# Patient Record
Sex: FEMALE | Race: WHITE | NOT HISPANIC OR LATINO | Employment: OTHER | URBAN - METROPOLITAN AREA
[De-identification: names, ages, dates, MRNs, and addresses within clinical notes are randomized per-mention and may not be internally consistent; named-entity substitution may affect disease eponyms.]

---

## 2017-01-03 ENCOUNTER — OFFICE VISIT (OUTPATIENT)
Dept: MEDICAL GROUP | Facility: PHYSICIAN GROUP | Age: 62
End: 2017-01-03
Payer: COMMERCIAL

## 2017-01-03 VITALS
RESPIRATION RATE: 16 BRPM | BODY MASS INDEX: 35.88 KG/M2 | WEIGHT: 178 LBS | TEMPERATURE: 97.9 F | DIASTOLIC BLOOD PRESSURE: 80 MMHG | HEART RATE: 86 BPM | SYSTOLIC BLOOD PRESSURE: 116 MMHG | OXYGEN SATURATION: 98 % | HEIGHT: 59 IN

## 2017-01-03 DIAGNOSIS — D70.9 NEUTROPENIA, UNSPECIFIED TYPE (HCC): ICD-10-CM

## 2017-01-03 DIAGNOSIS — B38.2 COCCIDIOIDOMYCOSIS, PULMONARY (HCC): ICD-10-CM

## 2017-01-03 DIAGNOSIS — Z23 NEED FOR PNEUMOCOCCAL VACCINATION: ICD-10-CM

## 2017-01-03 DIAGNOSIS — R74.8 ELEVATED LIVER ENZYMES: ICD-10-CM

## 2017-01-03 DIAGNOSIS — E78.5 DYSLIPIDEMIA: ICD-10-CM

## 2017-01-03 PROCEDURE — 99214 OFFICE O/P EST MOD 30 MIN: CPT | Mod: 25 | Performed by: FAMILY MEDICINE

## 2017-01-03 PROCEDURE — 90471 IMMUNIZATION ADMIN: CPT | Performed by: FAMILY MEDICINE

## 2017-01-03 PROCEDURE — 90670 PCV13 VACCINE IM: CPT | Performed by: FAMILY MEDICINE

## 2017-01-03 RX ORDER — NAPROXEN 500 MG/1
500 TABLET ORAL 2 TIMES DAILY WITH MEALS
COMMUNITY
End: 2017-05-22

## 2017-01-03 RX ORDER — ASCORBIC ACID 500 MG
1000 TABLET ORAL DAILY
Status: ON HOLD | COMMUNITY
End: 2018-10-09

## 2017-01-03 RX ORDER — HYDROCODONE BITARTRATE AND ACETAMINOPHEN 10; 325 MG/1; MG/1
1-2 TABLET ORAL EVERY 6 HOURS PRN
COMMUNITY
End: 2017-05-22

## 2017-01-03 RX ORDER — CLONAZEPAM 0.5 MG/1
0.25 TABLET ORAL 2 TIMES DAILY
Status: ON HOLD | COMMUNITY
End: 2017-06-07

## 2017-01-03 RX ORDER — SUMATRIPTAN 25 MG/1
25-100 TABLET, FILM COATED ORAL
Status: ON HOLD | COMMUNITY
End: 2017-06-07

## 2017-01-03 NOTE — MR AVS SNAPSHOT
"Dee Armstrong   1/3/2017 8:00 AM   Office Visit   MRN: [id number]    Department:  [facility]   Dept Phone:  [phone]    Description:  Female : 1955   Provider:  Sherly Calvert M.D.           Reason for Visit     Follow-Up 3 Month Follow Up       Allergies as of 1/3/2017     Allergen Noted Reactions    Nkda [No Known Drug Allergy] 2010         You were diagnosed with     Coccidioidomycosis, pulmonary (HCC)   [883397]       Dyslipidemia   [483605]       Elevated liver enzymes   [991839]       Neutropenia, unspecified type (HCC)   [5558373]       Need for pneumococcal vaccination   [337443]         Vital Signs     Blood Pressure Pulse Temperature Respirations Height Weight    116/80 mmHg 86 36.6 °C (97.9 °F) 16 1.499 m (4' 11.02\") 80.74 kg (178 lb)    Body Mass Index Oxygen Saturation Breastfeeding? Smoking Status          35.93 kg/m2 98% No Former Smoker        Basic Information     Date Of Birth Sex Race Ethnicity Preferred Language    1955 Female White Non- English      Your appointments     2017  1:00 PM   Established Patient with Sherly Calvert M.D.   [facility] (--)    [address]  [address]  [facility] [address] [phone]           You will be receiving a confirmation call a few days before your appointment from our automated call confirmation system.              Problem List              ICD-10-CM Priority Class Noted - Resolved    Obesity E66.9 Low  Unknown - Present    GERD (gastroesophageal reflux disease) K21.9 Low  Unknown - Present    Diverticulosis K57.90 Low  Unknown - Present    Low back pain M54.5 Medium  Unknown - Present    Vitamin D deficiency E55.9 Low  2013 - Present    Chest pain R07.9   2015 - Present    Cough R05   2015 - Present    Lumbar radicular pain M54.16 Medium  2015 - Present    DDD (degenerative disc disease), lumbar M51.36 High  2016 - Present    Anemia D64.9 Medium Acute 2016 - " Present    Hypokalemia E87.6 Medium Acute 2/18/2016 - Present    Coccidioidomycosis, pulmonary (HCC) B38.2   7/19/2016 - Present    Coccidioidomycosis B38.9   11/30/2016 - Present    Incomplete rotator cuff tear or rupture of right shoulder, not specified as traumatic M75.111   12/6/2016 - Present      Health Maintenance        Date Due Completion Dates    IMM INFLUENZA (1) 9/1/2016 9/6/2015    MAMMOGRAM 4/29/2017 4/29/2016, 4/28/2015, 4/14/2014, 4/10/2013, 4/5/2012, 1/13/2011, 12/16/2009    PAP SMEAR 7/6/2018 7/6/2015, 7/11/2011, 6/10/2009    COLONOSCOPY 9/29/2021 9/29/2011    IMM DTaP/Tdap/Td Vaccine (2 - Td) 12/9/2024 12/9/2014            Current Immunizations     13-VALENT PCV PREVNAR  Incomplete    Influenza Vaccine Adult HD 9/6/2015    Influenza Vaccine Quad Inj (Pf) 11/10/2016    SHINGLES VACCINE 12/9/2013    Tdap Vaccine 12/9/2014      Below and/or attached are the medications your provider expects you to take. Review all of your home medications and newly ordered medications with your provider and/or pharmacist. Follow medication instructions as directed by your provider and/or pharmacist. Please keep your medication list with you and share with your provider. Update the information when medications are discontinued, doses are changed, or new medications (including over-the-counter products) are added; and carry medication information at all times in the event of emergency situations     Allergies:  NKDA - (reactions not documented)               Medications  Valid as of: January 03, 2017 -  8:31 AM    Generic Name Brand Name Tablet Size Instructions for use    Acetaminophen (Tab) TYLENOL 500 MG Take 500-1,000 mg by mouth every 6 hours as needed.        Ascorbic Acid (Tab) ascorbic acid 500 MG Take 500 mg by mouth every day.        Aspirin-Acetaminophen-Caffeine (Tab) EXCEDRIN 250-250-65 MG Take 1 Tab by mouth every 6 hours as needed for Headache.        Calcium   Take 3 Tabs by mouth every evening.         Cholecalciferol (Cap) Vitamin D 2000 UNITS Take 1 Cap by mouth every day.        Cholecalciferol (Tab) cholecalciferol 1000 UNIT Take 1,000 Units by mouth every day.        ClonazePAM (Tab) KLONOPIN 0.5 MG Take 0.25 mg by mouth 2 times a day.        Clotrimazole-Betamethasone (Cream) LOTRISONE 1-0.05 % Apply 1 Application to affected area(s) 2 Times a Day.        Fiber   Take  by mouth every day.        Hydrocodone-Acetaminophen (Tab) NORCO  MG Take 1-2 Tabs by mouth every 6 hours as needed.        Lansoprazole (CAPSULE DELAYED RELEASE) PREVACID 30 MG TAKE 1 CAPSULE DAILY        Multiple Vitamins-Minerals (Tab) THERAGRAN-M  Take 1 Tab by mouth every day.        Naproxen (Tab) NAPROSYN 500 MG Take 500 mg by mouth 2 times a day, with meals.        Polyethylene Glycol 3350 (Pack) MIRALAX  Take 1 Packet by mouth every day.        SUMAtriptan Succinate (Tab) IMITREX 25 MG Take  mg by mouth Once PRN for Migraine.        .                 Medicines prescribed today were sent to:     [facility]ARMACY #[id number] - [place] - [address]    [address] [place] [address]    Phone: [phone] Fax: [phone]    Open 24 Hours?: No    [facility] [place], [place] - [address] AT PORTAL TO REGISTERED [facility] SITES    [address] [facility] [address]    Phone: [phone] Fax: [phone]    Open 24 Hours?: No      Medication refill instructions:       If your prescription bottle indicates you have medication refills left, it is not necessary to call your provider’s office. Please contact your pharmacy and they will refill your medication.    If your prescription bottle indicates you do not have any refills left, you may request refills at any time through one of the following ways: The online [facility] system (except Urgent Care), by calling your provider’s office, or by asking your pharmacy to contact your provider’s office with a refill request. Medication refills are processed  only during regular business hours and may not be available until the next business day. Your provider may request additional information or to have a follow-up visit with you prior to refilling your medication.   *Please Note: Medication refills are assigned a new Rx number when refilled electronically. Your pharmacy may indicate that no refills were authorized even though a new prescription for the same medication is available at the pharmacy. Please request the medicine by name with the pharmacy before contacting your provider for a refill.        Your To Do List     Future Labs/Procedures Complete By Expires    COMP METABOLIC PANEL  As directed 1/4/2018    HEP A AB IGM  As directed 1/4/2018    HEP A AB TOTAL  As directed 1/4/2018    HEP B SURFACE AB  As directed 1/4/2018    HEP B SURFACE ANTIGEN  As directed 1/4/2018    HEP C VIRUS ANTIBODY  As directed 1/4/2018    LIPID PROFILE  As directed 1/4/2018         [employer] Access Code: Activation code not generated  Current [employer] Status: Active

## 2017-01-04 NOTE — PATIENT INSTRUCTIONS
Patient instructions given regarding labs, x-rays, medications, referral and followup appointment.

## 2017-01-04 NOTE — PROGRESS NOTES
"Subjective:      Dee Armstrong is a 61 y.o. female who presents with Follow-Up            HPI     Follow-up of her medical problems.    For her pulmonary coccidioidomycosis, pulmonologist discontinued her Diflucan in 11/30 after she has been on it for a year. Most recent chest x-ray at the end of November 2016 came back mass in the left lung has not changed from before. The recommendation is to do annual chest x-ray. She has been asymptomatic even from the time of diagnosis.    She continues to manage her dyslipidemia with her own efforts. When we did her blood work in 9/16 her 10 year cardiovascular disease risk was less than 7.5%.     We have seen mild elevation of her alkaline phosphatase which started after she had her back surgery in 2/16. Patient already had her gallbladder removed a few years ago. She denies any abdominal pain, nausea, vomiting. She consumes wine about one glass per month.    We also did a follow-up CBC because she had slightly low WBC count 3 months ago. She has not had any recurrent infections.    She needs Prevnar 13 today. Her pulmonologist recommended that she gets both the Prevnar 13 and Pneumovax.    Past medical history, past surgical history, family history reviewed-no changes    Social history reviewed-no changes    Allergies reviewed-no changes    Medications reviewed-no changes    ROS     Review of systems as per history of present illness, the rest are negative.     Objective:     /80 mmHg  Pulse 86  Temp(Src) 36.6 °C (97.9 °F)  Resp 16  Ht 1.499 m (4' 11.02\")  Wt 80.74 kg (178 lb)  BMI 35.93 kg/m2  SpO2 98%  Breastfeeding? No     Physical Exam     Examined alert, awake, oriented, not in distress    Neck-supple, no lymphadenopathy, no thyromegaly  Lungs-clear to auscultation, no rales, no wheezes  Heart-regular rate and rhythm, no murmur  Extremities-no edema, clubbing, cyanosis     Hospital Outpatient Visit on 12/16/2016   Component Date Value   • WBC " 12/16/2016 7.5 previously 3.6    • RBC 12/16/2016 4.16*   • Hemoglobin 12/16/2016 13.3    • Hematocrit 12/16/2016 41.7    • MCV 12/16/2016 100.2*   • MCH 12/16/2016 32.0    • MCHC 12/16/2016 31.9*   • RDW 12/16/2016 49.5    • Platelet Count 12/16/2016 345    • MPV 12/16/2016 9.3    • Neutrophils-Polys 12/16/2016 69.30    • Lymphocytes 12/16/2016 21.10*   • Monocytes 12/16/2016 5.80    • Eosinophils 12/16/2016 2.40    • Basophils 12/16/2016 1.10    • Immature Granulocytes 12/16/2016 0.30    • Nucleated RBC 12/16/2016 0.00    • Neutrophils (Absolute) 12/16/2016 5.23    • Lymphs (Absolute) 12/16/2016 1.59    • Monos (Absolute) 12/16/2016 0.44    • Eos (Absolute) 12/16/2016 0.18    • Baso (Absolute) 12/16/2016 0.08    • Immature Granulocytes (a* 12/16/2016 0.02    • NRBC (Absolute) 12/16/2016 0.00    • Sodium 12/16/2016 138    • Potassium 12/16/2016 4.6    • Chloride 12/16/2016 103    • Co2 12/16/2016 27    • Anion Gap 12/16/2016 8.0    • Glucose 12/16/2016 86    • Bun 12/16/2016 26*   • Creatinine 12/16/2016 0.72    • Calcium 12/16/2016 9.9    • AST(SGOT) 12/16/2016 20    • ALT(SGPT) 12/16/2016 52*previously 18    • Alkaline Phosphatase 12/16/2016 130*previously 104    • Total Bilirubin 12/16/2016 0.4    • Albumin 12/16/2016 3.9    • Total Protein 12/16/2016 7.2    • Globulin 12/16/2016 3.3    • A-G Ratio 12/16/2016 1.2    • GFR If  12/16/2016 >60    • GFR If Non  Ameri* 12/16/2016 >60         Assessment/Plan:     1. Coccidioidomycosis, pulmonary (HCC)  She was treated with Diflucan for a total of one year. She finished treatment. Her chest x-ray has been stable without change in the lung mass from the pulmonary coccidioidomycosis. She will follow up with the pulmonologist on a yearly basis with her yearly chest x-ray. She is asymptomatic.  - LIPID PROFILE; Future  - COMP METABOLIC PANEL; Future  - HEP B SURFACE ANTIGEN; Future  - HEP C VIRUS ANTIBODY; Future  - HEP A AB IGM; Future  - HEP B  SURFACE AB; Future  - HEP A AB TOTAL; Future    2. Dyslipidemia  Her last blood work in September 2016 came back less than 7.5% 10 year cardiovascular disease risk. We will continue to have her manage this with her own efforts. We will do follow-up blood work next visit.  - LIPID PROFILE; Future  - COMP METABOLIC PANEL; Future  - HEP B SURFACE ANTIGEN; Future  - HEP C VIRUS ANTIBODY; Future  - HEP A AB IGM; Future  - HEP B SURFACE AB; Future  - HEP A AB TOTAL; Future    3. Elevated liver enzymes  Her alkaline phosphatase is higher than before. This is probably because she just had arthroscopic surgery of her shoulder. Her ALT is now elevated. I will check viral hepatitis panel to rule out hepatitis A, B, C. Consider further evaluation with abdominal ultrasound depending on results.  - LIPID PROFILE; Future  - COMP METABOLIC PANEL; Future  - HEP B SURFACE ANTIGEN; Future  - HEP C VIRUS ANTIBODY; Future  - HEP A AB IGM; Future  - HEP B SURFACE AB; Future  - HEP A AB TOTAL; Future    4. Neutropenia, unspecified type (HCC)  This has resolved. We don't need to do any further intervention.  - LIPID PROFILE; Future  - COMP METABOLIC PANEL; Future  - HEP B SURFACE ANTIGEN; Future  - HEP C VIRUS ANTIBODY; Future  - HEP A AB IGM; Future  - HEP B SURFACE AB; Future  - HEP A AB TOTAL; Future    5. Need for pneumococcal vaccination  Prevnar 13 was given.  - PNEUMOCOCCAL CONJUGATE VACCINE 13-VALENT      Please note that this dictation was created using voice recognition software. I have worked with consultants from the vendor as well as technical experts from [facility]  [employer] to optimize the interface. I have made every reasonable attempt to correct obvious errors, but I expect that there are errors of grammar and possibly content I did not discover before finalizing the note.

## 2017-02-21 RX ORDER — LANSOPRAZOLE 30 MG/1
CAPSULE, DELAYED RELEASE ORAL
Qty: 90 CAP | Refills: 1 | Status: ON HOLD | OUTPATIENT
Start: 2017-02-21 | End: 2017-08-11 | Stop reason: SDUPTHER

## 2017-03-29 ENCOUNTER — HOSPITAL ENCOUNTER (OUTPATIENT)
Dept: RADIOLOGY | Facility: MEDICAL CENTER | Age: 62
End: 2017-03-29
Attending: FAMILY MEDICINE
Payer: COMMERCIAL

## 2017-03-29 ENCOUNTER — HOSPITAL ENCOUNTER (OUTPATIENT)
Dept: LAB | Facility: MEDICAL CENTER | Age: 62
End: 2017-03-29
Attending: FAMILY MEDICINE
Payer: COMMERCIAL

## 2017-03-29 ENCOUNTER — OFFICE VISIT (OUTPATIENT)
Dept: URGENT CARE | Facility: PHYSICIAN GROUP | Age: 62
End: 2017-03-29
Payer: COMMERCIAL

## 2017-03-29 VITALS
OXYGEN SATURATION: 97 % | HEART RATE: 89 BPM | RESPIRATION RATE: 16 BRPM | WEIGHT: 177 LBS | DIASTOLIC BLOOD PRESSURE: 80 MMHG | TEMPERATURE: 99.9 F | BODY MASS INDEX: 35.68 KG/M2 | SYSTOLIC BLOOD PRESSURE: 140 MMHG | HEIGHT: 59 IN

## 2017-03-29 DIAGNOSIS — B38.2 COCCIDIOIDOMYCOSIS, PULMONARY (HCC): ICD-10-CM

## 2017-03-29 DIAGNOSIS — R06.2 WHEEZE: ICD-10-CM

## 2017-03-29 DIAGNOSIS — E78.5 DYSLIPIDEMIA: ICD-10-CM

## 2017-03-29 DIAGNOSIS — R74.8 ELEVATED LIVER ENZYMES: ICD-10-CM

## 2017-03-29 DIAGNOSIS — R19.7 DIARRHEA OF PRESUMED INFECTIOUS ORIGIN: ICD-10-CM

## 2017-03-29 DIAGNOSIS — Z86.19 HISTORY OF COCCIDIOIDOMYCOSIS: ICD-10-CM

## 2017-03-29 DIAGNOSIS — R05.9 COUGH: ICD-10-CM

## 2017-03-29 DIAGNOSIS — D70.9 NEUTROPENIA, UNSPECIFIED TYPE (HCC): ICD-10-CM

## 2017-03-29 LAB
ALBUMIN SERPL BCP-MCNC: 3.9 G/DL (ref 3.2–4.9)
ALBUMIN/GLOB SERPL: 1.2 G/DL
ALP SERPL-CCNC: 89 U/L (ref 30–99)
ALT SERPL-CCNC: 14 U/L (ref 2–50)
ANION GAP SERPL CALC-SCNC: 8 MMOL/L (ref 0–11.9)
AST SERPL-CCNC: 18 U/L (ref 12–45)
BILIRUB SERPL-MCNC: 0.3 MG/DL (ref 0.1–1.5)
BUN SERPL-MCNC: 11 MG/DL (ref 8–22)
CALCIUM SERPL-MCNC: 9.1 MG/DL (ref 8.5–10.5)
CHLORIDE SERPL-SCNC: 106 MMOL/L (ref 96–112)
CHOLEST SERPL-MCNC: 173 MG/DL (ref 100–199)
CO2 SERPL-SCNC: 26 MMOL/L (ref 20–33)
CREAT SERPL-MCNC: 0.54 MG/DL (ref 0.5–1.4)
GLOBULIN SER CALC-MCNC: 3.2 G/DL (ref 1.9–3.5)
GLUCOSE SERPL-MCNC: 89 MG/DL (ref 65–99)
HAV IGM SERPL QL IA: NEGATIVE
HBV SURFACE AB SER RIA-ACNC: 3.37 MIU/ML (ref 0–10)
HBV SURFACE AG SERPL QL IA: NEGATIVE
HCV AB S/CO SERPL IA: NEGATIVE
HDLC SERPL-MCNC: 59 MG/DL
LDLC SERPL CALC-MCNC: 91 MG/DL
POTASSIUM SERPL-SCNC: 3.7 MMOL/L (ref 3.6–5.5)
PROT SERPL-MCNC: 7.1 G/DL (ref 6–8.2)
SODIUM SERPL-SCNC: 140 MMOL/L (ref 135–145)
TRIGL SERPL-MCNC: 114 MG/DL (ref 0–149)

## 2017-03-29 PROCEDURE — 71020 DX-CHEST-2 VIEWS: CPT

## 2017-03-29 PROCEDURE — 86708 HEPATITIS A ANTIBODY: CPT

## 2017-03-29 PROCEDURE — 80061 LIPID PANEL: CPT

## 2017-03-29 PROCEDURE — 86706 HEP B SURFACE ANTIBODY: CPT

## 2017-03-29 PROCEDURE — 86709 HEPATITIS A IGM ANTIBODY: CPT

## 2017-03-29 PROCEDURE — 80053 COMPREHEN METABOLIC PANEL: CPT

## 2017-03-29 PROCEDURE — 99214 OFFICE O/P EST MOD 30 MIN: CPT | Performed by: FAMILY MEDICINE

## 2017-03-29 PROCEDURE — 86803 HEPATITIS C AB TEST: CPT

## 2017-03-29 PROCEDURE — 36415 COLL VENOUS BLD VENIPUNCTURE: CPT

## 2017-03-29 PROCEDURE — 87340 HEPATITIS B SURFACE AG IA: CPT

## 2017-03-29 RX ORDER — ALBUTEROL SULFATE 90 UG/1
2 AEROSOL, METERED RESPIRATORY (INHALATION) EVERY 4 HOURS PRN
Qty: 1 INHALER | Refills: 0 | Status: SHIPPED | OUTPATIENT
Start: 2017-03-29 | End: 2017-05-22

## 2017-03-29 RX ORDER — PROMETHAZINE HYDROCHLORIDE AND CODEINE PHOSPHATE 6.25; 1 MG/5ML; MG/5ML
5 SYRUP ORAL 4 TIMES DAILY PRN
Qty: 120 ML | Refills: 0 | Status: SHIPPED | OUTPATIENT
Start: 2017-03-29 | End: 2017-05-22

## 2017-03-29 ASSESSMENT — ENCOUNTER SYMPTOMS
WEIGHT LOSS: 0
HEADACHES: 0
COUGH: 1
ABDOMINAL PAIN: 0
NAUSEA: 0
MYALGIAS: 0
EYE DISCHARGE: 0
EYE REDNESS: 0
VOMITING: 0
DIARRHEA: 1
CHILLS: 1

## 2017-03-29 NOTE — MR AVS SNAPSHOT
"Dee Armstrong   3/29/2017 8:15 AM   Office Visit   MRN: [id number]    Department:  [facility]   Dept Phone:  [phone]    Description:  Female : 1955   Provider:  Jasvir Maciel M.D.           Reason for Visit     Cough with wheezing x 6 days       Allergies as of 3/29/2017     Allergen Noted Reactions    Nkda [No Known Drug Allergy] 2010         You were diagnosed with     Cough   [786.2.ICD-9-CM]       Wheeze   [695083]       History of coccidioidomycosis   [631439]       Diarrhea of presumed infectious origin   [009.3.ICD-9-CM]         Vital Signs     Blood Pressure Pulse Temperature Respirations Height Weight    140/80 mmHg 89 37.7 °C (99.9 °F) 16 1.499 m (4' 11.02\") 80.287 kg (177 lb)    Body Mass Index Oxygen Saturation Smoking Status             35.73 kg/m2 97% Former Smoker         Basic Information     Date Of Birth Sex Race Ethnicity Preferred Language    1955 Female White Non- English      Your appointments     2017  1:00 PM   Established Patient with Sherly Calvert M.D.   [facility] (--)    [address]  [address]  [facility] [address] [phone]           You will be receiving a confirmation call a few days before your appointment from our automated call confirmation system.              Problem List              ICD-10-CM Priority Class Noted - Resolved    Obesity E66.9 Low  Unknown - Present    GERD (gastroesophageal reflux disease) K21.9 Low  Unknown - Present    Diverticulosis K57.90 Low  Unknown - Present    Low back pain M54.5 Medium  Unknown - Present    Vitamin D deficiency E55.9 Low  2013 - Present    Chest pain R07.9   2015 - Present    Cough R05   2015 - Present    Lumbar radicular pain M54.16 Medium  2015 - Present    DDD (degenerative disc disease), lumbar M51.36 High  2016 - Present    Anemia D64.9 Medium Acute 2016 - Present    Hypokalemia E87.6 Medium Acute 2016 - Present   " Coccidioidomycosis, pulmonary ([facility]) B38.2   7/19/2016 - Present    Coccidioidomycosis B38.9   11/30/2016 - Present    Incomplete rotator cuff tear or rupture of right shoulder, not specified as traumatic M75.111   12/6/2016 - Present      Health Maintenance        Date Due Completion Dates    MAMMOGRAM 4/29/2017 4/29/2016, 4/28/2015, 4/14/2014, 4/10/2013, 4/5/2012, 1/13/2011, 12/16/2009    PAP SMEAR 7/6/2018 7/6/2015, 7/11/2011, 6/10/2009    COLONOSCOPY 9/29/2021 9/29/2011    IMM DTaP/Tdap/Td Vaccine (2 - Td) 12/9/2024 12/9/2014            Current Immunizations     13-VALENT PCV PREVNAR 1/3/2017  8:46 AM    Influenza TIV (IM) 11/10/2016    Influenza Vaccine Adult HD 9/6/2015    Influenza Vaccine Quad Inj (Pf) 11/10/2016    SHINGLES VACCINE 12/9/2013    Tdap Vaccine 12/9/2014      Below and/or attached are the medications your provider expects you to take. Review all of your home medications and newly ordered medications with your provider and/or pharmacist. Follow medication instructions as directed by your provider and/or pharmacist. Please keep your medication list with you and share with your provider. Update the information when medications are discontinued, doses are changed, or new medications (including over-the-counter products) are added; and carry medication information at all times in the event of emergency situations     Allergies:  NKDA - (reactions not documented)               Medications  Valid as of: March 29, 2017 -  9:28 AM    Generic Name Brand Name Tablet Size Instructions for use    Acetaminophen (Tab) TYLENOL 500 MG Take 500-1,000 mg by mouth every 6 hours as needed.        Albuterol Sulfate (Aero Soln) albuterol 108 (90 BASE) MCG/ACT Inhale 2 Puffs by mouth every four hours as needed for Shortness of Breath.        Ascorbic Acid (Tab) ascorbic acid 500 MG Take 500 mg by mouth every day.        Aspirin-Acetaminophen-Caffeine (Tab) EXCEDRIN 250-250-65 MG Take 1 Tab by mouth every 6 hours as  needed for Headache.        Calcium   Take 3 Tabs by mouth every evening.        Cholecalciferol (Cap) Vitamin D 2000 UNITS Take 1 Cap by mouth every day.        Cholecalciferol (Tab) cholecalciferol 1000 UNIT Take 1,000 Units by mouth every day.        ClonazePAM (Tab) KLONOPIN 0.5 MG Take 0.25 mg by mouth 2 times a day.        Clotrimazole-Betamethasone (Cream) LOTRISONE 1-0.05 % Apply 1 Application to affected area(s) 2 Times a Day.        Fiber   Take  by mouth every day.        Hydrocodone-Acetaminophen (Tab) NORCO  MG Take 1-2 Tabs by mouth every 6 hours as needed.        Lansoprazole (CAPSULE DELAYED RELEASE) PREVACID 30 MG TAKE 1 CAPSULE DAILY        Multiple Vitamins-Minerals (Tab) THERAGRAN-M  Take 1 Tab by mouth every day.        Naproxen (Tab) NAPROSYN 500 MG Take 500 mg by mouth 2 times a day, with meals.        Polyethylene Glycol 3350 (Pack) MIRALAX  Take 1 Packet by mouth every day.        Promethazine-Codeine (Syrup) PHENERGAN-CODEINE 6.25-10 MG/5ML Take 5 mL by mouth 4 times a day as needed for Cough.        SUMAtriptan Succinate (Tab) IMITREX 25 MG Take  mg by mouth Once PRN for Migraine.        .                 Medicines prescribed today were sent to:     [facility]/PHARMACY #[id number] - [place] - [address]    [address] [facility] [place] [address]    Phone: [phone] Fax: [phone]    Open 24 Hours?: No    [facility] - [address] [facility] [address]    Phone: [phone] Fax: [phone]    Open 24 Hours?: No      Medication refill instructions:       If your prescription bottle indicates you have medication refills left, it is not necessary to call your provider’s office. Please contact your pharmacy and they will refill your medication.    If your prescription bottle indicates you do not have any refills left, you may request refills at any time through one of the following ways: The online [employer] system  (except Urgent Care), by calling your provider’s office, or by asking your pharmacy to contact your provider’s office with a refill request. Medication refills are processed only during regular business hours and may not be available until the next business day. Your provider may request additional information or to have a follow-up visit with you prior to refilling your medication.   *Please Note: Medication refills are assigned a new Rx number when refilled electronically. Your pharmacy may indicate that no refills were authorized even though a new prescription for the same medication is available at the pharmacy. Please request the medicine by name with the pharmacy before contacting your provider for a refill.        Your To Do List     Future Labs/Procedures Complete By Expires    CDIFF BY PCR  As directed 3/30/2018    CRYPTO/GIARDIA RAPID ASSAY  As directed 3/29/2018    CULTURE STOOL  As directed 3/29/2018    DX-CHEST-2 VIEWS  As directed 3/29/2018         MyChart Access Code: Activation code not generated  Current [employer] Status: Active

## 2017-03-29 NOTE — PROGRESS NOTES
"Subjective:      Dee Armstrong is a 61 y.o. female who presents with Cough            Cough  This is a new problem. Episode onset: 6 days nonproductive wet cough. No fever. Intermittent wheeze. No SOB. No PMH asthma. +PMH coccidiomycosis left lung. +nasal congestion. No ST. OTC dayquil with minimal relief.  Associated symptoms include chills (3 days ago). Pertinent negatives include no eye redness, headaches, myalgias, rash or weight loss.       Review of Systems   Constitutional: Positive for chills (3 days ago) and malaise/fatigue. Negative for weight loss.   Eyes: Negative for discharge and redness.   Respiratory: Positive for cough.    Gastrointestinal: Positive for diarrhea (watery, 2x daily since 3/14. No blood in diarrhea. No foreign travel/abx/camping). Negative for nausea, vomiting and abdominal pain.   Genitourinary:        Normal urine output   Musculoskeletal: Negative for myalgias and joint pain.   Skin: Negative for itching and rash.   Neurological: Negative for headaches.   .  Medications, Allergies, and current problem list reviewed today in Epic         Objective:     /80 mmHg  Pulse 89  Temp(Src) 37.7 °C (99.9 °F)  Resp 16  Ht 1.499 m (4' 11.02\")  Wt 80.287 kg (177 lb)  BMI 35.73 kg/m2  SpO2 97%     Physical Exam   Constitutional: She appears well-developed and well-nourished. No distress.   HENT:   Head: Normocephalic and atraumatic.   Nasal congestion   Eyes: Conjunctivae are normal.   Neck: Neck supple.   Cardiovascular: Normal rate, regular rhythm and normal heart sounds.    Pulmonary/Chest: Effort normal. She has wheezes (end expiratory, rhonchi heard best right anterior).   Abdominal: Soft. Bowel sounds are normal. There is no tenderness.   Lymphadenopathy:     She has no cervical adenopathy.   Neurological:   Speech is clear. Patient is appropriate and cooperative.     Skin: Skin is warm and dry. No rash noted.               Assessment/Plan:     Pulse ox adequate  CXR: no " acute cardiopulmonary process by my read, previous xray 11/2016 reviewed with similar appearing PATTI oval opacity.  radiology pending.    1. Cough  DX-CHEST-2 VIEWS    promethazine-codeine (PHENERGAN-CODEINE) 6.25-10 MG/5ML Syrup   2. Wheeze  DX-CHEST-2 VIEWS    albuterol 108 (90 BASE) MCG/ACT Aero Soln inhalation aerosol   3. History of coccidioidomycosis  DX-CHEST-2 VIEWS   4. Diarrhea of presumed infectious origin  CULTURE STOOL    CRYPTO/GIARDIA RAPID ASSAY    CDIFF BY PCR     Differential diagnosis, natural history, supportive care, and indications for immediate follow-up discussed at length.   Will f/u stool studies.

## 2017-03-30 LAB — HAV AB SER QL IA: POSITIVE

## 2017-03-31 ENCOUNTER — TELEPHONE (OUTPATIENT)
Dept: MEDICAL GROUP | Facility: PHYSICIAN GROUP | Age: 62
End: 2017-03-31

## 2017-03-31 ENCOUNTER — HOSPITAL ENCOUNTER (OUTPATIENT)
Facility: MEDICAL CENTER | Age: 62
End: 2017-03-31
Attending: FAMILY MEDICINE
Payer: COMMERCIAL

## 2017-03-31 DIAGNOSIS — R19.7 DIARRHEA OF PRESUMED INFECTIOUS ORIGIN: ICD-10-CM

## 2017-03-31 LAB
C DIFF DNA SPEC QL NAA+PROBE: NEGATIVE
C DIFF TOX GENS STL QL NAA+PROBE: NEGATIVE
G LAMBLIA+C PARVUM AG STL QL RAPID: NORMAL
SIGNIFICANT IND 70042: NORMAL
SITE SITE: NORMAL
SOURCE SOURCE: NORMAL

## 2017-03-31 PROCEDURE — 87045 FECES CULTURE AEROBIC BACT: CPT

## 2017-03-31 PROCEDURE — 87329 GIARDIA AG IA: CPT

## 2017-03-31 PROCEDURE — 87493 C DIFF AMPLIFIED PROBE: CPT

## 2017-03-31 PROCEDURE — 87899 AGENT NOS ASSAY W/OPTIC: CPT

## 2017-03-31 PROCEDURE — 87328 CRYPTOSPORIDIUM AG IA: CPT

## 2017-03-31 PROCEDURE — 87046 STOOL CULTR AEROBIC BACT EA: CPT

## 2017-03-31 NOTE — TELEPHONE ENCOUNTER
1. Caller Name: Dee Armstrong                      Call Back Number: [phone] (home)     2. Message: Pt notified of results below. Pt stated she has an appointment with Dr. Calvert Monday and will speak with her if she has any questions.     3. Patient approves office to leave a detailed voicemail/MyChart message: N\A

## 2017-03-31 NOTE — TELEPHONE ENCOUNTER
Please let patient know that her results were positive for Hepatitis A. This is different from Hepatitis B or C. Hepatitis A is not a bloodborne illness. It is a GI illness and can cause vomiting, diarrhea and abdominal pain. Her liver tests are now in normal range which indicates she probably had the illness several months ago. Hepatitis A can be transmitted by contaminated food or stool, so it is important that she washes her hands.     Hepatitis A is a reportable disease. I will submit the paperwork to the health department. Dee may receive a call from them if they have any questions.    If patient has any concerns, please let me know and I can speak to her at lunchtime or the end of the day.    Dr. Greene covering for Dr. Calvert

## 2017-04-01 LAB
E COLI SXT1+2 STL IA: NORMAL
SIGNIFICANT IND 70042: NORMAL
SITE SITE: NORMAL
SOURCE SOURCE: NORMAL

## 2017-04-03 ENCOUNTER — OFFICE VISIT (OUTPATIENT)
Dept: MEDICAL GROUP | Facility: PHYSICIAN GROUP | Age: 62
End: 2017-04-03
Payer: COMMERCIAL

## 2017-04-03 VITALS
SYSTOLIC BLOOD PRESSURE: 130 MMHG | DIASTOLIC BLOOD PRESSURE: 80 MMHG | BODY MASS INDEX: 35.88 KG/M2 | WEIGHT: 178 LBS | OXYGEN SATURATION: 94 % | HEART RATE: 84 BPM | TEMPERATURE: 98.1 F | HEIGHT: 59 IN | RESPIRATION RATE: 18 BRPM

## 2017-04-03 DIAGNOSIS — B38.2 COCCIDIOIDOMYCOSIS, PULMONARY (HCC): ICD-10-CM

## 2017-04-03 DIAGNOSIS — E78.5 DYSLIPIDEMIA: ICD-10-CM

## 2017-04-03 DIAGNOSIS — R19.7 DIARRHEA, UNSPECIFIED TYPE: ICD-10-CM

## 2017-04-03 DIAGNOSIS — E66.9 OBESITY (BMI 35.0-39.9 WITHOUT COMORBIDITY): ICD-10-CM

## 2017-04-03 DIAGNOSIS — R74.8 ELEVATED LIVER ENZYMES: ICD-10-CM

## 2017-04-03 DIAGNOSIS — Z78.9 IMMUNE TO HEPATITIS A: ICD-10-CM

## 2017-04-03 LAB
BACTERIA STL CULT: NORMAL
E COLI SXT1+2 STL IA: NORMAL
SIGNIFICANT IND 70042: NORMAL
SITE SITE: NORMAL
SOURCE SOURCE: NORMAL

## 2017-04-03 PROCEDURE — 99214 OFFICE O/P EST MOD 30 MIN: CPT | Performed by: FAMILY MEDICINE

## 2017-04-03 RX ORDER — ALBUTEROL SULFATE 90 UG/1
2 AEROSOL, METERED RESPIRATORY (INHALATION) EVERY 6 HOURS PRN
COMMUNITY
End: 2017-05-22

## 2017-04-03 RX ORDER — ESTRADIOL 0.1 MG/G
1 CREAM VAGINAL DAILY
COMMUNITY
End: 2018-10-08

## 2017-04-03 NOTE — MR AVS SNAPSHOT
"Dee Armstrong   4/3/2017 1:00 PM   Office Visit   MRN: [id number]    Department:  [facility]   Dept Phone:  [phone]    Description:  Female : 1955   Provider:  Sherly Calvert M.D.           Reason for Visit     Follow-Up 3 month follow up       Allergies as of 4/3/2017     Allergen Noted Reactions    Nkda [No Known Drug Allergy] 2010         You were diagnosed with     Dyslipidemia   [999509]       Elevated liver enzymes   [733603]       Immune to hepatitis A   [208293]       Coccidioidomycosis, pulmonary (CMS-HCC)   [389118]       Diarrhea, unspecified type   [6737246]         Vital Signs     Blood Pressure Pulse Temperature Respirations Height Weight    122/89 mmHg 84 36.7 °C (98 °F) 18 1.499 m (4' 11\") 80.74 kg (178 lb)    Body Mass Index Oxygen Saturation Smoking Status             35.93 kg/m2 94% Former Smoker         Basic Information     Date Of Birth Sex Race Ethnicity Preferred Language    1955 Female White Non- English      Your appointments     Oct 09, 2017  1:00 PM   Established Patient with Sherly Calvert M.D.   [facility] (--)    [address]  [address]  [facility] [address] [phone]           You will be receiving a confirmation call a few days before your appointment from our automated call confirmation system.              Problem List              ICD-10-CM Priority Class Noted - Resolved    Obesity E66.9 Low  Unknown - Present    GERD (gastroesophageal reflux disease) K21.9 Low  Unknown - Present    Diverticulosis K57.90 Low  Unknown - Present    Low back pain M54.5 Medium  Unknown - Present    Vitamin D deficiency E55.9 Low  2013 - Present    Chest pain R07.9   2015 - Present    Cough R05   2015 - Present    Lumbar radicular pain M54.16 Medium  2015 - Present    DDD (degenerative disc disease), lumbar M51.36 High  2016 - Present    Anemia D64.9 Medium Acute 2016 - Present    Hypokalemia E87.6 Medium " Acute 2/18/2016 - Present    Coccidioidomycosis, pulmonary (CMS-[facility]) B38.2   7/19/2016 - Present    Coccidioidomycosis B38.9   11/30/2016 - Present    Incomplete rotator cuff tear or rupture of right shoulder, not specified as traumatic M75.111   12/6/2016 - Present      Health Maintenance        Date Due Completion Dates    MAMMOGRAM 4/29/2017 4/29/2016, 4/28/2015, 4/14/2014, 4/10/2013, 4/5/2012, 1/13/2011, 12/16/2009    PAP SMEAR 7/6/2018 7/6/2015, 7/11/2011, 6/10/2009    COLONOSCOPY 9/29/2021 9/29/2011    IMM DTaP/Tdap/Td Vaccine (2 - Td) 12/9/2024 12/9/2014            Current Immunizations     13-VALENT PCV PREVNAR 1/3/2017  8:46 AM    Influenza TIV (IM) 11/10/2016    Influenza Vaccine Adult HD 9/6/2015    Influenza Vaccine Quad Inj (Pf) 11/10/2016    SHINGLES VACCINE 12/9/2013    Tdap Vaccine 12/9/2014      Below and/or attached are the medications your provider expects you to take. Review all of your home medications and newly ordered medications with your provider and/or pharmacist. Follow medication instructions as directed by your provider and/or pharmacist. Please keep your medication list with you and share with your provider. Update the information when medications are discontinued, doses are changed, or new medications (including over-the-counter products) are added; and carry medication information at all times in the event of emergency situations     Allergies:  NKDA - (reactions not documented)               Medications  Valid as of: April 03, 2017 -  1:33 PM    Generic Name Brand Name Tablet Size Instructions for use    Acetaminophen (Tab) TYLENOL 500 MG Take 500-1,000 mg by mouth every 6 hours as needed.        Albuterol Sulfate (Aero Soln) albuterol 108 (90 BASE) MCG/ACT Inhale 2 Puffs by mouth every four hours as needed for Shortness of Breath.        Albuterol Sulfate (Aero Soln) albuterol 108 (90 BASE) MCG/ACT Inhale 2 Puffs by mouth every 6 hours as needed for Shortness of Breath.         Ascorbic Acid (Tab) ascorbic acid 500 MG Take 500 mg by mouth every day.        Aspirin-Acetaminophen-Caffeine (Tab) EXCEDRIN 250-250-65 MG Take 1 Tab by mouth every 6 hours as needed for Headache.        Calcium   Take 3 Tabs by mouth every evening.        Cholecalciferol (Cap) Vitamin D 2000 UNITS Take 1 Cap by mouth every day.        Cholecalciferol (Tab) cholecalciferol 1000 UNIT Take 1,000 Units by mouth every day.        ClonazePAM (Tab) KLONOPIN 0.5 MG Take 0.25 mg by mouth 2 times a day.        Clotrimazole-Betamethasone (Cream) LOTRISONE 1-0.05 % Apply 1 Application to affected area(s) 2 Times a Day.        Estradiol (Cream) ESTRACE 0.1 MG/GM Insert  in vagina every day.        Fiber   Take  by mouth every day.        Hydrocodone-Acetaminophen (Tab) NORCO  MG Take 1-2 Tabs by mouth every 6 hours as needed.        Lansoprazole (CAPSULE DELAYED RELEASE) PREVACID 30 MG TAKE 1 CAPSULE DAILY        Multiple Vitamins-Minerals (Tab) THERAGRAN-M  Take 1 Tab by mouth every day.        Naproxen (Tab) NAPROSYN 500 MG Take 500 mg by mouth 2 times a day, with meals.        Polyethylene Glycol 3350 (Pack) MIRALAX  Take 1 Packet by mouth every day.        Promethazine-Codeine (Syrup) PHENERGAN-CODEINE 6.25-10 MG/5ML Take 5 mL by mouth 4 times a day as needed for Cough.        SUMAtriptan Succinate (Tab) IMITREX 25 MG Take  mg by mouth Once PRN for Migraine.        .                 Medicines prescribed today were sent to:     [id number] - [place] [address] - [address]    [address] [place] [address]    Phone: [phone] Fax: [phone]    Open 24 Hours?: No    [facility] - [address] [facility] [address]    Phone: [phone] Fax: [phone]    Open 24 Hours?: No      Medication refill instructions:       If your prescription bottle indicates you have medication refills left, it is not necessary to call your provider’s  office. Please contact your pharmacy and they will refill your medication.    If your prescription bottle indicates you do not have any refills left, you may request refills at any time through one of the following ways: The online [employer] system (except Urgent Care), by calling your provider’s office, or by asking your pharmacy to contact your provider’s office with a refill request. Medication refills are processed only during regular business hours and may not be available until the next business day. Your provider may request additional information or to have a follow-up visit with you prior to refilling your medication.   *Please Note: Medication refills are assigned a new Rx number when refilled electronically. Your pharmacy may indicate that no refills were authorized even though a new prescription for the same medication is available at the pharmacy. Please request the medicine by name with the pharmacy before contacting your provider for a refill.        Your To Do List     Future Labs/Procedures Complete By Expires    COMP METABOLIC PANEL  As directed 4/4/2018    LIPID PROFILE  As directed 4/4/2018         [employer] Access Code: Activation code not generated  Current [employer] Status: Active

## 2017-04-04 NOTE — PROGRESS NOTES
"Subjective:      Dee Armstrong is a 61 y.o. female who presents with Follow-Up            HPI     Patient returns for follow-up of her medical problems.    She continues to manage her dyslipidemia with her own efforts only. She did a follow-up blood work before this visit.    We have been following her for elevated ALT and alkaline phosphatase. She already had cholecystectomy before. We had her 2 other hepatitis panel before this visit.    She continues to follow-up with her pulmonologist for history of pulmonary coccidioidomycosis. She has finished treatment with antifungal. They are recommending yearly chest x-ray which is due this coming August. She has residual scarring in the left upper lobe where she had the fungal infection.    She ended up in urgent care 2 days ago for cough and wheezing and diarrhea. She was treated supportively. Because of prior history of pulmonary coccidioidomycosis and recent trip to [place] where she acquired the infection, urgent care provider did a chest x-ray which showed scarring in the left upper lobe but no acute abnormalities. She was given albuterol HFA which is helped with the coughing. She was not given antibiotics. She was prescribed Phenergan/codeine cough syrup which has helped. She said she is now significantly improved. She was sent for stool studies which all came back negative for parasites, bacteria, C. difficile. She said the diarrhea has completely cleared after she's had it on and off for total of 3 weeks.    Past medical history, past surgical history, family history reviewed-no changes    Social history reviewed-no changes    Allergies reviewed-no changes    Medications reviewed-no changes        ROS     Review of systems as per history of present illness, the rest are negative.       Objective:     /89 mmHg  Pulse 84  Temp(Src) 36.7 °C (98 °F)  Resp 18  Ht 1.499 m (4' 11\")  Wt 80.74 kg (178 lb)  BMI 35.93 kg/m2  SpO2 94%     Physical Exam "     Examined alert, awake, oriented, not in distress    Neck-supple, no lymphadenopathy, no thyromegaly  Lungs-clear to auscultation, no rales, no wheezes  Heart-regular rate and rhythm, no murmur  Extremities-no edema, clubbing, cyanosis       I reviewed urgent care records including the chest x-ray result.    Hospital Outpatient Visit on 03/31/2017   Component Date Value   • EHEC 03/31/2017 Negative for Shiga Toxin 1 and 2.    • Significant Indicator 03/31/2017 NEG    • Source 03/31/2017 STL    • Site 03/31/2017 STOOL    • Culture Result Stool 03/31/2017                      Value:No enteric pathogens isolated.  NOTE:  Stool cultures are screened for Shiga Toxins 1 and 2,  Salmonella, Shigella, Campylobacter, Aeromonas,  Plesiomonas, and Vibrio.     • Significant Indicator 03/31/2017 NEG    • Source 03/31/2017 STL    • Site 03/31/2017 STOOL    • Ova And Parasites Antige* 03/31/2017                      Value:Negative for Giardia lamblia antigen.  Negative for Cryptosporidium parvum antigen.  NOTE:  The Cryptosporidium/Giardia assay is a rapid test for the  presence or absence of these specific antigens. In special  circumstances, a physician may need to request a complete  ova and parasite procedure when the patient meets certain  criteria. For example, recent travel abroad,immunosupression,  recent immigration, persistent undiagnosed diarrhea, or  persistent unexplained eosinophilia may be conditions to  warrant a complete ova and parasite examination. In these  special cases, or if the physician suspects another specific  gastrointestinal parasite,the Microbiology Department can  perform a complete ova and parasite microscopic examination.  The request for a complete ova and parasite examination must  come directly from the physician (or designee) within the  seven days of the original stool specimen being received in  the Microbiology Department. Stool specimens are discarded  after sev                           en days of storage.     • C Diff by PCR 03/31/2017 Negative    • 027-NAP1-BI Presumptive 03/31/2017 Negative    • Significant Indicator 03/31/2017 NEG    • Source 03/31/2017 STL    • Site 03/31/2017 STOOL    • EHEC 03/31/2017 Negative for Shiga Toxin 1 and 2.    Hospital Outpatient Visit on 03/29/2017   Component Date Value   • Cholesterol,Tot 03/29/2017 173 previously 230    • Triglycerides 03/29/2017 114 previously 226    • HDL 03/29/2017 59 previously 47    • LDL 03/29/2017 91 previously 138    • Sodium 03/29/2017 140    • Potassium 03/29/2017 3.7    • Chloride 03/29/2017 106    • Co2 03/29/2017 26    • Anion Gap 03/29/2017 8.0    • Glucose 03/29/2017 89    • Bun 03/29/2017 11    • Creatinine 03/29/2017 0.54    • Calcium 03/29/2017 9.1    • AST(SGOT) 03/29/2017 18    • ALT(SGPT) 03/29/2017 14 previously 52    • Alkaline Phosphatase 03/29/2017 89 previously 130    • Total Bilirubin 03/29/2017 0.3    • Albumin 03/29/2017 3.9    • Total Protein 03/29/2017 7.1    • Globulin 03/29/2017 3.2    • A-G Ratio 03/29/2017 1.2    • Hepatitis B Surface Anti* 03/29/2017 Negative    • Hepatitis C Antibody 03/29/2017 Negative    • Hepatitis A Virus Ab, IgM 03/29/2017 Negative    • Hep B Surface Antibody Q* 03/29/2017 3.37    • Hep A Virus Antibody Tot* 03/29/2017 Positive*   • GFR If  03/29/2017 >60    • GFR If Non  Ameri* 03/29/2017 >60         Assessment/Plan:     1. Dyslipidemia  Significantly improved. Previously her 10 year cardiovascular disease risk was 7.5% and now this is down to 3.6%. We don't need to put her on statin. She will continue to manage this with her own efforts. I congratulated her with her own efforts.  - LIPID PROFILE; Future  - COMP METABOLIC PANEL; Future    2. Elevated liver enzymes  These are back to normal. Viral hepatitis panel came back she is immune to hepatitis A. Discussed at length the significance of immunity to hepatitis A. She couldn't remember receiving hepatitis A  vaccine or having hepatitis A infection in the past. Most likely the elevation of the ALT and alkaline phosphatase was from taking the antifungal for the pulmonary coccidioidomycosis. She has been off the medication for a few months now.  - LIPID PROFILE; Future  - COMP METABOLIC PANEL; Future    3. Immune to hepatitis A  Same as #2.  - LIPID PROFILE; Future  - COMP METABOLIC PANEL; Future    4. Coccidioidomycosis, pulmonary (CMS-HCC)  She had cough and wheezing requiring urgent care visit 2 days ago. Chest x-ray showed left upper lobe scarring from previous pulmonary coccidioidomycosis in no acute abnormalities. I reassured patient. Her symptoms improved and resolved. She could have a viral URI causing bronchospasm. She has albuterol HFA that she can use an as needed basis.    5. Diarrhea, unspecified type  She had stool studies all came back negative for bacteria, parasite and C. difficile. She is now completely asymptomatic. No further intervention needed.    6. Obesity (BMI 35.0-39.9 without comorbidity) ([facility])  Discussed diet, exercise and weight loss.  - Patient identified as having weight management issue.  Appropriate orders and counseling given.      Please note that this dictation was created using voice recognition software. I have worked with consultants from the vendor as well as technical experts from [employer] to optimize the interface. I have made every reasonable attempt to correct obvious errors, but I expect that there are errors of grammar and possibly content I did not discover before finalizing the note.

## 2017-04-12 ENCOUNTER — PATIENT MESSAGE (OUTPATIENT)
Dept: PULMONOLOGY | Facility: HOSPICE | Age: 62
End: 2017-04-12

## 2017-04-13 NOTE — TELEPHONE ENCOUNTER
"Last OV: 2016, Lisa, GARRETT   Next OV: Not on File    Dee is requesting a letter for surgical clearance, please send to  . Lisa's last note stated clearance was given for \"4 right rotator cuff repair to be completed next week.\" I send a [employer] message to Dee to clarify surgical procedure being done 17. Does she need an OV/ testing prior to clearance?     Dr.Elizabeth Hoopre  [address] [facility] [place] [address]  Fax: [phone]    Last PFT: 2016   - FVC: 109%   - FEV1/FVC: 98%   - T%   - DLCO: 156%    Last CXR: 2016   - Comparison made to CXR 2015. The PATTI has shown interval decrease in size, now approximately 1.5 x 1cm. No infiltrates or effusions, Cardiac silhouette and bones are WNL.     - Impression: Interval diminish size of PATTI mass from 12/15    Thank You  "

## 2017-04-19 NOTE — TELEPHONE ENCOUNTER
Left patient a message advising to call back and inform me which Clinic to send surgical clearance letter.

## 2017-04-21 ENCOUNTER — TELEPHONE (OUTPATIENT)
Dept: PULMONOLOGY | Facility: HOSPICE | Age: 62
End: 2017-04-21

## 2017-04-21 NOTE — TELEPHONE ENCOUNTER
Call received from patient Dr. Jerica Hooper [phone]    Cover sheet, Surgical clearance letter, PFT 11/30/16, CXR 3/29/17 faxed to Dr. Hooper [phone]    [employer] message sent, and I called pt spoke to the  advised letter faxed

## 2017-05-17 ENCOUNTER — HOSPITAL ENCOUNTER (OUTPATIENT)
Dept: RADIOLOGY | Facility: MEDICAL CENTER | Age: 62
End: 2017-05-17
Attending: FAMILY MEDICINE
Payer: COMMERCIAL

## 2017-05-17 DIAGNOSIS — Z12.31 VISIT FOR SCREENING MAMMOGRAM: ICD-10-CM

## 2017-05-17 PROCEDURE — G0202 SCR MAMMO BI INCL CAD: HCPCS

## 2017-05-22 ENCOUNTER — HOSPITAL ENCOUNTER (OUTPATIENT)
Dept: RADIOLOGY | Facility: MEDICAL CENTER | Age: 62
End: 2017-05-22
Attending: OBSTETRICS & GYNECOLOGY | Admitting: OBSTETRICS & GYNECOLOGY
Payer: COMMERCIAL

## 2017-05-22 DIAGNOSIS — Z01.811 PRE-OPERATIVE RESPIRATORY EXAMINATION: ICD-10-CM

## 2017-05-22 DIAGNOSIS — Z01.812 PRE-OPERATIVE LABORATORY EXAMINATION: ICD-10-CM

## 2017-05-22 DIAGNOSIS — Z01.810 PRE-OPERATIVE CARDIOVASCULAR EXAMINATION: ICD-10-CM

## 2017-05-22 LAB
ANION GAP SERPL CALC-SCNC: 8 MMOL/L (ref 0–11.9)
BASOPHILS # BLD AUTO: 1.1 % (ref 0–1.8)
BASOPHILS # BLD: 0.06 K/UL (ref 0–0.12)
BUN SERPL-MCNC: 22 MG/DL (ref 8–22)
CALCIUM SERPL-MCNC: 10 MG/DL (ref 8.5–10.5)
CHLORIDE SERPL-SCNC: 104 MMOL/L (ref 96–112)
CO2 SERPL-SCNC: 25 MMOL/L (ref 20–33)
CREAT SERPL-MCNC: 0.66 MG/DL (ref 0.5–1.4)
EKG IMPRESSION: NORMAL
EOSINOPHIL # BLD AUTO: 0.09 K/UL (ref 0–0.51)
EOSINOPHIL NFR BLD: 1.7 % (ref 0–6.9)
ERYTHROCYTE [DISTWIDTH] IN BLOOD BY AUTOMATED COUNT: 45.4 FL (ref 35.9–50)
GFR SERPL CREATININE-BSD FRML MDRD: >60 ML/MIN/1.73 M 2
GLUCOSE SERPL-MCNC: 96 MG/DL (ref 65–99)
HCT VFR BLD AUTO: 46.1 % (ref 37–47)
HGB BLD-MCNC: 15.4 G/DL (ref 12–16)
IMM GRANULOCYTES # BLD AUTO: 0.01 K/UL (ref 0–0.11)
IMM GRANULOCYTES NFR BLD AUTO: 0.2 % (ref 0–0.9)
LYMPHOCYTES # BLD AUTO: 1.88 K/UL (ref 1–4.8)
LYMPHOCYTES NFR BLD: 35 % (ref 22–41)
MCH RBC QN AUTO: 31.4 PG (ref 27–33)
MCHC RBC AUTO-ENTMCNC: 33.4 G/DL (ref 33.6–35)
MCV RBC AUTO: 94.1 FL (ref 81.4–97.8)
MONOCYTES # BLD AUTO: 0.38 K/UL (ref 0–0.85)
MONOCYTES NFR BLD AUTO: 7.1 % (ref 0–13.4)
NEUTROPHILS # BLD AUTO: 2.95 K/UL (ref 2–7.15)
NEUTROPHILS NFR BLD: 54.9 % (ref 44–72)
NRBC # BLD AUTO: 0 K/UL
NRBC BLD AUTO-RTO: 0 /100 WBC
PLATELET # BLD AUTO: 314 K/UL (ref 164–446)
PMV BLD AUTO: 9.5 FL (ref 9–12.9)
POTASSIUM SERPL-SCNC: 4 MMOL/L (ref 3.6–5.5)
RBC # BLD AUTO: 4.9 M/UL (ref 4.2–5.4)
SODIUM SERPL-SCNC: 137 MMOL/L (ref 135–145)
WBC # BLD AUTO: 5.4 K/UL (ref 4.8–10.8)

## 2017-05-22 PROCEDURE — 36415 COLL VENOUS BLD VENIPUNCTURE: CPT

## 2017-05-22 PROCEDURE — 71020 DX-CHEST-2 VIEWS: CPT

## 2017-05-22 PROCEDURE — 93010 ELECTROCARDIOGRAM REPORT: CPT | Performed by: INTERNAL MEDICINE

## 2017-05-22 PROCEDURE — 85025 COMPLETE CBC W/AUTO DIFF WBC: CPT

## 2017-05-22 PROCEDURE — 93005 ELECTROCARDIOGRAM TRACING: CPT

## 2017-05-22 PROCEDURE — 80048 BASIC METABOLIC PNL TOTAL CA: CPT

## 2017-06-07 ENCOUNTER — HOSPITAL ENCOUNTER (OUTPATIENT)
Facility: MEDICAL CENTER | Age: 62
End: 2017-06-07
Attending: OBSTETRICS & GYNECOLOGY | Admitting: OBSTETRICS & GYNECOLOGY
Payer: COMMERCIAL

## 2017-06-07 VITALS
SYSTOLIC BLOOD PRESSURE: 121 MMHG | DIASTOLIC BLOOD PRESSURE: 64 MMHG | HEART RATE: 77 BPM | OXYGEN SATURATION: 93 % | WEIGHT: 182.98 LBS | HEIGHT: 60 IN | RESPIRATION RATE: 14 BRPM | BODY MASS INDEX: 35.92 KG/M2 | TEMPERATURE: 98.1 F

## 2017-06-07 PROBLEM — N95.0 POSTMENOPAUSAL BLEEDING: Status: ACTIVE | Noted: 2017-06-07

## 2017-06-07 PROBLEM — N81.2 UTEROVAGINAL PROLAPSE, INCOMPLETE: Status: RESOLVED | Noted: 2017-06-07 | Resolved: 2017-06-07

## 2017-06-07 PROBLEM — N95.0 POSTMENOPAUSAL BLEEDING: Status: RESOLVED | Noted: 2017-06-07 | Resolved: 2017-06-07

## 2017-06-07 PROBLEM — N81.2 UTEROVAGINAL PROLAPSE, INCOMPLETE: Status: ACTIVE | Noted: 2017-06-07

## 2017-06-07 PROCEDURE — 500002 HCHG ADHESIVE, DERMABOND: Performed by: OBSTETRICS & GYNECOLOGY

## 2017-06-07 PROCEDURE — 160025 RECOVERY II MINUTES (STATS): Performed by: OBSTETRICS & GYNECOLOGY

## 2017-06-07 PROCEDURE — 160002 HCHG RECOVERY MINUTES (STAT): Performed by: OBSTETRICS & GYNECOLOGY

## 2017-06-07 PROCEDURE — A9270 NON-COVERED ITEM OR SERVICE: HCPCS

## 2017-06-07 PROCEDURE — 501664 HCHG TUBING, FILTER STRYKER: Performed by: OBSTETRICS & GYNECOLOGY

## 2017-06-07 PROCEDURE — 700101 HCHG RX REV CODE 250

## 2017-06-07 PROCEDURE — 501583 HCHG TROCAR, THRD CAN&SEAL 5X100: Performed by: OBSTETRICS & GYNECOLOGY

## 2017-06-07 PROCEDURE — 501582 HCHG TROCAR, THRD BLADED: Performed by: OBSTETRICS & GYNECOLOGY

## 2017-06-07 PROCEDURE — 700111 HCHG RX REV CODE 636 W/ 250 OVERRIDE (IP)

## 2017-06-07 PROCEDURE — 502594 HCHG SCISSOR HANDLE, HARMONIC ACE: Performed by: OBSTETRICS & GYNECOLOGY

## 2017-06-07 PROCEDURE — 160048 HCHG OR STATISTICAL LEVEL 1-5: Performed by: OBSTETRICS & GYNECOLOGY

## 2017-06-07 PROCEDURE — 500868 HCHG NEEDLE, SURGI(VARES): Performed by: OBSTETRICS & GYNECOLOGY

## 2017-06-07 PROCEDURE — 501330 HCHG SET, CYSTO IRRIG TUBING: Performed by: OBSTETRICS & GYNECOLOGY

## 2017-06-07 PROCEDURE — 501838 HCHG SUTURE GENERAL: Performed by: OBSTETRICS & GYNECOLOGY

## 2017-06-07 PROCEDURE — 88307 TISSUE EXAM BY PATHOLOGIST: CPT

## 2017-06-07 PROCEDURE — 160031 HCHG SURGERY MINUTES - 1ST 30 MINS LEVEL 5: Performed by: OBSTETRICS & GYNECOLOGY

## 2017-06-07 PROCEDURE — 502679 HCHG MANIPULATOR, UTRN DAVINCI: Performed by: OBSTETRICS & GYNECOLOGY

## 2017-06-07 PROCEDURE — 160046 HCHG PACU - 1ST 60 MINS PHASE II: Performed by: OBSTETRICS & GYNECOLOGY

## 2017-06-07 PROCEDURE — 700102 HCHG RX REV CODE 250 W/ 637 OVERRIDE(OP)

## 2017-06-07 PROCEDURE — 501572 HCHG TROCAR, SHIELD OBTU 5X100: Performed by: OBSTETRICS & GYNECOLOGY

## 2017-06-07 PROCEDURE — 160036 HCHG PACU - EA ADDL 30 MINS PHASE I: Performed by: OBSTETRICS & GYNECOLOGY

## 2017-06-07 PROCEDURE — A9270 NON-COVERED ITEM OR SERVICE: HCPCS | Performed by: OBSTETRICS & GYNECOLOGY

## 2017-06-07 PROCEDURE — 160035 HCHG PACU - 1ST 60 MINS PHASE I: Performed by: OBSTETRICS & GYNECOLOGY

## 2017-06-07 PROCEDURE — 160047 HCHG PACU  - EA ADDL 30 MINS PHASE II: Performed by: OBSTETRICS & GYNECOLOGY

## 2017-06-07 PROCEDURE — 160042 HCHG SURGERY MINUTES - EA ADDL 1 MIN LEVEL 5: Performed by: OBSTETRICS & GYNECOLOGY

## 2017-06-07 PROCEDURE — 502714 HCHG ROBOTIC SURGERY SERVICES: Performed by: OBSTETRICS & GYNECOLOGY

## 2017-06-07 PROCEDURE — 500025 HCHG ARMREST, CRADLE FOAM: Performed by: OBSTETRICS & GYNECOLOGY

## 2017-06-07 PROCEDURE — 700102 HCHG RX REV CODE 250 W/ 637 OVERRIDE(OP): Performed by: OBSTETRICS & GYNECOLOGY

## 2017-06-07 PROCEDURE — 160009 HCHG ANES TIME/MIN: Performed by: OBSTETRICS & GYNECOLOGY

## 2017-06-07 PROCEDURE — 502240 HCHG MISC OR SUPPLY RC 0272: Performed by: OBSTETRICS & GYNECOLOGY

## 2017-06-07 RX ORDER — LIDOCAINE HYDROCHLORIDE 10 MG/ML
INJECTION, SOLUTION INFILTRATION; PERINEURAL
Status: COMPLETED
Start: 2017-06-07 | End: 2017-06-07

## 2017-06-07 RX ORDER — LIDOCAINE HYDROCHLORIDE 10 MG/ML
0.5 INJECTION, SOLUTION INFILTRATION; PERINEURAL
Status: COMPLETED | OUTPATIENT
Start: 2017-06-07 | End: 2017-06-07

## 2017-06-07 RX ORDER — SIMETHICONE 80 MG
80 TABLET,CHEWABLE ORAL EVERY 8 HOURS PRN
Status: DISCONTINUED | OUTPATIENT
Start: 2017-06-07 | End: 2017-06-07 | Stop reason: HOSPADM

## 2017-06-07 RX ORDER — ONDANSETRON 2 MG/ML
4 INJECTION INTRAMUSCULAR; INTRAVENOUS EVERY 6 HOURS PRN
Status: DISCONTINUED | OUTPATIENT
Start: 2017-06-07 | End: 2017-06-07 | Stop reason: HOSPADM

## 2017-06-07 RX ORDER — SODIUM CHLORIDE, SODIUM LACTATE, POTASSIUM CHLORIDE, CALCIUM CHLORIDE 600; 310; 30; 20 MG/100ML; MG/100ML; MG/100ML; MG/100ML
INJECTION, SOLUTION INTRAVENOUS CONTINUOUS
Status: DISCONTINUED | OUTPATIENT
Start: 2017-06-07 | End: 2017-06-07 | Stop reason: HOSPADM

## 2017-06-07 RX ORDER — METOCLOPRAMIDE HYDROCHLORIDE 5 MG/ML
10 INJECTION INTRAMUSCULAR; INTRAVENOUS EVERY 4 HOURS PRN
Status: DISCONTINUED | OUTPATIENT
Start: 2017-06-07 | End: 2017-06-07 | Stop reason: HOSPADM

## 2017-06-07 RX ORDER — PROMETHAZINE HYDROCHLORIDE 25 MG/1
25 SUPPOSITORY RECTAL EVERY 4 HOURS PRN
Status: DISCONTINUED | OUTPATIENT
Start: 2017-06-07 | End: 2017-06-07 | Stop reason: HOSPADM

## 2017-06-07 RX ORDER — IBUPROFEN 200 MG
600 TABLET ORAL EVERY 6 HOURS PRN
Status: DISCONTINUED | OUTPATIENT
Start: 2017-06-07 | End: 2017-06-07 | Stop reason: HOSPADM

## 2017-06-07 RX ORDER — BUPIVACAINE HYDROCHLORIDE AND EPINEPHRINE 2.5; 5 MG/ML; UG/ML
INJECTION, SOLUTION EPIDURAL; INFILTRATION; INTRACAUDAL; PERINEURAL
Status: DISCONTINUED | OUTPATIENT
Start: 2017-06-07 | End: 2017-06-07 | Stop reason: HOSPADM

## 2017-06-07 RX ORDER — KETOROLAC TROMETHAMINE 30 MG/ML
30 INJECTION, SOLUTION INTRAMUSCULAR; INTRAVENOUS
Status: DISCONTINUED | OUTPATIENT
Start: 2017-06-07 | End: 2017-06-07 | Stop reason: HOSPADM

## 2017-06-07 RX ORDER — HYDROCODONE BITARTRATE AND ACETAMINOPHEN 5; 325 MG/1; MG/1
2 TABLET ORAL EVERY 6 HOURS PRN
Status: DISCONTINUED | OUTPATIENT
Start: 2017-06-07 | End: 2017-06-07 | Stop reason: HOSPADM

## 2017-06-07 RX ORDER — LIDOCAINE AND PRILOCAINE 25; 25 MG/G; MG/G
1 CREAM TOPICAL
Status: COMPLETED | OUTPATIENT
Start: 2017-06-07 | End: 2017-06-07

## 2017-06-07 RX ORDER — HYDROCODONE BITARTRATE AND ACETAMINOPHEN 5; 325 MG/1; MG/1
1 TABLET ORAL EVERY 4 HOURS PRN
Status: DISCONTINUED | OUTPATIENT
Start: 2017-06-07 | End: 2017-06-07 | Stop reason: HOSPADM

## 2017-06-07 RX ORDER — KETOROLAC TROMETHAMINE 30 MG/ML
INJECTION, SOLUTION INTRAMUSCULAR; INTRAVENOUS
Status: COMPLETED
Start: 2017-06-07 | End: 2017-06-07

## 2017-06-07 RX ORDER — LANSOPRAZOLE 30 MG/1
30 CAPSULE, DELAYED RELEASE ORAL DAILY
COMMUNITY
End: 2018-02-09

## 2017-06-07 RX ORDER — ONDANSETRON 2 MG/ML
INJECTION INTRAMUSCULAR; INTRAVENOUS
Status: COMPLETED
Start: 2017-06-07 | End: 2017-06-07

## 2017-06-07 RX ADMIN — KETOROLAC TROMETHAMINE 30 MG: 30 INJECTION, SOLUTION INTRAMUSCULAR; INTRAVENOUS at 17:38

## 2017-06-07 RX ADMIN — FENTANYL CITRATE 25 MCG: 50 INJECTION, SOLUTION INTRAMUSCULAR; INTRAVENOUS at 15:30

## 2017-06-07 RX ADMIN — HYDROCODONE BITARTRATE AND ACETAMINOPHEN 15 ML: 2.5; 108 SOLUTION ORAL at 14:55

## 2017-06-07 RX ADMIN — FENTANYL CITRATE 25 MCG: 50 INJECTION, SOLUTION INTRAMUSCULAR; INTRAVENOUS at 15:40

## 2017-06-07 RX ADMIN — SODIUM CHLORIDE, SODIUM LACTATE, POTASSIUM CHLORIDE, CALCIUM CHLORIDE 1000 ML: 600; 310; 30; 20 INJECTION, SOLUTION INTRAVENOUS at 10:30

## 2017-06-07 RX ADMIN — KETOROLAC TROMETHAMINE 30 MG: 30 INJECTION, SOLUTION INTRAMUSCULAR at 17:38

## 2017-06-07 RX ADMIN — SIMETHICONE CHEW TAB 80 MG 80 MG: 80 TABLET ORAL at 20:01

## 2017-06-07 ASSESSMENT — PAIN SCALES - GENERAL
PAINLEVEL_OUTOF10: 4
PAINLEVEL_OUTOF10: 4
PAINLEVEL_OUTOF10: 3
PAINLEVEL_OUTOF10: 3
PAINLEVEL_OUTOF10: 0
PAINLEVEL_OUTOF10: 0
PAINLEVEL_OUTOF10: 3
PAINLEVEL_OUTOF10: 5
PAINLEVEL_OUTOF10: 6
PAINLEVEL_OUTOF10: 3
PAINLEVEL_OUTOF10: 0

## 2017-06-07 NOTE — OR SURGEON
Immediate Post-Operative Note      PreOp Diagnosis: Uterine prolapse, postmenopausal bleeding     PostOp Diagnosis: Same with adhesions    Procedure(s):  HYSTERECTOMY ROBOTIC SI, BILATERAL SALPINGO-OOPHORECTOMY, URETERAL SACRAL LIGAMENT SHORTENING, ADHESIOLYSIS  - Wound Class: Clean Contaminated  CYSTOSCOPY - Wound Class: Clean Contaminated    Surgeon(s):  Jerica Hooper M.D.    Anesthesiologist/Type of Anesthesia:  Anesthesiologist: Cezar Gilbert M.D./General    Surgical Staff:  Assistant: Noah Mendiola R.N.  Circulator: Tamra Hoyos R.N.  Scrub Person: Zina Cross  Count [place]: Tim Harper R.N.    Specimen: Uterus, bilateral tubes and ovaries    Estimated Blood Loss: 20 cc    Findings: Omentum adherent to the anterior abdominal wall over prior incisions, cervix protruding thru the introitus, small uterus, tubes adherent to the side walls, normal ovaries, cystoscopy is normal and good efflux of urine bilaterally, great support postop    Complications: None        6/7/2017 2:17 PM Jerica Hooper

## 2017-06-07 NOTE — OP REPORT
DATE OF SERVICE:  06/07/2017    PREOPERATIVE DIAGNOSES:  Uterine prolapse, postmenopausal bleeding.    POSTOPERATIVE DIAGNOSES:  Uterine prolapse, postmenopausal bleeding, with   adhesions.    PROCEDURES PERFORMED:  Da Rupal laparoscopic total hysterectomy with bilateral   salpingo-oophorectomy, uterosacral ligament shortening and plication,   adhesiolysis and cystoscopy.    SURGEON:  Jerica Hooper MD    ASSISTANT:  MOHAN Alvarez    ANESTHESIOLOGIST:  Cezar Gilbert MD    ANESTHESIA:  General endotracheal.    SPECIMEN:  Uterus, bilateral tubes and ovaries.    ESTIMATED BLOOD LOSS:  20 mL.    FINDINGS AT THE TIME OF SURGERY:  Exam under anesthesia reveals the cervix to   be protruding beyond the vaginal introitus and filling the vagina.  The   anterior and posterior fornix are actually fairly well supported.  The uterus   feels normal size.  There are no adnexal masses.  Laparoscopic findings   confirm the uterus to be normal size and no adnexal masses; however, the   fallopian tubes are adherent to the pelvic sidewall bilaterally.  There are   extensive adhesions of the omentum to the anterior abdominal wall from prior   surgeries.  I did take these down and approached them from that mid clavicular   left upper quadrant line.  Postoperatively, cystoscopic examination of   bladder appeared normal with good efflux of urine through the ureteral os   bilaterally and the vagina was well supported with no evidence of prolapse.    Bimanual was normal.    TECHNIQUE:  The patient was taken to the operating room where general   anesthesia was placed.  She was then placed in the Chetan stirrups with   excellent positioning, prepped and draped in the normal sterile fashion.  A   V-Care uterine manipulator was then placed without difficulty.  Attention was   then turned to the abdomen where a 0.25% Marcaine with epinephrine was then   injected in the left upper quadrant in the midclavicular line.  I then placed   the  Veress needle and pneumoperitoneum was created with CO2 gas.  I then   placed the 5 mm trocar and there were multiple omental adhesions to the   anterior abdominal wall.  These were taken down with monopolar sarah.  I   placed an additional port 20 cm from the umbilicus on the left side.  This   port was placed under direct visualization and the adhesions were taken down   without difficulty.  I then placed umbilical port.  The ports both 10 cm   lateral to the umbilicus on each side.  These trocars were placed under direct   visualization without difficulty.  Once instruments were in their appropriate   location, the da Rupal was brought to the patient's right side and side   docking took place without difficulty.  The hot sarah were placed in the   right arm, the [place] bipolar in the left arm and a straight scope was used   for this procedure.  Once instruments were in their appropriate location, I   then went to the [employer] Rupal console.  The patient had elected to have me remove   her tubes and ovaries, so I dissected the fallopian tubes off of the sidewalls   and removed the fallopian tubes, brought these out through the assistant port   and then the bipolar cautery was used to take down the infundibulopelvic   ligaments.  These were cut and the round ligaments were clamped, desiccated   and cut.  Intervening tissue clamped, desiccated, and cut.  Anterior leaf of   the broad ligament incised, posterior leaf incised.  Uterine vessels were then   clamped, desiccated and cut at about the level of the cardinal ligament   complex.  The endopelvic fascia was then scored anteriorly and posteriorly.    The bladder flap was created and the bladder pushed well below the V-Care cup.    The infundibulopelvic ligament on the left side was isolated after taking   the tube off of the sidewall on this side and the round ligament taken down in   a similar fashion, intervening tissue taken down.  Anterior leaf of the broad    ligament incised, posterior leaf incised.  Uterine vessels were then clamped,   desiccated and cut at about the level of the cardinal ligament complex.  The   anterior leaf of the broad ligament was completely incised creating the   bladder flap and the bladder was pushed well below that V-Care cup on this   side as well.  Endopelvic fascia was then scored anteriorly and posteriorly on   each side and then circumferentially.  The colpotomy incision was made with   the Harmonic scalpel.  The uterus, tubes, and ovaries were then delivered   through the vagina without any difficulty.  The Harmonic scalpel was switched   out for a silvana suture cut, 0 Vicryl was placed at each of the cuff angles   incorporating the uterosacral ligament on each side.  One of these sutures was   used to reapproximate the vaginal mucosa, the other the endopelvic fascia.    Excellent reapproximation was achieved.  After this, Prolene suture was used   to reinforce and shorten the uterosacral ligaments on each side.  Excellent   support was achieved.  PDS was then used to plicate the uterosacral ligaments   in the midline, incorporating the pubocervical fascia posteriorly for   excellent support.  Once this was accomplished, Monocryl was introduced.  This   was used to plicate the peritoneum from round ligament to round ligament   incorporating the uterosacral ligament in the mid portion of this suture line.    Excellent reapproximation was achieved.  Irrigation was performed.  There   was no bleeding.  Instruments were removed under direct visualization.  I then   scrubbed back into the case.  The umbilical fascia was reapproximated with 0   Vicryl, skin with Dermabond.  Excellent reapproximation was achieved.  After   IV administration of sodium fluoroscein, cystoscopic examination of the   bladder revealed a normal bladder with good efflux of sodium fluoroscein   through the ureteral os bilaterally.  There was no cystocele noted.  The    cystoscope was removed and SMARTECH MFG speculum was used to view the vagina and   this had excellent apical support and no evidence of any bulging anteriorly or   posteriorly.  The Graves speculum was removed and bimanual exam was   performed.  This was normal.  Rectal exam performed and this was normal.  The   patient tolerated the procedure very well and was brought to recovery room in   stable condition.       ____________________________________     MD LA LEMUS / GARETT    DD:  06/07/2017 14:27:39  DT:  06/07/2017 16:54:27    D#:  [id number]  Job#:  [id number]

## 2017-06-07 NOTE — DISCHARGE INSTRUCTIONS
ACTIVITY: Rest and take it easy for the first 24 hours.  A responsible adult is recommended to remain with you during that time.  It is normal to feel sleepy.  We encourage you to not do anything that requires balance, judgment or coordination.    MILD FLU-LIKE SYMPTOMS ARE NORMAL. YOU MAY EXPERIENCE GENERALIZED MUSCLE ACHES, THROAT IRRITATION, HEADACHE AND/OR SOME NAUSEA.    FOR 24 HOURS DO NOT:  Drive, operate machinery or run household appliances.  Drink beer or alcoholic beverages.   Make important decisions or sign legal documents.    SPECIAL INSTRUCTIONS:     The first few days after surgery are the most difficult. It is important to rest, take your pain medications regularly, stay well hydrated, get up and walk around at least four times a day, and call your doctor if you have any problems.     You may experience shoulder or neck pain for several days after your surgery if it involved laparoscopy. This is from the gas placed in your abdomen during surgery and usually within a few days this gas is reabsorbed and the pain will subside. You may use ice or heat, position changes, pain medications to help relieve this pain. Ice to left/right lateral incisions.    For the first 1-2 weeks, you should be on home rest. That means no driving or doing organized activities. Your goal should be to relax, read books, watch movies, nap and put your energies into healing. After two weeks, you can begin resuming your usual activities other than heavy lifting and sex. Listen to your body and don't overdo it. Pelvic rest for 6 weeks or until MD gives approval.    If you are given an incentive spirometer in the hospital, take it home with you and use it every hour while awake for seven days. This will help to keep your lungs expanded and decrease your chance of getting pneumonia post-op.     You may shower. After showering, pat incisions dry with a clean towel. Do not rub. If steri-strips are covering the incisions, do not  remove for at least 1 week. If glue is on the incisions, do not peel it off until after 1-2 weeks. Within the first few days to a week, you may note some leaking from the incision, bruising, or suture material - this is O.K.     You may eat and drink whatever you feel like. If you don't have much of an appetite, try to at least stay well hydrated. Several small meals a day may work better at first. Be sure to eat something before taking your pain medicines, as this will decrease the chance of nausea.     Pain medication and decreased activity may cause constipation. Please start taking a stool softener when you get home from surgery. When you stop taking your pain medications and resume more normal activities, you can stop taking the stool softeners. Keep bowels soft and regular. May use Milk of Magnesia PRN constipation. May use Colace OTC 2 Xs daily PRN constipation.    You will need to follow-up with your doctor for post-op visits as recommended, usually around two and six weeks post-op.     Some vaginal discharge and bleeding can be normal depending on the type of surgery you had. This should get less with time and is usually gone by 4-6 weeks. Call your doctor before going to the Emergency Department or if: temp greater than 100.4 F, severe pain, more than light spotting or drainage, redness of incision(s).    After surgery, the first couple days are the most difficult and then, every day should be better as long as you don't overdo it. If that is not the case, then please call your doctor's office.     Other reasons to call your doctor's office include these WARNING SIGNS:     Pain that is not relieved by pain medications   Symptoms getting worse over time instead of better   Fever over 101   Nausea and vomiting   Very heavy bleeding with clots   Not passing gas for 48 hours   No bowel movements in 4 days   Redness and swelling around incisions   Difficult or painful urination   Unexplained symptoms    DIET:  To avoid nausea, slowly advance diet as tolerated, avoiding spicy or greasy foods for the first day.  Add more substantial food to your diet according to your physician's instructions.  INCREASE FLUIDS AND FIBER TO AVOID CONSTIPATION.    SURGICAL DRESSING/BATHING: May shower as desired.    FOLLOW-UP APPOINTMENT:  A follow-up appointment should be arranged with your doctor; call to schedule.    You should CALL YOUR PHYSICIAN if you develop:  Fever greater than 101 degrees F.  Pain not relieved by medication, or persistent nausea or vomiting.  Excessive bleeding (blood soaking through dressing) or unexpected drainage from the wound.  Extreme redness or swelling around the incision site, drainage of pus or foul smelling drainage.  Inability to urinate or empty your bladder within 8 hours.  Problems with breathing or chest pain.    You should call 911 if you develop problems with breathing or chest pain.  If you are unable to contact your doctor or surgical center, you should go to the nearest emergency room or urgent care center.  Physician's telephone #: Dr. Hooper [phone]    If any questions arise, call your doctor.  If your doctor is not available, please feel free to call the Surgical Center at [phone].  The Center is open Monday through Friday from 7AM to 7PM.  You can also call the [employer] HOTLINE open 24 hours/day, 7 days/week and speak to a nurse at [phone], or toll free at [phone].    A registered nurse may call you a few days after your surgery to see how you are doing after your procedure.    MEDICATIONS: Resume taking daily medication.  Take prescribed pain medication with food.  If no medication is prescribed, you may take non-aspirin pain medication if needed.  PAIN MEDICATION CAN BE VERY CONSTIPATING.  Take a stool softener or laxative such as senokot, pericolace, or milk of magnesia if needed.    Prescriptions at home.  Last pain medication given at 2:55PM.    If your physician  has prescribed pain medication that includes Acetaminophen (Tylenol), do not take additional Acetaminophen (Tylenol) while taking the prescribed medication.    Depression / Suicide Risk    As you are discharged from this [facility] facility, it is important to learn how to keep safe from harming yourself.    Recognize the warning signs:  · Abrupt changes in personality, positive or negative- including increase in energy   · Giving away possessions  · Change in eating patterns- significant weight changes-  positive or negative  · Change in sleeping patterns- unable to sleep or sleeping all the time   · Unwillingness or inability to communicate  · Depression  · Unusual sadness, discouragement and loneliness  · Talk of wanting to die  · Neglect of personal appearance   · Rebelliousness- reckless behavior  · Withdrawal from people/activities they love  · Confusion- inability to concentrate     If you or a loved one observes any of these behaviors or has concerns about self-harm, here's what you can do:  · Talk about it- your feelings and reasons for harming yourself  · Remove any means that you might use to hurt yourself (examples: pills, rope, extension cords, firearm)  · Get professional help from the community (Mental Health, Substance Abuse, psychological counseling)  · Do not be alone:Call your Safe Contact- someone whom you trust who will be there for you.  · Call your local CRISIS HOTLINE 291-3098 or 143-181-0288  · Call your local Children's Mobile Crisis Response Team [place] (085) 425-1340 or www.[employer]  · Call the toll free National Suicide Prevention Hotlines   · National Suicide Prevention Lifeline 874-639-HWJT (4630)  · National Hope Line Network 800-SUICIDE (574-3230)

## 2017-06-07 NOTE — H&P
YOB: 1955.    DATE OF OPERATION:  2017.    CHIEF COMPLAINT:  Pelvic support problems including uterine prolapse with a   long cervix protruding beyond the introitus.    HISTORY OF PRESENT ILLNESS:  The patient is a 61-year-old  AB2   postmenopausal female on Estrace cream for vaginal atrophy.  The patient has   severe uterine/cervical prolapse protruding beyond the introitus every day.    This is very uncomfortable as the tissues are rubbing on her underwear.  In   fact her cervix is raw and dry appearing on exam secondary to rubbing.  She   has started to do Kegel's without halt.  She denies bladder or bowel problems.    She does not want a pessary and would like to proceed with definitive   surgical management.  She is scheduled for a da Rupal total laparoscopic   hysterectomy with bilateral salpingectomy, uterosacral ligament shortening.    Risks, benefits, alternatives and procedure were discussed with patient in   detail and she agrees to proceed.  A pelvic ultrasound was done end of last   year which showed her uterus measured 6x4x2 cm.  The myometrium was normal.    The endometrial echo complex was 0.36 cm and the ovaries appeared normal.    PAST MEDICAL HISTORY:  Valley fever, colitis, back trouble, gallbladder   troubles.    PAST SURGICAL HISTORY:  Cholecystectomy in , spine surgery in , foot   surgery in , gallbladder surgery in , tubes tied in .    MENSTRUAL HISTORY:  The patient underwent menarche at age 10, menopause at age   48.    OBSTETRICAL HISTORY:  She has had 4 pregnancies, 2 babies born alive and 2   miscarriages.  Last delivery was in ____.    MEDICATIONS:  Include Prevacid, clonazepam, hydrocodone, sumatriptan, estrogen   cream.    FAMILY HISTORY:  Noncontributory.    SOCIAL HISTORY:  The patient does not smoke, drinks very occasionally, does   not do recreational drugs.    ALLERGIES:  No known drug allergies.    PHYSICAL EXAMINATION:  VITAL  SIGNS:  The patient's blood pressure is 140/80, her weight is 182,   height is 5 feet.  HEENT:  Within normal limits.  NECK:  Supple , without lymphadenopathy or thyromegaly.  CARDIOVASCULAR:  Regular rate and rhythm.  LUNGS:  Clear to auscultation.  BACK:  No CVA tenderness.  ABDOMEN:  Soft and nontender, nondistended.  No masses are palpable.  PELVIC:  EGBUS appears normal; however, the cervix is protruding through the   introitus.  The cervix is long and the anterior and posterior fornix have   decent support so I think the patient will do well to have her cervix taken   out, so that this will functionally lengthen her vagina.  Bimanual exam   reveals a normal sized and positioned uterus which is smooth in contour and   there are no adnexal masses.  EXTREMITIES:  Benign.    ASSESSMENT:  1.  A 61-year-old  AB2, postmenopausal female, not on hormone replacement   therapy, but using Estrace cream vaginally for atrophy.  2.  Uterine/cervical prolapse protruding beyond the introitus with a long   cervix.  3.  Very uncomfortable secondary to this prolapse.  Denies any bladder or   bowel problems.  Normal ultrasound.    PLAN:  1.  The patient is scheduled for a da Rupal laparoscopic total hysterectomy   with bilateral salpingectomy, ovarian preservation if possible, uterosacral   ligament shortening.  Risks, benefits, alternatives and procedure were   discussed with the patient in detail and she agrees to proceed.  Preoperative   labs will be obtained.  Preoperative antibiotics will be administered.  If she   has any problems or questions, she can contact my office.       ____________________________________     MD LA LEMUS / GARETT    DD:  2017 21:59:40  DT:  2017 22:58:42    D#:  [id number]  Job#:  [id number]

## 2017-06-07 NOTE — IP AVS SNAPSHOT
6/7/2017    Dee Tripathie Patti  [address] [address]    Dear Dee:    [facility] wants to ensure your discharge home is safe and you or your loved ones have had all of your questions answered regarding your care after you leave the hospital.    Below is a list of resources and contact information should you have any questions regarding your hospital stay, follow-up instructions, or active medical symptoms.    Questions or Concerns Regarding… Contact   Medical Questions Related to Your Discharge  (7 days a week, 8am-5pm) Contact a Nurse Care Coordinator   [phone]   Medical Questions Not Related to Your Discharge  (24 hours a day / 7 days a week)  Contact the Nurse Health Line   [phone]    Medications or Discharge Instructions Refer to your discharge packet   or contact your [facility] Primary Care Provider   [phone]   Follow-up Appointment(s) Schedule your appointment via [employer]   or contact Scheduling [phone]   Billing Review your statement via [employer]  or contact Billing [phone]   Medical Records Review your records via [employer]   or contact Medical Records [phone]     You may receive a telephone call within two days of discharge. This call is to make certain you understand your discharge instructions and have the opportunity to have any questions answered. You can also easily access your medical information, test results and upcoming appointments via the [employer] free online health management tool. You can learn more and sign up at [employer]/Fara. For assistance setting up your [employer] account, please call [phone].    Once again, we want to ensure your discharge home is safe and that you have a clear understanding of any next steps in your care. If you have any questions or concerns, please do not hesitate to contact us, we are here for you. Thank you for choosing [facility] for your healthcare needs.    Sincerely,    Your [facility] Healthcare Team

## 2017-06-07 NOTE — IP AVS SNAPSHOT
Home Care Instructions                                                                                                                Name:Dee Armstrong  Medical Record Number:[id number]  CSN: [id number]    YOB: 1955   Age: 62 y.o.  Sex: female  HT:1.524 m (5') WT: 83 kg (182 lb 15.7 oz)          Admit Date: 6/7/2017     Discharge Date:   Today's Date: 6/7/2017  Attending Doctor:  Jerica Hooper M.D.                  Allergies:  Nkda                Discharge Instructions         ACTIVITY: Rest and take it easy for the first 24 hours.  A responsible adult is recommended to remain with you during that time.  It is normal to feel sleepy.  We encourage you to not do anything that requires balance, judgment or coordination.    MILD FLU-LIKE SYMPTOMS ARE NORMAL. YOU MAY EXPERIENCE GENERALIZED MUSCLE ACHES, THROAT IRRITATION, HEADACHE AND/OR SOME NAUSEA.    FOR 24 HOURS DO NOT:  Drive, operate machinery or run household appliances.  Drink beer or alcoholic beverages.   Make important decisions or sign legal documents.    SPECIAL INSTRUCTIONS:     The first few days after surgery are the most difficult. It is important to rest, take your pain medications regularly, stay well hydrated, get up and walk around at least four times a day, and call your doctor if you have any problems.     You may experience shoulder or neck pain for several days after your surgery if it involved laparoscopy. This is from the gas placed in your abdomen during surgery and usually within a few days this gas is reabsorbed and the pain will subside. You may use ice or heat, position changes, pain medications to help relieve this pain. Ice to left/right lateral incisions.    For the first 1-2 weeks, you should be on home rest. That means no driving or doing organized activities. Your goal should be to relax, read books, watch movies, nap and put your energies into healing. After two weeks, you can begin resuming your usual  activities other than heavy lifting and sex. Listen to your body and don't overdo it. Pelvic rest for 6 weeks or until MD gives approval.    If you are given an incentive spirometer in the hospital, take it home with you and use it every hour while awake for seven days. This will help to keep your lungs expanded and decrease your chance of getting pneumonia post-op.     You may shower. After showering, pat incisions dry with a clean towel. Do not rub. If steri-strips are covering the incisions, do not remove for at least 1 week. If glue is on the incisions, do not peel it off until after 1-2 weeks. Within the first few days to a week, you may note some leaking from the incision, bruising, or suture material - this is O.K.     You may eat and drink whatever you feel like. If you don't have much of an appetite, try to at least stay well hydrated. Several small meals a day may work better at first. Be sure to eat something before taking your pain medicines, as this will decrease the chance of nausea.     Pain medication and decreased activity may cause constipation. Please start taking a stool softener when you get home from surgery. When you stop taking your pain medications and resume more normal activities, you can stop taking the stool softeners. Keep bowels soft and regular. May use Milk of Magnesia PRN constipation. May use Colace OTC 2 Xs daily PRN constipation.    You will need to follow-up with your doctor for post-op visits as recommended, usually around two and six weeks post-op.     Some vaginal discharge and bleeding can be normal depending on the type of surgery you had. This should get less with time and is usually gone by 4-6 weeks. Call your doctor before going to the Emergency Department or if: temp greater than 100.4 F, severe pain, more than light spotting or drainage, redness of incision(s).    After surgery, the first couple days are the most difficult and then, every day should be better as long  as you don't overdo it. If that is not the case, then please call your doctor's office.     Other reasons to call your doctor's office include these WARNING SIGNS:     Pain that is not relieved by pain medications   Symptoms getting worse over time instead of better   Fever over 101   Nausea and vomiting   Very heavy bleeding with clots   Not passing gas for 48 hours   No bowel movements in 4 days   Redness and swelling around incisions   Difficult or painful urination   Unexplained symptoms    DIET: To avoid nausea, slowly advance diet as tolerated, avoiding spicy or greasy foods for the first day.  Add more substantial food to your diet according to your physician's instructions.  INCREASE FLUIDS AND FIBER TO AVOID CONSTIPATION.    SURGICAL DRESSING/BATHING: May shower as desired.    FOLLOW-UP APPOINTMENT:  A follow-up appointment should be arranged with your doctor; call to schedule.    You should CALL YOUR PHYSICIAN if you develop:  Fever greater than 101 degrees F.  Pain not relieved by medication, or persistent nausea or vomiting.  Excessive bleeding (blood soaking through dressing) or unexpected drainage from the wound.  Extreme redness or swelling around the incision site, drainage of pus or foul smelling drainage.  Inability to urinate or empty your bladder within 8 hours.  Problems with breathing or chest pain.    You should call 911 if you develop problems with breathing or chest pain.  If you are unable to contact your doctor or surgical center, you should go to the nearest emergency room or urgent care center.  Physician's telephone #: Dr. Hooper [phone]    If any questions arise, call your doctor.  If your doctor is not available, please feel free to call the Surgical Center at [phone].  The Center is open Monday through Friday from 7AM to 7PM.  You can also call the [place] HOTLINE open 24 hours/day, 7 days/week and speak to a nurse at [phone], or toll free at [phone].    A  registered nurse may call you a few days after your surgery to see how you are doing after your procedure.    MEDICATIONS: Resume taking daily medication.  Take prescribed pain medication with food.  If no medication is prescribed, you may take non-aspirin pain medication if needed.  PAIN MEDICATION CAN BE VERY CONSTIPATING.  Take a stool softener or laxative such as senokot, pericolace, or milk of magnesia if needed.    Prescriptions at home.  Last pain medication given at 2:55PM.    If your physician has prescribed pain medication that includes Acetaminophen (Tylenol), do not take additional Acetaminophen (Tylenol) while taking the prescribed medication.    Depression / Suicide Risk    As you are discharged from this [facility] facility, it is important to learn how to keep safe from harming yourself.    Recognize the warning signs:  · Abrupt changes in personality, positive or negative- including increase in energy   · Giving away possessions  · Change in eating patterns- significant weight changes-  positive or negative  · Change in sleeping patterns- unable to sleep or sleeping all the time   · Unwillingness or inability to communicate  · Depression  · Unusual sadness, discouragement and loneliness  · Talk of wanting to die  · Neglect of personal appearance   · Rebelliousness- reckless behavior  · Withdrawal from people/activities they love  · Confusion- inability to concentrate     If you or a loved one observes any of these behaviors or has concerns about self-harm, here's what you can do:  · Talk about it- your feelings and reasons for harming yourself  · Remove any means that you might use to hurt yourself (examples: pills, rope, extension cords, firearm)  · Get professional help from the community (Mental Health, Substance Abuse, psychological counseling)  · Do not be alone:Call your Safe Contact- someone whom you trust who will be there for you.  · Call your local CRISIS HOTLINE 066-8591 or  548.632.6145  · Call your local Children's Mobile Crisis Response Team [place] (391) 392-3700 or www.VersionOne  · Call the toll free National Suicide Prevention Hotlines   · National Suicide Prevention Lifeline 837-250-ZYKN (6758)  · National Hope Line Network 800-SUICIDE (302-9558)       Medication List      CONTINUE taking these medications        Instructions    Morning Afternoon Evening Bedtime    ascorbic acid 500 MG Tabs   Commonly known as:  ascorbic acid        Take 1,000 mg by mouth every day.   Dose:  1000 mg                        ESTRACE VAGINAL 0.1 MG/GM vaginal cream   Generic drug:  estradiol        Insert 1 g in vagina every day.   Dose:  1 g                        FIBER PO        Take 1 Cap by mouth every day.   Dose:  1 Cap                        lansoprazole 30 MG Cpdr   Commonly known as:  PREVACID        Take 30 mg by mouth every day.   Dose:  30 mg                                Medication Information     Above and/or attached are the medications your physician expects you to take upon discharge. Review all of your home medications and newly ordered medications with your doctor and/or pharmacist. Follow medication instructions as directed by your doctor and/or pharmacist. Please keep your medication list with you and share with your physician. Update the information when medications are discontinued, doses are changed, or new medications (including over-the-counter products) are added; and carry medication information at all times in the event of emergency situations.        Resources     Quit Smoking / Tobacco Use:    I understand the use of any tobacco products increases my chance of suffering from future heart disease or stroke and could cause other illnesses which may shorten my life. Quitting the use of tobacco products is the single most important thing I can do to improve my health. For further information on smoking / tobacco cessation call a Toll Free Quit Line at  1-705.579.3131 (*National Cancer [place]) or 1-497.222.3665 (American Lung Association) or you can access the web based program at www.lungusa.org.    [place] Tobacco Users Help Line:  (274) 447-2033       Toll Free: 7-791-345-8064  Quit Tobacco Program [facility] (237)885-9378    Crisis Hotline:    [place] Crisis Hotline:  6-231-QKQOYGY or 1-712.422.6800    [place] Crisis Hotline:    1-128.874.7446 or 630-176-9636    Discharge Survey:   Thank you for choosing [facility]. We hope we did everything we could to make your hospital stay a pleasant one. You may be receiving a survey and we would appreciate your time and participation in answering the questions. Your input is very valuable to us in our efforts to improve our service to our patients and their families.            Signatures     My signature on this form indicates that:    1. I acknowledge receipt and understanding of these Home Care Instruction.  2. My questions regarding this information have been answered to my satisfaction.  3. I have formulated a plan with my discharge nurse to obtain my prescribed medications for home.    __________________________________      __________________________________                   Patient Signature                                 Guardian/Responsible Adult Signature      __________________________________                 __________       ________                       Nurse Signature                                               Date                 Time

## 2017-06-07 NOTE — OR NURSING
Pt AA/Ox4. VSS. Intact dermabond to 5 lap stabs to abdomen, clean and dry. Pt reports 4/10 pain scale at this time. Pain medications given. Pt denies nausea or vomiting. No numbness or tingling. Intact indwelling catheter with good urine output. SCDs in place. Report given to NANDINI Calvin    Pt via aniya was transferred to PACU2 at 1551.

## 2017-06-08 NOTE — OR NURSING
Jason backfilled with 300cc of NS. Pt able to successfully void once jason removed. Bladder scan showed 12cc post void residual. Pt meets all D/C criteria.

## 2017-08-11 RX ORDER — LANSOPRAZOLE 30 MG/1
CAPSULE, DELAYED RELEASE ORAL
Qty: 90 CAP | Refills: 1 | Status: SHIPPED | OUTPATIENT
Start: 2017-08-11 | End: 2018-02-15 | Stop reason: SDUPTHER

## 2017-09-29 ENCOUNTER — HOSPITAL ENCOUNTER (OUTPATIENT)
Dept: LAB | Facility: MEDICAL CENTER | Age: 62
End: 2017-09-29
Attending: FAMILY MEDICINE
Payer: COMMERCIAL

## 2017-09-29 DIAGNOSIS — R74.8 ELEVATED LIVER ENZYMES: ICD-10-CM

## 2017-09-29 DIAGNOSIS — Z78.9 IMMUNE TO HEPATITIS A: ICD-10-CM

## 2017-09-29 DIAGNOSIS — E78.5 DYSLIPIDEMIA: ICD-10-CM

## 2017-09-29 LAB
ALBUMIN SERPL BCP-MCNC: 4 G/DL (ref 3.2–4.9)
ALBUMIN/GLOB SERPL: 1.4 G/DL
ALP SERPL-CCNC: 90 U/L (ref 30–99)
ALT SERPL-CCNC: 17 U/L (ref 2–50)
ANION GAP SERPL CALC-SCNC: 8 MMOL/L (ref 0–11.9)
AST SERPL-CCNC: 17 U/L (ref 12–45)
BILIRUB SERPL-MCNC: 0.7 MG/DL (ref 0.1–1.5)
BUN SERPL-MCNC: 21 MG/DL (ref 8–22)
CALCIUM SERPL-MCNC: 9.3 MG/DL (ref 8.5–10.5)
CHLORIDE SERPL-SCNC: 106 MMOL/L (ref 96–112)
CHOLEST SERPL-MCNC: 225 MG/DL (ref 100–199)
CO2 SERPL-SCNC: 27 MMOL/L (ref 20–33)
CREAT SERPL-MCNC: 0.66 MG/DL (ref 0.5–1.4)
GFR SERPL CREATININE-BSD FRML MDRD: >60 ML/MIN/1.73 M 2
GLOBULIN SER CALC-MCNC: 2.9 G/DL (ref 1.9–3.5)
GLUCOSE SERPL-MCNC: 89 MG/DL (ref 65–99)
HDLC SERPL-MCNC: 65 MG/DL
LDLC SERPL CALC-MCNC: 135 MG/DL
POTASSIUM SERPL-SCNC: 4.3 MMOL/L (ref 3.6–5.5)
PROT SERPL-MCNC: 6.9 G/DL (ref 6–8.2)
SODIUM SERPL-SCNC: 141 MMOL/L (ref 135–145)
TRIGL SERPL-MCNC: 125 MG/DL (ref 0–149)

## 2017-09-29 PROCEDURE — 80053 COMPREHEN METABOLIC PANEL: CPT

## 2017-09-29 PROCEDURE — 80061 LIPID PANEL: CPT

## 2017-09-29 PROCEDURE — 36415 COLL VENOUS BLD VENIPUNCTURE: CPT

## 2017-10-09 ENCOUNTER — OFFICE VISIT (OUTPATIENT)
Dept: MEDICAL GROUP | Facility: PHYSICIAN GROUP | Age: 62
End: 2017-10-09
Payer: COMMERCIAL

## 2017-10-09 VITALS
HEIGHT: 59 IN | TEMPERATURE: 98 F | HEART RATE: 93 BPM | OXYGEN SATURATION: 96 % | BODY MASS INDEX: 38.89 KG/M2 | DIASTOLIC BLOOD PRESSURE: 80 MMHG | SYSTOLIC BLOOD PRESSURE: 132 MMHG | WEIGHT: 192.9 LBS

## 2017-10-09 DIAGNOSIS — E78.5 DYSLIPIDEMIA: ICD-10-CM

## 2017-10-09 DIAGNOSIS — G25.81 RLS (RESTLESS LEGS SYNDROME): ICD-10-CM

## 2017-10-09 DIAGNOSIS — E66.9 OBESITY (BMI 30-39.9): ICD-10-CM

## 2017-10-09 DIAGNOSIS — Z23 NEED FOR IMMUNIZATION AGAINST INFLUENZA: ICD-10-CM

## 2017-10-09 DIAGNOSIS — B38.2 COCCIDIOIDOMYCOSIS, PULMONARY (HCC): ICD-10-CM

## 2017-10-09 PROCEDURE — 90471 IMMUNIZATION ADMIN: CPT | Performed by: FAMILY MEDICINE

## 2017-10-09 PROCEDURE — 90686 IIV4 VACC NO PRSV 0.5 ML IM: CPT | Performed by: FAMILY MEDICINE

## 2017-10-09 PROCEDURE — 99214 OFFICE O/P EST MOD 30 MIN: CPT | Mod: 25 | Performed by: FAMILY MEDICINE

## 2017-10-09 RX ORDER — SUMATRIPTAN 25 MG/1
25-100 TABLET, FILM COATED ORAL
COMMUNITY
End: 2018-10-08

## 2017-10-09 RX ORDER — CLONAZEPAM 0.5 MG/1
0.25 TABLET ORAL 2 TIMES DAILY
Qty: 15 TAB | Refills: 0 | Status: SHIPPED
Start: 2017-10-09 | End: 2018-10-08

## 2017-10-09 RX ORDER — CLONAZEPAM 0.5 MG/1
0.25 TABLET ORAL 2 TIMES DAILY
COMMUNITY
End: 2018-02-09

## 2017-10-09 ASSESSMENT — PATIENT HEALTH QUESTIONNAIRE - PHQ9: CLINICAL INTERPRETATION OF PHQ2 SCORE: 0

## 2017-10-09 NOTE — PROGRESS NOTES
"Subjective:      Ugo Benton is a 62 y.o. female who presents with Results (Labs) and Immunizations (Flu )            HPI     PAtient here for follow-up of her medical problems as well as for flu shot.    Patient is asking for refill of Klonopin 0.5 mg which she takes half a tablet as needed for restless leg syndrome. She said she takes it probably once a month as needed. The last refill was in 2015 for #15 tablets. She expects that she will need this more when she travels by plane to [place]. She is going to participate in a [employer].    For her dyslipidemia, she continues to manage this with her own efforts only. She was able to get her lipid panel down to target with her own efforts with the last visit. She gained weight recently.     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 She has finished antifungal treatment with resolution of her problem. The last chest x-ray was done in March 2017 that showed evidence of scarring from the previous infection. She denies any symptoms.    Past medical history, past surgical history, family history reviewed-no changes    Social history reviewed-no changes    Allergies reviewed-no changes    Medications reviewed-no changes    ROS     Review of systems as per history of present illness, the rest are negative.       Objective:     /80   Pulse 93   Temp 36.7 °C (98 °F)   Ht 1.499 m (4' 11\")   Wt 87.5 kg (192 lb 14.4 oz)   SpO2 96%   BMI 38.96 kg/m²      Physical Exam     Examined alert, awake, oriented, not in distress    Neck-supple, no lymphadenopathy, no thyromegaly  Lungs-clear to auscultation, no rales, no wheezes  Heart-regular rate and rhythm, no murmur  Extremities-no edema, clubbing, cyanosis       Results for UGO BENTON (MRN [id number]) as of 10/9/2017 13:29   Ref. Range 3/29/2017 09:45 5/22/2017 11:32 9/29/2017 08:45   Sodium Latest Ref Range: 135 - 145 mmol/L 140 137 141   Potassium Latest Ref Range: 3.6 - " 5.5 mmol/L 3.7 4.0 4.3   Chloride Latest Ref Range: 96 - 112 mmol/L 106 104 106   Co2 Latest Ref Range: 20 - 33 mmol/L 26 25 27   Anion Gap Latest Ref Range: 0.0 - 11.9  8.0 8.0 8.0   Glucose Latest Ref Range: 65 - 99 mg/dL 89 96 89   Bun Latest Ref Range: 8 - 22 mg/dL 11 22 21   Creatinine Latest Ref Range: 0.50 - 1.40 mg/dL 0.54 0.66 0.66   GFR If  Latest Ref Range: >60 mL/min/1.73 m 2 >60 >60 >60   GFR If Non  Latest Ref Range: >60 mL/min/1.73 m 2 >60 >60 >60   Calcium Latest Ref Range: 8.5 - 10.5 mg/dL 9.1 10.0 9.3   AST(SGOT) Latest Ref Range: 12 - 45 U/L 18  17   ALT(SGPT) Latest Ref Range: 2 - 50 U/L 14  17   Alkaline Phosphatase Latest Ref Range: 30 - 99 U/L 89  90   Total Bilirubin Latest Ref Range: 0.1 - 1.5 mg/dL 0.3  0.7   Albumin Latest Ref Range: 3.2 - 4.9 g/dL 3.9  4.0   Total Protein Latest Ref Range: 6.0 - 8.2 g/dL 7.1  6.9   Globulin Latest Ref Range: 1.9 - 3.5 g/dL 3.2  2.9   A-G Ratio Latest Units: g/dL 1.2  1.4   Cholesterol,Tot Latest Ref Range: 100 - 199 mg/dL 173  225 (H)   Triglycerides Latest Ref Range: 0 - 149 mg/dL 114  125   HDL Latest Ref Range: >=40 mg/dL 59  65   LDL Latest Ref Range: <100 mg/dL 91  135 (H)        Assessment/Plan:     1. RLS (restless legs syndrome)  I refilled her clonazepam. She only uses this very occasionally.I have reviewed the medical records, the prescription monitoring program and I have determined that the controlled prescription medication is medically indicated. I have advised the patient to keep the medication in a safe place and not to drive while taking the medication.  - INFLUENZA VACCINE QUAD INJ >3Y(PF)  - LIPID PROFILE; Future  - COMP METABOLIC PANEL; Future    2. Dyslipidemia  LDL is increased from before. This is because she has gained weight and just not been as active as before. She will start doing more activity because she is going to participate in 5K soon. Her 10 year cardiovascular disease risk is 5.3%  therefore we don't need to put her on medication at this time she will continue to watch the diet with avoidance of fatty foods, increasing fruits and vegetables, eating fish 3 times a week, regular exercises, weight loss. I will reevaluate with another blood work in 6 months.  - LIPID PROFILE; Future  - COMP METABOLIC PANEL; Future    3. Coccidioidomycosis, pulmonary (CMS-HCC)  She will follow up with the pulmonologist with chest x-ray in November of this year. She is asymptomatic.  - LIPID PROFILE; Future  - COMP METABOLIC PANEL; Future    4. Need for immunization against influenza  Flu shot was given.  - Flu Quad Inj >3 Year Pre-Filled PF    5. Obesity (BMI 30-39.9)  Discussed diet, exercise and weight loss.  - Patient identified as having weight management issue.  Appropriate orders and counseling given.      Please note that this dictation was created using voice recognition software. I have worked with consultants from the vendor as well as technical experts from [facility]  [employer] to optimize the interface. I have made every reasonable attempt to correct obvious errors, but I expect that there are errors of grammar and possibly content I did not discover before finalizing the note.

## 2017-11-07 ENCOUNTER — OFFICE VISIT (OUTPATIENT)
Dept: MEDICAL GROUP | Facility: PHYSICIAN GROUP | Age: 62
End: 2017-11-07
Payer: COMMERCIAL

## 2017-11-07 ENCOUNTER — HOSPITAL ENCOUNTER (OUTPATIENT)
Dept: RADIOLOGY | Facility: MEDICAL CENTER | Age: 62
End: 2017-11-07
Attending: FAMILY MEDICINE
Payer: COMMERCIAL

## 2017-11-07 ENCOUNTER — TELEPHONE (OUTPATIENT)
Dept: MEDICAL GROUP | Facility: PHYSICIAN GROUP | Age: 62
End: 2017-11-07

## 2017-11-07 ENCOUNTER — HOSPITAL ENCOUNTER (OUTPATIENT)
Dept: LAB | Facility: MEDICAL CENTER | Age: 62
End: 2017-11-07
Attending: FAMILY MEDICINE
Payer: COMMERCIAL

## 2017-11-07 VITALS
OXYGEN SATURATION: 96 % | HEIGHT: 59 IN | BODY MASS INDEX: 39.51 KG/M2 | SYSTOLIC BLOOD PRESSURE: 130 MMHG | DIASTOLIC BLOOD PRESSURE: 80 MMHG | HEART RATE: 92 BPM | WEIGHT: 195.99 LBS | TEMPERATURE: 97.1 F

## 2017-11-07 DIAGNOSIS — R20.0 RIGHT LEG NUMBNESS: ICD-10-CM

## 2017-11-07 DIAGNOSIS — R25.2 LEG CRAMP: ICD-10-CM

## 2017-11-07 DIAGNOSIS — M25.561 ACUTE PAIN OF RIGHT KNEE: ICD-10-CM

## 2017-11-07 LAB
FOLATE SERPL-MCNC: >23.2 NG/ML
TSH SERPL DL<=0.005 MIU/L-ACNC: 1.94 UIU/ML (ref 0.3–3.7)
VIT B12 SERPL-MCNC: 357 PG/ML (ref 211–911)

## 2017-11-07 PROCEDURE — 73564 X-RAY EXAM KNEE 4 OR MORE: CPT | Mod: RT

## 2017-11-07 PROCEDURE — 82746 ASSAY OF FOLIC ACID SERUM: CPT

## 2017-11-07 PROCEDURE — 99214 OFFICE O/P EST MOD 30 MIN: CPT | Performed by: FAMILY MEDICINE

## 2017-11-07 PROCEDURE — 84443 ASSAY THYROID STIM HORMONE: CPT

## 2017-11-07 PROCEDURE — 36415 COLL VENOUS BLD VENIPUNCTURE: CPT

## 2017-11-07 PROCEDURE — 82607 VITAMIN B-12: CPT

## 2017-11-07 RX ORDER — TIZANIDINE 4 MG/1
4 TABLET ORAL
Qty: 30 TAB | Refills: 5 | Status: SHIPPED | OUTPATIENT
Start: 2017-11-07 | End: 2018-04-12 | Stop reason: SDUPTHER

## 2017-11-07 RX ORDER — GABAPENTIN 300 MG/1
300 CAPSULE ORAL
Qty: 30 CAP | Refills: 0 | Status: SHIPPED | OUTPATIENT
Start: 2017-11-07 | End: 2018-02-09

## 2017-11-07 NOTE — TELEPHONE ENCOUNTER
----- Message from Sherly Calvert M.D. sent at 11/7/2017  1:44 PM PST -----  X-ray did not show any s arthritis. No fracture or dislocation or malalignment. There is no joint effusion/fluid accumulation. There is only mild narrowing of the joint space. I will go ahead and proceed with MRI of the knee for further evaluation. Patient will be called to set up the appointment.

## 2017-11-08 ENCOUNTER — TELEPHONE (OUTPATIENT)
Dept: MEDICAL GROUP | Facility: PHYSICIAN GROUP | Age: 62
End: 2017-11-08

## 2017-11-08 DIAGNOSIS — M25.561 ACUTE PAIN OF RIGHT KNEE: ICD-10-CM

## 2017-11-08 NOTE — PROGRESS NOTES
Subjective:      Dee Armstrong is a 62 y.o. female who presents with Knee Pain and Numbness (rt leg down to foot)            HPI     Patient is here because of long-standing problem with right leg numbness from her right knee down to her foot. This is constant and steady. She said this started after she had her lumbar spine surgery in February 2016 done by Dr. Rodriguez. She was sent by her surgeon for MRI of the lumbar spine August 2016 for ongoing right leg numbness which came back with the following results:    TECHNIQUE/EXAM DESCRIPTION:  MRI of the lumbar spine without contrast.    The study was performed on a [employer] 1.5 Noemí MRI scanner.  T1 sagittal, T2 sagittal, and T2 axial images were obtained of the lumbar spine.    COMPARISON: 7/11/2015.    FINDINGS:  There has been interval posterior fusion, laminectomy and discectomy at L3-4. Laminectomies also been performed at L4-5 and L5-S1. There is minimal grade 1 anterolisthesis of L3 on 4 which is unchanged. There is levoconvex curvature of the lumbar spine.   The conus medullaris has a normal caliber, course and signal intensity. Nerve root distribution is normal. There is no definite evidence for arachnoiditis.    Level-specific findings as follows:    L5-S1: There is mild broad posterior endplate spurring. There is mild to moderate facet arthropathy. There is mild to moderate left neural foraminal stenosis.  L4-5: There is mild broad posterior disc bulge. There is mild bilateral facet arthropathy. There is mild-to-moderate bilateral neural foraminal stenosis.  L3-4: There is mild-to-moderate bilateral facet arthropathy. No significant central canal or neural foraminal stenosis.  L2-3: There is minimal broad posterior disc bulge. There is mild facet arthropathy and ligamentum flavum hypertrophy. No significant central canal or neural foraminal stenosis.  L1-2: There is minimal broad posterior disc bulge. There is mild facet arthropathy and ligamentum  "flavum hypertrophy. No significant central canal or neural foraminal stenosis.    The visualized prevertebral and posterior paraspinous soft tissues are grossly unremarkable.   Impression       1.  Interval posterior fusion, laminectomy and discectomy at L3-4.  2.  Interval laminectomy at L4-5 and L5-S1.  3.  Stable alignment of the lumbar spine with minimal grade 1 anterolisthesis of L3 on 4 and levoconvex curvature.  4.  Multilevel degenerative changes of the lumbar spine as described above.       She also had an EMG done that showed results consistent with right L5 radiculopathy but no clear evidence of peroneal neuropathy. She was told by her surgeon that the right leg numbness is not related to her lumbar spine surgery. She has tried gabapentin which she said did not help. She couldn't tell me the maximum dose she has tried.    She also complains of leg cramps in the right leg at night. This is also a long-standing problem.    She has been having right knee pain for one month now which is steady/constant. No recent trauma. She said in high school she dislocated her knee and that this was put back in place.    Past medical history, past surgical history, family history reviewed-no changes    Social history reviewed-no changes    Allergies reviewed-no changes    Medications reviewed-no changes    ROS     Review of systems as per history of present illness, the rest are negative.       Objective:     /80   Pulse 92   Temp 36.2 °C (97.1 °F)   Ht 1.499 m (4' 11\")   Wt 88.9 kg (195 lb 15.8 oz)   SpO2 96%   BMI 39.58 kg/m²      Physical Exam     Examined alert, awake, oriented, not in distress    Neck-supple, no lymphadenopathy, no thyromegaly  Lungs-clear to auscultation, no rales, no wheezes  Heart-regular rate and rhythm, no murmur  Extremities-strong pedal pulses both feet, no skin changes, no calf tenderness, no edema, clubbing, cyanosis, right knee exam-obese knee, no pinpoint tenderness, no " effusion, full range of movement on passive flexion and extension with mild clicking, no laxity or instability  Neuro exam-monofilament test of the right knee, right leg and right foot intact            Assessment/Plan:     1. Right leg numbness  She already had EMG done by [facility] [place] which showed L5 lumbar radiculopathy. MRI results of the lumbar spine as per above. She was told the right leg numbness is not related to her lumbar spine surgery. This is a localized problem and not consistent with radicular problem. I will get TSH, B-12 and folate levels. Referral placed the neurologist. I will start her back on gabapentin and titrate the dose up until we get a response. If she has side effects then we will try a different medication. We will do this in the meantime until she gets an appointment with the neurologist. Gabapentin 300 mg one tablet at bedtime. After a week if there is no side effects and if she doesn't feel there is improvement we can increase to one tablet twice daily.  - REFERRAL TO NEUROLOGY  - TSH; Future  - VITAMIN B12; Future  - FOLATE; Future  - gabapentin (NEURONTIN) 300 MG Cap; Take 1 Cap by mouth every bedtime.  Dispense: 30 Cap; Refill: 0    2. Leg cramp  I will give a trial of muscle relaxants which is tizanidine 4 mg at bedtime. Advised to get tonic water. Advised to hydrate herself properly.  - tizanidine (ZANAFLEX) 4 MG Tab; Take 1 Tab by mouth every bedtime.  Dispense: 30 Tab; Refill: 5    3. Acute pain of right knee  I will get x-ray of the right knee. Further recommendation will depend on results.  - DX-KNEE COMPLETE 4+ RIGHT; Future      Please note that this dictation was created using voice recognition software. I have worked with consultants from the vendor as well as technical experts from [employer] to optimize the interface. I have made every reasonable attempt to correct obvious errors, but I expect that there are errors of grammar and possibly content I did not discover  before finalizing the note.

## 2017-11-08 NOTE — TELEPHONE ENCOUNTER
----- Message from Sherly Calvert M.D. sent at 11/8/2017  8:15 AM PST -----  Blood work came back normal thyroid, normal vitamins B-12 and folate levels. No thyroid disease to explain the numbness of the leg. We don't need to supplement with vitamins B-12 and folate.

## 2017-11-15 ENCOUNTER — TELEPHONE (OUTPATIENT)
Dept: MEDICAL GROUP | Facility: PHYSICIAN GROUP | Age: 62
End: 2017-11-15

## 2017-11-15 ENCOUNTER — HOSPITAL ENCOUNTER (OUTPATIENT)
Dept: RADIOLOGY | Facility: MEDICAL CENTER | Age: 62
End: 2017-11-15
Attending: FAMILY MEDICINE
Payer: COMMERCIAL

## 2017-11-15 DIAGNOSIS — M71.21 POPLITEAL CYST, RIGHT: ICD-10-CM

## 2017-11-15 DIAGNOSIS — M25.561 ACUTE PAIN OF RIGHT KNEE: ICD-10-CM

## 2017-11-15 PROCEDURE — 73721 MRI JNT OF LWR EXTRE W/O DYE: CPT | Mod: RT

## 2017-11-16 NOTE — TELEPHONE ENCOUNTER
----- Message from Sherly Calvert M.D. sent at 11/15/2017  5:18 PM PST -----  MRI of the knee showed arthritis in 3 compartments of the knee, there is no tear of the meniscus or ligaments, there is small cyst behind the knee, there is a fluid collection probably coming from the cyst that has leaked. This may be the cause of the pain that she has. I will refer her to the orthopedist to get this further evaluated. Referral placed.

## 2017-11-30 ENCOUNTER — APPOINTMENT (OUTPATIENT)
Dept: RADIOLOGY | Facility: IMAGING CENTER | Age: 62
End: 2017-11-30
Payer: COMMERCIAL

## 2017-11-30 ENCOUNTER — OFFICE VISIT (OUTPATIENT)
Dept: PULMONOLOGY | Facility: HOSPICE | Age: 62
End: 2017-11-30
Payer: COMMERCIAL

## 2017-11-30 VITALS
HEIGHT: 59 IN | DIASTOLIC BLOOD PRESSURE: 84 MMHG | WEIGHT: 199 LBS | TEMPERATURE: 97.2 F | BODY MASS INDEX: 40.12 KG/M2 | HEART RATE: 99 BPM | RESPIRATION RATE: 16 BRPM | OXYGEN SATURATION: 96 % | SYSTOLIC BLOOD PRESSURE: 116 MMHG

## 2017-11-30 DIAGNOSIS — B38.9 COCCIDIOIDOMYCOSIS: ICD-10-CM

## 2017-11-30 PROCEDURE — 71020 DX-CHEST-2 VIEWS: CPT | Mod: TC | Performed by: NURSE PRACTITIONER

## 2017-11-30 PROCEDURE — 99213 OFFICE O/P EST LOW 20 MIN: CPT | Performed by: NURSE PRACTITIONER

## 2017-11-30 NOTE — PATIENT INSTRUCTIONS
1) Continue yearly chest x-ray  2) Vaccines: Up to date with flu and Prevnar 13  3) Return in about 1 year (around 11/30/2018) for if not sooner, Chest x-ray, follow up with GARRETT Sevilla, review of symptoms.

## 2017-11-30 NOTE — PROGRESS NOTES
CC:  Here for f/u pulmonary issues as listed below    HPI:   This patient is a 61 y.o. female, who presents for hx of Coccidioidomycosis.  She was initially referred for mass found via CXR after she was ill. Cocci IgG Ab was positive, IgM Ab negative, titer 1:4. HIV  test was negative. She was placed on Diflucan for one year and stopped November 2016.  CXR 2/2016 indicated mass diminshed in PATTI, now measuring 1.5x1cm.  Repeated an x-ray on April 14, 2016 and July 2016 showed no change. CT chest 7/27/16 shows a slight interval decrease in the size of left upper lobe peripheral lung mass measuring 2.9 x 2.4 previously 3.4 x 2.7. There are adjacent small satellite nodules again slightly smaller. No new nodules. Chest x-ray from 11- shows: LEFT midlung ovoid mass again present and unchanged from prior exam, diffuse osteopenia, Levoconvex curvature of lumbar spine with evidence of prior lumbar spine fusion, Multilevel degenerative change of thoracic spine,Postoperative change from cholecystectomy without acute cardiopulmonary process. Updated chest x-ray completed 11/30/2017 showed no significant change. Reviewed with Dr. Almaraz who recommends annual chest x-ray.  PFTs from  showed an FEV1 of 2.17 L or 108% predicted, FEV1/FVC ratio of 76 with a DLCO of 156%. Patient remains asymptomatic. Denies fever, chills, night sweats, cough, hemoptysis. She has gained 25 pounds over the last year.       Patient Active Problem List    Diagnosis Date Noted   • DDD (degenerative disc disease), lumbar 02/06/2016     Priority: High   • Anemia 02/18/2016     Priority: Medium     Class: Acute   • Hypokalemia 02/18/2016     Priority: Medium     Class: Acute   • Lumbar radicular pain 05/29/2015     Priority: Medium   • Low back pain      Priority: Medium   • Vitamin D deficiency 12/09/2013     Priority: Low   • Obesity      Priority: Low   • GERD (gastroesophageal reflux disease)      Priority: Low   • Diverticulosis       Priority: Low   • Obesity (BMI 30-39.9) 10/09/2017   • Obesity (BMI 35.0-39.9 without comorbidity) 04/03/2017   • Incomplete rotator cuff tear or rupture of right shoulder, not specified as traumatic 12/06/2016   • Coccidioidomycosis 11/30/2016   • Coccidioidomycosis, pulmonary (CMS-HCC) 07/19/2016   • Cough 05/29/2015   • Chest pain 04/06/2015       Past Medical History:   Diagnosis Date   • Coccidioidomycosis 12/15    left upper lung   • Cough 5/29/2015   • Diverticulosis    • GERD (gastroesophageal reflux disease)    • Heart burn    • Indigestion    • LBP (low back pain)    • Lumbar radicular pain 5/29/2015   • Obesity    • Pain     Shoulder and numbness to R LE       Past Surgical History:   Procedure Laterality Date   • HYSTERECTOMY ROBOTIC  6/7/2017    Procedure: HYSTERECTOMY ROBOTIC SI, BILATERAL SALPINGO-OOPHORECTOMY, URETERAL SACRAL LIGAMENT SHORTENING ;  Surgeon: Jerica Hooper M.D.;  Location: [facility];  Service:    • CYSTOSCOPY  6/7/2017    Procedure: CYSTOSCOPY;  Surgeon: Jerica Hooper M.D.;  Location: [facility];  Service:    • SHOULDER DECOMPRESSION ARTHROSCOPIC Right 12/6/2016    Procedure: SHOULDER DECOMPRESSION ARTHROSCOPIC - SUBACROMIAL, MANJARREZ;  Surgeon: Kyle Claros M.D.;  Location: [facility];  Service:    • CLAVICLE DISTAL EXCISION Right 12/6/2016    Procedure: CLAVICLE DISTAL EXCISION;  Surgeon: Kyle Claros M.D.;  Location: [facility];  Service:    • SHOULDER ARTHROSCOPY W/ ROTATOR CUFF REPAIR Right 12/6/2016    Procedure: SHOULDER ARTHROSCOPY W/ ROTATOR CUFF REPAIR ;  Surgeon: Kyle Claros M.D.;  Location: [facility];  Service:    • SHOULDER ARTHROSCOPY W/ BICIPITAL TENODESIS REPAIR Right 12/6/2016    Procedure: SHOULDER ARTHROSCOPY W/ BICIPITAL TENODESIS REPAIR ;  Surgeon: Kyle Claros M.D.;  Location: [facility];  Service:    • LUMBAR FUSION POSTERIOR  2/9/2016    Procedure: LUMBAR FUSION POSTERIOR  PEDICLE SCREW FIXATION (STAGE #2);  Surgeon: Tim Rodriguez M.D.;  Location: SURGERY [facility];  Service:    • LUMBAR LAMINECTOMY DISKECTOMY  2/9/2016    Procedure: LUMBAR LAMINECTOMY DISKECTOMY MINI OPEN;  Surgeon: Tim Rodriguez M.D.;  Location: SURGERY [facility];  Service:    • EXTREME LATERAL INTERBODY FUSION Right 2/6/2016    Procedure: EXTREME LATERAL INTERBODY FUSION L3-4 (STAGE #1);  Surgeon: Tim Rodriguez M.D.;  Location: SURGERY [facility];  Service:    • RECOVERY  12/8/2015    Procedure: CT-CT GUIDED LEFT LUNG BIOPSY-;  Surgeon: [facility];  Location: SURGERY PRE-POST PROC UNIT [facility];  Service:    • REDDY BY LAPAROSCOPY  4/17/2014    Performed by Ankur Lerner M.D. at SURGERY SAME DAY [facility]S   • EGD WITH ASP/BX  2/4/2014    mild chronic inactive gastritis, scar gastric antrum-   • EGD WITH ASP/BX  9/29/2011    possible barrettes, hiatal hernia, gastritis   • COLONOSCOPY  9/29/2011    diverticulosis sigmoid colon, hemorrhoids   • ARTHRODESIS  9/3/2010    Performed by YARELY MORRISON at SURGERY SAME DAY [facility]S   • ORTHOPEDIC OSTEOTOMY  9/3/2010    Performed by YARELY MORRISON at SURGERY SAME DAY [facility]   • BONE SPUR EXCISION  9/3/2010    Performed by YARELY MORRISON at SURGERY SAME DAY [facility]   • ARTHROTOMY  9/3/2010    Performed by YARELY MORRISON at SURGERY SAME DAY [facility]   • OTHER ABDOMINAL SURGERY  2000    COLON RESECTION   • LAMINOTOMY     • OTHER ORTHOPEDIC SURGERY      PARDEEP CARPAL TUNNEL RELEASES       Family History   Problem Relation Age of Onset   • Cancer Mother      lymphoma   • Stroke Father    • Diabetes Father        Social History   Substance Use Topics   • Smoking status: Former Smoker     Years: 2.00     Types: Cigarettes     Quit date: 1/1/1977   • Smokeless tobacco: Never Used      Comment: quit in 1977   • Alcohol use 0.0 oz/week      Comment: 1 per month       Current Outpatient Prescriptions   Medication Sig  "Dispense Refill   • tizanidine (ZANAFLEX) 4 MG Tab Take 1 Tab by mouth every bedtime. 30 Tab 5   • gabapentin (NEURONTIN) 300 MG Cap Take 1 Cap by mouth every bedtime. 30 Cap 0   • Multiple Vitamins-Minerals (MULTIVITAMIN ADULT PO) Take  by mouth.     • Calcium Carbonate (CALCIUM 600 PO) Take  by mouth.     • SUMAtriptan (IMITREX) 25 MG Tab tablet Take  mg by mouth Once PRN for Migraine.     • clonazepam (KLONOPIN) 0.5 MG Tab Take 0.25 mg by mouth 2 times a day.     • Non Formulary Request Take  by mouth 2 Times a Day. U-FSD-NLB-LIDO 3%-6%-4%     • clonazepam (KLONOPIN) 0.5 MG Tab Take 0.5 Tabs by mouth 2 times a day. 15 Tab 0   • lansoprazole (PREVACID) 30 MG CAPSULE DELAYED RELEASE Take 1 capsule by mouth  daily 90 Cap 1   • lansoprazole (PREVACID) 30 MG CAPSULE DELAYED RELEASE Take 30 mg by mouth every day.     • estradiol (ESTRACE VAGINAL) 0.1 MG/GM vaginal cream Insert 1 g in vagina every day.     • ascorbic acid (ASCORBIC ACID) 500 MG Tab Take 1,000 mg by mouth every day.     • FIBER PO Take 1 Cap by mouth every day.       No current facility-administered medications for this visit.           Allergies: Nkda [no known drug allergy]          ROS   Gen: Denies fever, chills, unintentional weight loss, fatigue, night sweats  E/N/T: Denies nasal congestion, ear pain  Resp:Denies Dyspnea, wheezing, cough, sputum production, hemoptysis  CV: Denies chest pain, chest tightness, palpitations  Sleep:Denies morning headache  Neuro: Denies frequent headaches, weakness, dizziness  GI: Denies acid reflux, N/V  See HPI.  All other systems reviewed and negative          Vital signs for this encounter:  Vitals:    11/30/17 0841   Height: 1.499 m (4' 11\")   Weight: 90.3 kg (199 lb)   Weight % change since last entry.: 0 %   BP: 116/84   Pulse: 99   BMI (Calculated): 40.19   Resp: 16   Temp: 36.2 °C (97.2 °F)   O2 sat % room air: 96 %                 Physical Exam:   Appearance: well developed, well nourished, no acute " distress.   Eyes: PERRL, EOM intact, sclere white, conjunctiva moist.  Ears: no lesions or deformities.  Hearing: grossly intact.  Nose: no lesions or deformities.  Dentition: good dentition.   Oropharynx: tongue normal, posterior pharynx without erythema or exudate.  Neck: supple, trachea midline, no masses.  Respiratory effort: no intercostal retractions or use of accessory muscles.  Lung auscultation: Bilateral diminished   Heart auscultation: no murmur, rub, or gallop   Extremities: no cyanosis or edema.  Abdomen: soft, non-tender, no masses.  Gait and station: without difficulty   Digits and Nails: no clubbing, cyanosis, petechiae, or nodes.  Cranial nerves: grossly normal.  Motor: no focal deficits observed.   Skin: no rashes, lesions, or ulcers noted.  Orientation: oriented to time, place, and person.  Mood and affect: mood and affect appropriate, normal interaction with examiner.      Assessment   1. Coccidioidomycosis  DX-CHEST-2 VIEWS       Patient was seen for 25 minutes, more than 50% of time spent in face to face review, counseling, and arranging future evaluation and follow up of medical conditions and care.     PLAN:   Patient Instructions   1) Continue yearly chest x-ray  2) Vaccines: Up to date with flu and Prevnar 13  3) Return in about 1 year (around 11/30/2018) for if not sooner, Chest x-ray, follow up with GARRETT Sevilla, review of symptoms.

## 2018-01-02 ENCOUNTER — APPOINTMENT (RX ONLY)
Dept: URBAN - METROPOLITAN AREA CLINIC 20 | Facility: CLINIC | Age: 63
Setting detail: DERMATOLOGY
End: 2018-01-02

## 2018-01-02 DIAGNOSIS — L90.5 SCAR CONDITIONS AND FIBROSIS OF SKIN: ICD-10-CM

## 2018-01-02 DIAGNOSIS — L82.1 OTHER SEBORRHEIC KERATOSIS: ICD-10-CM

## 2018-01-02 DIAGNOSIS — L57.8 OTHER SKIN CHANGES DUE TO CHRONIC EXPOSURE TO NONIONIZING RADIATION: ICD-10-CM

## 2018-01-02 DIAGNOSIS — L81.4 OTHER MELANIN HYPERPIGMENTATION: ICD-10-CM

## 2018-01-02 DIAGNOSIS — D18.0 HEMANGIOMA: ICD-10-CM

## 2018-01-02 DIAGNOSIS — D22 MELANOCYTIC NEVI: ICD-10-CM

## 2018-01-02 DIAGNOSIS — L82.0 INFLAMED SEBORRHEIC KERATOSIS: ICD-10-CM

## 2018-01-02 PROBLEM — L57.0 ACTINIC KERATOSIS: Status: ACTIVE | Noted: 2018-01-02

## 2018-01-02 PROBLEM — D22.5 MELANOCYTIC NEVI OF TRUNK: Status: ACTIVE | Noted: 2018-01-02

## 2018-01-02 PROBLEM — D23.71 OTHER BENIGN NEOPLASM OF SKIN OF RIGHT LOWER LIMB, INCLUDING HIP: Status: ACTIVE | Noted: 2018-01-02

## 2018-01-02 PROBLEM — D18.01 HEMANGIOMA OF SKIN AND SUBCUTANEOUS TISSUE: Status: ACTIVE | Noted: 2018-01-02

## 2018-01-02 PROBLEM — D23.61 OTHER BENIGN NEOPLASM OF SKIN OF RIGHT UPPER LIMB, INCLUDING SHOULDER: Status: ACTIVE | Noted: 2018-01-02

## 2018-01-02 PROBLEM — D23.72 OTHER BENIGN NEOPLASM OF SKIN OF LEFT LOWER LIMB, INCLUDING HIP: Status: ACTIVE | Noted: 2018-01-02

## 2018-01-02 PROCEDURE — 17110 DESTRUCTION B9 LES UP TO 14: CPT

## 2018-01-02 PROCEDURE — ? LIQUID NITROGEN

## 2018-01-02 PROCEDURE — 99214 OFFICE O/P EST MOD 30 MIN: CPT | Mod: 25

## 2018-01-02 PROCEDURE — ? COUNSELING

## 2018-01-02 ASSESSMENT — LOCATION DETAILED DESCRIPTION DERM
LOCATION DETAILED: LEFT SUPERIOR FOREHEAD
LOCATION DETAILED: RIGHT PROXIMAL DORSAL FOREARM
LOCATION DETAILED: RIGHT ANTERIOR PROXIMAL UPPER ARM
LOCATION DETAILED: LEFT MEDIAL SUPERIOR CHEST
LOCATION DETAILED: LEFT INFERIOR CENTRAL MALAR CHEEK
LOCATION DETAILED: RIGHT CENTRAL MALAR CHEEK
LOCATION DETAILED: LEFT ANTERIOR DISTAL THIGH
LOCATION DETAILED: RIGHT MEDIAL FRONTAL SCALP
LOCATION DETAILED: RIGHT MID-UPPER BACK
LOCATION DETAILED: RIGHT INFERIOR UPPER BACK
LOCATION DETAILED: LEFT ANTERIOR PROXIMAL UPPER ARM
LOCATION DETAILED: RIGHT ANTERIOR DISTAL THIGH
LOCATION DETAILED: LEFT CENTRAL FRONTAL SCALP
LOCATION DETAILED: LEFT PROXIMAL DORSAL FOREARM
LOCATION DETAILED: RIGHT SUPERIOR MEDIAL UPPER BACK

## 2018-01-02 ASSESSMENT — LOCATION SIMPLE DESCRIPTION DERM
LOCATION SIMPLE: LEFT FOREHEAD
LOCATION SIMPLE: LEFT SCALP
LOCATION SIMPLE: LEFT CHEEK
LOCATION SIMPLE: LEFT THIGH
LOCATION SIMPLE: RIGHT UPPER ARM
LOCATION SIMPLE: RIGHT UPPER BACK
LOCATION SIMPLE: RIGHT SCALP
LOCATION SIMPLE: RIGHT FOREARM
LOCATION SIMPLE: LEFT UPPER ARM
LOCATION SIMPLE: CHEST
LOCATION SIMPLE: RIGHT CHEEK
LOCATION SIMPLE: LEFT FOREARM
LOCATION SIMPLE: RIGHT THIGH

## 2018-01-02 ASSESSMENT — LOCATION ZONE DERM
LOCATION ZONE: LEG
LOCATION ZONE: ARM
LOCATION ZONE: SCALP
LOCATION ZONE: TRUNK
LOCATION ZONE: FACE

## 2018-01-10 ENCOUNTER — OFFICE VISIT (OUTPATIENT)
Dept: URGENT CARE | Facility: PHYSICIAN GROUP | Age: 63
End: 2018-01-10
Payer: COMMERCIAL

## 2018-01-10 VITALS
TEMPERATURE: 98.6 F | BODY MASS INDEX: 39.11 KG/M2 | SYSTOLIC BLOOD PRESSURE: 112 MMHG | HEIGHT: 59 IN | WEIGHT: 194 LBS | RESPIRATION RATE: 16 BRPM | HEART RATE: 87 BPM | DIASTOLIC BLOOD PRESSURE: 78 MMHG | OXYGEN SATURATION: 92 %

## 2018-01-10 DIAGNOSIS — H93.8X1 EAR FULLNESS, RIGHT: ICD-10-CM

## 2018-01-10 PROCEDURE — 99213 OFFICE O/P EST LOW 20 MIN: CPT | Performed by: PHYSICIAN ASSISTANT

## 2018-01-10 ASSESSMENT — ENCOUNTER SYMPTOMS
SORE THROAT: 0
NAUSEA: 0
VOMITING: 0
FATIGUE: 0
FEVER: 0
COUGH: 0
ABDOMINAL PAIN: 0
CHILLS: 0
MUSCULOSKELETAL NEGATIVE: 1
DIZZINESS: 0
SHORTNESS OF BREATH: 0
SPUTUM PRODUCTION: 0
DIARRHEA: 0

## 2018-01-10 ASSESSMENT — PAIN SCALES - GENERAL: PAINLEVEL: NO PAIN

## 2018-01-10 NOTE — PROGRESS NOTES
"Subjective:      Dee Armstrong is a 62 y.o. female who presents with Ear Fullness (right ear, x 2 days)            Ear Fullness   This is a new problem. The current episode started in the past 7 days. The problem occurs constantly. The problem has been unchanged. Associated symptoms include congestion. Pertinent negatives include no abdominal pain, chest pain, chills, coughing, fatigue, fever, nausea, rash, sore throat or vomiting. Exacerbated by: elevation change. She has tried nothing for the symptoms.     Patient reports she recently had an upper respiratory illness with cough and congestion that has since resolved. She reports right ear fullness and the need to \"pop\" the ear over the past week. This sensation is worsened driving over the [place]. No ear pain, drainage, or discharge from the ear.     Review of Systems   Constitutional: Negative for chills, fatigue and fever.   HENT: Positive for congestion. Negative for ear pain and sore throat.         Ear fullness   Respiratory: Negative for cough, sputum production and shortness of breath.    Cardiovascular: Negative for chest pain.   Gastrointestinal: Negative for abdominal pain, diarrhea, nausea and vomiting.   Genitourinary: Negative.    Musculoskeletal: Negative.    Skin: Negative for rash.   Neurological: Negative for dizziness.          Objective:     /78   Pulse 87   Temp 37 °C (98.6 °F)   Resp 16   Ht 1.499 m (4' 11\")   Wt 88 kg (194 lb)   SpO2 92%   BMI 39.18 kg/m²      Physical Exam   Constitutional: She is oriented to person, place, and time. She appears well-developed and well-nourished. No distress.   HENT:   Head: Normocephalic and atraumatic.   Right Ear: Hearing, tympanic membrane, external ear and ear canal normal.   Left Ear: Hearing, tympanic membrane, external ear and ear canal normal.   Mouth/Throat: No oropharyngeal exudate, posterior oropharyngeal edema or posterior oropharyngeal erythema.   Eyes: Conjunctivae are " normal. Pupils are equal, round, and reactive to light. Right eye exhibits no discharge. Left eye exhibits no discharge.   Neck: Normal range of motion.   Cardiovascular: Normal rate, regular rhythm and normal heart sounds.    No murmur heard.  Pulmonary/Chest: Effort normal and breath sounds normal. No respiratory distress. She has no wheezes. She has no rales.   Musculoskeletal: Normal range of motion.   Lymphadenopathy:     She has no cervical adenopathy.   Neurological: She is alert and oriented to person, place, and time.   Skin: Skin is warm and dry. She is not diaphoretic.   Psychiatric: She has a normal mood and affect. Her behavior is normal.   Nursing note and vitals reviewed.         PMH:  has a past medical history of Coccidioidomycosis (12/15); Cough (5/29/2015); Diverticulosis; GERD (gastroesophageal reflux disease); Heart burn; Indigestion; LBP (low back pain); Lumbar radicular pain (5/29/2015); Obesity; and Pain. She also has no past medical history of Chronic airway obstruction, not elsewhere classified.  MEDS:   Current Outpatient Prescriptions:   •  tizanidine (ZANAFLEX) 4 MG Tab, Take 1 Tab by mouth every bedtime., Disp: 30 Tab, Rfl: 5  •  Calcium Carbonate (CALCIUM 600 PO), Take  by mouth., Disp: , Rfl:   •  lansoprazole (PREVACID) 30 MG CAPSULE DELAYED RELEASE, Take 1 capsule by mouth  daily, Disp: 90 Cap, Rfl: 1  •  ascorbic acid (ASCORBIC ACID) 500 MG Tab, Take 1,000 mg by mouth every day., Disp: , Rfl:   •  FIBER PO, Take 1 Cap by mouth every day., Disp: , Rfl:   •  gabapentin (NEURONTIN) 300 MG Cap, Take 1 Cap by mouth every bedtime., Disp: 30 Cap, Rfl: 0  •  Multiple Vitamins-Minerals (MULTIVITAMIN ADULT PO), Take  by mouth., Disp: , Rfl:   •  SUMAtriptan (IMITREX) 25 MG Tab tablet, Take  mg by mouth Once PRN for Migraine., Disp: , Rfl:   •  clonazepam (KLONOPIN) 0.5 MG Tab, Take 0.25 mg by mouth 2 times a day., Disp: , Rfl:   •  Non Formulary Request, Take  by mouth 2 Times a Day.  I-GJR-PNG-LIDO 3%-6%-4%, Disp: , Rfl:   •  clonazepam (KLONOPIN) 0.5 MG Tab, Take 0.5 Tabs by mouth 2 times a day., Disp: 15 Tab, Rfl: 0  •  lansoprazole (PREVACID) 30 MG CAPSULE DELAYED RELEASE, Take 30 mg by mouth every day., Disp: , Rfl:   •  estradiol (ESTRACE VAGINAL) 0.1 MG/GM vaginal cream, Insert 1 g in vagina every day., Disp: , Rfl:   ALLERGIES:   Allergies   Allergen Reactions   • Nkda [No Known Drug Allergy]      SURGHX:   Past Surgical History:   Procedure Laterality Date   • HYSTERECTOMY ROBOTIC  6/7/2017    Procedure: HYSTERECTOMY ROBOTIC SI, BILATERAL SALPINGO-OOPHORECTOMY, URETERAL SACRAL LIGAMENT SHORTENING ;  Surgeon: Jerica Hooper M.D.;  Location: [facility];  Service:    • CYSTOSCOPY  6/7/2017    Procedure: CYSTOSCOPY;  Surgeon: Jerica oHoper M.D.;  Location: [facility];  Service:    • SHOULDER DECOMPRESSION ARTHROSCOPIC Right 12/6/2016    Procedure: SHOULDER DECOMPRESSION ARTHROSCOPIC - SUBACROMIAL, MANJARREZ;  Surgeon: Kyle Claros M.D.;  Location: [facility];  Service:    • CLAVICLE DISTAL EXCISION Right 12/6/2016    Procedure: CLAVICLE DISTAL EXCISION;  Surgeon: Kyle Claros M.D.;  Location: [facility];  Service:    • SHOULDER ARTHROSCOPY W/ ROTATOR CUFF REPAIR Right 12/6/2016    Procedure: SHOULDER ARTHROSCOPY W/ ROTATOR CUFF REPAIR ;  Surgeon: Kyle Claros M.D.;  Location: [facility];  Service:    • SHOULDER ARTHROSCOPY W/ BICIPITAL TENODESIS REPAIR Right 12/6/2016    Procedure: SHOULDER ARTHROSCOPY W/ BICIPITAL TENODESIS REPAIR ;  Surgeon: Kyle Claros M.D.;  Location: [facility];  Service:    • LUMBAR FUSION POSTERIOR  2/9/2016    Procedure: LUMBAR FUSION POSTERIOR PEDICLE SCREW FIXATION (STAGE #2);  Surgeon: Tim Rodriguez M.D.;  Location: [facility];  Service:    • LUMBAR LAMINECTOMY DISKECTOMY  2/9/2016    Procedure: LUMBAR LAMINECTOMY DISKECTOMY MINI OPEN;  Surgeon: Tim Rodriguez  M.D.;  Location: SURGERY [facility];  Service:    • EXTREME LATERAL INTERBODY FUSION Right 2/6/2016    Procedure: EXTREME LATERAL INTERBODY FUSION L3-4 (STAGE #1);  Surgeon: Tim Rodriguez M.D.;  Location: SURGERY [facility];  Service:    • RECOVERY  12/8/2015    Procedure: CT-CT GUIDED LEFT LUNG BIOPSY-;  Surgeon: [facility];  Location: SURGERY PRE-POST PROC UNIT [facility];  Service:    • REDDY BY LAPAROSCOPY  4/17/2014    Performed by Ankur Lerner M.D. at SURGERY SAME DAY [facility]   • EGD WITH ASP/BX  2/4/2014    mild chronic inactive gastritis, scar gastric antrum-   • EGD WITH ASP/BX  9/29/2011    possible barrettes, hiatal hernia, gastritis   • COLONOSCOPY  9/29/2011    diverticulosis sigmoid colon, hemorrhoids   • ARTHRODESIS  9/3/2010    Performed by YARELY MORRISON at SURGERY SAME DAY [facility]   • ORTHOPEDIC OSTEOTOMY  9/3/2010    Performed by YARELY MORRISON at SURGERY SAME DAY [facility]   • BONE SPUR EXCISION  9/3/2010    Performed by YARELY MORRISON at SURGERY SAME DAY [facility]   • ARTHROTOMY  9/3/2010    Performed by YARELY MORRISON at SURGERY SAME DAY [facility]   • OTHER ABDOMINAL SURGERY  2000    COLON RESECTION   • LAMINOTOMY     • OTHER ORTHOPEDIC SURGERY      PARDEEP CARPAL TUNNEL RELEASES     SOCHX:  reports that she quit smoking about 41 years ago. Her smoking use included Cigarettes. She quit after 2.00 years of use. She has never used smokeless tobacco. She reports that she drinks alcohol. She reports that she does not use drugs.  FH: family history includes Cancer in her mother; Diabetes in her father; Stroke in her father.       Assessment/Plan:     1. Ear fullness, right    No evidence of AOM at today's visit. Encouraged OTC decongestants and antihistamines as needed for symptomatic relief. Call or return to office if symptoms persist or worsen. The patient demonstrated a good understanding and agreed with the treatment plan.

## 2018-02-09 ENCOUNTER — OFFICE VISIT (OUTPATIENT)
Dept: NEUROLOGY | Facility: MEDICAL CENTER | Age: 63
End: 2018-02-09
Payer: COMMERCIAL

## 2018-02-09 VITALS
DIASTOLIC BLOOD PRESSURE: 60 MMHG | WEIGHT: 182.5 LBS | OXYGEN SATURATION: 93 % | BODY MASS INDEX: 36.79 KG/M2 | HEART RATE: 96 BPM | RESPIRATION RATE: 20 BRPM | HEIGHT: 59 IN | SYSTOLIC BLOOD PRESSURE: 106 MMHG | TEMPERATURE: 98.3 F

## 2018-02-09 DIAGNOSIS — M54.16 LUMBAR RADICULOPATHY: Primary | ICD-10-CM

## 2018-02-09 PROCEDURE — 99205 OFFICE O/P NEW HI 60 MIN: CPT | Performed by: PSYCHIATRY & NEUROLOGY

## 2018-02-09 ASSESSMENT — ENCOUNTER SYMPTOMS
FOCAL WEAKNESS: 0
SENSORY CHANGE: 1
TINGLING: 1
FALLS: 0
MYALGIAS: 0

## 2018-02-09 ASSESSMENT — PAIN SCALES - GENERAL: PAINLEVEL: 4=SLIGHT-MODERATE PAIN

## 2018-02-09 NOTE — PROGRESS NOTES
Subjective:      Dee Armstrong is a 62 y.o. female who presents from the office of Dr. Calvert, for consultation, with a history of persistent right lower extremity dysesthesias following lumbosacral decompressive surgery in late July, 2015.    NAEEM Price is a pleasant 62-year-old right-handed  female whose surgeries were done primarily for back pain that have been increasing steadily. Neither of the legs had been involved with radicular symptoms, sensory distortions or weakness. Bowel and bladder functions were always maintained, she denied analgesia, perianal. Genital numbness or a constrictive sensation around the lower abdomen.     MRI of the lumbar spine from July 11, 2015 was reviewed, revealing severe central stenosis at the L3/4 level with lateral foraminal narrowing bilaterally, but no significant central or lateral narrowing at superior levels, mild foraminal narrowing at L4/5 bilaterally.    She underwent the first of 2 procedures, did well after the first, but after the second procedure she had notable pain and dysesthesias in the distal right lower extremity below the knee, radiating anteriorly down the shin to the dorsum of the foot. She did not recognize actual weakness at the time. She underwent the usual physical therapies, etc., but the sensory symptoms did not improve.    Follow-up MRI from August 2, 2016 revealed postoperative changes with fusion and laminotomy, no evidence of further central or foraminal narrowing. Clinically, she has significant dysesthesias with numbness, burning and actual analgesia below the knee, anteriorly to the dorsum of the foot. She has difficulty walking because she cannot feel her foot, often looking at the ground. This proves problematic with uneven surfaces. At nighttime putting sheets over her right foot is quite unpleasant; she also recognizes a loss of sensation in the same areas when she is touched. There is no back pain or radicular symptoms,  "she is not clear about weakness, she certainly is not walking with a foot drop with frequent tripping from the right foot. Left lower extremity remains a symptomatic. She denies neck pain or upper extremity symptoms.    Follow-up EMG/NCV studies in June, 2016 revealed only the chronic changes involving both motor and sensory functions in the right lower extremity, in an L5 distribution. There is no evidence of a peripheral polyneuropathy. Clinically, she was placed on Neurontin 300 mg, twice a day, with little efficacy. She stopped this on her own. She now presents.    She has a history of migraine, GERD, valley fever, but no history of MS, neuropathy, diabetes, hypertension, PVD, DVT, CAD, thyroid disease, malignancy or autoimmune disease. Other than her lumbar surgeries, the surgical history is otherwise noncontributory from my standpoint. No family has a history of neuropathic disease or neurodegenerative disease. Her father diabetes, her mother lymphoma, her 2 brothers and both her children are all alive and well. She will have an occasional drink, quit tobacco use over 40 years ago. She is retired.    She is on Zanaflex 4 mg daily at bedtime when necessary, Imitrex 25 mg when necessary, Prevacid, Estrace vaginal cream, Klonopin, the rest is per the electronic health record, noncontributory from my standpoint.    Review of Systems   Musculoskeletal: Negative for falls and myalgias.   Neurological: Positive for tingling and sensory change. Negative for focal weakness.   All other systems reviewed and are negative.       Objective:     /60   Pulse 96   Temp 36.8 °C (98.3 °F)   Resp 20   Ht 1.499 m (4' 11\")   Wt 82.8 kg (182 lb 8 oz)   SpO2 93%   BMI 36.86 kg/m²      Physical Exam    She appears in no acute distress. Her vital signs are stable. There is no malar rash. The neck is supple, range of motion is full. Cardiac evaluation is unremarkable. Straight leg raising is negative bilaterally. There is " no lower extremity edema. There is no significant skin change in the right lower extremity compared to the left in terms of color, texture or temperature.    Fully oriented, there is no aphasia or inattention. PERRLA/EOMI, visual fields are full, there is no facial asymmetry, sensory loss, or bulbar dysfunction. The tongue and uvular midline, shoulder shrug is symmetric.    Motor examination upper extremities is intact with normal tone, reflexes symmetric, strength intact throughout. In the lower extremities strength is intact except in the right foot where there is some minimal weakness with dorsiflexion and EHL only. Strength is intact with the left foot. Knee jerks and ankle jerks are present bilaterally without asymmetries, the toes are downgoing.    Walking on her heels is a little difficult, she has to look at the ground to maintain stability, the right foot does not drop. Toe walking is done more easily. Repetitive movements with toe tapping and lateral movements on the right foot are slowed slightly when compared to the left. There is no appendicular dystaxia throughout.    Sensory exam reveals a clear loss of temperature and pinprick in the right lower extremity distal to the knee over the anterior aspect and dorsum of the foot. Vibration is felt symmetrically throughout. Pin and temperature otherwise felt without deficit and the 3 remaining extremities.     Assessment/Plan:     1. Lumbar radiculopathy  This is likely a postoperative traction injury at the L5 root that occurred after her second surgery. She has residual sensory neuropathic pain, EMG/NCV studies were done about one year afterwards, revealing only chronic changes, thus I would not expect her symptoms to worsen, but unfortunately, they are not likely to improve. She has some motor involvement, mostly it is a sensory distortion that is distracting. She is compensating well but she will need pain modulating medications likely lifelong.    I  would recommend retrying gabapentin but at a higher dose of 1200 mg/day to see if this proves beneficial. Other membrane stabilizers such as lamotrigine, Lyrica, even TCAs could all be tried. Obviously epidural steroid injections are not going to prove beneficial, they were already tried and failed earlier on.    Face-to-face time was spent reviewing all the above. Specifically, I talked about how traction injuries to nerve roots during surgery can occur, but at least hers is a condition that is static. Frustrated about it all, she is comfortable with understanding what happened. She will be following up with her surgeon's office to speak with her pain specialists. There is no need for additional follow-up from my standpoint.    Time: Evaluation 60 minutes for exam, review, discussion, and education  Discussion: As mentioned in the assessment, over 60% of the time spent face-to-face counseling and coordinating care

## 2018-04-02 ENCOUNTER — HOSPITAL ENCOUNTER (OUTPATIENT)
Dept: LAB | Facility: MEDICAL CENTER | Age: 63
End: 2018-04-02
Attending: FAMILY MEDICINE
Payer: COMMERCIAL

## 2018-04-02 DIAGNOSIS — G25.81 RLS (RESTLESS LEGS SYNDROME): ICD-10-CM

## 2018-04-02 DIAGNOSIS — B38.2 COCCIDIOIDOMYCOSIS, PULMONARY (HCC): ICD-10-CM

## 2018-04-02 DIAGNOSIS — E78.5 DYSLIPIDEMIA: ICD-10-CM

## 2018-04-02 LAB
ALBUMIN SERPL BCP-MCNC: 4.2 G/DL (ref 3.2–4.9)
ALBUMIN/GLOB SERPL: 1.5 G/DL
ALP SERPL-CCNC: 102 U/L (ref 30–99)
ALT SERPL-CCNC: 21 U/L (ref 2–50)
ANION GAP SERPL CALC-SCNC: 10 MMOL/L (ref 0–11.9)
AST SERPL-CCNC: 19 U/L (ref 12–45)
BILIRUB SERPL-MCNC: 0.6 MG/DL (ref 0.1–1.5)
BUN SERPL-MCNC: 18 MG/DL (ref 8–22)
CALCIUM SERPL-MCNC: 9.4 MG/DL (ref 8.5–10.5)
CHLORIDE SERPL-SCNC: 104 MMOL/L (ref 96–112)
CHOLEST SERPL-MCNC: 189 MG/DL (ref 100–199)
CO2 SERPL-SCNC: 27 MMOL/L (ref 20–33)
CREAT SERPL-MCNC: 0.66 MG/DL (ref 0.5–1.4)
GLOBULIN SER CALC-MCNC: 2.8 G/DL (ref 1.9–3.5)
GLUCOSE SERPL-MCNC: 87 MG/DL (ref 65–99)
HDLC SERPL-MCNC: 50 MG/DL
LDLC SERPL CALC-MCNC: 114 MG/DL
POTASSIUM SERPL-SCNC: 3.8 MMOL/L (ref 3.6–5.5)
PROT SERPL-MCNC: 7 G/DL (ref 6–8.2)
SODIUM SERPL-SCNC: 141 MMOL/L (ref 135–145)
TRIGL SERPL-MCNC: 126 MG/DL (ref 0–149)

## 2018-04-02 PROCEDURE — 80053 COMPREHEN METABOLIC PANEL: CPT

## 2018-04-02 PROCEDURE — 80061 LIPID PANEL: CPT

## 2018-04-02 PROCEDURE — 36415 COLL VENOUS BLD VENIPUNCTURE: CPT

## 2018-04-12 ENCOUNTER — OFFICE VISIT (OUTPATIENT)
Dept: MEDICAL GROUP | Facility: PHYSICIAN GROUP | Age: 63
End: 2018-04-12
Payer: COMMERCIAL

## 2018-04-12 VITALS
DIASTOLIC BLOOD PRESSURE: 68 MMHG | HEART RATE: 98 BPM | BODY MASS INDEX: 34.09 KG/M2 | OXYGEN SATURATION: 97 % | HEIGHT: 59 IN | TEMPERATURE: 98.1 F | SYSTOLIC BLOOD PRESSURE: 118 MMHG | WEIGHT: 169.09 LBS

## 2018-04-12 DIAGNOSIS — R25.2 LEG CRAMP: ICD-10-CM

## 2018-04-12 DIAGNOSIS — Z23 NEED FOR SHINGLES VACCINE: ICD-10-CM

## 2018-04-12 DIAGNOSIS — M17.11 PRIMARY OSTEOARTHRITIS OF ONE KNEE, RIGHT: ICD-10-CM

## 2018-04-12 DIAGNOSIS — R20.0 RIGHT LEG NUMBNESS: ICD-10-CM

## 2018-04-12 DIAGNOSIS — E78.5 DYSLIPIDEMIA: ICD-10-CM

## 2018-04-12 PROCEDURE — 99214 OFFICE O/P EST MOD 30 MIN: CPT | Mod: 25 | Performed by: FAMILY MEDICINE

## 2018-04-12 PROCEDURE — 90471 IMMUNIZATION ADMIN: CPT | Performed by: FAMILY MEDICINE

## 2018-04-12 PROCEDURE — 90750 HZV VACC RECOMBINANT IM: CPT | Performed by: FAMILY MEDICINE

## 2018-04-12 RX ORDER — TIZANIDINE 4 MG/1
4 TABLET ORAL
Qty: 30 TAB | Refills: 5 | Status: SHIPPED | OUTPATIENT
Start: 2018-04-12 | End: 2018-06-18

## 2018-04-12 RX ORDER — AMITRIPTYLINE HYDROCHLORIDE 10 MG/1
10 TABLET, FILM COATED ORAL NIGHTLY
COMMUNITY
End: 2018-06-18

## 2018-04-13 DIAGNOSIS — R25.2 LEG CRAMP: ICD-10-CM

## 2018-04-13 RX ORDER — TIZANIDINE 4 MG/1
TABLET ORAL
Qty: 30 TAB | Refills: 5 | Status: SHIPPED | OUTPATIENT
Start: 2018-04-13 | End: 2018-06-18

## 2018-04-13 NOTE — PROGRESS NOTES
Subjective:      Dee Armstrong is a 62 y.o. female who presents with Follow-Up (dyslipidemia)            HPI     Patient returns for follow-up of her medical problems.    In terms of her chronic right leg numbness, I referred her to the neurologist and she was seen by Dr. Dillon. Impression was postoperative traction injury L5 root from previous lumbar spine surgery. She was told this may be something that is permanent. Recommendation is increasing the dose of gabapentin, Lyrica, TCA. She did not respond to the gabapentin that I gave her even with the higher dose. She said Lyrica did not work either. Priscilla Bass started her on amitriptyline initially with low dose and now she is taking 10 mg 4 tablets at night which seems to help. I have given her tizanidine to help with the leg cramps and she has been taking this one tablet at night which is also helping.    I worked her up for her right knee pain and x-ray did not show any acute findings or significant arthropathy. MRI showed tricompartment degenerative change with patellofemoral articulation to the greatest degree with fraying of the medial meniscus. She was seen and evaluated by the orthopedist and had steroid injection shot recently which helped only for a few weeks. She was sent for physical therapy for total of 3 weeks and she said she has home exercises which she continues to do with help. Surgery is recommended only if problem becomes debilitating. She has lost 13 pounds since the last visit with her own efforts. At this time she is able to ambulate and move around without significant pain and she is still able to do her ADLs without problems.    For her dyslipidemia, she continues to manage this with her own efforts. The last 10 year ASCVD risk based on her lab results in September 2017 came back 5.3%. Blood work was done before this visit.    Past medical history, past surgical history, family history reviewed-no changes    Social history  "reviewed-no changes    Allergies reviewed-no changes    Medications reviewed-no changes        ROS     As per history of present illness, the rest are negative.       Objective:     /68   Pulse 98   Temp 36.7 °C (98.1 °F)   Ht 1.499 m (4' 11\")   Wt 76.7 kg (169 lb 1.5 oz)   SpO2 97%   BMI 34.15 kg/m²      Physical Exam     Examined alert, awake, oriented, not in distress    Neck-supple, no lymphadenopathy, no thyromegaly  Lungs-clear to auscultation, no rales, no wheezes  Heart-regular rate and rhythm, no murmur  Extremities-no edema, clubbing, cyanosis       Results for UGO BENTON (MRN [id number]) as of 4/13/2018 09:52   Ref. Range 9/29/2017 08:45 4/2/2018 08:47   Sodium Latest Ref Range: 135 - 145 mmol/L 141 141   Potassium Latest Ref Range: 3.6 - 5.5 mmol/L 4.3 3.8   Chloride Latest Ref Range: 96 - 112 mmol/L 106 104   Co2 Latest Ref Range: 20 - 33 mmol/L 27 27   Anion Gap Latest Ref Range: 0.0 - 11.9  8.0 10.0   Glucose Latest Ref Range: 65 - 99 mg/dL 89 87   Bun Latest Ref Range: 8 - 22 mg/dL 21 18   Creatinine Latest Ref Range: 0.50 - 1.40 mg/dL 0.66 0.66   GFR If  Latest Ref Range: >60 mL/min/1.73 m 2 >60 >60   GFR If Non  Latest Ref Range: >60 mL/min/1.73 m 2 >60 >60   Calcium Latest Ref Range: 8.5 - 10.5 mg/dL 9.3 9.4   AST(SGOT) Latest Ref Range: 12 - 45 U/L 17 19   ALT(SGPT) Latest Ref Range: 2 - 50 U/L 17 21   Alkaline Phosphatase Latest Ref Range: 30 - 99 U/L 90 102 (H)   Total Bilirubin Latest Ref Range: 0.1 - 1.5 mg/dL 0.7 0.6   Albumin Latest Ref Range: 3.2 - 4.9 g/dL 4.0 4.2   Total Protein Latest Ref Range: 6.0 - 8.2 g/dL 6.9 7.0   Globulin Latest Ref Range: 1.9 - 3.5 g/dL 2.9 2.8   A-G Ratio Latest Units: g/dL 1.4 1.5   Cholesterol,Tot Latest Ref Range: 100 - 199 mg/dL 225 (H) 189   Triglycerides Latest Ref Range: 0 - 149 mg/dL 125 126   HDL Latest Ref Range: >=40 mg/dL 65 50   LDL Latest Ref Range: <100 mg/dL 135 (H) 114 (H)      "   Assessment/Plan:     1. Right leg numbness  She has been seen and evaluated by the neurologist. She has come to terms and has accepted that this may be something permanent. She is getting good results with amitriptyline. She can continue taking the tizanidine as needed for cramping.  - LIPID PROFILE; Future  - COMP METABOLIC PANEL; Future  - CBC WITH DIFFERENTIAL; Future    2. Leg cramp  May continue tizanidine at bedtime for the cramps.  - tizanidine (ZANAFLEX) 4 MG Tab; Take 1 Tab by mouth every bedtime.  Dispense: 30 Tab; Refill: 5    3. Primary osteoarthritis of one knee, right  She was seen and evaluated by the orthopedist. Steroid injection only gave short-term benefit. She had physical therapy which has helped. Surgery will be last resort when pain becomes debilitating and interferes with ADLs but at this time it is not that bad to require surgery per patient.  - LIPID PROFILE; Future  - COMP METABOLIC PANEL; Future  - CBC WITH DIFFERENTIAL; Future    4. Dyslipidemia  LDL improved and this is lower than before. Ten-year ASCVD risk dropped from 5.3% to 4.3%. She will continue to manage this with her own efforts. She has lost weight since her last visit and she will continue losing weight.  - LIPID PROFILE; Future  - COMP METABOLIC PANEL; Future  - CBC WITH DIFFERENTIAL; Future    5. BMI 34.0-34.9,adult  She already has lost 13 pounds since last visit. She will continue to work on losing weight with exercise and diet.  - Patient identified as having weight management issue.  Appropriate orders and counseling given.    6. Need for shingles vaccine  We discussed the new shingles vaccine. We gave her the first dose today. She will get the second dose in 2-6 months time.  - SHINGLES VACCINE (SHINGRIX)      Please note that this dictation was created using voice recognition software. I have worked with consultants from the vendor as well as technical experts from [employer] to optimize the interface. I have  made every reasonable attempt to correct obvious errors, but I expect that there are errors of grammar and possibly content I did not discover before finalizing the note.

## 2018-04-19 ENCOUNTER — HOSPITAL ENCOUNTER (OUTPATIENT)
Dept: RADIOLOGY | Facility: MEDICAL CENTER | Age: 63
End: 2018-04-19
Attending: PHYSICIAN ASSISTANT
Payer: COMMERCIAL

## 2018-04-19 DIAGNOSIS — M54.50 ACUTE MIDLINE LOW BACK PAIN WITHOUT SCIATICA: ICD-10-CM

## 2018-04-19 PROCEDURE — 72110 X-RAY EXAM L-2 SPINE 4/>VWS: CPT

## 2018-05-18 ENCOUNTER — HOSPITAL ENCOUNTER (OUTPATIENT)
Dept: RADIOLOGY | Facility: MEDICAL CENTER | Age: 63
End: 2018-05-18
Attending: FAMILY MEDICINE
Payer: MEDICARE

## 2018-05-18 DIAGNOSIS — Z12.39 SCREENING BREAST EXAMINATION: ICD-10-CM

## 2018-05-18 PROCEDURE — 77067 SCR MAMMO BI INCL CAD: CPT

## 2018-06-12 ENCOUNTER — TELEPHONE (OUTPATIENT)
Dept: HEALTH INFORMATION MANAGEMENT | Facility: OTHER | Age: 63
End: 2018-06-12

## 2018-06-12 DIAGNOSIS — Z78.0 POSTMENOPAUSAL: ICD-10-CM

## 2018-06-18 ENCOUNTER — OFFICE VISIT (OUTPATIENT)
Dept: MEDICAL GROUP | Facility: PHYSICIAN GROUP | Age: 63
End: 2018-06-18
Payer: MEDICARE

## 2018-06-18 VITALS
SYSTOLIC BLOOD PRESSURE: 120 MMHG | OXYGEN SATURATION: 99 % | HEART RATE: 71 BPM | BODY MASS INDEX: 32.89 KG/M2 | TEMPERATURE: 97.4 F | WEIGHT: 163.14 LBS | HEIGHT: 59 IN | DIASTOLIC BLOOD PRESSURE: 70 MMHG

## 2018-06-18 DIAGNOSIS — Z91.81 RISK FOR FALLS: ICD-10-CM

## 2018-06-18 DIAGNOSIS — R25.2 LEG CRAMPS: ICD-10-CM

## 2018-06-18 DIAGNOSIS — Z23 NEED FOR VACCINATION FOR PNEUMOCOCCUS: ICD-10-CM

## 2018-06-18 PROCEDURE — 90732 PPSV23 VACC 2 YRS+ SUBQ/IM: CPT | Performed by: FAMILY MEDICINE

## 2018-06-18 PROCEDURE — G0009 ADMIN PNEUMOCOCCAL VACCINE: HCPCS | Performed by: FAMILY MEDICINE

## 2018-06-18 PROCEDURE — 99214 OFFICE O/P EST MOD 30 MIN: CPT | Mod: 25 | Performed by: FAMILY MEDICINE

## 2018-06-18 RX ORDER — DILTIAZEM HYDROCHLORIDE 120 MG/1
120 CAPSULE, COATED, EXTENDED RELEASE ORAL
Qty: 90 CAP | Refills: 0 | Status: SHIPPED | OUTPATIENT
Start: 2018-06-18 | End: 2018-09-15 | Stop reason: SDUPTHER

## 2018-06-18 NOTE — PROGRESS NOTES
"Subjective:      Dee Armstrong is a 63 y.o. female who presents with Medication Refill and Injections (pneumonia)            HPI     Patient is here for ongoing problems with leg cramps, obesity, need for pneumonia shot. Patient also fell on [employer] day because she slipped on ice without any injury.    She has been taking tizanidine for nocturnal leg cramps for a few months now. Initially it worked but about couple months ago he stopped working. The leg cramps are only in the right leg almost every night that wakes her up about 2-3 times at night. Last CBC was in May 2017 that did not show any anemia. She denies restless leg.     She needs Pneumovax 23. She had Prevnar 13 in January 2017.    She has been doing Weight Watchers for weight loss. She has dropped close to 30 pounds since November 2017.    Past medical history, past surgical history, family history reviewed-no changes    Social history reviewed-no changes    Allergies reviewed-no changes    Medications reviewed-no changes    ROS     As per history of present illness, the rest are negative.         Objective:     /70   Pulse 71   Temp 36.3 °C (97.4 °F)   Ht 1.499 m (4' 11\")   Wt 74 kg (163 lb 2.3 oz)   SpO2 99%   BMI 32.95 kg/m²      Physical Exam     Examined alert, awake, oriented, not in distress    Neck-supple, no lymphadenopathy, no thyromegaly  Lungs-clear to auscultation, no rales, no wheezes  Heart-regular rate and rhythm, no murmur  Extremities-no edema, clubbing, cyanosis            Assessment/Plan:     1. Leg cramps  Tizanidine initially worked but has stopped helping in the last 2 months. Advised stretching exercises, adequately hydration with water. Advised over-the-counter B complex. We will try diltiazem 120 mg one tablet at bedtime. Monitor blood pressure to make sure it doesn't drop too low below 90 mmHg. She will communicate with me if blood pressure runs below 90 mmHg or if blood pressure runs 90 mmHg with " dizziness or lightheadedness. If the medication is tolerated well and the dose is not enough to control the problem we can increase to higher dose. If this doesn't work we can try gabapentin as the next.    2. Need for vaccination for pneumococcus  She was given Pneumovax 23.  - PNEUMOCOCCAL POLYSACCHARIDE VACCINE 23-VALENT =>3YO SQ/IM    3. Risk for falls  Counseling done to avoid falls.    4. BMI 32.0-32.9,adult  I congratulated her for all her efforts. For her BMI to drop to 28 she will have to lose another 30 pounds. She will continue with weight watchers.      Please note that this dictation was created using voice recognition software. I have worked with consultants from the vendor as well as technical experts from [employer]  [employer] to optimize the interface. I have made every reasonable attempt to correct obvious errors, but I expect that there are errors of grammar and possibly content I did not discover before finalizing the note.

## 2018-06-21 ENCOUNTER — HOSPITAL ENCOUNTER (OUTPATIENT)
Dept: RADIOLOGY | Facility: MEDICAL CENTER | Age: 63
End: 2018-06-21
Attending: FAMILY MEDICINE
Payer: MEDICARE

## 2018-06-21 DIAGNOSIS — Z78.0 POSTMENOPAUSAL: ICD-10-CM

## 2018-06-21 PROCEDURE — 77080 DXA BONE DENSITY AXIAL: CPT

## 2018-06-22 DIAGNOSIS — M81.0 OSTEOPOROSIS OF FOREARM: ICD-10-CM

## 2018-06-22 RX ORDER — ALENDRONATE SODIUM 70 MG/1
70 TABLET ORAL
Qty: 12 TAB | Refills: 3 | Status: SHIPPED | OUTPATIENT
Start: 2018-06-22 | End: 2019-05-13 | Stop reason: SDUPTHER

## 2018-07-03 ENCOUNTER — APPOINTMENT (RX ONLY)
Dept: URBAN - METROPOLITAN AREA CLINIC 20 | Facility: CLINIC | Age: 63
Setting detail: DERMATOLOGY
End: 2018-07-03

## 2018-07-03 DIAGNOSIS — L81.4 OTHER MELANIN HYPERPIGMENTATION: ICD-10-CM

## 2018-07-03 DIAGNOSIS — L57.8 OTHER SKIN CHANGES DUE TO CHRONIC EXPOSURE TO NONIONIZING RADIATION: ICD-10-CM

## 2018-07-03 DIAGNOSIS — D22 MELANOCYTIC NEVI: ICD-10-CM

## 2018-07-03 DIAGNOSIS — D18.0 HEMANGIOMA: ICD-10-CM

## 2018-07-03 DIAGNOSIS — L82.1 OTHER SEBORRHEIC KERATOSIS: ICD-10-CM

## 2018-07-03 PROBLEM — D22.5 MELANOCYTIC NEVI OF TRUNK: Status: ACTIVE | Noted: 2018-07-03

## 2018-07-03 PROBLEM — D18.01 HEMANGIOMA OF SKIN AND SUBCUTANEOUS TISSUE: Status: ACTIVE | Noted: 2018-07-03

## 2018-07-03 PROCEDURE — ? COUNSELING

## 2018-07-03 PROCEDURE — 99214 OFFICE O/P EST MOD 30 MIN: CPT

## 2018-07-03 ASSESSMENT — LOCATION SIMPLE DESCRIPTION DERM
LOCATION SIMPLE: LEFT CHEEK
LOCATION SIMPLE: RIGHT CHEEK
LOCATION SIMPLE: LEFT THIGH
LOCATION SIMPLE: LEFT FOREARM
LOCATION SIMPLE: RIGHT THIGH
LOCATION SIMPLE: RIGHT UPPER BACK
LOCATION SIMPLE: CHEST
LOCATION SIMPLE: RIGHT UPPER ARM
LOCATION SIMPLE: RIGHT FOREARM
LOCATION SIMPLE: LEFT UPPER ARM

## 2018-07-03 ASSESSMENT — LOCATION DETAILED DESCRIPTION DERM
LOCATION DETAILED: LEFT ANTERIOR DISTAL THIGH
LOCATION DETAILED: RIGHT MID-UPPER BACK
LOCATION DETAILED: RIGHT CENTRAL MALAR CHEEK
LOCATION DETAILED: RIGHT ANTERIOR DISTAL THIGH
LOCATION DETAILED: LEFT MEDIAL SUPERIOR CHEST
LOCATION DETAILED: RIGHT SUPERIOR MEDIAL UPPER BACK
LOCATION DETAILED: LEFT INFERIOR CENTRAL MALAR CHEEK
LOCATION DETAILED: RIGHT ANTERIOR PROXIMAL UPPER ARM
LOCATION DETAILED: LEFT ANTERIOR PROXIMAL UPPER ARM
LOCATION DETAILED: RIGHT INFERIOR UPPER BACK
LOCATION DETAILED: RIGHT PROXIMAL DORSAL FOREARM
LOCATION DETAILED: LEFT PROXIMAL DORSAL FOREARM

## 2018-07-03 ASSESSMENT — LOCATION ZONE DERM
LOCATION ZONE: LEG
LOCATION ZONE: TRUNK
LOCATION ZONE: FACE
LOCATION ZONE: ARM

## 2018-07-31 RX ORDER — LANSOPRAZOLE 30 MG/1
CAPSULE, DELAYED RELEASE ORAL
Qty: 90 CAP | Refills: 0 | Status: SHIPPED | OUTPATIENT
Start: 2018-07-31 | End: 2018-10-19 | Stop reason: SDUPTHER

## 2018-08-29 RX ORDER — GABAPENTIN 300 MG/1
300 CAPSULE ORAL EVERY EVENING
Qty: 90 CAP | Refills: 0 | Status: SHIPPED | OUTPATIENT
Start: 2018-08-29 | End: 2018-11-24 | Stop reason: SDUPTHER

## 2018-09-04 ENCOUNTER — APPOINTMENT (OUTPATIENT)
Dept: RADIOLOGY | Facility: IMAGING CENTER | Age: 63
End: 2018-09-04
Payer: MEDICARE

## 2018-09-04 ENCOUNTER — OFFICE VISIT (OUTPATIENT)
Dept: PULMONOLOGY | Facility: HOSPICE | Age: 63
End: 2018-09-04
Payer: MEDICARE

## 2018-09-04 VITALS
HEIGHT: 59 IN | HEART RATE: 92 BPM | BODY MASS INDEX: 34.27 KG/M2 | OXYGEN SATURATION: 95 % | SYSTOLIC BLOOD PRESSURE: 124 MMHG | RESPIRATION RATE: 16 BRPM | DIASTOLIC BLOOD PRESSURE: 68 MMHG | TEMPERATURE: 97.8 F | WEIGHT: 170 LBS

## 2018-09-04 DIAGNOSIS — B38.2 COCCIDIOIDOMYCOSIS, PULMONARY (HCC): Primary | ICD-10-CM

## 2018-09-04 DIAGNOSIS — B38.9 COCCIDIOIDOMYCOSIS: ICD-10-CM

## 2018-09-04 PROCEDURE — 71046 X-RAY EXAM CHEST 2 VIEWS: CPT | Mod: TC,FY | Performed by: NURSE PRACTITIONER

## 2018-09-04 PROCEDURE — 99213 OFFICE O/P EST LOW 20 MIN: CPT | Performed by: PHYSICIAN ASSISTANT

## 2018-09-04 RX ORDER — AMITRIPTYLINE HYDROCHLORIDE 10 MG/1
10 TABLET, FILM COATED ORAL
COMMUNITY
Start: 2018-08-28 | End: 2019-03-06 | Stop reason: SDUPTHER

## 2018-09-04 NOTE — PROGRESS NOTES
Chief Complaint   Patient presents with   • Follow-Up     Last seen 11/30/2017       HPI:  Dee Armstrong is a 63 y.o. 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 Treatment for initial illness completed in November 2016.  CXR today reviewed as compared to her one year follow up film on 11/30/17.  Result per radiology: similar left upper lobe ovoid mass, no pulmonary infiltrates,consolidations, pleural effusions or pneumothorax.  Stable cardiopericardial silhouette.     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    left upper lung   • Cough 5/29/2015   • Diverticulosis    • GERD (gastroesophageal reflux disease)    • Heart burn    • Indigestion    • LBP (low back pain)    • Lumbar radicular pain 5/29/2015   • Muscle disorder    • Obesity    • Pain     Shoulder and numbness to R LE       Past Surgical History:   Procedure Laterality Date   • HYSTERECTOMY ROBOTIC  6/7/2017    Procedure: HYSTERECTOMY ROBOTIC SI, BILATERAL SALPINGO-OOPHORECTOMY, URETERAL SACRAL LIGAMENT SHORTENING ;  Surgeon: Jerica Hooper M.D.;  Location: [facility];  Service:    • CYSTOSCOPY  6/7/2017    Procedure: CYSTOSCOPY;  Surgeon: Jerica Hooper M.D.;  Location: [facility];  Service:    • SHOULDER DECOMPRESSION ARTHROSCOPIC Right 12/6/2016    Procedure: SHOULDER DECOMPRESSION ARTHROSCOPIC - SUBACROMIAL, MANJARREZ;  Surgeon: Kyle Claros M.D.;  Location: [facility];  Service:    • CLAVICLE DISTAL EXCISION Right 12/6/2016    Procedure: CLAVICLE DISTAL EXCISION;  Surgeon: Kyle Claros M.D.;  Location: [facility]  [facility];  Service:    • SHOULDER ARTHROSCOPY W/ ROTATOR CUFF REPAIR Right 12/6/2016    Procedure: SHOULDER ARTHROSCOPY W/ ROTATOR CUFF REPAIR ;  Surgeon: Kyle Claros M.D.;  Location: SURGERY [facility];  Service:    • SHOULDER ARTHROSCOPY W/ BICIPITAL TENODESIS REPAIR Right 12/6/2016    Procedure: SHOULDER ARTHROSCOPY W/ BICIPITAL TENODESIS REPAIR ;  Surgeon: Kyle Claros M.D.;  Location: SURGERY [facility];  Service:    • LUMBAR FUSION POSTERIOR  2/9/2016    Procedure: LUMBAR FUSION POSTERIOR PEDICLE SCREW FIXATION (STAGE #2);  Surgeon: Tim Rodriguez M.D.;  Location: SURGERY [facility];  Service:    • LUMBAR LAMINECTOMY DISKECTOMY  2/9/2016    Procedure: LUMBAR LAMINECTOMY DISKECTOMY MINI OPEN;  Surgeon: Tim Rodriguez M.D.;  Location: SURGERY [facility];  Service:    • EXTREME LATERAL INTERBODY FUSION Right 2/6/2016    Procedure: EXTREME LATERAL INTERBODY FUSION L3-4 (STAGE #1);  Surgeon: Tim Rodriguez M.D.;  Location: SURGERY [facility];  Service:    • RECOVERY  12/8/2015    Procedure: CT-CT GUIDED LEFT LUNG BIOPSY-;  Surgeon: [facility];  Location: SURGERY PRE-POST PROC UNIT [facility];  Service:    • REDDY BY LAPAROSCOPY  4/17/2014    Performed by Ankur Lerner M.D. at SURGERY SAME DAY [facility]   • EGD WITH ASP/BX  2/4/2014    mild chronic inactive gastritis, scar gastric antrum-   • EGD WITH ASP/BX  9/29/2011    possible barrettes, hiatal hernia, gastritis   • COLONOSCOPY  9/29/2011    diverticulosis sigmoid colon, hemorrhoids   • ARTHRODESIS  9/3/2010    Performed by YARELY MORRISON at SURGERY SAME DAY [facility] ORS   • ORTHOPEDIC OSTEOTOMY  9/3/2010    Performed by YARELY MORRISNO at SURGERY SAME DAY [facility] ORS   • BONE SPUR EXCISION  9/3/2010    Performed by YARELY MORRISON at SURGERY SAME DAY [facility] ORS   • ARTHROTOMY  9/3/2010    Performed by YARELY MORRISON at SURGERY SAME DAY [facility] ORS   • OTHER ABDOMINAL SURGERY  2000    COLON RESECTION   •  "LAMINOTOMY     • OTHER ORTHOPEDIC SURGERY      PARDEEP CARPAL TUNNEL RELEASES       Family History   Problem Relation Age of Onset   • Cancer Mother         lymphoma   • Stroke Father    • Diabetes Father        Social History     Social History   • Marital status:      Spouse name: N/A   • Number of children: N/A   • Years of education: N/A     Occupational History   • Not on file.     Social History Main Topics   • Smoking status: Former Smoker     Years: 2.00     Types: Cigarettes     Quit date: 1/1/1977   • Smokeless tobacco: Never Used      Comment: quit in 1977   • Alcohol use 0.0 oz/week      Comment: 1 per month   • Drug use: No      Comment: cig hx= 1/2 pack per month    • Sexual activity: Yes     Partners: Male      Comment:      Other Topics Concern   • Not on file     Social History Narrative   • No narrative on file       Allergies as of 09/04/2018 - Reviewed 09/04/2018   Allergen Reaction Noted   • Nkda [no known drug allergy]  02/18/2010        Vital signs for this encounter:  Vitals:    09/04/18 0922   Height: 1.499 m (4' 11\")   Weight: 77.1 kg (170 lb)   Weight % change since last entry.: 0 %   BP: 124/68   Pulse: 92   BMI (Calculated): 34.34   Resp: 16   Temp: 36.6 °C (97.8 °F)       Current medications as of today   Current Outpatient Prescriptions   Medication Sig Dispense Refill   • amitriptyline (ELAVIL) 10 MG Tab      • gabapentin (NEURONTIN) 300 MG Cap Take 1 Cap by mouth every evening. 90 Cap 0   • lansoprazole (PREVACID) 30 MG CAPSULE DELAYED RELEASE TAKE 1 CAPSULE BY MOUTH  DAILY 90 Cap 0   • alendronate (FOSAMAX) 70 MG Tab Take 1 Tab by mouth every 7 days. 12 Tab 3   • Multiple Vitamins-Minerals (MULTIVITAMIN ADULT PO) Take  by mouth.     • Calcium Carbonate (CALCIUM 600 PO) Take  by mouth.     • SUMAtriptan (IMITREX) 25 MG Tab tablet Take  mg by mouth Once PRN for Migraine.     • clonazepam (KLONOPIN) 0.5 MG Tab Take 0.5 Tabs by mouth 2 times a day. 15 Tab 0   • FIBER PO " Take 1 Cap by mouth every day.     • DILTIAZem CD (CARDIZEM CD) 120 MG CAPSULE SR 24 HR Take 1 Cap by mouth every bedtime. 90 Cap 0   • estradiol (ESTRACE VAGINAL) 0.1 MG/GM vaginal cream Insert 1 g in vagina every day.     • ascorbic acid (ASCORBIC ACID) 500 MG Tab Take 1,000 mg by mouth every day.       No current facility-administered medications for this visit.          Physical Exam:   Gen:           Alert + oriented, No apparent distress. Mood + affect appropriate, normal interaction with provider.  Eyes:          sclere white, conjunctive moist.  Hearing:     Grossly intact.  Dentition:    Intact, regular dental care.  Oropharynx:   Tongue normal, posterior pharynx without erythema or exudate, no lesions noted.  Mallampati Classification: 2  Respiratory Effort: No intercostal retractions or use of accessory muscles.   Lung Auscultation:      Clear to auscultation bilaterally; no rales, rhonchi or wheezing.  CV:            Regular rate and rhythm. No murmurs, rubs or gallops.  Digits and Nails: No clubbing, cyanosis, petechiae, or nodes.   Skin:        No rashes, lesions or ulcers noted.               Ext:           No cyanosis or edema.      Assessment:  Coccidiodmycosis    Immunizations:    Flu:will need in about 6 weeks  Pneumovax 23:6/18/18  Prevnar 13:1/13/17    Plan:  Continue to work on weight loss goals.  BMI down from 40.1 last appointment to 34.34 today. Remote brief smoking history, continue tobacco abstinence.     1-Follow up one year for xray and exam   2-Update patient on official xray results via My Chart  3-seeing primary in October consider flu vaccine and zoster vaccine then  4-encourage light activity as tolerated consider aqua therapy        This dictation was created using voice recognition software. The accuracy of the dictation is limited to the abilities of the software. I expect there may be some errors of grammar and possibly content.

## 2018-09-04 NOTE — PATIENT INSTRUCTIONS
1-Follow up one year for xray and exam, sooner if needed   2-Update patient on official xray results via My Chart  3-seeing primary in October consider flu vaccine and zoster vaccine then  4-encourage light activity as tolerated consider aqua therapy

## 2018-10-04 ENCOUNTER — HOSPITAL ENCOUNTER (OUTPATIENT)
Dept: LAB | Facility: MEDICAL CENTER | Age: 63
End: 2018-10-04
Attending: FAMILY MEDICINE
Payer: MEDICARE

## 2018-10-04 DIAGNOSIS — E78.5 DYSLIPIDEMIA: ICD-10-CM

## 2018-10-04 DIAGNOSIS — M17.11 PRIMARY OSTEOARTHRITIS OF ONE KNEE, RIGHT: ICD-10-CM

## 2018-10-04 DIAGNOSIS — R20.0 RIGHT LEG NUMBNESS: ICD-10-CM

## 2018-10-04 LAB
ALBUMIN SERPL BCP-MCNC: 4.3 G/DL (ref 3.2–4.9)
ALBUMIN/GLOB SERPL: 1.4 G/DL
ALP SERPL-CCNC: 69 U/L (ref 30–99)
ALT SERPL-CCNC: 17 U/L (ref 2–50)
ANION GAP SERPL CALC-SCNC: 10 MMOL/L (ref 0–11.9)
AST SERPL-CCNC: 17 U/L (ref 12–45)
BASOPHILS # BLD AUTO: 1.1 % (ref 0–1.8)
BASOPHILS # BLD: 0.04 K/UL (ref 0–0.12)
BILIRUB SERPL-MCNC: 0.8 MG/DL (ref 0.1–1.5)
BUN SERPL-MCNC: 20 MG/DL (ref 8–22)
CALCIUM SERPL-MCNC: 9.6 MG/DL (ref 8.5–10.5)
CHLORIDE SERPL-SCNC: 102 MMOL/L (ref 96–112)
CHOLEST SERPL-MCNC: 230 MG/DL (ref 100–199)
CO2 SERPL-SCNC: 27 MMOL/L (ref 20–33)
CREAT SERPL-MCNC: 0.77 MG/DL (ref 0.5–1.4)
EOSINOPHIL # BLD AUTO: 0.08 K/UL (ref 0–0.51)
EOSINOPHIL NFR BLD: 2.2 % (ref 0–6.9)
ERYTHROCYTE [DISTWIDTH] IN BLOOD BY AUTOMATED COUNT: 46.2 FL (ref 35.9–50)
FASTING STATUS PATIENT QL REPORTED: NORMAL
GLOBULIN SER CALC-MCNC: 3.1 G/DL (ref 1.9–3.5)
GLUCOSE SERPL-MCNC: 82 MG/DL (ref 65–99)
HCT VFR BLD AUTO: 47.3 % (ref 37–47)
HDLC SERPL-MCNC: 77 MG/DL
HGB BLD-MCNC: 15.6 G/DL (ref 12–16)
IMM GRANULOCYTES # BLD AUTO: 0.01 K/UL (ref 0–0.11)
IMM GRANULOCYTES NFR BLD AUTO: 0.3 % (ref 0–0.9)
LDLC SERPL CALC-MCNC: 130 MG/DL
LYMPHOCYTES # BLD AUTO: 1.57 K/UL (ref 1–4.8)
LYMPHOCYTES NFR BLD: 42.3 % (ref 22–41)
MCH RBC QN AUTO: 32.6 PG (ref 27–33)
MCHC RBC AUTO-ENTMCNC: 33 G/DL (ref 33.6–35)
MCV RBC AUTO: 99 FL (ref 81.4–97.8)
MONOCYTES # BLD AUTO: 0.25 K/UL (ref 0–0.85)
MONOCYTES NFR BLD AUTO: 6.7 % (ref 0–13.4)
NEUTROPHILS # BLD AUTO: 1.76 K/UL (ref 2–7.15)
NEUTROPHILS NFR BLD: 47.4 % (ref 44–72)
NRBC # BLD AUTO: 0 K/UL
NRBC BLD-RTO: 0 /100 WBC
PLATELET # BLD AUTO: 304 K/UL (ref 164–446)
PMV BLD AUTO: 9.8 FL (ref 9–12.9)
POTASSIUM SERPL-SCNC: 3.8 MMOL/L (ref 3.6–5.5)
PROT SERPL-MCNC: 7.4 G/DL (ref 6–8.2)
RBC # BLD AUTO: 4.78 M/UL (ref 4.2–5.4)
SODIUM SERPL-SCNC: 139 MMOL/L (ref 135–145)
TRIGL SERPL-MCNC: 113 MG/DL (ref 0–149)
WBC # BLD AUTO: 3.7 K/UL (ref 4.8–10.8)

## 2018-10-04 PROCEDURE — 80053 COMPREHEN METABOLIC PANEL: CPT

## 2018-10-04 PROCEDURE — 36415 COLL VENOUS BLD VENIPUNCTURE: CPT

## 2018-10-04 PROCEDURE — 85025 COMPLETE CBC W/AUTO DIFF WBC: CPT

## 2018-10-04 PROCEDURE — 80061 LIPID PANEL: CPT

## 2018-10-05 NOTE — PROGRESS NOTES
Monse Price,  This is Dr. Akhtar, covering for Dr. Calvert. I just wanted to let you know that the labs look good. Please review them with Dr. Calvert at your next appointment.   Best,  Rl Akhtar M.D.

## 2018-10-08 ENCOUNTER — OFFICE VISIT (OUTPATIENT)
Dept: URGENT CARE | Facility: PHYSICIAN GROUP | Age: 63
End: 2018-10-08
Payer: MEDICARE

## 2018-10-08 ENCOUNTER — APPOINTMENT (OUTPATIENT)
Dept: RADIOLOGY | Facility: MEDICAL CENTER | Age: 63
End: 2018-10-08
Attending: EMERGENCY MEDICINE
Payer: MEDICARE

## 2018-10-08 ENCOUNTER — HOSPITAL ENCOUNTER (OUTPATIENT)
Facility: MEDICAL CENTER | Age: 63
End: 2018-10-09
Attending: EMERGENCY MEDICINE | Admitting: FAMILY MEDICINE
Payer: MEDICARE

## 2018-10-08 VITALS
OXYGEN SATURATION: 98 % | TEMPERATURE: 98.6 F | DIASTOLIC BLOOD PRESSURE: 84 MMHG | RESPIRATION RATE: 18 BRPM | HEART RATE: 92 BPM | SYSTOLIC BLOOD PRESSURE: 140 MMHG

## 2018-10-08 DIAGNOSIS — Z86.19 HISTORY OF COCCIDIOIDOMYCOSIS: ICD-10-CM

## 2018-10-08 DIAGNOSIS — R07.9 CHEST PAIN, UNSPECIFIED TYPE: ICD-10-CM

## 2018-10-08 DIAGNOSIS — R07.89 OTHER CHEST PAIN: ICD-10-CM

## 2018-10-08 LAB
ALBUMIN SERPL BCP-MCNC: 4 G/DL (ref 3.2–4.9)
ALBUMIN/GLOB SERPL: 1.4 G/DL
ALP SERPL-CCNC: 67 U/L (ref 30–99)
ALT SERPL-CCNC: 15 U/L (ref 2–50)
ANION GAP SERPL CALC-SCNC: 12 MMOL/L (ref 0–11.9)
APTT PPP: 27.1 SEC (ref 24.7–36)
AST SERPL-CCNC: 18 U/L (ref 12–45)
BASOPHILS # BLD AUTO: 0.8 % (ref 0–1.8)
BASOPHILS # BLD: 0.04 K/UL (ref 0–0.12)
BILIRUB SERPL-MCNC: 0.5 MG/DL (ref 0.1–1.5)
BLOOD CULTURE HOLD CXBCH: NORMAL
BNP SERPL-MCNC: 4 PG/ML (ref 0–100)
BUN SERPL-MCNC: 17 MG/DL (ref 8–22)
CALCIUM SERPL-MCNC: 9.8 MG/DL (ref 8.5–10.5)
CHLORIDE SERPL-SCNC: 106 MMOL/L (ref 96–112)
CO2 SERPL-SCNC: 19 MMOL/L (ref 20–33)
CREAT SERPL-MCNC: 0.6 MG/DL (ref 0.5–1.4)
EKG IMPRESSION: NORMAL
EKG IMPRESSION: NORMAL
EOSINOPHIL # BLD AUTO: 0.04 K/UL (ref 0–0.51)
EOSINOPHIL NFR BLD: 0.8 % (ref 0–6.9)
ERYTHROCYTE [DISTWIDTH] IN BLOOD BY AUTOMATED COUNT: 43.7 FL (ref 35.9–50)
GLOBULIN SER CALC-MCNC: 2.9 G/DL (ref 1.9–3.5)
GLUCOSE SERPL-MCNC: 92 MG/DL (ref 65–99)
HCT VFR BLD AUTO: 45.5 % (ref 37–47)
HGB BLD-MCNC: 15.4 G/DL (ref 12–16)
IMM GRANULOCYTES # BLD AUTO: 0.01 K/UL (ref 0–0.11)
IMM GRANULOCYTES NFR BLD AUTO: 0.2 % (ref 0–0.9)
INR PPP: 0.92 (ref 0.87–1.13)
LIPASE SERPL-CCNC: 31 U/L (ref 11–82)
LYMPHOCYTES # BLD AUTO: 1.54 K/UL (ref 1–4.8)
LYMPHOCYTES NFR BLD: 32.2 % (ref 22–41)
MCH RBC QN AUTO: 32.6 PG (ref 27–33)
MCHC RBC AUTO-ENTMCNC: 33.8 G/DL (ref 33.6–35)
MCV RBC AUTO: 96.2 FL (ref 81.4–97.8)
MONOCYTES # BLD AUTO: 0.27 K/UL (ref 0–0.85)
MONOCYTES NFR BLD AUTO: 5.6 % (ref 0–13.4)
NEUTROPHILS # BLD AUTO: 2.89 K/UL (ref 2–7.15)
NEUTROPHILS NFR BLD: 60.4 % (ref 44–72)
NRBC # BLD AUTO: 0 K/UL
NRBC BLD-RTO: 0 /100 WBC
PLATELET # BLD AUTO: 280 K/UL (ref 164–446)
PMV BLD AUTO: 9.5 FL (ref 9–12.9)
POTASSIUM SERPL-SCNC: 3.9 MMOL/L (ref 3.6–5.5)
PROT SERPL-MCNC: 6.9 G/DL (ref 6–8.2)
PROTHROMBIN TIME: 12.5 SEC (ref 12–14.6)
RBC # BLD AUTO: 4.73 M/UL (ref 4.2–5.4)
SODIUM SERPL-SCNC: 137 MMOL/L (ref 135–145)
TROPONIN I SERPL-MCNC: <0.01 NG/ML (ref 0–0.04)
TSH SERPL DL<=0.005 MIU/L-ACNC: 2.67 UIU/ML (ref 0.38–5.33)
WBC # BLD AUTO: 4.8 K/UL (ref 4.8–10.8)

## 2018-10-08 PROCEDURE — 84443 ASSAY THYROID STIM HORMONE: CPT

## 2018-10-08 PROCEDURE — 85025 COMPLETE CBC W/AUTO DIFF WBC: CPT

## 2018-10-08 PROCEDURE — 93005 ELECTROCARDIOGRAM TRACING: CPT

## 2018-10-08 PROCEDURE — G0378 HOSPITAL OBSERVATION PER HR: HCPCS

## 2018-10-08 PROCEDURE — 90471 IMMUNIZATION ADMIN: CPT

## 2018-10-08 PROCEDURE — 96374 THER/PROPH/DIAG INJ IV PUSH: CPT

## 2018-10-08 PROCEDURE — 93005 ELECTROCARDIOGRAM TRACING: CPT | Performed by: EMERGENCY MEDICINE

## 2018-10-08 PROCEDURE — 700102 HCHG RX REV CODE 250 W/ 637 OVERRIDE(OP): Performed by: EMERGENCY MEDICINE

## 2018-10-08 PROCEDURE — 700111 HCHG RX REV CODE 636 W/ 250 OVERRIDE (IP): Performed by: FAMILY MEDICINE

## 2018-10-08 PROCEDURE — 96375 TX/PRO/DX INJ NEW DRUG ADDON: CPT

## 2018-10-08 PROCEDURE — 99285 EMERGENCY DEPT VISIT HI MDM: CPT

## 2018-10-08 PROCEDURE — 84484 ASSAY OF TROPONIN QUANT: CPT

## 2018-10-08 PROCEDURE — 99220 PR INITIAL OBSERVATION CARE,LEVL III: CPT | Performed by: FAMILY MEDICINE

## 2018-10-08 PROCEDURE — 96372 THER/PROPH/DIAG INJ SC/IM: CPT | Mod: XU

## 2018-10-08 PROCEDURE — 700117 HCHG RX CONTRAST REV CODE 255: Performed by: EMERGENCY MEDICINE

## 2018-10-08 PROCEDURE — 83880 ASSAY OF NATRIURETIC PEPTIDE: CPT

## 2018-10-08 PROCEDURE — 80053 COMPREHEN METABOLIC PANEL: CPT

## 2018-10-08 PROCEDURE — 93010 ELECTROCARDIOGRAM REPORT: CPT | Performed by: INTERNAL MEDICINE

## 2018-10-08 PROCEDURE — A9270 NON-COVERED ITEM OR SERVICE: HCPCS | Performed by: FAMILY MEDICINE

## 2018-10-08 PROCEDURE — 700102 HCHG RX REV CODE 250 W/ 637 OVERRIDE(OP): Performed by: FAMILY MEDICINE

## 2018-10-08 PROCEDURE — 71045 X-RAY EXAM CHEST 1 VIEW: CPT

## 2018-10-08 PROCEDURE — 85730 THROMBOPLASTIN TIME PARTIAL: CPT

## 2018-10-08 PROCEDURE — 90662 IIV NO PRSV INCREASED AG IM: CPT | Performed by: FAMILY MEDICINE

## 2018-10-08 PROCEDURE — 99215 OFFICE O/P EST HI 40 MIN: CPT | Performed by: PHYSICIAN ASSISTANT

## 2018-10-08 PROCEDURE — 85610 PROTHROMBIN TIME: CPT

## 2018-10-08 PROCEDURE — 74175 CTA ABDOMEN W/CONTRAST: CPT

## 2018-10-08 PROCEDURE — 83690 ASSAY OF LIPASE: CPT

## 2018-10-08 PROCEDURE — A9270 NON-COVERED ITEM OR SERVICE: HCPCS | Performed by: EMERGENCY MEDICINE

## 2018-10-08 RX ORDER — ONDANSETRON 2 MG/ML
4 INJECTION INTRAMUSCULAR; INTRAVENOUS ONCE
Status: DISCONTINUED | OUTPATIENT
Start: 2018-10-08 | End: 2018-10-08

## 2018-10-08 RX ORDER — ASPIRIN 325 MG
325 TABLET ORAL DAILY
Status: DISCONTINUED | OUTPATIENT
Start: 2018-10-09 | End: 2018-10-09 | Stop reason: HOSPADM

## 2018-10-08 RX ORDER — OXYCODONE HYDROCHLORIDE 5 MG/1
5 TABLET ORAL
Status: DISCONTINUED | OUTPATIENT
Start: 2018-10-08 | End: 2018-10-09 | Stop reason: HOSPADM

## 2018-10-08 RX ORDER — GABAPENTIN 300 MG/1
300 CAPSULE ORAL EVERY EVENING
Status: DISCONTINUED | OUTPATIENT
Start: 2018-10-08 | End: 2018-10-09 | Stop reason: HOSPADM

## 2018-10-08 RX ORDER — AMITRIPTYLINE HYDROCHLORIDE 10 MG/1
10 TABLET, FILM COATED ORAL
Status: DISCONTINUED | OUTPATIENT
Start: 2018-10-08 | End: 2018-10-09 | Stop reason: HOSPADM

## 2018-10-08 RX ORDER — OXYCODONE HYDROCHLORIDE 10 MG/1
10 TABLET ORAL
Status: DISCONTINUED | OUTPATIENT
Start: 2018-10-08 | End: 2018-10-09 | Stop reason: HOSPADM

## 2018-10-08 RX ORDER — ACETAMINOPHEN 325 MG/1
650 TABLET ORAL EVERY 6 HOURS PRN
Status: DISCONTINUED | OUTPATIENT
Start: 2018-10-08 | End: 2018-10-09 | Stop reason: HOSPADM

## 2018-10-08 RX ORDER — ASPIRIN 325 MG
325 TABLET ORAL ONCE
Status: COMPLETED | OUTPATIENT
Start: 2018-10-08 | End: 2018-10-08

## 2018-10-08 RX ORDER — NITROGLYCERIN 0.4 MG/1
0.4 TABLET SUBLINGUAL
Status: DISCONTINUED | OUTPATIENT
Start: 2018-10-08 | End: 2018-10-09 | Stop reason: HOSPADM

## 2018-10-08 RX ORDER — OMEPRAZOLE 20 MG/1
20 CAPSULE, DELAYED RELEASE ORAL DAILY
Status: DISCONTINUED | OUTPATIENT
Start: 2018-10-09 | End: 2018-10-09 | Stop reason: HOSPADM

## 2018-10-08 RX ORDER — ONDANSETRON 4 MG/1
4 TABLET, ORALLY DISINTEGRATING ORAL EVERY 4 HOURS PRN
Status: DISCONTINUED | OUTPATIENT
Start: 2018-10-08 | End: 2018-10-09 | Stop reason: HOSPADM

## 2018-10-08 RX ORDER — BISACODYL 10 MG
10 SUPPOSITORY, RECTAL RECTAL
Status: DISCONTINUED | OUTPATIENT
Start: 2018-10-08 | End: 2018-10-09 | Stop reason: HOSPADM

## 2018-10-08 RX ORDER — MORPHINE SULFATE 4 MG/ML
4 INJECTION, SOLUTION INTRAMUSCULAR; INTRAVENOUS
Status: DISCONTINUED | OUTPATIENT
Start: 2018-10-08 | End: 2018-10-09 | Stop reason: HOSPADM

## 2018-10-08 RX ORDER — MORPHINE SULFATE 4 MG/ML
4 INJECTION, SOLUTION INTRAMUSCULAR; INTRAVENOUS ONCE
Status: DISCONTINUED | OUTPATIENT
Start: 2018-10-08 | End: 2018-10-09 | Stop reason: HOSPADM

## 2018-10-08 RX ORDER — ONDANSETRON 2 MG/ML
4 INJECTION INTRAMUSCULAR; INTRAVENOUS EVERY 4 HOURS PRN
Status: DISCONTINUED | OUTPATIENT
Start: 2018-10-08 | End: 2018-10-09 | Stop reason: HOSPADM

## 2018-10-08 RX ORDER — POLYETHYLENE GLYCOL 3350 17 G/17G
1 POWDER, FOR SOLUTION ORAL
Status: DISCONTINUED | OUTPATIENT
Start: 2018-10-08 | End: 2018-10-09 | Stop reason: HOSPADM

## 2018-10-08 RX ORDER — AMOXICILLIN 250 MG
2 CAPSULE ORAL 2 TIMES DAILY
Status: DISCONTINUED | OUTPATIENT
Start: 2018-10-08 | End: 2018-10-09 | Stop reason: HOSPADM

## 2018-10-08 RX ADMIN — INFLUENZA A VIRUS A/MICHIGAN/45/2015 X-275 (H1N1) ANTIGEN (FORMALDEHYDE INACTIVATED), INFLUENZA A VIRUS A/SINGAPORE/INFIMH-16-0019/2016 IVR-186 (H3N2) ANTIGEN (FORMALDEHYDE INACTIVATED), AND INFLUENZA B VIRUS B/MARYLAND/15/2016 BX-69A (A B/COLORADO/6/2017-LIKE VIRUS) ANTIGEN (FORMALDEHYDE INACTIVATED) 0.5 ML: 60; 60; 60 INJECTION, SUSPENSION INTRAMUSCULAR at 17:22

## 2018-10-08 RX ADMIN — ENOXAPARIN SODIUM 40 MG: 100 INJECTION SUBCUTANEOUS at 17:33

## 2018-10-08 RX ADMIN — MORPHINE SULFATE 4 MG: 4 INJECTION INTRAVENOUS at 21:48

## 2018-10-08 RX ADMIN — IOHEXOL 100 ML: 350 INJECTION, SOLUTION INTRAVENOUS at 15:12

## 2018-10-08 RX ADMIN — SENNOSIDES-DOCUSATE SODIUM TAB 8.6-50 MG 2 TABLET: 8.6-5 TAB at 21:49

## 2018-10-08 RX ADMIN — OXYCODONE HYDROCHLORIDE 5 MG: 5 TABLET ORAL at 17:33

## 2018-10-08 RX ADMIN — AMITRIPTYLINE HYDROCHLORIDE 10 MG: 10 TABLET, FILM COATED ORAL at 21:49

## 2018-10-08 RX ADMIN — GABAPENTIN 300 MG: 300 CAPSULE ORAL at 17:33

## 2018-10-08 RX ADMIN — ASPIRIN 325 MG: 325 TABLET, COATED ORAL at 16:06

## 2018-10-08 ASSESSMENT — ENCOUNTER SYMPTOMS
NUMBNESS: 1
SHORTNESS OF BREATH: 0
FEVER: 0
WHEEZING: 0
DIARRHEA: 0
IRREGULAR HEARTBEAT: 0
BACK PAIN: 1
BLURRED VISION: 0
HEARTBURN: 0
SENSORY CHANGE: 0
SHORTNESS OF BREATH: 0
ORTHOPNEA: 0
LOWER EXTREMITY EDEMA: 0
TINGLING: 1
ABDOMINAL PAIN: 0
DIZZINESS: 0
SYNCOPE: 0
SPUTUM PRODUCTION: 0
WHEEZING: 0
EYES NEGATIVE: 1
NAUSEA: 0
CHILLS: 0
ABDOMINAL PAIN: 0
SORE THROAT: 0
HEADACHES: 0
EXERTIONAL CHEST PRESSURE: 0
CONSTIPATION: 0
NERVOUS/ANXIOUS: 0
DIZZINESS: 0
DIARRHEA: 0
LEG PAIN: 0
HEADACHES: 0
BACK PAIN: 1
NAUSEA: 1
WEAKNESS: 0
COUGH: 0
PALPITATIONS: 0
NECK PAIN: 0
COUGH: 0
FEVER: 0
CHILLS: 0
VOMITING: 0
VOMITING: 0

## 2018-10-08 ASSESSMENT — PATIENT HEALTH QUESTIONNAIRE - PHQ9
2. FEELING DOWN, DEPRESSED, IRRITABLE, OR HOPELESS: NOT AT ALL
SUM OF ALL RESPONSES TO PHQ9 QUESTIONS 1 AND 2: 0
1. LITTLE INTEREST OR PLEASURE IN DOING THINGS: NOT AT ALL
1. LITTLE INTEREST OR PLEASURE IN DOING THINGS: NOT AT ALL
2. FEELING DOWN, DEPRESSED, IRRITABLE, OR HOPELESS: NOT AT ALL
SUM OF ALL RESPONSES TO PHQ9 QUESTIONS 1 AND 2: 0
SUM OF ALL RESPONSES TO PHQ9 QUESTIONS 1 AND 2: 0
2. FEELING DOWN, DEPRESSED, IRRITABLE, OR HOPELESS: NOT AT ALL
1. LITTLE INTEREST OR PLEASURE IN DOING THINGS: NOT AT ALL

## 2018-10-08 ASSESSMENT — PAIN SCALES - GENERAL
PAINLEVEL_OUTOF10: 5
PAINLEVEL_OUTOF10: 6
PAINLEVEL_OUTOF10: 5
PAINLEVEL_OUTOF10: 7
PAINLEVEL_OUTOF10: 3

## 2018-10-08 ASSESSMENT — COPD QUESTIONNAIRES
DO YOU EVER COUGH UP ANY MUCUS OR PHLEGM?: NO/ONLY WITH OCCASIONAL COLDS OR INFECTIONS
HAVE YOU SMOKED AT LEAST 100 CIGARETTES IN YOUR ENTIRE LIFE: NO/DON'T KNOW
DURING THE PAST 4 WEEKS HOW MUCH DID YOU FEEL SHORT OF BREATH: NONE/LITTLE OF THE TIME
COPD SCREENING SCORE: 1
IN THE PAST 12 MONTHS DO YOU DO LESS THAN YOU USED TO BECAUSE OF YOUR BREATHING PROBLEMS: DISAGREE/UNSURE

## 2018-10-08 ASSESSMENT — LIFESTYLE VARIABLES
EVER_SMOKED: YES
ALCOHOL_USE: NO

## 2018-10-08 NOTE — ED NOTES
Pt C/O 3-4/10 chest pain right sided radiating to back, EKG performed. ERP notified, order placed. Pt medicated per MAR.

## 2018-10-08 NOTE — ED TRIAGE NOTES
64 y/o female ambulatory to triage after being sent by urgent care for evaluation of chest pain that began this morning, pt states the pain radiates to her back and also c/o nausea. EKG done prior to triage.

## 2018-10-08 NOTE — ED NOTES
Assumed care of pt from Red [facility]. AAO x4. Spouse at bedside. Call light within reach. Seen by admitting MD

## 2018-10-08 NOTE — PROGRESS NOTES
Subjective:      Dee Armstrong is a 63 y.o. female who presents with Chest Pain (x 2 hours)            Patient was sitting on the couch today began having sharp stabbing right-sided chest pains that did radiate to her left side. Pain does go straight through to her back. She had associated nausea. She had tingling in her fingers. She denies any radiation to her jaw. Pain has been constant but it has been waxing and waning in severity. Patient has no personal or family history of cardiac conditions. 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      Chest Pain    This is a new problem. The current episode started today. The onset quality is sudden. The problem occurs constantly. The problem has been unchanged. The pain is present in the lateral region (Right sided). The pain is at a severity of 8/10. The pain is severe. The quality of the pain is described as sharp and stabbing. The pain radiates to the left arm and right arm. Associated symptoms include back pain, nausea and numbness. Pertinent negatives include no abdominal pain, cough, dizziness, exertional chest pressure, fever, headaches, irregular heartbeat, leg pain, lower extremity edema, orthopnea, palpitations, shortness of breath, sputum production, syncope or vomiting. She has tried nothing for the symptoms. The treatment provided no relief. Risk factors include obesity.       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 She also has no past medical history of Chronic airway obstruction, not elsewhere classified.  MEDS:   Current Outpatient Prescriptions:   •  diltiazem (TIAZAC) 120 MG SR capsule, TAKE 1 CAP BY MOUTH EVERY BEDTIME., Disp: 90 Cap, Rfl: 1  •  amitriptyline (ELAVIL) 10 MG Tab, , Disp: ,  Rfl:   •  gabapentin (NEURONTIN) 300 MG Cap, Take 1 Cap by mouth every evening., Disp: 90 Cap, Rfl: 0  •  lansoprazole (PREVACID) 30 MG CAPSULE DELAYED RELEASE, TAKE 1 CAPSULE BY MOUTH  DAILY, Disp: 90 Cap, Rfl: 0  •  alendronate (FOSAMAX) 70 MG Tab, Take 1 Tab by mouth every 7 days., Disp: 12 Tab, Rfl: 3  •  Multiple Vitamins-Minerals (MULTIVITAMIN ADULT PO), Take  by mouth., Disp: , Rfl:   •  Calcium Carbonate (CALCIUM 600 PO), Take  by mouth., Disp: , Rfl:   •  SUMAtriptan (IMITREX) 25 MG Tab tablet, Take  mg by mouth Once PRN for Migraine., Disp: , Rfl:   •  clonazepam (KLONOPIN) 0.5 MG Tab, Take 0.5 Tabs by mouth 2 times a day., Disp: 15 Tab, Rfl: 0  •  estradiol (ESTRACE VAGINAL) 0.1 MG/GM vaginal cream, Insert 1 g in vagina every day., Disp: , Rfl:   •  ascorbic acid (ASCORBIC ACID) 500 MG Tab, Take 1,000 mg by mouth every day., Disp: , Rfl:   •  FIBER PO, Take 1 Cap by mouth every day., Disp: , Rfl:   ALLERGIES:   Allergies   Allergen Reactions   • Nkda [No Known Drug Allergy]      SURGHX:   Past Surgical History:   Procedure Laterality Date   • HYSTERECTOMY ROBOTIC  6/7/2017    Procedure: HYSTERECTOMY ROBOTIC SI, BILATERAL SALPINGO-OOPHORECTOMY, URETERAL SACRAL LIGAMENT SHORTENING ;  Surgeon: Jerica Hooper M.D.;  Location: [facility];  Service:    • CYSTOSCOPY  6/7/2017    Procedure: CYSTOSCOPY;  Surgeon: Jerica Hooper M.D.;  Location: [facility];  Service:    • SHOULDER DECOMPRESSION ARTHROSCOPIC Right 12/6/2016    Procedure: SHOULDER DECOMPRESSION ARTHROSCOPIC - SUBACROMIAL, MANJARREZ;  Surgeon: Kyle Claros M.D.;  Location: [facility];  Service:    • CLAVICLE DISTAL EXCISION Right 12/6/2016    Procedure: CLAVICLE DISTAL EXCISION;  Surgeon: Kyle Claros M.D.;  Location: [facility];  Service:    • SHOULDER ARTHROSCOPY W/ ROTATOR CUFF REPAIR Right 12/6/2016    Procedure: SHOULDER ARTHROSCOPY W/ ROTATOR CUFF REPAIR ;  Surgeon: Kyle Claros,  M.D.;  Location: SURGERY [facility];  Service:    • SHOULDER ARTHROSCOPY W/ BICIPITAL TENODESIS REPAIR Right 12/6/2016    Procedure: SHOULDER ARTHROSCOPY W/ BICIPITAL TENODESIS REPAIR ;  Surgeon: Kyle Claros M.D.;  Location: SURGERY [facility];  Service:    • LUMBAR FUSION POSTERIOR  2/9/2016    Procedure: LUMBAR FUSION POSTERIOR PEDICLE SCREW FIXATION (STAGE #2);  Surgeon: Tim Rodriguez M.D.;  Location: SURGERY [facility];  Service:    • LUMBAR LAMINECTOMY DISKECTOMY  2/9/2016    Procedure: LUMBAR LAMINECTOMY DISKECTOMY MINI OPEN;  Surgeon: Tim Rodriguez M.D.;  Location: SURGERY [facility];  Service:    • EXTREME LATERAL INTERBODY FUSION Right 2/6/2016    Procedure: EXTREME LATERAL INTERBODY FUSION L3-4 (STAGE #1);  Surgeon: Tim Rodriguez M.D.;  Location: SURGERY [facility];  Service:    • RECOVERY  12/8/2015    Procedure: CT-CT GUIDED LEFT LUNG BIOPSY-;  Surgeon: [facility];  Location: SURG[place] PRE[place]IT [facility];  Service:    • REDDY BY LAPAROSCOPY  4/17/2014    Performed by Ankur Lerner M.D. at SURGERY SAME DAY [facility] ORS   • EGD WITH ASP/BX  2/4/2014    mild chronic inactive gastritis, scar gastric antrum-   • EGD WITH ASP/BX  9/29/2011    possible barrettes, hiatal hernia, gastritis   • COLONOSCOPY  9/29/2011    diverticulosis sigmoid colon, hemorrhoids   • ARTHRODESIS  9/3/2010    Performed by YARELY MORRISON at SURGERY SAME DAY [facility] ORS   • ORTHOPEDIC OSTEOTOMY  9/3/2010    Performed by YARELY MORRISON at SURGERY SAME DAY [facility] ORS   • BONE SPUR EXCISION  9/3/2010    Performed by YARELY MORRISON at SURGERY SAME DAY [facility] ORS   • ARTHROTOMY  9/3/2010    Performed by YARELY MORRISON at SURGERY SAME DAY [facility] ORS   • OTHER ABDOMINAL SURGERY  2000    COLON RESECTION   • LAMINOTOMY     • OTHER ORTHOPEDIC SURGERY      PARDEEP CARPAL TUNNEL RELEASES     SOCHX:  reports that she quit smoking about 41 years ago. Her smoking use included  Cigarettes. She quit after 2.00 years of use. She has never used smokeless tobacco. She reports that she drinks alcohol. She reports that she does not use drugs.  FH: family history includes Cancer in her mother; Diabetes in her father; Stroke in her father.      Review of Systems   Constitutional: Negative for chills and fever.   HENT: Negative.    Eyes: Negative.    Respiratory: Negative for cough, sputum production, shortness of breath and wheezing.    Cardiovascular: Positive for chest pain. Negative for palpitations, orthopnea, leg swelling and syncope.   Gastrointestinal: Positive for nausea. Negative for abdominal pain, constipation, diarrhea and vomiting.   Musculoskeletal: Positive for back pain.   Neurological: Positive for tingling and numbness. Negative for dizziness, sensory change and headaches.          Objective:     /84 (BP Location: Left arm)   Pulse 92   Temp 37 °C (98.6 °F)   Resp 18   SpO2 98%      Physical Exam   Constitutional: She is oriented to person, place, and time. She appears well-developed and well-nourished. No distress.   HENT:   Head: Normocephalic and atraumatic.   Eyes: Pupils are equal, round, and reactive to light. Conjunctivae and EOM are normal.   Neck: Normal range of motion. Neck supple.   Cardiovascular: Normal rate, regular rhythm and normal heart sounds.    Pulmonary/Chest: Effort normal and breath sounds normal. No respiratory distress. She has no wheezes.   Neurological: She is alert and oriented to person, place, and time.   Skin: Skin is warm and dry. She is not diaphoretic.   Psychiatric: She has a normal mood and affect. Her behavior is normal. Judgment and thought content normal.   Nursing note and vitals reviewed.         EKG: Normal sinus rhythm, rate 83. Possible left atrial enlargement. No obvious signs of ST elevation or ischemia. No previous EKG for comparison.     Assessment/Plan:     1. Chest pain, unspecified type  UC AMA/Refusal of Treatment      63-year-old female sudden onset of right-sided sharp stabbing chest pain. She has associated tingling in her fingers and nausea. Given age and risk factors it is recommended she be seen in the ER immediately for further workup and management. EMS was called but patient vehemently declines transport. Her  will drive her now by private vehicle. An AMA refusal of treatment form is scanned and signed into her chart.    Please note that this dictation was created using voice recognition software. I have made every reasonable attempt to correct obvious errors, but I expect that there are errors of grammar and possibly content that I did not discover before finalizing the note.

## 2018-10-08 NOTE — ED PROVIDER NOTES
ED Provider Note    CHIEF COMPLAINT  Chief Complaint   Patient presents with   • Chest Pain     began at 0930 this morning   • Back Pain   • Nausea       HPI  Dee Armstrong is a 63 y.o. female who presents to the emergency department complaint of chest pain.  The patient is chest pain that started this morning at 9:30 AM.  Started in the left side and felt like a squeezing band around the anterior portion of her chest.  Was associate with a sudden onset of sharp pain between her shoulder blades.  This pain was not tearing did not radiate but is quite severe.  She felt a little nauseated but did not vomit.  She had no shortness of breath.  There is no cough fevers chills or hemoptysis.  No history of PE or DVT.  The patient has a remote history of coccidiomycosis in her upper lung.  Denies any weight loss or night sweats.  No recent travel or immobilization or pain or swelling in her legs.  No family history of venous thromboembolic disease or dissections.    REVIEW OF SYSTEMS  See HPI for further details. All other systems are negative.    PAST MEDICAL HISTORY  Past Medical History:   Diagnosis Date   • Coccidioidomycosis 12/15    left upper lung   • Cough 5/29/2015   • Diverticulosis    • GERD (gastroesophageal reflux disease)    • Heart burn    • Indigestion    • LBP (low back pain)    • Lumbar radicular pain 5/29/2015   • Muscle disorder    • Obesity    • Pain     Shoulder and numbness to R LE       FAMILY HISTORY  Family History   Problem Relation Age of Onset   • Cancer Mother         lymphoma   • Stroke Father    • Diabetes Father        SOCIAL HISTORY  Social History     Social History   • Marital status:      Spouse name: N/A   • Number of children: N/A   • Years of education: N/A     Social History Main Topics   • Smoking status: Former Smoker     Years: 2.00     Types: Cigarettes     Quit date: 1/1/1977   • Smokeless tobacco: Never Used      Comment: quit in 1977   • Alcohol use 0.0 oz/week       Comment: 1 per month   • Drug use: No      Comment: cig hx= 1/2 pack per month    • Sexual activity: Yes     Partners: Male      Comment:      Other Topics Concern   • Not on file     Social History Narrative   • No narrative on file       SURGICAL HISTORY  Past Surgical History:   Procedure Laterality Date   • HYSTERECTOMY ROBOTIC  6/7/2017    Procedure: HYSTERECTOMY ROBOTIC SI, BILATERAL SALPINGO-OOPHORECTOMY, URETERAL SACRAL LIGAMENT SHORTENING ;  Surgeon: Jerica Hooper M.D.;  Location: [facility];  Service:    • CYSTOSCOPY  6/7/2017    Procedure: CYSTOSCOPY;  Surgeon: Jerica Hooper M.D.;  Location: [facility];  Service:    • SHOULDER DECOMPRESSION ARTHROSCOPIC Right 12/6/2016    Procedure: SHOULDER DECOMPRESSION ARTHROSCOPIC - SUBACROMIAL, MANJARREZ;  Surgeon: Kyle Claros M.D.;  Location: [facility];  Service:    • CLAVICLE DISTAL EXCISION Right 12/6/2016    Procedure: CLAVICLE DISTAL EXCISION;  Surgeon: Kyle Claros M.D.;  Location: [facility];  Service:    • SHOULDER ARTHROSCOPY W/ ROTATOR CUFF REPAIR Right 12/6/2016    Procedure: SHOULDER ARTHROSCOPY W/ ROTATOR CUFF REPAIR ;  Surgeon: Kyle Claros M.D.;  Location: [facility];  Service:    • SHOULDER ARTHROSCOPY W/ BICIPITAL TENODESIS REPAIR Right 12/6/2016    Procedure: SHOULDER ARTHROSCOPY W/ BICIPITAL TENODESIS REPAIR ;  Surgeon: Kyle Claros M.D.;  Location: [facility];  Service:    • LUMBAR FUSION POSTERIOR  2/9/2016    Procedure: LUMBAR FUSION POSTERIOR PEDICLE SCREW FIXATION (STAGE #2);  Surgeon: Tim Rodriguez M.D.;  Location: [facility];  Service:    • LUMBAR LAMINECTOMY DISKECTOMY  2/9/2016    Procedure: LUMBAR LAMINECTOMY DISKECTOMY MINI OPEN;  Surgeon: Tim Rodriguez M.D.;  Location: [facility];  Service:    • EXTREME LATERAL INTERBODY FUSION Right 2/6/2016    Procedure: EXTREME LATERAL INTERBODY FUSION L3-4 (STAGE #1);   Surgeon: Tim Rodriguez M.D.;  Location: SURGERY [facility];  Service:    • RECOVERY  12/8/2015    Procedure: CT-CT GUIDED LEFT LUNG BIOPSY-;  Surgeon: [facility];  Location: SURGERY PRE-POST PROC UNIT [facility];  Service:    • REDDY BY LAPAROSCOPY  4/17/2014    Performed by Ankur Lerner M.D. at SURGERY SAME DAY [facility] ORS   • EGD WITH ASP/BX  2/4/2014    mild chronic inactive gastritis, scar gastric antrum-   • EGD WITH ASP/BX  9/29/2011    possible barrettes, hiatal hernia, gastritis   • COLONOSCOPY  9/29/2011    diverticulosis sigmoid colon, hemorrhoids   • ARTHRODESIS  9/3/2010    Performed by YARELY MORRISON at SURGERY SAME DAY [facility] ORS   • ORTHOPEDIC OSTEOTOMY  9/3/2010    Performed by YARELY MORRISON at SURGERY SAME DAY [facility] ORS   • BONE SPUR EXCISION  9/3/2010    Performed by YARELY MORRISON at SURGERY SAME DAY [facility] ORS   • ARTHROTOMY  9/3/2010    Performed by YARELY MORRISON at SURGERY SAME DAY [facility] ORS   • OTHER ABDOMINAL SURGERY  2000    COLON RESECTION   • LAMINOTOMY     • OTHER ORTHOPEDIC SURGERY      PARDEEP CARPAL TUNNEL RELEASES       CURRENT MEDICATIONS  Home Medications     Reviewed by Wero Rao R.N. (Registered Nurse) on 10/08/18 at 1146  Med List Status: <None>   Medication Last Dose Status   alendronate (FOSAMAX) 70 MG Tab 9/4/2018 Active   amitriptyline (ELAVIL) 10 MG Tab 9/4/2018 Active   ascorbic acid (ASCORBIC ACID) 500 MG Tab 6/18/2018 Active   Calcium Carbonate (CALCIUM 600 PO) 9/4/2018 Active   clonazepam (KLONOPIN) 0.5 MG Tab 9/4/2018 Active   diltiazem (TIAZAC) 120 MG SR capsule  Active   estradiol (ESTRACE VAGINAL) 0.1 MG/GM vaginal cream 6/18/2018 Active   FIBER PO 9/4/2018 Active   gabapentin (NEURONTIN) 300 MG Cap 9/4/2018 Active   lansoprazole (PREVACID) 30 MG CAPSULE DELAYED RELEASE 9/4/2018 Active   Multiple Vitamins-Minerals (MULTIVITAMIN ADULT PO) 9/4/2018 Active   SUMAtriptan (IMITREX) 25 MG Tab tablet 9/4/2018 Active           "      ALLERGIES  Allergies   Allergen Reactions   • Nkda [No Known Drug Allergy]        PHYSICAL EXAM  VITAL SIGNS: BP (!) 177/87   Pulse 84   Temp 36.8 °C (98.3 °F) (Temporal)   Resp 18   Ht 1.499 m (4' 11\")   Wt 81 kg (178 lb 9.2 oz)   SpO2 97%   BMI 36.07 kg/m²    Constitutional: Well developed, Well nourished, No acute distress, Non-toxic appearance.   HENT: Normocephalic, Atraumatic, Bilateral external ears normal, Oropharynx moist, No oral exudates, Nose normal.   Eyes: PERRL, EOMI, Conjunctiva normal, No discharge.   Neck: Normal range of motion, No tenderness, Supple, No stridor.   Lymphatic: No lymphadenopathy noted.   Cardiovascular: Normal heart rate, Normal rhythm, No murmurs, No rubs, No gallops.   Thorax & Lungs: Normal breath sounds, No respiratory distress, No wheezing, No chest tenderness.   Abdomen: Bowel sounds normal, Soft, No tenderness, No masses, No pulsatile masses.   Skin: Warm, Dry, No erythema, No rash.   Back: No tenderness, No CVA tenderness. .   Musculoskeletal: Good range of motion in all major joints.  No asymmetric edema.  Good pulses in all extremities.  Neurologic: Alert, No focal deficits noted.   Psychiatric: Affect normal      Results for orders placed or performed during the hospital encounter of 10/08/18   Troponin   Result Value Ref Range    Troponin I <0.01 0.00 - 0.04 ng/mL   Btype Natriuretic Peptide   Result Value Ref Range    B Natriuretic Peptide 4 0 - 100 pg/mL   CBC with Differential   Result Value Ref Range    WBC 4.8 4.8 - 10.8 K/uL    RBC 4.73 4.20 - 5.40 M/uL    Hemoglobin 15.4 12.0 - 16.0 g/dL    Hematocrit 45.5 37.0 - 47.0 %    MCV 96.2 81.4 - 97.8 fL    MCH 32.6 27.0 - 33.0 pg    MCHC 33.8 33.6 - 35.0 g/dL    RDW 43.7 35.9 - 50.0 fL    Platelet Count 280 164 - 446 K/uL    MPV 9.5 9.0 - 12.9 fL    Neutrophils-Polys 60.40 44.00 - 72.00 %    Lymphocytes 32.20 22.00 - 41.00 %    Monocytes 5.60 0.00 - 13.40 %    Eosinophils 0.80 0.00 - 6.90 %    Basophils " 0.80 0.00 - 1.80 %    Immature Granulocytes 0.20 0.00 - 0.90 %    Nucleated RBC 0.00 /100 WBC    Neutrophils (Absolute) 2.89 2.00 - 7.15 K/uL    Lymphs (Absolute) 1.54 1.00 - 4.80 K/uL    Monos (Absolute) 0.27 0.00 - 0.85 K/uL    Eos (Absolute) 0.04 0.00 - 0.51 K/uL    Baso (Absolute) 0.04 0.00 - 0.12 K/uL    Immature Granulocytes (abs) 0.01 0.00 - 0.11 K/uL    NRBC (Absolute) 0.00 K/uL   Complete Metabolic Panel (CMP)   Result Value Ref Range    Sodium 137 135 - 145 mmol/L    Potassium 3.9 3.6 - 5.5 mmol/L    Chloride 106 96 - 112 mmol/L    Co2 19 (L) 20 - 33 mmol/L    Anion Gap 12.0 (H) 0.0 - 11.9    Glucose 92 65 - 99 mg/dL    Bun 17 8 - 22 mg/dL    Creatinine 0.60 0.50 - 1.40 mg/dL    Calcium 9.8 8.5 - 10.5 mg/dL    AST(SGOT) 18 12 - 45 U/L    ALT(SGPT) 15 2 - 50 U/L    Alkaline Phosphatase 67 30 - 99 U/L    Total Bilirubin 0.5 0.1 - 1.5 mg/dL    Albumin 4.0 3.2 - 4.9 g/dL    Total Protein 6.9 6.0 - 8.2 g/dL    Globulin 2.9 1.9 - 3.5 g/dL    A-G Ratio 1.4 g/dL   Prothrombin Time   Result Value Ref Range    PT 12.5 12.0 - 14.6 sec    INR 0.92 0.87 - 1.13   APTT   Result Value Ref Range    APTT 27.1 24.7 - 36.0 sec   Lipase   Result Value Ref Range    Lipase 31 11 - 82 U/L   ESTIMATED GFR   Result Value Ref Range    GFR If African American >60 >60 mL/min/1.73 m 2    GFR If Non African American >60 >60 mL/min/1.73 m 2   BLOOD CULTURE,HOLD   Result Value Ref Range    Blood Culture Hold Collected    TROPONIN   Result Value Ref Range    Troponin I <0.01 0.00 - 0.04 ng/mL   EKG (NOW)   Result Value Ref Range    Report       [facility] Emergency Dept.    Test Date:  2018-10-08  Pt Name:    UGO BENTON                Department: ER  MRN:        [id number]                      Room:  Gender:     Female                       Technician: [id number]  :        1955                   Requested By:ER TRIAGE PROTOCOL  Order #:    [id number]                    Reading MD:    Measurements  Intervals                                 Axis  Rate:       82                           P:          21  KY:         148                          QRS:        9  QRSD:       102                          T:          5  QT:         372  QTc:        435    Interpretive Statements  SINUS RHYTHM  PROBABLE LEFT ATRIAL ABNORMALITY  RSR' IN V1 OR V2, RIGHT VCD OR RVH  Compared to ECG 2017 11:25:25  Right ventricular hypertrophy now present     EKG (NOW)   Result Value Ref Range    Report       [facility] Emergency Dept.    Test Date:  2018-10-08  Pt Name:    UGO BENTON                Department: ER  MRN:        [id number]                      Room:        04  Gender:     Female                       Technician: [id number]  :        1955                   Requested By:AMANDA COMBS  Order #:    [id number]                    Reading MD:    Measurements  Intervals                                Axis  Rate:       76                           P:          49  KY:         164                          QRS:        3  QRSD:       108                          T:          26  QT:         400  QTc:        450    Interpretive Statements  SINUS RHYTHM  INCOMPLETE RIGHT BUNDLE BRANCH BLOCK  Compared to ECG 10/08/2018 11:42:29  Incomplete right bundle-branch block now present  Right ventricular hypertrophy no longer present        RADIOLOGY/PROCEDURES  CT-CTA COMPLETE THORACOABDOMINAL AORTA   Final Result      No evidence of aortic dissection. No aneurysmal dilatation. No mediastinal or retroperitoneal hematoma.   2.9 cm left upper lobe mass concerning for malignant neoplasm. Further evaluation recommended.   4.6 mm hypodensity left lobe of the liver statistically most likely hemangioma or cyst.   No hydronephrosis.   There are diverticula colon. No evidence diverticulitis. No free fluid.   Large and small bowel and bladder are incompletely imaged.         DX-CHEST-LIMITED (1 VIEW)   Final Result      2.4 cm nodular opacity left  lung unchanged. No new infiltrates.      NM-CARDIAC STRESS TEST    (Results Pending)         COURSE & MEDICAL DECISION MAKING  Pertinent Labs & Imaging studies reviewed. (See chart for details)    The patient presents emergency part for chest pain.  She has had an aspirin already and currently chest pain is minimal.  EKG does not show STEMI.    A broad official diagnosis was considered including but not limited to STEMI, N STEMI, ACS, PE, dissection, pneumonia, pneumothorax.    Because of this sudden onset severe pain and the pain in her back I was concerned about a dissection.  CTA was obtained this is negative.  She was given an aspirin.    Chest imaging does not show evidence of pneumonia, pneumothorax.  Her history is not strongly suggestive of PE.  We do not see one on the CT.    She had a mass on the CT but this is known to be coccidiomycosis.  She is aware of this.  I do not think is malignancy.  She will be admitted to the hospitalist for continued workup and treatment.*    Because of her age and correlated she will be admitted to the CDU.  Case was discussed with the CDU doctor.    FINAL IMPRESSION  1. Other chest pain    2. History of coccidioidomycosis        2.   3.         Electronically signed by: Jose L Smalls, 10/8/2018 2:56 PM

## 2018-10-08 NOTE — H&P
Hospital Medicine History & Physical Note    Date of Service  10/8/2018    Primary Care Physician  Sherly Calvert M.D.    Consultants  None    Code Status  Full    Chief Complaint  Chest pain    History of Presenting Illness  63 y.o. female who presented 10/8/2018 with chest pain which started this morning, denies any diaphoresis, palpitations or shortness of breath.  She describes the pain is across her chest and more so under her right breast.  Is also complaining of some pain radiation to the back.  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  She denies any fever chills, cough or congestion, abdominal pain nausea vomiting or diarrhea.  Initial EKG shows no acute findings and troponins are negative.    Review of Systems  Review of Systems   Constitutional: Negative for chills, fever and malaise/fatigue.   HENT: Negative for hearing loss and sore throat.    Eyes: Negative for blurred vision.   Respiratory: Negative for cough, shortness of breath and wheezing.    Cardiovascular: Positive for chest pain. Negative for leg swelling.   Gastrointestinal: Negative for abdominal pain, diarrhea, heartburn, nausea and vomiting.   Genitourinary: Negative for dysuria.   Musculoskeletal: Positive for back pain. Negative for neck pain.   Skin: Negative for rash.   Neurological: Negative for dizziness, weakness and headaches.   Psychiatric/Behavioral: The patient is not nervous/anxious.        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 She also has no past medical history of Chronic airway obstruction, not  elsewhere classified.    Surgical History   has a past surgical history that includes other orthopedic surgery; arthrodesis (9/3/2010); orthopedic osteotomy (9/3/2010); bone spur excision (9/3/2010); arthrotomy (9/3/2010); egd with asp/bx (9/29/2011); egd with asp/bx (2/4/2014); puja by laparoscopy (4/17/2014); other abdominal surgery (2000); colonoscopy (9/29/2011); recovery (12/8/2015); extreme lateral interbody fusion (Right, 2/6/2016); lumbar fusion posterior (2/9/2016); lumbar laminectomy diskectomy (2/9/2016); laminotomy; shoulder decompression arthroscopic (Right, 12/6/2016); clavicle distal excision (Right, 12/6/2016); shoulder arthroscopy w/ rotator cuff repair (Right, 12/6/2016); shoulder arthroscopy w/ bicipital tenodesis repair (Right, 12/6/2016); hysterectomy robotic (6/7/2017); and cystoscopy (6/7/2017).     Family History  family history includes Cancer in her mother; Diabetes in her father; Stroke in her father.     Social History   reports that she quit smoking about 41 years ago. Her smoking use included Cigarettes. She quit after 2.00 years of use. She has never used smokeless tobacco. She reports that she drinks alcohol. She reports that she does not use drugs.    Allergies  Allergies   Allergen Reactions   • Nkda [No Known Drug Allergy]        Medications  Prior to Admission Medications   Prescriptions Last Dose Informant Patient Reported? Taking?   Calcium Carbonate (CALCIUM 600 PO) 10/7/2018 at 1000 Patient Yes No   Sig: Take 2 Tabs by mouth every day.   FIBER PO 10/7/2018 at 1000 Patient Yes No   Sig: Take 1 Cap by mouth every day.   Multiple Vitamins-Minerals (MULTIVITAMIN ADULT PO) 10/7/2018 at 1000 Patient Yes No   Sig: Take 1 Tab by mouth every day.   alendronate (FOSAMAX) 70 MG Tab 10/7/2018 at 0730 Patient No No   Sig: Take 1 Tab by mouth every 7 days.   amitriptyline (ELAVIL) 10 MG Tab 10/7/2018 at 2030 Patient Yes No   Sig: Take 10 mg by mouth every bedtime.   asa/apap/caffeine  (EXCEDRIN) 250-250-65 MG Tab 10/7/2018 at 1630 Patient Yes Yes   Sig: Take 2 Tabs by mouth Once.   ascorbic acid (ASCORBIC ACID) 500 MG Tab 10/7/2018 at 1000 Patient Yes No   Sig: Take 1,000 mg by mouth every day.   gabapentin (NEURONTIN) 300 MG Cap 10/7/2018 at 2030 Patient No No   Sig: Take 1 Cap by mouth every evening.   lansoprazole (PREVACID) 30 MG CAPSULE DELAYED RELEASE 10/8/2018 at 0800 Patient No No   Sig: TAKE 1 CAPSULE BY MOUTH  DAILY      Facility-Administered Medications: None       Physical Exam  Temp:  [36.8 °C (98.3 °F)] 36.8 °C (98.3 °F)  Pulse:  [81-96] 84  Resp:  [16-18] 16  BP: (177)/(87) 177/87    Physical Exam   Constitutional: She is oriented to person, place, and time. She appears well-developed.   HENT:   Head: Normocephalic and atraumatic.   Eyes: Pupils are equal, round, and reactive to light. Conjunctivae are normal.   Neck: No tracheal deviation present. No thyromegaly present.   Cardiovascular: Normal rate and regular rhythm.    Pulmonary/Chest: Effort normal and breath sounds normal.   Abdominal: Soft. Bowel sounds are normal. She exhibits no distension. There is no tenderness.   Musculoskeletal: She exhibits no edema.   Lymphadenopathy:     She has no cervical adenopathy.   Neurological: She is alert and oriented to person, place, and time.   Skin: Skin is warm and dry.   Nursing note and vitals reviewed.      Laboratory:  Recent Labs      10/08/18   1325   WBC  4.8   RBC  4.73   HEMOGLOBIN  15.4   HEMATOCRIT  45.5   MCV  96.2   MCH  32.6   MCHC  33.8   RDW  43.7   PLATELETCT  280   MPV  9.5     Recent Labs      10/08/18   1325   SODIUM  137   POTASSIUM  3.9   CHLORIDE  106   CO2  19*   GLUCOSE  92   BUN  17   CREATININE  0.60   CALCIUM  9.8     Recent Labs      10/08/18   1325   ALTSGPT  15   ASTSGOT  18   ALKPHOSPHAT  67   TBILIRUBIN  0.5   LIPASE  31   GLUCOSE  92     Recent Labs      10/08/18   1324   APTT  27.1   INR  0.92     Recent Labs      10/08/18   1325   BNPBTYPENAT  4          Recent Labs      10/08/18   1325   TROPONINI  <0.01       Urinalysis:    No results found     Imaging:  CT-CTA COMPLETE THORACOABDOMINAL AORTA   Final Result      No evidence of aortic dissection. No aneurysmal dilatation. No mediastinal or retroperitoneal hematoma.   2.9 cm left upper lobe mass concerning for malignant neoplasm. Further evaluation recommended.   4.6 mm hypodensity left lobe of the liver statistically most likely hemangioma or cyst.   No hydronephrosis.   There are diverticula colon. No evidence diverticulitis. No free fluid.   Large and small bowel and bladder are incompletely imaged.         DX-CHEST-LIMITED (1 VIEW)   Final Result      2.4 cm nodular opacity left lung unchanged. No new infiltrates.      NM-CARDIAC STRESS TEST    (Results Pending)         Assessment/Plan:  I anticipate this patient is appropriate for observation status at this time.    * Chest pain- (present on admission)   Assessment & Plan    ASA,  Serial troponin, check lipid profile  Check stress test        History of coccidioidomycosis- (present on admission)   Assessment & Plan    CT stable        Low back pain- (present on admission)   Assessment & Plan    Chronic  Continue Elavil and Neurontin        GERD (gastroesophageal reflux disease)- (present on admission)   Assessment & Plan    Continue Prevacid            VTE prophylaxis: Lovenox

## 2018-10-09 ENCOUNTER — PATIENT OUTREACH (OUTPATIENT)
Dept: HEALTH INFORMATION MANAGEMENT | Facility: OTHER | Age: 63
End: 2018-10-09

## 2018-10-09 ENCOUNTER — APPOINTMENT (OUTPATIENT)
Dept: RADIOLOGY | Facility: MEDICAL CENTER | Age: 63
End: 2018-10-09
Attending: FAMILY MEDICINE
Payer: MEDICARE

## 2018-10-09 VITALS
HEIGHT: 59 IN | HEART RATE: 97 BPM | BODY MASS INDEX: 35.38 KG/M2 | WEIGHT: 175.49 LBS | SYSTOLIC BLOOD PRESSURE: 149 MMHG | OXYGEN SATURATION: 95 % | TEMPERATURE: 97.9 F | RESPIRATION RATE: 16 BRPM | DIASTOLIC BLOOD PRESSURE: 72 MMHG

## 2018-10-09 LAB
ALBUMIN SERPL BCP-MCNC: 3.3 G/DL (ref 3.2–4.9)
ALBUMIN/GLOB SERPL: 1.3 G/DL
ALP SERPL-CCNC: 63 U/L (ref 30–99)
ALT SERPL-CCNC: 25 U/L (ref 2–50)
ANION GAP SERPL CALC-SCNC: 9 MMOL/L (ref 0–11.9)
AST SERPL-CCNC: 33 U/L (ref 12–45)
BASOPHILS # BLD AUTO: 0.7 % (ref 0–1.8)
BASOPHILS # BLD: 0.04 K/UL (ref 0–0.12)
BILIRUB SERPL-MCNC: 0.6 MG/DL (ref 0.1–1.5)
BUN SERPL-MCNC: 18 MG/DL (ref 8–22)
CALCIUM SERPL-MCNC: 8.7 MG/DL (ref 8.5–10.5)
CHLORIDE SERPL-SCNC: 106 MMOL/L (ref 96–112)
CHOLEST SERPL-MCNC: 172 MG/DL (ref 100–199)
CO2 SERPL-SCNC: 24 MMOL/L (ref 20–33)
CREAT SERPL-MCNC: 0.55 MG/DL (ref 0.5–1.4)
EKG IMPRESSION: NORMAL
EOSINOPHIL # BLD AUTO: 0.1 K/UL (ref 0–0.51)
EOSINOPHIL NFR BLD: 1.6 % (ref 0–6.9)
ERYTHROCYTE [DISTWIDTH] IN BLOOD BY AUTOMATED COUNT: 43.2 FL (ref 35.9–50)
GLOBULIN SER CALC-MCNC: 2.6 G/DL (ref 1.9–3.5)
GLUCOSE SERPL-MCNC: 104 MG/DL (ref 65–99)
HCT VFR BLD AUTO: 40.4 % (ref 37–47)
HDLC SERPL-MCNC: 50 MG/DL
HGB BLD-MCNC: 13.4 G/DL (ref 12–16)
IMM GRANULOCYTES # BLD AUTO: 0.04 K/UL (ref 0–0.11)
IMM GRANULOCYTES NFR BLD AUTO: 0.7 % (ref 0–0.9)
LDLC SERPL CALC-MCNC: 78 MG/DL
LYMPHOCYTES # BLD AUTO: 2.11 K/UL (ref 1–4.8)
LYMPHOCYTES NFR BLD: 34.4 % (ref 22–41)
MCH RBC QN AUTO: 31.9 PG (ref 27–33)
MCHC RBC AUTO-ENTMCNC: 33.2 G/DL (ref 33.6–35)
MCV RBC AUTO: 96.2 FL (ref 81.4–97.8)
MONOCYTES # BLD AUTO: 0.44 K/UL (ref 0–0.85)
MONOCYTES NFR BLD AUTO: 7.2 % (ref 0–13.4)
NEUTROPHILS # BLD AUTO: 3.41 K/UL (ref 2–7.15)
NEUTROPHILS NFR BLD: 55.4 % (ref 44–72)
NRBC # BLD AUTO: 0 K/UL
NRBC BLD-RTO: 0 /100 WBC
PLATELET # BLD AUTO: 275 K/UL (ref 164–446)
PMV BLD AUTO: 9.3 FL (ref 9–12.9)
POTASSIUM SERPL-SCNC: 3.8 MMOL/L (ref 3.6–5.5)
PROT SERPL-MCNC: 5.9 G/DL (ref 6–8.2)
RBC # BLD AUTO: 4.2 M/UL (ref 4.2–5.4)
SODIUM SERPL-SCNC: 139 MMOL/L (ref 135–145)
TRIGL SERPL-MCNC: 222 MG/DL (ref 0–149)
TROPONIN I SERPL-MCNC: <0.01 NG/ML (ref 0–0.04)
WBC # BLD AUTO: 6.1 K/UL (ref 4.8–10.8)

## 2018-10-09 PROCEDURE — G0378 HOSPITAL OBSERVATION PER HR: HCPCS

## 2018-10-09 PROCEDURE — 96372 THER/PROPH/DIAG INJ SC/IM: CPT | Mod: XU

## 2018-10-09 PROCEDURE — A9502 TC99M TETROFOSMIN: HCPCS

## 2018-10-09 PROCEDURE — A9270 NON-COVERED ITEM OR SERVICE: HCPCS | Performed by: FAMILY MEDICINE

## 2018-10-09 PROCEDURE — 99217 PR OBSERVATION CARE DISCHARGE: CPT | Performed by: HOSPITALIST

## 2018-10-09 PROCEDURE — 700111 HCHG RX REV CODE 636 W/ 250 OVERRIDE (IP)

## 2018-10-09 PROCEDURE — 80061 LIPID PANEL: CPT

## 2018-10-09 PROCEDURE — 700102 HCHG RX REV CODE 250 W/ 637 OVERRIDE(OP): Performed by: NURSE PRACTITIONER

## 2018-10-09 PROCEDURE — 85025 COMPLETE CBC W/AUTO DIFF WBC: CPT

## 2018-10-09 PROCEDURE — A9270 NON-COVERED ITEM OR SERVICE: HCPCS | Performed by: NURSE PRACTITIONER

## 2018-10-09 PROCEDURE — 80053 COMPREHEN METABOLIC PANEL: CPT

## 2018-10-09 PROCEDURE — 96375 TX/PRO/DX INJ NEW DRUG ADDON: CPT | Mod: XU

## 2018-10-09 PROCEDURE — 700111 HCHG RX REV CODE 636 W/ 250 OVERRIDE (IP): Performed by: NURSE PRACTITIONER

## 2018-10-09 PROCEDURE — 700102 HCHG RX REV CODE 250 W/ 637 OVERRIDE(OP): Performed by: FAMILY MEDICINE

## 2018-10-09 PROCEDURE — 700111 HCHG RX REV CODE 636 W/ 250 OVERRIDE (IP): Performed by: FAMILY MEDICINE

## 2018-10-09 PROCEDURE — 84484 ASSAY OF TROPONIN QUANT: CPT

## 2018-10-09 RX ORDER — KETOROLAC TROMETHAMINE 30 MG/ML
15 INJECTION, SOLUTION INTRAMUSCULAR; INTRAVENOUS ONCE
Status: COMPLETED | OUTPATIENT
Start: 2018-10-09 | End: 2018-10-09

## 2018-10-09 RX ORDER — REGADENOSON 0.08 MG/ML
INJECTION, SOLUTION INTRAVENOUS
Status: COMPLETED
Start: 2018-10-09 | End: 2018-10-09

## 2018-10-09 RX ORDER — REGADENOSON 0.08 MG/ML
INJECTION, SOLUTION INTRAVENOUS
Status: DISCONTINUED
Start: 2018-10-09 | End: 2018-10-09 | Stop reason: HOSPADM

## 2018-10-09 RX ADMIN — KETOROLAC TROMETHAMINE 15 MG: 30 INJECTION, SOLUTION INTRAMUSCULAR at 13:33

## 2018-10-09 RX ADMIN — LIDOCAINE HYDROCHLORIDE 30 ML: 20 SOLUTION OROPHARYNGEAL at 12:58

## 2018-10-09 RX ADMIN — OMEPRAZOLE 20 MG: 20 CAPSULE, DELAYED RELEASE ORAL at 05:01

## 2018-10-09 RX ADMIN — REGADENOSON 0.4 MG: 0.08 INJECTION, SOLUTION INTRAVENOUS at 10:18

## 2018-10-09 RX ADMIN — ENOXAPARIN SODIUM 40 MG: 100 INJECTION SUBCUTANEOUS at 05:02

## 2018-10-09 RX ADMIN — SENNOSIDES-DOCUSATE SODIUM TAB 8.6-50 MG 2 TABLET: 8.6-5 TAB at 05:01

## 2018-10-09 RX ADMIN — ASPIRIN 325 MG: 325 TABLET, COATED ORAL at 05:01

## 2018-10-09 RX ADMIN — OXYCODONE HYDROCHLORIDE 5 MG: 5 TABLET ORAL at 11:35

## 2018-10-09 ASSESSMENT — PAIN SCALES - GENERAL
PAINLEVEL_OUTOF10: 3
PAINLEVEL_OUTOF10: 0
PAINLEVEL_OUTOF10: 3

## 2018-10-09 ASSESSMENT — PATIENT HEALTH QUESTIONNAIRE - PHQ9
1. LITTLE INTEREST OR PLEASURE IN DOING THINGS: NOT AT ALL
SUM OF ALL RESPONSES TO PHQ9 QUESTIONS 1 AND 2: 0
2. FEELING DOWN, DEPRESSED, IRRITABLE, OR HOPELESS: NOT AT ALL

## 2018-10-09 NOTE — DISCHARGE SUMMARY
Discharge Summary    CHIEF COMPLAINT ON ADMISSION  Chief Complaint   Patient presents with   • Chest Pain     began at 0930 this morning   • Back Pain   • Nausea       Reason for Admission  CP     Admission Date  10/8/2018 chest    CODE STATUS  Full Code    HPI & HOSPITAL COURSE  This is a 63 y.o. female here with chest pain. Please see the dictated history of present illness 10/8/2018 for complete details. In short, the patient describes substernal chest pain radiating under the right breast and to the spine. Initial EKG in the emergency room was interpreted as not acute. Initial troponin was unremarkable. CT scan of the chest was performed as well as CTA which was negative for aortic dissection or pulmonary embolism but which redemonstrated a chronic left upper lobe mass at 2.9 cm consistent with prior coccidioidomycosis. Again this was seen on multiple previous CTs and per the patient's report for many years. The patient was therefore admitted to the CDU under observation status for further risk stratification and treatment.    Nuclear medicine stress test was performed the following morning and was negative for evidence of reversible ischemia, wall motion abnormalities, or heart failure. Serial troponins were unremarkable. The patient was given a GI cocktail without improvement. She was therefore given a dose of IV Toradol with marketed reduction in her pain. I discussed the differential diagnosis and numerous potential etiologies, which at this point is likely emanating from a musculoskeletal source. The patient does have a history of gastroesophageal reflux and takes Prevacid on a chronic basis. Tear without improvement in her symptoms. She was found to be without evidence of GI bleed or hemodynamic instability. She does have outpatient follow-up with her PCP later this week.     Therefore, she is discharged in good and stable condition to home with close outpatient follow-up.    Discharge  Date  10/9/2018    FOLLOW UP ITEMS POST DISCHARGE  None    DISCHARGE DIAGNOSES  Principal Problem:    Chest pain POA: Yes  Active Problems:    Low back pain POA: Yes      Overview: Diagnois update 10/1/2016    History of coccidioidomycosis POA: Yes    GERD (gastroesophageal reflux disease) POA: Yes  Resolved Problems:    * No resolved hospital problems. *      FOLLOW UP  Future Appointments  Date Time Provider Department Center   10/11/2018 9:40 AM Sherly Calvert M.D. [facility] None   9/4/2019 11:00 AM CHASIDY Daniels None     No follow-up provider specified.    MEDICATIONS ON DISCHARGE     Medication List      CONTINUE taking these medications      Instructions   alendronate 70 MG Tabs  Commonly known as:  FOSAMAX   Take 1 Tab by mouth every 7 days.  Dose:  70 mg     amitriptyline 10 MG Tabs  Commonly known as:  ELAVIL   Take 10 mg by mouth every bedtime.  Dose:  10 mg     asa/apap/caffeine 250-250-65 MG Tabs  Commonly known as:  EXCEDRIN   Take 2 Tabs by mouth Once.  Dose:  2 Tab     CALCIUM 600 PO   Take 2 Tabs by mouth every day.  Dose:  2 Tab     FIBER PO   Take 1 Cap by mouth every day.  Dose:  1 Cap     gabapentin 300 MG Caps  Commonly known as:  NEURONTIN   Take 1 Cap by mouth every evening.  Dose:  300 mg     lansoprazole 30 MG Cpdr  Commonly known as:  PREVACID   TAKE 1 CAPSULE BY MOUTH  DAILY     MULTIVITAMIN ADULT PO   Take 1 Tab by mouth every day.  Dose:  1 Tab        STOP taking these medications    ascorbic acid 500 MG Tabs  Commonly known as:  ascorbic acid            Allergies  Allergies   Allergen Reactions   • Nkda [No Known Drug Allergy]        DIET  Orders Placed This Encounter   Procedures   • Diet Order Regular     Standing Status:   Standing     Number of Occurrences:   1     Order Specific Question:   Diet:     Answer:   Regular [1]       ACTIVITY  As tolerated.  Weight bearing as tolerated    CONSULTATIONS  None    PROCEDURES  None    LABORATORY  Lab Results   Component Value Date     SODIUM 139 10/09/2018    POTASSIUM 3.8 10/09/2018    CHLORIDE 106 10/09/2018    CO2 24 10/09/2018    GLUCOSE 104 (H) 10/09/2018    BUN 18 10/09/2018    CREATININE 0.55 10/09/2018        Lab Results   Component Value Date    WBC 6.1 10/09/2018    HEMOGLOBIN 13.4 10/09/2018    HEMATOCRIT 40.4 10/09/2018    PLATELETCT 275 10/09/2018        Total time of the discharge process exceeds 35 minutes.

## 2018-10-09 NOTE — PROGRESS NOTES
Patient resting in bed. Patient says her pain is 3/10 but bearable at this time. Patient NPO at this time.

## 2018-10-09 NOTE — DISCHARGE INSTRUCTIONS
Discharge Instructions    Discharged to home by car with relative. Discharged via walking, hospital escort: Refused.  Special equipment needed: Not Applicable    Be sure to schedule a follow-up appointment with your primary care doctor or any specialists as instructed.     Discharge Plan:   Diet Plan: Discussed  Activity Level: Discussed  Confirmed Follow up Appointment: Appointment Scheduled  Confirmed Symptoms Management: Discussed  Medication Reconciliation Updated: Yes  Influenza Vaccine Indication: Not indicated: Previously immunized this influenza season and > 8 years of age  Influenza Vaccine Given - only chart on this line when given: Influenza Vaccine Given (See MAR)    I understand that a diet low in cholesterol, fat, and sodium is recommended for good health. Unless I have been given specific instructions below for another diet, I accept this instruction as my diet prescription.   Other diet: heart healthy     Special Instructions: None    · Is patient discharged on Warfarin / Coumadin?   No     Depression / Suicide Risk    As you are discharged from this [facility] facility, it is important to learn how to keep safe from harming yourself.    Recognize the warning signs:  · Abrupt changes in personality, positive or negative- including increase in energy   · Giving away possessions  · Change in eating patterns- significant weight changes-  positive or negative  · Change in sleeping patterns- unable to sleep or sleeping all the time   · Unwillingness or inability to communicate  · Depression  · Unusual sadness, discouragement and loneliness  · Talk of wanting to die  · Neglect of personal appearance   · Rebelliousness- reckless behavior  · Withdrawal from people/activities they love  · Confusion- inability to concentrate     If you or a loved one observes any of these behaviors or has concerns about self-harm, here's what you can do:  · Talk about it- your feelings and reasons for harming  yourself  · Remove any means that you might use to hurt yourself (examples: pills, rope, extension cords, firearm)  · Get professional help from the community (Mental Health, Substance Abuse, psychological counseling)  · Do not be alone:Call your Safe Contact- someone whom you trust who will be there for you.  · Call your local CRISIS HOTLINE 957-9279 or 654-005-9751  · Call your local Children's Mobile Crisis Response Team [place] (557) 925-2478 or www.EchoFirst  · Call the toll free National Suicide Prevention Hotlines   · National Suicide Prevention Lifeline 321-411-GPFX (3412)  · National Hope Line Network 800-SUICIDE (779-8666)

## 2018-10-11 ENCOUNTER — OFFICE VISIT (OUTPATIENT)
Dept: MEDICAL GROUP | Facility: PHYSICIAN GROUP | Age: 63
End: 2018-10-11
Payer: MEDICARE

## 2018-10-11 VITALS
HEART RATE: 99 BPM | WEIGHT: 175.93 LBS | OXYGEN SATURATION: 98 % | BODY MASS INDEX: 35.47 KG/M2 | SYSTOLIC BLOOD PRESSURE: 130 MMHG | HEIGHT: 59 IN | TEMPERATURE: 98.6 F | DIASTOLIC BLOOD PRESSURE: 70 MMHG

## 2018-10-11 DIAGNOSIS — E55.9 VITAMIN D DEFICIENCY: ICD-10-CM

## 2018-10-11 DIAGNOSIS — M81.0 OSTEOPOROSIS OF FOREARM: ICD-10-CM

## 2018-10-11 DIAGNOSIS — R25.2 LEG CRAMPS: ICD-10-CM

## 2018-10-11 DIAGNOSIS — E78.5 DYSLIPIDEMIA: ICD-10-CM

## 2018-10-11 DIAGNOSIS — M25.552 LEFT HIP PAIN: ICD-10-CM

## 2018-10-11 DIAGNOSIS — R07.9 CHEST PAIN, UNSPECIFIED TYPE: ICD-10-CM

## 2018-10-11 DIAGNOSIS — Z23 NEED FOR SHINGLES VACCINE: ICD-10-CM

## 2018-10-11 PROCEDURE — 99214 OFFICE O/P EST MOD 30 MIN: CPT | Performed by: FAMILY MEDICINE

## 2018-10-11 RX ORDER — MELOXICAM 15 MG/1
15 TABLET ORAL DAILY
Qty: 30 TAB | Refills: 1 | Status: SHIPPED | OUTPATIENT
Start: 2018-10-11 | End: 2018-12-04 | Stop reason: SDUPTHER

## 2018-10-12 NOTE — PROGRESS NOTES
Subjective:      Dee Armstrong is a 63 y.o. female who presents with Leg Pain (cramps) and Pain (hip)            HPI     Patient returns for follow-up of her medical problems as well as for follow-up of recent hospitalization and for new concerns.    She was admitted at [facility] overnight for chest pain that radiated to the back on 10/8-9/2018.  Normal EKG, normal troponin, CTA of the chest did not show any dissection of the aorta, chest x-ray did not show any acute abnormalities and showed a 2.9 cm lesion from her previous coccidioidomycosis.  She had a nuclear stress test which came back normal heart function with no ischemia or infarction.  Most likely the chest pain was musculoskeletal in nature.  He said the pain has improved.    She planes of left hip pain after a fall in March which injured her left hip.  MRI was done at [facility] to evaluate the left hip pain show any tears of any tendons but it showed moderate degree of osteoarthritis.  She is scheduled to see Dr. Portillo at the [facility] for her back problem and she will talk to him about her hip problem at that time.  She is asking what she can take for the pain.  She said Tylenol does not work.  Has history of GERD for which she takes Prevacid with good results.    We have tried her on different medications to manage the leg cramps.  She did not have good results with diltiazem.  We switched her to gabapentin and she is now taking 300 mg 1 tablet daily.  She said this is helping with the leg cramps and instead of waking up 3-4 times at night only waking up 1-2 times at night.  She has been already on amitriptyline prescribed by [facility] [place] for her back pain.  This is a combination works for her leg cramps.    For her osteoporosis of the forearm, she continues to take alendronate which we started in June 2018, calcium and vitamin D supplementation.  Is also on calcium and vitamin D supplementation.    For her  "dyslipidemia, she is managing this with her own efforts only.    She takes vitamin D supplementation for vitamin D deficiency.    Past medical history, past surgical history, family history reviewed-no changes    Social history reviewed-no changes    Allergies reviewed-no changes    Medications reviewed-no changes        ROS     As per HPI, the rest are negative.       Objective:     /70 (BP Location: Left arm, Patient Position: Sitting, BP Cuff Size: Adult)   Pulse 99   Temp 37 °C (98.6 °F) (Temporal)   Ht 1.499 m (4' 11\")   Wt 79.8 kg (175 lb 14.8 oz)   SpO2 98%   BMI 35.53 kg/m²      Physical Exam     Examined alert, awake, oriented, not in distress    Neck-supple, no lymphadenopathy, no thyromegaly  Lungs-clear to auscultation, no rales, no wheezes  Heart-regular rate and rhythm, no murmur  Extremities-no edema, clubbing, cyanosis, left hip exam-there is some tenderness in the posterior hip joint, no tenderness in the trochanteric bursa, full range of movement on flexion, extension and internal rotations.    I reviewed hospital records.       Results for UGO BENTON (MRN [id number]) as of 10/12/2018 12:26   Ref. Range 10/4/2018 09:04 10/8/2018 13:25 10/8/2018 17:27 10/8/2018 22:00 10/9/2018 04:10   Sodium Latest Ref Range: 135 - 145 mmol/L 139 137   139   Potassium Latest Ref Range: 3.6 - 5.5 mmol/L 3.8 3.9   3.8   Chloride Latest Ref Range: 96 - 112 mmol/L 102 106   106   Co2 Latest Ref Range: 20 - 33 mmol/L 27 19 (L)   24   Anion Gap Latest Ref Range: 0.0 - 11.9  10.0 12.0 (H)   9.0   Glucose Latest Ref Range: 65 - 99 mg/dL 82 92   104 (H)   Bun Latest Ref Range: 8 - 22 mg/dL 20 17   18   Creatinine Latest Ref Range: 0.50 - 1.40 mg/dL 0.77 0.60   0.55   GFR If  Latest Ref Range: >60 mL/min/1.73 m 2 >60 >60   >60   GFR If Non  Latest Ref Range: >60 mL/min/1.73 m 2 >60 >60   >60   Calcium Latest Ref Range: 8.5 - 10.5 mg/dL 9.6 9.8   8.7   AST(SGOT) Latest Ref " Range: 12 - 45 U/L 17 18   33   ALT(SGPT) Latest Ref Range: 2 - 50 U/L 17 15   25   Alkaline Phosphatase Latest Ref Range: 30 - 99 U/L 69 67   63   Total Bilirubin Latest Ref Range: 0.1 - 1.5 mg/dL 0.8 0.5   0.6   Albumin Latest Ref Range: 3.2 - 4.9 g/dL 4.3 4.0   3.3   Total Protein Latest Ref Range: 6.0 - 8.2 g/dL 7.4 6.9   5.9 (L)   Globulin Latest Ref Range: 1.9 - 3.5 g/dL 3.1 2.9   2.6   A-G Ratio Latest Units: g/dL 1.4 1.4   1.3   Lipase Latest Ref Range: 11 - 82 U/L  31      Cholesterol,Tot Latest Ref Range: 100 - 199 mg/dL 230 (H)    172   Triglycerides Latest Ref Range: 0 - 149 mg/dL 113    222 (H)   HDL Latest Ref Range: >=40 mg/dL 77    50   LDL Latest Ref Range: <100 mg/dL 130 (H)    78        Assessment/Plan:     1. Chest pain, unspecified type  Extensive workup done in hospital.  Noncardiac.  No dissection per CTA.  Patient reassured.  Most likely musculoskeletal.    2. Left hip pain  We will try meloxicam 15 mg 1 tablet daily with food.  She can only take 1 tablet a day.  MRI showed osteoarthritis moderate degree.  She will see Dr. Portillo at [facility] and we will await further recommendation.  - meloxicam (MOBIC) 15 MG tablet; Take 1 Tab by mouth every day.  Dispense: 30 Tab; Refill: 1    3. Leg cramps  She has good results with gabapentin and amitriptyline.  - LIPID PROFILE; Future  - COMP METABOLIC PANEL; Future  - VITAMIN D,25 HYDROXY; Future    4. Osteoporosis of forearm  Continue Fosamax, calcium and vitamin D supplementation.  We will check vitamin D level next visit.  - LIPID PROFILE; Future  - COMP METABOLIC PANEL; Future  - VITAMIN D,25 HYDROXY; Future    5. Dyslipidemia  DL cholesterol improved.  Triglycerides however are high.  Advised to avoid sweets and decrease the carbs.  10-year ASCVD risk is at 5.5% therefore we do not need to start medication and she will continue to manage this with her own efforts with watching the fatty foods, eating more vegetables and fish, regular  exercise and keeping a healthy weight.  - LIPID PROFILE; Future  - COMP METABOLIC PANEL; Future  - VITAMIN D,25 HYDROXY; Future    6. Vitamin D deficiency  Continue vitamin D supplementation and we will check vitamin D level next visit.  - LIPID PROFILE; Future  - COMP METABOLIC PANEL; Future  - VITAMIN D,25 HYDROXY; Future    7. Need for shingles vaccine  Discussed the new shingles vaccine and she will get this from a local drugstore.  She will get 2 doses 2-6 months apart.  - Zoster Vac Recomb Adjuvanted (SHINGRIX) 50 MCG Recon Susp; 0.5 mL by Intramuscular route Once for 1 dose.  Dispense: 0.5 mL; Refill: 0      Please note that this dictation was created using voice recognition software. I have worked with consultants from the vendor as well as technical experts from [employer] to optimize the interface. I have made every reasonable attempt to correct obvious errors, but I expect that there are errors of grammar and possibly content I did not discover before finalizing the note.

## 2018-10-22 RX ORDER — LANSOPRAZOLE 30 MG/1
CAPSULE, DELAYED RELEASE ORAL
Qty: 90 CAP | Refills: 1 | Status: SHIPPED | OUTPATIENT
Start: 2018-10-22 | End: 2019-03-29 | Stop reason: SDUPTHER

## 2018-10-23 NOTE — ADDENDUM NOTE
Encounter addended by: Keila Rutherford on: 10/23/2018  9:39 AM<BR>    Actions taken: Charge Capture section accepted

## 2018-10-29 ENCOUNTER — OFFICE VISIT (OUTPATIENT)
Dept: MEDICAL GROUP | Facility: PHYSICIAN GROUP | Age: 63
End: 2018-10-29
Payer: MEDICARE

## 2018-10-29 VITALS
BODY MASS INDEX: 36.4 KG/M2 | OXYGEN SATURATION: 99 % | SYSTOLIC BLOOD PRESSURE: 120 MMHG | HEART RATE: 98 BPM | TEMPERATURE: 98.6 F | WEIGHT: 180.56 LBS | DIASTOLIC BLOOD PRESSURE: 80 MMHG | HEIGHT: 59 IN

## 2018-10-29 DIAGNOSIS — K46.9 ABDOMINAL HERNIA WITHOUT OBSTRUCTION AND WITHOUT GANGRENE, RECURRENCE NOT SPECIFIED, UNSPECIFIED HERNIA TYPE: ICD-10-CM

## 2018-10-29 DIAGNOSIS — R10.11 RUQ PAIN: ICD-10-CM

## 2018-10-29 PROCEDURE — 99213 OFFICE O/P EST LOW 20 MIN: CPT | Performed by: FAMILY MEDICINE

## 2018-10-29 NOTE — PROGRESS NOTES
"Subjective:      Dee Armstrong is a 63 y.o. female who presents with Gastrophageal Reflux            HPI     Patient returns earlier than scheduled appointment because of ongoing pain in the right upper quadrant that shoots to the back.  She was admitted at [facility] on 10/8/18 for chest pain which was across the chest and radiated to the back.  She was ruled out for acute MI and nuclear stress test came back no ischemia or infarction.  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  It did not show any common bile duct dilatation or common bile duct stone.    Since then chest pain has resolved but she continues to have episodes of the right upper quadrant pain happening late afternoon to early evening lasting for about 2 hours.  The pain can be very severe that makes her double over.  She denies any nausea, vomiting, disturbances in urination or bowel movement.  She already had her gallbladder removed years ago.  Labs done in the hospital did not show any problems with liver enzymes    Past medical history, past surgical history, family history reviewed-no changes    Social history reviewed-no changes    Allergies reviewed-no changes    Medications reviewed-no changes    ROS     As per HPI, the rest are negative.       Objective:     /80 (BP Location: Left arm, Patient Position: Sitting, BP Cuff Size: Adult)   Pulse 98   Temp 37 °C (98.6 °F) (Temporal)   Ht 1.499 m (4' 11\")   Wt 81.9 kg (180 lb 8.9 oz)   SpO2 99%   BMI 36.47 kg/m²      Physical Exam     Examined alert, awake, oriented, not in distress    Neck-supple, no lymphadenopathy, no thyromegaly  Lungs-clear to auscultation, no rales, no wheezes  Heart-regular rate and rhythm, no murmur  Abdomen -obese, good bowel sounds, soft, nontender, no hepatosplenomegaly, no masses, no palpable " hernia  Extremities-no edema, clubbing, cyanosis            Assessment/Plan:     1. RUQ pain  I will proceed with abdominal ultrasound complete survey.  Further recommendation will depend on results.  - US-ABDOMEN COMPLETE SURVEY; Future    2. Abdominal hernia without obstruction and without gangrene, recurrence not specified, unspecified hernia type  When she had CTA of the abdomen and chest it showed fat-containing right lateral abdominal hernia.  This could be the cause of the pain.  On exam I could not appreciate any abdominal hernia.  I will do ultrasound of the abdomen for further evaluation and further recommendation will depend on results.  We may have to send her to a surgeon or gastroenterologist depending on results.  - US-ABDOMEN COMPLETE SURVEY; Future      Please note that this dictation was created using voice recognition software. I have worked with consultants from the vendor as well as technical experts from [employer] to optimize the interface. I have made every reasonable attempt to correct obvious errors, but I expect that there are errors of grammar and possibly content I did not discover before finalizing the note.

## 2018-10-31 ENCOUNTER — HOSPITAL ENCOUNTER (OUTPATIENT)
Dept: RADIOLOGY | Facility: MEDICAL CENTER | Age: 63
End: 2018-10-31
Attending: FAMILY MEDICINE
Payer: MEDICARE

## 2018-10-31 DIAGNOSIS — R10.11 RUQ PAIN: ICD-10-CM

## 2018-10-31 DIAGNOSIS — K46.9 ABDOMINAL HERNIA WITHOUT OBSTRUCTION AND WITHOUT GANGRENE, RECURRENCE NOT SPECIFIED, UNSPECIFIED HERNIA TYPE: ICD-10-CM

## 2018-10-31 PROCEDURE — 76705 ECHO EXAM OF ABDOMEN: CPT

## 2018-11-01 DIAGNOSIS — K43.9 ABDOMINAL WALL HERNIA: ICD-10-CM

## 2018-11-01 DIAGNOSIS — R10.10 PAIN OF UPPER ABDOMEN: ICD-10-CM

## 2018-11-05 ENCOUNTER — TELEPHONE (OUTPATIENT)
Dept: MEDICAL GROUP | Facility: PHYSICIAN GROUP | Age: 63
End: 2018-11-05

## 2018-11-05 NOTE — TELEPHONE ENCOUNTER
Liat called about the referral to Haleigh Castillo's office.  It was sent to the Referral Dept on Nov 1, 2018

## 2018-11-15 ENCOUNTER — TELEPHONE (OUTPATIENT)
Dept: PULMONOLOGY | Facility: HOSPICE | Age: 63
End: 2018-11-15

## 2018-11-15 DIAGNOSIS — Z01.818 PRE-OPERATIVE CLEARANCE: ICD-10-CM

## 2018-11-15 NOTE — TELEPHONE ENCOUNTER
Pt is requesting a letter of surgical clearance for Hernia surgery w/ Dr. Ravi 12/7/18.  No recent PFT 2016, CXR done on 9/4/18.  Please advise!    Last OV: 9/4/18- [facility]    Next OV: 9/4/19    DX: Coccidiodmycosis

## 2018-11-16 NOTE — TELEPHONE ENCOUNTER
Pt notified on vm of Elda Fitch's last message and to [phone] so that the orders could be placed.

## 2018-11-19 ENCOUNTER — APPOINTMENT (OUTPATIENT)
Dept: ADMISSIONS | Facility: MEDICAL CENTER | Age: 63
End: 2018-11-19
Attending: SURGERY
Payer: MEDICARE

## 2018-11-19 RX ORDER — ASCORBIC ACID 500 MG
1000 TABLET ORAL DAILY
Status: ON HOLD | COMMUNITY
End: 2024-02-12

## 2018-11-21 ENCOUNTER — OFFICE VISIT (OUTPATIENT)
Dept: PULMONOLOGY | Facility: HOSPICE | Age: 63
End: 2018-11-21
Payer: MEDICARE

## 2018-11-21 ENCOUNTER — APPOINTMENT (OUTPATIENT)
Dept: RADIOLOGY | Facility: IMAGING CENTER | Age: 63
End: 2018-11-21
Payer: MEDICARE

## 2018-11-21 ENCOUNTER — NON-PROVIDER VISIT (OUTPATIENT)
Dept: PULMONOLOGY | Facility: HOSPICE | Age: 63
End: 2018-11-21
Attending: PHYSICIAN ASSISTANT
Payer: MEDICARE

## 2018-11-21 VITALS
BODY MASS INDEX: 36.89 KG/M2 | HEIGHT: 59 IN | OXYGEN SATURATION: 95 % | DIASTOLIC BLOOD PRESSURE: 80 MMHG | RESPIRATION RATE: 16 BRPM | TEMPERATURE: 96.8 F | WEIGHT: 183 LBS | SYSTOLIC BLOOD PRESSURE: 140 MMHG | HEART RATE: 93 BPM

## 2018-11-21 DIAGNOSIS — Z01.818 PRE-OPERATIVE CLEARANCE: ICD-10-CM

## 2018-11-21 DIAGNOSIS — B38.2 COCCIDIOIDOMYCOSIS, PULMONARY (HCC): ICD-10-CM

## 2018-11-21 PROCEDURE — 94726 PLETHYSMOGRAPHY LUNG VOLUMES: CPT | Performed by: INTERNAL MEDICINE

## 2018-11-21 PROCEDURE — 99214 OFFICE O/P EST MOD 30 MIN: CPT | Performed by: NURSE PRACTITIONER

## 2018-11-21 PROCEDURE — 94060 EVALUATION OF WHEEZING: CPT | Performed by: INTERNAL MEDICINE

## 2018-11-21 PROCEDURE — 94729 DIFFUSING CAPACITY: CPT | Performed by: INTERNAL MEDICINE

## 2018-11-21 ASSESSMENT — PULMONARY FUNCTION TESTS
FEV1: 2.08
FVC_PERCENT_PREDICTED: 100
FEV1/FVC_PERCENT_PREDICTED: 100
FVC_PREDICTED: 2.56
FEV1_PREDICTED: 1.95
FEV1/FVC_PERCENT_LLN: 64
FEV1_PERCENT_CHANGE: -2
FVC_PERCENT_PREDICTED: 103
FEV1/FVC_PERCENT_PREDICTED: 106
FEV1/FVC: 76
FEV1/FVC_PERCENT_CHANGE: -100
FEV1/FVC: 80.62
FEV1/FVC_PERCENT_PREDICTED: 99
FEV1/FVC: 81
FEV1/FVC_PERCENT_CHANGE: 5
FEV1/FVC_PREDICTED: 77
FVC_LLN: 2.14
FEV1_PERCENT_PREDICTED: 103
FEV1/FVC_PERCENT_PREDICTED: 104
FVC: 2.66
FEV1: 2.03
FEV1_PERCENT_PREDICTED: 106
FEV1/FVC: 76
FEV1_LLN: 1.63
FEV1_PERCENT_CHANGE: 2
FEV1/FVC_PERCENT_PREDICTED: 76
FVC: 2.58

## 2018-11-21 NOTE — PATIENT INSTRUCTIONS
Plan:    1) Reviewed PFT's and chest xray. Pt remains asymptomatic. Normal PFT's. Stable chest imaging. She is requesting clearance for upcoming hernia repair. She is cleared from a pulmonary standpoint for needed upcoming surgery. She is recommended early mobilization post op, coughing & deep breathing and routine use of incentive spirometer.   2) Request radiologist to compare recent CTA to prior CT chest July 2016 to ensure stability of her left upper lobe mass. Appears stable and consistent with her history of treated Coccidioidomycosis.   3) Patient is up to date on her Prevnar 13, Pneumovax 23 and Influenza vaccinations.  4) Continue annual follow up with chest xray, sooner OV if symptoms warrant.

## 2018-11-21 NOTE — LETTER
BHANU Terry  [facility] Pulmonary Medicine   [address],   [facility] [place] [address]  Phone: [phone] - Fax: [phone]           Encounter Date: 11/21/2018  Provider: BHANU Terry  Location of Care: [facility] PULMONARY MEDICINE      Patient:   Dee Armstrong   MR Number: [id number]   YOB: 1955     PROGRESS NOTE:  Chief Complaint   Patient presents with   • Results     PFT/ Xray      • Follow-Up     Surgical Clearance        HPI:  Dee Armstrong is a 63 y.o. 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 She was last seen in the office in September 2017 with Elda JOHNSON. Previously followed by Keke TUCKER. She was initially referred to our office after a mass was found on chest xray after an infection. Cocci  Titers were positive. She was placed on Diflucan for 1 year which was completed in November 2016. Imaging has been stable. She has been recommended yearly chest xray and PFT's. She is here today requesting clearance for upcoming surgery.     Chest xray 9/4/2018 indicates no change in a left upper lobe ovoid mass.     She ended up having a CTA in October 2018 for chest pain. Results indicated;  No evidence of aortic dissection. No aneurysmal dilatation. No mediastinal or retroperitoneal hematoma.  2.9 cm left upper lobe mass concerning for malignant neoplasm. Further evaluation recommended.  4.6 mm hypodensity left lobe of the liver statistically most likely hemangioma or cyst.  No hydronephrosis.  There are diverticula colon. No evidence diverticulitis. No free fluid.  Large and small bowel and bladder are incompletely imaged.    Prior CT chest July 2016 measured her PATTI mass at 2.9 x 2.4 CM. Will request radiologist to compare to confirm stable.    PFT's today in the office indicate an FEV1 of 2.03 L, 103% predicted with an FEV1/FVC ratio of 76, % predicted with  a DLCO of 161% predicted.    She denies dyspnea. She denies cough or mucous. She denies any wheezing. She denies any fevers or chills. She denies any recent respiratory infections.     Past Medical History:   Diagnosis Date   • Coccidioidomycosis 12/15    left upper lung   • Cough 5/29/2015   • Diverticulosis    • GERD (gastroesophageal reflux disease)    • Heart burn    • Indigestion    • LBP (low back pain)    • Lumbar radicular pain 5/29/2015   • Muscle disorder    • Obesity    • Osteoporosis of forearm     Alendronate started 6/18   • Pain     Shoulder and numbness to R LE       Past Surgical History:   Procedure Laterality Date   • HYSTERECTOMY ROBOTIC  6/7/2017    Procedure: HYSTERECTOMY ROBOTIC SI, BILATERAL SALPINGO-OOPHORECTOMY, URETERAL SACRAL LIGAMENT SHORTENING ;  Surgeon: Jerica Hooper M.D.;  Location: [facility];  Service:    • CYSTOSCOPY  6/7/2017    Procedure: CYSTOSCOPY;  Surgeon: Jerica Hooper M.D.;  Location: [facility];  Service:    • SHOULDER DECOMPRESSION ARTHROSCOPIC Right 12/6/2016    Procedure: SHOULDER DECOMPRESSION ARTHROSCOPIC - SUBACROMIAL, MANJARREZ;  Surgeon: Kyle Claros M.D.;  Location: [facility];  Service:    • CLAVICLE DISTAL EXCISION Right 12/6/2016    Procedure: CLAVICLE DISTAL EXCISION;  Surgeon: Kyle Claros M.D.;  Location: [facility];  Service:    • SHOULDER ARTHROSCOPY W/ ROTATOR CUFF REPAIR Right 12/6/2016    Procedure: SHOULDER ARTHROSCOPY W/ ROTATOR CUFF REPAIR ;  Surgeon: Kyle Claros M.D.;  Location: [facility];  Service:    • SHOULDER ARTHROSCOPY W/ BICIPITAL TENODESIS REPAIR Right 12/6/2016    Procedure: SHOULDER ARTHROSCOPY W/ BICIPITAL TENODESIS REPAIR ;  Surgeon: Kyle Claros M.D.;  Location: [facility];  Service:    • LUMBAR FUSION POSTERIOR  2/9/2016    Procedure: LUMBAR FUSION POSTERIOR PEDICLE SCREW FIXATION (STAGE #2);  Surgeon: Tim Rodriguez M.D.;  Location: SURGERY  [facility];  Service:    • LUMBAR LAMINECTOMY DISKECTOMY  2/9/2016    Procedure: LUMBAR LAMINECTOMY DISKECTOMY MINI OPEN;  Surgeon: Tim Rodriguez M.D.;  Location: SURGERY [facility];  Service:    • EXTREME LATERAL INTERBODY FUSION Right 2/6/2016    Procedure: EXTREME LATERAL INTERBODY FUSION L3-4 (STAGE #1);  Surgeon: Tim Rodriguez M.D.;  Location: SURGERY [facility];  Service:    • RECOVERY  12/8/2015    Procedure: CT-CT GUIDED LEFT LUNG BIOPSY-;  Surgeon: [facility];  Location: SURGERY PRE-POST PROC UNIT [facility];  Service:    • REDDY BY LAPAROSCOPY  4/17/2014    Performed by Ankur Lerner M.D. at SURGERY SAME DAY [facility] ORS   • EGD WITH ASP/BX  2/4/2014    mild chronic inactive gastritis, scar gastric antrum-   • EGD WITH ASP/BX  9/29/2011    possible barrettes, hiatal hernia, gastritis   • COLONOSCOPY  9/29/2011    diverticulosis sigmoid colon, hemorrhoids   • ARTHRODESIS  9/3/2010    Performed by YARELY MORRISON at SURGERY SAME DAY [facility] ORS   • ORTHOPEDIC OSTEOTOMY  9/3/2010    Performed by YARELY MORRISON at SURGERY SAME DAY [facility] ORS   • BONE SPUR EXCISION  9/3/2010    Performed by YARELY MORRISON at SURGERY SAME DAY [facility] ORS   • ARTHROTOMY  9/3/2010    Performed by YARELY MORRISON at SURGERY SAME DAY [facility] ORS   • OTHER ABDOMINAL SURGERY  2000    COLON RESECTION   • LAMINOTOMY     • OTHER ORTHOPEDIC SURGERY      PARDEEP CARPAL TUNNEL RELEASES       Family History   Problem Relation Age of Onset   • Cancer Mother         lymphoma   • Stroke Father    • Diabetes Father        Social History     Social History   • Marital status:      Spouse name: N/A   • Number of children: N/A   • Years of education: N/A     Occupational History   • Not on file.     Social History Main Topics   • Smoking status: Former Smoker     Years: 2.00     Types: Cigarettes     Quit date: 1/1/1977   • Smokeless tobacco: Never Used      Comment: quit in 1977   • Alcohol use 0.0  oz/week      Comment: 1 per month   • Drug use: No      Comment: cig hx= 1/2 pack per month    • Sexual activity: Yes     Partners: Male      Comment:      Other Topics Concern   • Not on file     Social History Narrative   • No narrative on file       ROS:  Constitutional: Denies fevers, chills, sweats, fatigue, weight loss  Eyes: Denies glasses, vision loss, pain, drainage, double vision  Ears/Nose/Mouth/Throat: Denies rhinitis, nasal congestion, ear ache, difficulty hearing, sore throat, persistent hoarseness, decayed teeth/toothache  Cardiovascular: Denies chest pain, tightness, palpitations, swelling in feet/legs, fainting, difficulty breathing when laying down  Respiratory: See HPI   GI: Denies heartburn, difficulty swallowing, nausea, vomiting, abdominal pain, diarrhea, constipation  : Denies frequent urination, painful urination  Integumentary: Denies rashes, lumps or color changes  MSK: Denies painful joints, sore muscles, and back pain.   Neurological: Denies frequent headaches, dizziness, weakness        Current Outpatient Prescriptions   Medication Sig Dispense Refill   • lansoprazole (PREVACID) 30 MG CAPSULE DELAYED RELEASE TAKE 1 CAPSULE BY MOUTH  DAILY 90 Cap 1   • meloxicam (MOBIC) 15 MG tablet Take 1 Tab by mouth every day. 30 Tab 1   • amitriptyline (ELAVIL) 10 MG Tab Take 10 mg by mouth every bedtime.     • gabapentin (NEURONTIN) 300 MG Cap Take 1 Cap by mouth every evening. 90 Cap 0   • Multiple Vitamins-Minerals (MULTIVITAMIN ADULT PO) Take 1 Tab by mouth every day.     • FIBER PO Take 1 Cap by mouth every day.     • ascorbic acid (ASCORBIC ACID) 500 MG Tab Take 1,000 mg by mouth every day.     • asa/apap/caffeine (EXCEDRIN) 250-250-65 MG Tab Take 2 Tabs by mouth Once.     • alendronate (FOSAMAX) 70 MG Tab Take 1 Tab by mouth every 7 days. 12 Tab 3     No current facility-administered medications for this visit.        Allergies   Allergen Reactions   • Nkda [No Known Drug Allergy]       "      Blood pressure 140/80, pulse 93, temperature 36 °C (96.8 °F), temperature source Temporal, resp. rate 16, height 1.499 m (4' 11\"), weight 83 kg (183 lb), SpO2 95 %, not currently breastfeeding.    PE:   Appearance: Well developed, well nourished, no acute distress  Eyes: PERRL, EOM intact, sclera white, conjunctiva moist  Ears: no lesions or deformities  Hearing: grossly intact  Nose: no lesions or deformities  Oropharynx: tongue normal, posterior pharynx without erythema or exudate  Neck: supple, trachea midline, no masses   Respiratory effort: no intercostal retractions or use of accessory muscles  Lung auscultation: no rales, rhonchi or wheezes  Heart auscultation: no murmur rub or gallop  Extremities: no cyanosis or edema  Abdomen: soft ,non tender, no masses  Gait and Station: normal  Digits and nails: no clubbing, cyanosis, petechiae or nodes.  Cranial nerves: grossly intact  Skin: no rashes, lesions or ulcers noted  Orientation: Oriented to time, person and place  Mood and affect: mood and affect appropriate, normal interaction with examiner  Judgement: Intact          Assessment:    1. Coccidioidomycosis, pulmonary (HCC)     2. BMI 36.0-36.9,adult           Plan:    1) Reviewed PFT's and chest xray. Pt remains asymptomatic. Normal PFT's. Stable chest imaging. She is requesting clearance for upcoming hernia repair. She is cleared from a pulmonary standpoint for needed upcoming surgery. She is recommended early mobilization post op, coughing & deep breathing and routine use of incentive spirometer.   2) Request radiologist to compare recent CTA to prior CT chest July 2016 to ensure stability of her left upper lobe mass. Appears stable and consistent with her history of treated Coccidioidomycosis.   3) Patient is up to date on her Prevnar 13, Pneumovax 23 and Influenza vaccinations.  4) Continue annual follow up with chest xray, sooner OV if symptoms warrant.       Electronically signed by Haleigh GALLEGO" BHANU Palencia  on 11/21/18    Misael Ramírez M.D.  [address] [place] [id number]  VIA Facsimile: [phone]

## 2018-11-21 NOTE — PROCEDURES
Good patient effort & cooperation.  The results of this test meet the ATS/ERS standards for acceptability and repeatability.  Predicted equations for Spirometry are N-Gabino II, ITS for lung volumes, and [facility] for DLCO.  The DLCO was uncorrected for Hgb.  A bronchodilator of Ventolin HFA- 2puffs via spacer were administered.  DLCO was performed during dilation period.    Spirometry: FVC was 2.58 L, 100% of predicted.  FEV1 was 2.08 L, 106% of predicted.  FEV1/FVC ratio was 81%.  There was no significant response to bronchodilators.  Flow volume loop was normal in shape and size.    Lung volumes showed normal residual volume at 2.21 L, 119% of predicted and normal total lung capacity at 4.84 L, 112% of predicted.    Diffusion capacity was moderately increased at 161% of predicted.    Impression:  The patient has moderate increase in diffusion capacity probably secondary to obesity with listed BMI of 37.  Otherwise the patient had normal pulmonary function test.  Clinical correlation is required.

## 2018-11-21 NOTE — LETTER
BHANU Terry  [facility] Pulmonary Medicine   [address],   [facility] [place] [address]  Phone: [phone] - Fax: [phone]           Encounter Date: 11/21/2018  Provider: BHANU Terry  Location of Care: [facility] PULMONARY MEDICINE      Patient:   Dee Armstrong   MR Number: [id number]   YOB: 1955     PROGRESS NOTE:  Chief Complaint   Patient presents with   • Results     PFT/ Xray      • Follow-Up     Surgical Clearance        HPI:  Dee Armstrong is a 63 y.o. 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 She was last seen in the office in September 2017 with Elda JOHNSON. Previously followed by Keke TUCKER. She was initially referred to our office after a mass was found on chest xray after an infection. Cocci  Titers were positive. She was placed on Diflucan for 1 year which was completed in November 2016. Imaging has been stable. She has been recommended yearly chest xray and PFT's. She is here today requesting clearance for upcoming surgery.     Chest xray 9/4/2018 indicates no change in a left upper lobe ovoid mass.     She ended up having a CTA in October 2018 for chest pain. Results indicated;  No evidence of aortic dissection. No aneurysmal dilatation. No mediastinal or retroperitoneal hematoma.  2.9 cm left upper lobe mass concerning for malignant neoplasm. Further evaluation recommended.  4.6 mm hypodensity left lobe of the liver statistically most likely hemangioma or cyst.  No hydronephrosis.  There are diverticula colon. No evidence diverticulitis. No free fluid.  Large and small bowel and bladder are incompletely imaged.    Prior CT chest July 2016 measured her PATTI mass at 2.9 x 2.4 CM. Will request radiologist to compare to confirm stable.    PFT's today in the office indicate an FEV1 of 2.03 L, 103% predicted with an FEV1/FVC ratio of 76, % predicted with  a DLCO of 161% predicted.    She denies dyspnea. She denies cough or mucous. She denies any wheezing. She denies any fevers or chills. She denies any recent respiratory infections.     Past Medical History:   Diagnosis Date   • Coccidioidomycosis 12/15    left upper lung   • Cough 5/29/2015   • Diverticulosis    • GERD (gastroesophageal reflux disease)    • Heart burn    • Indigestion    • LBP (low back pain)    • Lumbar radicular pain 5/29/2015   • Muscle disorder    • Obesity    • Osteoporosis of forearm     Alendronate started 6/18   • Pain     Shoulder and numbness to R LE       Past Surgical History:   Procedure Laterality Date   • HYSTERECTOMY ROBOTIC  6/7/2017    Procedure: HYSTERECTOMY ROBOTIC SI, BILATERAL SALPINGO-OOPHORECTOMY, URETERAL SACRAL LIGAMENT SHORTENING ;  Surgeon: Jerica Hooper M.D.;  Location: [facility];  Service:    • CYSTOSCOPY  6/7/2017    Procedure: CYSTOSCOPY;  Surgeon: Jerica Hooper M.D.;  Location: [facility];  Service:    • SHOULDER DECOMPRESSION ARTHROSCOPIC Right 12/6/2016    Procedure: SHOULDER DECOMPRESSION ARTHROSCOPIC - SUBACROMIAL, MANJARREZ;  Surgeon: Kyle Claros M.D.;  [facility];  Service:    • CLAVICLE DISTAL EXCISION Right 12/6/2016    Procedure: CLAVICLE DISTAL EXCISION;  Surgeon: Kyle Claros M.D.;  Location[facility];  Service:    • SHOULDER ARTHROSCOPY W/ ROTATOR CUFF REPAIR Right 12/6/2016    Procedure: SHOULDER ARTHROSCOPY W/ ROTATOR CUFF REPAIR ;  Surgeon: Kyle Claros M.D.;  Location: [facility];  Service:    • SHOULDER ARTHROSCOPY W/ BICIPITAL TENODESIS REPAIR Right 12/6/2016    Procedure: SHOULDER ARTHROSCOPY W/ BICIPITAL TENODESIS REPAIR ;  Surgeon: Kyle Claros M.D.;  Location: [facility];  Service:    • LUMBAR FUSION POSTERIOR  2/9/2016    Procedure: LUMBAR FUSION POSTERIOR PEDICLE SCREW FIXATION (STAGE #2);  Surgeon: Tim Rodriguez M.D.;  Location: SURGERY  [facility];  Service:    • LUMBAR LAMINECTOMY DISKECTOMY  2/9/2016    Procedure: LUMBAR LAMINECTOMY DISKECTOMY MINI OPEN;  Surgeon: Tim Rodriguze M.D.;  Location: SURGERY [facility];  Service:    • EXTREME LATERAL INTERBODY FUSION Right 2/6/2016    Procedure: EXTREME LATERAL INTERBODY FUSION L3-4 (STAGE #1);  Surgeon: Tim Rodriguez M.D.;  Location: SURGERY [facility];  Service:    • RECOVERY  12/8/2015    Procedure: CT-CT GUIDED LEFT LUNG BIOPSY-;  Surgeon: [facility];  Location: SURGERY PRE-POST PROC UNIT [facility];  Service:    • REDDY BY LAPAROSCOPY  4/17/2014    Performed by Ankur Lerner M.D. at SURGERY SAME DAY [facility] ORS   • EGD WITH ASP/BX  2/4/2014    mild chronic inactive gastritis, scar gastric antrum-   • EGD WITH ASP/BX  9/29/2011    possible barrettes, hiatal hernia, gastritis   • COLONOSCOPY  9/29/2011    diverticulosis sigmoid colon, hemorrhoids   • ARTHRODESIS  9/3/2010    Performed by YARELY MORRISON at SURGERY SAME DAY [facility] ORS   • ORTHOPEDIC OSTEOTOMY  9/3/2010    Performed by YARELY MORRISON at SURGERY SAME DAY [facility] ORS   • BONE SPUR EXCISION  9/3/2010    Performed by YARELY MORRISON at SURGERY SAME DAY [facility] ORS   • ARTHROTOMY  9/3/2010    Performed by YARELY MORRISON at SURGERY SAME DAY [facility] ORS   • OTHER ABDOMINAL SURGERY  2000    COLON RESECTION   • LAMINOTOMY     • OTHER ORTHOPEDIC SURGERY      PARDEEP CARPAL TUNNEL RELEASES       Family History   Problem Relation Age of Onset   • Cancer Mother         lymphoma   • Stroke Father    • Diabetes Father        Social History     Social History   • Marital status:      Spouse name: N/A   • Number of children: N/A   • Years of education: N/A     Occupational History   • Not on file.     Social History Main Topics   • Smoking status: Former Smoker     Years: 2.00     Types: Cigarettes     Quit date: 1/1/1977   • Smokeless tobacco: Never Used      Comment: quit in 1977   • Alcohol use 0.0  oz/week      Comment: 1 per month   • Drug use: No      Comment: cig hx= 1/2 pack per month    • Sexual activity: Yes     Partners: Male      Comment:      Other Topics Concern   • Not on file     Social History Narrative   • No narrative on file       Immunizations:  Last flu: ***  Last pneumonia: ***    ROS:  Constitutional: Denies fevers, chills, sweats, fatigue, weight loss  Eyes: Denies glasses, vision loss, pain, drainage, double vision  Ears/Nose/Mouth/Throat: Denies rhinitis, nasal congestion, ear ache, difficulty hearing, sore throat, persistent hoarseness, decayed teeth/toothache  Cardiovascular: Denies chest pain, tightness, palpitations, swelling in feet/legs, fainting, difficulty breathing when laying down  Respiratory: Denies shortness of breath, cough, sputum, wheezing, painful breathing, coughing up blood  GI: Denies heartburn, difficulty swallowing, nausea, vomiting, abdominal pain, diarrhea, constipation  : Denies frequent urination, painful urination  Integumentary: Denies rashes, lumps or color changes  MSK: Denies painful joints, sore muscles, and back pain.   Neurological: Denies frequent headaches, dizziness, weakness  Sleep: Denies daytime sleepiness, loud snoring, stops breathing during sleep, waking up gasping for air, insomnia, morning headaches      Current Outpatient Prescriptions   Medication Sig Dispense Refill   • lansoprazole (PREVACID) 30 MG CAPSULE DELAYED RELEASE TAKE 1 CAPSULE BY MOUTH  DAILY 90 Cap 1   • meloxicam (MOBIC) 15 MG tablet Take 1 Tab by mouth every day. 30 Tab 1   • amitriptyline (ELAVIL) 10 MG Tab Take 10 mg by mouth every bedtime.     • gabapentin (NEURONTIN) 300 MG Cap Take 1 Cap by mouth every evening. 90 Cap 0   • Multiple Vitamins-Minerals (MULTIVITAMIN ADULT PO) Take 1 Tab by mouth every day.     • FIBER PO Take 1 Cap by mouth every day.     • ascorbic acid (ASCORBIC ACID) 500 MG Tab Take 1,000 mg by mouth every day.     • asa/apap/caffeine (EXCEDRIN)  "250-250-65 MG Tab Take 2 Tabs by mouth Once.     • alendronate (FOSAMAX) 70 MG Tab Take 1 Tab by mouth every 7 days. 12 Tab 3     No current facility-administered medications for this visit.        Allergies   Allergen Reactions   • Nkda [No Known Drug Allergy]        Blood pressure 140/80, pulse 93, temperature 36 °C (96.8 °F), temperature source Temporal, resp. rate 16, height 1.499 m (4' 11\"), weight 83 kg (183 lb), SpO2 95 %, not currently breastfeeding.    PE:   Appearance: Well developed, well nourished, no acute distress  Eyes: PERRL, EOM intact, sclera white, conjunctiva moist  Ears: no lesions or deformities  Hearing: grossly intact  Nose: no lesions or deformities  Oropharynx: tongue normal, posterior pharynx without erythema or exudate  Neck: supple, trachea midline, no masses   Respiratory effort: no intercostal retractions or use of accessory muscles  Lung auscultation: no rales, rhonchi or wheezes  Heart auscultation: no murmur rub or gallop  Extremities: no cyanosis or edema  Abdomen: soft ,non tender, no masses  Gait and Station: normal  Digits and nails: no clubbing, cyanosis, petechiae or nodes.  Cranial nerves: grossly intact  Skin: no rashes, lesions or ulcers noted  Orientation: Oriented to time, person and place  Mood and affect: mood and affect appropriate, normal interaction with examiner  Judgement: Intact          Assessment:    1. Coccidioidomycosis, pulmonary (HCC)     2. BMI 36.0-36.9,adult           Plan:    1) Reviewed PFT's and chest xray. Pt remains asymptomatic. Normal PFT's. Stable chest imaging. She is requesting clearance for upcoming hernia repair. She is cleared from a pulmonary standpoint for needed upcoming surgery. She is recommended early mobilization post op, coughing & deep breathing and routine use of incentive spirometer.   2) Request radiologist to compare recent CTA to prior CT chest July 2016 to ensure stability of her left upper lobe mass. Appears stable and " consistent with her history of treated Coccidioidomycosis.   3) Patient is up to date on her Prevnar 13, Pneumovax 23 and Influenza vaccinations.  4) Continue annual follow up with chest xray, sooner OV if symptoms warrant.       Electronically signed by BHANU Terry  on 11/21/18    Misael Ramírez M.D.  [address] [place] [id number]  VIA Facsimile: [phone]

## 2018-11-21 NOTE — PROGRESS NOTES
Chief Complaint   Patient presents with   • Results     PFT/ Xray      • Follow-Up     Surgical Clearance        HPI:  Dee Armstrong is a 63 y.o. 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 She was last seen in the office in September 2017 with Elda JOHNSON. Previously followed by Keke TUCKER. She was initially referred to our office after a mass was found on chest xray after an infection. Cocci  Titers were positive. She was placed on Diflucan for 1 year which was completed in November 2016. Imaging has been stable. She has been recommended yearly chest xray and PFT's. She is here today requesting clearance for upcoming surgery.     Chest xray 9/4/2018 indicates no change in a left upper lobe ovoid mass.     She ended up having a CTA in October 2018 for chest pain. Results indicated;  No evidence of aortic dissection. No aneurysmal dilatation. No mediastinal or retroperitoneal hematoma.  2.9 cm left upper lobe mass concerning for malignant neoplasm. Further evaluation recommended.  4.6 mm hypodensity left lobe of the liver statistically most likely hemangioma or cyst.  No hydronephrosis.  There are diverticula colon. No evidence diverticulitis. No free fluid.  Large and small bowel and bladder are incompletely imaged.    Prior CT chest July 2016 measured her PATTI mass at 2.9 x 2.4 CM. Will request radiologist to compare to confirm stable.    PFT's today in the office indicate an FEV1 of 2.03 L, 103% predicted with an FEV1/FVC ratio of 76, % predicted with a DLCO of 161% predicted.    She denies dyspnea. She denies cough or mucous. She denies any wheezing. She denies any fevers or chills. She denies any recent respiratory infections.     Past Medical History:   Diagnosis Date   • Coccidioidomycosis 12/15    left upper lung   • Cough 5/29/2015   • Diverticulosis    • GERD (gastroesophageal reflux disease)    • Heart burn    • Indigestion    • LBP (low back  pain)    • Lumbar radicular pain 5/29/2015   • Muscle disorder    • Obesity    • Osteoporosis of forearm     Alendronate started 6/18   • Pain     Shoulder and numbness to R LE       Past Surgical History:   Procedure Laterality Date   • HYSTERECTOMY ROBOTIC  6/7/2017    Procedure: HYSTERECTOMY ROBOTIC SI, BILATERAL SALPINGO-OOPHORECTOMY, URETERAL SACRAL LIGAMENT SHORTENING ;  Surgeon: Jerica Hooper M.D.;  Location: [facility];  Service:    • CYSTOSCOPY  6/7/2017    Procedure: CYSTOSCOPY;  Surgeon: Jerica Hooper M.D.;  Location: [facility];  Service:    • SHOULDER DECOMPRESSION ARTHROSCOPIC Right 12/6/2016    Procedure: SHOULDER DECOMPRESSION ARTHROSCOPIC - SUBACROMIAL, MANJARREZ;  Surgeon: Kyle Claros M.D.;  Location: [facility];  Service:    • CLAVICLE DISTAL EXCISION Right 12/6/2016    Procedure: CLAVICLE DISTAL EXCISION;  Surgeon: Kyle Claros M.D.;  Location: [facility];  Service:    • SHOULDER ARTHROSCOPY W/ ROTATOR CUFF REPAIR Right 12/6/2016    Procedure: SHOULDER ARTHROSCOPY W/ ROTATOR CUFF REPAIR ;  Surgeon: Kyle Claros M.D.;  Location: [facility];  Service:    • SHOULDER ARTHROSCOPY W/ BICIPITAL TENODESIS REPAIR Right 12/6/2016    Procedure: SHOULDER ARTHROSCOPY W/ BICIPITAL TENODESIS REPAIR ;  Surgeon: Kyle Claros M.D.;  Location: [facility];  Service:    • LUMBAR FUSION POSTERIOR  2/9/2016    Procedure: LUMBAR FUSION POSTERIOR PEDICLE SCREW FIXATION (STAGE #2);  Surgeon: Tim Rodriguez M.D.;  Location: [facility];  Service:    • LUMBAR LAMINECTOMY DISKECTOMY  2/9/2016    Procedure: LUMBAR LAMINECTOMY DISKECTOMY MINI OPEN;  Surgeon: Tim Rodriguez M.D.;  Location: [facility];  Service:    • EXTREME LATERAL INTERBODY FUSION Right 2/6/2016    Procedure: EXTREME LATERAL INTERBODY FUSION L3-4 (STAGE #1);  Surgeon: Tim Rodriguez M.D.;  Location: [facility];  Service:    • RECOVERY   12/8/2015    Procedure: CT-CT GUIDED LEFT LUNG BIOPSY-;  Surgeon: [facility];  Location: SURGERY PRE-[facility] PROC UNIT [facility];  Service:    • REDDY BY LAPAROSCOPY  4/17/2014    Performed by Ankur Lerner M.D. at SURGERY SAME DAY [facility] ORS   • EGD WITH ASP/BX  2/4/2014    mild chronic inactive gastritis, scar gastric antrum-   • EGD WITH ASP/BX  9/29/2011    possible barrettes, hiatal hernia, gastritis   • COLONOSCOPY  9/29/2011    diverticulosis sigmoid colon, hemorrhoids   • ARTHRODESIS  9/3/2010    Performed by YARELY MORRISON at SURGERY SAME DAY [facility] ORS   • ORTHOPEDIC OSTEOTOMY  9/3/2010    Performed by YARELY MORRISON at SURGERY SAME DAY [facility] ORS   • BONE SPUR EXCISION  9/3/2010    Performed by YARELY MORRISON at SURGERY SAME DAY [facility] ORS   • ARTHROTOMY  9/3/2010    Performed by YARELY MORRISON at SURGERY SAME DAY [facility] ORS   • OTHER ABDOMINAL SURGERY  2000    COLON RESECTION   • LAMINOTOMY     • OTHER ORTHOPEDIC SURGERY      PARDEEP CARPAL TUNNEL RELEASES       Family History   Problem Relation Age of Onset   • Cancer Mother         lymphoma   • Stroke Father    • Diabetes Father        Social History     Social History   • Marital status:      Spouse name: N/A   • Number of children: N/A   • Years of education: N/A     Occupational History   • Not on file.     Social History Main Topics   • Smoking status: Former Smoker     Years: 2.00     Types: Cigarettes     Quit date: 1/1/1977   • Smokeless tobacco: Never Used      Comment: quit in 1977   • Alcohol use 0.0 oz/week      Comment: 1 per month   • Drug use: No      Comment: cig hx= 1/2 pack per month    • Sexual activity: Yes     Partners: Male      Comment:      Other Topics Concern   • Not on file     Social History Narrative   • No narrative on file       ROS:  Constitutional: Denies fevers, chills, sweats, fatigue, weight loss  Eyes: Denies glasses, vision loss, pain, drainage, double  "vision  Ears/Nose/Mouth/Throat: Denies rhinitis, nasal congestion, ear ache, difficulty hearing, sore throat, persistent hoarseness, decayed teeth/toothache  Cardiovascular: Denies chest pain, tightness, palpitations, swelling in feet/legs, fainting, difficulty breathing when laying down  Respiratory: See HPI   GI: Denies heartburn, difficulty swallowing, nausea, vomiting, abdominal pain, diarrhea, constipation  : Denies frequent urination, painful urination  Integumentary: Denies rashes, lumps or color changes  MSK: Denies painful joints, sore muscles, and back pain.   Neurological: Denies frequent headaches, dizziness, weakness        Current Outpatient Prescriptions   Medication Sig Dispense Refill   • lansoprazole (PREVACID) 30 MG CAPSULE DELAYED RELEASE TAKE 1 CAPSULE BY MOUTH  DAILY 90 Cap 1   • meloxicam (MOBIC) 15 MG tablet Take 1 Tab by mouth every day. 30 Tab 1   • amitriptyline (ELAVIL) 10 MG Tab Take 10 mg by mouth every bedtime.     • gabapentin (NEURONTIN) 300 MG Cap Take 1 Cap by mouth every evening. 90 Cap 0   • Multiple Vitamins-Minerals (MULTIVITAMIN ADULT PO) Take 1 Tab by mouth every day.     • FIBER PO Take 1 Cap by mouth every day.     • ascorbic acid (ASCORBIC ACID) 500 MG Tab Take 1,000 mg by mouth every day.     • asa/apap/caffeine (EXCEDRIN) 250-250-65 MG Tab Take 2 Tabs by mouth Once.     • alendronate (FOSAMAX) 70 MG Tab Take 1 Tab by mouth every 7 days. 12 Tab 3     No current facility-administered medications for this visit.        Allergies   Allergen Reactions   • Nkda [No Known Drug Allergy]        Blood pressure 140/80, pulse 93, temperature 36 °C (96.8 °F), temperature source Temporal, resp. rate 16, height 1.499 m (4' 11\"), weight 83 kg (183 lb), SpO2 95 %, not currently breastfeeding.    PE:   Appearance: Well developed, well nourished, no acute distress  Eyes: PERRL, EOM intact, sclera white, conjunctiva moist  Ears: no lesions or deformities  Hearing: grossly intact  Nose: no " lesions or deformities  Oropharynx: tongue normal, posterior pharynx without erythema or exudate  Neck: supple, trachea midline, no masses   Respiratory effort: no intercostal retractions or use of accessory muscles  Lung auscultation: no rales, rhonchi or wheezes  Heart auscultation: no murmur rub or gallop  Extremities: no cyanosis or edema  Abdomen: soft ,non tender, no masses  Gait and Station: normal  Digits and nails: no clubbing, cyanosis, petechiae or nodes.  Cranial nerves: grossly intact  Skin: no rashes, lesions or ulcers noted  Orientation: Oriented to time, person and place  Mood and affect: mood and affect appropriate, normal interaction with examiner  Judgement: Intact          Assessment:    1. Coccidioidomycosis, pulmonary (HCC)     2. BMI 36.0-36.9,adult           Plan:    1) Reviewed PFT's and chest xray. Pt remains asymptomatic. Normal PFT's. Stable chest imaging. She is requesting clearance for upcoming hernia repair. She is cleared from a pulmonary standpoint for needed upcoming surgery. She is recommended early mobilization post op, coughing & deep breathing and routine use of incentive spirometer.   2) Request radiologist to compare recent CTA to prior CT chest July 2016 to ensure stability of her left upper lobe mass. Appears stable and consistent with her history of treated Coccidioidomycosis.   3) Patient is up to date on her Prevnar 13, Pneumovax 23 and Influenza vaccinations.  4) Continue annual follow up with chest xray, sooner OV if symptoms warrant.

## 2018-11-26 ENCOUNTER — APPOINTMENT (OUTPATIENT)
Dept: RADIOLOGY | Facility: IMAGING CENTER | Age: 63
End: 2018-11-26
Payer: MEDICARE

## 2018-11-26 ENCOUNTER — HOSPITAL ENCOUNTER (OUTPATIENT)
Facility: MEDICAL CENTER | Age: 63
End: 2018-11-26
Attending: SURGERY | Admitting: SURGERY
Payer: MEDICARE

## 2018-11-26 ENCOUNTER — APPOINTMENT (OUTPATIENT)
Dept: PULMONOLOGY | Facility: HOSPICE | Age: 63
End: 2018-11-26
Payer: MEDICARE

## 2018-11-26 ENCOUNTER — APPOINTMENT (OUTPATIENT)
Dept: PULMONOLOGY | Facility: HOSPICE | Age: 63
End: 2018-11-26
Attending: PHYSICIAN ASSISTANT
Payer: MEDICARE

## 2018-11-26 ENCOUNTER — APPOINTMENT (OUTPATIENT)
Dept: RADIOLOGY | Facility: MEDICAL CENTER | Age: 63
End: 2018-11-26
Attending: SURGERY
Payer: MEDICARE

## 2018-11-26 VITALS
OXYGEN SATURATION: 93 % | SYSTOLIC BLOOD PRESSURE: 141 MMHG | BODY MASS INDEX: 37.38 KG/M2 | WEIGHT: 185.41 LBS | DIASTOLIC BLOOD PRESSURE: 73 MMHG | HEART RATE: 86 BPM | HEIGHT: 59 IN | TEMPERATURE: 97.6 F | RESPIRATION RATE: 18 BRPM

## 2018-11-26 DIAGNOSIS — K43.2 INCISIONAL HERNIA WITHOUT OBSTRUCTION OR GANGRENE: ICD-10-CM

## 2018-11-26 PROCEDURE — 501445 HCHG STAPLER, SKIN DISP: Performed by: SURGERY

## 2018-11-26 PROCEDURE — A6402 STERILE GAUZE <= 16 SQ IN: HCPCS | Performed by: SURGERY

## 2018-11-26 PROCEDURE — 501838 HCHG SUTURE GENERAL: Performed by: SURGERY

## 2018-11-26 PROCEDURE — 160002 HCHG RECOVERY MINUTES (STAT): Performed by: SURGERY

## 2018-11-26 PROCEDURE — 160046 HCHG PACU - 1ST 60 MINS PHASE II: Performed by: SURGERY

## 2018-11-26 PROCEDURE — 160025 RECOVERY II MINUTES (STATS): Performed by: SURGERY

## 2018-11-26 PROCEDURE — C1781 MESH (IMPLANTABLE): HCPCS | Performed by: SURGERY

## 2018-11-26 PROCEDURE — 160035 HCHG PACU - 1ST 60 MINS PHASE I: Performed by: SURGERY

## 2018-11-26 PROCEDURE — 160039 HCHG SURGERY MINUTES - EA ADDL 1 MIN LEVEL 3: Performed by: SURGERY

## 2018-11-26 PROCEDURE — 700111 HCHG RX REV CODE 636 W/ 250 OVERRIDE (IP): Performed by: ANESTHESIOLOGY

## 2018-11-26 PROCEDURE — A9270 NON-COVERED ITEM OR SERVICE: HCPCS | Performed by: ANESTHESIOLOGY

## 2018-11-26 PROCEDURE — 160028 HCHG SURGERY MINUTES - 1ST 30 MINS LEVEL 3: Performed by: SURGERY

## 2018-11-26 PROCEDURE — 160009 HCHG ANES TIME/MIN: Performed by: SURGERY

## 2018-11-26 PROCEDURE — 700102 HCHG RX REV CODE 250 W/ 637 OVERRIDE(OP): Performed by: ANESTHESIOLOGY

## 2018-11-26 PROCEDURE — 160048 HCHG OR STATISTICAL LEVEL 1-5: Performed by: SURGERY

## 2018-11-26 PROCEDURE — 160036 HCHG PACU - EA ADDL 30 MINS PHASE I: Performed by: SURGERY

## 2018-11-26 PROCEDURE — 76705 ECHO EXAM OF ABDOMEN: CPT

## 2018-11-26 PROCEDURE — 700101 HCHG RX REV CODE 250

## 2018-11-26 PROCEDURE — 700111 HCHG RX REV CODE 636 W/ 250 OVERRIDE (IP)

## 2018-11-26 DEVICE — IMPLANTABLE DEVICE: Type: IMPLANTABLE DEVICE | Status: FUNCTIONAL

## 2018-11-26 RX ORDER — OXYCODONE HYDROCHLORIDE 5 MG/1
5 TABLET ORAL
Status: DISCONTINUED | OUTPATIENT
Start: 2018-11-26 | End: 2018-11-26 | Stop reason: HOSPADM

## 2018-11-26 RX ORDER — SODIUM CHLORIDE, SODIUM LACTATE, POTASSIUM CHLORIDE, CALCIUM CHLORIDE 600; 310; 30; 20 MG/100ML; MG/100ML; MG/100ML; MG/100ML
INJECTION, SOLUTION INTRAVENOUS ONCE
Status: DISCONTINUED | OUTPATIENT
Start: 2018-11-26 | End: 2018-11-26 | Stop reason: HOSPADM

## 2018-11-26 RX ORDER — HYDROMORPHONE HYDROCHLORIDE 1 MG/ML
0.1 INJECTION, SOLUTION INTRAMUSCULAR; INTRAVENOUS; SUBCUTANEOUS
Status: DISCONTINUED | OUTPATIENT
Start: 2018-11-26 | End: 2018-11-26 | Stop reason: HOSPADM

## 2018-11-26 RX ORDER — HALOPERIDOL 5 MG/ML
1 INJECTION INTRAMUSCULAR
Status: DISCONTINUED | OUTPATIENT
Start: 2018-11-26 | End: 2018-11-26 | Stop reason: HOSPADM

## 2018-11-26 RX ORDER — SODIUM CHLORIDE, SODIUM LACTATE, POTASSIUM CHLORIDE, CALCIUM CHLORIDE 600; 310; 30; 20 MG/100ML; MG/100ML; MG/100ML; MG/100ML
INJECTION, SOLUTION INTRAVENOUS CONTINUOUS
Status: DISCONTINUED | OUTPATIENT
Start: 2018-11-26 | End: 2018-11-26 | Stop reason: HOSPADM

## 2018-11-26 RX ORDER — OXYCODONE HYDROCHLORIDE 5 MG/1
10 TABLET ORAL
Status: DISCONTINUED | OUTPATIENT
Start: 2018-11-26 | End: 2018-11-26 | Stop reason: HOSPADM

## 2018-11-26 RX ORDER — DIPHENHYDRAMINE HYDROCHLORIDE 50 MG/ML
12.5 INJECTION INTRAMUSCULAR; INTRAVENOUS
Status: DISCONTINUED | OUTPATIENT
Start: 2018-11-26 | End: 2018-11-26 | Stop reason: HOSPADM

## 2018-11-26 RX ORDER — HYDROMORPHONE HYDROCHLORIDE 1 MG/ML
0.4 INJECTION, SOLUTION INTRAMUSCULAR; INTRAVENOUS; SUBCUTANEOUS
Status: DISCONTINUED | OUTPATIENT
Start: 2018-11-26 | End: 2018-11-26 | Stop reason: HOSPADM

## 2018-11-26 RX ORDER — HYDROMORPHONE HYDROCHLORIDE 1 MG/ML
0.2 INJECTION, SOLUTION INTRAMUSCULAR; INTRAVENOUS; SUBCUTANEOUS
Status: DISCONTINUED | OUTPATIENT
Start: 2018-11-26 | End: 2018-11-26 | Stop reason: HOSPADM

## 2018-11-26 RX ORDER — ONDANSETRON 2 MG/ML
4 INJECTION INTRAMUSCULAR; INTRAVENOUS
Status: DISCONTINUED | OUTPATIENT
Start: 2018-11-26 | End: 2018-11-26 | Stop reason: HOSPADM

## 2018-11-26 RX ORDER — OXYCODONE HCL 5 MG/5 ML
5 SOLUTION, ORAL ORAL
Status: COMPLETED | OUTPATIENT
Start: 2018-11-26 | End: 2018-11-26

## 2018-11-26 RX ORDER — MEPERIDINE HYDROCHLORIDE 25 MG/ML
12.5 INJECTION INTRAMUSCULAR; INTRAVENOUS; SUBCUTANEOUS
Status: DISCONTINUED | OUTPATIENT
Start: 2018-11-26 | End: 2018-11-26 | Stop reason: HOSPADM

## 2018-11-26 RX ORDER — ACETAMINOPHEN 325 MG/1
1000 TABLET ORAL EVERY 6 HOURS PRN
Status: DISCONTINUED | OUTPATIENT
Start: 2018-11-26 | End: 2018-11-26 | Stop reason: HOSPADM

## 2018-11-26 RX ORDER — MIDAZOLAM HYDROCHLORIDE 1 MG/ML
1 INJECTION INTRAMUSCULAR; INTRAVENOUS
Status: DISCONTINUED | OUTPATIENT
Start: 2018-11-26 | End: 2018-11-26 | Stop reason: HOSPADM

## 2018-11-26 RX ORDER — BUPIVACAINE HYDROCHLORIDE AND EPINEPHRINE 5; 5 MG/ML; UG/ML
INJECTION, SOLUTION EPIDURAL; INTRACAUDAL; PERINEURAL
Status: DISCONTINUED | OUTPATIENT
Start: 2018-11-26 | End: 2018-11-26 | Stop reason: HOSPADM

## 2018-11-26 RX ORDER — OXYCODONE HCL 5 MG/5 ML
10 SOLUTION, ORAL ORAL
Status: COMPLETED | OUTPATIENT
Start: 2018-11-26 | End: 2018-11-26

## 2018-11-26 RX ADMIN — MIDAZOLAM 1 MG: 1 INJECTION INTRAMUSCULAR; INTRAVENOUS at 11:09

## 2018-11-26 RX ADMIN — HYDROMORPHONE HYDROCHLORIDE 0.4 MG: 1 INJECTION, SOLUTION INTRAMUSCULAR; INTRAVENOUS; SUBCUTANEOUS at 11:05

## 2018-11-26 RX ADMIN — FENTANYL CITRATE 50 MCG: 50 INJECTION, SOLUTION INTRAMUSCULAR; INTRAVENOUS at 10:48

## 2018-11-26 RX ADMIN — HYDROMORPHONE HYDROCHLORIDE 0.2 MG: 1 INJECTION, SOLUTION INTRAMUSCULAR; INTRAVENOUS; SUBCUTANEOUS at 11:57

## 2018-11-26 RX ADMIN — FENTANYL CITRATE 50 MCG: 50 INJECTION, SOLUTION INTRAMUSCULAR; INTRAVENOUS at 11:31

## 2018-11-26 RX ADMIN — MIDAZOLAM 1 MG: 1 INJECTION INTRAMUSCULAR; INTRAVENOUS at 11:34

## 2018-11-26 RX ADMIN — OXYCODONE HYDROCHLORIDE 10 MG: 5 SOLUTION ORAL at 11:08

## 2018-11-26 ASSESSMENT — PAIN SCALES - GENERAL
PAINLEVEL_OUTOF10: 8
PAINLEVEL_OUTOF10: 0
PAINLEVEL_OUTOF10: 0
PAINLEVEL_OUTOF10: 5
PAINLEVEL_OUTOF10: ASSUMED PAIN PRESENT
PAINLEVEL_OUTOF10: ASSUMED PAIN PRESENT

## 2018-11-26 NOTE — OP REPORT
DATE OF SERVICE:  11/26/2018    PREOPERATIVE DIAGNOSIS:  Right flank incisional hernia.    POSTOPERATIVE DIAGNOSIS:  Right flank incisional hernia.    PROCEDURE:  Right flank exploration with repair of incisional hernia with UA   chest oval mesh.    ANESTHESIA:  General endotracheal by Dr. Acharya.     SURGEON:  Misael Ramírez MD    FIRST ASSISTANT:  Clarence Escobar MD    INDICATIONS:  Patient is a 63-year-old female who previously had had   instrumentation; however, lower back through a posterior flank incision and   has now developed a hernia that has become symptomatic on CT scan with adipose from   the abdominal cavity is protruding through the muscle defect.    OPERATIVE FINDINGS:  A 3x1.5 cm defect through the posterior flank muscles.    OPERATIVE NOTE:  The patient was taken to the operating room, placed in supine   position, given general endotracheal anesthesia.  Once properly anesthetized,   was prepped and draped in usual sterile fashion, then rolled over laterally   in a lateral decubitus position and prepped and draped in usual sterile   fashion.  Ultrasound was used preoperatively to try to locate the hernia   because I was unable to palpate it.  There was a mild suggestion of where the   hernia was, but no definitive findings with ultrasound.  Going through her old   scar, the incision was made and carried down through the skin and   subcutaneous tissue.  The posterior flank musculature was palpated.  There was   a weak space that was opened through attenuated muscle and true defect was   encountered.  This hernia defect measured 3x1.5 cm.  The adipose tissue was   pushed away in the retroperitoneal space to allow UHS oval mesh system to be   placed, the posterior leaflet was sewn to the internal muscles with 3 pexing   horizontal mattress stitches of 0 Prolene.  The fascia was closed back.  The   defect was loosely reapproximated without undue tension with interrupted 0   Ethibonds.  The anterior  leaflet of the mesh was then deployed over the closed   muscle fascia and secured to the fascia with interrupted 0 horizontal   mattress Prolene sutures.  The subcutaneous tissues were closed in 2 layers   with a deep layer being closed with a running 2-0 Vicryl and the subcutaneous   tissue.  Superficial layer being closed with interrupted 3-0 Vicryls and the   skin was closed with a 4-0 Vicryl subcuticular closure, 30 mL of 0.5% Marcaine   with epinephrine was used to anesthetize the skin and muscle for   postoperative pain relief.  Sterile Tegaderms were applied as dressings.    Patient tolerated the procedure well.  Lap, sponge and instrument counts were   correct.       ____________________________________     MD JENNIFER Estrada / GARETT    DD:  11/26/2018 10:41:17  DT:  11/26/2018 10:56:36    D#:  [id number]  Job#:  [id number]

## 2018-11-26 NOTE — OR NURSING
Pt ready for sx, awaiting confirmation from Radiology that an ultrasound with radiologist read will be available in the OR pre-procedure. Pt will roll back to OR once availability of ultrasound is confirmed.

## 2018-11-26 NOTE — DISCHARGE INSTRUCTIONS
ACTIVITY: Rest and take it easy for the first 24 hours.  A responsible adult is recommended to remain with you during that time.  It is normal to feel sleepy.  We encourage you to not do anything that requires balance, judgment or coordination.    MILD FLU-LIKE SYMPTOMS ARE NORMAL. YOU MAY EXPERIENCE GENERALIZED MUSCLE ACHES, THROAT IRRITATION, HEADACHE AND/OR SOME NAUSEA.    FOR 24 HOURS DO NOT:  Drive, operate machinery or run household appliances.  Drink beer or alcoholic beverages.   Make important decisions or sign legal documents.    SPECIAL INSTRUCTIONS:   Follow up:7-10 days  Activity:no lifting weight greater than 20 lbs x 6 weeks  Diet:clear liquids tonight then advance as tolerated in AM  Showers only x 2 weeks  No driving for one week or while on pain medications  Wound Care:remove tegaderm dressings in 4 days      DIET: To avoid nausea, slowly advance diet as tolerated, avoiding spicy or greasy foods for the first day.  Add more substantial food to your diet according to your physician's instructions. INCREASE FLUIDS AND FIBER TO AVOID CONSTIPATION.    SURGICAL DRESSING/BATHING: Showers only x 2 weeks    FOLLOW-UP APPOINTMENT:  A follow-up appointment should be arranged with your doctor in 1-2; call to schedule.    You should CALL YOUR PHYSICIAN if you develop:  Fever greater than 101 degrees F.  Pain not relieved by medication, or persistent nausea or vomiting.  Excessive bleeding (blood soaking through dressing) or unexpected drainage from the wound.  Extreme redness or swelling around the incision site, drainage of pus or foul smelling drainage.  Inability to urinate or empty your bladder within 8 hours.  Problems with breathing or chest pain.    You should call 911 if you develop problems with breathing or chest pain.  If you are unable to contact your doctor or surgical center, you should go to the nearest emergency room or urgent care center.  Physician's telephone #: [phone]    If any  questions arise, call your doctor.  If your doctor is not available, please feel free to call the Surgical Center at [phone].  The Center is open Monday through Friday from 7AM to 7PM.  You can also call the HEALTH HOTLINE open 24 hours/day, 7 days/week and speak to a nurse at [phone], or toll free at [phone].    A registered nurse may call you a few days after your surgery to see how you are doing after your procedure.    MEDICATIONS: Resume taking daily medication.  Take prescribed pain medication with food.  If no medication is prescribed, you may take non-aspirin pain medication if needed.  PAIN MEDICATION CAN BE VERY CONSTIPATING.  Take a stool softener or laxative such as senokot, pericolace, or milk of magnesia if needed.    Prescriptions at home.  Last pain medication given at 11:08.    If your physician has prescribed pain medication that includes Acetaminophen (Tylenol), do not take additional Acetaminophen (Tylenol) while taking the prescribed medication.    Depression / Suicide Risk    As you are discharged from this [facility] facility, it is important to learn how to keep safe from harming yourself.    Recognize the warning signs:  · Abrupt changes in personality, positive or negative- including increase in energy   · Giving away possessions  · Change in eating patterns- significant weight changes-  positive or negative  · Change in sleeping patterns- unable to sleep or sleeping all the time   · Unwillingness or inability to communicate  · Depression  · Unusual sadness, discouragement and loneliness  · Talk of wanting to die  · Neglect of personal appearance   · Rebelliousness- reckless behavior  · Withdrawal from people/activities they love  · Confusion- inability to concentrate     If you or a loved one observes any of these behaviors or has concerns about self-harm, here's what you can do:  · Talk about it- your feelings and reasons for harming yourself  · Remove any means  that you might use to hurt yourself (examples: pills, rope, extension cords, firearm)  · Get professional help from the community (Mental Health, Substance Abuse, psychological counseling)  · Do not be alone:Call your Safe Contact- someone whom you trust who will be there for you.  · Call your local CRISIS HOTLINE 986-9968 or 748-324-1945  · Call your local Children's Mobile Crisis Response Team [place] (632) 585-5111 or www.MOBITRAC  · Call the toll free National Suicide Prevention Hotlines   · National Suicide Prevention Lifeline 226-927-PACS (5538)  · National Hope Line Network 800-SUICIDE (997-9381)

## 2018-11-26 NOTE — OR NURSING
CONTACTED SPOUSE LEFT MESSAGE  PT IN RECOVERY  DOING WELL   SLEEPY  WILL CALL TO UPDATE WHEN PT CLOSER TO DISCHARGE   ANTICIPATE 1.5 HOURS

## 2018-11-26 NOTE — OR NURSING
POC reviewed with pt and ride, call light within reach. Educated to call for assistance if needed. Dr. Acharya at bedside, I notified him I was unable to obtain IV access, he is aware and stated he will start IV in the OR.

## 2018-11-26 NOTE — OR NURSING
Pt's VSS; denies N/V; states pain is at tolerable level. Dressing CDI to R flank. D/c orders received. IV dc'd. Pt changed into clothing with assistance. Pt up and ambulated to BR, steady gait, voided adequately. Discharge instructions given; pt and family verbalized understanding and questions answered. Patient states ready to d/c home. Prescriptions already at home. Pt dc'd in w/c with CNA in stable condition.

## 2018-11-26 NOTE — OR SURGEON
Immediate Post OP Note    PreOp Diagnosis: Right Flank Incisional Hernia    PostOp Diagnosis: Same    Procedure(s):  INCISION HERNIA REPAIR- FLANK WITH [facility] OVAL MESH - Wound Class: Clean    Surgeon(s):  ZUNILDA Watt M.D.    Anesthesiologist/Type of Anesthesia:GET  Anesthesiologist: Jose L Acharya M.D./General    Surgical Staff:  Circulator: JEFFERSON Moya Circulator: JEFFERSON Miller Scrub: Angela Edmond  Scrub Person: Kelly Herrera    Specimens removed if any:  none    Estimated Blood Loss: none    Findings: 3 x 1.5 cm fascial defect containing adipose tissue     Complications: none        11/26/2018 10:27 AM Misael Ramírez M.D.

## 2018-11-26 NOTE — OR NURSING
PT RESTLESS/TENSE. REPORTS PAIN RIGHT FLANK. TURNED ONTO LEFT SIDE PROPPED W/ PILLOW.    PT HAS REC'D FENTANYL 50 MCGS IV, DILAUDID 0.4 MG IV, OXYCODONE 10 MG PO, MIDAZOLAM 1 MG IV.

## 2018-11-26 NOTE — OR NURSING
PT SITTING UP IN BED. REPORTS FATIGUE.  DIFFICULTY  RATING PAIN DESPITE REVIEW.  PRESENTLY REPORT PAIN 6-7/10  PT IS CALM. RESTING EYES CLOSED. EASILY AROUSED BY NORMAL VOICE. CONVERSANT.  HR 92 /64 RR 14 92% ROOM AIR.

## 2018-11-28 ENCOUNTER — TELEPHONE (OUTPATIENT)
Dept: PULMONOLOGY | Facility: HOSPICE | Age: 63
End: 2018-11-28

## 2018-11-28 NOTE — TELEPHONE ENCOUNTER
Please let pt know the radiologist compared her CT chest 10/8/2018 to prior imaging and her left upper lobe mass is unchanged from CT July 2016. Consistent with her history of cocci. No need for further work up at this time.

## 2019-01-16 ENCOUNTER — APPOINTMENT (RX ONLY)
Dept: URBAN - METROPOLITAN AREA CLINIC 20 | Facility: CLINIC | Age: 64
Setting detail: DERMATOLOGY
End: 2019-01-16

## 2019-01-16 DIAGNOSIS — L82.1 OTHER SEBORRHEIC KERATOSIS: ICD-10-CM

## 2019-01-16 DIAGNOSIS — L73.8 OTHER SPECIFIED FOLLICULAR DISORDERS: ICD-10-CM

## 2019-01-16 DIAGNOSIS — D49.2 NEOPLASM OF UNSPECIFIED BEHAVIOR OF BONE, SOFT TISSUE, AND SKIN: ICD-10-CM

## 2019-01-16 DIAGNOSIS — L84 CORNS AND CALLOSITIES: ICD-10-CM

## 2019-01-16 DIAGNOSIS — L57.8 OTHER SKIN CHANGES DUE TO CHRONIC EXPOSURE TO NONIONIZING RADIATION: ICD-10-CM

## 2019-01-16 DIAGNOSIS — L81.4 OTHER MELANIN HYPERPIGMENTATION: ICD-10-CM

## 2019-01-16 DIAGNOSIS — D22 MELANOCYTIC NEVI: ICD-10-CM

## 2019-01-16 DIAGNOSIS — D18.0 HEMANGIOMA: ICD-10-CM

## 2019-01-16 PROBLEM — D22.5 MELANOCYTIC NEVI OF TRUNK: Status: ACTIVE | Noted: 2019-01-16

## 2019-01-16 PROBLEM — D18.01 HEMANGIOMA OF SKIN AND SUBCUTANEOUS TISSUE: Status: ACTIVE | Noted: 2019-01-16

## 2019-01-16 PROCEDURE — ? OBSERVATION

## 2019-01-16 PROCEDURE — 99214 OFFICE O/P EST MOD 30 MIN: CPT

## 2019-01-16 PROCEDURE — ? COUNSELING

## 2019-01-16 PROCEDURE — ? ADDITIONAL NOTES

## 2019-01-16 ASSESSMENT — LOCATION ZONE DERM
LOCATION ZONE: ARM
LOCATION ZONE: FACE
LOCATION ZONE: LEG
LOCATION ZONE: SCALP
LOCATION ZONE: TRUNK
LOCATION ZONE: NECK
LOCATION ZONE: FINGER

## 2019-01-16 ASSESSMENT — LOCATION DETAILED DESCRIPTION DERM
LOCATION DETAILED: RIGHT INFERIOR UPPER BACK
LOCATION DETAILED: RIGHT MID-UPPER BACK
LOCATION DETAILED: RIGHT PROXIMAL DORSAL FOREARM
LOCATION DETAILED: LEFT POPLITEAL SKIN
LOCATION DETAILED: RIGHT MEDIAL FRONTAL SCALP
LOCATION DETAILED: RIGHT INFERIOR ANTERIOR NECK
LOCATION DETAILED: LEFT INFERIOR CENTRAL MALAR CHEEK
LOCATION DETAILED: RIGHT DISTAL ULNAR THUMB
LOCATION DETAILED: RIGHT ANTERIOR DISTAL THIGH
LOCATION DETAILED: RIGHT CENTRAL MALAR CHEEK
LOCATION DETAILED: LEFT ANTERIOR DISTAL THIGH
LOCATION DETAILED: LEFT PROXIMAL DORSAL FOREARM
LOCATION DETAILED: RIGHT ANTERIOR PROXIMAL UPPER ARM
LOCATION DETAILED: LEFT ANTERIOR PROXIMAL UPPER ARM
LOCATION DETAILED: RIGHT SUPERIOR MEDIAL UPPER BACK
LOCATION DETAILED: LEFT MEDIAL SUPERIOR CHEST

## 2019-01-16 ASSESSMENT — LOCATION SIMPLE DESCRIPTION DERM
LOCATION SIMPLE: LEFT POPLITEAL SKIN
LOCATION SIMPLE: LEFT UPPER ARM
LOCATION SIMPLE: LEFT FOREARM
LOCATION SIMPLE: RIGHT ANTERIOR NECK
LOCATION SIMPLE: LEFT THIGH
LOCATION SIMPLE: RIGHT SCALP
LOCATION SIMPLE: RIGHT THIGH
LOCATION SIMPLE: RIGHT CHEEK
LOCATION SIMPLE: LEFT CHEEK
LOCATION SIMPLE: RIGHT THUMB
LOCATION SIMPLE: RIGHT UPPER ARM
LOCATION SIMPLE: RIGHT UPPER BACK
LOCATION SIMPLE: RIGHT FOREARM
LOCATION SIMPLE: CHEST

## 2019-01-28 DIAGNOSIS — M25.552 LEFT HIP PAIN: ICD-10-CM

## 2019-01-28 RX ORDER — MELOXICAM 15 MG/1
TABLET ORAL
Qty: 30 TAB | Refills: 1 | Status: SHIPPED | OUTPATIENT
Start: 2019-01-28 | End: 2019-03-25 | Stop reason: SDUPTHER

## 2019-02-16 ENCOUNTER — OFFICE VISIT (OUTPATIENT)
Dept: URGENT CARE | Facility: PHYSICIAN GROUP | Age: 64
End: 2019-02-16
Payer: MEDICARE

## 2019-02-16 ENCOUNTER — HOSPITAL ENCOUNTER (OUTPATIENT)
Dept: RADIOLOGY | Facility: MEDICAL CENTER | Age: 64
End: 2019-02-16
Attending: PHYSICIAN ASSISTANT
Payer: MEDICARE

## 2019-02-16 VITALS
DIASTOLIC BLOOD PRESSURE: 82 MMHG | OXYGEN SATURATION: 93 % | WEIGHT: 180 LBS | TEMPERATURE: 97.8 F | HEART RATE: 102 BPM | BODY MASS INDEX: 36.36 KG/M2 | RESPIRATION RATE: 14 BRPM | SYSTOLIC BLOOD PRESSURE: 136 MMHG

## 2019-02-16 DIAGNOSIS — R05.9 COUGH: ICD-10-CM

## 2019-02-16 DIAGNOSIS — J98.01 ACUTE BRONCHOSPASM: ICD-10-CM

## 2019-02-16 PROCEDURE — 71046 X-RAY EXAM CHEST 2 VIEWS: CPT

## 2019-02-16 PROCEDURE — 99214 OFFICE O/P EST MOD 30 MIN: CPT | Performed by: PHYSICIAN ASSISTANT

## 2019-02-16 RX ORDER — ALBUTEROL SULFATE 90 UG/1
2 AEROSOL, METERED RESPIRATORY (INHALATION) EVERY 4 HOURS PRN
Qty: 1 INHALER | Refills: 0 | Status: SHIPPED | OUTPATIENT
Start: 2019-02-16 | End: 2019-09-12

## 2019-02-16 RX ORDER — DOXYCYCLINE HYCLATE 100 MG
100 TABLET ORAL 2 TIMES DAILY
Qty: 20 TAB | Refills: 0 | Status: SHIPPED | OUTPATIENT
Start: 2019-02-16 | End: 2019-02-26

## 2019-02-16 RX ORDER — SUMATRIPTAN 20 MG/1
1 SPRAY NASAL PRN
COMMUNITY
End: 2019-04-15

## 2019-02-16 RX ORDER — CODEINE PHOSPHATE/GUAIFENESIN 10-100MG/5
10 LIQUID (ML) ORAL 4 TIMES DAILY PRN
Qty: 200 ML | Refills: 0 | Status: SHIPPED | OUTPATIENT
Start: 2019-02-16 | End: 2019-02-21

## 2019-02-16 RX ORDER — IPRATROPIUM BROMIDE AND ALBUTEROL SULFATE 2.5; .5 MG/3ML; MG/3ML
3 SOLUTION RESPIRATORY (INHALATION) ONCE
Status: COMPLETED | OUTPATIENT
Start: 2019-02-16 | End: 2019-02-16

## 2019-02-16 RX ORDER — CLONAZEPAM 0.5 MG/1
0.25 TABLET ORAL 2 TIMES DAILY
COMMUNITY
End: 2019-04-15

## 2019-02-16 RX ORDER — PREDNISONE 20 MG/1
20 TABLET ORAL DAILY
Qty: 5 TAB | Refills: 0 | Status: SHIPPED | OUTPATIENT
Start: 2019-02-16 | End: 2019-02-21

## 2019-02-16 RX ADMIN — IPRATROPIUM BROMIDE AND ALBUTEROL SULFATE 3 ML: 2.5; .5 SOLUTION RESPIRATORY (INHALATION) at 11:30

## 2019-02-16 ASSESSMENT — ENCOUNTER SYMPTOMS
WHEEZING: 1
FEVER: 0
COUGH: 1
DIZZINESS: 0
SHORTNESS OF BREATH: 0

## 2019-02-16 NOTE — PROGRESS NOTES
Subjective:      Dee Armstrong is a 63 y.o. 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  MEDS:   Current Outpatient Prescriptions:   •  Calcium Carbonate (CALCIUM 600) 1500 (600 Ca) MG Tab, Take  by mouth., Disp: , Rfl:   •  Cholecalciferol (D3-1000) 1000 UNIT Tab, Take 1,000 Units by mouth every day., Disp: , Rfl:   •  clonazePAM (KLONOPIN) 0.5 MG Tab, Take 0.25 mg by mouth 2 times a day., Disp: , Rfl:   •  sumatriptan (IMITREX) 20 MG/ACT nasal spray, Spray 1 Spray in nose as needed for Migraine., Disp: , Rfl:   •  meloxicam (MOBIC) 15 MG tablet, TAKE 1 TABLET BY MOUTH EVERY DAY, Disp: 30 Tab, Rfl: 1  •  gabapentin (NEURONTIN) 300 MG Cap, TAKE 1 CAP BY MOUTH EVERY EVENING., Disp: 90 Cap, Rfl: 1  •  Diphenhydramine-APAP, sleep, (TYLENOL PM EXTRA STRENGTH)  MG/30ML Liquid, Take 2 Tabs by mouth at bedtime as needed., Disp: , Rfl:   •  ascorbic acid (ASCORBIC ACID) 500 MG Tab, Take 1,000 mg by mouth every day., Disp: , Rfl:   •  lansoprazole (PREVACID) 30 MG CAPSULE DELAYED RELEASE, TAKE 1 CAPSULE BY MOUTH  DAILY, Disp: 90 Cap, Rfl: 1  •  asa/apap/caffeine (EXCEDRIN) 250-250-65 MG Tab, Take 2 Tabs by mouth 2 times a day as needed., Disp: , Rfl:   •  amitriptyline (ELAVIL) 10 MG Tab, Take 10 mg by mouth every bedtime., Disp: , Rfl:   •  alendronate (FOSAMAX) 70 MG Tab, Take 1 Tab by mouth every 7 days., Disp: 12 Tab, Rfl: 3  •  Multiple Vitamins-Minerals (MULTIVITAMIN ADULT PO), Take 1 Tab by mouth every day., Disp: , Rfl:   •  FIBER PO, Take 1 Cap by mouth every day., Disp: , Rfl:     Current Facility-Administered Medications:   •  ipratropium-albuterol (DUONEB) nebulizer solution, 3 mL,  Nebulization, Once, Lizy TONE Davila, P.A.-C.  ALLERGIES:   Allergies   Allergen Reactions   • Nkda [No Known Drug Allergy]      SURGHX:   Past Surgical History:   Procedure Laterality Date   • INCISION HERNIA REPAIR Right 11/26/2018    Procedure: INCISION HERNIA REPAIR- FLANK WITH MESH;  Surgeon: Misael Ramírez M.D.;  Location: [facility];  Service: General   • HYSTERECTOMY ROBOTIC  6/7/2017    Procedure: HYSTERECTOMY ROBOTIC SI, BILATERAL SALPINGO-OOPHORECTOMY, URETERAL SACRAL LIGAMENT SHORTENING ;  Surgeon: Jerica Hooper M.D.;  Location: [facility];  Service:    • CYSTOSCOPY  6/7/2017    Procedure: CYSTOSCOPY;  Surgeon: Jerica Hooper M.D.;  Location: [facility];  Service:    • SHOULDER DECOMPRESSION ARTHROSCOPIC Right 12/6/2016    Procedure: SHOULDER DECOMPRESSION ARTHROSCOPIC - SUBACROMIAL, MANJARREZ;  Surgeon: Kyle Claros M.D.;  Location: [facility];  Service:    • CLAVICLE DISTAL EXCISION Right 12/6/2016    Procedure: CLAVICLE DISTAL EXCISION;  Surgeon: Kyle Claros M.D.;  Location: [facility];  Service:    • SHOULDER ARTHROSCOPY W/ ROTATOR CUFF REPAIR Right 12/6/2016    Procedure: SHOULDER ARTHROSCOPY W/ ROTATOR CUFF REPAIR ;  Surgeon: Kyle Claros M.D.;  Location: [facility];  Service:    • SHOULDER ARTHROSCOPY W/ BICIPITAL TENODESIS REPAIR Right 12/6/2016    Procedure: SHOULDER ARTHROSCOPY W/ BICIPITAL TENODESIS REPAIR ;  Surgeon: Kyle Claros M.D.;  Location: [facility];  Service:    • LUMBAR FUSION POSTERIOR  2/9/2016    Procedure: LUMBAR FUSION POSTERIOR PEDICLE SCREW FIXATION (STAGE #2);  Surgeon: Tim Rodriguez M.D.;  Location: [facility];  Service:    • LUMBAR LAMINECTOMY DISKECTOMY  2/9/2016    Procedure: LUMBAR LAMINECTOMY DISKECTOMY MINI OPEN;  Surgeon: Tim Rodriguez M.D.;  Location: [facility];  Service:    • EXTREME LATERAL INTERBODY FUSION Right 2/6/2016    Procedure:  EXTREME LATERAL INTERBODY FUSION L3-4 (STAGE #1);  Surgeon: Tim Rodriguez M.D.;  Location: SURGERY [facility];  Service:    • RECOVERY  12/8/2015    Procedure: CT-CT GUIDED LEFT LUNG BIOPSY-;  Surgeon: Recoveryonly Surgery;  Location: SURGERY PRE-POST PROC UNIT [facility];  Service:    • REDDY BY LAPAROSCOPY  4/17/2014    Performed by Ankur Lerner M.D. at SURGERY SAME DAY [facility]   • EGD WITH ASP/BX  2/4/2014    mild chronic inactive gastritis, scar gastric antrum-   • EGD WITH ASP/BX  9/29/2011    possible barrettes, hiatal hernia, gastritis   • COLONOSCOPY  9/29/2011    diverticulosis sigmoid colon, hemorrhoids   • ARTHRODESIS  9/3/2010    Performed by YARELY MORRISON at SURGERY SAME DAY [facility]   • ORTHOPEDIC OSTEOTOMY  9/3/2010    Performed by YARELY MORRISON at SURGERY SAME DAY [facility]   • BONE SPUR EXCISION  9/3/2010    Performed by YARELY MORRISON at SURGERY SAME DAY [facility]   • ARTHROTOMY  9/3/2010    Performed by YARELY MORRISON at SURGERY SAME DAY [facility]   • OTHER ABDOMINAL SURGERY  2000    COLON RESECTION   • LAMINOTOMY     • OTHER ORTHOPEDIC SURGERY      PARDEEP CARPAL TUNNEL RELEASES     SOCHX:  reports that she quit smoking about 42 years ago. Her smoking use included Cigarettes. She quit after 2.00 years of use. She has never used smokeless tobacco. She reports that she drinks alcohol. She reports that she does not use drugs.  FH: Reviewed with patient, not pertinent to this visit.           Patient presents with:  Cough: Burning sensation in chest when coughing, x2 days.            Cough   This is a new problem. The current episode started yesterday. The problem has been gradually worsening. The problem occurs every few minutes. The cough is non-productive. Associated symptoms include nasal congestion and wheezing. Pertinent negatives include no chest pain, fever, postnasal drip or shortness of breath. The symptoms are aggravated by cold air and lying down. She  has tried body position changes and OTC cough suppressant for the symptoms. The treatment provided no relief. Her past medical history is significant for pneumonia. coccidiomycosis       Review of Systems   Constitutional: Negative for fever.   HENT: Negative for postnasal drip.    Respiratory: Positive for cough and wheezing. Negative for shortness of breath.    Cardiovascular: Negative for chest pain and leg swelling.   Neurological: Negative for dizziness.   All other systems reviewed and are negative.         Objective:     /82 (BP Location: Left arm, Patient Position: Sitting, BP Cuff Size: Large adult)   Pulse (!) 102   Temp 36.6 °C (97.8 °F) (Temporal)   Resp 14   Wt 81.6 kg (180 lb)   SpO2 93%   BMI 36.36 kg/m²      Physical Exam   Constitutional: She appears well-developed and well-nourished. No distress.   HENT:   Head: Normocephalic.   Nose: Mucosal edema and rhinorrhea present.   Mouth/Throat: Oropharynx is clear and moist.   Eyes: Pupils are equal, round, and reactive to light. Conjunctivae and EOM are normal.   Neck: Normal range of motion. Neck supple.   Cardiovascular: Normal rate and regular rhythm.    Pulmonary/Chest: Effort normal. No respiratory distress. She has decreased breath sounds in the right lower field and the left lower field. She has wheezes. She has rhonchi. She has no rales.   Abdominal: Soft.   Musculoskeletal: Normal range of motion.   Lymphadenopathy:     She has no cervical adenopathy.   Neurological: She is alert.   Skin: Skin is warm and dry.   Psychiatric: She has a normal mood and affect.   Nursing note and vitals reviewed.         Xray images viewed and interpreted by me, confirmed by radiology:         FINDINGS:    The cardiac silhouette  and mediastinal contours are normal.    Elliptically shaped mass in the left upper lobe is grossly unchanged from the prior exam. No pleural fluid or pneumothorax identified.    No suspicious bony lesions.    Surgical clips  project over the upper abdomen.    Prior surgical fusion is partially visualized in the lumbar spine.   Impression       1.  No significant change in left upper lobe mass.    2.  No acute cardiopulmonary findings.   Reading Provider Reading Date   Aretha Brizuela M.D. Feb 16, 2019         Pt states symptoms have improved after neb treatment, on re-eval wheezing has improved and air movement better throughout.     Assessment/Plan:     1. Acute bronchospasm  ipratropium-albuterol (DUONEB) nebulizer solution    DX-CHEST-2 VIEWS    albuterol 108 (90 Base) MCG/ACT Aero Soln inhalation aerosol    doxycycline (VIBRAMYCIN) 100 MG Tab    predniSONE (DELTASONE) 20 MG Tab    guaifenesin-codeine (TUSSI-ORGANIDIN NR) 100-10 MG/5ML syrup   2. Cough  ipratropium-albuterol (DUONEB) nebulizer solution    DX-CHEST-2 VIEWS    albuterol 108 (90 Base) MCG/ACT Aero Soln inhalation aerosol    doxycycline (VIBRAMYCIN) 100 MG Tab    predniSONE (DELTASONE) 20 MG Tab    guaifenesin-codeine (TUSSI-ORGANIDIN NR) 100-10 MG/5ML syrup       Discussed that I felt this was viral in nature. Did not see any evidence of a bacterial process. However, due to patient history of coccidiomycosis, contingent antibiotic prescription given to patient to fill upon meeting criteria of guidelines discussed.     PT instructed not to drive or operate heavy machinery or drink alcohol while taking this medication because it contains either a narcotic or benzodiazepines which causes drowsiness. PT verbalized understanding of these instructions.     [facility] Aware web site evaluation: I have obtained and reviewed patient utilization report from [facility] pharmacy database prior to writing prescription for controlled substance.  No history of abuse.    PT should follow up with PCP in 1-2 days for re-evaluation if symptoms have not improved.  Discussed red flags and reasons to return to UC or ED.  Pt and/or family verbalized understanding of diagnosis and follow  up instructions and was offered informational handout on diagnosis.  PT discharged.

## 2019-03-06 DIAGNOSIS — M51.36 DDD (DEGENERATIVE DISC DISEASE), LUMBAR: ICD-10-CM

## 2019-03-06 RX ORDER — AMITRIPTYLINE HYDROCHLORIDE 10 MG/1
10 TABLET, FILM COATED ORAL
Qty: 90 TAB | Refills: 1 | Status: SHIPPED | OUTPATIENT
Start: 2019-03-06 | End: 2019-04-15

## 2019-03-25 DIAGNOSIS — M25.552 LEFT HIP PAIN: ICD-10-CM

## 2019-03-25 RX ORDER — MELOXICAM 15 MG/1
15 TABLET ORAL
Qty: 30 TAB | Refills: 0 | Status: SHIPPED | OUTPATIENT
Start: 2019-03-25 | End: 2019-04-27 | Stop reason: SDUPTHER

## 2019-03-25 RX ORDER — MELOXICAM 15 MG/1
15 TABLET ORAL
Qty: 30 TAB | Refills: 0 | OUTPATIENT
Start: 2019-03-25

## 2019-03-25 NOTE — TELEPHONE ENCOUNTER
Was the patient seen in the last year in this department?  Yes     Does patient have an active prescription for medications requested?  NO    Received Request Via: Pharmacy

## 2019-04-09 ENCOUNTER — HOSPITAL ENCOUNTER (OUTPATIENT)
Dept: LAB | Facility: MEDICAL CENTER | Age: 64
End: 2019-04-09
Attending: FAMILY MEDICINE
Payer: MEDICARE

## 2019-04-09 DIAGNOSIS — E78.5 DYSLIPIDEMIA: ICD-10-CM

## 2019-04-09 DIAGNOSIS — E55.9 VITAMIN D DEFICIENCY: ICD-10-CM

## 2019-04-09 DIAGNOSIS — R25.2 LEG CRAMPS: ICD-10-CM

## 2019-04-09 DIAGNOSIS — M81.0 OSTEOPOROSIS OF FOREARM: ICD-10-CM

## 2019-04-09 LAB
25(OH)D3 SERPL-MCNC: 40 NG/ML (ref 30–100)
ALBUMIN SERPL BCP-MCNC: 4 G/DL (ref 3.2–4.9)
ALBUMIN/GLOB SERPL: 1.4 G/DL
ALP SERPL-CCNC: 70 U/L (ref 30–99)
ALT SERPL-CCNC: 16 U/L (ref 2–50)
ANION GAP SERPL CALC-SCNC: 8 MMOL/L (ref 0–11.9)
AST SERPL-CCNC: 13 U/L (ref 12–45)
BILIRUB SERPL-MCNC: 0.8 MG/DL (ref 0.1–1.5)
BUN SERPL-MCNC: 27 MG/DL (ref 8–22)
CALCIUM SERPL-MCNC: 9.3 MG/DL (ref 8.5–10.5)
CHLORIDE SERPL-SCNC: 103 MMOL/L (ref 96–112)
CHOLEST SERPL-MCNC: 201 MG/DL (ref 100–199)
CO2 SERPL-SCNC: 26 MMOL/L (ref 20–33)
CREAT SERPL-MCNC: 0.73 MG/DL (ref 0.5–1.4)
FASTING STATUS PATIENT QL REPORTED: NORMAL
GLOBULIN SER CALC-MCNC: 2.9 G/DL (ref 1.9–3.5)
GLUCOSE SERPL-MCNC: 98 MG/DL (ref 65–99)
HDLC SERPL-MCNC: 71 MG/DL
LDLC SERPL CALC-MCNC: 108 MG/DL
POTASSIUM SERPL-SCNC: 4.4 MMOL/L (ref 3.6–5.5)
PROT SERPL-MCNC: 6.9 G/DL (ref 6–8.2)
SODIUM SERPL-SCNC: 137 MMOL/L (ref 135–145)
TRIGL SERPL-MCNC: 109 MG/DL (ref 0–149)

## 2019-04-09 PROCEDURE — 80053 COMPREHEN METABOLIC PANEL: CPT

## 2019-04-09 PROCEDURE — 80061 LIPID PANEL: CPT

## 2019-04-09 PROCEDURE — 36415 COLL VENOUS BLD VENIPUNCTURE: CPT

## 2019-04-09 PROCEDURE — 82306 VITAMIN D 25 HYDROXY: CPT

## 2019-04-15 ENCOUNTER — OFFICE VISIT (OUTPATIENT)
Dept: MEDICAL GROUP | Facility: PHYSICIAN GROUP | Age: 64
End: 2019-04-15
Payer: MEDICARE

## 2019-04-15 VITALS
HEART RATE: 100 BPM | SYSTOLIC BLOOD PRESSURE: 110 MMHG | BODY MASS INDEX: 38.58 KG/M2 | OXYGEN SATURATION: 92 % | HEIGHT: 59 IN | DIASTOLIC BLOOD PRESSURE: 70 MMHG | TEMPERATURE: 98.7 F | WEIGHT: 191.36 LBS

## 2019-04-15 DIAGNOSIS — M81.0 OSTEOPOROSIS OF FOREARM: ICD-10-CM

## 2019-04-15 DIAGNOSIS — E78.5 DYSLIPIDEMIA: ICD-10-CM

## 2019-04-15 DIAGNOSIS — E55.9 VITAMIN D DEFICIENCY: ICD-10-CM

## 2019-04-15 DIAGNOSIS — M25.552 LEFT HIP PAIN: ICD-10-CM

## 2019-04-15 PROCEDURE — 99214 OFFICE O/P EST MOD 30 MIN: CPT | Performed by: FAMILY MEDICINE

## 2019-04-15 RX ORDER — AMITRIPTYLINE HYDROCHLORIDE 10 MG/1
30 TABLET, FILM COATED ORAL NIGHTLY PRN
Qty: 270 TAB | Refills: 1 | Status: SHIPPED | OUTPATIENT
Start: 2019-04-15 | End: 2019-10-03 | Stop reason: SDUPTHER

## 2019-04-15 RX ORDER — ESTRADIOL 0.1 MG/G
CREAM VAGINAL DAILY
Status: ON HOLD | COMMUNITY
End: 2019-09-19

## 2019-04-15 ASSESSMENT — PATIENT HEALTH QUESTIONNAIRE - PHQ9: CLINICAL INTERPRETATION OF PHQ2 SCORE: 0

## 2019-04-15 NOTE — PROGRESS NOTES
Subjective:      Dee Armstrong is a 63 y.o. female who presents with Results (labs)      HPI:    Patient presents today for follow-up of her chronic medical problems.    Hernia Repair  Patient had a hernia repair surgery via mesh on 11/26/2018 performed by Dr. Ramírez. She notes it has healed well. Her  RUQ pain has since much improved.    Left hip pain  She notes she had a fall in March 2018 which has resulted in left lower back pain mostly in her left buttock. She is followed by [facility] for this issue, who has referred her to pain management. Exacerbation of her pain from putting weight on her left leg; alleviation of her pain from laying down. She notes they have not identified a specific etiology of her pain, but they have plans for pain injections. She has gone through physical therapy as well for this issue with only some improvement. Previous MRI through [facility] does shows moderate osteoarthritis. She has also been taking Amitriptyline 10 mg up to 3 tablets at bedtime, and she would like a refill. She also take Gabapentin 300 mg at night as well for this issue.  The combination is working to manage her pain.    Osteoporosis  She has continued to take Alendronate which she began in 2018. She also supplements with Calcium and Vitamin D. She is not due for another bone density screening until next year as her last was in June 2018.    Dyslipidemia  She continues to manage her dyslipidemia through her own efforts.     Vitamin D deficiency  She takes OTC Vitamin D supplements for this issue as well as her osteoporosis. Blood work was completed prior to this visit.    Health Maintenance  She is due for a repeat mammogram in May 2019.       Past medical history, past surgical history, family history reviewed-no changes    Social history reviewed-no changes    Allergies reviewed-no changes    Medications reviewed-no changes        ROS:  As per the HPI as shown above, the rest are negative.        "Objective:     /70 (BP Location: Left arm, Patient Position: Sitting, BP Cuff Size: Adult) Comment: forearm  Pulse 100   Temp 37.1 °C (98.7 °F) (Temporal)   Ht 1.499 m (4' 11\")   Wt 86.8 kg (191 lb 5.8 oz)   SpO2 92%   BMI 38.65 kg/m²     Physical Exam    Examined alert, awake, oriented, not in distress    Neck-supple, no lymphadenopathy, no thyromegaly  Lungs-clear to auscultation, no rales, no wheezes  Heart-regular rate and rhythm, no murmur  Extremities-no edema, clubbing, cyanosis      Labs:  Hospital Outpatient Visit on 04/09/2019   Component Date Value Ref Range Status   • Cholesterol,Tot 04/09/2019 201* 100 - 199 mg/dL Final   • Triglycerides 04/09/2019 109  0 - 149 mg/dL Final   • HDL 04/09/2019 71  >=40 mg/dL Final   • LDL 04/09/2019 108* <100 mg/dL Final   • Sodium 04/09/2019 137  135 - 145 mmol/L Final   • Potassium 04/09/2019 4.4  3.6 - 5.5 mmol/L Final   • Chloride 04/09/2019 103  96 - 112 mmol/L Final   • Co2 04/09/2019 26  20 - 33 mmol/L Final   • Anion Gap 04/09/2019 8.0  0.0 - 11.9 Final   • Glucose 04/09/2019 98  65 - 99 mg/dL Final   • Bun 04/09/2019 27* 8 - 22 mg/dL Final   • Creatinine 04/09/2019 0.73  0.50 - 1.40 mg/dL Final   • Calcium 04/09/2019 9.3  8.5 - 10.5 mg/dL Final   • AST(SGOT) 04/09/2019 13  12 - 45 U/L Final   • ALT(SGPT) 04/09/2019 16  2 - 50 U/L Final   • Alkaline Phosphatase 04/09/2019 70  30 - 99 U/L Final   • Total Bilirubin 04/09/2019 0.8  0.1 - 1.5 mg/dL Final   • Albumin 04/09/2019 4.0  3.2 - 4.9 g/dL Final   • Total Protein 04/09/2019 6.9  6.0 - 8.2 g/dL Final   • Globulin 04/09/2019 2.9  1.9 - 3.5 g/dL Final   • A-G Ratio 04/09/2019 1.4  g/dL Final   • 25-Hydroxy   Vitamin D 25 04/09/2019 40  30 - 100 ng/mL Final    Comment: Adult Ranges:   <20 ng/mL - Deficiency  20-29 ng/mL - Insufficiency   ng/mL - Sufficiency  The [employer] Centaur Vitamin D Assay is standardized to the  [facility] reference measurement procedures, a  reference method for the " Vitamin D Standardization Program  (VDSP).  The VDSP aligns patient results among 25 (OH)  Vitamin D methods.     • Fasting Status 04/09/2019 Fasting   Final   • GFR If  04/09/2019 >60  >60 mL/min/1.73 m 2 Final   • GFR If Non  04/09/2019 >60  >60 mL/min/1.73 m 2 Final        Assessment/Plan:     1. Dyslipidemia  Her total cholesterol has increased from 172 to 201, and her LDL has increased from 78 to 108. Her triglycerides have also improved significantly from 222 to 109, and her HDL improved from 50 to 71.  10-year ASCVD risk 3.8% therefore we do not need to put her on statin.  We will continue the current plan of care. I have advised the patient to increase her exercise regimen as her pain allows and avoid fatty foods. Labs have been ordered for the next follow up visit.   - Lipid Profile; Future  - Comp Metabolic Panel; Future  - CBC WITH DIFFERENTIAL; Future    2. Osteoporosis of forearm  She will continue Alendronate which she will complete in 2023 as well as calcium and Vitamin D supplementation. Her last bone density screening was in June 2018, so we will repeat this next year.   - Lipid Profile; Future  - Comp Metabolic Panel; Future  - CBC WITH DIFFERENTIAL; Future    3. Vitamin D deficiency  Her vitamin D levels remain with target range. Plan of care is to continue current supplementation.  - Lipid Profile; Future  - Comp Metabolic Panel; Future  - CBC WITH DIFFERENTIAL; Future    4. Left hip pain  She is followed by [facility] for this issue. Refills of her Amitriptyline have been provided as she has been taking 3 tablets at night for management of her pain.  Continue gabapentin.  A combination is working to manage her pain.  We will continue to follow her closely along with [facility].   - amitriptyline (ELAVIL) 10 MG Tab; Take 3 Tabs by mouth at bedtime as needed.  Dispense: 270 Tab; Refill: 1        IRolly (Scribe), am scribing for, and in the presence  of, Sherly Calvert MD     Electronically signed by: Rolly Rodriguez (Scribe), 4/15/2019    I, Sherly Calvert MD personally performed the services described in this documentation, as scribed by Rolly Rodriguez in my presence, and it is both accurate and complete.

## 2019-04-27 DIAGNOSIS — M25.552 LEFT HIP PAIN: ICD-10-CM

## 2019-04-27 DIAGNOSIS — R25.2 LEG CRAMP: ICD-10-CM

## 2019-04-29 RX ORDER — TIZANIDINE 4 MG/1
TABLET ORAL
Qty: 30 TAB | Refills: 0 | Status: SHIPPED | OUTPATIENT
Start: 2019-04-29 | End: 2019-05-28 | Stop reason: SDUPTHER

## 2019-04-29 RX ORDER — CLOBETASOL PROPIONATE 0.5 MG/G
1 CREAM TOPICAL PRN
Refills: 1 | Status: ON HOLD | COMMUNITY
Start: 2019-03-08 | End: 2024-02-12

## 2019-04-29 RX ORDER — MELOXICAM 15 MG/1
TABLET ORAL
Qty: 30 TAB | Refills: 0 | Status: SHIPPED | OUTPATIENT
Start: 2019-04-29 | End: 2019-05-27 | Stop reason: SDUPTHER

## 2019-04-29 RX ORDER — TIZANIDINE 4 MG/1
4 TABLET ORAL
Qty: 30 TAB | Refills: 0 | OUTPATIENT
Start: 2019-04-29

## 2019-04-29 RX ORDER — ALBUTEROL SULFATE 90 UG/1
AEROSOL, METERED RESPIRATORY (INHALATION)
COMMUNITY
Start: 2019-02-16 | End: 2019-09-12

## 2019-04-29 RX ORDER — OXYCODONE HYDROCHLORIDE AND ACETAMINOPHEN 5; 325 MG/1; MG/1
1 TABLET ORAL
Refills: 0 | COMMUNITY
Start: 2019-03-18 | End: 2019-09-12

## 2019-04-29 RX ORDER — TIZANIDINE 4 MG/1
4 TABLET ORAL
Refills: 5 | COMMUNITY
Start: 2019-03-31 | End: 2019-09-12

## 2019-04-29 RX ORDER — ALBUTEROL SULFATE 90 UG/1
AEROSOL, METERED RESPIRATORY (INHALATION)
Refills: 0 | COMMUNITY
Start: 2019-02-16 | End: 2019-09-12

## 2019-04-29 RX ORDER — METHYLPREDNISOLONE 4 MG/1
TABLET ORAL
Refills: 0 | COMMUNITY
Start: 2019-03-18 | End: 2019-09-12

## 2019-05-13 RX ORDER — GABAPENTIN 300 MG/1
300 CAPSULE ORAL EVERY EVENING
Qty: 90 CAP | Refills: 3 | Status: SHIPPED | OUTPATIENT
Start: 2019-05-13 | End: 2020-05-04

## 2019-05-13 RX ORDER — ALENDRONATE SODIUM 70 MG/1
70 TABLET ORAL
Qty: 12 TAB | Refills: 3 | Status: ON HOLD | OUTPATIENT
Start: 2019-05-13 | End: 2019-10-07

## 2019-06-07 ENCOUNTER — HOSPITAL ENCOUNTER (OUTPATIENT)
Dept: RADIOLOGY | Facility: MEDICAL CENTER | Age: 64
End: 2019-06-07
Attending: FAMILY MEDICINE
Payer: MEDICARE

## 2019-06-07 DIAGNOSIS — Z12.31 BREAST CANCER SCREENING BY MAMMOGRAM: ICD-10-CM

## 2019-06-07 PROCEDURE — 77063 BREAST TOMOSYNTHESIS BI: CPT

## 2019-07-31 ENCOUNTER — OFFICE VISIT (OUTPATIENT)
Dept: URGENT CARE | Facility: PHYSICIAN GROUP | Age: 64
End: 2019-07-31
Payer: MEDICARE

## 2019-07-31 ENCOUNTER — HOSPITAL ENCOUNTER (OUTPATIENT)
Dept: RADIOLOGY | Facility: MEDICAL CENTER | Age: 64
End: 2019-07-31
Attending: NURSE PRACTITIONER
Payer: MEDICARE

## 2019-07-31 VITALS
HEART RATE: 95 BPM | HEIGHT: 59 IN | RESPIRATION RATE: 14 BRPM | SYSTOLIC BLOOD PRESSURE: 122 MMHG | DIASTOLIC BLOOD PRESSURE: 80 MMHG | OXYGEN SATURATION: 96 % | TEMPERATURE: 98.3 F | BODY MASS INDEX: 39.72 KG/M2 | WEIGHT: 197 LBS

## 2019-07-31 DIAGNOSIS — M16.12 OSTEOARTHRITIS OF LEFT HIP, UNSPECIFIED OSTEOARTHRITIS TYPE: ICD-10-CM

## 2019-07-31 DIAGNOSIS — M79.652 LEFT THIGH PAIN: ICD-10-CM

## 2019-07-31 DIAGNOSIS — S76.012A HIP STRAIN, LEFT, INITIAL ENCOUNTER: ICD-10-CM

## 2019-07-31 PROCEDURE — 99214 OFFICE O/P EST MOD 30 MIN: CPT | Performed by: NURSE PRACTITIONER

## 2019-07-31 PROCEDURE — 73502 X-RAY EXAM HIP UNI 2-3 VIEWS: CPT | Mod: LT

## 2019-07-31 RX ORDER — METHYLPREDNISOLONE 4 MG/1
TABLET ORAL
Qty: 1 KIT | Refills: 0 | Status: SHIPPED | OUTPATIENT
Start: 2019-07-31 | End: 2019-09-12

## 2019-07-31 RX ORDER — CYCLOBENZAPRINE HCL 5 MG
5-10 TABLET ORAL 3 TIMES DAILY PRN
Qty: 30 TAB | Refills: 0 | Status: SHIPPED | OUTPATIENT
Start: 2019-07-31 | End: 2019-09-12

## 2019-07-31 RX ORDER — HYDROCODONE BITARTRATE AND ACETAMINOPHEN 5; 325 MG/1; MG/1
1 TABLET ORAL EVERY 8 HOURS PRN
Qty: 15 TAB | Refills: 0 | Status: SHIPPED | OUTPATIENT
Start: 2019-07-31 | End: 2019-08-05

## 2019-07-31 ASSESSMENT — ENCOUNTER SYMPTOMS
CONSTITUTIONAL NEGATIVE: 1
INABILITY TO BEAR WEIGHT: 0
TINGLING: 0
LOSS OF SENSATION: 0
SENSORY CHANGE: 0
NUMBNESS: 0
SHORTNESS OF BREATH: 0
NEUROLOGICAL NEGATIVE: 1
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
## 2019-08-05 ENCOUNTER — APPOINTMENT (RX ONLY)
Dept: URBAN - METROPOLITAN AREA CLINIC 20 | Facility: CLINIC | Age: 64
Setting detail: DERMATOLOGY
End: 2019-08-05

## 2019-08-05 DIAGNOSIS — L82.1 OTHER SEBORRHEIC KERATOSIS: ICD-10-CM

## 2019-08-05 DIAGNOSIS — D18.0 HEMANGIOMA: ICD-10-CM

## 2019-08-05 DIAGNOSIS — L82.0 INFLAMED SEBORRHEIC KERATOSIS: ICD-10-CM

## 2019-08-05 DIAGNOSIS — L57.8 OTHER SKIN CHANGES DUE TO CHRONIC EXPOSURE TO NONIONIZING RADIATION: ICD-10-CM

## 2019-08-05 DIAGNOSIS — D22 MELANOCYTIC NEVI: ICD-10-CM

## 2019-08-05 DIAGNOSIS — L81.4 OTHER MELANIN HYPERPIGMENTATION: ICD-10-CM

## 2019-08-05 PROBLEM — D22.5 MELANOCYTIC NEVI OF TRUNK: Status: ACTIVE | Noted: 2019-08-05

## 2019-08-05 PROBLEM — D18.01 HEMANGIOMA OF SKIN AND SUBCUTANEOUS TISSUE: Status: ACTIVE | Noted: 2019-08-05

## 2019-08-05 PROCEDURE — ? COUNSELING

## 2019-08-05 PROCEDURE — ? LIQUID NITROGEN

## 2019-08-05 PROCEDURE — 99214 OFFICE O/P EST MOD 30 MIN: CPT | Mod: 25

## 2019-08-05 PROCEDURE — 17110 DESTRUCTION B9 LES UP TO 14: CPT

## 2019-08-05 ASSESSMENT — LOCATION DETAILED DESCRIPTION DERM
LOCATION DETAILED: RIGHT PROXIMAL DORSAL FOREARM
LOCATION DETAILED: LEFT MEDIAL SUPERIOR CHEST
LOCATION DETAILED: RIGHT ANTERIOR PROXIMAL UPPER ARM
LOCATION DETAILED: RIGHT ANTERIOR DISTAL THIGH
LOCATION DETAILED: RIGHT CENTRAL MALAR CHEEK
LOCATION DETAILED: RIGHT MID-UPPER BACK
LOCATION DETAILED: LEFT FOREHEAD
LOCATION DETAILED: LEFT PROXIMAL DORSAL FOREARM
LOCATION DETAILED: LEFT INFERIOR CENTRAL MALAR CHEEK
LOCATION DETAILED: RIGHT INFERIOR UPPER BACK
LOCATION DETAILED: LEFT ANTERIOR PROXIMAL UPPER ARM
LOCATION DETAILED: LEFT ANTERIOR DISTAL THIGH
LOCATION DETAILED: RIGHT SUPERIOR MEDIAL UPPER BACK

## 2019-08-05 ASSESSMENT — LOCATION ZONE DERM
LOCATION ZONE: FACE
LOCATION ZONE: ARM
LOCATION ZONE: TRUNK
LOCATION ZONE: LEG

## 2019-08-05 ASSESSMENT — LOCATION SIMPLE DESCRIPTION DERM
LOCATION SIMPLE: RIGHT UPPER BACK
LOCATION SIMPLE: RIGHT FOREARM
LOCATION SIMPLE: RIGHT THIGH
LOCATION SIMPLE: RIGHT UPPER ARM
LOCATION SIMPLE: LEFT FOREARM
LOCATION SIMPLE: RIGHT CHEEK
LOCATION SIMPLE: LEFT UPPER ARM
LOCATION SIMPLE: LEFT CHEEK
LOCATION SIMPLE: LEFT THIGH
LOCATION SIMPLE: LEFT FOREHEAD
LOCATION SIMPLE: CHEST

## 2019-08-11 DIAGNOSIS — M25.552 LEFT HIP PAIN: ICD-10-CM

## 2019-08-12 RX ORDER — MELOXICAM 15 MG/1
TABLET ORAL
Qty: 30 TAB | Refills: 2 | Status: ON HOLD | OUTPATIENT
Start: 2019-08-12 | End: 2019-10-07

## 2019-08-20 ENCOUNTER — HOSPITAL ENCOUNTER (OUTPATIENT)
Dept: RADIOLOGY | Facility: MEDICAL CENTER | Age: 64
End: 2019-08-20
Attending: PHYSICIAN ASSISTANT
Payer: MEDICARE

## 2019-08-20 DIAGNOSIS — M25.552 LEFT HIP PAIN: ICD-10-CM

## 2019-08-20 PROCEDURE — 73721 MRI JNT OF LWR EXTRE W/O DYE: CPT | Mod: LT

## 2019-09-06 ENCOUNTER — OFFICE VISIT (OUTPATIENT)
Dept: PULMONOLOGY | Facility: HOSPICE | Age: 64
End: 2019-09-06
Payer: MEDICARE

## 2019-09-06 ENCOUNTER — APPOINTMENT (OUTPATIENT)
Dept: RADIOLOGY | Facility: IMAGING CENTER | Age: 64
End: 2019-09-06
Payer: MEDICARE

## 2019-09-06 VITALS
SYSTOLIC BLOOD PRESSURE: 134 MMHG | HEIGHT: 59 IN | BODY MASS INDEX: 38.3 KG/M2 | DIASTOLIC BLOOD PRESSURE: 84 MMHG | HEART RATE: 88 BPM | OXYGEN SATURATION: 96 % | RESPIRATION RATE: 16 BRPM | WEIGHT: 190 LBS

## 2019-09-06 DIAGNOSIS — B38.9 COCCIDIOIDOMYCOSIS: ICD-10-CM

## 2019-09-06 DIAGNOSIS — Z01.818 ENCOUNTER FOR PRE-OPERATIVE EXAMINATION: ICD-10-CM

## 2019-09-06 PROCEDURE — 99213 OFFICE O/P EST LOW 20 MIN: CPT | Performed by: PHYSICIAN ASSISTANT

## 2019-09-06 PROCEDURE — 71046 X-RAY EXAM CHEST 2 VIEWS: CPT | Mod: TC | Performed by: PHYSICIAN ASSISTANT

## 2019-09-06 ASSESSMENT — ENCOUNTER SYMPTOMS
SINUS PAIN: 0
SPUTUM PRODUCTION: 0
EYES NEGATIVE: 1
PALPITATIONS: 0
SORE THROAT: 0
HEARTBURN: 1
CHILLS: 0
CLAUDICATION: 0
ORTHOPNEA: 0
WEIGHT LOSS: 0
SHORTNESS OF BREATH: 0
TREMORS: 0
COUGH: 0
DIAPHORESIS: 0
DIZZINESS: 0
WHEEZING: 0
HEADACHES: 0
FEVER: 0

## 2019-09-06 NOTE — PATIENT INSTRUCTIONS
1-will review cxr when available  2-otherwise no absolute contraindication to medically necessary surger  3-patient provided clearance information to send letter  4-will follow up annual basis   5-any symptoms at that time will repeat PFT

## 2019-09-06 NOTE — LETTER
To whom it may concern:      Patient seen in pulmonary clinic 9/6/2019.  She does have a history of coccidiomycosis treated for 1 year with Diflucan on with treatment completion in November 2016.  She does have a left upper lobe mass which has been stable since 2016.  She has no current symptoms.    Chest x-ray obtained 9/6/2019 demonstrated unchanged 2.4 cm elliptical mass in the left upper lobe with no other cardiopulmonary processes identified.  Patient does have an elevated BMI with central adiposity which can impact pulmonary function. Postop care should include incentive spirometry, has albuterol rescue inhaler to use if wheezing.    Last pulmonary function test demonstrated moderate increase in gas transfer capacity probably secondary to weight.  Otherwise normal PFT.  Oxygen saturation was 96% in the clinic.    Regarding upcoming total hip surgery, patient has no absolute contraindications to medically necessary procedures.  Any questions or concerns please contact our office.    Respectfully,    Elda Fitch PA-C

## 2019-09-06 NOTE — PROGRESS NOTES
CC: Hip and back pain    HPI:  Dee Armstrong is a 64 y.o. year old female here today for surgical clearance, history of coccidiomycosis.  Patient reports total hip replacement as needed and she has fairly severe lumbar radiculopathy. Last seen in clinic 11/21/2018 by GARRETT Hanson.  Remote very brief smoking history reported as  2 pack years quit in 1977.  Continued abstinence advised.    Reviewed in clinic vitals including blood pressure 140/100, heart rate of 88, O2 sat of 96%.  Patient admits she is very anxious regarding getting surgical clearance due to severity of her current pain.  Blood pressure was rechecked at completion of visit and was 134/84 at that time.  Patient's body mass index is 38.38 kg/m². Exercise and nutrition counseling were performed at this visit.  Discussed impact of central adiposity on pulmonary function.  Patient is hoping to increase her activity level significantly post surgery.    Reviewed home medication regimen including Prevacid albuterol rescue inhaler which she rarely requires.  She has occasionally required a Medrol Dosepak last in July 2019 and in March 2019 prior.    Reviewed most recent imaging including chest x-ray obtained 9/6/2019 demonstrating unchanged 2.4 cm elliptical mass in the left upper lobe with no other cardiopulmonary processes identified.  Left upper lobe mass is unchanged in size compared to 7/27/2016.  Patient has a past history of coccidiomycosis with positive titers treated with Diflucan for 1 year completed November 2016.    Last pulmonary function test was obtained 12/12/2018 demonstrating an FEV1 of 2.08 L or 106% predicted, FVC of 2.58 L or 100% of predicted, FEV1/FVC ratio 81.  No significant response to bronchodilators, normal residual volume at 119% predicted, normal total lung capacity at 112% predicted, diffusion capacity moderately increased at 161% predicted.  Per pulmonologist interpretation moderate increase in diffusion  capacity probably secondary to obesity.  Otherwise normal pulmonary function test.      Review of Systems   Constitutional: Negative for chills, diaphoresis, fever, malaise/fatigue and weight loss.   HENT: Negative for congestion, hearing loss, nosebleeds, sinus pain, sore throat and tinnitus.    Eyes: Negative.    Respiratory: Negative for cough, sputum production, shortness of breath and wheezing.    Cardiovascular: Negative for chest pain, palpitations, orthopnea, claudication and leg swelling.   Gastrointestinal: Positive for heartburn (no break thru symptoms).   Neurological: Negative for dizziness, tremors and headaches.       Past Medical History:   Diagnosis Date   • Coccidioidomycosis 12/15    left upper lung   • Cough 5/29/2015   • Diverticulosis    • GERD (gastroesophageal reflux disease)    • Heart burn    • Indigestion    • LBP (low back pain)    • Lumbar radicular pain 5/29/2015   • Muscle disorder    • Obesity    • Osteoporosis of forearm     Alendronate started 6/18   • Pain     Shoulder and numbness to R LE       Past Surgical History:   Procedure Laterality Date   • INCISION HERNIA REPAIR Right 11/26/2018    Procedure: INCISION HERNIA REPAIR- FLANK WITH MESH;  Surgeon: Misael Ramírez M.D.;  Location: [facility];  Service: General   • HYSTERECTOMY ROBOTIC  6/7/2017    Procedure: HYSTERECTOMY ROBOTIC SI, BILATERAL SALPINGO-OOPHORECTOMY, URETERAL SACRAL LIGAMENT SHORTENING ;  Surgeon: Jerica Hooper M.D.;  Location: [facility];  Service:    • CYSTOSCOPY  6/7/2017    Procedure: CYSTOSCOPY;  Surgeon: Jerica Hooper M.D.;  Location: [facility];  Service:    • SHOULDER DECOMPRESSION ARTHROSCOPIC Right 12/6/2016    Procedure: SHOULDER DECOMPRESSION ARTHROSCOPIC - SUBACROMIAL, MANJARREZ;  Surgeon: Kyle Claros M.D.;  Location: [facility];  Service:    • CLAVICLE DISTAL EXCISION Right 12/6/2016    Procedure: CLAVICLE DISTAL EXCISION;  Surgeon: Kyle  ZUNILDA Claros;  Location: SURGERY [facility];  Service:    • SHOULDER ARTHROSCOPY W/ ROTATOR CUFF REPAIR Right 12/6/2016    Procedure: SHOULDER ARTHROSCOPY W/ ROTATOR CUFF REPAIR ;  Surgeon: Kyle Claros M.D.;  Location: SURGERY [facility];  Service:    • SHOULDER ARTHROSCOPY W/ BICIPITAL TENODESIS REPAIR Right 12/6/2016    Procedure: SHOULDER ARTHROSCOPY W/ BICIPITAL TENODESIS REPAIR ;  Surgeon: Kyle Claros M.D.;  Location: SURGERY [facility];  Service:    • LUMBAR FUSION POSTERIOR  2/9/2016    Procedure: LUMBAR FUSION POSTERIOR PEDICLE SCREW FIXATION (STAGE #2);  Surgeon: Tim Rodriguez M.D.;  Location: SURGERY [facility];  Service:    • LUMBAR LAMINECTOMY DISKECTOMY  2/9/2016    Procedure: LUMBAR LAMINECTOMY DISKECTOMY MINI OPEN;  Surgeon: Tim Rodriguez M.D.;  Location: SURGERY [facility];  Service:    • EXTREME LATERAL INTERBODY FUSION Right 2/6/2016    Procedure: EXTREME LATERAL INTERBODY FUSION L3-4 (STAGE #1);  Surgeon: Tim Rodriguez M.D.;  Location: SURGERY [facility];  Service:    • RECOVERY  12/8/2015    Procedure: CT-CT GUIDED LEFT LUNG BIOPSY-;  Surgeon: Recoveryonly Surgery;  Location: SURGERY PRE[place] PROC UNIT [facility];  Service:    • REDDY BY LAPAROSCOPY  4/17/2014    Performed by Ankur Lerner M.D. at SURGERY SAME DAY [facility]S   • EGD WITH ASP/BX  2/4/2014    mild chronic inactive gastritis, scar gastric antrum-   • EGD WITH ASP/BX  9/29/2011    possible barrettes, hiatal hernia, gastritis   • COLONOSCOPY  9/29/2011    diverticulosis sigmoid colon, hemorrhoids   • ARTHRODESIS  9/3/2010    Performed by YARELY MORRISON at SURGERY SAME DAY [facility] ORS   • ORTHOPEDIC OSTEOTOMY  9/3/2010    Performed by YARELY MORRISON at SURGERY SAME DAY [facility]   • BONE SPUR EXCISION  9/3/2010    Performed by YARELY MORRISON at SURGERY SAME DAY [facility]   • ARTHROTOMY  9/3/2010    Performed by YARELY MORRISON at SURGERY SAME DAY [facility]   • OTHER  ABDOMINAL SURGERY      COLON RESECTION   • LAMINOTOMY     • OTHER ORTHOPEDIC SURGERY      PARDEEP CARPAL TUNNEL RELEASES       Family History   Problem Relation Age of Onset   • Cancer Mother         lymphoma   • Stroke Father    • Diabetes Father        Social History     Socioeconomic History   • Marital status:      Spouse name: Not on file   • Number of children: Not on file   • Years of education: Not on file   • Highest education level: Not on file   Occupational History   • Not on file   Social Needs   • Financial resource strain: Not on file   • Food insecurity:     Worry: Not on file     Inability: Not on file   • Transportation needs:     Medical: Not on file     Non-medical: Not on file   Tobacco Use   • Smoking status: Former Smoker     Years: 2.00     Types: Cigarettes     Last attempt to quit: 1977     Years since quittin.7   • Smokeless tobacco: Never Used   • Tobacco comment: quit in    Substance and Sexual Activity   • Alcohol use: Yes     Alcohol/week: 0.0 oz     Comment: 1 per month   • Drug use: No     Comment: cig hx= 1/2 pack per month    • Sexual activity: Yes     Partners: Male     Comment:    Lifestyle   • Physical activity:     Days per week: Not on file     Minutes per session: Not on file   • Stress: Not on file   Relationships   • Social connections:     Talks on phone: Not on file     Gets together: Not on file     Attends Rastafari service: Not on file     Active member of club or organization: Not on file     Attends meetings of clubs or organizations: Not on file     Relationship status: Not on file   • Intimate partner violence:     Fear of current or ex partner: Not on file     Emotionally abused: Not on file     Physically abused: Not on file     Forced sexual activity: Not on file   Other Topics Concern   • Not on file   Social History Narrative   • Not on file       Allergies as of 2019 - Reviewed 2019   Allergen Reaction Noted   • Nkda [no  "known drug allergy]  02/18/2010        @Vital signs for this encounter:  Vitals:    09/06/19 1033   Height: 1.499 m (4' 11\")   Weight: 86.2 kg (190 lb)   Weight % change since last entry.: 0 %   BP: 140/100   Pulse: 88   BMI (Calculated): 38.38   Resp: 16       Current medications as of today   Current Outpatient Medications   Medication Sig Dispense Refill   • lansoprazole (PREVACID) 30 MG CAPSULE DELAYED RELEASE TAKE 1 CAPSULE BY MOUTH  DAILY 90 Cap 3   • meloxicam (MOBIC) 15 MG tablet TAKE 1 TABLET BY MOUTH EVERY DAY 30 Tab 2   • cyclobenzaprine (FLEXERIL) 5 MG tablet Take 1-2 Tabs by mouth 3 times a day as needed. 30 Tab 0   • methylPREDNISolone (MEDROL DOSEPAK) 4 MG Tablet Therapy Pack Use as directed on package 1 Kit 0   • tizanidine (ZANAFLEX) 4 MG Tab TAKE 1 TABLET BY MOUTH EVERYDAY AT BEDTIME 30 Tab 2   • alendronate (FOSAMAX) 70 MG Tab Take 1 Tab by mouth every 7 days. 12 Tab 3   • gabapentin (NEURONTIN) 300 MG Cap Take 1 Cap by mouth every evening. 90 Cap 3   • tizanidine (ZANAFLEX) 4 MG Tab Take 4 mg by mouth every bedtime.  5   • oxyCODONE-acetaminophen (PERCOCET) 5-325 MG Tab Take 1 Tab by mouth.  0   • MethylPREDNISolone (MEDROL DOSEPAK) 4 MG Tablet Therapy Pack TAKE 6 TABLETS ON DAY 1 AS DIRECTED ON PACKAGE AND DECREASE BY 1 TAB EACH DAY FOR A TOTAL OF 6 DAYS  0   • clobetasol (TEMOVATE) 0.05 % Cream APPLY TO AFFECTED AREA TWICE A DAY UP TO 4 DAYS AS NEEDED FOR ITCH  1   • albuterol 108 (90 Base) MCG/ACT Aero Soln inhalation aerosol      • albuterol 108 (90 Base) MCG/ACT Aero Soln inhalation aerosol INHALE 2 PUFFS BY MOUTH EVERY FOUR HOURS AS NEEDED.  0   • estradiol (ESTRACE) 0.1 MG/GM vaginal cream Insert  in vagina every day.     • amitriptyline (ELAVIL) 10 MG Tab Take 3 Tabs by mouth at bedtime as needed. 270 Tab 1   • Calcium Carbonate (CALCIUM 600) 1500 (600 Ca) MG Tab Take  by mouth.     • Cholecalciferol (D3-1000) 1000 UNIT Tab Take 1,000 Units by mouth every day.     • albuterol 108 (90 " Base) MCG/ACT Aero Soln inhalation aerosol Inhale 2 Puffs by mouth every four hours as needed. 1 Inhaler 0   • ascorbic acid (ASCORBIC ACID) 500 MG Tab Take 1,000 mg by mouth every day.     • asa/apap/caffeine (EXCEDRIN) 250-250-65 MG Tab Take 2 Tabs by mouth 2 times a day as needed.     • FIBER PO Take 1 Cap by mouth every day.       No current facility-administered medications for this visit.          Physical Exam:   Gen:           Alert and oriented, No apparent distress. Mood and affect     appropriate, normal interaction with provider.  Eyes:          sclere white, conjunctive moist.  Hearing:     Grossly intact.  Dentition:    Good dentition.  Oropharynx:   Tongue normal, posterior pharynx without erythema or exudate.  Neck:        Supple, trachea midline, no masses.  Respiratory Effort: No intercostal retractions or use of accessory muscles.   Lung Auscultation:      Slightly decreased bases otherwise clear to auscultation bilaterally; no rales, rhonchi or wheezing.  CV:            Regular rate and rhythm. No edema. No murmurs, rubs or gallops.  Digits, Nails, Ext: No clubbing, cyanosis, petechiae, or nodes.   Skin:        No rashes, lesions or ulcers noted on back or exposed skin    surfaces.                     Assessment:  1. Pre-op exam     2. Encounter for pre-operative examination  DX-CHEST-2 VIEWS       Immunizations:    Flu: 10/8/2018, recommend updating September October 2019  Pneumovax 23: 6/18/2018  Prevnar 13: 1/3/2017    Plan:  1-chest x-ray reviewed  2-no absolute contraindication to medically necessary surgery  3-patient provided clearance information to send letter  4-will follow up annual basis   5-any symptoms at that time will repeat PFT  6-baseline oxygen saturation 96% on room air, ambulating multi ox not completed due to patient's poor tolerance of any ambulation due to pain    This dictation was created using voice recognition software. The accuracy of the dictation is limited to the  abilities of the software. I expect there may be some errors of grammar and possibly content.

## 2019-09-12 DIAGNOSIS — Z01.812 PRE-OPERATIVE LABORATORY EXAMINATION: ICD-10-CM

## 2019-09-12 DIAGNOSIS — Z01.810 PRE-OPERATIVE CARDIOVASCULAR EXAMINATION: ICD-10-CM

## 2019-09-12 LAB
ANION GAP SERPL CALC-SCNC: 12 MMOL/L (ref 0–11.9)
APPEARANCE UR: CLEAR
BASOPHILS # BLD AUTO: 1.4 % (ref 0–1.8)
BASOPHILS # BLD: 0.07 K/UL (ref 0–0.12)
BILIRUB UR QL STRIP.AUTO: NEGATIVE
BUN SERPL-MCNC: 13 MG/DL (ref 8–22)
CALCIUM SERPL-MCNC: 9.3 MG/DL (ref 8.5–10.5)
CHLORIDE SERPL-SCNC: 106 MMOL/L (ref 96–112)
CO2 SERPL-SCNC: 24 MMOL/L (ref 20–33)
COLOR UR: YELLOW
CREAT SERPL-MCNC: 0.66 MG/DL (ref 0.5–1.4)
EKG IMPRESSION: NORMAL
EOSINOPHIL # BLD AUTO: 0.06 K/UL (ref 0–0.51)
EOSINOPHIL NFR BLD: 1.2 % (ref 0–6.9)
ERYTHROCYTE [DISTWIDTH] IN BLOOD BY AUTOMATED COUNT: 44.5 FL (ref 35.9–50)
GLUCOSE SERPL-MCNC: 98 MG/DL (ref 65–99)
GLUCOSE UR STRIP.AUTO-MCNC: NEGATIVE MG/DL
HCT VFR BLD AUTO: 47.6 % (ref 37–47)
HGB BLD-MCNC: 15.3 G/DL (ref 12–16)
HIV 1+2 AB+HIV1 P24 AG SERPL QL IA: NON REACTIVE
IMM GRANULOCYTES # BLD AUTO: 0.03 K/UL (ref 0–0.11)
IMM GRANULOCYTES NFR BLD AUTO: 0.6 % (ref 0–0.9)
KETONES UR STRIP.AUTO-MCNC: NEGATIVE MG/DL
LEUKOCYTE ESTERASE UR QL STRIP.AUTO: NEGATIVE
LYMPHOCYTES # BLD AUTO: 1.67 K/UL (ref 1–4.8)
LYMPHOCYTES NFR BLD: 34.4 % (ref 22–41)
MCH RBC QN AUTO: 31.4 PG (ref 27–33)
MCHC RBC AUTO-ENTMCNC: 32.1 G/DL (ref 33.6–35)
MCV RBC AUTO: 97.5 FL (ref 81.4–97.8)
MICRO URNS: NORMAL
MONOCYTES # BLD AUTO: 0.42 K/UL (ref 0–0.85)
MONOCYTES NFR BLD AUTO: 8.7 % (ref 0–13.4)
NEUTROPHILS # BLD AUTO: 2.6 K/UL (ref 2–7.15)
NEUTROPHILS NFR BLD: 53.7 % (ref 44–72)
NITRITE UR QL STRIP.AUTO: NEGATIVE
NRBC # BLD AUTO: 0 K/UL
NRBC BLD-RTO: 0 /100 WBC
PH UR STRIP.AUTO: 7 [PH] (ref 5–8)
PLATELET # BLD AUTO: 288 K/UL (ref 164–446)
PMV BLD AUTO: 10 FL (ref 9–12.9)
POTASSIUM SERPL-SCNC: 4 MMOL/L (ref 3.6–5.5)
PROT UR QL STRIP: NEGATIVE MG/DL
RBC # BLD AUTO: 4.88 M/UL (ref 4.2–5.4)
RBC UR QL AUTO: NEGATIVE
SCCMEC + MECA PNL NOSE NAA+PROBE: NEGATIVE
SCCMEC + MECA PNL NOSE NAA+PROBE: POSITIVE
SODIUM SERPL-SCNC: 142 MMOL/L (ref 135–145)
SP GR UR STRIP.AUTO: 1.01
UROBILINOGEN UR STRIP.AUTO-MCNC: 0.2 MG/DL
WBC # BLD AUTO: 4.9 K/UL (ref 4.8–10.8)

## 2019-09-12 PROCEDURE — 87641 MR-STAPH DNA AMP PROBE: CPT

## 2019-09-12 PROCEDURE — 81003 URINALYSIS AUTO W/O SCOPE: CPT

## 2019-09-12 PROCEDURE — 87640 STAPH A DNA AMP PROBE: CPT | Mod: XU

## 2019-09-12 PROCEDURE — 93005 ELECTROCARDIOGRAM TRACING: CPT | Performed by: ORTHOPAEDIC SURGERY

## 2019-09-12 PROCEDURE — 93010 ELECTROCARDIOGRAM REPORT: CPT | Performed by: INTERNAL MEDICINE

## 2019-09-12 PROCEDURE — 36415 COLL VENOUS BLD VENIPUNCTURE: CPT

## 2019-09-12 PROCEDURE — 80048 BASIC METABOLIC PNL TOTAL CA: CPT

## 2019-09-12 PROCEDURE — G0475 HIV COMBINATION ASSAY: HCPCS

## 2019-09-12 PROCEDURE — 85025 COMPLETE CBC W/AUTO DIFF WBC: CPT

## 2019-09-12 SDOH — HEALTH STABILITY: MENTAL HEALTH: HOW OFTEN DO YOU HAVE A DRINK CONTAINING ALCOHOL?: MONTHLY OR LESS

## 2019-09-12 NOTE — DISCHARGE PLANNING
DISCHARGE PLANNING NOTE - TOTAL JOINT     Procedure: Procedure(s):  ARTHROPLASTY, HIP, TOTAL, ANTERIOR APPROACH  Procedure Date: 9/19/2019  Insurance:  Payor: MEDICARE / Plan: MEDICARE PART A & B  Equipment currently available at home? cane, four-wheel walker, front-wheel walker, raised toilet seat and shower chair  Steps into the home? 0  Steps within the home? 0  Toilet height? Standard  Type of shower? walk-in shower   Who will be with you during your recovery? spouse  Is Outpatient Physical Therapy set up after surgery? No   Did you take the Total Joint Class and where? No, provided LYNDA booklet     Plan: Provided Home Safety Checklist.There are no identified discharge needs. Anticipate discharge home.

## 2019-09-19 ENCOUNTER — HOSPITAL ENCOUNTER (INPATIENT)
Facility: MEDICAL CENTER | Age: 64
LOS: 1 days | DRG: 470 | End: 2019-09-20
Attending: ORTHOPAEDIC SURGERY | Admitting: ORTHOPAEDIC SURGERY
Payer: MEDICARE

## 2019-09-19 ENCOUNTER — ANESTHESIA (OUTPATIENT)
Dept: SURGERY | Facility: MEDICAL CENTER | Age: 64
DRG: 470 | End: 2019-09-19
Payer: MEDICARE

## 2019-09-19 ENCOUNTER — APPOINTMENT (OUTPATIENT)
Dept: RADIOLOGY | Facility: MEDICAL CENTER | Age: 64
DRG: 470 | End: 2019-09-19
Attending: ORTHOPAEDIC SURGERY
Payer: MEDICARE

## 2019-09-19 ENCOUNTER — ANESTHESIA EVENT (OUTPATIENT)
Dept: SURGERY | Facility: MEDICAL CENTER | Age: 64
DRG: 470 | End: 2019-09-19
Payer: MEDICARE

## 2019-09-19 DIAGNOSIS — Z96.642 STATUS POST TOTAL HIP REPLACEMENT, LEFT: ICD-10-CM

## 2019-09-19 PROCEDURE — 501838 HCHG SUTURE GENERAL: Performed by: ORTHOPAEDIC SURGERY

## 2019-09-19 PROCEDURE — 700101 HCHG RX REV CODE 250: Performed by: ORTHOPAEDIC SURGERY

## 2019-09-19 PROCEDURE — 160031 HCHG SURGERY MINUTES - 1ST 30 MINS LEVEL 5: Performed by: ORTHOPAEDIC SURGERY

## 2019-09-19 PROCEDURE — 0SRB06A REPLACEMENT OF LEFT HIP JOINT WITH OXIDIZED ZIRCONIUM ON POLYETHYLENE SYNTHETIC SUBSTITUTE, UNCEMENTED, OPEN APPROACH: ICD-10-PCS | Performed by: ORTHOPAEDIC SURGERY

## 2019-09-19 PROCEDURE — 700111 HCHG RX REV CODE 636 W/ 250 OVERRIDE (IP): Performed by: ANESTHESIOLOGY

## 2019-09-19 PROCEDURE — 770001 HCHG ROOM/CARE - MED/SURG/GYN PRIV*

## 2019-09-19 PROCEDURE — 700105 HCHG RX REV CODE 258

## 2019-09-19 PROCEDURE — A9270 NON-COVERED ITEM OR SERVICE: HCPCS | Performed by: ORTHOPAEDIC SURGERY

## 2019-09-19 PROCEDURE — A6402 STERILE GAUZE <= 16 SQ IN: HCPCS | Performed by: ORTHOPAEDIC SURGERY

## 2019-09-19 PROCEDURE — 160042 HCHG SURGERY MINUTES - EA ADDL 1 MIN LEVEL 5: Performed by: ORTHOPAEDIC SURGERY

## 2019-09-19 PROCEDURE — 700111 HCHG RX REV CODE 636 W/ 250 OVERRIDE (IP): Performed by: ORTHOPAEDIC SURGERY

## 2019-09-19 PROCEDURE — 700102 HCHG RX REV CODE 250 W/ 637 OVERRIDE(OP): Performed by: ORTHOPAEDIC SURGERY

## 2019-09-19 PROCEDURE — 700112 HCHG RX REV CODE 229: Performed by: ORTHOPAEDIC SURGERY

## 2019-09-19 PROCEDURE — 160035 HCHG PACU - 1ST 60 MINS PHASE I: Performed by: ORTHOPAEDIC SURGERY

## 2019-09-19 PROCEDURE — 160048 HCHG OR STATISTICAL LEVEL 1-5: Performed by: ORTHOPAEDIC SURGERY

## 2019-09-19 PROCEDURE — 160009 HCHG ANES TIME/MIN: Performed by: ORTHOPAEDIC SURGERY

## 2019-09-19 PROCEDURE — 502578 HCHG PACK, TOTAL HIP: Performed by: ORTHOPAEDIC SURGERY

## 2019-09-19 PROCEDURE — 700101 HCHG RX REV CODE 250: Performed by: ANESTHESIOLOGY

## 2019-09-19 PROCEDURE — 160036 HCHG PACU - EA ADDL 30 MINS PHASE I: Performed by: ORTHOPAEDIC SURGERY

## 2019-09-19 PROCEDURE — A9270 NON-COVERED ITEM OR SERVICE: HCPCS | Performed by: ANESTHESIOLOGY

## 2019-09-19 PROCEDURE — 700105 HCHG RX REV CODE 258: Performed by: ORTHOPAEDIC SURGERY

## 2019-09-19 PROCEDURE — 700102 HCHG RX REV CODE 250 W/ 637 OVERRIDE(OP): Performed by: ANESTHESIOLOGY

## 2019-09-19 PROCEDURE — 502000 HCHG MISC OR IMPLANTS RC 0278: Performed by: ORTHOPAEDIC SURGERY

## 2019-09-19 PROCEDURE — 160002 HCHG RECOVERY MINUTES (STAT): Performed by: ORTHOPAEDIC SURGERY

## 2019-09-19 DEVICE — IMPLANT OXINIUM FEM HD 12/14 36 MM +0 (1EA): Type: IMPLANTABLE DEVICE | Site: HIP | Status: FUNCTIONAL

## 2019-09-19 DEVICE — IMPLANTABLE DEVICE: Type: IMPLANTABLE DEVICE | Site: HIP | Status: FUNCTIONAL

## 2019-09-19 DEVICE — IMPLANT REF SPHER HEAD SCREW 25MM (1EA): Type: IMPLANTABLE DEVICE | Site: HIP | Status: FUNCTIONAL

## 2019-09-19 DEVICE — IMPLANT R3 3 HOLE ACET SHELL 52MM (1EA): Type: IMPLANTABLE DEVICE | Site: HIP | Status: FUNCTIONAL

## 2019-09-19 DEVICE — IMPLANT REF THREADED HOLE COVER (1EA): Type: IMPLANTABLE DEVICE | Site: HIP | Status: FUNCTIONAL

## 2019-09-19 DEVICE — IMPLANT R3 0 DEG XLPE ACET LNR 36MM X 52MM (1EA): Type: IMPLANTABLE DEVICE | Site: HIP | Status: FUNCTIONAL

## 2019-09-19 RX ORDER — BISACODYL 10 MG
10 SUPPOSITORY, RECTAL RECTAL
Status: DISCONTINUED | OUTPATIENT
Start: 2019-09-19 | End: 2019-09-20 | Stop reason: HOSPADM

## 2019-09-19 RX ORDER — SODIUM CHLORIDE, SODIUM LACTATE, POTASSIUM CHLORIDE, CALCIUM CHLORIDE 600; 310; 30; 20 MG/100ML; MG/100ML; MG/100ML; MG/100ML
INJECTION, SOLUTION INTRAVENOUS CONTINUOUS
Status: DISCONTINUED | OUTPATIENT
Start: 2019-09-19 | End: 2019-09-19 | Stop reason: HOSPADM

## 2019-09-19 RX ORDER — GABAPENTIN 300 MG/1
600 CAPSULE ORAL ONCE
Status: COMPLETED | OUTPATIENT
Start: 2019-09-19 | End: 2019-09-19

## 2019-09-19 RX ORDER — HALOPERIDOL 5 MG/ML
1 INJECTION INTRAMUSCULAR
Status: DISCONTINUED | OUTPATIENT
Start: 2019-09-19 | End: 2019-09-19 | Stop reason: HOSPADM

## 2019-09-19 RX ORDER — HYDROMORPHONE HYDROCHLORIDE 2 MG/ML
INJECTION, SOLUTION INTRAMUSCULAR; INTRAVENOUS; SUBCUTANEOUS PRN
Status: DISCONTINUED | OUTPATIENT
Start: 2019-09-19 | End: 2019-09-19 | Stop reason: SURG

## 2019-09-19 RX ORDER — DIPHENHYDRAMINE HCL 25 MG
25 TABLET ORAL NIGHTLY PRN
Status: DISCONTINUED | OUTPATIENT
Start: 2019-09-20 | End: 2019-09-20 | Stop reason: HOSPADM

## 2019-09-19 RX ORDER — SODIUM CHLORIDE, SODIUM LACTATE, POTASSIUM CHLORIDE, CALCIUM CHLORIDE 600; 310; 30; 20 MG/100ML; MG/100ML; MG/100ML; MG/100ML
INJECTION, SOLUTION INTRAVENOUS CONTINUOUS
Status: ACTIVE | OUTPATIENT
Start: 2019-09-19 | End: 2019-09-19

## 2019-09-19 RX ORDER — DEXAMETHASONE SODIUM PHOSPHATE 4 MG/ML
INJECTION, SOLUTION INTRA-ARTICULAR; INTRALESIONAL; INTRAMUSCULAR; INTRAVENOUS; SOFT TISSUE PRN
Status: DISCONTINUED | OUTPATIENT
Start: 2019-09-19 | End: 2019-09-19 | Stop reason: SURG

## 2019-09-19 RX ORDER — ACETAMINOPHEN 500 MG
500 TABLET ORAL ONCE
Status: COMPLETED | OUTPATIENT
Start: 2019-09-19 | End: 2019-09-19

## 2019-09-19 RX ORDER — CHLORPROMAZINE HYDROCHLORIDE 10 MG/1
25 TABLET, FILM COATED ORAL EVERY 6 HOURS PRN
Status: DISCONTINUED | OUTPATIENT
Start: 2019-09-19 | End: 2019-09-20 | Stop reason: HOSPADM

## 2019-09-19 RX ORDER — CEFAZOLIN SODIUM 1 G/3ML
INJECTION, POWDER, FOR SOLUTION INTRAMUSCULAR; INTRAVENOUS PRN
Status: DISCONTINUED | OUTPATIENT
Start: 2019-09-19 | End: 2019-09-19 | Stop reason: SURG

## 2019-09-19 RX ORDER — DEXAMETHASONE SODIUM PHOSPHATE 4 MG/ML
10 INJECTION, SOLUTION INTRA-ARTICULAR; INTRALESIONAL; INTRAMUSCULAR; INTRAVENOUS; SOFT TISSUE ONCE
Status: COMPLETED | OUTPATIENT
Start: 2019-09-20 | End: 2019-09-20

## 2019-09-19 RX ORDER — OXYCODONE HYDROCHLORIDE 5 MG/1
5 TABLET ORAL
Status: DISCONTINUED | OUTPATIENT
Start: 2019-09-19 | End: 2019-09-20 | Stop reason: HOSPADM

## 2019-09-19 RX ORDER — MAGNESIUM HYDROXIDE 1200 MG/15ML
LIQUID ORAL
Status: COMPLETED | OUTPATIENT
Start: 2019-09-19 | End: 2019-09-19

## 2019-09-19 RX ORDER — ESMOLOL HYDROCHLORIDE 10 MG/ML
INJECTION INTRAVENOUS PRN
Status: DISCONTINUED | OUTPATIENT
Start: 2019-09-19 | End: 2019-09-19 | Stop reason: SURG

## 2019-09-19 RX ORDER — SCOLOPAMINE TRANSDERMAL SYSTEM 1 MG/1
1 PATCH, EXTENDED RELEASE TRANSDERMAL
Status: DISCONTINUED | OUTPATIENT
Start: 2019-09-19 | End: 2019-09-20 | Stop reason: HOSPADM

## 2019-09-19 RX ORDER — OMEPRAZOLE 20 MG/1
20 CAPSULE, DELAYED RELEASE ORAL DAILY
Status: DISCONTINUED | OUTPATIENT
Start: 2019-09-20 | End: 2019-09-20 | Stop reason: HOSPADM

## 2019-09-19 RX ORDER — CHLORPROMAZINE HYDROCHLORIDE 25 MG/ML
25 INJECTION INTRAMUSCULAR EVERY 6 HOURS PRN
Status: DISCONTINUED | OUTPATIENT
Start: 2019-09-19 | End: 2019-09-20 | Stop reason: HOSPADM

## 2019-09-19 RX ORDER — LIDOCAINE HYDROCHLORIDE 20 MG/ML
INJECTION, SOLUTION EPIDURAL; INFILTRATION; INTRACAUDAL; PERINEURAL PRN
Status: DISCONTINUED | OUTPATIENT
Start: 2019-09-19 | End: 2019-09-19 | Stop reason: SURG

## 2019-09-19 RX ORDER — ENEMA 19; 7 G/133ML; G/133ML
1 ENEMA RECTAL
Status: DISCONTINUED | OUTPATIENT
Start: 2019-09-19 | End: 2019-09-20 | Stop reason: HOSPADM

## 2019-09-19 RX ORDER — DEXAMETHASONE SODIUM PHOSPHATE 4 MG/ML
4 INJECTION, SOLUTION INTRA-ARTICULAR; INTRALESIONAL; INTRAMUSCULAR; INTRAVENOUS; SOFT TISSUE
Status: DISCONTINUED | OUTPATIENT
Start: 2019-09-19 | End: 2019-09-20 | Stop reason: HOSPADM

## 2019-09-19 RX ORDER — ACETAMINOPHEN 500 MG
1000 TABLET ORAL EVERY 6 HOURS
Status: DISCONTINUED | OUTPATIENT
Start: 2019-09-19 | End: 2019-09-20 | Stop reason: HOSPADM

## 2019-09-19 RX ORDER — OXYCODONE HCL 5 MG/5 ML
5 SOLUTION, ORAL ORAL
Status: COMPLETED | OUTPATIENT
Start: 2019-09-19 | End: 2019-09-19

## 2019-09-19 RX ORDER — DIPHENHYDRAMINE HCL 25 MG
25 TABLET ORAL EVERY 6 HOURS PRN
Status: DISCONTINUED | OUTPATIENT
Start: 2019-09-19 | End: 2019-09-20 | Stop reason: HOSPADM

## 2019-09-19 RX ORDER — AMOXICILLIN 250 MG
1 CAPSULE ORAL NIGHTLY
Status: DISCONTINUED | OUTPATIENT
Start: 2019-09-19 | End: 2019-09-20 | Stop reason: HOSPADM

## 2019-09-19 RX ORDER — HYDROMORPHONE HYDROCHLORIDE 1 MG/ML
0.4 INJECTION, SOLUTION INTRAMUSCULAR; INTRAVENOUS; SUBCUTANEOUS
Status: DISCONTINUED | OUTPATIENT
Start: 2019-09-19 | End: 2019-09-19 | Stop reason: HOSPADM

## 2019-09-19 RX ORDER — DIPHENHYDRAMINE HYDROCHLORIDE 50 MG/ML
25 INJECTION INTRAMUSCULAR; INTRAVENOUS EVERY 6 HOURS PRN
Status: DISCONTINUED | OUTPATIENT
Start: 2019-09-19 | End: 2019-09-20 | Stop reason: HOSPADM

## 2019-09-19 RX ORDER — CEFAZOLIN SODIUM 2 G/100ML
2 INJECTION, SOLUTION INTRAVENOUS ONCE
Status: DISCONTINUED | OUTPATIENT
Start: 2019-09-19 | End: 2019-09-19 | Stop reason: HOSPADM

## 2019-09-19 RX ORDER — HYDRALAZINE HYDROCHLORIDE 20 MG/ML
5 INJECTION INTRAMUSCULAR; INTRAVENOUS
Status: DISCONTINUED | OUTPATIENT
Start: 2019-09-19 | End: 2019-09-19 | Stop reason: HOSPADM

## 2019-09-19 RX ORDER — LANSOPRAZOLE 30 MG/1
30 CAPSULE, DELAYED RELEASE ORAL DAILY
Status: DISCONTINUED | OUTPATIENT
Start: 2019-09-19 | End: 2019-09-19

## 2019-09-19 RX ORDER — AMITRIPTYLINE HYDROCHLORIDE 10 MG/1
30 TABLET, FILM COATED ORAL NIGHTLY PRN
Status: DISCONTINUED | OUTPATIENT
Start: 2019-09-19 | End: 2019-09-20 | Stop reason: HOSPADM

## 2019-09-19 RX ORDER — CEFAZOLIN SODIUM 2 G/100ML
2 INJECTION, SOLUTION INTRAVENOUS EVERY 8 HOURS
Status: DISCONTINUED | OUTPATIENT
Start: 2019-09-19 | End: 2019-09-20

## 2019-09-19 RX ORDER — TRAMADOL HYDROCHLORIDE 50 MG/1
50 TABLET ORAL EVERY 4 HOURS PRN
Status: DISCONTINUED | OUTPATIENT
Start: 2019-09-19 | End: 2019-09-20 | Stop reason: HOSPADM

## 2019-09-19 RX ORDER — DIPHENHYDRAMINE HYDROCHLORIDE 50 MG/ML
12.5 INJECTION INTRAMUSCULAR; INTRAVENOUS
Status: DISCONTINUED | OUTPATIENT
Start: 2019-09-19 | End: 2019-09-19 | Stop reason: HOSPADM

## 2019-09-19 RX ORDER — SODIUM CHLORIDE 9 MG/ML
INJECTION, SOLUTION INTRAVENOUS
Status: COMPLETED
Start: 2019-09-19 | End: 2019-09-19

## 2019-09-19 RX ORDER — KETAMINE HYDROCHLORIDE 50 MG/ML
INJECTION, SOLUTION INTRAMUSCULAR; INTRAVENOUS PRN
Status: DISCONTINUED | OUTPATIENT
Start: 2019-09-19 | End: 2019-09-19 | Stop reason: SURG

## 2019-09-19 RX ORDER — CELECOXIB 200 MG/1
200 CAPSULE ORAL ONCE
Status: COMPLETED | OUTPATIENT
Start: 2019-09-19 | End: 2019-09-19

## 2019-09-19 RX ORDER — BUPIVACAINE HYDROCHLORIDE AND EPINEPHRINE 2.5; 5 MG/ML; UG/ML
INJECTION, SOLUTION EPIDURAL; INFILTRATION; INTRACAUDAL; PERINEURAL
Status: DISCONTINUED | OUTPATIENT
Start: 2019-09-19 | End: 2019-09-19 | Stop reason: HOSPADM

## 2019-09-19 RX ORDER — OXYCODONE HCL 5 MG/5 ML
10 SOLUTION, ORAL ORAL
Status: COMPLETED | OUTPATIENT
Start: 2019-09-19 | End: 2019-09-19

## 2019-09-19 RX ORDER — ONDANSETRON 2 MG/ML
INJECTION INTRAMUSCULAR; INTRAVENOUS PRN
Status: DISCONTINUED | OUTPATIENT
Start: 2019-09-19 | End: 2019-09-19 | Stop reason: SURG

## 2019-09-19 RX ORDER — CELECOXIB 200 MG/1
200 CAPSULE ORAL 2 TIMES DAILY
Status: DISCONTINUED | OUTPATIENT
Start: 2019-09-19 | End: 2019-09-20 | Stop reason: HOSPADM

## 2019-09-19 RX ORDER — ONDANSETRON 2 MG/ML
4 INJECTION INTRAMUSCULAR; INTRAVENOUS EVERY 4 HOURS PRN
Status: DISCONTINUED | OUTPATIENT
Start: 2019-09-19 | End: 2019-09-20 | Stop reason: HOSPADM

## 2019-09-19 RX ORDER — MAGNESIUM SULFATE HEPTAHYDRATE 40 MG/ML
INJECTION, SOLUTION INTRAVENOUS PRN
Status: DISCONTINUED | OUTPATIENT
Start: 2019-09-19 | End: 2019-09-19 | Stop reason: SURG

## 2019-09-19 RX ORDER — HYDROMORPHONE HYDROCHLORIDE 1 MG/ML
0.5 INJECTION, SOLUTION INTRAMUSCULAR; INTRAVENOUS; SUBCUTANEOUS
Status: DISCONTINUED | OUTPATIENT
Start: 2019-09-19 | End: 2019-09-20 | Stop reason: HOSPADM

## 2019-09-19 RX ORDER — HALOPERIDOL 5 MG/ML
1 INJECTION INTRAMUSCULAR EVERY 6 HOURS PRN
Status: DISCONTINUED | OUTPATIENT
Start: 2019-09-19 | End: 2019-09-20 | Stop reason: HOSPADM

## 2019-09-19 RX ORDER — HYDROMORPHONE HYDROCHLORIDE 1 MG/ML
0.1 INJECTION, SOLUTION INTRAMUSCULAR; INTRAVENOUS; SUBCUTANEOUS
Status: DISCONTINUED | OUTPATIENT
Start: 2019-09-19 | End: 2019-09-19 | Stop reason: HOSPADM

## 2019-09-19 RX ORDER — LABETALOL HYDROCHLORIDE 5 MG/ML
5 INJECTION, SOLUTION INTRAVENOUS
Status: DISCONTINUED | OUTPATIENT
Start: 2019-09-19 | End: 2019-09-19 | Stop reason: HOSPADM

## 2019-09-19 RX ORDER — DOCUSATE SODIUM 100 MG/1
100 CAPSULE, LIQUID FILLED ORAL 2 TIMES DAILY
Status: DISCONTINUED | OUTPATIENT
Start: 2019-09-19 | End: 2019-09-20 | Stop reason: HOSPADM

## 2019-09-19 RX ORDER — ONDANSETRON 2 MG/ML
4 INJECTION INTRAMUSCULAR; INTRAVENOUS
Status: COMPLETED | OUTPATIENT
Start: 2019-09-19 | End: 2019-09-19

## 2019-09-19 RX ORDER — OXYCODONE HYDROCHLORIDE 10 MG/1
10 TABLET ORAL
Status: DISCONTINUED | OUTPATIENT
Start: 2019-09-19 | End: 2019-09-20 | Stop reason: HOSPADM

## 2019-09-19 RX ORDER — GABAPENTIN 300 MG/1
300 CAPSULE ORAL 3 TIMES DAILY
Status: DISCONTINUED | OUTPATIENT
Start: 2019-09-19 | End: 2019-09-20 | Stop reason: HOSPADM

## 2019-09-19 RX ORDER — AMOXICILLIN 250 MG
1 CAPSULE ORAL
Status: DISCONTINUED | OUTPATIENT
Start: 2019-09-19 | End: 2019-09-20 | Stop reason: HOSPADM

## 2019-09-19 RX ORDER — HYDROMORPHONE HYDROCHLORIDE 1 MG/ML
0.2 INJECTION, SOLUTION INTRAMUSCULAR; INTRAVENOUS; SUBCUTANEOUS
Status: DISCONTINUED | OUTPATIENT
Start: 2019-09-19 | End: 2019-09-19 | Stop reason: HOSPADM

## 2019-09-19 RX ORDER — ACETAMINOPHEN 650 MG
TABLET, EXTENDED RELEASE ORAL
Status: DISCONTINUED | OUTPATIENT
Start: 2019-09-19 | End: 2019-09-19 | Stop reason: HOSPADM

## 2019-09-19 RX ORDER — POLYETHYLENE GLYCOL 3350 17 G/17G
1 POWDER, FOR SOLUTION ORAL 2 TIMES DAILY PRN
Status: DISCONTINUED | OUTPATIENT
Start: 2019-09-19 | End: 2019-09-20 | Stop reason: HOSPADM

## 2019-09-19 RX ORDER — ROCURONIUM BROMIDE 10 MG/ML
INJECTION, SOLUTION INTRAVENOUS PRN
Status: DISCONTINUED | OUTPATIENT
Start: 2019-09-19 | End: 2019-09-19 | Stop reason: SURG

## 2019-09-19 RX ORDER — KETOROLAC TROMETHAMINE 30 MG/ML
INJECTION, SOLUTION INTRAMUSCULAR; INTRAVENOUS
Status: DISCONTINUED | OUTPATIENT
Start: 2019-09-19 | End: 2019-09-19 | Stop reason: HOSPADM

## 2019-09-19 RX ADMIN — CEFAZOLIN 2 G: 330 INJECTION, POWDER, FOR SOLUTION INTRAMUSCULAR; INTRAVENOUS at 11:47

## 2019-09-19 RX ADMIN — OXYCODONE HYDROCHLORIDE 10 MG: 10 TABLET ORAL at 18:22

## 2019-09-19 RX ADMIN — ACETAMINOPHEN 1000 MG: 500 TABLET ORAL at 18:22

## 2019-09-19 RX ADMIN — CELECOXIB 200 MG: 200 CAPSULE ORAL at 09:36

## 2019-09-19 RX ADMIN — ROCURONIUM BROMIDE 50 MG: 10 INJECTION, SOLUTION INTRAVENOUS at 11:43

## 2019-09-19 RX ADMIN — ESMOLOL HYDROCHLORIDE 20 MG: 100 INJECTION, SOLUTION INTRAVENOUS at 12:10

## 2019-09-19 RX ADMIN — ASPIRIN 81 MG: 81 TABLET, COATED ORAL at 20:37

## 2019-09-19 RX ADMIN — FENTANYL CITRATE 50 MCG: 50 INJECTION INTRAMUSCULAR; INTRAVENOUS at 15:06

## 2019-09-19 RX ADMIN — FENTANYL CITRATE 50 MCG: 50 INJECTION, SOLUTION INTRAMUSCULAR; INTRAVENOUS at 11:55

## 2019-09-19 RX ADMIN — ONDANSETRON 4 MG: 2 INJECTION INTRAMUSCULAR; INTRAVENOUS at 13:14

## 2019-09-19 RX ADMIN — OXYCODONE HYDROCHLORIDE 10 MG: 5 SOLUTION ORAL at 14:50

## 2019-09-19 RX ADMIN — DEXAMETHASONE SODIUM PHOSPHATE 4 MG: 4 INJECTION, SOLUTION INTRA-ARTICULAR; INTRALESIONAL; INTRAMUSCULAR; INTRAVENOUS; SOFT TISSUE at 12:00

## 2019-09-19 RX ADMIN — GABAPENTIN 600 MG: 300 CAPSULE ORAL at 09:36

## 2019-09-19 RX ADMIN — CEFAZOLIN SODIUM 2 G: 2 INJECTION, SOLUTION INTRAVENOUS at 20:37

## 2019-09-19 RX ADMIN — SUGAMMADEX 200 MG: 100 INJECTION, SOLUTION INTRAVENOUS at 13:19

## 2019-09-19 RX ADMIN — FENTANYL CITRATE 50 MCG: 50 INJECTION, SOLUTION INTRAMUSCULAR; INTRAVENOUS at 11:43

## 2019-09-19 RX ADMIN — GABAPENTIN 300 MG: 300 CAPSULE ORAL at 18:22

## 2019-09-19 RX ADMIN — KETAMINE HYDROCHLORIDE 20 MG: 50 INJECTION, SOLUTION INTRAMUSCULAR; INTRAVENOUS at 13:06

## 2019-09-19 RX ADMIN — LIDOCAINE HYDROCHLORIDE 80 MG: 20 INJECTION, SOLUTION EPIDURAL; INFILTRATION; INTRACAUDAL at 11:43

## 2019-09-19 RX ADMIN — FENTANYL CITRATE 50 MCG: 50 INJECTION INTRAMUSCULAR; INTRAVENOUS at 14:51

## 2019-09-19 RX ADMIN — TRANEXAMIC ACID 1000 MG: 100 INJECTION, SOLUTION INTRAVENOUS at 13:51

## 2019-09-19 RX ADMIN — HYDROMORPHONE HYDROCHLORIDE 0.2 MG: 2 INJECTION, SOLUTION INTRAMUSCULAR; INTRAVENOUS; SUBCUTANEOUS at 13:21

## 2019-09-19 RX ADMIN — DOCUSATE SODIUM 100 MG: 100 CAPSULE, LIQUID FILLED ORAL at 18:22

## 2019-09-19 RX ADMIN — PROPOFOL 30 MG: 10 INJECTION, EMULSION INTRAVENOUS at 13:15

## 2019-09-19 RX ADMIN — HYDROMORPHONE HYDROCHLORIDE 0.4 MG: 2 INJECTION, SOLUTION INTRAMUSCULAR; INTRAVENOUS; SUBCUTANEOUS at 13:15

## 2019-09-19 RX ADMIN — KETAMINE HYDROCHLORIDE 40 MG: 50 INJECTION, SOLUTION INTRAMUSCULAR; INTRAVENOUS at 11:55

## 2019-09-19 RX ADMIN — ACETAMINOPHEN 500 MG: 500 TABLET ORAL at 09:36

## 2019-09-19 RX ADMIN — MAGNESIUM SULFATE IN WATER 4 G: 40 INJECTION, SOLUTION INTRAVENOUS at 12:36

## 2019-09-19 RX ADMIN — HYDROMORPHONE HYDROCHLORIDE 0.4 MG: 2 INJECTION, SOLUTION INTRAMUSCULAR; INTRAVENOUS; SUBCUTANEOUS at 11:55

## 2019-09-19 RX ADMIN — CELECOXIB 200 MG: 200 CAPSULE ORAL at 18:21

## 2019-09-19 RX ADMIN — PROPOFOL 150 MG: 10 INJECTION, EMULSION INTRAVENOUS at 11:43

## 2019-09-19 RX ADMIN — SODIUM CHLORIDE, POTASSIUM CHLORIDE, SODIUM LACTATE AND CALCIUM CHLORIDE: 600; 310; 30; 20 INJECTION, SOLUTION INTRAVENOUS at 11:36

## 2019-09-19 RX ADMIN — SENNOSIDES, DOCUSATE SODIUM 1 TABLET: 50; 8.6 TABLET, FILM COATED ORAL at 20:37

## 2019-09-19 RX ADMIN — SODIUM CHLORIDE: 9 INJECTION, SOLUTION INTRAVENOUS at 20:45

## 2019-09-19 ASSESSMENT — LIFESTYLE VARIABLES
HAVE PEOPLE ANNOYED YOU BY CRITICIZING YOUR DRINKING: NO
ON A TYPICAL DAY WHEN YOU DRINK ALCOHOL HOW MANY DRINKS DO YOU HAVE: 1
TOTAL SCORE: 0
EVER FELT BAD OR GUILTY ABOUT YOUR DRINKING: NO
HAVE YOU EVER FELT YOU SHOULD CUT DOWN ON YOUR DRINKING: NO
ALCOHOL_USE: YES
HOW MANY TIMES IN THE PAST YEAR HAVE YOU HAD 5 OR MORE DRINKS IN A DAY: 0
TOTAL SCORE: 0
DOES PATIENT WANT TO STOP DRINKING: NO
TOTAL SCORE: 0
AVERAGE NUMBER OF DAYS PER WEEK YOU HAVE A DRINK CONTAINING ALCOHOL: 0
CONSUMPTION TOTAL: NEGATIVE
EVER HAD A DRINK FIRST THING IN THE MORNING TO STEADY YOUR NERVES TO GET RID OF A HANGOVER: NO

## 2019-09-19 ASSESSMENT — PATIENT HEALTH QUESTIONNAIRE - PHQ9
9. THOUGHTS THAT YOU WOULD BE BETTER OFF DEAD, OR OF HURTING YOURSELF: NOT AT ALL
SUM OF ALL RESPONSES TO PHQ9 QUESTIONS 1 AND 2: 1
1. LITTLE INTEREST OR PLEASURE IN DOING THINGS: SEVERAL DAYS
6. FEELING BAD ABOUT YOURSELF - OR THAT YOU ARE A FAILURE OR HAVE LET YOURSELF OR YOUR FAMILY DOWN: NOT AL ALL
SUM OF ALL RESPONSES TO PHQ QUESTIONS 1-9: 1
3. TROUBLE FALLING OR STAYING ASLEEP OR SLEEPING TOO MUCH: NOT AT ALL
8. MOVING OR SPEAKING SO SLOWLY THAT OTHER PEOPLE COULD HAVE NOTICED. OR THE OPPOSITE, BEING SO FIGETY OR RESTLESS THAT YOU HAVE BEEN MOVING AROUND A LOT MORE THAN USUAL: NOT AT ALL
2. FEELING DOWN, DEPRESSED, IRRITABLE, OR HOPELESS: NOT AT ALL
5. POOR APPETITE OR OVEREATING: NOT AT ALL
7. TROUBLE CONCENTRATING ON THINGS, SUCH AS READING THE NEWSPAPER OR WATCHING TELEVISION: NOT AT ALL
4. FEELING TIRED OR HAVING LITTLE ENERGY: NOT AT ALL

## 2019-09-19 ASSESSMENT — COGNITIVE AND FUNCTIONAL STATUS - GENERAL
DRESSING REGULAR LOWER BODY CLOTHING: A LITTLE
SUGGESTED CMS G CODE MODIFIER DAILY ACTIVITY: CJ
MOBILITY SCORE: 19
WALKING IN HOSPITAL ROOM: A LITTLE
MOVING TO AND FROM BED TO CHAIR: A LITTLE
SUGGESTED CMS G CODE MODIFIER MOBILITY: CK
DAILY ACTIVITIY SCORE: 21
CLIMB 3 TO 5 STEPS WITH RAILING: A LITTLE
STANDING UP FROM CHAIR USING ARMS: A LITTLE
TOILETING: A LITTLE
HELP NEEDED FOR BATHING: A LITTLE
MOVING FROM LYING ON BACK TO SITTING ON SIDE OF FLAT BED: A LITTLE

## 2019-09-19 NOTE — OP REPORT
DIAGNOSIS: Osteoarthritis, left hip.    PROCEDURE: left Total hip arthroplasty.    ANESTHESIA: General.    COMPLICATIONS: None.    SURGEON: Clarence Vallejo MD.    ASSISTANT: Bella Lara    INDICATIONS: This is a patient with severe osteoarthritis causing pain,   having failed conservative treatments.    DESCRIPTION OF PROCEDURE: Patient was identified in the preop area, site was   marked, taken back to the operating room and underwent general anesthesia.   left lower extremity was prepped and draped in sterile manner. Preoperative   timeout was held and antibiotics were given. Incision was made coming off the  ASIS. Soft tissue dissected down to fascia. Fascia was split in line with   the tensor. Tensor was retracted laterally. Deep fascia was incised and   vessels were ligated. A capsulotomy was performed and then an osteotomy of   the femoral neck. The acetabulum was then reamed up to a 52 and a 52 R3 cluster   hole cup by Smith and Nephew was placed. A liner was placed for a 36 head.   Osteophytes were debrided and then the femur was externally rotated and   extended. This was then broached up to a size 1, and a 1 lateral offset polar stem  by Smith and Nephew was placed. Final trialing showed equal leg lengths with  a 0 head, the 36 0  Oxinium head by Smith and Nephew was placed. Wound was   soaked with dilute Betadine solution and was injected with Marcaine. Vicryl   was used for the fascia, Monocryl soft tissue skin and Dermabond for the final  skin layer. Patient was woken up, taken back to PACU, will be weightbear as   tolerated.

## 2019-09-19 NOTE — ANESTHESIA TIME REPORT
Anesthesia Start and Stop Event Times     Date Time Event    9/19/2019 1133 Ready for Procedure     1136 Anesthesia Start     1332 Anesthesia Stop        Responsible Staff  09/19/19    Name Role Begin End    Bry Villareal M.D. Anesth 1136 1332        Preop Diagnosis (Free Text):  Pre-op Diagnosis     HIP PAIN LEFT        Preop Diagnosis (Codes):    Post op Diagnosis  Osteoarthritis of left hip      Premium Reason  Non-Premium    Comments:

## 2019-09-19 NOTE — ANESTHESIA PROCEDURE NOTES
Airway  Date/Time: 9/19/2019 11:44 AM  Performed by: Bry Villareal M.D.  Authorized by: Bry Villareal M.D.     Location:  OR  Urgency:  Elective  Indications for Airway Management:  Anesthesia  Spontaneous Ventilation: absent    Sedation Level:  Deep  Preoxygenated: Yes    Patient Position:  Sniffing  Mask Difficulty Assessment:  0 - not attempted  Final Airway Type:  Endotracheal airway  Final Endotracheal Airway:  ETT  Cuffed: Yes    Technique Used for Successful ETT Placement:  Direct laryngoscopy  Insertion Site:  Oral  Blade Type:  Tanner  Laryngoscope Blade/Videolaryngoscope Blade Size:  3  ETT Size (mm):  7.0  Measured from:  Lips  ETT to Lips (cm):  20  Placement Verified by: auscultation and capnometry    Cormack-Lehane Classification:  Grade I - full view of glottis  Number of Attempts at Approach:  1  Number of Other Approaches Attempted:  0

## 2019-09-19 NOTE — ANESTHESIA PREPROCEDURE EVALUATION
Left hip osteoarthritis. No problems with anesthesia in the past.    Relevant Problems   PULMONARY   (+) History of coccidioidomycosis      NEURO   (+) History of coccidioidomycosis      GI   (+) GERD (gastroesophageal reflux disease)       Physical Exam    Airway   Mallampati: II  TM distance: >3 FB  Neck ROM: full       Cardiovascular - normal exam  Rhythm: regular  Rate: normal  (-) murmur     Dental - normal exam         Pulmonary - normal exam  Breath sounds clear to auscultation     Abdominal    Neurological - normal exam                 Anesthesia Plan    ASA 2       Plan - general       Airway plan will be ETT        Induction: intravenous    Postoperative Plan: Postoperative administration of opioids is intended.    Pertinent diagnostic labs and testing reviewed    Informed Consent:    Anesthetic plan and risks discussed with patient.    Use of blood products discussed with: patient whom consented to blood products.

## 2019-09-20 VITALS
HEIGHT: 59 IN | BODY MASS INDEX: 38.13 KG/M2 | OXYGEN SATURATION: 95 % | DIASTOLIC BLOOD PRESSURE: 69 MMHG | TEMPERATURE: 97 F | SYSTOLIC BLOOD PRESSURE: 122 MMHG | HEART RATE: 114 BPM | WEIGHT: 189.15 LBS | RESPIRATION RATE: 17 BRPM

## 2019-09-20 LAB
HCT VFR BLD AUTO: 40.8 % (ref 37–47)
HGB BLD-MCNC: 13.6 G/DL (ref 12–16)

## 2019-09-20 PROCEDURE — 97535 SELF CARE MNGMENT TRAINING: CPT

## 2019-09-20 PROCEDURE — A9270 NON-COVERED ITEM OR SERVICE: HCPCS | Performed by: ORTHOPAEDIC SURGERY

## 2019-09-20 PROCEDURE — 700112 HCHG RX REV CODE 229: Performed by: ORTHOPAEDIC SURGERY

## 2019-09-20 PROCEDURE — 36415 COLL VENOUS BLD VENIPUNCTURE: CPT

## 2019-09-20 PROCEDURE — 700102 HCHG RX REV CODE 250 W/ 637 OVERRIDE(OP): Performed by: ORTHOPAEDIC SURGERY

## 2019-09-20 PROCEDURE — 97165 OT EVAL LOW COMPLEX 30 MIN: CPT

## 2019-09-20 PROCEDURE — 85018 HEMOGLOBIN: CPT

## 2019-09-20 PROCEDURE — 85014 HEMATOCRIT: CPT

## 2019-09-20 PROCEDURE — 97162 PT EVAL MOD COMPLEX 30 MIN: CPT

## 2019-09-20 PROCEDURE — 700111 HCHG RX REV CODE 636 W/ 250 OVERRIDE (IP): Performed by: ORTHOPAEDIC SURGERY

## 2019-09-20 RX ORDER — CEFAZOLIN SODIUM 2 G/100ML
2 INJECTION, SOLUTION INTRAVENOUS EVERY 8 HOURS
Status: COMPLETED | OUTPATIENT
Start: 2019-09-20 | End: 2019-09-20

## 2019-09-20 RX ORDER — OXYCODONE HYDROCHLORIDE 5 MG/1
40 TABLET ORAL
Qty: 30 TAB | Refills: 0 | Status: SHIPPED | OUTPATIENT
Start: 2019-09-20 | End: 2019-09-20 | Stop reason: SDUPTHER

## 2019-09-20 RX ORDER — OXYCODONE HYDROCHLORIDE 5 MG/1
5 TABLET ORAL
Qty: 30 TAB | Refills: 0 | Status: SHIPPED | OUTPATIENT
Start: 2019-09-20 | End: 2019-10-04

## 2019-09-20 RX ADMIN — DEXAMETHASONE SODIUM PHOSPHATE 10 MG: 4 INJECTION, SOLUTION INTRA-ARTICULAR; INTRALESIONAL; INTRAMUSCULAR; INTRAVENOUS; SOFT TISSUE at 04:24

## 2019-09-20 RX ADMIN — CELECOXIB 200 MG: 200 CAPSULE ORAL at 04:28

## 2019-09-20 RX ADMIN — GABAPENTIN 300 MG: 300 CAPSULE ORAL at 04:28

## 2019-09-20 RX ADMIN — OMEPRAZOLE 20 MG: 20 CAPSULE, DELAYED RELEASE ORAL at 04:28

## 2019-09-20 RX ADMIN — CEFAZOLIN SODIUM 2 G: 2 INJECTION, SOLUTION INTRAVENOUS at 10:48

## 2019-09-20 RX ADMIN — CEFAZOLIN SODIUM 2 G: 2 INJECTION, SOLUTION INTRAVENOUS at 04:19

## 2019-09-20 RX ADMIN — DOCUSATE SODIUM 100 MG: 100 CAPSULE, LIQUID FILLED ORAL at 04:28

## 2019-09-20 RX ADMIN — ACETAMINOPHEN 1000 MG: 500 TABLET ORAL at 04:28

## 2019-09-20 RX ADMIN — ASPIRIN 81 MG: 81 TABLET, COATED ORAL at 08:39

## 2019-09-20 RX ADMIN — ACETAMINOPHEN 1000 MG: 500 TABLET ORAL at 01:25

## 2019-09-20 RX ADMIN — GABAPENTIN 300 MG: 300 CAPSULE ORAL at 12:29

## 2019-09-20 RX ADMIN — HYDROMORPHONE HYDROCHLORIDE 0.5 MG: 1 INJECTION, SOLUTION INTRAMUSCULAR; INTRAVENOUS; SUBCUTANEOUS at 04:22

## 2019-09-20 RX ADMIN — OXYCODONE HYDROCHLORIDE 10 MG: 10 TABLET ORAL at 05:55

## 2019-09-20 RX ADMIN — OXYCODONE HYDROCHLORIDE 10 MG: 10 TABLET ORAL at 02:12

## 2019-09-20 RX ADMIN — ACETAMINOPHEN 1000 MG: 500 TABLET ORAL at 12:29

## 2019-09-20 ASSESSMENT — COGNITIVE AND FUNCTIONAL STATUS - GENERAL
DRESSING REGULAR LOWER BODY CLOTHING: A LITTLE
HELP NEEDED FOR BATHING: A LITTLE
CLIMB 3 TO 5 STEPS WITH RAILING: A LITTLE
SUGGESTED CMS G CODE MODIFIER MOBILITY: CK
DAILY ACTIVITIY SCORE: 21
MOVING FROM LYING ON BACK TO SITTING ON SIDE OF FLAT BED: A LITTLE
MOBILITY SCORE: 18
STANDING UP FROM CHAIR USING ARMS: A LITTLE
MOVING TO AND FROM BED TO CHAIR: A LITTLE
TURNING FROM BACK TO SIDE WHILE IN FLAT BAD: A LITTLE
WALKING IN HOSPITAL ROOM: A LITTLE
SUGGESTED CMS G CODE MODIFIER DAILY ACTIVITY: CJ
TOILETING: A LITTLE

## 2019-09-20 ASSESSMENT — GAIT ASSESSMENTS
DEVIATION: ANTALGIC
ASSISTIVE DEVICE: FRONT WHEEL WALKER
GAIT LEVEL OF ASSIST: SUPERVISED
DISTANCE (FEET): 150

## 2019-09-20 ASSESSMENT — ACTIVITIES OF DAILY LIVING (ADL): TOILETING: INDEPENDENT

## 2019-09-20 ASSESSMENT — PAIN SCALES - GENERAL: PAIN_LEVEL: 6

## 2019-09-20 NOTE — DISCHARGE SUMMARY
Patient was admitted for a Total Hip Arthroplasty.  Had no complications during the surgery. Did well post-operatively.         Recent Labs     09/20/19  0527   HEMOGLOBIN 13.6   HEMATOCRIT 40.8       There are no active hospital problems to display for this patient.      Uneventful hospital course.     Medication List      START taking these medications      Instructions   oxyCODONE immediate-release 5 MG Tabs  Commonly known as:  ROXICODONE   Take 8 Tabs by mouth every 3 hours as needed for up to 14 days.  Dose:  40 mg        CONTINUE taking these medications      Instructions   alendronate 70 MG Tabs  Commonly known as:  FOSAMAX   Take 1 Tab by mouth every 7 days.  Dose:  70 mg     amitriptyline 10 MG Tabs  Commonly known as:  ELAVIL   Take 3 Tabs by mouth at bedtime as needed.  Dose:  30 mg     ascorbic acid 500 MG Tabs  Commonly known as:  ascorbic acid   Take 1,000 mg by mouth every day.  Dose:  1,000 mg     CALCIUM 600 1500 (600 Ca) MG Tabs  Generic drug:  Calcium Carbonate   Take 1 Tab by mouth every day.  Dose:  1 Tab     clobetasol 0.05 % Crea  Commonly known as:  TEMOVATE   APPLY TO AFFECTED AREA TWICE A DAY UP TO 4 DAYS AS NEEDED FOR ITCH     D3-1000 1000 UNIT Tabs  Generic drug:  Cholecalciferol   Take 1,000 Units by mouth every day.  Dose:  1,000 Units     FIBER PO   Take 1 Cap by mouth every day.  Dose:  1 Cap     gabapentin 300 MG Caps  Commonly known as:  NEURONTIN   Take 1 Cap by mouth every evening.  Dose:  300 mg     lansoprazole 30 MG Cpdr  Commonly known as:  PREVACID   TAKE 1 CAPSULE BY MOUTH  DAILY     meloxicam 15 MG tablet  Commonly known as:  MOBIC   TAKE 1 TABLET BY MOUTH EVERY DAY         Oxy order corrected to 1 instead of 8     Patient will be discharged home and follow up with Dr. Vallejo clinic in 2 weeks, for which the patient already has scheduled.

## 2019-09-20 NOTE — DISCHARGE INSTRUCTIONS
Discharge Instructions    Discharged to home by car with relative. Discharged via wheelchair, hospital escort: Yes.  Special equipment needed: Not Applicable    Be sure to schedule a follow-up appointment with your primary care doctor or any specialists as instructed.     *Follow up with Dr. Vallejo at scheduled appointment  *Weight bearing as tolerated                     *Activity as tolerated  *Use assistive device for all activity  *Continue exercises provided by physical therapy  *Elevate leg as needed  *Ice as needed (20 minutes every 1-2 hours)  *Keep dressing in place until 9/24/19 postoperative day #5   *Starting 9/24/19 remove dressing and shower. Do not soak or scrub incision, after shower pat dry and leave open to air.  *No soaking of the incision; no baths, hot tubs, or swimming until cleared by doctor  *Aspirin 81mg twice a day for blood clot prevention for six weeks        *Take medications as prescribed by doctor  *Call doctor’s office with any questions or concerns     Discharge Plan:   Diet Plan: Discussed  Activity Level: Discussed  Confirmed Follow up Appointment: Patient to Call and Schedule Appointment  Confirmed Symptoms Management: Discussed  Medication Reconciliation Updated: Yes  Influenza Vaccine Indication: Indicated: Not available from distributor/    I understand that a diet low in cholesterol, fat, and sodium is recommended for good health. Unless I have been given specific instructions below for another diet, I accept this instruction as my diet prescription.   Other diet: Regular    Special Instructions: Discharge instructions for the Orthopedic Patient    Follow up with Primary Care Physician within 2 weeks of discharge to home, regarding:  Review of medications and diagnostic testing.  Surveillance for medical complications.  Workup and treatment of osteoporosis, if appropriate.     -Is this a Joint Replacement patient? Yes   Total Joint Hip Replacement Discharge  Instructions    Pain  - The goal is to slowly wean off the prescription pain medicine.  - Ice can be used for pain control.  20 minutes at a time is recommended, and never directly against your skin or incision.  - Most patients are off the pain pills by 3 weeks; others may require a low level of pain medications for many months. If your pain continues to be severe, follow up with your physician.  Infection  Deep hip joint infections that require removal of the prostheses occur in less than 0.1% of patients. Lesser infections in the skin (cellulites) are more common and much more easily treated.  - Keep the incision as clean and dry as possible.  - Always wash your hands before touching your incision.  - Skin infections tend to develop around 7-10 days after surgery, most can be treated with oral antibiotics.  - Dental Care should be delayed for 3 months after surgery, your surgeon recommends taking a dose of antibiotics 1 hour prior to any dental procedure.  After 2 years, most surgeons recommend antibiotics only before an extensive procedure.  Ask your surgeon what he recommends.  - Signs and symptoms of infection can include:  low grade fever, redness, pain, swelling and drainage from your incision.  Notify your surgeon immediately if you develop any of these symptoms.  Post op Disturbances  - Bowel habits - constipation is extremely common and is caused by a combination of anesthesia, lack of mobility and pain medicine.  Use stool softeners or laxatives if necessary. It is important not to ignore this problem, as bowel obstructions can be a serious complication after joint replacement surgery.  - Mood/Energy Level - Many patients experience a lack of energy and endurance for up to 2-3 months after surgery.  Some may also feel down and can even become depressed.  This is likely due to the postoperative anemia, change in activity level, lack of sleep, pain medicine and just the emotional reaction to the surgery  itself that is a big disruption in a person’s life.  This usually passes.  If symptoms persist, follow up with your primary physician.  - Returning to work - Your surgeon will give you more specific instructions.  Generally, if you work a sedentary job requiring little standing or walking, most patients may return within 2-6 weeks.  Manual labor jobs involving walking, lifting and standing may take 3-4 months.  Your surgeon’s office can provide a release to part-time or light duty work early on in your recovery and progress you to full duty as able.  - Driving - You can begin driving an automatic shift car in 4 to 8 weeks, provided you are no longer taking narcotic pain medication. If you have a stick-shift car and your right hip was replaced, do not begin driving until your doctor says you can.   - Avoiding falls -  throw rugs and tack down loose carpeting.  Be aware of floor hazards such as pets, small objects or uneven surfaces.   -  Airport Metal Detectors - The sensitivity of metal detectors varies and it is likely that your prosthesis will cause an alarm. Inform the  that you have an artificial joint.  Diet  - Resume your normal diet as tolerated.  - It is important to achieve a healthy nutritional status by eating a well balanced diet on a regular basis.  - Your physician may recommend that you take iron and vitamin supplements.   - Continue to drink plenty of fluids.  Shower/Bathing  - You may shower as soon as you get home from the hospital unless otherwise instructed.  - Keep your incision out of water.  To keep the incision dry when showering, cover it with a plastic bag or plastic wrap.  - Pat incision dry if it gets wet.  Don’t rub.  - Do not submerge in a bath until staples are out and the incision is completely healed. (Approximately 6-8 weeks after surgery).  Dressing Change:  Procedure (if recommended by your physician)  - Wash hands.  - Open all dressing change  materials.  - Remove old dressing and discard.  - Inspect incision for redness, increase in clear drainage, yellow/green drainage, odor and surrounding skin hot to touch.  -  ABD (large gauze) pad by one corner and lay over the incision.  Be careful not to touch the inside of the dressing that will lay over the incision.  - Secure in place as instructed (Ace wrap or tape).    Swelling/Bruising  - Swelling is normal after hip replacement and can involve the thigh, knee, calf and foot.  - Swelling can last from 3-6 months.  - Elevate your leg higher than your heart while reclining.  The first week you are home you should elevate your leg an equal amount of time, as you are active.    - Anti-inflammatory pills can be taken once you have stopped the blood thinners.  - The swelling is usually worse after you go home since you are upright for longer periods of time.  - Bruising is common and can involve the entire leg including the thigh, calf and even foot.  Bruising often does not appear until after you arrive home and it can be quite dramatic- purple, black, green.  The bruising you can see is not usually concerning and will subside without any treatment.      Blood Clot Prevention  Blood clots in the legs and the less common, but frightening, clots that travel to the lungs are a real focus of our preventative. Most patients are at standard risk for them, but those patients who are at higher risk include people who have had previous clots, a family history of clotting, smoking, diabetes, obesity, advanced age, use of estrogen and a sedentary lifestyle.    - Signs of blood clots in legs - Swelling in thigh, calf or ankle that does not go down with elevation.  Pain, heat and tenderness in calf, back of calf or groin area.  NOTE: blood clots can occur in either leg.  - You have been receiving anticoagulant therapy (blood thinners) in the hospital and you may be instructed to continue at home depending on your risk  factors.  - Your risk for developing a clot continues for up to 2-3 months after surgery.  You should avoid prolonged sitting and dehydration during that time (long air trips and car trips).  If you do take a trip during this time, please get up and move around every 1- 1.5 hours.  - If you are prescribed blood thinning medication for home, follow instructions as directed. (Handouts provided if applicable).      Activity    Once you get home, you should stay active. The key is not to overdo it! While you can expect some good days and some bad days, you should notice a gradual improvement over time you should notice a gradual improvement and a gradual increase in your endurance over the next 6 to 12 months.    - Weight Bearing - If you have undergone cemented or hybrid hip replacement, you can put some weight on the leg immediately using a cane or walker, and you should continue to use some support for 4 to 6 weeks to help the muscles recover.   - Sleeping Positions - Sleep on your back with your legs slightly apart or on your side with a regular pillow between your knees. Be sure to use the pillow for at least 6 weeks, or until your doctor says you can do without it. Sleeping on your stomach should be all right  - Sitting - For at least the first 3 months, sit only in chairs that have arms. Do not sit on low chairs, low stools, or reclining chairs. Do not cross your legs at the knees. The physical therapist will show you how to sit and stand from a chair, keeping your affected leg out in front of you. Get up and move around on a regular basis--at least once every hour.  - Walking - Walk as much as you like once your doctor gives you the go-ahead, but remember that walking is no substitute for your prescribed exercises. Walking with a pair of trekking poles is helpful and adds as much as 40% to the exercise you get when you walk  - Therapy may be needed in some cases, to strengthen your muscles and improve your gait  (walking pattern).  This decision will be made at your post-operative appointment.  Follow your therapist recommended post-operative exercises (handout provided by Therapist).  - Swimming is also recommended; you can begin as soon as the sutures have been removed and the wound is healed, approximately 6 to 8 weeks after surgery. Using a pair of training fins may make swimming a more enjoyable and effective exercise.  - Other activities - Lower impact activities are preferred.  If you have specific questions, consult your Surgeon.    - Sexual activity - Your surgeon can tell you when it should be safe to resume sexual activity.      When to Call the Doctor   Call the physician if:   - Fever over 100.5? F  - Increased pain, drainage, redness, odor or heat around the incision area  - Shaking chills  - Increased knee pain with activity and rest  - Increased pain in calf, tenderness or redness above or below the knee  - Increased swelling of calf, ankle, foot  - Sudden increased shortness of breath, sudden onset of chest pain, localized chest pain with coughing  - Incision opening  Or, if there are any questions or concerns about medications or care.       -Is this patient being discharged with medication to prevent blood clots?  Yes, Aspirin Aspirin, ASA oral tablets  What is this medicine?  ASPIRIN (AS pir in) is a pain reliever. It is used to treat mild pain and fever. This medicine is also used as directed by a doctor to prevent and to treat heart attacks, to prevent strokes, and to treat arthritis or inflammation.  This medicine may be used for other purposes; ask your health care provider or pharmacist if you have questions.  COMMON BRAND NAME(S): Aspir-Low, Aspir-Erika, Aspirtab, Jorge Advanced Aspirin, Jorge Aspirin, Jorge Aspirin Extra Strength, Jorge Aspirin Plus, Jorge Extra Strength, Jorge Extra Strength Plus, Jorge Genuine Aspirin, Jorge Womens Aspirin, Bufferin, Bufferin Extra Strength, Bufferin Low  Dose  What should I tell my health care provider before I take this medicine?  They need to know if you have any of these conditions:  -anemia  -asthma  -bleeding problems  -child with chickenpox, the flu, or other viral infection  -diabetes  -gout  -if you frequently drink alcohol containing drinks  -kidney disease  -liver disease  -low level of vitamin K  -lupus  -smoke tobacco  -stomach ulcers or other problems  -an unusual or allergic reaction to aspirin, tartrazine dye, other medicines, dyes, or preservatives  -pregnant or trying to get pregnant  -breast-feeding  How should I use this medicine?  Take this medicine by mouth with a glass of water. Follow the directions on the package or prescription label. You can take this medicine with or without food. If it upsets your stomach, take it with food. Do not take your medicine more often than directed.  Talk to your pediatrician regarding the use of this medicine in children. While this drug may be prescribed for children as young as 12 years of age for selected conditions, precautions do apply. Children and teenagers should not use this medicine to treat chicken pox or flu symptoms unless directed by a doctor.  Patients over 65 years old may have a stronger reaction and need a smaller dose.  Overdosage: If you think you have taken too much of this medicine contact a poison control center or emergency room at once.  NOTE: This medicine is only for you. Do not share this medicine with others.  What if I miss a dose?  If you are taking this medicine on a regular schedule and miss a dose, take it as soon as you can. If it is almost time for your next dose, take only that dose. Do not take double or extra doses.  What may interact with this medicine?  Do not take this medicine with any of the following medications:  -cidofovir  -ketorolac  -probenecid  This medicine may also interact with the following medications:  -alcohol  -alendronate  -bismuth  subsalicylate  -flavocoxid  -herbal supplements like feverfew, garlic, nicolle, ginkgo biloba, horse chestnut  -medicines for diabetes or glaucoma like acetazolamide, methazolamide  -medicines for gout  -medicines that treat or prevent blood clots like enoxaparin, heparin, ticlopidine, warfarin  -other aspirin and aspirin-like medicines  -NSAIDs, medicines for pain and inflammation, like ibuprofen or naproxen  -pemetrexed  -sulfinpyrazone  -varicella live vaccine  This list may not describe all possible interactions. Give your health care provider a list of all the medicines, herbs, non-prescription drugs, or dietary supplements you use. Also tell them if you smoke, drink alcohol, or use illegal drugs. Some items may interact with your medicine.  What should I watch for while using this medicine?  If you are treating yourself for pain, tell your doctor or health care professional if the pain lasts more than 10 days, if it gets worse, or if there is a new or different kind of pain. Tell your doctor if you see redness or swelling. Also, check with your doctor if you have a fever that lasts for more than 3 days. Only take this medicine to prevent heart attacks or blood clotting if prescribed by your doctor or health care professional.  Do not take aspirin or aspirin-like medicines with this medicine. Too much aspirin can be dangerous. Always read the labels carefully.  This medicine can irritate your stomach or cause bleeding problems. Do not smoke cigarettes or drink alcohol while taking this medicine. Do not lie down for 30 minutes after taking this medicine to prevent irritation to your throat.  If you are scheduled for any medical or dental procedure, tell your healthcare provider that you are taking this medicine. You may need to stop taking this medicine before the procedure.  This medicine may be used to treat migraines. If you take migraine medicines for 10 or more days a month, your migraines may get worse.  Keep a diary of headache days and medicine use. Contact your healthcare professional if your migraine attacks occur more frequently.  What side effects may I notice from receiving this medicine?  Side effects that you should report to your doctor or health care professional as soon as possible:  -allergic reactions like skin rash, itching or hives, swelling of the face, lips, or tongue  -breathing problems  -changes in hearing, ringing in the ears  -confusion  -general ill feeling or flu-like symptoms  -pain on swallowing  -redness, blistering, peeling or loosening of the skin, including inside the mouth or nose  -signs and symptoms of bleeding such as bloody or black, tarry stools; red or dark-brown urine; spitting up blood or brown material that looks like coffee grounds; red spots on the skin; unusual bruising or bleeding from the eye, gums, or nose  -trouble passing urine or change in the amount of urine  -unusually weak or tired  -yellowing of the eyes or skin  Side effects that usually do not require medical attention (report to your doctor or health care professional if they continue or are bothersome):  -diarrhea or constipation  -headache  -nausea, vomiting  -stomach gas, heartburn  This list may not describe all possible side effects. Call your doctor for medical advice about side effects. You may report side effects to FDA at 1-849-FDA-8530.  Where should I keep my medicine?  Keep out of the reach of children.  Store at room temperature between 15 and 30 degrees C (59 and 86 degrees F). Protect from heat and moisture. Do not use this medicine if it has a strong vinegar smell. Throw away any unused medicine after the expiration date.  NOTE: This sheet is a summary. It may not cover all possible information. If you have questions about this medicine, talk to your doctor, pharmacist, or health care provider.  © 2018 [employer]/Gold Standard (2014-08-19 11:30:31)      · Is patient discharged on Warfarin /  Coumadin?   No       Total Hip Replacement, Care After  These instructions give you information on caring for yourself after your procedure. Your doctor also may give you specific instructions. Call your doctor if you have any problems or questions after your procedure.  Follow these instructions at home:  Your doctor will give you instructions on what types of movements are okay and not okay.  · Take medicines as told by your doctor.  · Do not take baths, swim, or use a hot tub until your doctor says that it is okay.  · Avoid lifting until your doctor says it is okay.  · Use a raised toilet as told by your doctor.  · Avoid sitting in low chairs as told by your doctor.  · Use crutches or a walker as told by your doctor.  · Rest often, but move around as much as you can. Movement helps you to heal and helps to prevent problems.  · Wear special socks (compression stockings) as told by your doctor. These socks help to prevent blood clots and lessen swelling of your legs.  · Follow instructions from your doctor about how to take care of your cut from surgery (incision). Make sure you:  ¨ Wash your hands with soap and water before you change your bandage (dressing). If you cannot use soap and water, use hand .  ¨ Change your bandage as told by your doctor.  ¨ Leave stitches (sutures), skin glue, or skin tape (adhesive) strips in place. These may need to stay in place for 2 weeks or longer. If the tape strips get loose and curl up, you may trim the loose edges. Do not remove the strips completely unless your doctor says it is okay.  Contact a doctor if:  · You have trouble breathing.  · You have fluid coming from your surgery site.  · You have redness or swelling at your surgery site.  · You have a bad smell coming from your surgery site.  · You have bleeding that will not stop.  · Your surgical cut opens up after sutures (stitches) or staples are removed.  · You have a fever.  Get help right away if:  · You have  a rash.  · You have pain or puffiness on your thigh or on the back of your lower leg.  · You have shortness of breath or chest pain.  This information is not intended to replace advice given to you by your health care provider. Make sure you discuss any questions you have with your health care provider.  Document Released: 03/11/2013 Document Revised: 08/21/2017 Document Reviewed: 02/18/2015  [employer] Interactive Patient Education © 2017 [employer]    Depression / Suicide Risk    As you are discharged from this [facility] facility, it is important to learn how to keep safe from harming yourself.    Recognize the warning signs:  · Abrupt changes in personality, positive or negative- including increase in energy   · Giving away possessions  · Change in eating patterns- significant weight changes-  positive or negative  · Change in sleeping patterns- unable to sleep or sleeping all the time   · Unwillingness or inability to communicate  · Depression  · Unusual sadness, discouragement and loneliness  · Talk of wanting to die  · Neglect of personal appearance   · Rebelliousness- reckless behavior  · Withdrawal from people/activities they love  · Confusion- inability to concentrate     If you or a loved one observes any of these behaviors or has concerns about self-harm, here's what you can do:  · Talk about it- your feelings and reasons for harming yourself  · Remove any means that you might use to hurt yourself (examples: pills, rope, extension cords, firearm)  · Get professional help from the community (Mental Health, Substance Abuse, psychological counseling)  · Do not be alone:Call your Safe Contact- someone whom you trust who will be there for you.  · Call your local CRISIS HOTLINE 079-2139 or 865-478-7618  · Call your local Children's Mobile Crisis Response Team [place] (275) 795-0115 or www.[employer]For[employer].com  · Call the toll free National Suicide Prevention Hotlines   · National Suicide Prevention  Lifeline 069-439-PBPN (5099)  · National [place] Line Network 800-SUICIDE (000-5037)

## 2019-09-20 NOTE — THERAPY
"Physical Therapy Evaluation completed.   Bed Mobility:  Supine to Sit: Supervised  Transfers: Sit to Stand: Supervised  Gait: Level Of Assist: Supervised with Front-Wheel Walker       Plan of Care: Patient with no further skilled PT needs in the acute care setting at this time  Discharge Recommendations: Equipment: No Equipment Needed. Post-acute therapy Recommend home health transitional care for continued physical therapy services.     See \"Rehab Therapy-Acute\" Patient Summary Report for complete documentation.     "

## 2019-09-20 NOTE — ANESTHESIA POSTPROCEDURE EVALUATION
Patient: Dee Armstrong    Procedure Summary     Date:  09/19/19 Room / Location:  [place] [address] / SURGERY [facility]    Anesthesia Start:  1136 Anesthesia Stop:  1332    Procedure:  ARTHROPLASTY, HIP, TOTAL, ANTERIOR APPROACH (Left Hip) Diagnosis:  (HIP PAIN LEFT)    Surgeon:  Clarence Vallejo M.D. Responsible Provider:  Bry Villareal M.D.    Anesthesia Type:  general ASA Status:  2          Final Anesthesia Type: general  Last vitals  BP   Blood Pressure : 122/69    Temp   36.1 °C (97 °F)    Pulse   Pulse: (!) 114   Resp   17    SpO2   95 %      Anesthesia Post Evaluation    Patient location during evaluation: PACU  Patient participation: complete - patient participated  Level of consciousness: awake and alert  Pain score: 6    Airway patency: patent  Anesthetic complications: no  Cardiovascular status: hemodynamically stable  Respiratory status: acceptable  Hydration status: euvolemic    PONV: none           Nurse Pain Score: 10 (NPRS)

## 2019-09-20 NOTE — CARE PLAN
Non-skid socks on and belongings within reach. Call light within reach. Hourly rounding in place.    SCDs to BLE. Aspirin for DVT prophylaxis per MAR. Mobilization at least three times a day with staff and PT/OT.     Pain adequately controlled per MAR PRN.     Patient receiving Ancef as an ATB for infection prevention.

## 2019-09-20 NOTE — PROGRESS NOTES
ABX retimed d/t lack of access. CIC charge notified this RN he would be down shortly for US guided IV. This RN attempted PIV three times with patient permission prior.

## 2019-09-20 NOTE — THERAPY
"Occupational Therapy Evaluation completed.   Functional Status:  SPV ADLs with AE and ADL transfers w/FWW  Plan of Care: Patient with no further skilled OT needs in the acute care setting at this time  Discharge Recommendations:  Equipment: No Equipment Needed. Post-acute therapy Anticipate that the patient will have no further occupational therapy needs after discharge from the hospital.     See \"Rehab Therapy-Acute\" Patient Summary Report for complete documentation.    Pt is 63yo female s/p L LYNDA, anterior approach, WBAT. Pt presents to OT eval at SPV level for basic ADLs following AE training for LB dressing (sock aid/reacher). Pt reports DME available in home for increased safety includes FWW, 4WW, SPC, shower chair, and hand held shower head. Pt did demonstrate increased work of breathing throughout functional activities, cued for pursed lip breathing. Pt reports supportive  to assist as needed.  No additional acute OT needs at this time.   "

## 2019-09-20 NOTE — PROGRESS NOTES
MDs have signed off on patient to discharge today. Patient will be leaving today with home to go . Discharge instructions given and patient has no questions or concerns at this time. Prescriptions given to patient at bedside. DME not needed.

## 2019-09-20 NOTE — PROGRESS NOTES
Skin check completed no new skin break down noted. Chronic numbness to LLE shin area from previous surgery. L hip dressing with scant old drainage, pain tolerable at this time. Ambulating with FWW, 100 ft ambulated SBA, patient brushed teeth and now back to bed. Needs met at this time. Patient tolerating room air.

## 2019-09-20 NOTE — PROGRESS NOTES
Patient was connected to PIV line for ATB dose prior to dc. IV was soon seen to be infiltrated. MD called to verify is pt needed a new PIV and this ATB dose or if she was ok to dc without.

## 2019-09-20 NOTE — PROGRESS NOTES
2 RN skin check complete with Adonay DELATORRE. Adonay is primary Rn and responsible for interventions.   Devices in place include oxymask and SCDs.  Skin assessed under devices.  No confirmed pressure ulcers found.  No new potential pressure ulcers noted.  The following interventions in place patient encouraged to turn from side to side and pillows in use for support and positioning.  Surgical dressing to left hip.

## 2019-09-20 NOTE — PROGRESS NOTES
Patient up in bed, no complaints or needs at this time. Rates pain 2/10 and seems comfortable at this time. Instructed to use call light if needed at her bedside.

## 2019-09-20 NOTE — CARE PLAN
Problem: Pain Management  Goal: Pain level will decrease to patient's comfort goal  Outcome: PROGRESSING AS EXPECTED  Note:   Patient came to floor complains of some pain when moving/ambulating. Administer Oxycodone 10 mg at 1815.

## 2019-09-20 NOTE — PROGRESS NOTES
"Patient seen and examined  No complaints    /69   Pulse (!) 114   Temp 36.1 °C (97 °F) (Temporal)   Resp 17   Ht 1.499 m (4' 11\")   Wt 85.8 kg (189 lb 2.5 oz)   SpO2 95%     Recent Labs     09/20/19  0527   HEMOGLOBIN 13.6   HEMATOCRIT 40.8       No acute distress  Dressing clean dry and intact  Neurovascularly intact      Plan:  Doing well  Discharge home.          "

## 2019-09-20 NOTE — PROGRESS NOTES
Refusal of ambulation this morning due to pain. Appropriate pain meds given. SCD / polar ice on patient. resting in bed.

## 2019-09-30 ENCOUNTER — HOSPITAL ENCOUNTER (OUTPATIENT)
Dept: LAB | Facility: MEDICAL CENTER | Age: 64
End: 2019-09-30
Attending: FAMILY MEDICINE
Payer: MEDICARE

## 2019-09-30 DIAGNOSIS — E78.5 DYSLIPIDEMIA: ICD-10-CM

## 2019-09-30 DIAGNOSIS — E55.9 VITAMIN D DEFICIENCY: ICD-10-CM

## 2019-09-30 DIAGNOSIS — M81.0 OSTEOPOROSIS OF FOREARM: ICD-10-CM

## 2019-09-30 LAB
ALBUMIN SERPL BCP-MCNC: 3.9 G/DL (ref 3.2–4.9)
ALBUMIN/GLOB SERPL: 1.4 G/DL
ALP SERPL-CCNC: 79 U/L (ref 30–99)
ALT SERPL-CCNC: 15 U/L (ref 2–50)
ANION GAP SERPL CALC-SCNC: 10 MMOL/L (ref 0–11.9)
AST SERPL-CCNC: 16 U/L (ref 12–45)
BASOPHILS # BLD AUTO: 0.9 % (ref 0–1.8)
BASOPHILS # BLD: 0.04 K/UL (ref 0–0.12)
BILIRUB SERPL-MCNC: 0.5 MG/DL (ref 0.1–1.5)
BUN SERPL-MCNC: 18 MG/DL (ref 8–22)
CALCIUM SERPL-MCNC: 9.4 MG/DL (ref 8.5–10.5)
CHLORIDE SERPL-SCNC: 107 MMOL/L (ref 96–112)
CHOLEST SERPL-MCNC: 184 MG/DL (ref 100–199)
CO2 SERPL-SCNC: 26 MMOL/L (ref 20–33)
CREAT SERPL-MCNC: 0.77 MG/DL (ref 0.5–1.4)
EOSINOPHIL # BLD AUTO: 0.1 K/UL (ref 0–0.51)
EOSINOPHIL NFR BLD: 2.1 % (ref 0–6.9)
ERYTHROCYTE [DISTWIDTH] IN BLOOD BY AUTOMATED COUNT: 45.3 FL (ref 35.9–50)
FASTING STATUS PATIENT QL REPORTED: NORMAL
GLOBULIN SER CALC-MCNC: 2.8 G/DL (ref 1.9–3.5)
GLUCOSE SERPL-MCNC: 99 MG/DL (ref 65–99)
HCT VFR BLD AUTO: 39.9 % (ref 37–47)
HDLC SERPL-MCNC: 46 MG/DL
HGB BLD-MCNC: 12.9 G/DL (ref 12–16)
IMM GRANULOCYTES # BLD AUTO: 0.01 K/UL (ref 0–0.11)
IMM GRANULOCYTES NFR BLD AUTO: 0.2 % (ref 0–0.9)
LDLC SERPL CALC-MCNC: 108 MG/DL
LYMPHOCYTES # BLD AUTO: 1.17 K/UL (ref 1–4.8)
LYMPHOCYTES NFR BLD: 24.9 % (ref 22–41)
MCH RBC QN AUTO: 32 PG (ref 27–33)
MCHC RBC AUTO-ENTMCNC: 32.3 G/DL (ref 33.6–35)
MCV RBC AUTO: 99 FL (ref 81.4–97.8)
MONOCYTES # BLD AUTO: 0.33 K/UL (ref 0–0.85)
MONOCYTES NFR BLD AUTO: 7 % (ref 0–13.4)
NEUTROPHILS # BLD AUTO: 3.05 K/UL (ref 2–7.15)
NEUTROPHILS NFR BLD: 64.9 % (ref 44–72)
NRBC # BLD AUTO: 0.02 K/UL
NRBC BLD-RTO: 0.4 /100 WBC
PLATELET # BLD AUTO: 412 K/UL (ref 164–446)
PMV BLD AUTO: 9.1 FL (ref 9–12.9)
POTASSIUM SERPL-SCNC: 4.6 MMOL/L (ref 3.6–5.5)
PROT SERPL-MCNC: 6.7 G/DL (ref 6–8.2)
RBC # BLD AUTO: 4.03 M/UL (ref 4.2–5.4)
SODIUM SERPL-SCNC: 143 MMOL/L (ref 135–145)
TRIGL SERPL-MCNC: 151 MG/DL (ref 0–149)
WBC # BLD AUTO: 4.7 K/UL (ref 4.8–10.8)

## 2019-09-30 PROCEDURE — 80053 COMPREHEN METABOLIC PANEL: CPT

## 2019-09-30 PROCEDURE — 36415 COLL VENOUS BLD VENIPUNCTURE: CPT

## 2019-09-30 PROCEDURE — 85025 COMPLETE CBC W/AUTO DIFF WBC: CPT

## 2019-09-30 PROCEDURE — 80061 LIPID PANEL: CPT

## 2019-10-01 ENCOUNTER — OFFICE VISIT (OUTPATIENT)
Dept: URGENT CARE | Facility: PHYSICIAN GROUP | Age: 64
End: 2019-10-01
Payer: MEDICARE

## 2019-10-01 ENCOUNTER — HOSPITAL ENCOUNTER (OUTPATIENT)
Facility: MEDICAL CENTER | Age: 64
End: 2019-10-01
Attending: PHYSICIAN ASSISTANT
Payer: MEDICARE

## 2019-10-01 VITALS
HEIGHT: 59 IN | RESPIRATION RATE: 16 BRPM | BODY MASS INDEX: 38.3 KG/M2 | HEART RATE: 114 BPM | TEMPERATURE: 98.3 F | SYSTOLIC BLOOD PRESSURE: 116 MMHG | DIASTOLIC BLOOD PRESSURE: 72 MMHG | WEIGHT: 190 LBS | OXYGEN SATURATION: 97 %

## 2019-10-01 DIAGNOSIS — R39.89 SUSPECTED UTI: ICD-10-CM

## 2019-10-01 DIAGNOSIS — R31.0 GROSS HEMATURIA: ICD-10-CM

## 2019-10-01 LAB
APPEARANCE UR: CLEAR
BILIRUB UR STRIP-MCNC: NEGATIVE MG/DL
COLOR UR AUTO: ABNORMAL
GLUCOSE UR STRIP.AUTO-MCNC: NEGATIVE MG/DL
KETONES UR STRIP.AUTO-MCNC: NEGATIVE MG/DL
LEUKOCYTE ESTERASE UR QL STRIP.AUTO: NEGATIVE
NITRITE UR QL STRIP.AUTO: NEGATIVE
PH UR STRIP.AUTO: 6.5 [PH] (ref 5–8)
PROT UR QL STRIP: POSITIVE MG/DL
RBC UR QL AUTO: ABNORMAL
SP GR UR STRIP.AUTO: 1.01
UROBILINOGEN UR STRIP-MCNC: NEGATIVE MG/DL

## 2019-10-01 PROCEDURE — 81002 URINALYSIS NONAUTO W/O SCOPE: CPT | Performed by: PHYSICIAN ASSISTANT

## 2019-10-01 PROCEDURE — 99214 OFFICE O/P EST MOD 30 MIN: CPT | Performed by: PHYSICIAN ASSISTANT

## 2019-10-01 PROCEDURE — 87086 URINE CULTURE/COLONY COUNT: CPT

## 2019-10-01 RX ORDER — CEFDINIR 300 MG/1
300 CAPSULE ORAL EVERY 12 HOURS
Qty: 10 CAP | Refills: 0 | Status: SHIPPED | OUTPATIENT
Start: 2019-10-01 | End: 2019-10-06

## 2019-10-01 ASSESSMENT — ENCOUNTER SYMPTOMS
CHILLS: 0
ABDOMINAL PAIN: 0
FEVER: 0
VOMITING: 0
NAUSEA: 0
BACK PAIN: 1
FLANK PAIN: 0
DIARRHEA: 0

## 2019-10-01 NOTE — PROGRESS NOTES
"Subjective:   Dee Armstrong is a 64 y.o. female who presents for Blood in Urine (x2days, pain in mid to lower back last night, had hip replacement 2weeks ago)    This is a new problem.  Patient presents urgent care with 3-day history of blood in the urine.  Patient denies dysuria, urgency or frequency with urination.  Denies flank pain or abdominal pain.  Denies fever or chills.  Patient denies history of hematuria.  She is a non-smoker.  Patient did undergo hip replacement 2 weeks ago.  She is on a daily aspirin.    Denies hematochezia or melena.  Denies unusual bleeding when brushing her teeth.  Denies unusual bruising.      HPI  Review of Systems   Constitutional: Negative for chills, fever and malaise/fatigue.   Gastrointestinal: Negative for abdominal pain, diarrhea, nausea and vomiting.   Genitourinary: Positive for hematuria. Negative for dysuria, flank pain and frequency.   Musculoskeletal: Positive for back pain and joint pain.   All other systems reviewed and are negative.    Allergies   Allergen Reactions   • Nkda [No Known Drug Allergy]         Objective:   /72 (BP Location: Left arm, Patient Position: Sitting, BP Cuff Size: Adult)   Pulse (!) 114   Temp 36.8 °C (98.3 °F) (Temporal)   Resp 16   Ht 1.499 m (4' 11\")   Wt 86.2 kg (190 lb)   SpO2 97%   BMI 38.38 kg/m²   Physical Exam   Constitutional: She is oriented to person, place, and time. She appears well-developed and well-nourished. She does not appear ill. No distress.   HENT:   Head: Normocephalic and atraumatic.   Right Ear: External ear normal.   Left Ear: External ear normal.   Mouth/Throat: Uvula is midline, oropharynx is clear and moist and mucous membranes are normal. No oropharyngeal exudate.   Eyes: Pupils are equal, round, and reactive to light. Conjunctivae and EOM are normal.   Neck: Normal range of motion. Neck supple.   Cardiovascular: Normal rate, regular rhythm and normal heart sounds. Exam reveals no gallop and no " friction rub.   No murmur heard.  Pulmonary/Chest: Effort normal and breath sounds normal. No respiratory distress. She has no wheezes. She has no rales.   Abdominal: Soft. Bowel sounds are normal. She exhibits no distension and no mass. There is no hepatosplenomegaly. There is no tenderness. There is no rebound, no guarding and no CVA tenderness.   Musculoskeletal: Normal range of motion. She exhibits no edema, tenderness or deformity.   Lymphadenopathy:        Head (right side): No submental, no submandibular and no tonsillar adenopathy present.        Head (left side): No submental, no submandibular and no tonsillar adenopathy present.     She has no cervical adenopathy.        Right: No supraclavicular adenopathy present.        Left: No supraclavicular adenopathy present.   Neurological: She is alert and oriented to person, place, and time. She has normal strength. No cranial nerve deficit or sensory deficit. Coordination normal.   Skin: Skin is warm and dry. No rash noted.   Psychiatric: She has a normal mood and affect. Judgment normal.   Vitals reviewed.          Assessment/Plan:   Assessment    1. Gross hematuria  - POCT Urinalysis  - Urine Culture; Future  - cefdinir (OMNICEF) 300 MG Cap; Take 1 Cap by mouth every 12 hours for 5 days.  Dispense: 10 Cap; Refill: 0    2. Suspected UTI  - Urine Culture; Future  - cefdinir (OMNICEF) 300 MG Cap; Take 1 Cap by mouth every 12 hours for 5 days.  Dispense: 10 Cap; Refill: 0    Results for orders placed or performed in visit on 10/01/19   POCT Urinalysis   Result Value Ref Range    POC Color PINK Negative    POC Appearance CLEAR Negative    POC Leukocyte Esterase NEGATIVE Negative    POC Nitrites NEGATIVE Negative    POC Urobiligen NEGATIVE Negative (0.2) mg/dL    POC Protein POSITIVE Negative mg/dL    POC Urine PH 6.5 5.0 - 8.0    POC Blood LARGE Negative    POC Specific Gravity 1.010 <1.005 - >1.030    POC Ketones NEGATIVE Negative mg/dL    POC Bilirubin NEGATIVE  "Negative mg/dL    POC Glucose NEGATIVE Negative mg/dL     Despite no leukocyte esterase or nitrates I am still suspicious for the possibility of UTI as patient has no other relatable symptoms.  Patient will be started on Omnicef pending culture and sensitivity.  If culture negative, patient will need referral to urology.  She is not experiencing dysuria and therefore is not given Pyridium.      OK to leave detailed voicemail or discuss results with  \"bryan\"    Differential diagnosis, natural history, supportive care, and indications for immediate follow-up discussed.    If not improving in 3-5 days, F/U with PCP or return to  or sooner if worsens  Red flag warning symptoms and strict ER/follow-up precautions given.  The patient demonstrated a good understanding and agreed with the treatment plan.  Please note that this note was created using voice recognition speech to text software. Every effort has been made to correct obvious errors.  However, I expect there are errors of grammar and possibly context that were not discovered prior to finalizing the note  CHRIS Griffin PA-C    "

## 2019-10-02 ENCOUNTER — HOSPITAL ENCOUNTER (EMERGENCY)
Facility: MEDICAL CENTER | Age: 64
End: 2019-10-02
Attending: EMERGENCY MEDICINE
Payer: MEDICARE

## 2019-10-02 ENCOUNTER — APPOINTMENT (OUTPATIENT)
Dept: RADIOLOGY | Facility: MEDICAL CENTER | Age: 64
End: 2019-10-02
Attending: EMERGENCY MEDICINE
Payer: MEDICARE

## 2019-10-02 VITALS
WEIGHT: 187.39 LBS | HEIGHT: 59 IN | TEMPERATURE: 97.3 F | DIASTOLIC BLOOD PRESSURE: 71 MMHG | RESPIRATION RATE: 18 BRPM | BODY MASS INDEX: 37.78 KG/M2 | HEART RATE: 88 BPM | OXYGEN SATURATION: 97 % | SYSTOLIC BLOOD PRESSURE: 107 MMHG

## 2019-10-02 DIAGNOSIS — R10.9 FLANK PAIN: ICD-10-CM

## 2019-10-02 DIAGNOSIS — N20.1 URETERAL STONE: ICD-10-CM

## 2019-10-02 LAB
ALBUMIN SERPL BCP-MCNC: 4.2 G/DL (ref 3.2–4.9)
ALBUMIN/GLOB SERPL: 1.4 G/DL
ALP SERPL-CCNC: 83 U/L (ref 30–99)
ALT SERPL-CCNC: 12 U/L (ref 2–50)
ANION GAP SERPL CALC-SCNC: 11 MMOL/L (ref 0–11.9)
APPEARANCE UR: ABNORMAL
AST SERPL-CCNC: 13 U/L (ref 12–45)
BACTERIA #/AREA URNS HPF: NEGATIVE /HPF
BASOPHILS # BLD AUTO: 1 % (ref 0–1.8)
BASOPHILS # BLD: 0.07 K/UL (ref 0–0.12)
BILIRUB SERPL-MCNC: 0.7 MG/DL (ref 0.1–1.5)
BILIRUB UR QL STRIP.AUTO: NEGATIVE
BUN SERPL-MCNC: 16 MG/DL (ref 8–22)
CALCIUM SERPL-MCNC: 9.2 MG/DL (ref 8.5–10.5)
CHLORIDE SERPL-SCNC: 104 MMOL/L (ref 96–112)
CO2 SERPL-SCNC: 24 MMOL/L (ref 20–33)
COLOR UR: YELLOW
CREAT SERPL-MCNC: 0.8 MG/DL (ref 0.5–1.4)
EOSINOPHIL # BLD AUTO: 0.09 K/UL (ref 0–0.51)
EOSINOPHIL NFR BLD: 1.3 % (ref 0–6.9)
EPI CELLS #/AREA URNS HPF: ABNORMAL /HPF
ERYTHROCYTE [DISTWIDTH] IN BLOOD BY AUTOMATED COUNT: 44.8 FL (ref 35.9–50)
GLOBULIN SER CALC-MCNC: 2.9 G/DL (ref 1.9–3.5)
GLUCOSE SERPL-MCNC: 125 MG/DL (ref 65–99)
GLUCOSE UR STRIP.AUTO-MCNC: NEGATIVE MG/DL
HCT VFR BLD AUTO: 42.5 % (ref 37–47)
HGB BLD-MCNC: 13.6 G/DL (ref 12–16)
IMM GRANULOCYTES # BLD AUTO: 0.02 K/UL (ref 0–0.11)
IMM GRANULOCYTES NFR BLD AUTO: 0.3 % (ref 0–0.9)
KETONES UR STRIP.AUTO-MCNC: 40 MG/DL
LEUKOCYTE ESTERASE UR QL STRIP.AUTO: NEGATIVE
LIPASE SERPL-CCNC: 24 U/L (ref 11–82)
LYMPHOCYTES # BLD AUTO: 1.9 K/UL (ref 1–4.8)
LYMPHOCYTES NFR BLD: 28.1 % (ref 22–41)
MCH RBC QN AUTO: 31.4 PG (ref 27–33)
MCHC RBC AUTO-ENTMCNC: 32 G/DL (ref 33.6–35)
MCV RBC AUTO: 98.2 FL (ref 81.4–97.8)
MICRO URNS: ABNORMAL
MONOCYTES # BLD AUTO: 0.46 K/UL (ref 0–0.85)
MONOCYTES NFR BLD AUTO: 6.8 % (ref 0–13.4)
NEUTROPHILS # BLD AUTO: 4.23 K/UL (ref 2–7.15)
NEUTROPHILS NFR BLD: 62.5 % (ref 44–72)
NITRITE UR QL STRIP.AUTO: NEGATIVE
NRBC # BLD AUTO: 0 K/UL
NRBC BLD-RTO: 0 /100 WBC
PH UR STRIP.AUTO: 6.5 [PH] (ref 5–8)
PLATELET # BLD AUTO: 452 K/UL (ref 164–446)
PMV BLD AUTO: 8.6 FL (ref 9–12.9)
POTASSIUM SERPL-SCNC: 3.4 MMOL/L (ref 3.6–5.5)
PROT SERPL-MCNC: 7.1 G/DL (ref 6–8.2)
PROT UR QL STRIP: NEGATIVE MG/DL
RBC # BLD AUTO: 4.33 M/UL (ref 4.2–5.4)
RBC # URNS HPF: >150 /HPF
RBC UR QL AUTO: ABNORMAL
SODIUM SERPL-SCNC: 139 MMOL/L (ref 135–145)
SP GR UR STRIP.AUTO: 1.01
UROBILINOGEN UR STRIP.AUTO-MCNC: 0.2 MG/DL
WBC # BLD AUTO: 6.8 K/UL (ref 4.8–10.8)
WBC #/AREA URNS HPF: ABNORMAL /HPF

## 2019-10-02 PROCEDURE — 81001 URINALYSIS AUTO W/SCOPE: CPT

## 2019-10-02 PROCEDURE — 74176 CT ABD & PELVIS W/O CONTRAST: CPT

## 2019-10-02 PROCEDURE — A9270 NON-COVERED ITEM OR SERVICE: HCPCS | Performed by: EMERGENCY MEDICINE

## 2019-10-02 PROCEDURE — 83690 ASSAY OF LIPASE: CPT

## 2019-10-02 PROCEDURE — 96374 THER/PROPH/DIAG INJ IV PUSH: CPT

## 2019-10-02 PROCEDURE — 700111 HCHG RX REV CODE 636 W/ 250 OVERRIDE (IP)

## 2019-10-02 PROCEDURE — 700102 HCHG RX REV CODE 250 W/ 637 OVERRIDE(OP): Performed by: EMERGENCY MEDICINE

## 2019-10-02 PROCEDURE — 80053 COMPREHEN METABOLIC PANEL: CPT

## 2019-10-02 PROCEDURE — 700111 HCHG RX REV CODE 636 W/ 250 OVERRIDE (IP): Performed by: EMERGENCY MEDICINE

## 2019-10-02 PROCEDURE — 85025 COMPLETE CBC W/AUTO DIFF WBC: CPT

## 2019-10-02 PROCEDURE — 700105 HCHG RX REV CODE 258: Performed by: EMERGENCY MEDICINE

## 2019-10-02 PROCEDURE — 99284 EMERGENCY DEPT VISIT MOD MDM: CPT

## 2019-10-02 RX ORDER — TAMSULOSIN HYDROCHLORIDE 0.4 MG/1
0.4 CAPSULE ORAL DAILY
Qty: 5 CAP | Refills: 0 | Status: ON HOLD | OUTPATIENT
Start: 2019-10-02 | End: 2019-10-07

## 2019-10-02 RX ORDER — OXYCODONE AND ACETAMINOPHEN 10; 325 MG/1; MG/1
1 TABLET ORAL ONCE
Status: COMPLETED | OUTPATIENT
Start: 2019-10-02 | End: 2019-10-02

## 2019-10-02 RX ORDER — SODIUM CHLORIDE 9 MG/ML
1000 INJECTION, SOLUTION INTRAVENOUS ONCE
Status: COMPLETED | OUTPATIENT
Start: 2019-10-02 | End: 2019-10-02

## 2019-10-02 RX ORDER — OXYCODONE HYDROCHLORIDE AND ACETAMINOPHEN 5; 325 MG/1; MG/1
1 TABLET ORAL EVERY 6 HOURS PRN
Qty: 15 TAB | Refills: 0 | Status: SHIPPED | OUTPATIENT
Start: 2019-10-02 | End: 2019-10-06

## 2019-10-02 RX ORDER — KETOROLAC TROMETHAMINE 30 MG/ML
INJECTION, SOLUTION INTRAMUSCULAR; INTRAVENOUS
Status: COMPLETED
Start: 2019-10-02 | End: 2019-10-02

## 2019-10-02 RX ORDER — KETOROLAC TROMETHAMINE 30 MG/ML
15 INJECTION, SOLUTION INTRAMUSCULAR; INTRAVENOUS ONCE
Status: DISCONTINUED | OUTPATIENT
Start: 2019-10-02 | End: 2019-10-02 | Stop reason: HOSPADM

## 2019-10-02 RX ORDER — MORPHINE SULFATE 4 MG/ML
4 INJECTION, SOLUTION INTRAMUSCULAR; INTRAVENOUS ONCE
Status: COMPLETED | OUTPATIENT
Start: 2019-10-02 | End: 2019-10-02

## 2019-10-02 RX ADMIN — KETOROLAC TROMETHAMINE 30 MG: 30 INJECTION, SOLUTION INTRAMUSCULAR at 10:08

## 2019-10-02 RX ADMIN — OXYCODONE HYDROCHLORIDE AND ACETAMINOPHEN 1 TABLET: 10; 325 TABLET ORAL at 12:18

## 2019-10-02 RX ADMIN — MORPHINE SULFATE 4 MG: 4 INJECTION INTRAVENOUS at 10:54

## 2019-10-02 RX ADMIN — SODIUM CHLORIDE 1000 ML: 9 INJECTION, SOLUTION INTRAVENOUS at 10:08

## 2019-10-02 ASSESSMENT — ENCOUNTER SYMPTOMS
SHORTNESS OF BREATH: 0
FLANK PAIN: 1
FEVER: 0
CHILLS: 0
NAUSEA: 1
VOMITING: 1
DIARRHEA: 0
HEADACHES: 0
BACK PAIN: 1

## 2019-10-02 NOTE — ED TRIAGE NOTES
Dee Tripathie Patti  64 y.o.  Chief Complaint   Patient presents with   • Flank Pain     left side, onset this am while in the shower, caused her to vomit   • Blood in Urine     since sunday, went to  yesterday and was dx with UTI, urine test showed gross hematuria     Patient wheeled in wc with  to triage room with above complaint.  Patient appears in severe pain, patient is hyperventilating and groaning in triage. Patient has some difficulty standing due to pain and she just had a hip replacement a couple weeks ago.  She was started on an abx yesterday after going to .  She's taken one dose.  Denies fevers.  Patient also has significant hx of back problems/surgeries.    Patient escorted to the lobby and instructed to notify staff of any changes in condition.

## 2019-10-02 NOTE — ED NOTES
Pt moaning and rolling around in bed in pain.    Attempted to call Dr. Oliveira x 2 with no answer.

## 2019-10-02 NOTE — ED PROVIDER NOTES
ED Provider Note    ED Provider Note    Primary care provider: Sherly Calvert M.D.  Means of arrival: POV  History obtained from: patient  History limited by: NOne    CHIEF COMPLAINT  Chief Complaint   Patient presents with   • Flank Pain     left side, onset this am while in the shower, caused her to vomit   • Blood in Urine     since sunday, went to  yesterday and was dx with UTI, urine test showed gross hematuria       HPI  Dee Armstrong is a 64 y.o. female who presents to the Emergency Department with a chief complaint of left-sided flank pain.  She had had some minor left-sided back pain over the last several days.  She was seen in the urgent care yesterday and placed on antibiotics for concern for urinary tract infection although the patient states it was not clear that she definitively had a urinary tract infection.  She has not had a fever.  This morning, pain worsened significantly.  She is unable to find a position of comfort.  She is had nausea and vomiting.  This prompted her ED visit.  She has not had a fever.  No chest pain or shortness of breath.  Patient has no history of kidney stones.    REVIEW OF SYSTEMS  Review of Systems   Constitutional: Negative for chills and fever.   HENT: Negative for congestion.    Respiratory: Negative for shortness of breath.    Cardiovascular: Negative for chest pain.   Gastrointestinal: Positive for nausea and vomiting. Negative for diarrhea.   Genitourinary: Positive for flank pain and hematuria.   Musculoskeletal: Positive for back pain.   Neurological: Negative for headaches.   All other systems reviewed and are negative.      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    SURGICAL HISTORY   has a  "past surgical history that includes arthrotomy (9/3/2010); egd with asp/bx (2011); egd with asp/bx (2014); puja by laparoscopy (2014); other abdominal surgery (); colonoscopy (2011); recovery (2015); extreme lateral interbody fusion (Right, 2016); lumbar fusion posterior (2016); lumbar laminectomy diskectomy (2016); laminotomy; hysterectomy robotic (2017); cystoscopy (2017); incision hernia repair (Right, 2018); other orthopedic surgery; arthrodesis (9/3/2010); orthopedic osteotomy (9/3/2010); bone spur excision (9/3/2010); shoulder decompression arthroscopic (Right, 2016); clavicle distal excision (Right, 2016); shoulder arthroscopy w/ rotator cuff repair (Right, 2016); shoulder arthroscopy w/ bicipital tenodesis repair (Right, 2016); and total hip arthroplasty (Left, 2019).    SOCIAL HISTORY  Social History     Tobacco Use   • Smoking status: Former Smoker     Years: 2.00     Types: Cigarettes     Last attempt to quit: 1977     Years since quittin.7   • Smokeless tobacco: Never Used   Substance Use Topics   • Alcohol use: Not Currently     Alcohol/week: 0.0 oz     Frequency: Monthly or less   • Drug use: No      Social History     Substance and Sexual Activity   Drug Use No       FAMILY HISTORY  Family History   Problem Relation Age of Onset   • Cancer Mother         lymphoma   • Stroke Father    • Diabetes Father        CURRENT MEDICATIONS  Home Medications    **Home medications have not yet been reviewed for this encounter**         ALLERGIES  No Known Allergies    PHYSICAL EXAM  VITAL SIGNS: /71   Pulse 88   Temp 36.3 °C (97.3 °F) (Temporal)   Resp 18   Ht 1.499 m (4' 11\")   Wt 85 kg (187 lb 6.3 oz)   SpO2 97%   BMI 37.85 kg/m²   Vitals reviewed.  Constitutional: Patient is oriented to person, place, and time. Appears well-developed and well-nourished.  Moderate distress.    Head: Normocephalic and atraumatic. "   Ears: Normal external ears bilaterally.   Mouth/Throat: Oropharynx is clear and moist  Eyes: Conjunctivae are normal.   Neck: Normal range of motion.   Cardiovascular: Normal rate, regular rhythm and normal heart sounds.   Pulmonary/Chest: Effort normal and breath sounds normal. No respiratory distress, no wheezes, rhonchi, or rales.   Abdominal: Soft. Bowel sounds are normal. There is no tenderness. No rebound or guarding, or peritoneal signs.  Left CVA tenderness.  Musculoskeletal: No edema.  There is some mild tenderness around the left hip, there are subcutaneous sutures with dressing in place over the lateral hip.   Neurological: No focal deficits.   Skin: Skin is warm and dry. No erythema. No pallor.   Psychiatric: Patient has a normal mood and affect, given circumstances.     LABS  Results for orders placed or performed during the hospital encounter of 10/02/19   CBC WITH DIFFERENTIAL   Result Value Ref Range    WBC 6.8 4.8 - 10.8 K/uL    RBC 4.33 4.20 - 5.40 M/uL    Hemoglobin 13.6 12.0 - 16.0 g/dL    Hematocrit 42.5 37.0 - 47.0 %    MCV 98.2 (H) 81.4 - 97.8 fL    MCH 31.4 27.0 - 33.0 pg    MCHC 32.0 (L) 33.6 - 35.0 g/dL    RDW 44.8 35.9 - 50.0 fL    Platelet Count 452 (H) 164 - 446 K/uL    MPV 8.6 (L) 9.0 - 12.9 fL    Neutrophils-Polys 62.50 44.00 - 72.00 %    Lymphocytes 28.10 22.00 - 41.00 %    Monocytes 6.80 0.00 - 13.40 %    Eosinophils 1.30 0.00 - 6.90 %    Basophils 1.00 0.00 - 1.80 %    Immature Granulocytes 0.30 0.00 - 0.90 %    Nucleated RBC 0.00 /100 WBC    Neutrophils (Absolute) 4.23 2.00 - 7.15 K/uL    Lymphs (Absolute) 1.90 1.00 - 4.80 K/uL    Monos (Absolute) 0.46 0.00 - 0.85 K/uL    Eos (Absolute) 0.09 0.00 - 0.51 K/uL    Baso (Absolute) 0.07 0.00 - 0.12 K/uL    Immature Granulocytes (abs) 0.02 0.00 - 0.11 K/uL    NRBC (Absolute) 0.00 K/uL   COMP METABOLIC PANEL   Result Value Ref Range    Sodium 139 135 - 145 mmol/L    Potassium 3.4 (L) 3.6 - 5.5 mmol/L    Chloride 104 96 - 112 mmol/L     Co2 24 20 - 33 mmol/L    Anion Gap 11.0 0.0 - 11.9    Glucose 125 (H) 65 - 99 mg/dL    Bun 16 8 - 22 mg/dL    Creatinine 0.80 0.50 - 1.40 mg/dL    Calcium 9.2 8.5 - 10.5 mg/dL    AST(SGOT) 13 12 - 45 U/L    ALT(SGPT) 12 2 - 50 U/L    Alkaline Phosphatase 83 30 - 99 U/L    Total Bilirubin 0.7 0.1 - 1.5 mg/dL    Albumin 4.2 3.2 - 4.9 g/dL    Total Protein 7.1 6.0 - 8.2 g/dL    Globulin 2.9 1.9 - 3.5 g/dL    A-G Ratio 1.4 g/dL   LIPASE   Result Value Ref Range    Lipase 24 11 - 82 U/L   URINALYSIS CULTURE, IF INDICATED   Result Value Ref Range    Color Yellow     Character Cloudy (A)     Specific Gravity 1.015 <1.035    Ph 6.5 5.0 - 8.0    Glucose Negative Negative mg/dL    Ketones 40 (A) Negative mg/dL    Protein Negative Negative mg/dL    Bilirubin Negative Negative    Urobilinogen, Urine 0.2 Negative    Nitrite Negative Negative    Leukocyte Esterase Negative Negative    Occult Blood Large (A) Negative    Micro Urine Req Microscopic    ESTIMATED GFR   Result Value Ref Range    GFR If African American >60 >60 mL/min/1.73 m 2    GFR If Non African American >60 >60 mL/min/1.73 m 2   URINE MICROSCOPIC (W/UA)   Result Value Ref Range    WBC 0-2 /hpf    RBC >150 (A) /hpf    Bacteria Negative None /hpf    Epithelial Cells Many (A) /hpf       All labs reviewed by me.      RADIOLOGY  CT-RENAL COLIC EVALUATION(A/P W/O)   Final Result      1.  5 mm stone in the proximal LEFT ureter with mild associated obstructive changes.   2.  Stable RIGHT lower lobe lung nodule.   3.  Irregular fluid collection within the deep subcutaneous soft tissues of the posterior lateral RIGHT lower chest wall at site of prior fat-containing hernia.  This may be postoperative in indicate seroma or hematoma.  Abscess is felt unlikely.        The radiologist's interpretation of all radiological studies have been reviewed by me.    COURSE & MEDICAL DECISION MAKING  Pertinent Labs & Imaging studies reviewed. (See chart for details)    Obtained and  reviewed past medical records.  Patient's last encounter was September 20, she underwent total hip arthroplasty with Dr. Clarence Vallejo    9:36 AM - Patient seen and examined at bedside.  Patient's been moderate to severe distress, she is moaning, rolling around on the gurney in pain.  To be treated with Toradol.  She presented with pain starting this morning, highly concerning for renal stone.  No fever.  She has normal vital signs.  IVs been established, labs drawn per nursing protocols, CT scan for further evaluation of her symptoms.     The differential diagnoses include but are not limited to: Kidney stone, pyelonephritis, UTI    Patient is reevaluated at the bedside.  Awaiting urine analysis.  She is made aware, that she has a left-sided ureteral stone.  Disposition, pending urine analysis results.    11:55 AM, patient's reevaluated the bedside.  This is after urine analysis is resulted.  There is no evidence of urinary tract infection.  She is feeling markedly better after medicated and given IV fluids here in the ED.  I discussed plan of care which would be for her home with Flomax and pain medication.  She is to follow-up with urology.  She is given strict return precautions regarding uncontrolled pain or fever.  Her  experienced a similar problem, he had a kidney stone but did not pass and he did have to return to the emergency room so she is aware of this.  She is able at this time and comfortable with this plan of care.      FINAL IMPRESSION  1. Ureteral stone    2. Flank pain

## 2019-10-03 LAB — BLOOD CULTURE HOLD CXBCH: NORMAL

## 2019-10-04 LAB
BACTERIA UR CULT: NORMAL
SIGNIFICANT IND 70042: NORMAL
SITE SITE: NORMAL
SOURCE SOURCE: NORMAL

## 2019-10-07 ENCOUNTER — HOSPITAL ENCOUNTER (OUTPATIENT)
Facility: MEDICAL CENTER | Age: 64
End: 2019-10-07
Attending: UROLOGY | Admitting: UROLOGY
Payer: MEDICARE

## 2019-10-07 ENCOUNTER — APPOINTMENT (OUTPATIENT)
Dept: RADIOLOGY | Facility: MEDICAL CENTER | Age: 64
End: 2019-10-07
Attending: UROLOGY
Payer: MEDICARE

## 2019-10-07 VITALS
DIASTOLIC BLOOD PRESSURE: 84 MMHG | SYSTOLIC BLOOD PRESSURE: 148 MMHG | BODY MASS INDEX: 38 KG/M2 | WEIGHT: 188.49 LBS | HEART RATE: 90 BPM | RESPIRATION RATE: 20 BRPM | HEIGHT: 59 IN | TEMPERATURE: 97.7 F | OXYGEN SATURATION: 97 %

## 2019-10-07 PROCEDURE — 700111 HCHG RX REV CODE 636 W/ 250 OVERRIDE (IP)

## 2019-10-07 RX ORDER — NAPROXEN SODIUM 220 MG
220 TABLET ORAL PRN
COMMUNITY
End: 2020-03-30

## 2019-10-07 RX ORDER — ALENDRONATE SODIUM 70 MG/1
70 TABLET ORAL
COMMUNITY
End: 2020-04-06

## 2019-10-07 RX ORDER — MELOXICAM 15 MG/1
15 TABLET ORAL DAILY
COMMUNITY
End: 2020-07-29

## 2019-10-07 RX ORDER — TAMSULOSIN HYDROCHLORIDE 0.4 MG/1
0.4 CAPSULE ORAL
COMMUNITY
Start: 2019-10-03 | End: 2019-10-15

## 2019-10-07 RX ORDER — OXYCODONE HYDROCHLORIDE AND ACETAMINOPHEN 5; 325 MG/1; MG/1
1 TABLET ORAL EVERY 4 HOURS PRN
COMMUNITY
End: 2021-01-12

## 2019-10-07 RX ORDER — LIDOCAINE HYDROCHLORIDE 10 MG/ML
INJECTION, SOLUTION EPIDURAL; INFILTRATION; INTRACAUDAL; PERINEURAL
Status: COMPLETED
Start: 2019-10-07 | End: 2019-10-07

## 2019-10-07 RX ORDER — SODIUM CHLORIDE, SODIUM LACTATE, POTASSIUM CHLORIDE, CALCIUM CHLORIDE 600; 310; 30; 20 MG/100ML; MG/100ML; MG/100ML; MG/100ML
INJECTION, SOLUTION INTRAVENOUS CONTINUOUS
Status: DISCONTINUED | OUTPATIENT
Start: 2019-10-07 | End: 2019-10-07 | Stop reason: HOSPADM

## 2019-10-07 RX ORDER — LANSOPRAZOLE 30 MG/1
30 CAPSULE, DELAYED RELEASE ORAL DAILY
COMMUNITY
End: 2020-07-09

## 2019-10-07 RX ORDER — AMITRIPTYLINE HYDROCHLORIDE 10 MG/1
30 TABLET, FILM COATED ORAL
COMMUNITY
End: 2020-03-30

## 2019-10-07 RX ADMIN — Medication 0.5 ML: at 14:45

## 2019-10-07 RX ADMIN — LIDOCAINE HYDROCHLORIDE 0.5 ML: 10 INJECTION, SOLUTION EPIDURAL; INFILTRATION; INTRACAUDAL at 14:45

## 2019-10-07 NOTE — OR NURSING
Surgery cancelled by Dr Blunt, he spoke with pt, pt dressed, discharged ambulatory to home with her personal belongings.  
home

## 2019-10-07 NOTE — PROGRESS NOTES
Med rec updated and complete  Allergies reviewed  Interviewed pt with  at bedside with permission from pt.  Pt reports no antibiotics in the last 2 weeks

## 2019-10-15 ENCOUNTER — OFFICE VISIT (OUTPATIENT)
Dept: MEDICAL GROUP | Facility: PHYSICIAN GROUP | Age: 64
End: 2019-10-15
Payer: MEDICARE

## 2019-10-15 VITALS
HEIGHT: 59 IN | TEMPERATURE: 99.1 F | SYSTOLIC BLOOD PRESSURE: 116 MMHG | WEIGHT: 189.62 LBS | OXYGEN SATURATION: 98 % | BODY MASS INDEX: 38.23 KG/M2 | DIASTOLIC BLOOD PRESSURE: 80 MMHG | HEART RATE: 98 BPM

## 2019-10-15 DIAGNOSIS — M81.0 OSTEOPOROSIS OF FOREARM: ICD-10-CM

## 2019-10-15 DIAGNOSIS — N20.1 LEFT URETERAL STONE: ICD-10-CM

## 2019-10-15 DIAGNOSIS — E55.9 VITAMIN D DEFICIENCY: ICD-10-CM

## 2019-10-15 DIAGNOSIS — E78.5 DYSLIPIDEMIA: ICD-10-CM

## 2019-10-15 DIAGNOSIS — Z23 NEED FOR IMMUNIZATION AGAINST INFLUENZA: ICD-10-CM

## 2019-10-15 DIAGNOSIS — Z96.642 S/P HIP REPLACEMENT, LEFT: ICD-10-CM

## 2019-10-15 DIAGNOSIS — B38.2 COCCIDIOIDOMYCOSIS, PULMONARY (HCC): ICD-10-CM

## 2019-10-15 PROCEDURE — G0008 ADMIN INFLUENZA VIRUS VAC: HCPCS | Performed by: FAMILY MEDICINE

## 2019-10-15 PROCEDURE — 90662 IIV NO PRSV INCREASED AG IM: CPT | Performed by: FAMILY MEDICINE

## 2019-10-15 PROCEDURE — 99214 OFFICE O/P EST MOD 30 MIN: CPT | Mod: 25 | Performed by: FAMILY MEDICINE

## 2019-10-15 RX ORDER — ACETAMINOPHEN 160 MG
1 TABLET,DISINTEGRATING ORAL DAILY
Status: ON HOLD | COMMUNITY
End: 2024-02-12

## 2019-10-15 NOTE — PROGRESS NOTES
Subjective:      Dee Armstrong is a 64 y.o. female who presents with Hyperlipidemia    HPI:    The patient is here for follow up on her chronic medical problems.  She said the last few months were rough because of her left hip replacement and left ureteral stone requiring surgical intervention.    Left hip replacement  Patient had previously had chronic left sided hip pain.  She was doing pain management through [facility].  They eventually figured out that the pain is due to severe osteoarthritis of the hip as documented by the left hip x-ray and MRI.  On 9/19 she underwent surgery for a complete hip replacement performed at [facility] by Dr. Lima, Orthopaedics with significant improvement of her pain.    Left ureteral stone  She was seen at urgent care on 10/01 for hematuria and left flank pain onset 2 days prior, and was started on antibiotics for a possible UTI and given red flag precautions to go to the ED. She went to the ED the next day for continued and worsening symptonms and was found to have 5 mm kidney stone in her left ureter with mild obstructive changes.  She eventually had ureteral stent and lithotripsy done with resolution of her symptoms.    Dyslipidemia  She continues to manage her dyslipidemia through her own efforts. Her triglycerides and LDL are both elevated however all other results are within normal limits. The 10-year ASCVD risk score is: 5.3%.    Osteoporosis  She has continued to take Alendronate 70 mg which she began in 2018. She also supplements with Calcium and Vitamin D. She is not due for another bone density screening until June 2020.    Vitamin D deficiency  She takes OTC Vitamin D supplements for this issue as well as her osteoporosis.    Coccidioidomycosis, pulmonary (HCC)  Patient was last seen by pulmonology on 9/6, where her imagng performed the same day was reviewed and demonstrated and unchanged 2.4 cm elliptical mass in the left upper lobe with no other  "cardiopulmonary processes. Left upper lobe mass was unchanged in size compared to 7/27/2016.  Patient has a past history of coccidiomycosis with positive titers treated with Diflucan for 1 year completed in November 2016. No new changes or concerns reported.    Need for immunization against influenza  Patient agrees to receive her influenza vaccination today in office.      Past medical history, past surgical history, family history reviewed-no changes    Social history reviewed-no changes    Allergies reviewed-no changes    Medications reviewed-no changes    ROS:  As per the HPI as shown above, the rest are negative.       Objective:     /80 (BP Location: Left arm, Patient Position: Sitting, BP Cuff Size: Adult)   Pulse 98   Temp 37.3 °C (99.1 °F) (Temporal)   Ht 1.499 m (4' 11\")   Wt 86 kg (189 lb 9.9 oz)   SpO2 98%   BMI 38.30 kg/m²     Physical Exam  Examined alert, awake, oriented, not in distress    Neck-supple, no lymphadenopathy, no thyromegaly  Lungs-clear to auscultation, no rales, no wheezes  Heart-regular rate and rhythm, no murmur  Extremities-no edema, clubbing, cyanosis  Skin: patient has a well healed surgical scar to her left hip.     Labs:  Admission on 10/02/2019, Discharged on 10/02/2019   Component Date Value Ref Range Status   • WBC 10/02/2019 6.8  4.8 - 10.8 K/uL Final   • RBC 10/02/2019 4.33  4.20 - 5.40 M/uL Final   • Hemoglobin 10/02/2019 13.6  12.0 - 16.0 g/dL Final   • Hematocrit 10/02/2019 42.5  37.0 - 47.0 % Final   • MCV 10/02/2019 98.2* 81.4 - 97.8 fL Final   • MCH 10/02/2019 31.4  27.0 - 33.0 pg Final   • MCHC 10/02/2019 32.0* 33.6 - 35.0 g/dL Final   • RDW 10/02/2019 44.8  35.9 - 50.0 fL Final   • Platelet Count 10/02/2019 452* 164 - 446 K/uL Final   • MPV 10/02/2019 8.6* 9.0 - 12.9 fL Final   • Neutrophils-Polys 10/02/2019 62.50  44.00 - 72.00 % Final   • Lymphocytes 10/02/2019 28.10  22.00 - 41.00 % Final   • Monocytes 10/02/2019 6.80  0.00 - 13.40 % Final   • " Eosinophils 10/02/2019 1.30  0.00 - 6.90 % Final   • Basophils 10/02/2019 1.00  0.00 - 1.80 % Final   • Immature Granulocytes 10/02/2019 0.30  0.00 - 0.90 % Final   • Nucleated RBC 10/02/2019 0.00  /100 WBC Final   • Neutrophils (Absolute) 10/02/2019 4.23  2.00 - 7.15 K/uL Final    Includes immature neutrophils, if present.   • Lymphs (Absolute) 10/02/2019 1.90  1.00 - 4.80 K/uL Final   • Monos (Absolute) 10/02/2019 0.46  0.00 - 0.85 K/uL Final   • Eos (Absolute) 10/02/2019 0.09  0.00 - 0.51 K/uL Final   • Baso (Absolute) 10/02/2019 0.07  0.00 - 0.12 K/uL Final   • Immature Granulocytes (abs) 10/02/2019 0.02  0.00 - 0.11 K/uL Final   • NRBC (Absolute) 10/02/2019 0.00  K/uL Final   • Sodium 10/02/2019 139  135 - 145 mmol/L Final   • Potassium 10/02/2019 3.4* 3.6 - 5.5 mmol/L Final   • Chloride 10/02/2019 104  96 - 112 mmol/L Final   • Co2 10/02/2019 24  20 - 33 mmol/L Final   • Anion Gap 10/02/2019 11.0  0.0 - 11.9 Final   • Glucose 10/02/2019 125* 65 - 99 mg/dL Final   • Bun 10/02/2019 16  8 - 22 mg/dL Final   • Creatinine 10/02/2019 0.80  0.50 - 1.40 mg/dL Final   • Calcium 10/02/2019 9.2  8.5 - 10.5 mg/dL Final   • AST(SGOT) 10/02/2019 13  12 - 45 U/L Final   • ALT(SGPT) 10/02/2019 12  2 - 50 U/L Final   • Alkaline Phosphatase 10/02/2019 83  30 - 99 U/L Final   • Total Bilirubin 10/02/2019 0.7  0.1 - 1.5 mg/dL Final   • Albumin 10/02/2019 4.2  3.2 - 4.9 g/dL Final   • Total Protein 10/02/2019 7.1  6.0 - 8.2 g/dL Final   • Globulin 10/02/2019 2.9  1.9 - 3.5 g/dL Final   • A-G Ratio 10/02/2019 1.4  g/dL Final   • Lipase 10/02/2019 24  11 - 82 U/L Final   • Color 10/02/2019 Yellow   Final   • Character 10/02/2019 Cloudy*  Final   • Specific Gravity 10/02/2019 1.015  <1.035 Final   • Ph 10/02/2019 6.5  5.0 - 8.0 Final   • Glucose 10/02/2019 Negative  Negative mg/dL Final   • Ketones 10/02/2019 40* Negative mg/dL Final   • Protein 10/02/2019 Negative  Negative mg/dL Final   • Bilirubin 10/02/2019 Negative  Negative  Final   • Urobilinogen, Urine 10/02/2019 0.2  Negative Final   • Nitrite 10/02/2019 Negative  Negative Final   • Leukocyte Esterase 10/02/2019 Negative  Negative Final   • Occult Blood 10/02/2019 Large* Negative Final   • Micro Urine Req 10/02/2019 Microscopic   Final   • GFR If  10/02/2019 >60  >60 mL/min/1.73 m 2 Final   • GFR If Non  10/02/2019 >60  >60 mL/min/1.73 m 2 Final   • WBC 10/02/2019 0-2  /hpf Final    Comment: Female  <12 Yr 0-2  >12 Yr 0-5  Male   None     • RBC 10/02/2019 >150* /hpf Final    Comment: Female  >12 Yr 0-2  Male   None     • Bacteria 10/02/2019 Negative  None /hpf Final   • Epithelial Cells 10/02/2019 Many* /hpf Final   • Blood Culture Hold 10/01/2019 Collected   Final   Hospital Outpatient Visit on 10/01/2019   Component Date Value Ref Range Status   • Significant Indicator 10/01/2019 NEG   Final   • Source 10/01/2019 UR   Final   • Site 10/01/2019 -   Final   • Culture Result 10/01/2019 Mixed skin emilie ,000 cfu/mL   Final   Office Visit on 10/01/2019   Component Date Value Ref Range Status   • POC Color 10/01/2019 PINK  Negative Final   • POC Appearance 10/01/2019 CLEAR  Negative Final   • POC Leukocyte Esterase 10/01/2019 NEGATIVE  Negative Final   • POC Nitrites 10/01/2019 NEGATIVE  Negative Final   • POC Urobiligen 10/01/2019 NEGATIVE  Negative (0.2) mg/dL Final   • POC Protein 10/01/2019 POSITIVE  Negative mg/dL Final   • POC Urine PH 10/01/2019 6.5  5.0 - 8.0 Final   • POC Blood 10/01/2019 LARGE  Negative Final   • POC Specific Gravity 10/01/2019 1.010  <1.005 - >1.030 Final   • POC Ketones 10/01/2019 NEGATIVE  Negative mg/dL Final   • POC Bilirubin 10/01/2019 NEGATIVE  Negative mg/dL Final   • POC Glucose 10/01/2019 NEGATIVE  Negative mg/dL Final   Hospital Outpatient Visit on 09/30/2019   Component Date Value Ref Range Status   • WBC 09/30/2019 4.7* 4.8 - 10.8 K/uL Final   • RBC 09/30/2019 4.03* 4.20 - 5.40 M/uL Final   • Hemoglobin  09/30/2019 12.9  12.0 - 16.0 g/dL Final   • Hematocrit 09/30/2019 39.9  37.0 - 47.0 % Final   • MCV 09/30/2019 99.0* 81.4 - 97.8 fL Final   • MCH 09/30/2019 32.0  27.0 - 33.0 pg Final   • MCHC 09/30/2019 32.3* 33.6 - 35.0 g/dL Final   • RDW 09/30/2019 45.3  35.9 - 50.0 fL Final   • Platelet Count 09/30/2019 412  164 - 446 K/uL Final   • MPV 09/30/2019 9.1  9.0 - 12.9 fL Final   • Neutrophils-Polys 09/30/2019 64.90  44.00 - 72.00 % Final   • Lymphocytes 09/30/2019 24.90  22.00 - 41.00 % Final   • Monocytes 09/30/2019 7.00  0.00 - 13.40 % Final   • Eosinophils 09/30/2019 2.10  0.00 - 6.90 % Final   • Basophils 09/30/2019 0.90  0.00 - 1.80 % Final   • Immature Granulocytes 09/30/2019 0.20  0.00 - 0.90 % Final   • Nucleated RBC 09/30/2019 0.40  /100 WBC Final   • Neutrophils (Absolute) 09/30/2019 3.05  2.00 - 7.15 K/uL Final    Includes immature neutrophils, if present.   • Lymphs (Absolute) 09/30/2019 1.17  1.00 - 4.80 K/uL Final   • Monos (Absolute) 09/30/2019 0.33  0.00 - 0.85 K/uL Final   • Eos (Absolute) 09/30/2019 0.10  0.00 - 0.51 K/uL Final   • Baso (Absolute) 09/30/2019 0.04  0.00 - 0.12 K/uL Final   • Immature Granulocytes (abs) 09/30/2019 0.01  0.00 - 0.11 K/uL Final   • NRBC (Absolute) 09/30/2019 0.02  K/uL Final   • Sodium 09/30/2019 143  135 - 145 mmol/L Final   • Potassium 09/30/2019 4.6  3.6 - 5.5 mmol/L Final   • Chloride 09/30/2019 107  96 - 112 mmol/L Final   • Co2 09/30/2019 26  20 - 33 mmol/L Final   • Anion Gap 09/30/2019 10.0  0.0 - 11.9 Final   • Glucose 09/30/2019 99  65 - 99 mg/dL Final   • Bun 09/30/2019 18  8 - 22 mg/dL Final   • Creatinine 09/30/2019 0.77  0.50 - 1.40 mg/dL Final   • Calcium 09/30/2019 9.4  8.5 - 10.5 mg/dL Final   • AST(SGOT) 09/30/2019 16  12 - 45 U/L Final   • ALT(SGPT) 09/30/2019 15  2 - 50 U/L Final   • Alkaline Phosphatase 09/30/2019 79  30 - 99 U/L Final   • Total Bilirubin 09/30/2019 0.5  0.1 - 1.5 mg/dL Final   • Albumin 09/30/2019 3.9  3.2 - 4.9 g/dL Final   •  Total Protein 09/30/2019 6.7  6.0 - 8.2 g/dL Final   • Globulin 09/30/2019 2.8  1.9 - 3.5 g/dL Final   • A-G Ratio 09/30/2019 1.4  g/dL Final   • Cholesterol,Tot 09/30/2019 184  100 - 199 mg/dL Final   • Triglycerides 09/30/2019 151* 0 - 149 mg/dL Final   • HDL 09/30/2019 46  >=40 mg/dL Final   • LDL 09/30/2019 108* <100 mg/dL Final   • Fasting Status 09/30/2019 Fasting   Final   • GFR If  09/30/2019 >60  >60 mL/min/1.73 m 2 Final   • GFR If Non  09/30/2019 >60  >60 mL/min/1.73 m 2 Final   Admission on 09/19/2019, Discharged on 09/20/2019   Component Date Value Ref Range Status   • Hemoglobin 09/20/2019 13.6  12.0 - 16.0 g/dL Final   • Hematocrit 09/20/2019 40.8  37.0 - 47.0 % Final            Assessment/Plan:     1. Dyslipidemia  -Most recent lipid panel came back mild elevation of triglycerides at 151 and LDL the same as before at 108.  10-year ASCVD risk however is still low at 5.3% therefore we do not need to treat with statin.  She will continue to manage this with her own efforts including diet, exercise and weight loss.  We will do blood work next visit..   - Lipid Profile; Future  - Comp Metabolic Panel; Future  - VITAMIN D,25 HYDROXY; Future    2. Osteoporosis of forearm  -She will continue to take alendronate for a total of 5 years.  She will continue to supplement herself with calcium and vitamin D.   - Lipid Profile; Future  - Comp Metabolic Panel; Future  - VITAMIN D,25 HYDROXY; Future    3. Vitamin D deficiency  - Advised to continue current OTC supplementation of 2000 IU's daily.  Last vitamin D level was normal at 48 in April 2019.  We will do follow-up vitamin D level  - Lipid Profile; Future  - Comp Metabolic Panel; Future  - VITAMIN D,25 HYDROXY; Future    4. Coccidioidomycosis, pulmonary (HCC)  -Status post treatment.  Currently asymptomatic.  Continue to follow pulmonology for yearly chest x-ray.     5. Left ureteral stone  -Status post lithotripsy and currently  asymptomatic. No new changes or concerns reported since.    6. S/P hip replacement, left  - Patient is still recovering from surgery. Her surgical scar is well healed and does not show signs of infection. She will continue to follow up with orthopaedics as needed.    7. Need for immunization against influenza  - Vaccine was given today after reviewing risks and benefits as well as side effects of vaccine.  - INFLUENZA VACCINE, HIGH DOSE (65+ ONLY)        IKyle (Rocaelibe), am scribing for, and in the presence of, Sherly Calvert MD     Electronically signed by: Kyle Lofton (Rodericke), 10/15/2019    ISherly MD personally performed the services described in this documentation, as scribed by Kyle Lofton in my presence, and it is both accurate and complete.

## 2019-10-30 DIAGNOSIS — R25.2 LEG CRAMP: ICD-10-CM

## 2019-10-30 RX ORDER — TIZANIDINE 4 MG/1
TABLET ORAL
Qty: 30 TAB | Refills: 2 | Status: SHIPPED | OUTPATIENT
Start: 2019-10-30 | End: 2020-03-30

## 2019-11-21 DIAGNOSIS — M25.552 LEFT HIP PAIN: ICD-10-CM

## 2019-11-21 RX ORDER — MELOXICAM 15 MG/1
TABLET ORAL
Qty: 30 TAB | Refills: 2 | Status: SHIPPED | OUTPATIENT
Start: 2019-11-21 | End: 2020-02-13

## 2019-12-17 ENCOUNTER — HOSPITAL ENCOUNTER (OUTPATIENT)
Dept: LAB | Facility: MEDICAL CENTER | Age: 64
End: 2019-12-17
Attending: UROLOGY
Payer: MEDICARE

## 2019-12-17 PROCEDURE — 82340 ASSAY OF CALCIUM IN URINE: CPT

## 2019-12-17 PROCEDURE — 83986 ASSAY PH BODY FLUID NOS: CPT

## 2019-12-17 PROCEDURE — 82570 ASSAY OF URINE CREATININE: CPT

## 2019-12-17 PROCEDURE — 82131 AMINO ACIDS SINGLE QUANT: CPT

## 2019-12-17 PROCEDURE — 82507 ASSAY OF CITRATE: CPT

## 2019-12-17 PROCEDURE — 80053 COMPREHEN METABOLIC PANEL: CPT

## 2019-12-17 PROCEDURE — 81050 URINALYSIS VOLUME MEASURE: CPT

## 2019-12-17 PROCEDURE — 84550 ASSAY OF BLOOD/URIC ACID: CPT

## 2019-12-17 PROCEDURE — 84560 ASSAY OF URINE/URIC ACID: CPT

## 2019-12-17 PROCEDURE — 83945 ASSAY OF OXALATE: CPT

## 2019-12-17 PROCEDURE — 83970 ASSAY OF PARATHORMONE: CPT

## 2019-12-17 PROCEDURE — 84300 ASSAY OF URINE SODIUM: CPT

## 2019-12-18 LAB
ALBUMIN SERPL BCP-MCNC: 4.2 G/DL (ref 3.2–4.9)
ALBUMIN/GLOB SERPL: 1.6 G/DL
ALP SERPL-CCNC: 91 U/L (ref 30–99)
ALT SERPL-CCNC: 16 U/L (ref 2–50)
ANION GAP SERPL CALC-SCNC: 11 MMOL/L (ref 0–11.9)
AST SERPL-CCNC: 20 U/L (ref 12–45)
BILIRUB SERPL-MCNC: 0.4 MG/DL (ref 0.1–1.5)
BODY FLD TYPE: NORMAL
BUN SERPL-MCNC: 10 MG/DL (ref 8–22)
CALCIUM SERPL-MCNC: 9.7 MG/DL (ref 8.5–10.5)
CHLORIDE SERPL-SCNC: 105 MMOL/L (ref 96–112)
CO2 SERPL-SCNC: 26 MMOL/L (ref 20–33)
COLLECT DURATION TIME SPEC: 24 HRS
CREAT 24H UR-MSRATE: 1066 MG/24 HR (ref 800–1800)
CREAT SERPL-MCNC: 0.66 MG/DL (ref 0.5–1.4)
CREAT UR-MCNC: 82 MG/DL
GLOBULIN SER CALC-MCNC: 2.6 G/DL (ref 1.9–3.5)
GLUCOSE SERPL-MCNC: 101 MG/DL (ref 65–99)
PH FLD: 6.8 [PH]
POTASSIUM SERPL-SCNC: 3.8 MMOL/L (ref 3.6–5.5)
PROT SERPL-MCNC: 6.8 G/DL (ref 6–8.2)
PTH-INTACT SERPL-MCNC: 49.9 PG/ML (ref 14–72)
SODIUM SERPL-SCNC: 142 MMOL/L (ref 135–145)
SPECIMEN VOL ?TM UR: 1300 ML
SPECIMEN VOL UR: 1300 ML
URATE SERPL-MCNC: 4 MG/DL (ref 1.9–8.2)

## 2019-12-21 LAB
CALCIUM 24H UR-MCNC: 22.5 MG/DL
CALCIUM 24H UR-MRATE: 292 MG/D
CITRATE 24H UR-MCNC: 793 MG/L
CITRATE 24H UR-MRATE: 1031 MG/D (ref 320–1240)
COLLECT DURATION TIME SPEC: 24 HRS
CREAT 24H UR-MCNC: 83 MG/DL
CREAT 24H UR-MCNC: 83 MG/DL
CREAT 24H UR-MRATE: 1079 MG/D (ref 500–1400)
CREAT 24H UR-MRATE: 1079 MG/D (ref 500–1400)
CREAT UR-MCNC: 77 MG/DL
CYSTINE 24H UR-MCNC: 1.5 MG/DL
CYSTINE 24H UR-MRATE: 19 MG/D
CYSTINE/CREAT 24H UR-RTO: 81 UMOL/G CRT
OXALATE 24H UR-MCNC: 18 MG/L
OXALATE 24H UR-MRATE: 23 MG/D (ref 13–40)
SODIUM 24H UR-SCNC: 224 MMOL/L
SODIUM 24H UR-SRATE: 291 MMOL/D (ref 51–286)
SPECIMEN VOL ?TM UR: 1300 ML
TOTAL VOLUME 1105: 1300 ML
TOTAL VOLUME 1105: 1300 ML
URATE 24H UR-MCNC: 36.6 MG/DL
URATE 24H UR-MRATE: 476 MG/D (ref 250–750)

## 2020-03-23 ENCOUNTER — HOSPITAL ENCOUNTER (OUTPATIENT)
Dept: LAB | Facility: MEDICAL CENTER | Age: 65
End: 2020-03-23
Attending: FAMILY MEDICINE
Payer: MEDICARE

## 2020-03-23 DIAGNOSIS — E78.5 DYSLIPIDEMIA: ICD-10-CM

## 2020-03-23 DIAGNOSIS — E55.9 VITAMIN D DEFICIENCY: ICD-10-CM

## 2020-03-23 DIAGNOSIS — M81.0 OSTEOPOROSIS OF FOREARM: ICD-10-CM

## 2020-03-23 LAB
25(OH)D3 SERPL-MCNC: 40 NG/ML (ref 30–100)
ALBUMIN SERPL BCP-MCNC: 4 G/DL (ref 3.2–4.9)
ALBUMIN/GLOB SERPL: 1.3 G/DL
ALP SERPL-CCNC: 78 U/L (ref 30–99)
ALT SERPL-CCNC: 17 U/L (ref 2–50)
ANION GAP SERPL CALC-SCNC: 13 MMOL/L (ref 7–16)
AST SERPL-CCNC: 25 U/L (ref 12–45)
BILIRUB SERPL-MCNC: 0.8 MG/DL (ref 0.1–1.5)
BUN SERPL-MCNC: 22 MG/DL (ref 8–22)
CALCIUM SERPL-MCNC: 9.6 MG/DL (ref 8.5–10.5)
CHLORIDE SERPL-SCNC: 105 MMOL/L (ref 96–112)
CHOLEST SERPL-MCNC: 232 MG/DL (ref 100–199)
CO2 SERPL-SCNC: 20 MMOL/L (ref 20–33)
CREAT SERPL-MCNC: 0.59 MG/DL (ref 0.5–1.4)
FASTING STATUS PATIENT QL REPORTED: NORMAL
GLOBULIN SER CALC-MCNC: 3.1 G/DL (ref 1.9–3.5)
GLUCOSE SERPL-MCNC: 81 MG/DL (ref 65–99)
HDLC SERPL-MCNC: 67 MG/DL
LDLC SERPL CALC-MCNC: 137 MG/DL
POTASSIUM SERPL-SCNC: 4.9 MMOL/L (ref 3.6–5.5)
PROT SERPL-MCNC: 7.1 G/DL (ref 6–8.2)
SODIUM SERPL-SCNC: 138 MMOL/L (ref 135–145)
TRIGL SERPL-MCNC: 138 MG/DL (ref 0–149)

## 2020-03-23 PROCEDURE — 82306 VITAMIN D 25 HYDROXY: CPT

## 2020-03-23 PROCEDURE — 36415 COLL VENOUS BLD VENIPUNCTURE: CPT

## 2020-03-23 PROCEDURE — 80053 COMPREHEN METABOLIC PANEL: CPT

## 2020-03-23 PROCEDURE — 80061 LIPID PANEL: CPT

## 2020-03-30 ENCOUNTER — OFFICE VISIT (OUTPATIENT)
Dept: MEDICAL GROUP | Facility: PHYSICIAN GROUP | Age: 65
End: 2020-03-30
Payer: MEDICARE

## 2020-03-30 VITALS
BODY MASS INDEX: 38.36 KG/M2 | SYSTOLIC BLOOD PRESSURE: 140 MMHG | HEART RATE: 118 BPM | HEIGHT: 59 IN | DIASTOLIC BLOOD PRESSURE: 86 MMHG | WEIGHT: 190.26 LBS | OXYGEN SATURATION: 96 % | TEMPERATURE: 98.2 F

## 2020-03-30 DIAGNOSIS — R25.2 LEG CRAMPS: ICD-10-CM

## 2020-03-30 DIAGNOSIS — B38.2 COCCIDIOIDOMYCOSIS, PULMONARY (HCC): ICD-10-CM

## 2020-03-30 DIAGNOSIS — E55.9 VITAMIN D DEFICIENCY: ICD-10-CM

## 2020-03-30 DIAGNOSIS — M81.0 OSTEOPOROSIS OF FOREARM: ICD-10-CM

## 2020-03-30 DIAGNOSIS — E78.5 DYSLIPIDEMIA: ICD-10-CM

## 2020-03-30 DIAGNOSIS — R03.0 ELEVATED BLOOD PRESSURE READING: ICD-10-CM

## 2020-03-30 DIAGNOSIS — K21.9 GASTROESOPHAGEAL REFLUX DISEASE, ESOPHAGITIS PRESENCE NOT SPECIFIED: ICD-10-CM

## 2020-03-30 PROCEDURE — 99214 OFFICE O/P EST MOD 30 MIN: CPT | Performed by: FAMILY MEDICINE

## 2020-03-30 RX ORDER — CLONAZEPAM 0.5 MG/1
0.25 TABLET ORAL 2 TIMES DAILY
COMMUNITY
End: 2020-07-29

## 2020-03-30 RX ORDER — AMITRIPTYLINE HYDROCHLORIDE 10 MG/1
TABLET, FILM COATED ORAL
Qty: 270 TAB | Refills: 1 | Status: SHIPPED | OUTPATIENT
Start: 2020-03-30 | End: 2020-09-21

## 2020-03-30 RX ORDER — SUMATRIPTAN 25 MG/1
25-100 TABLET, FILM COATED ORAL
COMMUNITY
End: 2021-01-12

## 2020-03-30 ASSESSMENT — FIBROSIS 4 INDEX: FIB4 SCORE: 0.89

## 2020-03-30 ASSESSMENT — PATIENT HEALTH QUESTIONNAIRE - PHQ9: CLINICAL INTERPRETATION OF PHQ2 SCORE: 0

## 2020-03-30 NOTE — PROGRESS NOTES
Subjective:      Dee Armstrong is a 64 y.o. female who presents with Results (labs)      HPI:    The patient is here for follow up on her chronic medical problems.      Dyslipidemia  She continues to manage her dyslipidemia through her own efforts. Her last lipid panel showed mild elevation of triglycerides and LDL. Her 10-year ASCVD risk score has increased from 5.3% to 8.3 %. She normally eats pork, fish, and chicken at home.    Osteoporosis of forearm  She has continued to take Alendronate 70 mg which she began in 2018. She will finish the treatment in 2023. She also supplements with Calcium and Vitamin D. Her last bone density scan was in 2018 and her next will be due in 2021.    Vitamin D deficiency  She takes 2000 IU's of OTC Vitamin D supplementation. Blood work was completed prior to this visit.     Coccidioidomycosis, pulmonary  She has a history of coccidioidomycosis and is followed closely by pulmonology. She follows up yearly and has x-rays once per year. Her most recent imaging was reviewed during her last pulmonology appointment in 9/2019 showing the left upper lobe mass was unchanged in size compared to 7/27/2016.     Leg cramps  She has noticed the increased leg cramping for the past 3 weeks, however this has worsened the past 3 nights. She will have to get out of bed and stand to alleviate her cramps. She has continued numbness of her right lower extremity but this is chronic received will problem from previous lumbar spine surgery. She has been taking gabapentin 300 mg 1 tablet and amitriptyline 10 mg, 3 tablets at night. These medications were [facility].     Gastroesophageal reflux disease, esophagitis presence not specified  She has new complaints of increased heartburn at night. She notices this while sitting down.  The lansoprazole 30 mg that she has been taking for a while now has not been helping anymore.  She said she consumes caffeine only very minimally on a daily  "basis.  She continues to take meloxicam daily for her hip pain post left hip replacement.  She has been seeing a chiropractor for the hip pain.    Elevated blood pressure reading  Patient's blood pressure in the office today is elevated at 150/80. Upon recheck it is 140/86. Her pulse is elevated at 118. The patient does not have a history of hypertension. She reports she was running late for her appointment and was speeding in and likely a cause for her elevated reading.     Past medical history, past surgical history, family history reviewed-no changes    Social history reviewed-no changes    Allergies reviewed-no changes    Medications reviewed-no changes     ROS:  As per the HPI as shown above, the rest are negative.       Objective:     /86   Pulse (!) 118   Temp 36.8 °C (98.2 °F) (Temporal)   Ht 1.499 m (4' 11\")   Wt 86.3 kg (190 lb 4.1 oz)   SpO2 96%   BMI 38.43 kg/m²     Physical Exam  Examined alert, awake, oriented, not in distress    Neck-supple, no lymphadenopathy, no thyromegaly  Lungs-clear to auscultation, no rales, no wheezes  Heart-regular rate and rhythm, no murmur  Extremities-no edema, clubbing, cyanosis       Labs:  No visits with results within 1 Week(s) from this visit.   Latest known visit with results is:   Hospital Outpatient Visit on 03/23/2020   Component Date Value Ref Range Status   • 25-Hydroxy   Vitamin D 25 03/23/2020 40  30 - 100 ng/mL Final    Comment: Adult Ranges:   <20 ng/mL - Deficiency  20-29 ng/mL - Insufficiency   ng/mL - Sufficiency  Effective 3/18/2020, this electrochemiluminescence binding assay is  performed using Roche daysi e immunoassay analyzer.  The [employer] Vitamin D  total II assay is intended for the quantitative determination of total 25  hydroxyvitamin D in human serum and plasma. This assay is to be used as an  aid in the assessment of vitamin D sufficiency in adults.     • Cholesterol,Tot 03/23/2020 232* 100 - 199 mg/dL Final   • Triglycerides " 03/23/2020 138  0 - 149 mg/dL Final   • HDL 03/23/2020 67  >=40 mg/dL Final   • LDL 03/23/2020 137* <100 mg/dL Final   • Sodium 03/23/2020 138  135 - 145 mmol/L Final   • Potassium 03/23/2020 4.9  3.6 - 5.5 mmol/L Final    Comment: Specimen slightly hemolyzed. Hemolysis checked by tech by: [id number] on:  2020-03-23 15:13:15     • Chloride 03/23/2020 105  96 - 112 mmol/L Final   • Co2 03/23/2020 20  20 - 33 mmol/L Final   • Anion Gap 03/23/2020 13.0  7.0 - 16.0 Final   • Glucose 03/23/2020 81  65 - 99 mg/dL Final   • Bun 03/23/2020 22  8 - 22 mg/dL Final   • Creatinine 03/23/2020 0.59  0.50 - 1.40 mg/dL Final   • Calcium 03/23/2020 9.6  8.5 - 10.5 mg/dL Final   • AST(SGOT) 03/23/2020 25  12 - 45 U/L Final    Comment: Specimen slightly hemolyzed. Hemolysis checked by tech by: [id number] on:  2020-03-23 15:13:15     • ALT(SGPT) 03/23/2020 17  2 - 50 U/L Final    Comment: Specimen slightly hemolyzed. Hemolysis checked by tech by: [id number] on:  2020-03-23 15:13:15     • Alkaline Phosphatase 03/23/2020 78  30 - 99 U/L Final   • Total Bilirubin 03/23/2020 0.8  0.1 - 1.5 mg/dL Final   • Albumin 03/23/2020 4.0  3.2 - 4.9 g/dL Final   • Total Protein 03/23/2020 7.1  6.0 - 8.2 g/dL Final   • Globulin 03/23/2020 3.1  1.9 - 3.5 g/dL Final   • A-G Ratio 03/23/2020 1.3  g/dL Final   • Fasting Status 03/23/2020 Fasting   Final   • GFR If  03/23/2020 >60  >60 mL/min/1.73 m 2 Final   • GFR If Non  03/23/2020 >60  >60 mL/min/1.73 m 2 Final        Assessment/Plan:     1. Dyslipidemia  Her lipid panel shows increased total cholesterol from 184 to 232, LDL increased from 108-137, decreased triglycerides from 184 to 138, increased HDL from 46 to 67. Her 10-year ASCVD risk score has increased from 5.3% to 8.3 %. Patient has agreed to decrease the amount of pork and other fatty foods in her diet. Advised fish 3 times per week in additional to increased fruits and vegetables. Patient was given a diet guide. She will  continue to manage her dyslipidemia through her own efforts without medication at this time. Labs have been ordered for the next follow up visit.    - Lipid Profile; Future  - Comp Metabolic Panel; Future    2. Osteoporosis of forearm  She will continue to take alendronate for a total of 5 years. Her treatment ends in 2023. She will continue to supplement with vitamin D. Advised patient to discontinue OTC calcium supplementation to prevent future ureteral stones which she has a history for.    3. Vitamin D deficiency  Patient's Vitamin D level is within the target range at 40. Advised to continue current OTC supplementation of 2000 IU's daily.      4. Coccidioidomycosis, pulmonary (HCC)  Status post treatment.  Currently asymptomatic.  Continue to follow pulmonology for yearly chest x-ray.     5. Leg cramps  Patient has a history of bilateral leg cramping at night which has been increased the past 3 weeks. We will increase her gabapentin dose from 300 mg to 600 mg.  She will continue amitriptyline 10 mg 3 tablets at night.  Patient was made aware the increased dose may cause increased sleepiness/drowsiness. She will message me on MyChart if her symptoms do not improve after the increased dose.     6. Gastroesophageal reflux disease, esophagitis presence not specified  Advised against consumption of high caffeine foods/drinks and OTC anti-inflammatories. Recommended stopping the meloxicam as this is known to be a stomach irritant. Patient has been taking this medication daily. She will message me on MyChart if her symptoms do not improve after the above modifications.     7. Elevated blood pressure reading  Patient's blood pressure in the office today is elevated at 150/80 likely due to rushing into the office for her appointment. Upon recheck it is 140/86. She does not have a history of elevated blood pressure. We will continue to monitor.  We will reevaluate next visit.      Agustin SILVERMAN (Juan Antonio), sumaya patricia  for, and in the presence of, Sherly Calvert MD     Electronically signed by: Agustin Causey (Scribe), 3/30/2020    I, Sherly Calvert MD personally performed the services described in this documentation, as scribed by Agustin Causey in my presence, and it is both accurate and complete.

## 2020-04-06 RX ORDER — ALENDRONATE SODIUM 70 MG/1
TABLET ORAL
Qty: 12 TAB | Refills: 1 | Status: SHIPPED | OUTPATIENT
Start: 2020-04-06 | End: 2020-09-14

## 2020-04-20 DIAGNOSIS — R25.2 LEG CRAMPS: ICD-10-CM

## 2020-04-20 RX ORDER — GABAPENTIN 600 MG/1
600 TABLET ORAL EVERY EVENING
Qty: 90 TAB | Refills: 1 | Status: SHIPPED | OUTPATIENT
Start: 2020-04-20 | End: 2020-10-14

## 2020-06-09 ENCOUNTER — HOSPITAL ENCOUNTER (OUTPATIENT)
Dept: RADIOLOGY | Facility: MEDICAL CENTER | Age: 65
End: 2020-06-09
Attending: FAMILY MEDICINE
Payer: MEDICARE

## 2020-06-09 DIAGNOSIS — Z12.31 VISIT FOR SCREENING MAMMOGRAM: ICD-10-CM

## 2020-06-09 PROCEDURE — 77067 SCR MAMMO BI INCL CAD: CPT

## 2020-07-24 ENCOUNTER — HOSPITAL ENCOUNTER (OUTPATIENT)
Dept: LAB | Facility: MEDICAL CENTER | Age: 65
End: 2020-07-24
Attending: FAMILY MEDICINE
Payer: MEDICARE

## 2020-07-24 DIAGNOSIS — E78.5 DYSLIPIDEMIA: ICD-10-CM

## 2020-07-24 LAB
ALBUMIN SERPL BCP-MCNC: 4.1 G/DL (ref 3.2–4.9)
ALBUMIN/GLOB SERPL: 1.6 G/DL
ALP SERPL-CCNC: 77 U/L (ref 30–99)
ALT SERPL-CCNC: 23 U/L (ref 2–50)
ANION GAP SERPL CALC-SCNC: 13 MMOL/L (ref 7–16)
AST SERPL-CCNC: 23 U/L (ref 12–45)
BILIRUB SERPL-MCNC: 0.5 MG/DL (ref 0.1–1.5)
BUN SERPL-MCNC: 15 MG/DL (ref 8–22)
CALCIUM SERPL-MCNC: 9.2 MG/DL (ref 8.5–10.5)
CHLORIDE SERPL-SCNC: 101 MMOL/L (ref 96–112)
CHOLEST SERPL-MCNC: 218 MG/DL (ref 100–199)
CO2 SERPL-SCNC: 24 MMOL/L (ref 20–33)
CREAT SERPL-MCNC: 0.68 MG/DL (ref 0.5–1.4)
FASTING STATUS PATIENT QL REPORTED: NORMAL
GLOBULIN SER CALC-MCNC: 2.5 G/DL (ref 1.9–3.5)
GLUCOSE SERPL-MCNC: 90 MG/DL (ref 65–99)
HDLC SERPL-MCNC: 58 MG/DL
LDLC SERPL CALC-MCNC: 127 MG/DL
POTASSIUM SERPL-SCNC: 4.1 MMOL/L (ref 3.6–5.5)
PROT SERPL-MCNC: 6.6 G/DL (ref 6–8.2)
SODIUM SERPL-SCNC: 138 MMOL/L (ref 135–145)
TRIGL SERPL-MCNC: 163 MG/DL (ref 0–149)

## 2020-07-24 PROCEDURE — 80053 COMPREHEN METABOLIC PANEL: CPT

## 2020-07-24 PROCEDURE — 36415 COLL VENOUS BLD VENIPUNCTURE: CPT

## 2020-07-24 PROCEDURE — 80061 LIPID PANEL: CPT

## 2020-07-29 ENCOUNTER — OFFICE VISIT (OUTPATIENT)
Dept: MEDICAL GROUP | Facility: PHYSICIAN GROUP | Age: 65
End: 2020-07-29
Payer: MEDICARE

## 2020-07-29 VITALS
BODY MASS INDEX: 39.38 KG/M2 | DIASTOLIC BLOOD PRESSURE: 80 MMHG | TEMPERATURE: 98.4 F | SYSTOLIC BLOOD PRESSURE: 118 MMHG | OXYGEN SATURATION: 97 % | HEART RATE: 103 BPM | HEIGHT: 59 IN | WEIGHT: 195.33 LBS

## 2020-07-29 DIAGNOSIS — M25.511 ACUTE PAIN OF RIGHT SHOULDER: ICD-10-CM

## 2020-07-29 DIAGNOSIS — M81.0 OSTEOPOROSIS OF FOREARM: ICD-10-CM

## 2020-07-29 DIAGNOSIS — E55.9 VITAMIN D DEFICIENCY: ICD-10-CM

## 2020-07-29 DIAGNOSIS — E78.5 DYSLIPIDEMIA: ICD-10-CM

## 2020-07-29 PROCEDURE — 99214 OFFICE O/P EST MOD 30 MIN: CPT | Performed by: FAMILY MEDICINE

## 2020-07-29 RX ORDER — MELOXICAM 15 MG/1
15 TABLET ORAL
Qty: 30 TAB | Refills: 2 | Status: SHIPPED | OUTPATIENT
Start: 2020-07-29 | End: 2022-08-30

## 2020-07-29 ASSESSMENT — FIBROSIS 4 INDEX: FIB4 SCORE: 0.71

## 2020-07-29 NOTE — PATIENT INSTRUCTIONS
Shoulder Range of Motion Exercises  Shoulder range of motion (ROM) exercises are done to keep the shoulder moving freely or to increase movement. They are often recommended for people who have shoulder pain or stiffness or who are recovering from a shoulder surgery.  Phase 1 exercises  When you are able, do this exercise 1-2 times per day for 30-60 seconds in each direction, or as directed by your health care provider.  Pendulum exercise  To do this exercise while sittin. Sit in a chair or at the edge of your bed with your feet flat on the floor.  2. Let your affected arm hang down in front of you over the edge of the bed or chair.  3. Relax your shoulder, arm, and hand.  4. Rock your body so your arm gently swings in small circles. You can also use your unaffected arm to start the motion.  5. Repeat changing the direction of the circles, swinging your arm left and right, and swinging your arm forward and back.  To do this exercise while standin. Stand next to a sturdy chair or table, and hold on to it with your hand on your unaffected side.  2. Bend forward at the waist.  3. Bend your knees slightly.  4. Relax your shoulder, arm, and hand.  5. While keeping your shoulder relaxed, use body motion to swing your arm in small circles.  6. Repeat changing the direction of the circles, swinging your arm left and right, and swinging your arm forward and back.  7. Between exercises, stand up tall and take a short break to relax your lower back.    Phase 2 exercises  Do these exercises 1-2 times per day or as told by your health care provider. Hold each stretch for 30 seconds, and repeat 3 times. Do the exercises with one or both arms as instructed by your health care provider.  For these exercises, sit at a table with your hand and arm supported by the table. A chair that slides easily or has wheels can be helpful.  External rotation  1. Turn your chair so that your affected side is nearest to the  table.  2. Place your forearm on the table to your side. Bend your elbow about 90° at the elbow (right angle) and place your hand palm facing down on the table. Your elbow should be about 6 inches away from your side.  3. Keeping your arm on the table, lean your body forward.  Abduction  1. Turn your chair so that your affected side is nearest to the table.  2. Place your forearm and hand on the table so that your thumb points toward the ceiling and your arm is straight out to your side.  3. Slide your hand out to the side and away from you, using your unaffected arm to do the work.  4. To increase the stretch, you can slide your chair away from the table.  Flexion: forward stretch  1. Sit facing the table. Place your hand and elbow on the table in front of you.  2. Slide your hand forward and away from you, using your unaffected arm to do the work.  3. To increase the stretch, you can slide your chair backward.  Phase 3 exercises  Do these exercises 1-2 times per day or as told by your health care provider. Hold each stretch for 30 seconds, and repeat 3 times. Do the exercises with one or both arms as instructed by your health care provider.  Cross-body stretch: posterior capsule stretch  1. Lift your arm straight out in front of you.  2. Bend your arm 90° at the elbow (right angle) so your forearm moves across your body.  3. Use your other arm to gently pull the elbow across your body, toward your other shoulder.  Wall climbs  1. Stand with your affected arm extended out to the side with your hand resting on a door frame.  2. Slide your hand slowly up the door frame.  3. To increase the stretch, step through the door frame. Keep your body upright and do not lean.  Wand exercises  You will need a cane, a piece of PVC pipe, or a sturdy wooden dowel for wand exercises.  Flexion  To do this exercise while standin. Hold the wand with both of your hands, palms down.  2. Using the other arm to help, lift your arms  up and over your head, if able.  3. Push upward with your other arm to gently increase the stretch.  To do this exercise while lying down:  1. Lie on your back with your elbows resting on the floor and the wand in both your hands. Your hands will be palm down, or pointing toward your feet.  2. Lift your hands toward the ceiling, using your unaffected arm to help if needed.  3. Bring your arms overhead as able, using your unaffected arm to help if needed.  Internal rotation  1. Stand while holding the wand behind you with both hands. Your unaffected arm should be extended above your head with the arm of the affected side extended behind you at the level of your waist. The wand should be pointing straight up and down as you hold it.  2. Slowly pull the wand up behind your back by straightening the elbow of your unaffected arm and bending the elbow of your affected arm.  External rotation  1. Lie on your back with your affected upper arm supported on a small pillow or rolled towel. When you first do this exercise, keep your upper arm close to your body. Over time, bring your arm up to a 90° angle out to the side.  2. Hold the wand across your stomach and with both hands palm up. Your elbow on your affected side should be bent at a 90° angle.  3. Use your unaffected side to help push your forearm away from you and toward the floor. Keep your elbow on your affected side bent at a 90° angle.  Contact a health care provider if you have:  · New or increasing pain.  · New numbness, tingling, weakness, or discoloration in your arm or hand.  This information is not intended to replace advice given to you by your health care provider. Make sure you discuss any questions you have with your health care provider.  Document Released: 09/15/2004 Document Revised: 01/30/2019 Document Reviewed: 01/30/2019  Elsevier Patient Education © 2020 [employer]

## 2020-07-29 NOTE — PROGRESS NOTES
"Subjective:      Dee Armstrong is a 65 y.o. female who presents with Hyperlipidemia            HPI     Patient returns for follow-up of her medical problems.    In terms of her dyslipidemia, she is managing this with her own efforts.  Her blood work 4 months ago came back 10-year ASCVD risk increased from 5.3 to 8.3%.  She opted to continue managing this with her own efforts at that time.  She has gained 5 pounds since her last visit due to the coronavirus pandemic.  Blood work was done before this visit.    For her osteoporosis, she continues to take alendronate and she will finish treatment in 2023.  She is on vitamin D supplementation for vitamin D deficiency and osteoporosis.  We told her not to take any calcium supplements secondary to history of ureteral stone.    She complains of pain in the right shoulder after she fell 5 days ago.  She said she was at a local [employer] restaurant when this happened.  She said she probably caught her foot on uneven surface on the floor and she went down face forward but she was able to break the fall by landing on all fours.  Since then she has been having pain in the right shoulder.  This shoulder already had arthroscopic surgery back in 2016.  She has been taking ibuprofen and using Biofreeze without any help.    Past medical history, past surgical history, family history reviewed-no changes    Social history reviewed-no changes    Allergies reviewed-no changes    Medications reviewed-no changes    ROS     As per HPI, the rest are negative.       Objective:     /80 (BP Location: Left arm, Patient Position: Sitting, BP Cuff Size: Adult)   Pulse (!) 103   Temp 36.9 °C (98.4 °F) (Temporal)   Ht 1.499 m (4' 11\")   Wt 88.6 kg (195 lb 5.2 oz)   SpO2 97%   BMI 39.45 kg/m²      Physical Exam     Examined alert, awake, oriented, not in distress    Neck-supple, no lymphadenopathy, no thyromegaly  Lungs-clear to auscultation, no rales, no wheezes  Heart-regular rate and " rhythm, no murmur  Extremities-no edema, clubbing, cyanosis, right shoulder exam-no deformities, no pinpoint tenderness, full range of movement on forward elevation at 180 degrees and decreased abduction only at 90 degrees.      Hospital Outpatient Visit on 07/24/2020   Component Date Value Ref Range Status   • Sodium 07/24/2020 138  135 - 145 mmol/L Final   • Potassium 07/24/2020 4.1  3.6 - 5.5 mmol/L Final   • Chloride 07/24/2020 101  96 - 112 mmol/L Final   • Co2 07/24/2020 24  20 - 33 mmol/L Final   • Anion Gap 07/24/2020 13.0  7.0 - 16.0 Final   • Glucose 07/24/2020 90  65 - 99 mg/dL Final   • Bun 07/24/2020 15  8 - 22 mg/dL Final   • Creatinine 07/24/2020 0.68  0.50 - 1.40 mg/dL Final   • Calcium 07/24/2020 9.2  8.5 - 10.5 mg/dL Final   • AST(SGOT) 07/24/2020 23  12 - 45 U/L Final   • ALT(SGPT) 07/24/2020 23  2 - 50 U/L Final   • Alkaline Phosphatase 07/24/2020 77  30 - 99 U/L Final   • Total Bilirubin 07/24/2020 0.5  0.1 - 1.5 mg/dL Final   • Albumin 07/24/2020 4.1  3.2 - 4.9 g/dL Final   • Total Protein 07/24/2020 6.6  6.0 - 8.2 g/dL Final   • Globulin 07/24/2020 2.5  1.9 - 3.5 g/dL Final   • A-G Ratio 07/24/2020 1.6  g/dL Final   • Cholesterol,Tot 07/24/2020 218* 100 - 199 mg/dL Final   • Triglycerides 07/24/2020 163* 0 - 149 mg/dL Final   • HDL 07/24/2020 58  >=40 mg/dL Final   • LDL 07/24/2020 127* <100 mg/dL Final   • Fasting Status 07/24/2020 Fasting   Final   • GFR If  07/24/2020 >60  >60 mL/min/1.73 m 2 Final   • GFR If Non  07/24/2020 >60  >60 mL/min/1.73 m 2 Final               The 10-year ASCVD risk score (Patricia DC Jr., et al., 2013) is: 6%     Assessment/Plan:       There are no diagnoses linked to this encounter.    1. Dyslipidemia  LDL cholesterol improved.  10-year ASCVD risk went down from 8.3 to 6%.  At this time we do not need to put her on statin and she can manage this with her own efforts with avoidance of fatty foods, eating more fruits and vegetables,  regular exercise and weight loss.  Recheck blood work in 4 months.    2. Osteoporosis of forearm  Continue alendronate and she will finish treatment in 2023.  We will do follow-up bone density scan next year.  Continue vitamin D supplementation.    3. Vitamin D deficiency  The last vitamin D level was in March 2020 that came back at goal.  Continue same dose of vitamin D supplementation.    4. Acute pain of right shoulder  She could have shoulder strain due to the recent fall.  She was given shoulder exercises for range of movement.  We will put her on meloxicam 15 mg 1 tablet daily with food.  Advised warm compresses.  If problem persists and does not get better or if this gets worse she will let me know because she may need further evaluation.      Please note that this dictation was created using voice recognition software. I have worked with consultants from the vendor as well as technical experts from [facility]  [employer] to optimize the interface. I have made every reasonable attempt to correct obvious errors, but I expect that there are errors of grammar and possibly content I did not discover before finalizing the note.

## 2020-08-22 ENCOUNTER — OFFICE VISIT (OUTPATIENT)
Dept: URGENT CARE | Facility: PHYSICIAN GROUP | Age: 65
End: 2020-08-22
Payer: MEDICARE

## 2020-08-22 ENCOUNTER — HOSPITAL ENCOUNTER (OUTPATIENT)
Facility: MEDICAL CENTER | Age: 65
End: 2020-08-22
Attending: NURSE PRACTITIONER
Payer: MEDICARE

## 2020-08-22 VITALS
HEIGHT: 59 IN | WEIGHT: 195.4 LBS | DIASTOLIC BLOOD PRESSURE: 86 MMHG | RESPIRATION RATE: 20 BRPM | BODY MASS INDEX: 39.39 KG/M2 | SYSTOLIC BLOOD PRESSURE: 140 MMHG | TEMPERATURE: 97 F | OXYGEN SATURATION: 96 % | HEART RATE: 108 BPM

## 2020-08-22 DIAGNOSIS — M54.50 ACUTE LEFT-SIDED LOW BACK PAIN WITHOUT SCIATICA: ICD-10-CM

## 2020-08-22 DIAGNOSIS — R82.90 ABNORMAL FINDING ON URINALYSIS: ICD-10-CM

## 2020-08-22 LAB
APPEARANCE UR: CLEAR
BILIRUB UR STRIP-MCNC: NEGATIVE MG/DL
COLOR UR AUTO: YELLOW
GLUCOSE UR STRIP.AUTO-MCNC: NEGATIVE MG/DL
KETONES UR STRIP.AUTO-MCNC: NEGATIVE MG/DL
LEUKOCYTE ESTERASE UR QL STRIP.AUTO: NORMAL
NITRITE UR QL STRIP.AUTO: NEGATIVE
PH UR STRIP.AUTO: 5.5 [PH] (ref 5–8)
PROT UR QL STRIP: NEGATIVE MG/DL
RBC UR QL AUTO: NORMAL
SP GR UR STRIP.AUTO: 1.02
UROBILINOGEN UR STRIP-MCNC: NEGATIVE MG/DL

## 2020-08-22 PROCEDURE — 81002 URINALYSIS NONAUTO W/O SCOPE: CPT | Performed by: NURSE PRACTITIONER

## 2020-08-22 PROCEDURE — 99214 OFFICE O/P EST MOD 30 MIN: CPT | Performed by: NURSE PRACTITIONER

## 2020-08-22 PROCEDURE — 87086 URINE CULTURE/COLONY COUNT: CPT

## 2020-08-22 RX ORDER — PREDNISONE 20 MG/1
TABLET ORAL
Qty: 10 TAB | Refills: 0 | Status: SHIPPED | OUTPATIENT
Start: 2020-08-22 | End: 2021-01-12

## 2020-08-22 RX ORDER — TIZANIDINE 4 MG/1
4 TABLET ORAL EVERY 6 HOURS PRN
Qty: 30 TAB | Refills: 3 | Status: SHIPPED | OUTPATIENT
Start: 2020-08-22 | End: 2021-04-22

## 2020-08-22 ASSESSMENT — ENCOUNTER SYMPTOMS
BACK PAIN: 1
CHILLS: 0
WEAKNESS: 0
TINGLING: 1
LEG PAIN: 0
NECK PAIN: 0
SENSORY CHANGE: 0
HEADACHES: 0
BOWEL INCONTINENCE: 0
DIZZINESS: 0
NUMBNESS: 1
FEVER: 0
PERIANAL NUMBNESS: 0
PARESTHESIAS: 1
FOCAL WEAKNESS: 0
WEIGHT LOSS: 0

## 2020-08-22 ASSESSMENT — PAIN SCALES - GENERAL: PAINLEVEL: 7=MODERATE-SEVERE PAIN

## 2020-08-22 ASSESSMENT — FIBROSIS 4 INDEX: FIB4 SCORE: 0.71

## 2020-08-22 NOTE — PROGRESS NOTES
Subjective:   Dee Armstrong  is a 65 y.o. female who presents for Back Pain (low back pain, since this morning pt states she is having a hard time walking )        65-year-old female patient reports urgent care for recurrent problem.  Patient states she has a history of low back pain.  Was diagnosed with lumbar radiculopathy 2 years ago is currently taking Mobic as well as gabapentin daily. She has paresthesias to her right lower extremity and states that she woke up having recurrent worsening back pain and states she can barely ambulate and is currently using a cane for support. She states that the pain is located in her left lower back with some radiating to the left buttocks. She hasn't taken anything OTC for symptoms. Denies fever, chills, nausea, vomiting or changes to her bowel or bladder function. Patient denies saddle anesthesia. Patient has had two lumbar surgeries dating back to 2015 and 2016    Back Pain  This is a new problem. The current episode started today. The problem occurs constantly. The problem is unchanged. The pain is present in the lumbar spine and gluteal. The quality of the pain is described as stabbing, cramping and shooting. The pain is at a severity of 7/10. The pain is severe. The pain is the same all the time. The symptoms are aggravated by sitting, standing and twisting. Associated symptoms include numbness, paresthesias and tingling. Pertinent negatives include no bladder incontinence, bowel incontinence, chest pain, dysuria, fever, headaches, leg pain, pelvic pain, perianal numbness, weakness or weight loss. Risk factors include lack of exercise and sedentary lifestyle. She has tried nothing for the symptoms.     Review of Systems   Constitutional: Negative for chills, fever, malaise/fatigue and weight loss.   Cardiovascular: Negative for chest pain.   Gastrointestinal: Negative for bowel incontinence.   Genitourinary: Negative for bladder incontinence, dysuria and pelvic  pain.   Musculoskeletal: Positive for back pain. Negative for joint pain and neck pain.   Neurological: Positive for tingling, numbness and paresthesias. Negative for dizziness, sensory change, focal weakness, weakness and headaches.     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), medicated for a year, yearly xrays   • Cough 5/29/2015   • Diverticulosis    • GERD (gastroesophageal reflux disease)    • Heart burn    • Indigestion    • LBP (low back pain)    • Lumbar radicular pain 5/29/2015   • Muscle disorder    • Obesity    • Osteoporosis of forearm     Alendronate started 6/18   • Pain     Shoulder and numbness to R LE, hip      Past Surgical History:   Procedure Laterality Date   • PB TOTAL HIP ARTHROPLASTY Left 9/19/2019    Procedure: ARTHROPLASTY, HIP, TOTAL, ANTERIOR APPROACH;  Surgeon: Clarence Vallejo M.D.;  Location: [facility];  Service: Orthopedics   • INCISION HERNIA REPAIR Right 11/26/2018    Procedure: INCISION HERNIA REPAIR- FLANK WITH MESH;  Surgeon: Misael Ramírez M.D.;  Location: [facility];  Service: General   • HYSTERECTOMY ROBOTIC  6/7/2017    Procedure: HYSTERECTOMY ROBOTIC SI, BILATERAL SALPINGO-OOPHORECTOMY, URETERAL SACRAL LIGAMENT SHORTENING ;  Surgeon: Jerica Hooper M.D.;  Location: [facility];  Service:    • CYSTOSCOPY  6/7/2017    Procedure: CYSTOSCOPY;  Surgeon: Jerica Hooper M.D.;  Location: [facility];  Service:    • SHOULDER DECOMPRESSION ARTHROSCOPIC Right 12/6/2016    Procedure: SHOULDER DECOMPRESSION ARTHROSCOPIC - SUBACROMIAL, MANJARREZ;  Surgeon: Kyle Claros M.D.;  Location: [facility];  Service:    • CLAVICLE DISTAL EXCISION Right 12/6/2016    Procedure: CLAVICLE DISTAL EXCISION;  Surgeon: Kyle Claros M.D.;  Location: [facility];  Service:    • SHOULDER ARTHROSCOPY W/ ROTATOR CUFF REPAIR  Right 12/6/2016    Procedure: SHOULDER ARTHROSCOPY W/ ROTATOR CUFF REPAIR ;  Surgeon: Kyle Claros M.D.;  Location: SURGERY [facility];  Service:    • SHOULDER ARTHROSCOPY W/ BICIPITAL TENODESIS REPAIR Right 12/6/2016    Procedure: SHOULDER ARTHROSCOPY W/ BICIPITAL TENODESIS REPAIR ;  Surgeon: Kyle Claros M.D.;  Location: SURGERY [facility];  Service:    • LUMBAR FUSION POSTERIOR  2/9/2016    Procedure: LUMBAR FUSION POSTERIOR PEDICLE SCREW FIXATION (STAGE #2);  Surgeon: Tim Rodriguez M.D.;  Location: SURGERY [facility];  Service:    • LUMBAR LAMINECTOMY DISKECTOMY  2/9/2016    Procedure: LUMBAR LAMINECTOMY DISKECTOMY MINI OPEN;  Surgeon: Tim Rodriguez M.D.;  Location: SURGERY [facility];  Service:    • EXTREME LATERAL INTERBODY FUSION Right 2/6/2016    Procedure: EXTREME LATERAL INTERBODY FUSION L3-4 (STAGE #1);  Surgeon: Tim Rodriguez M.D.;  Location: SURGERY [facility];  Service:    • RECOVERY  12/8/2015    Procedure: CT-CT GUIDED LEFT LUNG BIOPSY-;  Surgeon: Recoveryonly Surgery;  Location: SURGERY PRE-POST PROC UNIT [facility];  Service:    • REDDY BY LAPAROSCOPY  4/17/2014    Performed by Ankur Lerner M.D. at SURGERY SAME DAY [facility] ORS   • EGD WITH ASP/BX  2/4/2014    mild chronic inactive gastritis, scar gastric antrum-   • EGD WITH ASP/BX  9/29/2011    possible barrettes, hiatal hernia, gastritis   • COLONOSCOPY  9/29/2011    diverticulosis sigmoid colon, hemorrhoids   • ARTHROTOMY  9/3/2010    Performed by YARELY MORRISON at SURGERY SAME DAY [facility] ORS   • ARTHRODESIS  9/3/2010    Performed by YARELY MORRISON at SURGERY SAME DAY [facility] ORS   • ORTHOPEDIC OSTEOTOMY  9/3/2010    Performed by YARELY MORRISON at SURGERY SAME DAY [facility] ORS   • BONE SPUR EXCISION  9/3/2010    Performed by YARELY MORRISON at SURGERY SAME DAY [facility] ORS   • OTHER ABDOMINAL SURGERY  2000    COLON RESECTION   • LAMINOTOMY     • OTHER ORTHOPEDIC SURGERY      PARDEEP CARPAL TUNNEL  "RELEASES      Social History     Socioeconomic History   • Marital status:      Spouse name: Not on file   • Number of children: Not on file   • Years of education: Not on file   • Highest education level: Not on file   Occupational History   • Not on file   Social Needs   • Financial resource strain: Not on file   • Food insecurity     Worry: Not on file     Inability: Not on file   • Transportation needs     Medical: Not on file     Non-medical: Not on file   Tobacco Use   • Smoking status: Former Smoker     Years: 2.00     Types: Cigarettes     Quit date: 1977     Years since quittin.6   • Smokeless tobacco: Never Used   Substance and Sexual Activity   • Alcohol use: Not Currently     Alcohol/week: 0.0 oz     Frequency: Monthly or less   • Drug use: No   • Sexual activity: Yes     Partners: Male     Comment:    Lifestyle   • Physical activity     Days per week: Not on file     Minutes per session: Not on file   • Stress: Not on file   Relationships   • Social connections     Talks on phone: Not on file     Gets together: Not on file     Attends Synagogue service: Not on file     Active member of club or organization: Not on file     Attends meetings of clubs or organizations: Not on file     Relationship status: Not on file   • Intimate partner violence     Fear of current or ex partner: Not on file     Emotionally abused: Not on file     Physically abused: Not on file     Forced sexual activity: Not on file   Other Topics Concern   • Not on file   Social History Narrative   • Not on file    Patient has no known allergies.       Objective:   /86 (BP Location: Left arm, Patient Position: Sitting, BP Cuff Size: Adult)   Pulse (!) 108   Temp 36.1 °C (97 °F) (Temporal)   Resp 20   Ht 1.499 m (4' 11\")   Wt 88.6 kg (195 lb 6.4 oz)   SpO2 96%   BMI 39.47 kg/m²   Physical Exam  Vitals signs reviewed.   Constitutional:       Appearance: Normal appearance.   Cardiovascular:      Rate and " Rhythm: Normal rate and regular rhythm.      Heart sounds: Normal heart sounds.   Pulmonary:      Effort: Pulmonary effort is normal.      Breath sounds: Normal breath sounds.   Musculoskeletal:      Lumbar back: She exhibits decreased range of motion, tenderness, pain and spasm. She exhibits no swelling, no edema, no deformity and no laceration.   Skin:     Capillary Refill: Capillary refill takes less than 2 seconds.   Neurological:      Mental Status: She is alert and oriented to person, place, and time.   Psychiatric:         Mood and Affect: Mood normal.         Behavior: Behavior normal.         Thought Content: Thought content normal.         Judgment: Judgment normal.       Lab Results   Component Value Date/Time    POCCOLOR PINK 10/01/2019 02:10 PM    POCAPPEAR CLEAR 10/01/2019 02:10 PM    POCLEUKEST NEGATIVE 10/01/2019 02:10 PM    POCNITRITE NEGATIVE 10/01/2019 02:10 PM    POCUROBILIGE NEGATIVE 10/01/2019 02:10 PM    POCPROTEIN POSITIVE 10/01/2019 02:10 PM    POCURPH 6.5 10/01/2019 02:10 PM    POCBLOOD LARGE 10/01/2019 02:10 PM    POCSPGRV 1.010 10/01/2019 02:10 PM    POCKETONES NEGATIVE 10/01/2019 02:10 PM    POCBILIRUBIN NEGATIVE 10/01/2019 02:10 PM    POCGLUCUA NEGATIVE 10/01/2019 02:10 PM            Assessment/Plan:        1. Acute left-sided low back pain without sciatica  POCT Urinalysis    URINE CULTURE(NEW)    predniSONE (DELTASONE) 20 MG Tab    tizanidine (ZANAFLEX) 4 MG Tab       Discussed physical examination findings are consistent with possible left-sided low back pain with paraspinous muscle spasm.  Will provide patient with a prescription for steroid burst as well as muscle relaxant.  Discussed sedating effects of muscle relaxant.  Due to patient's abnormal results on urinalysis but no urinary symptoms will send urine for culture and will treat with antibiotics if indicated.  Advised patient she should not be taking additional Mobic while she is taking steroid due to possibility of  developing gastric ulcers.  Advised patient to continue to use cane for ambulation and may use rest, ice, heat and gentle exercises.  Advised patient to follow-up with her primary care physician or [facility] where she is a patient if patient's symptoms are persisting.    Supportive care, differential diagnoses, and indications for immediate follow-up discussed with patient.    Pathogenesis of diagnosis discussed including typical length and natural progression. Patient expresses understanding and agrees to plan.    Instructed patient to return to clinic for worsening symptoms or symptoms that persist for 7 to 10 days     Please note that this dictation was created using voice recognition software. I have made every reasonable attempt to correct obvious errors, but I expect that there are errors of grammar and possibly content that I did not discover before finalizing the note.    Note electronically signed by GARRETT Peck

## 2020-08-23 ENCOUNTER — TELEPHONE (OUTPATIENT)
Dept: URGENT CARE | Facility: PHYSICIAN GROUP | Age: 65
End: 2020-08-23

## 2020-08-23 DIAGNOSIS — M54.50 ACUTE LEFT-SIDED LOW BACK PAIN WITHOUT SCIATICA: ICD-10-CM

## 2020-08-23 DIAGNOSIS — R82.90 ABNORMAL FINDING ON URINALYSIS: ICD-10-CM

## 2020-08-24 LAB
AMBIGUOUS DTTM AMBI4: NORMAL
SIGNIFICANT IND 70042: NORMAL
SITE SITE: NORMAL
SOURCE SOURCE: NORMAL

## 2020-08-25 LAB
BACTERIA UR CULT: NORMAL
SIGNIFICANT IND 70042: NORMAL
SITE SITE: NORMAL
SOURCE SOURCE: NORMAL

## 2020-09-09 ENCOUNTER — HOSPITAL ENCOUNTER (OUTPATIENT)
Dept: RADIOLOGY | Facility: MEDICAL CENTER | Age: 65
End: 2020-09-09
Attending: PHYSICIAN ASSISTANT
Payer: MEDICARE

## 2020-09-09 DIAGNOSIS — B38.2: ICD-10-CM

## 2020-09-09 PROCEDURE — 71046 X-RAY EXAM CHEST 2 VIEWS: CPT

## 2020-09-11 ENCOUNTER — OFFICE VISIT (OUTPATIENT)
Dept: PULMONOLOGY | Facility: HOSPICE | Age: 65
End: 2020-09-11
Payer: MEDICARE

## 2020-09-11 VITALS
OXYGEN SATURATION: 97 % | HEIGHT: 59 IN | RESPIRATION RATE: 16 BRPM | WEIGHT: 198.4 LBS | SYSTOLIC BLOOD PRESSURE: 142 MMHG | HEART RATE: 100 BPM | DIASTOLIC BLOOD PRESSURE: 84 MMHG | BODY MASS INDEX: 40 KG/M2

## 2020-09-11 DIAGNOSIS — K21.9 GASTROESOPHAGEAL REFLUX DISEASE, ESOPHAGITIS PRESENCE NOT SPECIFIED: ICD-10-CM

## 2020-09-11 DIAGNOSIS — B38.2 COCCIDIOIDOMYCOSIS, PULMONARY (HCC): ICD-10-CM

## 2020-09-11 PROCEDURE — 99213 OFFICE O/P EST LOW 20 MIN: CPT | Performed by: PHYSICIAN ASSISTANT

## 2020-09-11 RX ORDER — ESTRADIOL 0.1 MG/G
CREAM VAGINAL
Status: ON HOLD | COMMUNITY
End: 2021-05-13

## 2020-09-11 RX ORDER — CEFDINIR 300 MG/1
CAPSULE ORAL
COMMUNITY
End: 2021-01-12

## 2020-09-11 RX ORDER — TIZANIDINE 4 MG
KIT MISCELLANEOUS
COMMUNITY
End: 2021-01-12

## 2020-09-11 RX ORDER — ALBUTEROL SULFATE 90 UG/1
AEROSOL, METERED RESPIRATORY (INHALATION)
COMMUNITY
End: 2021-01-12

## 2020-09-11 ASSESSMENT — ENCOUNTER SYMPTOMS
INSOMNIA: 0
ORTHOPNEA: 0
CHILLS: 0
FEVER: 0
PALPITATIONS: 0
SPUTUM PRODUCTION: 0
DIZZINESS: 0
SHORTNESS OF BREATH: 0
EYES NEGATIVE: 1
SINUS PAIN: 0
HEARTBURN: 1
COUGH: 0
HEADACHES: 1
WEIGHT LOSS: 0
TREMORS: 0
SORE THROAT: 0

## 2020-09-11 ASSESSMENT — FIBROSIS 4 INDEX: FIB4 SCORE: 0.71

## 2020-09-11 NOTE — PATIENT INSTRUCTIONS
1-stable imaging, no significant change  2-pulmonary symptoms none  3-reviewed covid 19 recommendations:  Wearing mask in public, social distancing, frequent hand washing, early flu vaccine-patient has gotten  4-follow up in one year

## 2020-09-11 NOTE — PROGRESS NOTES
CC: None    HPI:  Dee Armstrong is a 65 y.o. 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 Last seen in clinic 9/6/2019.  Remote brief smoking history reported with quit date in 1977 and 2 pack years.  Continued abstinence advised.    Pertinent past medical history includes valley fever left upper lobe, medicated x1 year, chronic back pain, left total hip replacement, right shoulder arthroscopy with rotator cuff repair GERD.    Reviewed in clinic vitals including blood pressure of 142/84, heart rate of 100, O2 sat of 97%. Patient's body mass index is 40.07 kg/m². Exercise and nutrition counseling were performed at this visit.    Reviewed home medication regimen no inhalers, no cpap.  Had an albuterol rescue inhaler but did not require use, on lansoprazole.    Reviewed most recent imaging including chest x-ray obtained 9/9/2020 demonstrating stable lobulated 2.4 cm elliptical left upper lobe mass consistent with previous granulomatous inflammatory process, no acute cardiopulmonary changes.    Last pulmonary function test was obtained 12/12/2018 demonstrating an FEV1 of 2.08 L or 106% predicted, FVC of 2.58 L or 100% of predicted, FEV1/FVC ratio 81.  No significant response to bronchodilators, normal residual volume at 119% predicted, normal total lung capacity at 112% predicted, diffusion capacity moderately increased at 161% predicted.  Per pulmonologist interpretation moderate increase in diffusion capacity probably secondary to obesity.  Otherwise normal pulmonary function test.    Review of Systems   Constitutional: Negative for chills, fever, malaise/fatigue and weight loss.   HENT: Negative for congestion, hearing loss, nosebleeds, sinus pain, sore throat and tinnitus.    Eyes: Negative.    Respiratory: Negative for cough, sputum production and shortness of breath.    Cardiovascular: Negative for chest pain, palpitations and orthopnea.   Gastrointestinal: Positive for heartburn (controlled  on medication).        No dentures, no difficulty   Skin: Negative.    Neurological: Positive for headaches (occasional ). Negative for dizziness and tremors.   Psychiatric/Behavioral: The patient does not have insomnia.        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), medicated for a year, yearly xrays   • Cough 5/29/2015   • Diverticulosis    • GERD (gastroesophageal reflux disease)    • Heart burn    • Indigestion    • LBP (low back pain)    • Lumbar radicular pain 5/29/2015   • Muscle disorder    • Obesity    • Osteoporosis of forearm     Alendronate started 6/18   • Pain     Shoulder and numbness to R LE, hip       Past Surgical History:   Procedure Laterality Date   • PB TOTAL HIP ARTHROPLASTY Left 9/19/2019    Procedure: ARTHROPLASTY, HIP, TOTAL, ANTERIOR APPROACH;  Surgeon: Clarence Vallejo M.D.;  Location: [facility];  Service: Orthopedics   • INCISION HERNIA REPAIR Right 11/26/2018    Procedure: INCISION HERNIA REPAIR- FLANK WITH MESH;  Surgeon: Misael Ramírez M.D.;  Location: [facility];  Service: General   • HYSTERECTOMY ROBOTIC  6/7/2017    Procedure: HYSTERECTOMY ROBOTIC SI, BILATERAL SALPINGO-OOPHORECTOMY, URETERAL SACRAL LIGAMENT SHORTENING ;  Surgeon: Jerica Hooper M.D.;  Location: [facility];  Service:    • CYSTOSCOPY  6/7/2017    Procedure: CYSTOSCOPY;  Surgeon: Jerica Hooper M.D.;  Location: [facility];  Service:    • SHOULDER DECOMPRESSION ARTHROSCOPIC Right 12/6/2016    Procedure: SHOULDER DECOMPRESSION ARTHROSCOPIC - SUBACROMIAL, MANJARREZ;  Surgeon: Kyle Claros M.D.;  Location: [facility];  Service:    • CLAVICLE DISTAL EXCISION Right 12/6/2016    Procedure: CLAVICLE DISTAL EXCISION;  Surgeon: Kyle Claros M.D.;  Location: [facility];  Service:    • SHOULDER ARTHROSCOPY W/ ROTATOR CUFF REPAIR Right  12/6/2016    Procedure: SHOULDER ARTHROSCOPY W/ ROTATOR CUFF REPAIR ;  Surgeon: Kyle Claros M.D.;  Location: SURGERY [facility];  Service:    • SHOULDER ARTHROSCOPY W/ BICIPITAL TENODESIS REPAIR Right 12/6/2016    Procedure: SHOULDER ARTHROSCOPY W/ BICIPITAL TENODESIS REPAIR ;  Surgeon: Kyle Claros M.D.;  Location: SURGERY [facility];  Service:    • LUMBAR FUSION POSTERIOR  2/9/2016    Procedure: LUMBAR FUSION POSTERIOR PEDICLE SCREW FIXATION (STAGE #2);  Surgeon: Tim Rodriguez M.D.;  Location: SURGERY [facility];  Service:    • LUMBAR LAMINECTOMY DISKECTOMY  2/9/2016    Procedure: LUMBAR LAMINECTOMY DISKECTOMY MINI OPEN;  Surgeon: Tim Rodriguez M.D.;  Location: SURGERY [facility];  Service:    • EXTREME LATERAL INTERBODY FUSION Right 2/6/2016    Procedure: EXTREME LATERAL INTERBODY FUSION L3-4 (STAGE #1);  Surgeon: Tim Rodriguez M.D.;  Location: SURGERY [facility];  Service:    • RECOVERY  12/8/2015    Procedure: CT-CT GUIDED LEFT LUNG BIOPSY-;  Surgeon: Recoveryonly Surgery;  [place] [facility]  Service:    • REDDY BY LAPAROSCOPY  4/17/2014    Performed by Ankur Lerner M.D. at SURGERY SAME DAY [facility] ORS   • EGD WITH ASP/BX  2/4/2014    mild chronic inactive gastritis, scar gastric antrum-   • EGD WITH ASP/BX  9/29/2011    possible barrettes, hiatal hernia, gastritis   • COLONOSCOPY  9/29/2011    diverticulosis sigmoid colon, hemorrhoids   • ARTHROTOMY  9/3/2010    Performed by YARELY MORRISON at SURGERY SAME DAY [facility] ORS   • ARTHRODESIS  9/3/2010    Performed by YARELY MORRISON at SURGERY SAME DAY [facility] ORS   • ORTHOPEDIC OSTEOTOMY  9/3/2010    Performed by YARELY MORRISON at SURGERY SAME DAY [facility] ORS   • BONE SPUR EXCISION  9/3/2010    Performed by YARELY MORRISON at SURGERY SAME DAY [facility] ORS   • OTHER ABDOMINAL SURGERY  2000    COLON RESECTION   • LAMINOTOMY     • OTHER ORTHOPEDIC SURGERY      PARDEEP CARPAL TUNNEL RELEASES  "      Family History   Problem Relation Age of Onset   • Cancer Mother         lymphoma   • Stroke Father    • Diabetes Father        Social History     Socioeconomic History   • Marital status:      Spouse name: Not on file   • Number of children: Not on file   • Years of education: Not on file   • Highest education level: Not on file   Occupational History   • Not on file   Social Needs   • Financial resource strain: Not on file   • Food insecurity     Worry: Not on file     Inability: Not on file   • Transportation needs     Medical: Not on file     Non-medical: Not on file   Tobacco Use   • Smoking status: Former Smoker     Years: 2.00     Types: Cigarettes     Quit date: 1977     Years since quittin.7   • Smokeless tobacco: Never Used   Substance and Sexual Activity   • Alcohol use: Not Currently     Alcohol/week: 0.0 oz     Frequency: Monthly or less   • Drug use: No   • Sexual activity: Yes     Partners: Male     Comment:    Lifestyle   • Physical activity     Days per week: Not on file     Minutes per session: Not on file   • Stress: Not on file   Relationships   • Social connections     Talks on phone: Not on file     Gets together: Not on file     Attends Oriental orthodox service: Not on file     Active member of club or organization: Not on file     Attends meetings of clubs or organizations: Not on file     Relationship status: Not on file   • Intimate partner violence     Fear of current or ex partner: Not on file     Emotionally abused: Not on file     Physically abused: Not on file     Forced sexual activity: Not on file   Other Topics Concern   • Not on file   Social History Narrative   • Not on file       Allergies as of 2020   • (No Known Allergies)        @Vital signs for this encounter:  Vitals:    20 0852 20 0855   Height: 1.499 m (4' 11\")    Weight: 90 kg (198 lb 6.4 oz)    Weight % change since last entry.: 0 %    BP: 142/84    Pulse: 100    BMI (Calculated): " 40.07    Resp: 16    O2 sat % room air:  97 %       Current medications as of today   Current Outpatient Medications   Medication Sig Dispense Refill   • Calcium Carbonate 1500 (600 Ca) MG Tab 1 Tab.     • Cholecalciferol 20984 UNIT Cap 1,000 Units.     • tiZANidine-Liniment (TIZANIDINE COMFORT PAC) 4 MG Misc tizanidine hydrochloride 4 mg tabs     • tizanidine (ZANAFLEX) 4 MG Tab Take 1 Tab by mouth every 6 hours as needed. 30 Tab 3   • Multiple Vitamin (MULTI VITAMIN DAILY PO) Take  by mouth.     • meloxicam (MOBIC) 15 MG tablet Take 1 Tab by mouth every day. 30 Tab 2   • lansoprazole (PREVACID) 30 MG CAPSULE DELAYED RELEASE TAKE 1 CAPSULE BY MOUTH  DAILY 90 Cap 3   • gabapentin (NEURONTIN) 600 MG tablet Take 1 Tab by mouth every evening. 90 Tab 1   • alendronate (FOSAMAX) 70 MG Tab TAKE 1 TAB BY MOUTH EVERY 7 DAYS. 12 Tab 1   • amitriptyline (ELAVIL) 10 MG Tab TAKE 3 TABS BY MOUTH AT BEDTIME AS NEEDED. 270 Tab 1   • Cholecalciferol (VITAMIN D3) 2000 UNIT Cap Take  by mouth.     • ascorbic acid (ASCORBIC ACID) 500 MG Tab Take 1,000 mg by mouth every day.     • albuterol 108 (90 Base) MCG/ACT Aero Soln inhalation aerosol albuterol sulfate hfa 108 (90 base) mcg/act aers     • cefdinir (OMNICEF) 300 MG Cap cefdinir 300 mg caps     • Diclofenac Sodium 1 % Gel diclofenac sodium 1 % gel     • estradiol (ESTRACE) 0.1 MG/GM vaginal cream estradiol 0.1 mg/gm crea     • predniSONE (DELTASONE) 20 MG Tab Take one in the morning and evening for five days (Patient not taking: Reported on 9/11/2020) 10 Tab 0   • gabapentin (NEURONTIN) 300 MG Cap TAKE 1 CAP BY MOUTH EVERY EVENING. 90 Cap 1   • SUMAtriptan (IMITREX) 25 MG Tab tablet Take  mg by mouth Once PRN for Migraine.     • oxyCODONE-acetaminophen (PERCOCET) 5-325 MG Tab Take 1 Tab by mouth every four hours as needed for Severe Pain.     • clobetasol (TEMOVATE) 0.05 % Cream Apply 1 Application to affected area(s) as needed (Apply's vaginal). Apply's vaginal  Indications:  Inflammation  1     No current facility-administered medications for this visit.          Physical Exam:   Gen:           Alert and oriented, No apparent distress. Mood and affect     appropriate, normal interaction with provider.  Eyes:          sclere white, conjunctive moist.  Hearing:     Grossly intact.  Dentition:    Fair dentition.  Oropharynx:   Tongue normal, posterior pharynx without erythema or exudate.  Neck:        Supple, trachea midline, no masses.  Respiratory Effort: No intercostal retractions or use of accessory muscles.   Lung Auscultation:      Clear to auscultation bilaterally; no rales, rhonchi or wheezing.  CV:            Regular rate and rhythm. No edema. No murmurs, rubs or gallops.  Digits, Nails, Ext: No clubbing, cyanosis, petechiae, or nodes.   Skin:        No rashes, lesions or ulcers noted on back or exposed skin    surfaces.                     Assessment:  1. Coccidioidomycosis, pulmonary ([facility])   follow-up in 1 year with chest x-ray   2. BMI 40.0-44.9, adult ([facility])   obesity counseling   3. Gastroesophageal reflux disease, esophagitis presence not specified   managed on lansoprazole       Immunizations:    Flu: 9/3/2020  Pneumovax 23: 6/18/2018  Prevnar 13:    1/3/2017                                                                                                                                                                                                                                   Plan:  65 y.o. 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 Last seen in clinic 9/6/2019.  Remote brief smoking history reported with quit date in 1977 and 2 pack years.  Continued abstinence advised.    Pertinent past medical history includes valley fever left upper lobe, medicated x1 year, chronic back pain, left total hip replacement, right shoulder arthroscopy with rotator cuff repair GERD.    Reviewed in clinic vitals including blood pressure of 142/84, heart rate of 100, O2  sat of 97%. Patient's body mass index is 40.07 kg/m². Exercise and nutrition counseling were performed at this visit.  Discussion included impact of central adiposity on pulmonary function.    Reviewed home medication regimen no inhalers, no cpap.  Had an albuterol rescue inhaler but did not require use, on lansoprazole.  GERD symptoms reported as controlled.    Reviewed most recent imaging including chest x-ray obtained 9/9/2020 demonstrating stable elliptical 2.4 cm lobulated left upper lobe mass consistent with previous granulomatous inflammatory process, no acute cardiopulmonary changes.    Last pulmonary function test was obtained 12/12/2018 demonstrating an FEV1 of 2.08 L or 106% predicted, FVC of 2.58 L or 100% of predicted, FEV1/FVC ratio 81.  No significant response to bronchodilators, normal residual volume at 119% predicted, normal total lung capacity at 112% predicted, diffusion capacity moderately increased at 161% predicted.  Per pulmonologist interpretation moderate increase in diffusion capacity probably secondary to obesity.  Otherwise normal pulmonary function test.    Patient remains asymptomatic of pulmonary symptoms.  Chest x-ray stable and consistent with treated coccidiomycosis.  Continue annual follow-up, sooner if symptoms warrant.    Health discussion included current COVID-19 recommendations including wearing mask in public, social distancing, frequent handwashing and/or hand  use and early flu vaccine.  Patient has received.    This dictation was created using voice recognition software. The accuracy of the dictation is limited to the abilities of the software. I expect there may be some errors of grammar and possibly content.

## 2020-09-22 ENCOUNTER — APPOINTMENT (RX ONLY)
Dept: URBAN - METROPOLITAN AREA CLINIC 22 | Facility: CLINIC | Age: 65
Setting detail: DERMATOLOGY
End: 2020-09-22

## 2020-09-22 DIAGNOSIS — Z71.89 OTHER SPECIFIED COUNSELING: ICD-10-CM

## 2020-09-22 DIAGNOSIS — D22 MELANOCYTIC NEVI: ICD-10-CM

## 2020-09-22 DIAGNOSIS — L82.1 OTHER SEBORRHEIC KERATOSIS: ICD-10-CM

## 2020-09-22 DIAGNOSIS — L81.4 OTHER MELANIN HYPERPIGMENTATION: ICD-10-CM

## 2020-09-22 PROBLEM — D22.5 MELANOCYTIC NEVI OF TRUNK: Status: ACTIVE | Noted: 2020-09-22

## 2020-09-22 PROCEDURE — ? COUNSELING

## 2020-09-22 PROCEDURE — 99214 OFFICE O/P EST MOD 30 MIN: CPT

## 2020-09-22 PROCEDURE — ? PHOTO-DOCUMENTATION

## 2020-09-22 PROCEDURE — ? OBSERVATION

## 2020-09-22 ASSESSMENT — LOCATION ZONE DERM
LOCATION ZONE: LEG
LOCATION ZONE: ARM
LOCATION ZONE: TRUNK
LOCATION ZONE: FACE

## 2020-09-22 ASSESSMENT — LOCATION DETAILED DESCRIPTION DERM
LOCATION DETAILED: LOWER STERNUM
LOCATION DETAILED: INFERIOR MID FOREHEAD
LOCATION DETAILED: RIGHT ANTERIOR PROXIMAL THIGH
LOCATION DETAILED: SUPERIOR THORACIC SPINE
LOCATION DETAILED: LEFT VENTRAL PROXIMAL FOREARM
LOCATION DETAILED: INFERIOR THORACIC SPINE
LOCATION DETAILED: LEFT MEDIAL FOREHEAD
LOCATION DETAILED: RIGHT VENTRAL PROXIMAL FOREARM

## 2020-09-22 ASSESSMENT — LOCATION SIMPLE DESCRIPTION DERM
LOCATION SIMPLE: RIGHT FOREARM
LOCATION SIMPLE: RIGHT THIGH
LOCATION SIMPLE: UPPER BACK
LOCATION SIMPLE: CHEST
LOCATION SIMPLE: INFERIOR FOREHEAD
LOCATION SIMPLE: LEFT FOREHEAD
LOCATION SIMPLE: LEFT FOREARM

## 2020-09-29 ENCOUNTER — HOSPITAL ENCOUNTER (OUTPATIENT)
Dept: RADIOLOGY | Facility: MEDICAL CENTER | Age: 65
End: 2020-09-29
Attending: ORTHOPAEDIC SURGERY
Payer: MEDICARE

## 2020-09-29 DIAGNOSIS — M25.552 LEFT HIP PAIN: ICD-10-CM

## 2020-09-29 DIAGNOSIS — Z96.642 PRESENCE OF LEFT ARTIFICIAL HIP JOINT: ICD-10-CM

## 2020-09-29 PROCEDURE — 73700 CT LOWER EXTREMITY W/O DYE: CPT | Mod: LT

## 2020-11-14 NOTE — TELEPHONE ENCOUNTER
Received request via: Pharmacy    Was the patient seen in the last year in this department? Yes    Does the patient have an active prescription (recently filled or refills available) for medication(s) requested? No  
Statement Selected

## 2020-11-16 ENCOUNTER — HOSPITAL ENCOUNTER (OUTPATIENT)
Dept: LAB | Facility: MEDICAL CENTER | Age: 65
End: 2020-11-16
Attending: ORTHOPAEDIC SURGERY
Payer: MEDICARE

## 2020-11-16 LAB
BASOPHILS # BLD AUTO: 1.5 % (ref 0–1.8)
BASOPHILS # BLD: 0.07 K/UL (ref 0–0.12)
CRP SERPL HS-MCNC: 0.69 MG/DL (ref 0–0.75)
EOSINOPHIL # BLD AUTO: 0.06 K/UL (ref 0–0.51)
EOSINOPHIL NFR BLD: 1.3 % (ref 0–6.9)
ERYTHROCYTE [DISTWIDTH] IN BLOOD BY AUTOMATED COUNT: 48.2 FL (ref 35.9–50)
HCT VFR BLD AUTO: 44.9 % (ref 37–47)
HGB BLD-MCNC: 14.6 G/DL (ref 12–16)
IMM GRANULOCYTES # BLD AUTO: 0.01 K/UL (ref 0–0.11)
IMM GRANULOCYTES NFR BLD AUTO: 0.2 % (ref 0–0.9)
LYMPHOCYTES # BLD AUTO: 1.78 K/UL (ref 1–4.8)
LYMPHOCYTES NFR BLD: 38.7 % (ref 22–41)
MCH RBC QN AUTO: 33 PG (ref 27–33)
MCHC RBC AUTO-ENTMCNC: 32.5 G/DL (ref 33.6–35)
MCV RBC AUTO: 101.4 FL (ref 81.4–97.8)
MONOCYTES # BLD AUTO: 0.34 K/UL (ref 0–0.85)
MONOCYTES NFR BLD AUTO: 7.4 % (ref 0–13.4)
NEUTROPHILS # BLD AUTO: 2.34 K/UL (ref 2–7.15)
NEUTROPHILS NFR BLD: 50.9 % (ref 44–72)
NRBC # BLD AUTO: 0 K/UL
NRBC BLD-RTO: 0 /100 WBC
PLATELET # BLD AUTO: 286 K/UL (ref 164–446)
PMV BLD AUTO: 10 FL (ref 9–12.9)
RBC # BLD AUTO: 4.43 M/UL (ref 4.2–5.4)
WBC # BLD AUTO: 4.6 K/UL (ref 4.8–10.8)

## 2020-11-16 PROCEDURE — 86140 C-REACTIVE PROTEIN: CPT

## 2020-11-16 PROCEDURE — 85025 COMPLETE CBC W/AUTO DIFF WBC: CPT

## 2020-11-16 PROCEDURE — 85652 RBC SED RATE AUTOMATED: CPT

## 2020-11-16 PROCEDURE — 36415 COLL VENOUS BLD VENIPUNCTURE: CPT

## 2020-11-17 LAB — ERYTHROCYTE [SEDIMENTATION RATE] IN BLOOD BY WESTERGREN METHOD: <1 MM/HOUR (ref 0–30)

## 2021-01-05 ENCOUNTER — HOSPITAL ENCOUNTER (OUTPATIENT)
Dept: LAB | Facility: MEDICAL CENTER | Age: 66
End: 2021-01-05
Attending: FAMILY MEDICINE
Payer: MEDICARE

## 2021-01-05 DIAGNOSIS — M81.0 OSTEOPOROSIS OF FOREARM: ICD-10-CM

## 2021-01-05 DIAGNOSIS — E78.5 DYSLIPIDEMIA: ICD-10-CM

## 2021-01-05 DIAGNOSIS — E55.9 VITAMIN D DEFICIENCY: ICD-10-CM

## 2021-01-05 DIAGNOSIS — M25.511 ACUTE PAIN OF RIGHT SHOULDER: ICD-10-CM

## 2021-01-05 LAB
ALBUMIN SERPL BCP-MCNC: 4.1 G/DL (ref 3.2–4.9)
ALBUMIN/GLOB SERPL: 1.5 G/DL
ALP SERPL-CCNC: 81 U/L (ref 30–99)
ALT SERPL-CCNC: 15 U/L (ref 2–50)
ANION GAP SERPL CALC-SCNC: 11 MMOL/L (ref 7–16)
AST SERPL-CCNC: 21 U/L (ref 12–45)
BASOPHILS # BLD AUTO: 0.9 % (ref 0–1.8)
BASOPHILS # BLD: 0.04 K/UL (ref 0–0.12)
BILIRUB SERPL-MCNC: 0.5 MG/DL (ref 0.1–1.5)
BUN SERPL-MCNC: 17 MG/DL (ref 8–22)
CALCIUM SERPL-MCNC: 8.9 MG/DL (ref 8.5–10.5)
CHLORIDE SERPL-SCNC: 103 MMOL/L (ref 96–112)
CHOLEST SERPL-MCNC: 225 MG/DL (ref 100–199)
CO2 SERPL-SCNC: 26 MMOL/L (ref 20–33)
CREAT SERPL-MCNC: 0.71 MG/DL (ref 0.5–1.4)
EOSINOPHIL # BLD AUTO: 0.1 K/UL (ref 0–0.51)
EOSINOPHIL NFR BLD: 2.2 % (ref 0–6.9)
ERYTHROCYTE [DISTWIDTH] IN BLOOD BY AUTOMATED COUNT: 46.2 FL (ref 35.9–50)
FASTING STATUS PATIENT QL REPORTED: NORMAL
GLOBULIN SER CALC-MCNC: 2.7 G/DL (ref 1.9–3.5)
GLUCOSE SERPL-MCNC: 93 MG/DL (ref 65–99)
HCT VFR BLD AUTO: 46 % (ref 37–47)
HDLC SERPL-MCNC: 67 MG/DL
HGB BLD-MCNC: 15.1 G/DL (ref 12–16)
IMM GRANULOCYTES # BLD AUTO: 0.01 K/UL (ref 0–0.11)
IMM GRANULOCYTES NFR BLD AUTO: 0.2 % (ref 0–0.9)
LDLC SERPL CALC-MCNC: 133 MG/DL
LYMPHOCYTES # BLD AUTO: 1.91 K/UL (ref 1–4.8)
LYMPHOCYTES NFR BLD: 41.8 % (ref 22–41)
MCH RBC QN AUTO: 32.8 PG (ref 27–33)
MCHC RBC AUTO-ENTMCNC: 32.8 G/DL (ref 33.6–35)
MCV RBC AUTO: 99.8 FL (ref 81.4–97.8)
MONOCYTES # BLD AUTO: 0.36 K/UL (ref 0–0.85)
MONOCYTES NFR BLD AUTO: 7.9 % (ref 0–13.4)
NEUTROPHILS # BLD AUTO: 2.15 K/UL (ref 2–7.15)
NEUTROPHILS NFR BLD: 47 % (ref 44–72)
NRBC # BLD AUTO: 0 K/UL
NRBC BLD-RTO: 0 /100 WBC
PLATELET # BLD AUTO: 305 K/UL (ref 164–446)
PMV BLD AUTO: 10 FL (ref 9–12.9)
POTASSIUM SERPL-SCNC: 3.9 MMOL/L (ref 3.6–5.5)
PROT SERPL-MCNC: 6.8 G/DL (ref 6–8.2)
RBC # BLD AUTO: 4.61 M/UL (ref 4.2–5.4)
SODIUM SERPL-SCNC: 140 MMOL/L (ref 135–145)
TRIGL SERPL-MCNC: 125 MG/DL (ref 0–149)
WBC # BLD AUTO: 4.6 K/UL (ref 4.8–10.8)

## 2021-01-05 PROCEDURE — 85025 COMPLETE CBC W/AUTO DIFF WBC: CPT

## 2021-01-05 PROCEDURE — 80061 LIPID PANEL: CPT

## 2021-01-05 PROCEDURE — 36415 COLL VENOUS BLD VENIPUNCTURE: CPT

## 2021-01-05 PROCEDURE — 80053 COMPREHEN METABOLIC PANEL: CPT

## 2021-01-11 ENCOUNTER — OFFICE VISIT (OUTPATIENT)
Dept: MEDICAL GROUP | Facility: PHYSICIAN GROUP | Age: 66
End: 2021-01-11
Payer: MEDICARE

## 2021-01-11 VITALS
OXYGEN SATURATION: 95 % | HEART RATE: 117 BPM | BODY MASS INDEX: 39.96 KG/M2 | HEIGHT: 59 IN | WEIGHT: 198.19 LBS | TEMPERATURE: 96.6 F | SYSTOLIC BLOOD PRESSURE: 150 MMHG | DIASTOLIC BLOOD PRESSURE: 80 MMHG

## 2021-01-11 DIAGNOSIS — E78.5 DYSLIPIDEMIA: ICD-10-CM

## 2021-01-11 DIAGNOSIS — R03.0 ELEVATED BLOOD PRESSURE READING: ICD-10-CM

## 2021-01-11 DIAGNOSIS — E55.9 VITAMIN D DEFICIENCY: ICD-10-CM

## 2021-01-11 DIAGNOSIS — M81.0 OSTEOPOROSIS OF FOREARM: ICD-10-CM

## 2021-01-11 DIAGNOSIS — S32.402A CLOSED NONDISPLACED FRACTURE OF LEFT ACETABULUM, UNSPECIFIED PORTION OF ACETABULUM, INITIAL ENCOUNTER (HCC): ICD-10-CM

## 2021-01-11 PROCEDURE — 99214 OFFICE O/P EST MOD 30 MIN: CPT | Performed by: FAMILY MEDICINE

## 2021-01-11 RX ORDER — TRAMADOL HYDROCHLORIDE 50 MG/1
100 TABLET ORAL
Qty: 60 TAB | Refills: 0 | Status: SHIPPED | OUTPATIENT
Start: 2021-01-11 | End: 2021-02-10

## 2021-01-11 ASSESSMENT — PATIENT HEALTH QUESTIONNAIRE - PHQ9: CLINICAL INTERPRETATION OF PHQ2 SCORE: 0

## 2021-01-11 ASSESSMENT — FIBROSIS 4 INDEX: FIB4 SCORE: 1.19

## 2021-01-11 NOTE — LETTER
[facility]  Sherly Calvert M.D.  [address]  [place] [address]  Fax: [phone]   Authorization for Release/Disclosure of   Protected Health Information   Name: UGO ARMSTRONG : 1955 SSN: [id number]   Address: [address] [place] [id number] Phone:    [phone] (home)    I authorize the entity listed below to release/disclose the PHI below to:   [facility]/Sherly Calvert M.D. and Sherly Calvert M.D.   Provider or Entity Name:    [facility]   Address   City, State, [facility]   Phone:      Fax:     Reason for request: continuity of care   Information to be released:    [  ] LAST COLONOSCOPY,  including any PATH REPORT and follow-up  [  ] LAST FIT/COLOGUARD RESULT [  ] LAST DEXA  [  ] LAST MAMMOGRAM  [  ] LAST PAP  [  ] LAST LABS [  ] RETINA EXAM REPORT  [  ] IMMUNIZATION RECORDS  [x  ] Release all info      [  ] Check here and initial the line next to each item to release ALL health information INCLUDING  _____ Care and treatment for drug and / or alcohol abuse  _____ HIV testing, infection status, or AIDS  _____ Genetic Testing    DATES OF SERVICE OR TIME PERIOD TO BE DISCLOSED: _____________  I understand and acknowledge that:  * This Authorization may be revoked at any time by you in writing, except if your health information has already been used or disclosed.  * Your health information that will be used or disclosed as a result of you signing this authorization could be re-disclosed by the recipient. If this occurs, your re-disclosed health information may no longer be protected by State or Federal laws.  * You may refuse to sign this Authorization. Your refusal will not affect your ability to obtain treatment.  * This Authorization becomes effective upon signing and will  on (date) __________.      If no date is indicated, this Authorization will  one (1) year from the signature date.    Name: Ugo Armstrong    Signature:   Date:     2021            PLEASE FAX REQUESTED RECORDS BACK TO: [phone]

## 2021-01-11 NOTE — PROGRESS NOTES
Subjective:      Dee Armstrong is a 65 y.o. female who presents with Other (labs)            HPI     Patient is here for follow-up of her medical problems as well as for left hip pain.  She had a ground-level fall in April 2020.  She thought she may have injured her hip at that time although she did not start having pain until September 2020.  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  She was seen and evaluated by her orthopedic surgeon Dr. Vallejo and she was sent for a CAT scan of the hip that showed left acetabular/periprostatic fracture which is nondisplaced.  Dr. Vallejo referred her to Dr. Adams.  She is currently doing physical therapy.  She said she has a follow-up appointment on February 24.  She has been taking about 2 to 3 tablets of Advil twice a day and 3 tablets of Advil PM at night without help of her pain.  She would like to take something stronger at night to help her sleep.  She also takes gabapentin 600 mg at bedtime to manage her leg cramps which is helping for the leg cramps but not for her pain.  She started doing physical therapy last week and she is doing it twice a week for 6 weeks.    Acute pain   This is a new problem.   Patient is complaining of pain x 4 month(s) located in her left hip.  Pain is constant, described as throbbing and a 7-8/10 on the pain scale.   Treatments tried include:NSAIDs, gabapentin     Is the pain medication improving the patient’s symptoms: No  Any adverse effects: No  Alcohol or illicit drug use:   She  reports previous alcohol use.  She  reports no history of drug use.     History of controlled substance used in a way other than prescribed? No     Any early refills of a controlled substance: No  History of lost or stolen controlled substance prescription: No  Any aberrant behavior or intoxication while on a controlled substance: No  Has the patient self-modified their dose or frequency of the medication :No  Compliant with treatment  "recommendations and plan: No  Any major health change to the patient: No  Concerns for misuse, abuse or addiction: No  /NarxCheck report reviewed: Yes  History of abnormal drug screening: No  I have assessed the patient’s risk for abuse, dependency, and addiction using the validated Opioid Risk Tool.     Opioid Risk Score: 0       Interpretation of Opioid Risk Score   Score 0-3 = Low risk of abuse. Do UDS at least once per year.  Score 4-7 = Moderate risk of abuse. Do UDS 1-4 times per year.  Score 8+ = High risk of abuse. Refer to specialist.     I have conducted a physical exam and documented findings.     I certify that I have obtained and reviewed her medical history. I have also made a good tracy effort to obtain applicable records from other providers who have treated the patient.  I have reviewed the patient's prescription history as maintained by the [place] Prescription Monitoring Program.      Given the above, I believe the benefits of controlled substance therapy outweigh the risks. The reasons for prescribing controlled substances include non-narcotic, oral analgesic alternatives have been inadequate for pain control. Accordingly, I have discussed the risk and benefits, treatment plan, and alternative therapies with the patient. Patient was advised that this medicine is intended for short term (no more than 14 days)/intermittent use only and not intended to be an ongoing prescription.    In terms of her dyslipidemia, she continues to manage this with her own efforts only.  Blood work was done before this visit.    She continues to take vitamin D supplementation for vitamin D deficiency.    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  She will finish her treatment in 2023.    Her blood pressure is elevated this visit.  No prior history of hypertension.    ROS       Objective:     /80   Pulse (!) 117   Temp 35.9 °C (96.6 °F) (Temporal)   Ht 1.499 m (4' 11\")   Wt 89.9 kg " (198 lb 3.1 oz)   SpO2 95%   BMI 40.03 kg/m²      Physical Exam     Examined alert, awake, oriented, not in distress    Neck-supple, no lymphadenopathy, no thyromegaly  Lungs-clear to auscultation, no rales, no wheezes  Heart-regular rate and rhythm, no murmur  Extremities-no edema, clubbing, cyanosis, left hip exam-no pinpoint tenderness, no limitation of range of movement    Hospital Outpatient Visit on 01/05/2021   Component Date Value Ref Range Status   • WBC 01/05/2021 4.6* 4.8 - 10.8 K/uL Final   • RBC 01/05/2021 4.61  4.20 - 5.40 M/uL Final   • Hemoglobin 01/05/2021 15.1  12.0 - 16.0 g/dL Final   • Hematocrit 01/05/2021 46.0  37.0 - 47.0 % Final   • MCV 01/05/2021 99.8* 81.4 - 97.8 fL Final   • MCH 01/05/2021 32.8  27.0 - 33.0 pg Final   • MCHC 01/05/2021 32.8* 33.6 - 35.0 g/dL Final   • RDW 01/05/2021 46.2  35.9 - 50.0 fL Final   • Platelet Count 01/05/2021 305  164 - 446 K/uL Final   • MPV 01/05/2021 10.0  9.0 - 12.9 fL Final   • Neutrophils-Polys 01/05/2021 47.00  44.00 - 72.00 % Final   • Lymphocytes 01/05/2021 41.80* 22.00 - 41.00 % Final   • Monocytes 01/05/2021 7.90  0.00 - 13.40 % Final   • Eosinophils 01/05/2021 2.20  0.00 - 6.90 % Final   • Basophils 01/05/2021 0.90  0.00 - 1.80 % Final   • Immature Granulocytes 01/05/2021 0.20  0.00 - 0.90 % Final   • Nucleated RBC 01/05/2021 0.00  /100 WBC Final   • Neutrophils (Absolute) 01/05/2021 2.15  2.00 - 7.15 K/uL Final    Includes immature neutrophils, if present.   • Lymphs (Absolute) 01/05/2021 1.91  1.00 - 4.80 K/uL Final   • Monos (Absolute) 01/05/2021 0.36  0.00 - 0.85 K/uL Final   • Eos (Absolute) 01/05/2021 0.10  0.00 - 0.51 K/uL Final   • Baso (Absolute) 01/05/2021 0.04  0.00 - 0.12 K/uL Final   • Immature Granulocytes (abs) 01/05/2021 0.01  0.00 - 0.11 K/uL Final   • NRBC (Absolute) 01/05/2021 0.00  K/uL Final   • Sodium 01/05/2021 140  135 - 145 mmol/L Final   • Potassium 01/05/2021 3.9  3.6 - 5.5 mmol/L Final   • Chloride 01/05/2021 103  96 -  112 mmol/L Final   • Co2 01/05/2021 26  20 - 33 mmol/L Final   • Anion Gap 01/05/2021 11.0  7.0 - 16.0 Final   • Glucose 01/05/2021 93  65 - 99 mg/dL Final   • Bun 01/05/2021 17  8 - 22 mg/dL Final   • Creatinine 01/05/2021 0.71  0.50 - 1.40 mg/dL Final   • Calcium 01/05/2021 8.9  8.5 - 10.5 mg/dL Final   • AST(SGOT) 01/05/2021 21  12 - 45 U/L Final   • ALT(SGPT) 01/05/2021 15  2 - 50 U/L Final   • Alkaline Phosphatase 01/05/2021 81  30 - 99 U/L Final   • Total Bilirubin 01/05/2021 0.5  0.1 - 1.5 mg/dL Final   • Albumin 01/05/2021 4.1  3.2 - 4.9 g/dL Final   • Total Protein 01/05/2021 6.8  6.0 - 8.2 g/dL Final   • Globulin 01/05/2021 2.7  1.9 - 3.5 g/dL Final   • A-G Ratio 01/05/2021 1.5  g/dL Final   • Cholesterol,Tot 01/05/2021 225* 100 - 199 mg/dL Final   • Triglycerides 01/05/2021 125  0 - 149 mg/dL Final   • HDL 01/05/2021 67  >=40 mg/dL Final   • LDL 01/05/2021 133* <100 mg/dL Final   • Fasting Status 01/05/2021 Fasting   Final   • GFR If  01/05/2021 >60  >60 mL/min/1.73 m 2 Final   • GFR If Non  01/05/2021 >60  >60 mL/min/1.73 m 2 Final              The 10-year ASCVD risk score (Patricia AGNES Jr., et al., 2013) is: 9.2%     Assessment/Plan:        1. Closed nondisplaced fracture of left acetabulum, unspecified portion of acetabulum, initial encounter ([facility])  Patient is asking for stronger pain medication to take at night to help manage her pain.  She is not getting good results with over-the-counter Advil PM.  I will put her on tramadol 50 mg and she can start 1 tablet at night and if 1 tablet does not work she can take 2 tablets altogether.  She will continue her physical therapy.  She will keep her follow-up appointment with her orthopedist.  She signed the informed consent and controlled substance treatment agreement.  Prescription was sent electronically to her pharmacy.I have reviewed the medical records, the prescription monitoring program and I have determined that the  controlled prescription medication is medically indicated. I have advised the patient to keep the medication in a safe place and not to drive while taking the medication.    2. Dyslipidemia  Total cholesterol and LDL cholesterol are more elevated than before.  10-year ASCVD risk is at 9.2%.  We discussed starting her on medication because of elevated 10-year ASCVD risk.  She wants to hold off on medication.  She will try to work harder on watching her diet and losing weight.  She has difficulty doing exercises because of her hip problem.    3. Vitamin D deficiency  Vitamin D was normal in March 2020.  Continue current dose of vitamin D supplementation.    4. Osteoporosis of forearm  Continue alendronate until 2023.  Continue calcium and vitamin D supplementation.    5. Elevated blood pressure reading  No prior history of hypertension.  This could be related to her pain in the hip.  She will monitor her blood pressure at home and keep a record.  Advised watching the salt intake.  I will reevaluate in 1 month.  She will bring her blood pressure log at that time.      Please note that this dictation was created using voice recognition software. I have worked with consultants from the vendor as well as technical experts from [employer] to optimize the interface. I have made every reasonable attempt to correct obvious errors, but I expect that there are errors of grammar and possibly content I did not discover before finalizing the note.

## 2021-01-24 ENCOUNTER — TELEPHONE (OUTPATIENT)
Dept: URGENT CARE | Facility: PHYSICIAN GROUP | Age: 66
End: 2021-01-24

## 2021-01-24 ENCOUNTER — OFFICE VISIT (OUTPATIENT)
Dept: URGENT CARE | Facility: PHYSICIAN GROUP | Age: 66
End: 2021-01-24
Payer: MEDICARE

## 2021-01-24 ENCOUNTER — HOSPITAL ENCOUNTER (OUTPATIENT)
Dept: RADIOLOGY | Facility: MEDICAL CENTER | Age: 66
End: 2021-01-24
Attending: NURSE PRACTITIONER
Payer: MEDICARE

## 2021-01-24 VITALS
SYSTOLIC BLOOD PRESSURE: 150 MMHG | TEMPERATURE: 98 F | OXYGEN SATURATION: 99 % | RESPIRATION RATE: 12 BRPM | BODY MASS INDEX: 38.91 KG/M2 | DIASTOLIC BLOOD PRESSURE: 98 MMHG | WEIGHT: 193 LBS | HEIGHT: 59 IN | HEART RATE: 100 BPM

## 2021-01-24 DIAGNOSIS — G44.319 ACUTE POST-TRAUMATIC HEADACHE, NOT INTRACTABLE: ICD-10-CM

## 2021-01-24 DIAGNOSIS — G44.52 NEW DAILY PERSISTENT HEADACHE: ICD-10-CM

## 2021-01-24 PROCEDURE — 70450 CT HEAD/BRAIN W/O DYE: CPT

## 2021-01-24 PROCEDURE — 99214 OFFICE O/P EST MOD 30 MIN: CPT | Performed by: NURSE PRACTITIONER

## 2021-01-24 RX ORDER — SUMATRIPTAN 50 MG/1
50 TABLET, FILM COATED ORAL
Qty: 10 TAB | Refills: 0 | Status: SHIPPED | OUTPATIENT
Start: 2021-01-24 | End: 2021-10-08

## 2021-01-24 ASSESSMENT — ENCOUNTER SYMPTOMS
HEADACHES: 1
LOSS OF CONSCIOUSNESS: 0
SPEECH CHANGE: 0
DIARRHEA: 0
SORE THROAT: 0
FEVER: 0
EYE DISCHARGE: 0
EYE REDNESS: 0
ABDOMINAL PAIN: 0
COUGH: 0
PALPITATIONS: 0
EYE PAIN: 0
NAUSEA: 0
VOMITING: 0
WEAKNESS: 0
SCALP TENDERNESS: 1
DOUBLE VISION: 0
VISUAL CHANGE: 0
BLURRED VISION: 0
PHOTOPHOBIA: 1
NECK PAIN: 0
SENSORY CHANGE: 0
DIZZINESS: 0
TREMORS: 0
LOSS OF BALANCE: 0
ABNORMAL BEHAVIOR: 0
TINGLING: 0
SEIZURES: 0
FOCAL WEAKNESS: 0
SHORTNESS OF BREATH: 0
CHILLS: 0

## 2021-01-24 ASSESSMENT — FIBROSIS 4 INDEX: FIB4 SCORE: 1.19

## 2021-01-24 NOTE — PATIENT INSTRUCTIONS
General Headache Without Cause  A headache is pain or discomfort felt around the head or neck area. The specific cause of a headache may not be found. There are many causes and types of headaches. A few common ones are:  · Tension headaches.  · Migraine headaches.  · Cluster headaches.  · Chronic daily headaches.  Follow these instructions at home:  Watch your condition for any changes. Let your health care provider know about them. Take these steps to help with your condition:  Managing pain         · Take over-the-counter and prescription medicines only as told by your health care provider.  · Lie down in a dark, quiet room when you have a headache.  · If directed, put ice on your head and neck area:  ? Put ice in a plastic bag.  ? Place a towel between your skin and the bag.  ? Leave the ice on for 20 minutes, 2-3 times per day.  · If directed, apply heat to the affected area. Use the heat source that your health care provider recommends, such as a moist heat pack or a heating pad.  ? Place a towel between your skin and the heat source.  ? Leave the heat on for 20-30 minutes.  ? Remove the heat if your skin turns bright red. This is especially important if you are unable to feel pain, heat, or cold. You may have a greater risk of getting burned.  · Keep lights dim if bright lights bother you or make your headaches worse.  Eating and drinking  · Eat meals on a regular schedule.  · If you drink alcohol:  ? Limit how much you use to:  § 0-1 drink a day for women.  § 0-2 drinks a day for men.  ? Be aware of how much alcohol is in your drink. In the [place] one drink equals one 12 oz bottle of beer (355 mL), one 5 oz glass of wine (148 mL), or one 1½ oz glass of hard liquor (44 mL).  · Stop drinking caffeine, or decrease the amount of caffeine you drink.  General instructions    · Keep a headache journal to help find out what may trigger your headaches. For example, write down:  ? What you eat and drink.  ? How much  sleep you get.  ? Any change to your diet or medicines.  · Try massage or other relaxation techniques.  · Limit stress.  · Sit up straight, and do not tense your muscles.  · Do not use any products that contain nicotine or tobacco, such as cigarettes, e-cigarettes, and chewing tobacco. If you need help quitting, ask your health care provider.  · Exercise regularly as told by your health care provider.  · Sleep on a regular schedule. Get 7-9 hours of sleep each night, or the amount recommended by your health care provider.  · Keep all follow-up visits as told by your health care provider. This is important.  Contact a health care provider if:  · Your symptoms are not helped by medicine.  · You have a headache that is different from the usual headache.  · You have nausea or you vomit.  · You have a fever.  Get help right away if:  · Your headache becomes severe quickly.  · Your headache gets worse after moderate to intense physical activity.  · You have repeated vomiting.  · You have a stiff neck.  · You have a loss of vision.  · You have problems with speech.  · You have pain in the eye or ear.  · You have muscular weakness or loss of muscle control.  · You lose your balance or have trouble walking.  · You feel faint or pass out.  · You have confusion.  · You have a seizure.  Summary  · A headache is pain or discomfort felt around the head or neck area.  · There are many causes and types of headaches. In some cases, the cause may not be found.  · Keep a headache journal to help find out what may trigger your headaches. Watch your condition for any changes. Let your health care provider know about them.  · Contact a health care provider if you have a headache that is different from the usual headache, or if your symptoms are not helped by medicine.  · Get help right away if your headache becomes severe, you vomit, you have a loss of vision, you lose your balance, or you have a seizure.  This information is not  intended to replace advice given to you by your health care provider. Make sure you discuss any questions you have with your health care provider.  Document Released: 12/18/2006 Document Revised: 07/08/2019 Document Reviewed: 07/08/2019  [employer]vier Patient Education © 2020 [employer]
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
## 2021-01-25 DIAGNOSIS — I10 ESSENTIAL HYPERTENSION: ICD-10-CM

## 2021-01-25 RX ORDER — LISINOPRIL 10 MG/1
10 TABLET ORAL DAILY
Qty: 30 TAB | Refills: 1 | Status: SHIPPED | OUTPATIENT
Start: 2021-01-25 | End: 2021-02-09 | Stop reason: SDUPTHER

## 2021-02-03 ENCOUNTER — TELEPHONE (OUTPATIENT)
Dept: MEDICAL GROUP | Facility: PHYSICIAN GROUP | Age: 66
End: 2021-02-03

## 2021-02-03 NOTE — TELEPHONE ENCOUNTER
ESTABLISHED PATIENT PRE-VISIT PLANNING     Patient was NOT contacted to complete PVP.     Note: Patient will not be contacted if there is no indication to call.     1.  Reviewed notes from the last few office visits within the medical group: Yes    2.  If any orders were placed at last visit or intended to be done for this visit (i.e. 6 mos follow-up), do we have Results/Consult Notes?         •  Labs - Labs were not ordered at last office visit.  Note: If patient appointment is for lab review and patient did not complete labs, check with provider if OK to reschedule patient until labs completed.       •  Imaging - Imaging was not ordered at last office visit.       •  Referrals - No referrals were ordered at last office visit.    3. Is this appointment scheduled as a Hospital Follow-Up? No    4.  Immunizations were updated in Epic using Reconcile Outside Information activity? Yes    5.  Patient is due for the following Health Maintenance Topics:   Health Maintenance Due   Topic Date Due   • Annual Wellness Visit  1955       - Patient plans to schedule appointment for Annual Wellness Visit (AWV).    6.  AHA (Pulse8) form printed for Provider? N/A

## 2021-02-09 ENCOUNTER — OFFICE VISIT (OUTPATIENT)
Dept: MEDICAL GROUP | Facility: PHYSICIAN GROUP | Age: 66
End: 2021-02-09
Payer: MEDICARE

## 2021-02-09 VITALS
OXYGEN SATURATION: 96 % | BODY MASS INDEX: 39.87 KG/M2 | DIASTOLIC BLOOD PRESSURE: 70 MMHG | SYSTOLIC BLOOD PRESSURE: 120 MMHG | TEMPERATURE: 96.9 F | HEART RATE: 112 BPM | WEIGHT: 197.75 LBS | HEIGHT: 59 IN

## 2021-02-09 DIAGNOSIS — I10 ESSENTIAL HYPERTENSION: ICD-10-CM

## 2021-02-09 DIAGNOSIS — R51.9 NEW ONSET HEADACHE: ICD-10-CM

## 2021-02-09 DIAGNOSIS — S32.402A CLOSED NONDISPLACED FRACTURE OF LEFT ACETABULUM, UNSPECIFIED PORTION OF ACETABULUM, INITIAL ENCOUNTER (HCC): ICD-10-CM

## 2021-02-09 PROCEDURE — 99214 OFFICE O/P EST MOD 30 MIN: CPT | Performed by: FAMILY MEDICINE

## 2021-02-09 RX ORDER — LISINOPRIL 10 MG/1
10 TABLET ORAL DAILY
Qty: 90 TAB | Refills: 1 | Status: SHIPPED | OUTPATIENT
Start: 2021-02-09 | End: 2021-02-11 | Stop reason: SDUPTHER

## 2021-02-09 ASSESSMENT — FIBROSIS 4 INDEX: FIB4 SCORE: 1.19
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
## 2021-02-11 DIAGNOSIS — I10 ESSENTIAL HYPERTENSION: ICD-10-CM

## 2021-02-11 DIAGNOSIS — R25.2 LEG CRAMPS: ICD-10-CM

## 2021-02-11 RX ORDER — LISINOPRIL 10 MG/1
10 TABLET ORAL DAILY
Qty: 90 TABLET | Refills: 1 | Status: SHIPPED | OUTPATIENT
Start: 2021-02-11 | End: 2021-06-21

## 2021-02-11 RX ORDER — GABAPENTIN 600 MG/1
600 TABLET ORAL
Qty: 90 TABLET | Refills: 1 | Status: SHIPPED | OUTPATIENT
Start: 2021-02-11 | End: 2021-04-07

## 2021-02-12 RX ORDER — AMITRIPTYLINE HYDROCHLORIDE 10 MG/1
TABLET, FILM COATED ORAL
Qty: 270 TABLET | Refills: 3 | Status: SHIPPED | OUTPATIENT
Start: 2021-02-12 | End: 2021-03-15

## 2021-02-12 RX ORDER — ALENDRONATE SODIUM 70 MG/1
70 TABLET ORAL
Qty: 12 TABLET | Refills: 3 | Status: SHIPPED | OUTPATIENT
Start: 2021-02-12 | End: 2021-02-24

## 2021-03-03 DIAGNOSIS — Z23 NEED FOR VACCINATION: ICD-10-CM

## 2021-04-01 ENCOUNTER — HOSPITAL ENCOUNTER (OUTPATIENT)
Dept: RADIOLOGY | Facility: MEDICAL CENTER | Age: 66
End: 2021-04-01
Attending: ORTHOPAEDIC SURGERY
Payer: MEDICARE

## 2021-04-01 DIAGNOSIS — Z96.642 PRESENCE OF ARTIFICIAL HIP JOINT, LEFT: ICD-10-CM

## 2021-04-01 PROCEDURE — 73721 MRI JNT OF LWR EXTRE W/O DYE: CPT | Mod: LT,MH

## 2021-04-14 ENCOUNTER — TELEPHONE (OUTPATIENT)
Dept: SLEEP MEDICINE | Facility: MEDICAL CENTER | Age: 66
End: 2021-04-14

## 2021-04-14 DIAGNOSIS — B38.9: ICD-10-CM

## 2021-04-14 DIAGNOSIS — J99: ICD-10-CM

## 2021-04-14 DIAGNOSIS — R06.02 SOB (SHORTNESS OF BREATH): ICD-10-CM

## 2021-04-14 DIAGNOSIS — B38.2 COCCIDIOIDOMYCOSIS, PULMONARY (HCC): ICD-10-CM

## 2021-04-19 ENCOUNTER — HOSPITAL ENCOUNTER (OUTPATIENT)
Dept: RADIOLOGY | Facility: MEDICAL CENTER | Age: 66
End: 2021-04-19
Attending: PHYSICIAN ASSISTANT
Payer: MEDICARE

## 2021-04-19 DIAGNOSIS — B38.9: ICD-10-CM

## 2021-04-19 PROCEDURE — 71046 X-RAY EXAM CHEST 2 VIEWS: CPT

## 2021-04-22 ENCOUNTER — PRE-ADMISSION TESTING (OUTPATIENT)
Dept: ADMISSIONS | Facility: MEDICAL CENTER | Age: 66
End: 2021-04-22
Attending: ORTHOPAEDIC SURGERY
Payer: MEDICARE

## 2021-04-22 DIAGNOSIS — Z01.812 PRE-OPERATIVE LABORATORY EXAMINATION: ICD-10-CM

## 2021-04-22 DIAGNOSIS — Z01.810 PRE-OPERATIVE CARDIOVASCULAR EXAMINATION: ICD-10-CM

## 2021-04-22 LAB
ANION GAP SERPL CALC-SCNC: 11 MMOL/L (ref 7–16)
BASOPHILS # BLD AUTO: 0.9 % (ref 0–1.8)
BASOPHILS # BLD: 0.04 K/UL (ref 0–0.12)
BUN SERPL-MCNC: 20 MG/DL (ref 8–22)
CALCIUM SERPL-MCNC: 9.6 MG/DL (ref 8.5–10.5)
CHLORIDE SERPL-SCNC: 105 MMOL/L (ref 96–112)
CO2 SERPL-SCNC: 25 MMOL/L (ref 20–33)
CREAT SERPL-MCNC: 0.73 MG/DL (ref 0.5–1.4)
EKG IMPRESSION: NORMAL
EOSINOPHIL # BLD AUTO: 0.12 K/UL (ref 0–0.51)
EOSINOPHIL NFR BLD: 2.8 % (ref 0–6.9)
ERYTHROCYTE [DISTWIDTH] IN BLOOD BY AUTOMATED COUNT: 46.5 FL (ref 35.9–50)
GLUCOSE SERPL-MCNC: 96 MG/DL (ref 65–99)
HCT VFR BLD AUTO: 45.8 % (ref 37–47)
HGB BLD-MCNC: 15.4 G/DL (ref 12–16)
IMM GRANULOCYTES # BLD AUTO: 0.01 K/UL (ref 0–0.11)
IMM GRANULOCYTES NFR BLD AUTO: 0.2 % (ref 0–0.9)
LYMPHOCYTES # BLD AUTO: 1.6 K/UL (ref 1–4.8)
LYMPHOCYTES NFR BLD: 37 % (ref 22–41)
MCH RBC QN AUTO: 32.7 PG (ref 27–33)
MCHC RBC AUTO-ENTMCNC: 33.6 G/DL (ref 33.6–35)
MCV RBC AUTO: 97.2 FL (ref 81.4–97.8)
MONOCYTES # BLD AUTO: 0.36 K/UL (ref 0–0.85)
MONOCYTES NFR BLD AUTO: 8.3 % (ref 0–13.4)
NEUTROPHILS # BLD AUTO: 2.2 K/UL (ref 2–7.15)
NEUTROPHILS NFR BLD: 50.8 % (ref 44–72)
NRBC # BLD AUTO: 0 K/UL
NRBC BLD-RTO: 0 /100 WBC
PLATELET # BLD AUTO: 326 K/UL (ref 164–446)
PMV BLD AUTO: 9.6 FL (ref 9–12.9)
POTASSIUM SERPL-SCNC: 4.6 MMOL/L (ref 3.6–5.5)
RBC # BLD AUTO: 4.71 M/UL (ref 4.2–5.4)
SODIUM SERPL-SCNC: 141 MMOL/L (ref 135–145)
WBC # BLD AUTO: 4.3 K/UL (ref 4.8–10.8)

## 2021-04-22 PROCEDURE — 36415 COLL VENOUS BLD VENIPUNCTURE: CPT

## 2021-04-22 PROCEDURE — 87640 STAPH A DNA AMP PROBE: CPT

## 2021-04-22 PROCEDURE — 93005 ELECTROCARDIOGRAM TRACING: CPT

## 2021-04-22 PROCEDURE — 85025 COMPLETE CBC W/AUTO DIFF WBC: CPT

## 2021-04-22 PROCEDURE — 80048 BASIC METABOLIC PNL TOTAL CA: CPT

## 2021-04-22 PROCEDURE — 93010 ELECTROCARDIOGRAM REPORT: CPT | Performed by: INTERNAL MEDICINE

## 2021-04-22 PROCEDURE — 87641 MR-STAPH DNA AMP PROBE: CPT

## 2021-04-22 ASSESSMENT — FIBROSIS 4 INDEX: FIB4 SCORE: 1.19

## 2021-04-22 NOTE — DISCHARGE PLANNING
Per Jazmyne, preadmission's RN, pt declined to speak with case management. She saw linda Price on 912/2019(see note in epic) and had no concerns at this time.

## 2021-04-23 LAB
SCCMEC + MECA PNL NOSE NAA+PROBE: NEGATIVE
SCCMEC + MECA PNL NOSE NAA+PROBE: POSITIVE

## 2021-04-28 ENCOUNTER — NON-PROVIDER VISIT (OUTPATIENT)
Dept: SLEEP MEDICINE | Facility: MEDICAL CENTER | Age: 66
End: 2021-04-28
Attending: PHYSICIAN ASSISTANT
Payer: MEDICARE

## 2021-04-28 ENCOUNTER — OFFICE VISIT (OUTPATIENT)
Dept: SLEEP MEDICINE | Facility: MEDICAL CENTER | Age: 66
End: 2021-04-28
Payer: MEDICARE

## 2021-04-28 VITALS — BODY MASS INDEX: 38.38 KG/M2 | WEIGHT: 190 LBS

## 2021-04-28 VITALS
HEIGHT: 59 IN | WEIGHT: 190 LBS | RESPIRATION RATE: 16 BRPM | BODY MASS INDEX: 38.3 KG/M2 | HEART RATE: 102 BPM | OXYGEN SATURATION: 95 % | SYSTOLIC BLOOD PRESSURE: 112 MMHG | DIASTOLIC BLOOD PRESSURE: 78 MMHG

## 2021-04-28 DIAGNOSIS — K21.9 GASTROESOPHAGEAL REFLUX DISEASE, UNSPECIFIED WHETHER ESOPHAGITIS PRESENT: ICD-10-CM

## 2021-04-28 DIAGNOSIS — J99: ICD-10-CM

## 2021-04-28 DIAGNOSIS — B38.9: ICD-10-CM

## 2021-04-28 DIAGNOSIS — B38.9 COCCIDIOMYCOSIS, PROGRESSIVE: ICD-10-CM

## 2021-04-28 DIAGNOSIS — E66.9 OBESITY (BMI 35.0-39.9 WITHOUT COMORBIDITY): ICD-10-CM

## 2021-04-28 PROCEDURE — 94726 PLETHYSMOGRAPHY LUNG VOLUMES: CPT | Performed by: INTERNAL MEDICINE

## 2021-04-28 PROCEDURE — 94729 DIFFUSING CAPACITY: CPT | Performed by: INTERNAL MEDICINE

## 2021-04-28 PROCEDURE — 99213 OFFICE O/P EST LOW 20 MIN: CPT | Performed by: PHYSICIAN ASSISTANT

## 2021-04-28 PROCEDURE — 94060 EVALUATION OF WHEEZING: CPT | Performed by: INTERNAL MEDICINE

## 2021-04-28 ASSESSMENT — PULMONARY FUNCTION TESTS
FVC_PERCENT_PREDICTED: 112
FEV1/FVC_PERCENT_PREDICTED: 99
FEV1_PERCENT_PREDICTED: 112
FEV1_PERCENT_CHANGE: 0
FEV1/FVC_PERCENT_CHANGE: 3
FVC: 2.74
FEV1_PREDICTED: 1.93
FVC_PERCENT_PREDICTED: 111
FEV1/FVC_PREDICTED: 79
FVC: 2.73
FEV1/FVC_PERCENT_PREDICTED: 100
FEV1/FVC_PERCENT_PREDICTED: 96
FEV1: 2.08
FEV1_LLN: 1.61
FEV1/FVC_PERCENT_LLN: 66
FVC_LLN: 2.05
FEV1/FVC_PERCENT_PREDICTED: 96
FEV1/FVC: 76
FEV1/FVC: 79
FVC_PREDICTED: 2.45
FEV1/FVC: 76
FEV1/FVC: 79.2
FEV1_PERCENT_PREDICTED: 107
FEV1_PERCENT_CHANGE: 4
FEV1/FVC_PERCENT_PREDICTED: 79
FEV1: 2.17

## 2021-04-28 ASSESSMENT — ENCOUNTER SYMPTOMS
PALPITATIONS: 0
HEARTBURN: 1
WHEEZING: 0
INSOMNIA: 0
ROS GI COMMENTS: NO DENTURES, NO DIFFICULTY SWALLOWING
FEVER: 0
WEIGHT LOSS: 0
COUGH: 0
CHILLS: 0
DIZZINESS: 0
SORE THROAT: 0
HEADACHES: 0
SHORTNESS OF BREATH: 0
SINUS PAIN: 0
ORTHOPNEA: 0
SPUTUM PRODUCTION: 0
TREMORS: 0

## 2021-04-28 ASSESSMENT — FIBROSIS 4 INDEX
FIB4 SCORE: 1.11
FIB4 SCORE: 1.11

## 2021-04-28 NOTE — LETTER
RE:  Dee Armstrong    : 1955    To whom it may concern:    Patient seen in pulmonary clinic 21.  She does have a history of coccidiomycosis treated for 1 year with Diflucan with treatment completion in 2016.  She does have a left upper lobe elliptical nodule which has been stable since 2016.  She has no current symptoms.    Chest x-ray obtained 21 demonstrated unchanged nodule left upper lobe possibly within fissure, no active cardiopulmonary disease.  Patient does have an elevated BMI with central adiposity which can impact pulmonary function.     Last pulmonary function test obtained 21 with an FEV1 2.08 L or 107% predicted, FVC of 2.73 L 111% predicted, FEV1/FVC ratio 76, no significant postbronchodilator change.  Residual volume 87% predicted, total lung capacity 106% predicted, DLCO 135% predicted.  Per pulmonologist interpretation normal pulmonary function test, elevated DLCO is nonspecific, does not rule out reactive airway disease. Oxygen saturation was 95% in the clinic on room air.    Regarding upcoming total hip surgery, patient has no absolute contraindications to medically necessary procedure.  Any questions or concerns please contact our office.    Respectfully,         Elda Fitch PA-C

## 2021-04-28 NOTE — PROGRESS NOTES
CC: None    HPI:  Dee Armstrong is a 65 y.o. year old female here today for surgical clearance knee surgery, history of coccidiomycosis.  She is concerned about her breathing in relation to surgery.  She has a remote brief smoking history with quit date 1977 and 2 pack years.    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    Reviewed in clinic vitals including /78, , O2 sat 95% on room air. Patient's body mass index is 38.38 kg/m². Exercise and nutrition counseling were performed at this visit.    Reviewed home medication regimen include lansoprazole, lisinopril-denies dry persistent cough.  She is not using her rescue inhaler.    Reviewed most recent imaging including chest x-ray obtained 4/19/2021 demonstrating normal heart size, elliptical shaped nodule left upper lobe possibly within pulmonary fissure no change, no active disease.    Reviewed most recent pulmonary function test obtained today 4/28/2021 with an FEV1 of 2.08 L or 107% predicted, FVC of 2.73 L 111% predicted, FEV1/FVC ratio 76, no significant postbronchodilator change.  Residual volume 87% predicted, total lung capacity 106% predicted, DLCO 135% predicted.  Per pulmonologist interpretation normal pulmonary function test, elevated DLCO is nonspecific, does not rule out reactive airway disease.    Review of Systems   Constitutional: Negative for chills, fever, malaise/fatigue and weight loss.   HENT: Negative for congestion, hearing loss, nosebleeds, sinus pain, sore throat and tinnitus.    Respiratory: Negative for cough, sputum production, shortness of breath and wheezing.    Cardiovascular: Negative for chest pain, palpitations, orthopnea and leg swelling.   Gastrointestinal: Positive for heartburn (controlled on meds).        No dentures, no difficulty swallowing    Neurological: Negative for  dizziness, tremors and headaches.   Psychiatric/Behavioral: The patient does not have insomnia.        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), medicated for a year, yearly xrays   • Cough 5/29/2015   • Diverticulosis    • GERD (gastroesophageal reflux disease)    • Heart burn    • Hypertension    • Indigestion    • LBP (low back pain)    • Lumbar radicular pain 5/29/2015   • Muscle disorder    • Obesity    • Osteoporosis of forearm     Alendronate started 6/18   • Pain     Shoulder and numbness to R LE, hip       Past Surgical History:   Procedure Laterality Date   • PB TOTAL HIP ARTHROPLASTY Left 9/19/2019    Procedure: ARTHROPLASTY, HIP, TOTAL, ANTERIOR APPROACH;  Surgeon: Clarence Vallejo M.D.;  Location: [facility];  Service: Orthopedics   • INCISION HERNIA REPAIR Right 11/26/2018    Procedure: INCISION HERNIA REPAIR- FLANK WITH MESH;  Surgeon: Misael Ramírez M.D.;  Location: [facility];  Service: General   • HYSTERECTOMY ROBOTIC  6/7/2017    Procedure: HYSTERECTOMY ROBOTIC SI, BILATERAL SALPINGO-OOPHORECTOMY, URETERAL SACRAL LIGAMENT SHORTENING ;  Surgeon: Jerica Hooper M.D.;  Location: [facility];  Service:    • CYSTOSCOPY  6/7/2017    Procedure: CYSTOSCOPY;  Surgeon: Jerica Hooper M.D.;  Location: [facility];  Service:    • SHOULDER DECOMPRESSION ARTHROSCOPIC Right 12/6/2016    Procedure: SHOULDER DECOMPRESSION ARTHROSCOPIC - SUBACROMIAL, MANJARREZ;  Surgeon: Kyle Claros M.D.;  Location: [facility];  Service:    • CLAVICLE DISTAL EXCISION Right 12/6/2016    Procedure: CLAVICLE DISTAL EXCISION;  Surgeon: Kyle Claros M.D.;  Location: [facility];  Service:    • SHOULDER ARTHROSCOPY W/ ROTATOR CUFF REPAIR Right 12/6/2016    Procedure: SHOULDER ARTHROSCOPY W/ ROTATOR CUFF REPAIR ;  Surgeon: Kyle Claros M.D.;  Location:  SURGERY [facility];  Service:    • SHOULDER ARTHROSCOPY W/ BICIPITAL TENODESIS REPAIR Right 12/6/2016    Procedure: SHOULDER ARTHROSCOPY W/ BICIPITAL TENODESIS REPAIR ;  Surgeon: Kyle Claros M.D.;  Location: SURGERY [facility];  Service:    • LUMBAR FUSION POSTERIOR  2/9/2016    Procedure: LUMBAR FUSION POSTERIOR PEDICLE SCREW FIXATION (STAGE #2);  Surgeon: Tim Rodriguez M.D.;  Location: SURGERY [facility];  Service:    • LUMBAR LAMINECTOMY DISKECTOMY  2/9/2016    Procedure: LUMBAR LAMINECTOMY DISKECTOMY MINI OPEN;  Surgeon: Tim Rodriguez M.D.;  Location: SURGERY [facility];  Service:    • EXTREME LATERAL INTERBODY FUSION Right 2/6/2016    Procedure: EXTREME LATERAL INTERBODY FUSION L3-4 (STAGE #1);  Surgeon: Tim Rodriguez M.D.;  Location: SURGERY [facility];  Service:    • RECOVERY  12/8/2015    Procedure: CT-CT GUIDED LEFT LUNG BIOPSY-;  Surgeon: [facility];  Location: SURGERY [facility] PROC UNIT [facility];  Service:    • REDDY BY LAPAROSCOPY  4/17/2014    Performed by Ankur Lerner M.D. at SURGERY SAME DAY [facility]   • EGD WITH ASP/BX  2/4/2014    mild chronic inactive gastritis, scar gastric antrum-   • EGD WITH ASP/BX  9/29/2011    possible barrettes, hiatal hernia, gastritis   • COLONOSCOPY  9/29/2011    diverticulosis sigmoid colon, hemorrhoids   • ARTHROTOMY  9/3/2010    Performed by YARELY MORRISON at SURGERY SAME DAY [facility] ORS   • ARTHRODESIS  9/3/2010    Performed by YARELY MORRISON at SURGERY SAME DAY [facility] ORS   • ORTHOPEDIC OSTEOTOMY  9/3/2010    Performed by YARELY MORRISON at SURGERY SAME DAY [facility] ORS   • BONE SPUR EXCISION  9/3/2010    Performed by YARELY MORRISON at SURGERY SAME DAY [facility] ORS   • OTHER ABDOMINAL SURGERY  2000    COLON RESECTION   • LAMINOTOMY     • OTHER ORTHOPEDIC SURGERY      PARDEEP CARPAL TUNNEL RELEASES       Family History   Problem Relation Age of Onset   • Cancer Mother         lymphoma   • Stroke Father    •  "Diabetes Father        Social History     Socioeconomic History   • Marital status:      Spouse name: Not on file   • Number of children: Not on file   • Years of education: Not on file   • Highest education level: Not on file   Occupational History   • Not on file   Tobacco Use   • Smoking status: Former Smoker     Years: 2.00     Types: Cigarettes     Quit date: 1977     Years since quittin.3   • Smokeless tobacco: Never Used   Substance and Sexual Activity   • Alcohol use: Not Currently     Alcohol/week: 0.0 oz   • Drug use: No   • Sexual activity: Yes     Partners: Male     Comment:    Other Topics Concern   • Not on file   Social History Narrative   • Not on file     Social Determinants of Health     Financial Resource Strain:    • Difficulty of Paying Living Expenses:    Food Insecurity:    • Worried About Running Out of Food in the Last Year:    • Ran Out of Food in the Last Year:    Transportation Needs:    • Lack of Transportation (Medical):    • Lack of Transportation (Non-Medical):    Physical Activity:    • Days of Exercise per Week:    • Minutes of Exercise per Session:    Stress:    • Feeling of Stress :    Social Connections:    • Frequency of Communication with Friends and Family:    • Frequency of Social Gatherings with Friends and Family:    • Attends Muslim Services:    • Active Member of Clubs or Organizations:    • Attends Club or Organization Meetings:    • Marital Status:    Intimate Partner Violence:    • Fear of Current or Ex-Partner:    • Emotionally Abused:    • Physically Abused:    • Sexually Abused:        Allergies as of 2021   • (No Known Allergies)        @Vital signs for this encounter:  Vitals:    21 1241   Height: 1.499 m (4' 11\")   Weight: 86.2 kg (190 lb)   Weight % change since last entry.: 0 %   BP: 112/78   Pulse: (!) 102   BMI (Calculated): 38.38   Resp: 16       Current medications as of today   Current Outpatient Medications "   Medication Sig Dispense Refill   • gabapentin (NEURONTIN) 600 MG tablet TAKE 1 TABLET BY MOUTH EVERY DAY IN THE EVENING 90 tablet 1   • amitriptyline (ELAVIL) 10 MG Tab TAKE 3 TABS BY MOUTH AT BEDTIME AS NEEDED. 270 tablet 1   • alendronate (FOSAMAX) 70 MG Tab TAKE 1 TABLET BY MOUTH EVERY 7 DAYS 12 tablet 1   • lisinopril (PRINIVIL) 10 MG Tab Take 1 tablet by mouth every day. 90 tablet 1   • asa/apap/caffeine (EXCEDRIN) 250-250-65 MG Tab Take 1 Tab by mouth every 6 hours as needed for Headache.     • Calcium Carbonate 1500 (600 Ca) MG Tab 1 Tab.     • estradiol (ESTRACE) 0.1 MG/GM vaginal cream estradiol 0.1 mg/gm crea     • Multiple Vitamin (MULTI VITAMIN DAILY PO) Take  by mouth.     • meloxicam (MOBIC) 15 MG tablet Take 1 Tab by mouth every day. 30 Tab 2   • lansoprazole (PREVACID) 30 MG CAPSULE DELAYED RELEASE TAKE 1 CAPSULE BY MOUTH  DAILY 90 Cap 3   • Cholecalciferol (VITAMIN D3) 2000 UNIT Cap Take  by mouth.     • clobetasol (TEMOVATE) 0.05 % Cream Apply 1 Application to affected area(s) as needed (Apply's vaginal). Apply's vaginal  Indications: Inflammation  1   • ascorbic acid (ASCORBIC ACID) 500 MG Tab Take 1,000 mg by mouth every day.     • SUMAtriptan (IMITREX) 50 MG Tab Take 1 Tab by mouth one time as needed for Migraine for up to 1 dose. May repeat dose X 1 if no improvement after 2 hours. 10 Tab 0     No current facility-administered medications for this visit.         Physical Exam:   Gen:           Alert and oriented, No apparent distress. Mood and affect appropriate, normal interaction with provider.  Eyes:          sclere white, conjunctive moist.  Hearing:     Grossly intact.  Dentition:    Fair dentition.  Oropharynx:   Tongue normal, posterior pharynx without erythema or exudate.  Neck:        Supple, trachea midline, no masses.  Respiratory Effort: No intercostal retractions or use of accessory muscles.   Lung Auscultation:      Clear to auscultation bilaterally; no rales, rhonchi or  wheezing.  CV:            Regular rate and rhythm. No edema. No murmurs, rubs or gallops.  Digits, Nails, Ext: No clubbing, cyanosis, petechiae, or nodes.   Skin:        No rashes, lesions or ulcers noted on exposed skin surfaces.                     Assessment:  1. Coccidiomycosis, progressive     2. Obesity (BMI 35.0-39.9 without comorbidity)     3. Gastroesophageal reflux disease, unspecified whether esophagitis present         Immunizations:    Flu: 9/3/2020  Pneumovax 23: 6/18/2018  Prevnar 13: 1/3/2017    Plan:  65 y.o. year old female here today for surgical clearance hip arthroscopy, history of coccidiomycosis.  She is concerned about her breathing in relation to surgery.  She has a remote brief smoking history with quit date 1977 and 2 pack years.    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    Coccidiomycosis: Status post treatment, stable pulmonary function testing.    Reviewed in clinic vitals including /78, , O2 sat 95% on room air. Patient's body mass index is 38.38 kg/m². Exercise and nutrition counseling were performed at this visit.    Elevated BMI:  Exercise and nutrition counseling were performed at this visit.    Reviewed home medication regimen include lansoprazole, lisinopril-denies dry persistent cough.  She is not using her rescue inhaler.    GERD: Symptoms managed on lansoprazole.    Patient did not require O2 in clinic with ambulation.     She had her Covid vaccine x2, continue COVID-19 precautions including wearing mask in public, social distancing, frequent handwashing and annual flu shot.    Follow-up as needed.    This dictation was created using voice recognition software. The accuracy of the dictation is limited to the abilities of the software. I expect there may be some errors of grammar and possibly content.

## 2021-04-28 NOTE — PROCEDURES
Technician: RASHEEDA Ayala    Technician Comment:  Good patient effort & cooperation.  The results of this test meet the ATS/ERS standards for acceptability & reproducibility.  Test was performed on the [employer] Body Plethysmograph-Elite DX system.  Predicted values were GLI-2012 for spirometry, GLI-2017 for DLCO, ITS for Lung Volumes.  The DLCO was uncorrected for Hgb.  A bronchodilator of Ventolin HFA -2puffs via spacer administered.  DLCO performed during dilation period.    Interpretation:  Baseline spirometry shows normal airflows.  No significant bronchodilator response.  Lung volumes are within normal limits.  Diffusion capacity is elevated at 1 her 35% predicted.  Normal pulmonary function testing with normal flow volume loop.  Elevated DLCO is nonspecific but not necessarily pathologic.  This does not rule out reactive airways disease.  Suggest clinical correlation.

## 2021-05-05 ENCOUNTER — PATIENT MESSAGE (OUTPATIENT)
Dept: SLEEP MEDICINE | Facility: MEDICAL CENTER | Age: 66
End: 2021-05-05

## 2021-05-06 NOTE — TELEPHONE ENCOUNTER
From: Dee Armstrong  To: Physician Assistant Elda Fitch  Sent: 5/5/2021 5:48 PM PDT  Subject: Test Result Question    As of this afternoon ESTELITA (Dr. Hogue) has not rec'd the my Surgical Clearance Letter. Could it be faxed asa to [phone]. Could I also have a copy email to me. My email is: [email or web address] I did call and left a message with the Pulmonary.    Thank you,  Dee Armstrong

## 2021-05-10 ENCOUNTER — PRE-ADMISSION TESTING (OUTPATIENT)
Dept: ADMISSIONS | Facility: MEDICAL CENTER | Age: 66
End: 2021-05-10
Attending: ORTHOPAEDIC SURGERY
Payer: MEDICARE

## 2021-05-10 DIAGNOSIS — Z01.812 PRE-OPERATIVE LABORATORY EXAMINATION: ICD-10-CM

## 2021-05-10 LAB
COVID ORDER STATUS COVID19: NORMAL
SARS-COV-2 RNA RESP QL NAA+PROBE: NOTDETECTED
SPECIMEN SOURCE: NORMAL

## 2021-05-10 PROCEDURE — U0005 INFEC AGEN DETEC AMPLI PROBE: HCPCS

## 2021-05-10 PROCEDURE — C9803 HOPD COVID-19 SPEC COLLECT: HCPCS

## 2021-05-10 PROCEDURE — U0003 INFECTIOUS AGENT DETECTION BY NUCLEIC ACID (DNA OR RNA); SEVERE ACUTE RESPIRATORY SYNDROME CORONAVIRUS 2 (SARS-COV-2) (CORONAVIRUS DISEASE [COVID-19]), AMPLIFIED PROBE TECHNIQUE, MAKING USE OF HIGH THROUGHPUT TECHNOLOGIES AS DESCRIBED BY CMS-2020-01-R: HCPCS

## 2021-05-13 ENCOUNTER — ANESTHESIA EVENT (OUTPATIENT)
Dept: SURGERY | Facility: MEDICAL CENTER | Age: 66
End: 2021-05-13
Payer: MEDICARE

## 2021-05-13 ENCOUNTER — ANESTHESIA (OUTPATIENT)
Dept: SURGERY | Facility: MEDICAL CENTER | Age: 66
End: 2021-05-13
Payer: MEDICARE

## 2021-05-13 ENCOUNTER — APPOINTMENT (OUTPATIENT)
Dept: RADIOLOGY | Facility: MEDICAL CENTER | Age: 66
End: 2021-05-13
Attending: ORTHOPAEDIC SURGERY
Payer: MEDICARE

## 2021-05-13 ENCOUNTER — HOSPITAL ENCOUNTER (OUTPATIENT)
Facility: MEDICAL CENTER | Age: 66
End: 2021-05-14
Attending: ORTHOPAEDIC SURGERY | Admitting: ORTHOPAEDIC SURGERY
Payer: MEDICARE

## 2021-05-13 DIAGNOSIS — G89.18 POSTOPERATIVE PAIN: ICD-10-CM

## 2021-05-13 PROCEDURE — C1713 ANCHOR/SCREW BN/BN,TIS/BN: HCPCS | Performed by: ORTHOPAEDIC SURGERY

## 2021-05-13 PROCEDURE — 700101 HCHG RX REV CODE 250: Performed by: ANESTHESIOLOGY

## 2021-05-13 PROCEDURE — 700111 HCHG RX REV CODE 636 W/ 250 OVERRIDE (IP): Performed by: ANESTHESIOLOGY

## 2021-05-13 PROCEDURE — 160009 HCHG ANES TIME/MIN: Performed by: ORTHOPAEDIC SURGERY

## 2021-05-13 PROCEDURE — 96365 THER/PROPH/DIAG IV INF INIT: CPT

## 2021-05-13 PROCEDURE — 700101 HCHG RX REV CODE 250: Performed by: ORTHOPAEDIC SURGERY

## 2021-05-13 PROCEDURE — A9270 NON-COVERED ITEM OR SERVICE: HCPCS | Performed by: ORTHOPAEDIC SURGERY

## 2021-05-13 PROCEDURE — G0378 HOSPITAL OBSERVATION PER HR: HCPCS

## 2021-05-13 PROCEDURE — A9270 NON-COVERED ITEM OR SERVICE: HCPCS | Performed by: ANESTHESIOLOGY

## 2021-05-13 PROCEDURE — C1776 JOINT DEVICE (IMPLANTABLE): HCPCS | Performed by: ORTHOPAEDIC SURGERY

## 2021-05-13 PROCEDURE — 160036 HCHG PACU - EA ADDL 30 MINS PHASE I: Performed by: ORTHOPAEDIC SURGERY

## 2021-05-13 PROCEDURE — 700105 HCHG RX REV CODE 258: Performed by: ORTHOPAEDIC SURGERY

## 2021-05-13 PROCEDURE — 160035 HCHG PACU - 1ST 60 MINS PHASE I: Performed by: ORTHOPAEDIC SURGERY

## 2021-05-13 PROCEDURE — 160042 HCHG SURGERY MINUTES - EA ADDL 1 MIN LEVEL 5: Performed by: ORTHOPAEDIC SURGERY

## 2021-05-13 PROCEDURE — 700102 HCHG RX REV CODE 250 W/ 637 OVERRIDE(OP): Performed by: ORTHOPAEDIC SURGERY

## 2021-05-13 PROCEDURE — 700111 HCHG RX REV CODE 636 W/ 250 OVERRIDE (IP): Performed by: ORTHOPAEDIC SURGERY

## 2021-05-13 PROCEDURE — 72170 X-RAY EXAM OF PELVIS: CPT

## 2021-05-13 PROCEDURE — 502240 HCHG MISC OR SUPPLY RC 0272: Performed by: ORTHOPAEDIC SURGERY

## 2021-05-13 PROCEDURE — 160002 HCHG RECOVERY MINUTES (STAT): Performed by: ORTHOPAEDIC SURGERY

## 2021-05-13 PROCEDURE — 160048 HCHG OR STATISTICAL LEVEL 1-5: Performed by: ORTHOPAEDIC SURGERY

## 2021-05-13 PROCEDURE — 501838 HCHG SUTURE GENERAL: Performed by: ORTHOPAEDIC SURGERY

## 2021-05-13 PROCEDURE — 160031 HCHG SURGERY MINUTES - 1ST 30 MINS LEVEL 5: Performed by: ORTHOPAEDIC SURGERY

## 2021-05-13 PROCEDURE — 110371 HCHG SHELL REV 272: Performed by: ORTHOPAEDIC SURGERY

## 2021-05-13 PROCEDURE — 700102 HCHG RX REV CODE 250 W/ 637 OVERRIDE(OP): Performed by: ANESTHESIOLOGY

## 2021-05-13 PROCEDURE — 502000 HCHG MISC OR IMPLANTS RC 0278: Performed by: ORTHOPAEDIC SURGERY

## 2021-05-13 PROCEDURE — 502578 HCHG PACK, TOTAL HIP: Performed by: ORTHOPAEDIC SURGERY

## 2021-05-13 DEVICE — IMPLANTABLE DEVICE: Type: IMPLANTABLE DEVICE | Site: HIP | Status: FUNCTIONAL

## 2021-05-13 DEVICE — IMPLANT OXINIUM FEM HD 12/14 28MM +4 (1EA): Type: IMPLANTABLE DEVICE | Site: HIP | Status: FUNCTIONAL

## 2021-05-13 DEVICE — BEADS STIMULAN CALCIUM SULFATE: Type: IMPLANTABLE DEVICE | Site: HIP | Status: FUNCTIONAL

## 2021-05-13 RX ORDER — HYDROMORPHONE HYDROCHLORIDE 1 MG/ML
0.1 INJECTION, SOLUTION INTRAMUSCULAR; INTRAVENOUS; SUBCUTANEOUS
Status: DISCONTINUED | OUTPATIENT
Start: 2021-05-13 | End: 2021-05-13 | Stop reason: HOSPADM

## 2021-05-13 RX ORDER — AMOXICILLIN 250 MG
1 CAPSULE ORAL NIGHTLY
Status: DISCONTINUED | OUTPATIENT
Start: 2021-05-13 | End: 2021-05-14 | Stop reason: HOSPADM

## 2021-05-13 RX ORDER — HYDROMORPHONE HYDROCHLORIDE 1 MG/ML
0.4 INJECTION, SOLUTION INTRAMUSCULAR; INTRAVENOUS; SUBCUTANEOUS
Status: DISCONTINUED | OUTPATIENT
Start: 2021-05-13 | End: 2021-05-13 | Stop reason: HOSPADM

## 2021-05-13 RX ORDER — METOPROLOL TARTRATE 1 MG/ML
1 INJECTION, SOLUTION INTRAVENOUS
Status: DISCONTINUED | OUTPATIENT
Start: 2021-05-13 | End: 2021-05-13 | Stop reason: HOSPADM

## 2021-05-13 RX ORDER — SCOLOPAMINE TRANSDERMAL SYSTEM 1 MG/1
1 PATCH, EXTENDED RELEASE TRANSDERMAL ONCE
Status: DISCONTINUED | OUTPATIENT
Start: 2021-05-13 | End: 2021-05-14 | Stop reason: HOSPADM

## 2021-05-13 RX ORDER — ACETAMINOPHEN 325 MG/1
650 TABLET ORAL EVERY 6 HOURS
Status: DISCONTINUED | OUTPATIENT
Start: 2021-05-13 | End: 2021-05-14 | Stop reason: HOSPADM

## 2021-05-13 RX ORDER — MIDAZOLAM HYDROCHLORIDE 1 MG/ML
1 INJECTION INTRAMUSCULAR; INTRAVENOUS
Status: DISCONTINUED | OUTPATIENT
Start: 2021-05-13 | End: 2021-05-13 | Stop reason: HOSPADM

## 2021-05-13 RX ORDER — HYDROMORPHONE HYDROCHLORIDE 2 MG/ML
INJECTION, SOLUTION INTRAMUSCULAR; INTRAVENOUS; SUBCUTANEOUS PRN
Status: DISCONTINUED | OUTPATIENT
Start: 2021-05-13 | End: 2021-05-13 | Stop reason: SURG

## 2021-05-13 RX ORDER — OXYCODONE HCL 5 MG/5 ML
10 SOLUTION, ORAL ORAL
Status: COMPLETED | OUTPATIENT
Start: 2021-05-13 | End: 2021-05-13

## 2021-05-13 RX ORDER — OXYCODONE HYDROCHLORIDE 5 MG/1
5 TABLET ORAL
Status: DISCONTINUED | OUTPATIENT
Start: 2021-05-13 | End: 2021-05-14 | Stop reason: HOSPADM

## 2021-05-13 RX ORDER — LIDOCAINE HYDROCHLORIDE 20 MG/ML
INJECTION, SOLUTION EPIDURAL; INFILTRATION; INTRACAUDAL; PERINEURAL PRN
Status: DISCONTINUED | OUTPATIENT
Start: 2021-05-13 | End: 2021-05-13 | Stop reason: SURG

## 2021-05-13 RX ORDER — AMITRIPTYLINE HYDROCHLORIDE 10 MG/1
30 TABLET, FILM COATED ORAL NIGHTLY PRN
Status: DISCONTINUED | OUTPATIENT
Start: 2021-05-13 | End: 2021-05-14 | Stop reason: HOSPADM

## 2021-05-13 RX ORDER — TOBRAMYCIN 1.2 G/30ML
INJECTION, POWDER, LYOPHILIZED, FOR SOLUTION INTRAVENOUS
Status: DISCONTINUED | OUTPATIENT
Start: 2021-05-13 | End: 2021-05-13 | Stop reason: HOSPADM

## 2021-05-13 RX ORDER — SODIUM CHLORIDE, SODIUM LACTATE, POTASSIUM CHLORIDE, CALCIUM CHLORIDE 600; 310; 30; 20 MG/100ML; MG/100ML; MG/100ML; MG/100ML
INJECTION, SOLUTION INTRAVENOUS CONTINUOUS
Status: ACTIVE | OUTPATIENT
Start: 2021-05-13 | End: 2021-05-13

## 2021-05-13 RX ORDER — DEXAMETHASONE SODIUM PHOSPHATE 4 MG/ML
INJECTION, SOLUTION INTRA-ARTICULAR; INTRALESIONAL; INTRAMUSCULAR; INTRAVENOUS; SOFT TISSUE PRN
Status: DISCONTINUED | OUTPATIENT
Start: 2021-05-13 | End: 2021-05-13 | Stop reason: SURG

## 2021-05-13 RX ORDER — HALOPERIDOL 5 MG/ML
1 INJECTION INTRAMUSCULAR
Status: DISCONTINUED | OUTPATIENT
Start: 2021-05-13 | End: 2021-05-13 | Stop reason: HOSPADM

## 2021-05-13 RX ORDER — SUMATRIPTAN 50 MG/1
50 TABLET, FILM COATED ORAL
Status: DISCONTINUED | OUTPATIENT
Start: 2021-05-13 | End: 2021-05-13

## 2021-05-13 RX ORDER — CEFAZOLIN SODIUM 1 G/3ML
INJECTION, POWDER, FOR SOLUTION INTRAMUSCULAR; INTRAVENOUS PRN
Status: DISCONTINUED | OUTPATIENT
Start: 2021-05-13 | End: 2021-05-13 | Stop reason: SURG

## 2021-05-13 RX ORDER — BISACODYL 10 MG
10 SUPPOSITORY, RECTAL RECTAL
Status: DISCONTINUED | OUTPATIENT
Start: 2021-05-13 | End: 2021-05-14 | Stop reason: HOSPADM

## 2021-05-13 RX ORDER — SODIUM CHLORIDE 9 MG/ML
INJECTION, SOLUTION INTRAMUSCULAR; INTRAVENOUS; SUBCUTANEOUS
Status: DISCONTINUED | OUTPATIENT
Start: 2021-05-13 | End: 2021-05-13 | Stop reason: HOSPADM

## 2021-05-13 RX ORDER — HYDROMORPHONE HYDROCHLORIDE 1 MG/ML
0.2 INJECTION, SOLUTION INTRAMUSCULAR; INTRAVENOUS; SUBCUTANEOUS
Status: DISCONTINUED | OUTPATIENT
Start: 2021-05-13 | End: 2021-05-13 | Stop reason: HOSPADM

## 2021-05-13 RX ORDER — OXYCODONE HCL 5 MG/5 ML
5 SOLUTION, ORAL ORAL
Status: COMPLETED | OUTPATIENT
Start: 2021-05-13 | End: 2021-05-13

## 2021-05-13 RX ORDER — HYDROMORPHONE HYDROCHLORIDE 1 MG/ML
0.25 INJECTION, SOLUTION INTRAMUSCULAR; INTRAVENOUS; SUBCUTANEOUS
Status: DISCONTINUED | OUTPATIENT
Start: 2021-05-13 | End: 2021-05-14 | Stop reason: HOSPADM

## 2021-05-13 RX ORDER — CEFAZOLIN SODIUM 2 G/100ML
2 INJECTION, SOLUTION INTRAVENOUS ONCE
Status: DISCONTINUED | OUTPATIENT
Start: 2021-05-13 | End: 2021-05-13 | Stop reason: HOSPADM

## 2021-05-13 RX ORDER — DOCUSATE SODIUM 100 MG/1
100 CAPSULE, LIQUID FILLED ORAL 2 TIMES DAILY
Status: DISCONTINUED | OUTPATIENT
Start: 2021-05-13 | End: 2021-05-14 | Stop reason: HOSPADM

## 2021-05-13 RX ORDER — TRANEXAMIC ACID 100 MG/ML
INJECTION, SOLUTION INTRAVENOUS PRN
Status: DISCONTINUED | OUTPATIENT
Start: 2021-05-13 | End: 2021-05-13 | Stop reason: SURG

## 2021-05-13 RX ORDER — ENEMA 19; 7 G/133ML; G/133ML
1 ENEMA RECTAL
Status: DISCONTINUED | OUTPATIENT
Start: 2021-05-13 | End: 2021-05-14 | Stop reason: HOSPADM

## 2021-05-13 RX ORDER — SODIUM CHLORIDE, SODIUM LACTATE, POTASSIUM CHLORIDE, CALCIUM CHLORIDE 600; 310; 30; 20 MG/100ML; MG/100ML; MG/100ML; MG/100ML
INJECTION, SOLUTION INTRAVENOUS CONTINUOUS
Status: DISCONTINUED | OUTPATIENT
Start: 2021-05-13 | End: 2021-05-13 | Stop reason: HOSPADM

## 2021-05-13 RX ORDER — AMOXICILLIN 250 MG
1 CAPSULE ORAL
Status: DISCONTINUED | OUTPATIENT
Start: 2021-05-13 | End: 2021-05-14 | Stop reason: HOSPADM

## 2021-05-13 RX ORDER — KETOROLAC TROMETHAMINE 30 MG/ML
INJECTION, SOLUTION INTRAMUSCULAR; INTRAVENOUS PRN
Status: DISCONTINUED | OUTPATIENT
Start: 2021-05-13 | End: 2021-05-13 | Stop reason: SURG

## 2021-05-13 RX ORDER — ROCURONIUM BROMIDE 10 MG/ML
INJECTION, SOLUTION INTRAVENOUS PRN
Status: DISCONTINUED | OUTPATIENT
Start: 2021-05-13 | End: 2021-05-13 | Stop reason: SURG

## 2021-05-13 RX ORDER — POLYETHYLENE GLYCOL 3350 17 G/17G
1 POWDER, FOR SOLUTION ORAL 2 TIMES DAILY PRN
Status: DISCONTINUED | OUTPATIENT
Start: 2021-05-13 | End: 2021-05-14 | Stop reason: HOSPADM

## 2021-05-13 RX ORDER — DIPHENHYDRAMINE HYDROCHLORIDE 50 MG/ML
25 INJECTION INTRAMUSCULAR; INTRAVENOUS EVERY 6 HOURS PRN
Status: DISCONTINUED | OUTPATIENT
Start: 2021-05-13 | End: 2021-05-14 | Stop reason: HOSPADM

## 2021-05-13 RX ORDER — SCOLOPAMINE TRANSDERMAL SYSTEM 1 MG/1
1 PATCH, EXTENDED RELEASE TRANSDERMAL
Status: DISCONTINUED | OUTPATIENT
Start: 2021-05-13 | End: 2021-05-14 | Stop reason: HOSPADM

## 2021-05-13 RX ORDER — DIPHENHYDRAMINE HYDROCHLORIDE 50 MG/ML
12.5 INJECTION INTRAMUSCULAR; INTRAVENOUS
Status: DISCONTINUED | OUTPATIENT
Start: 2021-05-13 | End: 2021-05-13 | Stop reason: HOSPADM

## 2021-05-13 RX ORDER — KETOROLAC TROMETHAMINE 30 MG/ML
INJECTION, SOLUTION INTRAMUSCULAR; INTRAVENOUS
Status: DISCONTINUED | OUTPATIENT
Start: 2021-05-13 | End: 2021-05-13 | Stop reason: HOSPADM

## 2021-05-13 RX ORDER — ONDANSETRON 2 MG/ML
4 INJECTION INTRAMUSCULAR; INTRAVENOUS
Status: DISCONTINUED | OUTPATIENT
Start: 2021-05-13 | End: 2021-05-13 | Stop reason: HOSPADM

## 2021-05-13 RX ORDER — OXYCODONE HCL 10 MG/1
10 TABLET, FILM COATED, EXTENDED RELEASE ORAL ONCE
Status: COMPLETED | OUTPATIENT
Start: 2021-05-13 | End: 2021-05-13

## 2021-05-13 RX ORDER — LISINOPRIL 10 MG/1
10 TABLET ORAL DAILY
Status: DISCONTINUED | OUTPATIENT
Start: 2021-05-14 | End: 2021-05-14 | Stop reason: HOSPADM

## 2021-05-13 RX ORDER — CELECOXIB 200 MG/1
200 CAPSULE ORAL ONCE
Status: COMPLETED | OUTPATIENT
Start: 2021-05-13 | End: 2021-05-13

## 2021-05-13 RX ORDER — HALOPERIDOL 5 MG/ML
1 INJECTION INTRAMUSCULAR EVERY 6 HOURS PRN
Status: DISCONTINUED | OUTPATIENT
Start: 2021-05-13 | End: 2021-05-14 | Stop reason: HOSPADM

## 2021-05-13 RX ORDER — LABETALOL HYDROCHLORIDE 5 MG/ML
5 INJECTION, SOLUTION INTRAVENOUS
Status: DISCONTINUED | OUTPATIENT
Start: 2021-05-13 | End: 2021-05-13 | Stop reason: HOSPADM

## 2021-05-13 RX ORDER — OXYCODONE HYDROCHLORIDE 5 MG/1
2.5 TABLET ORAL
Status: DISCONTINUED | OUTPATIENT
Start: 2021-05-13 | End: 2021-05-14 | Stop reason: HOSPADM

## 2021-05-13 RX ORDER — GABAPENTIN 300 MG/1
300 CAPSULE ORAL ONCE
Status: COMPLETED | OUTPATIENT
Start: 2021-05-13 | End: 2021-05-13

## 2021-05-13 RX ORDER — ACETAMINOPHEN 325 MG/1
650 TABLET ORAL 2 TIMES DAILY PRN
Status: DISCONTINUED | OUTPATIENT
Start: 2021-05-13 | End: 2021-05-14 | Stop reason: HOSPADM

## 2021-05-13 RX ORDER — ACETAMINOPHEN 325 MG/1
650 TABLET ORAL EVERY 6 HOURS PRN
Status: DISCONTINUED | OUTPATIENT
Start: 2021-05-18 | End: 2021-05-14 | Stop reason: HOSPADM

## 2021-05-13 RX ORDER — CEFAZOLIN SODIUM 2 G/100ML
2 INJECTION, SOLUTION INTRAVENOUS ONCE
Status: COMPLETED | OUTPATIENT
Start: 2021-05-13 | End: 2021-05-13

## 2021-05-13 RX ORDER — MIDAZOLAM HYDROCHLORIDE 1 MG/ML
INJECTION INTRAMUSCULAR; INTRAVENOUS PRN
Status: DISCONTINUED | OUTPATIENT
Start: 2021-05-13 | End: 2021-05-13 | Stop reason: SURG

## 2021-05-13 RX ORDER — ONDANSETRON 2 MG/ML
4 INJECTION INTRAMUSCULAR; INTRAVENOUS EVERY 4 HOURS PRN
Status: DISCONTINUED | OUTPATIENT
Start: 2021-05-13 | End: 2021-05-14 | Stop reason: HOSPADM

## 2021-05-13 RX ORDER — BUPIVACAINE HYDROCHLORIDE 2.5 MG/ML
INJECTION, SOLUTION EPIDURAL; INFILTRATION; INTRACAUDAL
Status: DISCONTINUED | OUTPATIENT
Start: 2021-05-13 | End: 2021-05-13 | Stop reason: HOSPADM

## 2021-05-13 RX ORDER — ACETAMINOPHEN 325 MG/1
650 TABLET ORAL 2 TIMES DAILY PRN
COMMUNITY
End: 2022-08-30

## 2021-05-13 RX ORDER — ONDANSETRON 2 MG/ML
INJECTION INTRAMUSCULAR; INTRAVENOUS PRN
Status: DISCONTINUED | OUTPATIENT
Start: 2021-05-13 | End: 2021-05-13 | Stop reason: SURG

## 2021-05-13 RX ORDER — OMEPRAZOLE 20 MG/1
20 CAPSULE, DELAYED RELEASE ORAL DAILY
Status: DISCONTINUED | OUTPATIENT
Start: 2021-05-14 | End: 2021-05-14 | Stop reason: HOSPADM

## 2021-05-13 RX ORDER — MAGNESIUM HYDROXIDE 1200 MG/15ML
LIQUID ORAL
Status: COMPLETED | OUTPATIENT
Start: 2021-05-13 | End: 2021-05-13

## 2021-05-13 RX ORDER — MEPERIDINE HYDROCHLORIDE 25 MG/ML
12.5 INJECTION INTRAMUSCULAR; INTRAVENOUS; SUBCUTANEOUS
Status: DISCONTINUED | OUTPATIENT
Start: 2021-05-13 | End: 2021-05-13 | Stop reason: HOSPADM

## 2021-05-13 RX ORDER — HYDRALAZINE HYDROCHLORIDE 20 MG/ML
5 INJECTION INTRAMUSCULAR; INTRAVENOUS
Status: DISCONTINUED | OUTPATIENT
Start: 2021-05-13 | End: 2021-05-13 | Stop reason: HOSPADM

## 2021-05-13 RX ORDER — MORPHINE SULFATE 0.5 MG/ML
INJECTION, SOLUTION EPIDURAL; INTRATHECAL; INTRAVENOUS
Status: DISCONTINUED | OUTPATIENT
Start: 2021-05-13 | End: 2021-05-13 | Stop reason: HOSPADM

## 2021-05-13 RX ORDER — VANCOMYCIN HYDROCHLORIDE 1 G/20ML
INJECTION, POWDER, LYOPHILIZED, FOR SOLUTION INTRAVENOUS
Status: COMPLETED | OUTPATIENT
Start: 2021-05-13 | End: 2021-05-13

## 2021-05-13 RX ORDER — ACETAMINOPHEN 500 MG
1000 TABLET ORAL ONCE
Status: COMPLETED | OUTPATIENT
Start: 2021-05-13 | End: 2021-05-13

## 2021-05-13 RX ADMIN — CEFAZOLIN 2 G: 330 INJECTION, POWDER, FOR SOLUTION INTRAMUSCULAR; INTRAVENOUS at 10:24

## 2021-05-13 RX ADMIN — FENTANYL CITRATE 50 MCG: 50 INJECTION, SOLUTION INTRAMUSCULAR; INTRAVENOUS at 13:05

## 2021-05-13 RX ADMIN — KETOROLAC TROMETHAMINE 30 MG: 30 INJECTION, SOLUTION INTRAMUSCULAR at 11:49

## 2021-05-13 RX ADMIN — DOCUSATE SODIUM 50 MG AND SENNOSIDES 8.6 MG 1 TABLET: 8.6; 5 TABLET, FILM COATED ORAL at 23:20

## 2021-05-13 RX ADMIN — OXYCODONE HYDROCHLORIDE 10 MG: 10 TABLET, FILM COATED, EXTENDED RELEASE ORAL at 09:09

## 2021-05-13 RX ADMIN — ACETAMINOPHEN 1000 MG: 500 TABLET ORAL at 09:10

## 2021-05-13 RX ADMIN — MIDAZOLAM HYDROCHLORIDE 2 MG: 1 INJECTION, SOLUTION INTRAMUSCULAR; INTRAVENOUS at 10:19

## 2021-05-13 RX ADMIN — FENTANYL CITRATE 100 MCG: 50 INJECTION, SOLUTION INTRAMUSCULAR; INTRAVENOUS at 10:24

## 2021-05-13 RX ADMIN — HYDROMORPHONE HYDROCHLORIDE 0.5 MG: 2 INJECTION, SOLUTION INTRAMUSCULAR; INTRAVENOUS; SUBCUTANEOUS at 11:25

## 2021-05-13 RX ADMIN — DEXAMETHASONE SODIUM PHOSPHATE 4 MG: 4 INJECTION, SOLUTION INTRA-ARTICULAR; INTRALESIONAL; INTRAMUSCULAR; INTRAVENOUS; SOFT TISSUE at 10:24

## 2021-05-13 RX ADMIN — POVIDONE IODINE 15 ML: 100 SOLUTION TOPICAL at 09:14

## 2021-05-13 RX ADMIN — PROPOFOL 160 MG: 10 INJECTION, EMULSION INTRAVENOUS at 10:24

## 2021-05-13 RX ADMIN — CELECOXIB 200 MG: 200 CAPSULE ORAL at 09:09

## 2021-05-13 RX ADMIN — OXYCODONE HYDROCHLORIDE 10 MG: 5 SOLUTION ORAL at 13:15

## 2021-05-13 RX ADMIN — TRANEXAMIC ACID 1000 MG: 100 INJECTION, SOLUTION INTRAVENOUS at 10:24

## 2021-05-13 RX ADMIN — DOCUSATE SODIUM 100 MG: 100 CAPSULE ORAL at 18:43

## 2021-05-13 RX ADMIN — ACETAMINOPHEN 650 MG: 325 TABLET, FILM COATED ORAL at 23:19

## 2021-05-13 RX ADMIN — ROCURONIUM BROMIDE 50 MG: 10 INJECTION, SOLUTION INTRAVENOUS at 10:24

## 2021-05-13 RX ADMIN — HYDROMORPHONE HYDROCHLORIDE 0.5 MG: 2 INJECTION, SOLUTION INTRAMUSCULAR; INTRAVENOUS; SUBCUTANEOUS at 12:22

## 2021-05-13 RX ADMIN — CEFAZOLIN SODIUM 2 G: 2 INJECTION, SOLUTION INTRAVENOUS at 18:43

## 2021-05-13 RX ADMIN — ROCURONIUM BROMIDE 20 MG: 10 INJECTION, SOLUTION INTRAVENOUS at 11:25

## 2021-05-13 RX ADMIN — GABAPENTIN 300 MG: 300 CAPSULE ORAL at 09:09

## 2021-05-13 RX ADMIN — OXYCODONE 5 MG: 5 TABLET ORAL at 23:18

## 2021-05-13 RX ADMIN — LIDOCAINE HYDROCHLORIDE 100 MG: 20 INJECTION, SOLUTION EPIDURAL; INFILTRATION; INTRACAUDAL at 10:24

## 2021-05-13 RX ADMIN — FENTANYL CITRATE 25 MCG: 50 INJECTION, SOLUTION INTRAMUSCULAR; INTRAVENOUS at 12:53

## 2021-05-13 RX ADMIN — SCOPALAMINE 1 PATCH: 1 PATCH, EXTENDED RELEASE TRANSDERMAL at 09:11

## 2021-05-13 RX ADMIN — ACETAMINOPHEN 650 MG: 325 TABLET, FILM COATED ORAL at 18:43

## 2021-05-13 RX ADMIN — SODIUM CHLORIDE, POTASSIUM CHLORIDE, SODIUM LACTATE AND CALCIUM CHLORIDE: 600; 310; 30; 20 INJECTION, SOLUTION INTRAVENOUS at 09:10

## 2021-05-13 RX ADMIN — HYDROMORPHONE HYDROCHLORIDE 0.5 MG: 2 INJECTION, SOLUTION INTRAMUSCULAR; INTRAVENOUS; SUBCUTANEOUS at 10:41

## 2021-05-13 RX ADMIN — FENTANYL CITRATE 25 MCG: 50 INJECTION, SOLUTION INTRAMUSCULAR; INTRAVENOUS at 12:57

## 2021-05-13 RX ADMIN — SUGAMMADEX 200 MG: 100 INJECTION, SOLUTION INTRAVENOUS at 12:13

## 2021-05-13 RX ADMIN — ONDANSETRON 4 MG: 2 INJECTION INTRAMUSCULAR; INTRAVENOUS at 11:49

## 2021-05-13 RX ADMIN — SODIUM CHLORIDE, POTASSIUM CHLORIDE, SODIUM LACTATE AND CALCIUM CHLORIDE: 600; 310; 30; 20 INJECTION, SOLUTION INTRAVENOUS at 11:55

## 2021-05-13 ASSESSMENT — PAIN DESCRIPTION - PAIN TYPE
TYPE: SURGICAL PAIN;ACUTE PAIN
TYPE: SURGICAL PAIN

## 2021-05-13 ASSESSMENT — FIBROSIS 4 INDEX: FIB4 SCORE: 1.11

## 2021-05-13 ASSESSMENT — PAIN SCALES - GENERAL: PAIN_LEVEL: 4

## 2021-05-13 NOTE — ANESTHESIA POSTPROCEDURE EVALUATION
Patient: Dee Armstrong    Procedure Summary     Date: 05/13/21 Room / Location: [place] [id number] / SURGERY [facility]    Anesthesia Start: 1017 Anesthesia Stop: 1227    Procedure: REVISION, TOTAL ARTHROPLASTY, HIP. (Left Hip) Diagnosis: (Failed left total hip arthroplasty)    Surgeons: Amol Hogue M.D. Responsible Provider: Ward Vo M.D.    Anesthesia Type: general ASA Status: 2          Final Anesthesia Type: general  Last vitals  BP   Blood Pressure : 124/69    Temp   36.4 °C (97.5 °F)    Pulse   71   Resp   (!) 21    SpO2   92 %      Anesthesia Post Evaluation    Patient location during evaluation: PACU  Patient participation: complete - patient participated  Level of consciousness: awake and alert  Pain score: 4    Airway patency: patent  Anesthetic complications: no  Cardiovascular status: hemodynamically stable  Respiratory status: acceptable  Hydration status: euvolemic    PONV: none          No complications documented.     Nurse Pain Score: 4 (NPRS)

## 2021-05-13 NOTE — OR NURSING
1225 Patient arrived to PACU from OR, VSS. Dressing to left hip clean dry and intact. Obdulio hugger applied to patient.     1246 spouse updated on plan of care, PT complaining of moderate to severe pain, medicated per MAR. Placed on 10L oxymask per ERAS.     1315 Pt given apple juice per ERAS. PT reports 8/10 hip pain, medication per MAR.     1350 Xray at bedside    1400 left spouse message regarding continued wait for bed assignment.     1710 Report called to T3 NANDINI Thomson. Spouse updated on bed assignment. Patient transported to room with transporter in bed. 1 bag of belongings with patient. Oxygen tank full, tubing connect to tank and patient.

## 2021-05-13 NOTE — ANESTHESIA PROCEDURE NOTES
Airway    Date/Time: 5/13/2021 10:26 AM  Performed by: Ward Vo M.D.  Authorized by: Ward Vo M.D.     Location:  OR  Urgency:  Elective  Indications for Airway Management:  Anesthesia      Spontaneous Ventilation: absent    Sedation Level:  Deep  Preoxygenated: Yes    Patient Position:  Sniffing  Mask Difficulty Assessment:  1 - vent by mask  Final Airway Type:  Endotracheal airway  Final Endotracheal Airway:  ETT  Cuffed: Yes    Technique Used for Successful ETT Placement:  Direct laryngoscopy    Insertion Site:  Oral  Blade Type:  Tanner  Laryngoscope Blade/Videolaryngoscope Blade Size:  3  ETT Size (mm):  7.0  Measured from:  Teeth  ETT to Teeth (cm):  21  Placement Verified by: auscultation and capnometry    Cormack-Lehane Classification:  Grade I - full view of glottis  Number of Attempts at Approach:  1

## 2021-05-13 NOTE — ANESTHESIA TIME REPORT
Anesthesia Start and Stop Event Times     Date Time Event    5/13/2021 0953 Ready for Procedure     1017 Anesthesia Start     1227 Anesthesia Stop        Responsible Staff  05/13/21    Name Role Begin End    Ward Vo M.D. Anesth 1017 1227        Preop Diagnosis (Free Text):  Pre-op Diagnosis     Failed left total hip arthroplasty        Preop Diagnosis (Codes):    Post op Diagnosis  Failed total hip arthroplasty (HCC)      Premium Reason  Non-Premium    Comments:

## 2021-05-13 NOTE — ANESTHESIA PREPROCEDURE EVALUATION
Relevant Problems   PULMONARY   (positive) History of coccidioidomycosis      NEURO   (positive) History of coccidioidomycosis      GI   (positive) GERD (gastroesophageal reflux disease)       Physical Exam    Airway   Mallampati: II  TM distance: >3 FB  Neck ROM: full       Cardiovascular - normal exam  Rhythm: regular  Rate: normal  (-) murmur     Dental - normal exam           Pulmonary - normal exam  Breath sounds clear to auscultation     Abdominal   (+) obese     Neurological - normal exam                 Anesthesia Plan    ASA 2       Plan - general       Airway plan will be ETT          Induction: intravenous    Postoperative Plan: Postoperative administration of opioids is intended.    Pertinent diagnostic labs and testing reviewed    Informed Consent:    Anesthetic plan and risks discussed with patient.    Use of blood products discussed with: patient whom consented to blood products.          No

## 2021-05-14 VITALS
OXYGEN SATURATION: 93 % | RESPIRATION RATE: 16 BRPM | WEIGHT: 189.6 LBS | BODY MASS INDEX: 38.22 KG/M2 | HEIGHT: 59 IN | SYSTOLIC BLOOD PRESSURE: 136 MMHG | HEART RATE: 94 BPM | TEMPERATURE: 98.5 F | DIASTOLIC BLOOD PRESSURE: 83 MMHG

## 2021-05-14 PROCEDURE — 700102 HCHG RX REV CODE 250 W/ 637 OVERRIDE(OP): Performed by: ORTHOPAEDIC SURGERY

## 2021-05-14 PROCEDURE — 97162 PT EVAL MOD COMPLEX 30 MIN: CPT

## 2021-05-14 PROCEDURE — G0378 HOSPITAL OBSERVATION PER HR: HCPCS

## 2021-05-14 PROCEDURE — A9270 NON-COVERED ITEM OR SERVICE: HCPCS | Performed by: ORTHOPAEDIC SURGERY

## 2021-05-14 PROCEDURE — 97165 OT EVAL LOW COMPLEX 30 MIN: CPT

## 2021-05-14 RX ORDER — OXYCODONE HYDROCHLORIDE 5 MG/1
2.5-5 TABLET ORAL EVERY 4 HOURS PRN
Qty: 35 TABLET | Refills: 0 | Status: SHIPPED | OUTPATIENT
Start: 2021-05-14 | End: 2021-05-21

## 2021-05-14 RX ORDER — PSEUDOEPHEDRINE HCL 30 MG
100 TABLET ORAL 2 TIMES DAILY
Qty: 20 CAPSULE | Refills: 2 | Status: SHIPPED | OUTPATIENT
Start: 2021-05-14 | End: 2022-08-30

## 2021-05-14 RX ORDER — ASPIRIN 81 MG/1
81 TABLET ORAL 2 TIMES DAILY
Qty: 30 TABLET | COMMUNITY
Start: 2021-05-14 | End: 2021-06-11

## 2021-05-14 RX ADMIN — ACETAMINOPHEN 650 MG: 325 TABLET, FILM COATED ORAL at 05:44

## 2021-05-14 RX ADMIN — OMEPRAZOLE 20 MG: 20 CAPSULE, DELAYED RELEASE ORAL at 05:44

## 2021-05-14 RX ADMIN — ASPIRIN 81 MG: 81 TABLET, COATED ORAL at 05:44

## 2021-05-14 RX ADMIN — OXYCODONE 5 MG: 5 TABLET ORAL at 13:23

## 2021-05-14 RX ADMIN — DOCUSATE SODIUM 100 MG: 100 CAPSULE ORAL at 05:44

## 2021-05-14 RX ADMIN — ACETAMINOPHEN 650 MG: 325 TABLET, FILM COATED ORAL at 13:23

## 2021-05-14 RX ADMIN — OXYCODONE 5 MG: 5 TABLET ORAL at 09:22

## 2021-05-14 ASSESSMENT — COGNITIVE AND FUNCTIONAL STATUS - GENERAL
TURNING FROM BACK TO SIDE WHILE IN FLAT BAD: A LOT
STANDING UP FROM CHAIR USING ARMS: A LITTLE
MOVING FROM LYING ON BACK TO SITTING ON SIDE OF FLAT BED: A LOT
TURNING FROM BACK TO SIDE WHILE IN FLAT BAD: A LITTLE
WALKING IN HOSPITAL ROOM: A LITTLE
DAILY ACTIVITIY SCORE: 23
MOBILITY SCORE: 14
SUGGESTED CMS G CODE MODIFIER MOBILITY: CL
DRESSING REGULAR LOWER BODY CLOTHING: A LITTLE
MOVING TO AND FROM BED TO CHAIR: A LOT
SUGGESTED CMS G CODE MODIFIER MOBILITY: CK
WALKING IN HOSPITAL ROOM: A LITTLE
CLIMB 3 TO 5 STEPS WITH RAILING: A LITTLE
DAILY ACTIVITIY SCORE: 23
STANDING UP FROM CHAIR USING ARMS: A LITTLE
SUGGESTED CMS G CODE MODIFIER DAILY ACTIVITY: CI
MOVING TO AND FROM BED TO CHAIR: A LITTLE
MOBILITY SCORE: 18
SUGGESTED CMS G CODE MODIFIER DAILY ACTIVITY: CI
MOVING FROM LYING ON BACK TO SITTING ON SIDE OF FLAT BED: A LITTLE
CLIMB 3 TO 5 STEPS WITH RAILING: A LOT
DRESSING REGULAR LOWER BODY CLOTHING: A LITTLE

## 2021-05-14 ASSESSMENT — LIFESTYLE VARIABLES
TOTAL SCORE: 0
CONSUMPTION TOTAL: NEGATIVE
HOW MANY TIMES IN THE PAST YEAR HAVE YOU HAD 5 OR MORE DRINKS IN A DAY: 0
TOTAL SCORE: 0
HAVE YOU EVER FELT YOU SHOULD CUT DOWN ON YOUR DRINKING: NO
TOTAL SCORE: 0
EVER HAD A DRINK FIRST THING IN THE MORNING TO STEADY YOUR NERVES TO GET RID OF A HANGOVER: NO
ON A TYPICAL DAY WHEN YOU DRINK ALCOHOL HOW MANY DRINKS DO YOU HAVE: 0
HAVE PEOPLE ANNOYED YOU BY CRITICIZING YOUR DRINKING: NO
ALCOHOL_USE: NO
DOES PATIENT WANT TO STOP DRINKING: NO
AVERAGE NUMBER OF DAYS PER WEEK YOU HAVE A DRINK CONTAINING ALCOHOL: 0
EVER FELT BAD OR GUILTY ABOUT YOUR DRINKING: NO

## 2021-05-14 ASSESSMENT — PATIENT HEALTH QUESTIONNAIRE - PHQ9
SUM OF ALL RESPONSES TO PHQ9 QUESTIONS 1 AND 2: 0
2. FEELING DOWN, DEPRESSED, IRRITABLE, OR HOPELESS: NOT AT ALL
1. LITTLE INTEREST OR PLEASURE IN DOING THINGS: NOT AT ALL

## 2021-05-14 ASSESSMENT — GAIT ASSESSMENTS
DISTANCE (FEET): 40
ASSISTIVE DEVICE: FRONT WHEEL WALKER
DEVIATION: ANTALGIC;BRADYKINETIC;DECREASED HEEL STRIKE;DECREASED TOE OFF

## 2021-05-14 ASSESSMENT — ACTIVITIES OF DAILY LIVING (ADL): TOILETING: INDEPENDENT

## 2021-05-14 ASSESSMENT — PAIN DESCRIPTION - PAIN TYPE: TYPE: SURGICAL PAIN

## 2021-05-14 NOTE — PROGRESS NOTES
2 RN Skin Assessment Completed by Berto RN and Yary RN.    Head: WDL  Ears: WDL  Nose: WDL  Mouth: WDL  Neck: WDL  Breasts/Chest: WDL  Shoulder Blades: WDL  Spine: incision  (R) Arm/Elbow/hand: WDL  (L) Arm/Elbow/hand: WDL  Abdomen:WDL  Groin: excoriation  Sacrum/Coccyx/Buttocks: WDL  (R) Leg: WDL  (L) Leg: WDL  (R) Heel/Foot/Toe: WDL  (L) Heel/Foot/Toe: WDL          Devices in place:     Interventions in place: Gray ear foams    Possible skin injury found: Yes    Pictures uploaded into Epic: N/A  Wound Consult Placed: N/A

## 2021-05-14 NOTE — DISCHARGE INSTRUCTIONS
Leave dressing in place until battery in pump dies. Then removed and leave without dressing    Total Hip Replacement, Anterior, Care After  This sheet gives you information about how to care for yourself after your procedure. Your doctor may also give you more specific instructions. If you have problems or questions, contact your doctor.  What can I expect after the procedure?  After the procedure, it is common to have:  · Pain.  · Stiffness.  · Discomfort.  Follow these instructions at home:  Medicines  · Take over-the-counter and prescription medicines only as told by your doctor.  · If you were prescribed a medicine to thin your blood (anticoagulant), take it as told by your doctor.  Surgery cut care    · Follow instructions from your doctor about how to take care of your cut (incision) from surgery. Make sure you:  ? Wash your hands with soap and water before you change your bandage (dressing). If you cannot use soap and water, use alcohol-based hand .  ? Change your bandage as told by your doctor.  ? Leave stitches (sutures), skin glue, or skin tape (adhesive) strips in place. They may need to stay in place for 2 weeks or longer. If tape strips get loose and curl up, you may trim the loose edges. Do not remove tape strips completely unless your doctor tells you to do that.  · Check your surgical cut every day for signs of infection. Check for:  ? Redness, swelling, or pain.  ? Fluid or blood.  ? Warmth.  ? Pus or a bad smell.  Bathing  · Do not take baths, swim, or use a hot tub until your doctor says it is okay.  · Keep the bandage dry until your doctor says it can be removed.  Managing pain, stiffness, and swelling    · If directed, put ice on the hip area.  ? Put ice in a plastic bag.  ? Place a towel between your skin and the bag.  ? Leave the ice on for 20 minutes, 2-3 times a day.  · Move your toes often to avoid stiffness and to lessen swelling.  · Raise (elevate) your leg above the level of  your heart while you are sitting or lying down.  Activity  · Rest as told by your doctor.  · Do not sit for a long time without moving. Get up to take short walks every 1-2 hours. This is important. Ask for help if you feel weak or unsteady.  · Do exercises as told by your doctor or physical therapist.  · Use a walker, crutches, or a cane as told by your doctor.  ? You may use your legs to support (bear) your body weight as told by your doctor. Follow instructions about how much weight you may safely support on your affected leg (weight-bearing restrictions).  ? A physical therapist may show you how to get out of a bed and chair and how to go up and down stairs. You will first do this with a walker, crutches, or a cane. Then you will do it without any of these devices.  ? Once you are able to walk without a limp, you may stop using a walker, crutches, or cane.  · Return to your normal activities as told by your doctor. Ask your doctor what activities are safe for you.  Safety  · To help prevent falls:  ? Keep floors clear of objects you may trip over.  ? Place items that you may need within easy reach.  · Wear an apron or tool belt with pockets for carrying objects. This leaves your hands free to help with your balance.  Driving  · Do not drive or use heavy machinery while taking prescription pain medicine.  · Ask your doctor when it is safe to drive.  General instructions  · Wear compression stockings as told by your doctor. These help to prevent blood clots and reduce swelling in your legs.  · Keep doing breathing exercises as told by your doctor. This helps prevent lung infection.  · If you are taking prescription pain medicine, take actions to prevent or treat constipation. Your doctor may suggest that you:  ? Drink enough fluid to keep your pee (urine) pale yellow.  ? Eat foods that are high in fiber. These include fresh fruits and vegetables, whole grains, and beans.  ? Limit foods that are high in fat and  sugar. These include fried or sweet foods.  ? Take an over-the-counter or prescription medicine for constipation.  · Do not use any products that have nicotine or tobacco in them, such as cigarettes and e-cigarettes. These can delay bone healing. If you need help quitting, ask your doctor.  · Keep all follow-up visits as told by your doctor. This is important.  Contact a doctor if:  · You have a fever or chills.  · You have a cough.  · You feel short of breath.  · Your medicine is not helping your pain.  · You have any of these at or near your cut from surgery:  ? Redness, swelling, or pain.  ? Fluid or blood.  ? An area that feels warm when you touch it.  ? Pus or a bad smell.  Get help right away if:  · You have very bad pain.  · You have trouble breathing.  · You have chest pain.  · You have redness, swelling, pain, and warmth in your calf or leg.  Summary  · Follow instructions from your doctor about how to take care of your surgery cut (incision).  · Do not take baths, swim, or use a hot tub until your doctor says it is okay.  · Use crutches, a walker, or a cane as told by your doctor.  · If you were prescribed a medicine to thin your blood (anticoagulant), take it as told by your doctor.  This information is not intended to replace advice given to you by your health care provider. Make sure you discuss any questions you have with your health care provider.  Document Released: 04/03/2019 Document Revised: 01/10/2020 Document Reviewed: 04/03/2019  Elsevier Interactive Patient Education © 2020 [employer]    Discharge Instructions    Discharged to home by car with relative. Discharged via wheelchair, hospital escort: Yes.  Special equipment needed: Walker    Be sure to schedule a follow-up appointment with your primary care doctor or any specialists as instructed.     Discharge Plan:   Diet Plan: Discussed  Activity Level: Discussed  Confirmed Follow up Appointment: Appointment Scheduled  Confirmed Symptoms  Management: Discussed  Medication Reconciliation Updated: Yes    I understand that a diet low in cholesterol, fat, and sodium is recommended for good health. Unless I have been given specific instructions below for another diet, I accept this instruction as my diet prescription.   Other diet: Regular    Special Instructions: None    · Is patient discharged on Warfarin / Coumadin?   No     Depression / Suicide Risk    As you are discharged from this [facility] facility, it is important to learn how to keep safe from harming yourself.    Recognize the warning signs:  · Abrupt changes in personality, positive or negative- including increase in energy   · Giving away possessions  · Change in eating patterns- significant weight changes-  positive or negative  · Change in sleeping patterns- unable to sleep or sleeping all the time   · Unwillingness or inability to communicate  · Depression  · Unusual sadness, discouragement and loneliness  · Talk of wanting to die  · Neglect of personal appearance   · Rebelliousness- reckless behavior  · Withdrawal from people/activities they love  · Confusion- inability to concentrate     If you or a loved one observes any of these behaviors or has concerns about self-harm, here's what you can do:  · Talk about it- your feelings and reasons for harming yourself  · Remove any means that you might use to hurt yourself (examples: pills, rope, extension cords, firearm)  · Get professional help from the community (Mental Health, Substance Abuse, psychological counseling)  · Do not be alone:Call your Safe Contact- someone whom you trust who will be there for you.  · Call your local CRISIS HOTLINE 084-2537 or 833-696-7786  · Call your local Children's Mobile Crisis Response Team [place] (361) 064-4470 or www.sofatutor  · Call the toll free National Suicide Prevention Hotlines   · National Suicide Prevention Lifeline 194-460-VWEO (0081)  · National Hope Line Network 800-DJDRRGX  (342-6432)

## 2021-05-14 NOTE — PROGRESS NOTES
Some pain when standing/walking with walker, minimal at rest  AVSS  + DF/PF  Dressing in place  SCDs on  XR - increased cup abduction    Plan:    WBAT LLE, posterior hip precautions  DVT proph with SCDs (while in hospital), ASA BID  D/c home when cleared by PT

## 2021-05-14 NOTE — THERAPY
"Physical Therapy   Initial Evaluation     Patient Name: Dee Armstrong  Age:  65 y.o., Sex:  female  Medical Record #: [id number]  Today's Date: 5/14/2021     Precautions: Weight Bearing As Tolerated Left Lower Extremity, Posterior Hip Precautions, Fall Risk    Assessment  Patient is 65 y.o. female POD #1 L LYNDA revision. Pt educated on posterior hip precautions and was able to verbalize. Primarily limited by L hip pain with WB for mobility. Reviewed post-op hip exercises with pt in seated position. She reports she will have adequate support at home by family upon DC.    Plan    Recommend Physical Therapy 4 times per week until therapy goals are met for the following treatments:  Gait Training, Neuro Re-Education / Balance, Stair Training, Therapeutic Activities and Therapeutic Exercises    DC Equipment Recommendations: None  Discharge Recommendations: Recommend outpatient physical therapy services to address higher level deficits     Objective     05/14/21 1040   Prior Living Situation   Prior Services None   Housing / Facility 1 Story House   Steps Into Home (small threshold ~1\")   Steps In Home 0   Equipment Owned Front-Wheel Walker;4-Wheel Walker   Lives with - Patient's Self Care Capacity Spouse   Comments Has adult children coming to visit that will be able to assist   Prior Level of Functional Mobility   Bed Mobility Independent   Transfer Status Independent   Ambulation Independent   Distance Ambulation (Feet) (Limited community distances 2/2 hip pain)   Assistive Devices Used None   Stairs Independent   Strength Lower Body   Comments RLE WFL, LLE limited by pain/weakness primarily with hip ABD and knee extension   Sensation Lower Body   Lower Extremity Sensation   WDL   Balance Assessment   Sitting Balance (Static) Fair +   Sitting Balance (Dynamic) Fair +   Standing Balance (Static) Fair   Standing Balance (Dynamic) Fair -   Gait Analysis   Gait Level Of Assist (SBA for safety)   Assistive Device Front " Wheel Walker   Distance (Feet) 40   Deviation Antalgic;Bradykinetic;Decreased Heel Strike;Decreased Toe Off   # of Stairs Climbed 0   Weight Bearing Status No restrictions   Bed Mobility    Supine to Sit Supervised   Sit to Supine (up in chair post session)   Short Term Goals    Short Term Goal # 1 Pt will amb >150ft with FWW and SPV within 6 visits to negotiate household distances at AR.   Short Term Goal # 2 Pt will ascend/descend a platform step with FWW and SPV within 6 visits to enter/exit home at AR.   Short Term Goal # 3 Pt will demo ind with post-op hip HEP within 6 visits to regain strength.   Session Information   Priority (LYNDA revision)

## 2021-05-14 NOTE — CARE PLAN
The patient is resting comfortable in bed, A&Ox4, NAD, no dyspnea, non-diaphoretic, clear speech.     Shift Goals  Clinical Goals: PT to remain safe and comfortable throughout shift    Progress made toward(s) clinical / shift goals:  ambulates safely with a FWW with a steady gait.      Patient is not progressing towards the following goals:

## 2021-05-14 NOTE — PROGRESS NOTES
Pt discharged home in stable condition. IV removed from right wrist. Dressing to left hip intact with prevena VAC in place. Pt informed to remove when battery dies, understands instructions. All discharge instructions including new medications and f/u appointment reviewed with pt. PT recommend outpt PT referral, doctor will order at follow up appointment. All belongings left with patient. Transported to car via wheelchair.

## 2021-05-14 NOTE — THERAPY
"Occupational Therapy   Initial Evaluation     Patient Name: Dee Armstrong  Age:  65 y.o., Sex:  female  Medical Record #: [id number]  Today's Date: 5/14/2021     Precautions  Precautions: (P) Posterior Hip Precautions, Weight Bearing As Tolerated Left Lower Extremity  Comments: (P) s/p LYNDA    Assessment  Patient is 65 y.o. female admitted for L LYNDA revision, LLE WBAT with posterior hip precautions. Pt normally independent with all ADLs and functional mobility had total hip on same hip ~1.5 years ago. Pt able to complete ADLs and functional mobility with supervision, educated on posterior hip precautions, adaptive equipment, and adaptive strategies for functional mobility/ADLs. Pt provided with handout for posterior hip precautions and adaptive equipment resources, pt safe to discharge home with family support when medically cleared.     Plan    Recommend Occupational Therapy for Evaluation only.    DC Equipment Recommendations: (P) Other (Comments) (hip kit)  Discharge Recommendations: (P) Anticipate that the patient will have no further occupational therapy needs after discharge from the hospital     Subjective    \"My  and my kids will help me\"     Objective       05/14/21 1043   Prior Living Situation   Prior Services Home-Independent   Housing / Facility [address]   Steps In Home 0   Bathroom Set up Walk In Shower;Shower Chair   Equipment Owned Front-Wheel Walker;4-Wheel Walker;Single Point Cane;Tub / Shower Seat;Raised Toilet Seat With Arms   Lives with - Patient's Self Care Capacity Spouse   Comments Adult children coming to assist and spouse capable to assist   Prior Level of ADL Function   Self Feeding Independent   Grooming / Hygiene Independent   Bathing Independent   Dressing Independent   Toileting Independent   Prior Level of IADL Function   Medication Management Independent   Laundry Independent   Kitchen Mobility Independent   Finances Independent   Home Management Independent   Shopping " Independent   Prior Level Of Mobility Independent Without Device in Community   Driving / Transportation Driving Independent   Occupation (Pre-Hospital Vocational) Retired Due To Age   History of Falls   History of Falls Yes   Precautions   Precautions Posterior Hip Precautions;Weight Bearing As Tolerated Left Lower Extremity   Comments s/p LYNDA   Pain 0 - 10 Group   Therapist Pain Assessment During Activity  (while walking)   Non Verbal Descriptors   Non Verbal Scale  Calm   Cognition    Cognition / Consciousness WDL   Level of Consciousness Alert   Comments Pleasant, cooperative, receptive to all education   Active ROM Upper Body   Active ROM Upper Body  WDL   Dominant Hand Right   Strength Upper Body   Upper Body Strength  WDL   Sensation Upper Body   Upper Extremity Sensation  WDL   Upper Body Muscle Tone   Upper Body Muscle Tone  WDL   Neurological Concerns   Neurological Concerns No   Coordination Upper Body   Coordination WDL   Balance Assessment   Sitting Balance (Static) Fair +   Sitting Balance (Dynamic) Fair +   Standing Balance (Static) Fair   Standing Balance (Dynamic) Fair -   Weight Shift Sitting Fair   Weight Shift Standing Poor   Comments w/ FWW   Bed Mobility    Supine to Sit Supervised   Scooting Supervised   ADL Assessment   Grooming Supervision;Seated   Upper Body Dressing Supervision   Lower Body Dressing Supervision  (w/ adaptive equipment)   Toileting   (NT-refused need)   How much help from another person does the patient currently need...   Putting on and taking off regular lower body clothing? 3   Bathing (including washing, rinsing, and drying)? 4   Toileting, which includes using a toilet, bedpan, or urinal? 4   Putting on and taking off regular upper body clothing? 4   Taking care of personal grooming such as brushing teeth? 4   Eating meals? 4   6 Clicks Daily Activity Score 23   Functional Mobility   Sit to Stand Supervised   Bed, Chair, Wheelchair Transfer Supervised   Transfer Method  Stand Step   Mobility bed mobility, hallway mobility, up to chair   Comments w/ FWW   Visual Perception   Visual Perception  Not Tested   Edema / Skin Assessment   Edema / Skin  Not Assessed   Activity Tolerance   Sitting in Chair left seated in chair   Sitting Edge of Bed 5   Standing 10   Education Group   Education Provided Role of Occupational Therapist;Hip Precautions;Adaptive Equipment   Role of Occupational Therapist Patient Response Patient;Acceptance;Explanation   Hip Precautions Patient Response Patient;Acceptance;Explanation;Handout;Action Demonstration   Adaptive Equipment Patient Response Patient;Acceptance;Explanation;Handout;Action Demonstration   Problem List   Problem List None   Interdisciplinary Plan of Care Collaboration   IDT Collaboration with  Nursing;Family / Caregiver;Physical Therapist   Patient Position at End of Therapy Seated;Call Light within Reach;Tray Table within Reach;Phone within Reach   Collaboration Comments RN updated

## 2021-05-14 NOTE — OP REPORT
DATE OF SERVICE:  05/13/2021     PREOPERATIVE DIAGNOSES:  1.  Painful left total hip arthroplasty.  2.  Obesity.     POSTOPERATIVE DIAGNOSES:  1.  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  2.  Obesity.     SURGICAL PROCEDURE:  Revision of left total hip arthroplasty (acetabular   component).     SURGEON:  Amol Hogue MD     ASSISTANT:  YASMEEN Benjamin     ANESTHESIOLOGIST:  Ward Vo MD     ANESTHETIC:  General.     ESTIMATED BLOOD LOSS:  150 mL.    IMPLANTS:    1.  Biomet OsseoTi 54 mm acetabular shell.  2.  Acetabular screws x 2 (6.5 x 40 mm, 6.5 x 30 mm)  3.  Dual mobility liner, 44 mm.  4.  Brizuela/Nephew Oxinium 12/14 taper femoral head, 28 mm, +4 mm  5.  Biomet dual mobility bearing, 44mm/28 mm     INDICATIONS:  The patient is 65 years old.  She had a left total hip   arthroplasty about a year and a half ago, doing anterior approach with Dr. Vallejo.  About 6 months afterwards she fell, she had pain and that has   persisted.  Initially she was found to have a nondisplaced fracture through   the quadrilateral plate.  There is a lucency around the medial aspect of the   acetabulum and this lucency is now more visible in the more cephalad aspect.    She has tried therapy.  She said she did get relief from a diagnostic   injection.  She has normal sed rate, CRP and white blood cell count.  A bone   scan has an increased uptake around the hip suggesting possible loosening of   the acetabulum, which is also our suspicion based on radiographs and   persistent buttock pain.  She does have some chronic lumbar spine problems,   but this was not felt to be the cause of her buttock pain after consultation   with her spine doctors.  I discussed the options with the patient and these   include continued therapy and nonsurgical treatment versus revision   arthroplasty of the acetabulum and possibly femur.  Risks include bleeding,   infection, neurovascular injury, pain,  stiffness, fracture, implant failure,   dislocation, leg length discrepancy, thromboembolic phenomena, anesthetic and   medical complications, etc.     DESCRIPTION OF PROCEDURE:  The patient was identified in the preoperative   holding area.  Her left leg was marked.  She was taken to the operating room   where general anesthetic was administered.  Intravenous antibiotics were   given.  She was placed in lateral decubitus position with the left side up.    The left hindquarter was prepped and draped in sterile fashion.  Timeout was   held to confirm the patient identity and correct surgical site.     Posterior approach to the hip was performed with longitudinal incision   centered over the greater trochanter.  This was carried down through about 4   inches of subcutaneous fat to the IT band, which was split longitudinally and   the split was carried into gluteus betsy in line with its fibers.  Then, the   short external rotators were elevated off the proximal femur.  A capsulotomy   was performed.  The capsule was extremely thick posteriorly.  Eventually, we   exposed the hip and dislocated the joint.  There is no fluid in the hip.  The   femoral head was removed.  We then exposed the acetabulum with anterior   retractor and Steinmann pins in the supra-acetabular region.  Inferior capsule   was divided.  Elevated the capsule off the posterior wall and placed the   posterior Steinmann pin.  I visualized the cup.  I could not get complete   exposure, so I released the gluteus betsy tendon from the femur.  This gave   us improved anterior translation of the femur.  Then I removed the   polyethylene liner.  The 2 screws were then removed.  I tapped on the   acetabulum and noted some movement although I could not dislodge it.  I then   placed the polyethylene liner back into place and used the cup extractor.    There was just fibrous tissue that I had to disrupt, then the cup came out   easily.  There was no bone  bonded to the cup once it had been removed.     Then reamed 51 mm up to 53 mm so we got some bleeding bone.  There were still   some areas of sclerosis.  I then inserted the OsseoTi size 54 cup, which was   not totally secure when it was finally impacted.  We used the alignment    and felt that the cup abduction was about 40 degrees and there was at   least 30 degrees of anteversion.  We then placed 2 screws into the ilium,   which had good purchase and the cup was then noted to be stable.  The dual   mobility liner was tapped into place in standard fashion.  We then trialed   with a +0 head.  This was too loose and the leg length was a little bit short   and the hip was a little bit unstable, so we went to a +4.  This gave better   leg lengths, soft tissue tension and better stability.  The trial was removed.    The Bearden taper was washed and dried and then the dual mobility component   was assembled on the back table.  This was then tapped into place in standard   fashion.  The hip was reduced.  One batch of Stimulan beads with vancomycin   and tobramycin were placed around the inferior aspect of the joint.  The   capsule was repaired with #1 Vicryl.  Local anesthetic mixture was injected   into the pericapsular tissues.  Short external rotators were then repaired to   the proximal femur with #1 Vicryl.  IT band and gluteus betsy fascia were   closed with #1 Vicryl.  Skin was closed with 2-0 Vicryl and 3-0 Monocryl.    Prevena VAC was applied.  The patient was transferred to a bed in the supine   position.  We noted approximately equal leg lengths.  She was then extubated   and taken to recovery room in stable condition.     POSTOPERATIVE PLAN:  1.  Intravenous antibiotics for 24 hours.  2.  DVT prophylaxis with early mobilization, SCDs and aspirin 81 mg p.o.   b.i.d. for 4 weeks.  3.  Weightbearing as tolerated with posterior hip precautions.  4.  AP pelvis radiograph in recovery  room.        ______________________________  MD EFREN MCCALL/DENTON    DD:  05/13/2021 20:36  DT:  05/13/2021 21:16    Job#:  [id number]

## 2021-05-14 NOTE — PROGRESS NOTES
Pt arrival assumed care. Assessment as charted. Pt GCS 15 answering quest approp. Pt ambulated to bathroom  To void with 1 person assist tolerated well.

## 2021-05-15 NOTE — DISCHARGE SUMMARY
DATE OF ADMISSION:  05/13/2021   DATE OF DISCHARGE:  05/14/2021     ADMISSION DIAGNOSIS:  Failed left total hip arthroplasty.     DISCHARGE DIAGNOSIS:  Failed left total hip arthroplasty.     SURGICAL PROCEDURE:  Revision left total hip arthroplasty.     HISTORY:  The patient has persistent pain for the last year in her left hip   after a fall shortly after having her left hip replaced.  X-rays show lucency   around the cut acetabulum and a nondisplaced fracture through the   quadrilateral plate.  She has persistent pain despite therapy and time.  She   did get some relief with diagnostic injection.  This is brief however.  On the   day of admission, she underwent revision arthroplasty at which time the cup   was noted to have no ingrowth.  Tolerated this procedure well and was   transferred to the floor postoperatively in stable condition.  Intravenous   antibiotics were given for 24 hours.  She had DVT prophylaxis with early   mobilization, SCDs, and aspirin.  Subsequently discharged home on   postoperative day #1 in stable condition.     POSTOPERATIVE PLAN:  Weightbear as tolerated with a walker and continue with   posterior hip precautions.  She should leave a Prevena VAC in place.  We will   see her back in clinic in approximately 10-14 days for wound check and   followup radiographs.     DISCHARGE MEDICATIONS:  1.  Aspirin 81 mg p.o. b.i.d. for four weeks.  2.  Oxycodone 5 mg p.o. every 4 to 6 hours p.r.n.  3.  Resume preoperative medical regimen.        ______________________________  MD EFREN MCCALL/BRIEN/ARCELIA    DD:  05/14/2021 15:56  DT:  05/14/2021 18:00    Job#:  [id number]

## 2021-06-14 ENCOUNTER — HOSPITAL ENCOUNTER (OUTPATIENT)
Dept: RADIOLOGY | Facility: MEDICAL CENTER | Age: 66
End: 2021-06-14
Attending: FAMILY MEDICINE
Payer: MEDICARE

## 2021-06-14 DIAGNOSIS — Z12.31 ENCOUNTER FOR SCREENING MAMMOGRAM FOR BREAST CANCER: ICD-10-CM

## 2021-06-14 PROCEDURE — 77063 BREAST TOMOSYNTHESIS BI: CPT

## 2021-06-18 DIAGNOSIS — I10 ESSENTIAL HYPERTENSION: ICD-10-CM

## 2021-06-21 RX ORDER — LISINOPRIL 10 MG/1
TABLET ORAL
Qty: 90 TABLET | Refills: 3 | Status: SHIPPED | OUTPATIENT
Start: 2021-06-21 | End: 2022-05-25

## 2021-06-23 PROBLEM — M16.12 UNILATERAL PRIMARY OSTEOARTHRITIS, LEFT HIP: Status: ACTIVE | Noted: 2021-06-23

## 2021-06-23 PROBLEM — Z96.649 MECHANICAL LOOSENING OF PROSTHETIC HIP (HCC): Status: ACTIVE | Noted: 2021-06-23

## 2021-06-23 PROBLEM — T84.038A MECHANICAL LOOSENING OF PROSTHETIC HIP (HCC): Status: ACTIVE | Noted: 2021-06-23

## 2021-08-31 ENCOUNTER — APPOINTMENT (RX ONLY)
Dept: URBAN - METROPOLITAN AREA CLINIC 22 | Facility: CLINIC | Age: 66
Setting detail: DERMATOLOGY
End: 2021-08-31

## 2021-08-31 DIAGNOSIS — L81.4 OTHER MELANIN HYPERPIGMENTATION: ICD-10-CM

## 2021-08-31 DIAGNOSIS — L82.1 OTHER SEBORRHEIC KERATOSIS: ICD-10-CM

## 2021-08-31 DIAGNOSIS — Z71.89 OTHER SPECIFIED COUNSELING: ICD-10-CM

## 2021-08-31 DIAGNOSIS — L85.1 ACQUIRED KERATOSIS [KERATODERMA] PALMARIS ET PLANTARIS: ICD-10-CM

## 2021-08-31 DIAGNOSIS — D22 MELANOCYTIC NEVI: ICD-10-CM

## 2021-08-31 DIAGNOSIS — L57.0 ACTINIC KERATOSIS: ICD-10-CM

## 2021-08-31 PROBLEM — D22.5 MELANOCYTIC NEVI OF TRUNK: Status: ACTIVE | Noted: 2021-08-31

## 2021-08-31 PROCEDURE — ? DIAGNOSIS COMMENT

## 2021-08-31 PROCEDURE — ? SUNSCREEN RECOMMENDATIONS

## 2021-08-31 PROCEDURE — 17000 DESTRUCT PREMALG LESION: CPT

## 2021-08-31 PROCEDURE — ? PHOTO-DOCUMENTATION

## 2021-08-31 PROCEDURE — ? LIQUID NITROGEN

## 2021-08-31 PROCEDURE — ? COUNSELING

## 2021-08-31 PROCEDURE — ? OBSERVATION

## 2021-08-31 PROCEDURE — 99213 OFFICE O/P EST LOW 20 MIN: CPT | Mod: 25

## 2021-08-31 PROCEDURE — 17003 DESTRUCT PREMALG LES 2-14: CPT

## 2021-08-31 ASSESSMENT — LOCATION DETAILED DESCRIPTION DERM
LOCATION DETAILED: LEFT LATERAL PLANTAR HEEL
LOCATION DETAILED: RIGHT VENTRAL PROXIMAL FOREARM
LOCATION DETAILED: SUPERIOR THORACIC SPINE
LOCATION DETAILED: INFERIOR MID FOREHEAD
LOCATION DETAILED: LEFT VENTRAL PROXIMAL FOREARM
LOCATION DETAILED: RIGHT RADIAL DORSAL HAND
LOCATION DETAILED: LEFT CENTRAL PARIETAL SCALP
LOCATION DETAILED: INFERIOR THORACIC SPINE
LOCATION DETAILED: RIGHT UPPER CUTANEOUS LIP
LOCATION DETAILED: RIGHT INFERIOR CENTRAL MALAR CHEEK
LOCATION DETAILED: LEFT MEDIAL FOREHEAD
LOCATION DETAILED: RIGHT MEDIAL PLANTAR HEEL

## 2021-08-31 ASSESSMENT — LOCATION SIMPLE DESCRIPTION DERM
LOCATION SIMPLE: LEFT PLANTAR SURFACE
LOCATION SIMPLE: RIGHT FOREARM
LOCATION SIMPLE: RIGHT CHEEK
LOCATION SIMPLE: SCALP
LOCATION SIMPLE: LEFT FOREHEAD
LOCATION SIMPLE: RIGHT LIP
LOCATION SIMPLE: INFERIOR FOREHEAD
LOCATION SIMPLE: LEFT FOREARM
LOCATION SIMPLE: RIGHT HAND
LOCATION SIMPLE: RIGHT PLANTAR SURFACE
LOCATION SIMPLE: UPPER BACK

## 2021-08-31 ASSESSMENT — LOCATION ZONE DERM
LOCATION ZONE: LIP
LOCATION ZONE: TRUNK
LOCATION ZONE: FACE
LOCATION ZONE: HAND
LOCATION ZONE: ARM
LOCATION ZONE: FEET
LOCATION ZONE: SCALP

## 2021-08-31 NOTE — PROCEDURE: COUNSELING
Detail Level: Generalized
Detail Level: Zone
Detail Level: Detailed
Detail Level: Simple
Topical Keratolytics Recommendations: Amlactin samples given

## 2021-08-31 NOTE — PROCEDURE: LIQUID NITROGEN
Consent: The patient's consent was obtained including but not limited to risks of crusting, scabbing, blistering, scarring, darker or lighter pigmentary change, recurrence, incomplete removal and infection.
Render Post-Care Instructions In Note?: no
Detail Level: Detailed
Number Of Freeze-Thaw Cycles: 2 freeze-thaw cycles
Duration Of Freeze Thaw-Cycle (Seconds): 0
Post-Care Instructions: I reviewed with the patient in detail post-care instructions. Patient is to wear sunprotection, and avoid picking at any of the treated lesions. Pt may apply Vaseline to crusted or scabbing areas.
Application Tool (Optional): Liquid Nitrogen Sprayer
Show Aperture Variable?: Yes

## 2021-10-08 ENCOUNTER — OFFICE VISIT (OUTPATIENT)
Dept: MEDICAL GROUP | Facility: PHYSICIAN GROUP | Age: 66
End: 2021-10-08
Payer: MEDICARE

## 2021-10-08 VITALS
TEMPERATURE: 97.2 F | DIASTOLIC BLOOD PRESSURE: 82 MMHG | BODY MASS INDEX: 37.78 KG/M2 | RESPIRATION RATE: 20 BRPM | WEIGHT: 187.39 LBS | SYSTOLIC BLOOD PRESSURE: 126 MMHG | OXYGEN SATURATION: 98 % | HEIGHT: 59 IN | HEART RATE: 112 BPM

## 2021-10-08 DIAGNOSIS — Z00.00 MEDICARE ANNUAL WELLNESS VISIT, INITIAL: ICD-10-CM

## 2021-10-08 DIAGNOSIS — I10 ESSENTIAL HYPERTENSION: ICD-10-CM

## 2021-10-08 DIAGNOSIS — Z23 NEED FOR IMMUNIZATION AGAINST INFLUENZA: ICD-10-CM

## 2021-10-08 DIAGNOSIS — B38.2 COCCIDIOIDOMYCOSIS, PULMONARY (HCC): ICD-10-CM

## 2021-10-08 DIAGNOSIS — Z91.81 RISK FOR FALLS: ICD-10-CM

## 2021-10-08 DIAGNOSIS — E78.5 DYSLIPIDEMIA: ICD-10-CM

## 2021-10-08 DIAGNOSIS — M81.0 OSTEOPOROSIS OF FOREARM: ICD-10-CM

## 2021-10-08 DIAGNOSIS — E55.9 VITAMIN D DEFICIENCY: ICD-10-CM

## 2021-10-08 DIAGNOSIS — E66.01 CLASS 2 SEVERE OBESITY DUE TO EXCESS CALORIES WITH SERIOUS COMORBIDITY AND BODY MASS INDEX (BMI) OF 37.0 TO 37.9 IN ADULT (HCC): ICD-10-CM

## 2021-10-08 PROCEDURE — 90662 IIV NO PRSV INCREASED AG IM: CPT | Performed by: FAMILY MEDICINE

## 2021-10-08 PROCEDURE — G0438 PPPS, INITIAL VISIT: HCPCS | Performed by: FAMILY MEDICINE

## 2021-10-08 PROCEDURE — G0008 ADMIN INFLUENZA VIRUS VAC: HCPCS | Performed by: FAMILY MEDICINE

## 2021-10-08 RX ORDER — ESTRADIOL 0.1 MG/G
CREAM VAGINAL
Status: ON HOLD | COMMUNITY
Start: 2021-10-06 | End: 2024-02-12

## 2021-10-08 ASSESSMENT — ENCOUNTER SYMPTOMS: GENERAL WELL-BEING: FAIR

## 2021-10-08 ASSESSMENT — PATIENT HEALTH QUESTIONNAIRE - PHQ9: CLINICAL INTERPRETATION OF PHQ2 SCORE: 0

## 2021-10-08 ASSESSMENT — FIBROSIS 4 INDEX: FIB4 SCORE: 1.13

## 2021-10-08 ASSESSMENT — ACTIVITIES OF DAILY LIVING (ADL): BATHING_REQUIRES_ASSISTANCE: 0

## 2021-10-08 NOTE — PROGRESS NOTES
Chief Complaint   Patient presents with   • Annual Exam         HPI:  Dee is a 66 y.o. here for Medicare Annual Wellness Visit    In the interim, patient had surgery done by orthopedist on 5/13/2021 for mechanical loosening of the prosthetic hip most likely from the fall.  She is currently doing physical therapy.  She is improving in terms of ambulation and her pain.    Patient Active Problem List    Diagnosis Date Noted   • Unilateral primary osteoarthritis, left hip 06/23/2021   • Mechanical loosening of prosthetic hip (HCC) 06/23/2021   • 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   • Lumbar radiculopathy 02/09/2018   • Obesity (BMI 30-39.9) 10/09/2017   • Obesity (BMI 35.0-39.9 without comorbidity) 04/03/2017   • Incomplete rotator cuff tear or rupture of right shoulder, not specified as traumatic 12/06/2016   • Coccidiomycosis, progressive 11/30/2016   • Coccidioidomycosis, pulmonary (HCC) 07/19/2016   • Anemia 02/18/2016   • Hypokalemia 02/18/2016   • DDD (degenerative disc disease), lumbar 02/06/2016   • Cough 05/29/2015   • Chest pain 04/06/2015   • Vitamin D deficiency 12/09/2013   • Obesity    • GERD (gastroesophageal reflux disease)    • Diverticulosis    • Low back pain        Current Outpatient Medications   Medication Sig Dispense Refill   • lansoprazole (PREVACID) 30 MG CAPSULE DELAYED RELEASE Take 1 Capsule by mouth every day. 90 Capsule 3   • gabapentin (NEURONTIN) 600 MG tablet TAKE 1 TABLET BY MOUTH  DAILY IN THE EVENING 90 Tablet 0   • lisinopril (PRINIVIL) 10 MG Tab TAKE 1 TABLET BY MOUTH  DAILY 90 tablet 3   • docusate sodium 100 MG Cap Take 100 mg by mouth 2 times a day. 20 capsule 2   • acetaminophen (TYLENOL) 325 MG Tab Take 650 mg by mouth 2 times a day as needed (HA). Indications: Pain     • amitriptyline (ELAVIL) 10 MG Tab TAKE 3 TABS BY MOUTH AT BEDTIME AS NEEDED. (Patient taking differently: Take 30 mg by mouth at bedtime as needed. TAKE 3 TABS BY MOUTH AT BEDTIME AS NEEDED.) 270 tablet  1   • alendronate (FOSAMAX) 70 MG Tab TAKE 1 TABLET BY MOUTH EVERY 7 DAYS 12 tablet 1   • asa/apap/caffeine (EXCEDRIN) 250-250-65 MG Tab Take 1 Tab by mouth every 6 hours as needed for Headache.     • Calcium Carbonate 1500 (600 Ca) MG Tab Take 1 tablet by mouth every day.     • Multiple Vitamin (MULTI VITAMIN DAILY PO) Take 1 tablet by mouth every day.     • meloxicam (MOBIC) 15 MG tablet Take 1 Tab by mouth every day. 30 Tab 2   • Cholecalciferol (VITAMIN D3) 2000 UNIT Cap Take 1 capsule by mouth every day.     • clobetasol (TEMOVATE) 0.05 % Cream Apply 1 Application topically as needed (Applies outside vaginal). Indications: Inflammation  1   • ascorbic acid (ASCORBIC ACID) 500 MG Tab Take 1,000 mg by mouth every day.     • SUMAtriptan (IMITREX) 50 MG Tab Take 1 Tab by mouth one time as needed for Migraine for up to 1 dose. May repeat dose X 1 if no improvement after 2 hours. 10 Tab 0     No current facility-administered medications for this visit.        Patient is taking medications as noted in medication list.  Current supplements as per medication list.     Allergies: Patient has no known allergies.    Current social contact/activities: watch baseball game, camping with trailer, retreat, movies    Is patient current with immunizations? No, due for FLU. Patient is interested in receiving FLU today.    She  reports that she quit smoking about 44 years ago. Her smoking use included cigarettes. She quit after 2.00 years of use. She has never used smokeless tobacco. She reports previous alcohol use. She reports that she does not use drugs.  Counseling given: Not Answered        DPA/Advanced directive: Patient has Advanced Directive on file.     ROS:    Gait: Uses no assistive device   Ostomy: No   Other tubes: No   Amputations: No   Chronic oxygen use No   Last eye exam 2020   Wears hearing aids: No   : Denies any urinary leakage during the last 6 months      Screening:        Depression Screening    Little  interest or pleasure in doing things?  0 - not at all  Feeling down, depressed, or hopeless? 0 - not at all  Patient Health Questionnaire Score: 0    If depressive symptoms identified deferred to follow up visit unless specifically addressed in assessment and plan.    Interpretation of PHQ-9 Total Score   Score Severity   1-4 No Depression   5-9 Mild Depression   10-14 Moderate Depression   15-19 Moderately Severe Depression   20-27 Severe Depression    Screening for Cognitive Impairment    Three Minute Recall (captain, garden, picture)  3/3    Jose Raul clock face with all 12 numbers and set the hands to show 5 past 8.  Yes    If cognitive concerns identified, deferred for follow up unless specifically addressed in assessment and plan.    Fall Risk Assessment    Has the patient had two or more falls in the last year or any fall with injury in the last year?  Yes  If fall risk identified, deferred for follow up unless specifically addressed in assessment and plan.    Safety Assessment    Throw rugs on floor.  Yes  Handrails on all stairs.  Yes  Good lighting in all hallways.  Yes  Difficulty hearing.  No  Patient counseled about all safety risks that were identified.    Functional Assessment ADLs    Are there any barriers preventing you from cooking for yourself or meeting nutritional needs?  No.    Are there any barriers preventing you from driving safely or obtaining transportation?  No.    Are there any barriers preventing you from using a telephone or calling for help?  No.    Are there any barriers preventing you from shopping?  No.    Are there any barriers preventing you from taking care of your own finances?  No.    Are there any barriers preventing you from managing your medications?  No.    Are there any barriers preventing you from showering, bathing or dressing yourself?  No.    Are you currently engaging in any exercise or physical activity?  No.     What is your perception of your health?  Fair.    Health  Maintenance Summary                Annual Wellness Visit Overdue 1955     IMM INFLUENZA Overdue 2021      Done 9/3/2020 Imm Admin: Influenza Vaccine Adult HD     Patient has more history with this topic...    COLORECTAL CANCER SCREENING Overdue 2021     PAP SMEAR Next Due 2022      Done 2019 sees gyn     Patient has more history with this topic...    MAMMOGRAM Next Due 2022      Done 2021 MA-SCREENING MAMMO BILAT W/TOMOSYNTHESIS W/CAD     Patient has more history with this topic...    BONE DENSITY Next Due 2023      Done 2018 DS-BONE DENSITY STUDY (DEXA)    IMM DTaP/Tdap/Td Vaccine Next Due 2024      Done 2014 Imm Admin: Tdap Vaccine          Patient Care Team:  Sherly Calvert M.D. as PCP - General (Family Medicine)    Social History     Tobacco Use   • Smoking status: Former Smoker     Years: 2.00     Types: Cigarettes     Quit date: 1977     Years since quittin.7   • Smokeless tobacco: Never Used   Vaping Use   • Vaping Use: Never used   Substance Use Topics   • Alcohol use: Not Currently     Alcohol/week: 0.0 oz   • Drug use: No     Family History   Problem Relation Age of Onset   • Cancer Mother         lymphoma   • Stroke Father    • Diabetes Father      She  has a past medical history of Arthritis, Coccidioidomycosis (12/15), Cough (2015), Diverticulosis, GERD (gastroesophageal reflux disease), Heart burn, Hypertension, Indigestion, LBP (low back pain), Lumbar radicular pain (2015), Muscle disorder, Obesity, Osteoporosis of forearm, and Pain.   Past Surgical History:   Procedure Laterality Date   • HIP REVISION TOTAL Left 2021    Procedure: REVISION, TOTAL ARTHROPLASTY, HIP.;  Surgeon: Amol Hogue M.D.;  Location: SURGERY [facility];  Service: Orthopedics   • PB TOTAL HIP ARTHROPLASTY Left 2019    Procedure: ARTHROPLASTY, HIP, TOTAL, ANTERIOR APPROACH;  Surgeon: Clarence Vallejo M.D.;  Location: [facility];   Service: Orthopedics   • INCISION HERNIA REPAIR Right 11/26/2018    Procedure: INCISION HERNIA REPAIR- FLANK WITH MESH;  Surgeon: Misael Ramírez M.D.;  Location: [facility];  Service: General   • HYSTERECTOMY ROBOTIC  6/7/2017    Procedure: HYSTERECTOMY ROBOTIC SI, BILATERAL SALPINGO-OOPHORECTOMY, URETERAL SACRAL LIGAMENT SHORTENING ;  Surgeon: Jerica Hooper M.D.;  Location: [facility];  Service:    • CYSTOSCOPY  6/7/2017    Procedure: CYSTOSCOPY;  Surgeon: Jerica Hooper M.D.;  Location: [facility];  Service:    • SHOULDER DECOMPRESSION ARTHROSCOPIC Right 12/6/2016    Procedure: SHOULDER DECOMPRESSION ARTHROSCOPIC - SUBACROMIAL, MANJARREZ;  Surgeon: Kyle Claros M.D.;  Location: [facility];  Service:    • CLAVICLE DISTAL EXCISION Right 12/6/2016    Procedure: CLAVICLE DISTAL EXCISION;  Surgeon: Kyle Claros M.D.;  Location: [facility];  Service:    • SHOULDER ARTHROSCOPY W/ ROTATOR CUFF REPAIR Right 12/6/2016    Procedure: SHOULDER ARTHROSCOPY W/ ROTATOR CUFF REPAIR ;  Surgeon: Kyle Claros M.D.;  Location: [facility];  Service:    • SHOULDER ARTHROSCOPY W/ BICIPITAL TENODESIS REPAIR Right 12/6/2016    Procedure: SHOULDER ARTHROSCOPY W/ BICIPITAL TENODESIS REPAIR ;  Surgeon: Kyle Claros M.D.;  Location: [facility];  Service:    • LUMBAR FUSION POSTERIOR  2/9/2016    Procedure: LUMBAR FUSION POSTERIOR PEDICLE SCREW FIXATION (STAGE #2);  Surgeon: Tim Rodriguez M.D.;  Location: [facility];  Service:    • LUMBAR LAMINECTOMY DISKECTOMY  2/9/2016    Procedure: LUMBAR LAMINECTOMY DISKECTOMY MINI OPEN;  Surgeon: Tim Rodriguez M.D.;  Location: [facility];  Service:    • EXTREME LATERAL INTERBODY FUSION Right 2/6/2016    Procedure: EXTREME LATERAL INTERBODY FUSION L3-4 (STAGE #1);  Surgeon: Tim Rodriguez M.D.;  Location: [facility];  Service:    • RECOVERY  12/8/2015    Procedure:  "CT-CT GUIDED LEFT LUNG BIOPSY-;  Surgeon: Recoveryonly Surgery;  Location: SURGERY PRE-POST PROC UNIT [facility];  Service:    • REDDY BY LAPAROSCOPY  4/17/2014    Performed by Ankur Lerner M.D. at SURGERY SAME DAY [facility]   • EGD WITH ASP/BX  2/4/2014    mild chronic inactive gastritis, scar gastric antrum-   • EGD WITH ASP/BX  9/29/2011    possible barrettes, hiatal hernia, gastritis   • COLONOSCOPY  9/29/2011    diverticulosis sigmoid colon, hemorrhoids   • ARTHROTOMY  9/3/2010    Performed by YARELY MORRISON at SURGERY SAME DAY [facility]   • ARTHRODESIS  9/3/2010    Performed by YARELY MORRISON at SURGERY SAME DAY [facility]   • ORTHOPEDIC OSTEOTOMY  9/3/2010    Performed by YARELY MORRISON at SURGERY SAME DAY [facility]   • BONE SPUR EXCISION  9/3/2010    Performed by YARELY MORRISON at SURGERY SAME DAY [facility]   • OTHER ABDOMINAL SURGERY  2000    COLON RESECTION   • LAMINOTOMY     • OTHER ORTHOPEDIC SURGERY      PARDEEP CARPAL TUNNEL RELEASES           Exam:     /82 (BP Location: Left arm, Patient Position: Sitting, BP Cuff Size: Adult)   Pulse (!) 112   Temp 36.2 °C (97.2 °F) (Temporal)   Resp 20   Ht 1.499 m (4' 11\")   Wt 85 kg (187 lb 6.3 oz)   SpO2 98%  Body mass index is 37.85 kg/m².    Hearing good.    Dentition good  Alert, oriented in no acute distress.  Eye contact is good, speech goal directed, affect calm  Constitutional:  Alert, awake, oriented, not in distress    Skin:                 Warm, no rashes in visible areas    Head:               Atraumatic, normocephalic    Eyes:               Pupils equal, round and reactive to light, extraocular muscles movement  intact, clear conjunctivae    Ears:               Left tympanic membrane intact without evidence of infection, right tympanic membrane not visible because of excessive cerumen in the canal    Nose:              No nasal discharge, no obstruction    Mouth:             No oral lesions    Throat:            " Tonsils not enlarged, no exudates    Neck:              Trachea midline, supple, no lymphadenopathy, no thyromegaly    Lungs:             Clear to auscultation, no rales, no wheezes, no rhonchi    Heart:              Regular rate and rhythm, no murmur    Abdomen:       Good bowel sounds, soft, nontender, no hepatosplenomegaly, no masses    Extremities:    No edema, no clubbing, no cyanosis    Psych:            Alert and oriented x3, normal affect    Neuro:            Cranial nerves intact, Motor strength 5/5 upper and lower extremities, DTRs 2+, sensation intact to light touch, negative Romberg    Assessment and Plan. The following treatment and monitoring plan is recommended:        1. Medicare annual wellness visit, initial  Negative screening for depression, dementia.  Up-to-date with all immunizations.  Aged out of for GYN exam/Pap smear.  The last mammogram was in June 2021 which came back no evidence of malignancy.  Her last colonoscopy was in September 2011 which came back diverticulosis otherwise no other abnormal findings.  She is due for another colonoscopy.  She will call her GI specialist office to schedule the colonoscopy.    2. Essential hypertension  Controlled on her blood pressure medication which is lisinopril 10 mg daily.    3. Dyslipidemia  She is managing this with her own efforts only.  The last blood work in January 2021 came back  and HDL 67.  We will do updated blood work.    4. Vitamin D deficiency  Last vitamin D level adequately replaced in March 2020.  Continue vitamin D supplementation and we will do updated level.    5. Osteoporosis of forearm  She continues to take Fosamax, calcium and vitamin D supplementation for osteoporosis of the left forearm and osteopenia of the left femur.  She will finish Fosamax in 2023.  We will do updated bone density scan.    6. Coccidioidomycosis, pulmonary (HCC)  This was treated with 1 year of Diflucan completed in November 2016.  She has left  upper lobe 2.4 cm mass which is unchanged.  This is followed by her pulmonary specialist with a yearly chest x-ray.  The last chest x-ray was in April 2021 which showed no change in the nodule in the left upper lung and no active disease.  Patient is currently asymptomatic.    7. Risk for falls  She has fallen discussed measures to avoid falls.    8. Class 2 severe obesity due to excess calories with serious comorbidity and body mass index (BMI) of 37.0 to 37.9 in adult (HCC)  She has lost 3 pounds since last visit.  Advised to continue to work on diet, exercise and weight loss.    9. Need for immunization against influenza  Flu shot was given today.    Services suggested: No services needed at this time  Health Care Screening recommendations as per orders if indicated.  Referrals offered: PT/OT/Nutrition counseling/Behavioral Health/Smoking cessation as per orders if indicated.    Discussion today about general wellness and lifestyle habits:    · Prevent falls and reduce trip hazards; Cautioned about securing or removing rugs.  · Have a working fire alarm and carbon monoxide detector;   · Engage in regular physical activity and social activities       Follow-up: Follow-up in 6 months or sooner if needed.      Please note that this dictation was created using voice recognition software. I have worked with consultants from the vendor as well as technical experts from [employer] to optimize the interface. I have made every reasonable attempt to correct obvious errors, but I expect that there are errors of grammar and possibly content I did not discover before finalizing the note.

## 2021-10-12 ENCOUNTER — HOSPITAL ENCOUNTER (EMERGENCY)
Facility: MEDICAL CENTER | Age: 66
End: 2021-10-12
Attending: EMERGENCY MEDICINE
Payer: MEDICARE

## 2021-10-12 ENCOUNTER — APPOINTMENT (OUTPATIENT)
Dept: RADIOLOGY | Facility: MEDICAL CENTER | Age: 66
End: 2021-10-12
Attending: EMERGENCY MEDICINE
Payer: MEDICARE

## 2021-10-12 VITALS
DIASTOLIC BLOOD PRESSURE: 51 MMHG | TEMPERATURE: 97.2 F | HEART RATE: 81 BPM | RESPIRATION RATE: 17 BRPM | OXYGEN SATURATION: 94 % | HEIGHT: 59 IN | BODY MASS INDEX: 37.85 KG/M2 | SYSTOLIC BLOOD PRESSURE: 127 MMHG

## 2021-10-12 DIAGNOSIS — M25.552 LEFT HIP PAIN: ICD-10-CM

## 2021-10-12 LAB
EKG IMPRESSION: NORMAL
TROPONIN T SERPL-MCNC: <6 NG/L (ref 6–19)

## 2021-10-12 PROCEDURE — 99285 EMERGENCY DEPT VISIT HI MDM: CPT

## 2021-10-12 PROCEDURE — A9270 NON-COVERED ITEM OR SERVICE: HCPCS | Performed by: EMERGENCY MEDICINE

## 2021-10-12 PROCEDURE — 700102 HCHG RX REV CODE 250 W/ 637 OVERRIDE(OP): Performed by: EMERGENCY MEDICINE

## 2021-10-12 PROCEDURE — 84484 ASSAY OF TROPONIN QUANT: CPT

## 2021-10-12 PROCEDURE — 700111 HCHG RX REV CODE 636 W/ 250 OVERRIDE (IP): Performed by: EMERGENCY MEDICINE

## 2021-10-12 PROCEDURE — 72170 X-RAY EXAM OF PELVIS: CPT

## 2021-10-12 PROCEDURE — 96374 THER/PROPH/DIAG INJ IV PUSH: CPT

## 2021-10-12 PROCEDURE — 93005 ELECTROCARDIOGRAM TRACING: CPT | Performed by: EMERGENCY MEDICINE

## 2021-10-12 PROCEDURE — 73700 CT LOWER EXTREMITY W/O DYE: CPT | Mod: LT,ME

## 2021-10-12 PROCEDURE — 73552 X-RAY EXAM OF FEMUR 2/>: CPT | Mod: LT

## 2021-10-12 PROCEDURE — 71045 X-RAY EXAM CHEST 1 VIEW: CPT

## 2021-10-12 RX ORDER — HYDROCODONE BITARTRATE AND ACETAMINOPHEN 5; 325 MG/1; MG/1
1 TABLET ORAL EVERY 4 HOURS PRN
Qty: 15 TABLET | Refills: 0 | Status: SHIPPED | OUTPATIENT
Start: 2021-10-12 | End: 2021-10-15

## 2021-10-12 RX ORDER — OXYCODONE HYDROCHLORIDE AND ACETAMINOPHEN 5; 325 MG/1; MG/1
1 TABLET ORAL EVERY 4 HOURS PRN
Status: DISCONTINUED | OUTPATIENT
Start: 2021-10-12 | End: 2021-10-12 | Stop reason: HOSPADM

## 2021-10-12 RX ADMIN — OXYCODONE AND ACETAMINOPHEN 1 TABLET: 5; 325 TABLET ORAL at 21:15

## 2021-10-12 RX ADMIN — FENTANYL CITRATE 50 MCG: 50 INJECTION, SOLUTION INTRAMUSCULAR; INTRAVENOUS at 17:59

## 2021-10-12 RX ADMIN — OXYCODONE AND ACETAMINOPHEN 1 TABLET: 5; 325 TABLET ORAL at 16:41

## 2021-10-12 NOTE — ED TRIAGE NOTES
Chief Complaint   Patient presents with   • Hip Pain   • Shoulder Pain   • Fall      pt had a fall today when trying to get up a small step. Pt state she fell backwards hurting her back, right shoulder, and left hip. Pt states she has had a total hip surgery on same hip. Unable to bear weight. Did not have LOC, not on blood thinners.     Has this patient been vaccinated for COVID yes

## 2021-10-13 ENCOUNTER — TELEPHONE (OUTPATIENT)
Dept: MEDICAL GROUP | Facility: PHYSICIAN GROUP | Age: 66
End: 2021-10-13

## 2021-10-13 NOTE — TELEPHONE ENCOUNTER
Phone conversation with patient, offered to schedule her visit with her primary care provider following her ED visit on 10/12/21, patient declined at this time, she plans to see Dr. Hogue at the [facility] first.

## 2021-10-13 NOTE — DISCHARGE INSTRUCTIONS
Please follow-up with your primary care physician as well as Dr. Shankar for further evaluation.  Did have chest discomfort here in the emergency department but was musculoskeletal and I do not believe it is cardiac in nature.  Please return to the emerge    Severe pain, nausea, vomiting, loss of sensation or strength arms or legs.  Although CT scans do not reveal evidence of an acute fracture is possible that you have a fracture that we cannot see.  For this reason I do recommend you follow-up with your surgeon for further evaluation and management.

## 2021-10-13 NOTE — ED NOTES
Pt rounding, vs as charted, placed on heart moniter due to previous report of chest pain. Attempted to stand-able to do so, though c/o left hip pain. erp aware, order for ct obtained.pt aware of plan.

## 2021-10-13 NOTE — ED NOTES
While in imaging pt c/o sudden o set of L side chest pain, describes pain as constant, denies any radiation, nausea, SOB, or diaphoresis; pt brought back to ED room 16, EKG done, ERP aware

## 2021-10-13 NOTE — ED NOTES
Dc completed with prescription to [facility] in [place] per pt request. To f/u with pcp and ortho, return for worsening s/s

## 2021-10-13 NOTE — ED NOTES
"Vs as charted upon return from ct. Per lab at [facility], d dimer has not been recvd, per que at [facility], spec still here \"freezer and will be picked up at any time\". erp aware of same   "

## 2021-11-04 DIAGNOSIS — R25.2 LEG CRAMPS: ICD-10-CM

## 2021-11-05 RX ORDER — GABAPENTIN 600 MG/1
TABLET ORAL
Qty: 90 TABLET | Refills: 1 | Status: SHIPPED | OUTPATIENT
Start: 2021-11-05 | End: 2022-04-21

## 2021-11-08 ENCOUNTER — HOSPITAL ENCOUNTER (OUTPATIENT)
Dept: RADIOLOGY | Facility: MEDICAL CENTER | Age: 66
End: 2021-11-08
Attending: FAMILY MEDICINE
Payer: MEDICARE

## 2021-11-08 DIAGNOSIS — M81.0 OSTEOPOROSIS OF FOREARM: ICD-10-CM

## 2021-11-08 PROCEDURE — 77080 DXA BONE DENSITY AXIAL: CPT

## 2021-11-10 NOTE — TELEPHONE ENCOUNTER
Was the patient seen in the last year in this department? Yes    Does patient have an active prescription for medications requested? No     Received Request Via: Pharmacy  
6

## 2021-11-29 ENCOUNTER — HOSPITAL ENCOUNTER (OUTPATIENT)
Dept: LAB | Facility: MEDICAL CENTER | Age: 66
End: 2021-11-29
Attending: FAMILY MEDICINE
Payer: MEDICARE

## 2021-11-29 DIAGNOSIS — I10 ESSENTIAL HYPERTENSION: ICD-10-CM

## 2021-11-29 DIAGNOSIS — B38.2 COCCIDIOIDOMYCOSIS, PULMONARY (HCC): ICD-10-CM

## 2021-11-29 DIAGNOSIS — E78.5 DYSLIPIDEMIA: ICD-10-CM

## 2021-11-29 DIAGNOSIS — E55.9 VITAMIN D DEFICIENCY: ICD-10-CM

## 2021-11-29 DIAGNOSIS — M81.0 OSTEOPOROSIS OF FOREARM: ICD-10-CM

## 2021-11-29 LAB
ALBUMIN SERPL BCP-MCNC: 4.2 G/DL (ref 3.2–4.9)
ALBUMIN/GLOB SERPL: 1.9 G/DL
ALP SERPL-CCNC: 72 U/L (ref 30–99)
ALT SERPL-CCNC: 16 U/L (ref 2–50)
ANION GAP SERPL CALC-SCNC: 9 MMOL/L (ref 7–16)
AST SERPL-CCNC: 13 U/L (ref 12–45)
BASOPHILS # BLD AUTO: 1.2 % (ref 0–1.8)
BASOPHILS # BLD: 0.05 K/UL (ref 0–0.12)
BILIRUB SERPL-MCNC: 0.4 MG/DL (ref 0.1–1.5)
BUN SERPL-MCNC: 19 MG/DL (ref 8–22)
CALCIUM SERPL-MCNC: 9.6 MG/DL (ref 8.5–10.5)
CHLORIDE SERPL-SCNC: 103 MMOL/L (ref 96–112)
CHOLEST SERPL-MCNC: 222 MG/DL (ref 100–199)
CO2 SERPL-SCNC: 27 MMOL/L (ref 20–33)
CREAT SERPL-MCNC: 0.65 MG/DL (ref 0.5–1.4)
EOSINOPHIL # BLD AUTO: 0.13 K/UL (ref 0–0.51)
EOSINOPHIL NFR BLD: 3.1 % (ref 0–6.9)
ERYTHROCYTE [DISTWIDTH] IN BLOOD BY AUTOMATED COUNT: 47 FL (ref 35.9–50)
FASTING STATUS PATIENT QL REPORTED: NORMAL
GLOBULIN SER CALC-MCNC: 2.2 G/DL (ref 1.9–3.5)
GLUCOSE SERPL-MCNC: 97 MG/DL (ref 65–99)
HCT VFR BLD AUTO: 43.5 % (ref 37–47)
HDLC SERPL-MCNC: 57 MG/DL
HGB BLD-MCNC: 14.5 G/DL (ref 12–16)
IMM GRANULOCYTES # BLD AUTO: 0.01 K/UL (ref 0–0.11)
IMM GRANULOCYTES NFR BLD AUTO: 0.2 % (ref 0–0.9)
LDLC SERPL CALC-MCNC: 127 MG/DL
LYMPHOCYTES # BLD AUTO: 1.63 K/UL (ref 1–4.8)
LYMPHOCYTES NFR BLD: 38.4 % (ref 22–41)
MCH RBC QN AUTO: 32.5 PG (ref 27–33)
MCHC RBC AUTO-ENTMCNC: 33.3 G/DL (ref 33.6–35)
MCV RBC AUTO: 97.5 FL (ref 81.4–97.8)
MONOCYTES # BLD AUTO: 0.33 K/UL (ref 0–0.85)
MONOCYTES NFR BLD AUTO: 7.8 % (ref 0–13.4)
NEUTROPHILS # BLD AUTO: 2.09 K/UL (ref 2–7.15)
NEUTROPHILS NFR BLD: 49.3 % (ref 44–72)
NRBC # BLD AUTO: 0 K/UL
NRBC BLD-RTO: 0 /100 WBC
PLATELET # BLD AUTO: 297 K/UL (ref 164–446)
PMV BLD AUTO: 9.8 FL (ref 9–12.9)
POTASSIUM SERPL-SCNC: 4.4 MMOL/L (ref 3.6–5.5)
PROT SERPL-MCNC: 6.4 G/DL (ref 6–8.2)
RBC # BLD AUTO: 4.46 M/UL (ref 4.2–5.4)
SODIUM SERPL-SCNC: 139 MMOL/L (ref 135–145)
TRIGL SERPL-MCNC: 188 MG/DL (ref 0–149)
WBC # BLD AUTO: 4.2 K/UL (ref 4.8–10.8)

## 2021-11-29 PROCEDURE — 85025 COMPLETE CBC W/AUTO DIFF WBC: CPT

## 2021-11-29 PROCEDURE — 80053 COMPREHEN METABOLIC PANEL: CPT

## 2021-11-29 PROCEDURE — 36415 COLL VENOUS BLD VENIPUNCTURE: CPT

## 2021-11-29 PROCEDURE — 82306 VITAMIN D 25 HYDROXY: CPT

## 2021-11-29 PROCEDURE — 80061 LIPID PANEL: CPT

## 2021-12-01 DIAGNOSIS — E78.5 DYSLIPIDEMIA: ICD-10-CM

## 2021-12-01 LAB — 25(OH)D3 SERPL-MCNC: 36 NG/ML (ref 30–80)

## 2021-12-01 RX ORDER — ATORVASTATIN CALCIUM 20 MG/1
20 TABLET, FILM COATED ORAL EVERY EVENING
Qty: 90 TABLET | Refills: 1 | Status: SHIPPED | OUTPATIENT
Start: 2021-12-01 | End: 2022-05-17

## 2021-12-14 NOTE — OR NURSING
Caller: AKOSUA YOUSSEF    Relationship to patient: SELF    Best call back number:     Patient is needing: PATIENT CALLED - REQ TO SPEAK TO April..Per PATIENT, SHE HAS BEEN TRYING SINCE LAST Thursday TO SCHEDULE SHOULDER INJ (WC RELATED).  PER PATIENT, SHE SAID WAS TOLD COULDN'T HAVE ANOTHER SHOULDER INJ TIL JAN 2022.  PER PATIENT, THERE HAS BEEN A MIX UP AND HASN'T HAD INJ IN SHOULDER APPROX FOR 8 MONTHS....REQ CALL BACK ASAP, TO HAVE THIS LOOKED INTO AND CALLED BACK.  HER CHART DOES SHOW SHE IS SCHEDULED FOR F/U IN JAN 2022.   Lab results called and faxed to Dr Vallejo

## 2022-03-04 ENCOUNTER — HOSPITAL ENCOUNTER (OUTPATIENT)
Dept: LAB | Facility: MEDICAL CENTER | Age: 67
End: 2022-03-04
Attending: FAMILY MEDICINE
Payer: MEDICARE

## 2022-03-04 DIAGNOSIS — E78.5 DYSLIPIDEMIA: ICD-10-CM

## 2022-03-04 LAB
ALBUMIN SERPL BCP-MCNC: 4.4 G/DL (ref 3.2–4.9)
ALBUMIN/GLOB SERPL: 1.6 G/DL
ALP SERPL-CCNC: 98 U/L (ref 30–99)
ALT SERPL-CCNC: 19 U/L (ref 2–50)
ANION GAP SERPL CALC-SCNC: 13 MMOL/L (ref 7–16)
AST SERPL-CCNC: 19 U/L (ref 12–45)
BILIRUB SERPL-MCNC: 0.6 MG/DL (ref 0.1–1.5)
BUN SERPL-MCNC: 14 MG/DL (ref 8–22)
CALCIUM SERPL-MCNC: 9.8 MG/DL (ref 8.5–10.5)
CHLORIDE SERPL-SCNC: 104 MMOL/L (ref 96–112)
CHOLEST SERPL-MCNC: 183 MG/DL (ref 100–199)
CO2 SERPL-SCNC: 24 MMOL/L (ref 20–33)
CREAT SERPL-MCNC: 0.67 MG/DL (ref 0.5–1.4)
FASTING STATUS PATIENT QL REPORTED: NORMAL
GLOBULIN SER CALC-MCNC: 2.8 G/DL (ref 1.9–3.5)
GLUCOSE SERPL-MCNC: 94 MG/DL (ref 65–99)
HDLC SERPL-MCNC: 59 MG/DL
LDLC SERPL CALC-MCNC: 86 MG/DL
POTASSIUM SERPL-SCNC: 4.2 MMOL/L (ref 3.6–5.5)
PROT SERPL-MCNC: 7.2 G/DL (ref 6–8.2)
SODIUM SERPL-SCNC: 141 MMOL/L (ref 135–145)
TRIGL SERPL-MCNC: 190 MG/DL (ref 0–149)

## 2022-03-04 PROCEDURE — 80061 LIPID PANEL: CPT

## 2022-03-04 PROCEDURE — 80053 COMPREHEN METABOLIC PANEL: CPT

## 2022-03-04 PROCEDURE — 36415 COLL VENOUS BLD VENIPUNCTURE: CPT

## 2022-03-09 ENCOUNTER — OFFICE VISIT (OUTPATIENT)
Dept: MEDICAL GROUP | Facility: PHYSICIAN GROUP | Age: 67
End: 2022-03-09
Payer: MEDICARE

## 2022-03-09 VITALS
BODY MASS INDEX: 37.82 KG/M2 | TEMPERATURE: 98.1 F | DIASTOLIC BLOOD PRESSURE: 70 MMHG | OXYGEN SATURATION: 95 % | WEIGHT: 187.61 LBS | SYSTOLIC BLOOD PRESSURE: 110 MMHG | HEART RATE: 104 BPM | HEIGHT: 59 IN

## 2022-03-09 DIAGNOSIS — M81.0 OSTEOPOROSIS OF FOREARM: ICD-10-CM

## 2022-03-09 DIAGNOSIS — G25.81 RLS (RESTLESS LEGS SYNDROME): ICD-10-CM

## 2022-03-09 DIAGNOSIS — E78.5 DYSLIPIDEMIA: ICD-10-CM

## 2022-03-09 DIAGNOSIS — H00.15 CHALAZION LEFT LOWER EYELID: ICD-10-CM

## 2022-03-09 DIAGNOSIS — I10 ESSENTIAL HYPERTENSION: ICD-10-CM

## 2022-03-09 DIAGNOSIS — E55.9 VITAMIN D DEFICIENCY: ICD-10-CM

## 2022-03-09 PROCEDURE — 99214 OFFICE O/P EST MOD 30 MIN: CPT | Performed by: FAMILY MEDICINE

## 2022-03-09 RX ORDER — SUMATRIPTAN 25 MG/1
25-100 TABLET, FILM COATED ORAL
COMMUNITY
End: 2023-05-03

## 2022-03-09 RX ORDER — GABAPENTIN 100 MG/1
100 CAPSULE ORAL
Qty: 30 CAPSULE | Refills: 0 | Status: SHIPPED | OUTPATIENT
Start: 2022-03-09 | End: 2022-11-03

## 2022-03-09 RX ORDER — CLONAZEPAM 0.5 MG/1
TABLET ORAL 2 TIMES DAILY
COMMUNITY
End: 2022-08-30

## 2022-03-09 ASSESSMENT — FIBROSIS 4 INDEX: FIB4 SCORE: 1

## 2022-03-09 ASSESSMENT — PATIENT HEALTH QUESTIONNAIRE - PHQ9: CLINICAL INTERPRETATION OF PHQ2 SCORE: 0

## 2022-03-09 NOTE — PROGRESS NOTES
"Subjective     Dee Armstrong is a 66 y.o. female who presents with Hypertension            HPI      Patient returns for follow-up of her medical problems.    In terms of her hypertension, this is under control on lisinopril without side effects.    She continues to take atorvastatin for dyslipidemia without myalgias.    She continues take vitamin D supplementation for vitamin D deficiency.    She has been on Fosamax for osteoporosis involving the forearm.  She will finish the treatment in 2023.  She is on calcium and vitamin D supplementation.  The last bone density scan in November 2021 came back worsening of the score of the forearm with normal hip score.    She has been taking gabapentin 600 mg at night for leg cramps with good results.  She said she gets restless leg about 1-2 times a month.  She is asking for clonazepam which she was taking before for the restless leg.    She said she had a stye on the left lower lid which started 3 months ago and which she managed with conservative measures such as warm compresses.  This is significantly improved but not completely gone.  She still has a small bump in the left lower lid although there is no more pain.    Past medical history, past surgical history, family history reviewed-no changes    Social history reviewed-no changes    Allergies reviewed-no changes    Medications reviewed-no changes      ROS     As per HPI, the rest are negative.           Objective     /70 (BP Location: Left arm, Patient Position: Sitting, BP Cuff Size: Adult)   Pulse (!) 104   Temp 36.7 °C (98.1 °F) (Temporal)   Ht 1.499 m (4' 11\")   Wt 85.1 kg (187 lb 9.8 oz)   SpO2 95%   BMI 37.89 kg/m²      Physical Exam     Examined alert, awake, oriented, not in distress    Eyes-small nodule/mass left lower lid which is hardly noticeable on inspection but on palpation it is about 3 mm in size, no overlying erythema of the skin or pointing, no tenderness  Neck-supple, no " lymphadenopathy, no thyromegaly  Lungs-clear to auscultation, no rales, no wheezes  Heart-regular rate and rhythm, no murmur  Extremities-no edema, clubbing, cyanosis             Hospital Outpatient Visit on 03/04/2022   Component Date Value Ref Range Status   • Sodium 03/04/2022 141  135 - 145 mmol/L Final   • Potassium 03/04/2022 4.2  3.6 - 5.5 mmol/L Final   • Chloride 03/04/2022 104  96 - 112 mmol/L Final   • Co2 03/04/2022 24  20 - 33 mmol/L Final   • Anion Gap 03/04/2022 13.0  7.0 - 16.0 Final   • Glucose 03/04/2022 94  65 - 99 mg/dL Final   • Bun 03/04/2022 14  8 - 22 mg/dL Final   • Creatinine 03/04/2022 0.67  0.50 - 1.40 mg/dL Final   • Calcium 03/04/2022 9.8  8.5 - 10.5 mg/dL Final   • AST(SGOT) 03/04/2022 19  12 - 45 U/L Final   • ALT(SGPT) 03/04/2022 19  2 - 50 U/L Final   • Alkaline Phosphatase 03/04/2022 98  30 - 99 U/L Final   • Total Bilirubin 03/04/2022 0.6  0.1 - 1.5 mg/dL Final   • Albumin 03/04/2022 4.4  3.2 - 4.9 g/dL Final   • Total Protein 03/04/2022 7.2  6.0 - 8.2 g/dL Final   • Globulin 03/04/2022 2.8  1.9 - 3.5 g/dL Final   • A-G Ratio 03/04/2022 1.6  g/dL Final   • Cholesterol,Tot 03/04/2022 183  100 - 199 mg/dL Final   • Triglycerides 03/04/2022 190 (A) 0 - 149 mg/dL Final   • HDL 03/04/2022 59  >=40 mg/dL Final   • LDL 03/04/2022 86  <100 mg/dL Final   • Fasting Status 03/04/2022 Fasting   Final   • GFR If  03/04/2022 >60  >60 mL/min/1.73 m 2 Final   • GFR If Non  03/04/2022 >60  >60 mL/min/1.73 m 2 Final                  Assessment & Plan        1. Essential hypertension  Controlled on her medication.  Continue lisinopril.  - Lipid Profile; Future  - Comp Metabolic Panel; Future  - CBC WITH DIFFERENTIAL; Future  - VITAMIN D,25 HYDROXY; Future    2. Dyslipidemia  Triglycerides are higher than before but advised avoidance of sweets and decreasing carbs.  LDL improved and this is now at goal.  Continue atorvastatin.  Advised regular exercises and keeping a  healthy weight.  - Lipid Profile; Future  - Comp Metabolic Panel; Future  - CBC WITH DIFFERENTIAL; Future  - VITAMIN D,25 HYDROXY; Future    3. Vitamin D deficiency  Adequately replaced per last vitamin D level in November 2021.  Continue vitamin D supplementation and we will do updated vitamin D level next visit..  - Lipid Profile; Future  - Comp Metabolic Panel; Future  - CBC WITH DIFFERENTIAL; Future  - VITAMIN D,25 HYDROXY; Future    4. Osteoporosis of forearm  I will have her stay on Fosamax, calcium and vitamin D supplementation through the 2023.  We will do updated bone density scan next year.  Advised weightbearing exercises.  We will consider extending alendronate for about 2 years if we do not see any improvement of the scores at that time.    5. RLS (restless legs syndrome)  I told the patient that clonazepam is a controlled substance and there is potential for abuse, dependence, tolerance.  I told her we will try giving her extra gabapentin 100 mg during episodes of restless leg syndrome which happens about 1 to 2 times a month only.  She agrees with the plan of care.  She will still continue taking her nighttime dose of gabapentin 600 mg regularly.  - gabapentin (NEURONTIN) 100 MG Cap; Take 1 Capsule by mouth at bedtime as needed.  Dispense: 30 Capsule; Refill: 0    6. Chalazion left lower eyelid  She has received well small mass/nodule left lower lid from previous stye.  This is hardly noticeable.  I told the patient to continue doing warm compresses.  I expect that this should clear completely.  Patient is now asymptomatic.    Follow-up in 6 months.      Please note that this dictation was created using voice recognition software. I have worked with consultants from the vendor as well as technical experts from [employer] to optimize the interface. I have made every reasonable attempt to correct obvious errors, but I expect that there are errors of grammar and possibly content I did not discover  before finalizing the note.

## 2022-03-23 RX ORDER — ALENDRONATE SODIUM 70 MG/1
TABLET ORAL
Qty: 12 TABLET | Refills: 0 | Status: SHIPPED | OUTPATIENT
Start: 2022-03-23 | End: 2022-06-10

## 2022-04-03 RX ORDER — AMITRIPTYLINE HYDROCHLORIDE 10 MG/1
TABLET, FILM COATED ORAL
Qty: 270 TABLET | Refills: 3 | Status: SHIPPED | OUTPATIENT
Start: 2022-04-03 | End: 2023-02-15 | Stop reason: SDUPTHER

## 2022-05-17 DIAGNOSIS — E78.5 DYSLIPIDEMIA: ICD-10-CM

## 2022-05-17 RX ORDER — ATORVASTATIN CALCIUM 20 MG/1
TABLET, FILM COATED ORAL
Qty: 90 TABLET | Refills: 1 | Status: SHIPPED | OUTPATIENT
Start: 2022-05-17 | End: 2022-08-30 | Stop reason: SDUPTHER

## 2022-06-10 RX ORDER — ALENDRONATE SODIUM 70 MG/1
TABLET ORAL
Qty: 12 TABLET | Refills: 3 | Status: SHIPPED | OUTPATIENT
Start: 2022-06-10 | End: 2023-04-26 | Stop reason: SDUPTHER

## 2022-06-16 ENCOUNTER — HOSPITAL ENCOUNTER (OUTPATIENT)
Dept: RADIOLOGY | Facility: MEDICAL CENTER | Age: 67
End: 2022-06-16
Attending: FAMILY MEDICINE
Payer: MEDICARE

## 2022-06-16 DIAGNOSIS — Z12.31 VISIT FOR SCREENING MAMMOGRAM: ICD-10-CM

## 2022-06-16 PROCEDURE — 77063 BREAST TOMOSYNTHESIS BI: CPT

## 2022-08-08 ENCOUNTER — TELEPHONE (OUTPATIENT)
Dept: SCHEDULING | Facility: IMAGING CENTER | Age: 67
End: 2022-08-08
Payer: MEDICARE

## 2022-08-08 NOTE — TELEPHONE ENCOUNTER
Hello,    Pt requests a call back at [phone] to discuss putting an order for an X-Ray in.    Thank you,  Susan CULLEN

## 2022-08-09 NOTE — TELEPHONE ENCOUNTER
I left a message with the patient spouse in regards to needing an appointment for her chest xray order.

## 2022-08-10 ENCOUNTER — TELEPHONE (OUTPATIENT)
Dept: SLEEP MEDICINE | Facility: MEDICAL CENTER | Age: 67
End: 2022-08-10
Payer: MEDICARE

## 2022-08-10 DIAGNOSIS — B38.9 COCCIDIOMYCOSIS, PROGRESSIVE: ICD-10-CM

## 2022-08-10 NOTE — TELEPHONE ENCOUNTER
The patient called and stated that she has an appt with you in September and she needs to get a chest xray before the visit.  She had Valley Fever and she gets a yearly xray before her yearly visit.  Please advise, thank you

## 2022-08-25 ENCOUNTER — HOSPITAL ENCOUNTER (OUTPATIENT)
Dept: LAB | Facility: MEDICAL CENTER | Age: 67
End: 2022-08-25
Attending: FAMILY MEDICINE
Payer: MEDICARE

## 2022-08-25 DIAGNOSIS — E78.5 DYSLIPIDEMIA: ICD-10-CM

## 2022-08-25 DIAGNOSIS — E55.9 VITAMIN D DEFICIENCY: ICD-10-CM

## 2022-08-25 DIAGNOSIS — I10 ESSENTIAL HYPERTENSION: ICD-10-CM

## 2022-08-25 LAB
25(OH)D3 SERPL-MCNC: 40 NG/ML (ref 30–100)
ALBUMIN SERPL BCP-MCNC: 3.9 G/DL (ref 3.2–4.9)
ALBUMIN/GLOB SERPL: 1.3 G/DL
ALP SERPL-CCNC: 75 U/L (ref 30–99)
ALT SERPL-CCNC: 17 U/L (ref 2–50)
ANION GAP SERPL CALC-SCNC: 12 MMOL/L (ref 7–16)
AST SERPL-CCNC: 18 U/L (ref 12–45)
BASOPHILS # BLD AUTO: 1.7 % (ref 0–1.8)
BASOPHILS # BLD: 0.06 K/UL (ref 0–0.12)
BILIRUB SERPL-MCNC: 0.8 MG/DL (ref 0.1–1.5)
BUN SERPL-MCNC: 20 MG/DL (ref 8–22)
CALCIUM SERPL-MCNC: 9.2 MG/DL (ref 8.5–10.5)
CHLORIDE SERPL-SCNC: 101 MMOL/L (ref 96–112)
CHOLEST SERPL-MCNC: 141 MG/DL (ref 100–199)
CO2 SERPL-SCNC: 25 MMOL/L (ref 20–33)
CREAT SERPL-MCNC: 0.81 MG/DL (ref 0.5–1.4)
EOSINOPHIL # BLD AUTO: 0.17 K/UL (ref 0–0.51)
EOSINOPHIL NFR BLD: 4.9 % (ref 0–6.9)
ERYTHROCYTE [DISTWIDTH] IN BLOOD BY AUTOMATED COUNT: 46.8 FL (ref 35.9–50)
FASTING STATUS PATIENT QL REPORTED: NORMAL
GFR SERPLBLD CREATININE-BSD FMLA CKD-EPI: 78 ML/MIN/1.73 M 2
GLOBULIN SER CALC-MCNC: 3 G/DL (ref 1.9–3.5)
GLUCOSE SERPL-MCNC: 90 MG/DL (ref 65–99)
HCT VFR BLD AUTO: 45.3 % (ref 37–47)
HDLC SERPL-MCNC: 54 MG/DL
HGB BLD-MCNC: 14.9 G/DL (ref 12–16)
IMM GRANULOCYTES # BLD AUTO: 0.01 K/UL (ref 0–0.11)
IMM GRANULOCYTES NFR BLD AUTO: 0.3 % (ref 0–0.9)
LDLC SERPL CALC-MCNC: 64 MG/DL
LYMPHOCYTES # BLD AUTO: 1.65 K/UL (ref 1–4.8)
LYMPHOCYTES NFR BLD: 47.6 % (ref 22–41)
MCH RBC QN AUTO: 32.4 PG (ref 27–33)
MCHC RBC AUTO-ENTMCNC: 32.9 G/DL (ref 33.6–35)
MCV RBC AUTO: 98.5 FL (ref 81.4–97.8)
MONOCYTES # BLD AUTO: 0.33 K/UL (ref 0–0.85)
MONOCYTES NFR BLD AUTO: 9.5 % (ref 0–13.4)
NEUTROPHILS # BLD AUTO: 1.25 K/UL (ref 2–7.15)
NEUTROPHILS NFR BLD: 36 % (ref 44–72)
NRBC # BLD AUTO: 0 K/UL
NRBC BLD-RTO: 0 /100 WBC
PLATELET # BLD AUTO: 267 K/UL (ref 164–446)
PMV BLD AUTO: 10 FL (ref 9–12.9)
POTASSIUM SERPL-SCNC: 4.2 MMOL/L (ref 3.6–5.5)
PROT SERPL-MCNC: 6.9 G/DL (ref 6–8.2)
RBC # BLD AUTO: 4.6 M/UL (ref 4.2–5.4)
SODIUM SERPL-SCNC: 138 MMOL/L (ref 135–145)
TRIGL SERPL-MCNC: 116 MG/DL (ref 0–149)
WBC # BLD AUTO: 3.5 K/UL (ref 4.8–10.8)

## 2022-08-25 PROCEDURE — 82306 VITAMIN D 25 HYDROXY: CPT

## 2022-08-25 PROCEDURE — 85025 COMPLETE CBC W/AUTO DIFF WBC: CPT

## 2022-08-25 PROCEDURE — 36415 COLL VENOUS BLD VENIPUNCTURE: CPT

## 2022-08-25 PROCEDURE — 80053 COMPREHEN METABOLIC PANEL: CPT

## 2022-08-25 PROCEDURE — 80061 LIPID PANEL: CPT

## 2022-08-29 ENCOUNTER — HOSPITAL ENCOUNTER (OUTPATIENT)
Dept: RADIOLOGY | Facility: MEDICAL CENTER | Age: 67
End: 2022-08-29
Attending: NURSE PRACTITIONER
Payer: MEDICARE

## 2022-08-29 DIAGNOSIS — B38.9 COCCIDIOMYCOSIS, PROGRESSIVE: ICD-10-CM

## 2022-08-29 PROCEDURE — 71046 X-RAY EXAM CHEST 2 VIEWS: CPT

## 2022-08-30 ENCOUNTER — OFFICE VISIT (OUTPATIENT)
Dept: MEDICAL GROUP | Facility: PHYSICIAN GROUP | Age: 67
End: 2022-08-30
Payer: MEDICARE

## 2022-08-30 VITALS
SYSTOLIC BLOOD PRESSURE: 100 MMHG | DIASTOLIC BLOOD PRESSURE: 60 MMHG | HEIGHT: 59 IN | BODY MASS INDEX: 37.73 KG/M2 | WEIGHT: 187.17 LBS | HEART RATE: 99 BPM | OXYGEN SATURATION: 97 % | TEMPERATURE: 97.2 F

## 2022-08-30 DIAGNOSIS — E66.9 OBESITY (BMI 30-39.9): ICD-10-CM

## 2022-08-30 DIAGNOSIS — I10 ESSENTIAL HYPERTENSION: ICD-10-CM

## 2022-08-30 DIAGNOSIS — E78.5 DYSLIPIDEMIA: ICD-10-CM

## 2022-08-30 DIAGNOSIS — E55.9 VITAMIN D DEFICIENCY: ICD-10-CM

## 2022-08-30 DIAGNOSIS — M81.0 OSTEOPOROSIS OF FOREARM: ICD-10-CM

## 2022-08-30 DIAGNOSIS — B38.2 COCCIDIOIDOMYCOSIS, PULMONARY (HCC): ICD-10-CM

## 2022-08-30 PROBLEM — N20.0 KIDNEY STONE: Status: ACTIVE | Noted: 2022-04-19

## 2022-08-30 PROCEDURE — 99214 OFFICE O/P EST MOD 30 MIN: CPT | Performed by: FAMILY MEDICINE

## 2022-08-30 RX ORDER — AMOXICILLIN 500 MG/1
CAPSULE ORAL
COMMUNITY
Start: 2022-08-30 | End: 2022-11-03

## 2022-08-30 RX ORDER — ATORVASTATIN CALCIUM 20 MG/1
20 TABLET, FILM COATED ORAL EVERY EVENING
Qty: 90 TABLET | Refills: 1 | Status: SHIPPED | OUTPATIENT
Start: 2022-08-30 | End: 2023-05-01 | Stop reason: SDUPTHER

## 2022-08-30 ASSESSMENT — FIBROSIS 4 INDEX: FIB4 SCORE: 1.13

## 2022-08-31 NOTE — PROGRESS NOTES
"Subjective     Dee Armstrong is a 67 y.o. female who presents with Hypertension            HPI    Patient is here for follow-up of her medical problems.    She continues to take lisinopril for hypertension with good control of the blood pressure.    She continues to take atorvastatin for dyslipidemia without myalgias.    For vitamin D deficiency, she continues to take vitamin D supplementation regularly.    In terms of osteoporosis, she continues to take alendronate and she will finish this in 2023.  She is also on calcium and vitamin D supplementation.    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  She is followed closely by pulmonary specialist with yearly chest x-ray.  She had most recent chest x-ray done yesterday which came back no evidence of acute cardiopulmonary disease and grossly stable appearance of left upper lobe pulmonary nodule biopsy-proven residua of coccidioidomycosis.    She has been dealing with lower back pain for years.  She already had lumbar spine surgery in 2016 done by [facility].  She said she will to get this reevaluated because this has been more prominent recently.    Past medical history, past surgical history, family history reviewed-no changes    Social history reviewed-no changes    Allergies reviewed-no changes    Medications reviewed-no changes    ROS    As per HPI, the rest are negative           Objective     /60 (BP Location: Left arm, Patient Position: Sitting, BP Cuff Size: Adult)   Pulse 99   Temp 36.2 °C (97.2 °F) (Temporal)   Ht 1.499 m (4' 11\")   Wt 84.9 kg (187 lb 2.7 oz)   SpO2 97%   BMI 37.80 kg/m²      Physical Exam    Examined alert, awake, oriented, not in distress    Neck-supple, no lymphadenopathy, no thyromegaly  Lungs-clear to auscultation, no rales, no wheezes  Heart-regular rate and rhythm, no murmur  Extremities-no edema, clubbing, cyanosis     "         Hospital Outpatient Visit on 08/25/2022   Component Date Value Ref Range Status    25-Hydroxy   Vitamin D 25 08/25/2022 40  30 - 100 ng/mL Final    Comment: Adult Ranges:   <20 ng/mL - Deficiency  20-29 ng/mL - Insufficiency   ng/mL - Sufficiency  Effective 3/18/2020, this electrochemiluminescence binding assay is  performed using Roche daysi e immunoassay analyzer.  The [employer] Vitamin D  total II assay is intended for the quantitative determination of total 25  hydroxyvitamin D in human serum and plasma. This assay is to be used as an  aid in the assessment of vitamin D sufficiency in adults.      WBC 08/25/2022 3.5 (A) 4.8 - 10.8 K/uL Final    RBC 08/25/2022 4.60  4.20 - 5.40 M/uL Final    Hemoglobin 08/25/2022 14.9  12.0 - 16.0 g/dL Final    Hematocrit 08/25/2022 45.3  37.0 - 47.0 % Final    MCV 08/25/2022 98.5 (A) 81.4 - 97.8 fL Final    MCH 08/25/2022 32.4  27.0 - 33.0 pg Final    MCHC 08/25/2022 32.9 (A) 33.6 - 35.0 g/dL Final    RDW 08/25/2022 46.8  35.9 - 50.0 fL Final    Platelet Count 08/25/2022 267  164 - 446 K/uL Final    MPV 08/25/2022 10.0  9.0 - 12.9 fL Final    Neutrophils-Polys 08/25/2022 36.00 (A) 44.00 - 72.00 % Final    Lymphocytes 08/25/2022 47.60 (A) 22.00 - 41.00 % Final    Monocytes 08/25/2022 9.50  0.00 - 13.40 % Final    Eosinophils 08/25/2022 4.90  0.00 - 6.90 % Final    Basophils 08/25/2022 1.70  0.00 - 1.80 % Final    Immature Granulocytes 08/25/2022 0.30  0.00 - 0.90 % Final    Nucleated RBC 08/25/2022 0.00  /100 WBC Final    Neutrophils (Absolute) 08/25/2022 1.25 (A) 2.00 - 7.15 K/uL Final    Includes immature neutrophils, if present.    Lymphs (Absolute) 08/25/2022 1.65  1.00 - 4.80 K/uL Final    Monos (Absolute) 08/25/2022 0.33  0.00 - 0.85 K/uL Final    Eos (Absolute) 08/25/2022 0.17  0.00 - 0.51 K/uL Final    Baso (Absolute) 08/25/2022 0.06  0.00 - 0.12 K/uL Final    Immature Granulocytes (abs) 08/25/2022 0.01  0.00 - 0.11 K/uL Final    NRBC (Absolute) 08/25/2022  0.00  K/uL Final    Sodium 08/25/2022 138  135 - 145 mmol/L Final    Potassium 08/25/2022 4.2  3.6 - 5.5 mmol/L Final    Chloride 08/25/2022 101  96 - 112 mmol/L Final    Co2 08/25/2022 25  20 - 33 mmol/L Final    Anion Gap 08/25/2022 12.0  7.0 - 16.0 Final    Glucose 08/25/2022 90  65 - 99 mg/dL Final    Bun 08/25/2022 20  8 - 22 mg/dL Final    Creatinine 08/25/2022 0.81  0.50 - 1.40 mg/dL Final    Calcium 08/25/2022 9.2  8.5 - 10.5 mg/dL Final    AST(SGOT) 08/25/2022 18  12 - 45 U/L Final    ALT(SGPT) 08/25/2022 17  2 - 50 U/L Final    Alkaline Phosphatase 08/25/2022 75  30 - 99 U/L Final    Total Bilirubin 08/25/2022 0.8  0.1 - 1.5 mg/dL Final    Albumin 08/25/2022 3.9  3.2 - 4.9 g/dL Final    Total Protein 08/25/2022 6.9  6.0 - 8.2 g/dL Final    Globulin 08/25/2022 3.0  1.9 - 3.5 g/dL Final    A-G Ratio 08/25/2022 1.3  g/dL Final    Cholesterol,Tot 08/25/2022 141  100 - 199 mg/dL Final    Triglycerides 08/25/2022 116  0 - 149 mg/dL Final    HDL 08/25/2022 54  >=40 mg/dL Final    LDL 08/25/2022 64  <100 mg/dL Final    Fasting Status 08/25/2022 Fasting   Final    GFR (CKD-EPI) 08/25/2022 78  >60 mL/min/1.73 m 2 Final    Comment: Effective 3/15/2022, estimated Glomerular Filtration Rate  is calculated using race neutral CKD-EPI 2021 equation  per NKF-ASN recommendations.                    Assessment & Plan        1. Essential hypertension  Controlled on lisinopril.    2. Dyslipidemia  All at goal on atorvastatin.  Triglycerides improved and down to goal.  - atorvastatin (LIPITOR) 20 MG Tab; Take 1 Tablet by mouth every evening.  Dispense: 90 Tablet; Refill: 1    3. Vitamin D deficiency  Adequately replaced.  Continue vitamin D supplementation.    4. Osteoporosis of forearm  Continue alendronate until 2023.  Calcium and vitamin D supplementation    5. Coccidioidomycosis, pulmonary (HCC)  Currently asymptomatic and no evidence of active disease with stable residual left upper lobe nodule.  Continue follow-up with  pulmonary specialist.    6. Obesity (BMI 30-39.9)  Discussed diet, exercise to lose weight.    She will establish care with another PCP as I am relocating to [place].      Please note that this dictation was created using voice recognition software. I have worked with consultants from the vendor as well as technical experts from [facility] to optimize the interface. I have made every reasonable attempt to correct obvious errors, but I expect that there are errors of grammar and possibly content I did not discover before finalizing the note.

## 2022-09-06 ENCOUNTER — APPOINTMENT (RX ONLY)
Dept: URBAN - METROPOLITAN AREA CLINIC 22 | Facility: CLINIC | Age: 67
Setting detail: DERMATOLOGY
End: 2022-09-06

## 2022-09-06 DIAGNOSIS — D22 MELANOCYTIC NEVI: ICD-10-CM

## 2022-09-06 DIAGNOSIS — Z71.89 OTHER SPECIFIED COUNSELING: ICD-10-CM

## 2022-09-06 DIAGNOSIS — L81.4 OTHER MELANIN HYPERPIGMENTATION: ICD-10-CM

## 2022-09-06 DIAGNOSIS — L82.1 OTHER SEBORRHEIC KERATOSIS: ICD-10-CM

## 2022-09-06 PROBLEM — D48.5 NEOPLASM OF UNCERTAIN BEHAVIOR OF SKIN: Status: ACTIVE | Noted: 2022-09-06

## 2022-09-06 PROBLEM — D23.61 OTHER BENIGN NEOPLASM OF SKIN OF RIGHT UPPER LIMB, INCLUDING SHOULDER: Status: ACTIVE | Noted: 2022-09-06

## 2022-09-06 PROBLEM — D22.5 MELANOCYTIC NEVI OF TRUNK: Status: ACTIVE | Noted: 2022-09-06

## 2022-09-06 PROCEDURE — ? SUNSCREEN TREATMENT REGIMEN

## 2022-09-06 PROCEDURE — ? PHOTO-DOCUMENTATION

## 2022-09-06 PROCEDURE — ? COUNSELING

## 2022-09-06 PROCEDURE — 99213 OFFICE O/P EST LOW 20 MIN: CPT

## 2022-09-06 ASSESSMENT — LOCATION SIMPLE DESCRIPTION DERM
LOCATION SIMPLE: LEFT SCALP
LOCATION SIMPLE: RIGHT FOREARM
LOCATION SIMPLE: INFERIOR FOREHEAD
LOCATION SIMPLE: LEFT FOREARM
LOCATION SIMPLE: UPPER BACK

## 2022-09-06 ASSESSMENT — LOCATION DETAILED DESCRIPTION DERM
LOCATION DETAILED: INFERIOR MID FOREHEAD
LOCATION DETAILED: LEFT VENTRAL PROXIMAL FOREARM
LOCATION DETAILED: SUPERIOR THORACIC SPINE
LOCATION DETAILED: RIGHT VENTRAL PROXIMAL FOREARM
LOCATION DETAILED: INFERIOR THORACIC SPINE
LOCATION DETAILED: LEFT CENTRAL FRONTAL SCALP

## 2022-09-06 ASSESSMENT — LOCATION ZONE DERM
LOCATION ZONE: FACE
LOCATION ZONE: SCALP
LOCATION ZONE: TRUNK
LOCATION ZONE: ARM

## 2022-09-20 ENCOUNTER — OFFICE VISIT (OUTPATIENT)
Dept: SLEEP MEDICINE | Facility: MEDICAL CENTER | Age: 67
End: 2022-09-20
Payer: MEDICARE

## 2022-09-20 VITALS
DIASTOLIC BLOOD PRESSURE: 84 MMHG | HEART RATE: 92 BPM | RESPIRATION RATE: 16 BRPM | SYSTOLIC BLOOD PRESSURE: 130 MMHG | HEIGHT: 59 IN | WEIGHT: 189 LBS | BODY MASS INDEX: 38.1 KG/M2 | OXYGEN SATURATION: 95 %

## 2022-09-20 DIAGNOSIS — Z23 NEED FOR IMMUNIZATION AGAINST INFLUENZA: ICD-10-CM

## 2022-09-20 DIAGNOSIS — B38.9 COCCIDIOMYCOSIS, PROGRESSIVE: ICD-10-CM

## 2022-09-20 PROCEDURE — 99214 OFFICE O/P EST MOD 30 MIN: CPT | Mod: 25 | Performed by: NURSE PRACTITIONER

## 2022-09-20 PROCEDURE — 90662 IIV NO PRSV INCREASED AG IM: CPT | Performed by: NURSE PRACTITIONER

## 2022-09-20 PROCEDURE — G0008 ADMIN INFLUENZA VIRUS VAC: HCPCS | Performed by: NURSE PRACTITIONER

## 2022-09-20 ASSESSMENT — FIBROSIS 4 INDEX: FIB4 SCORE: 1.13

## 2022-09-20 NOTE — PROGRESS NOTES
Chief Complaint   Patient presents with    Follow-Up     Last seen 4/28/21 Elda Fitch p.a-c // Coccidiomycosis    Results     Chest xray 8/30/22       HPI:  Dee Armstrong is a 67 y.o. 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  Last seen 4/28/2021 by Elda Fitch PA-C. She was initially referred to our office after a mass was found on chest xray after an infection. Cocci  Titers were positive. She was placed on Diflucan for 1 year which was completed in November 2016. Imaging has been stable. She has been recommended yearly chest xray and PFT's.    Patient today is asymptomatic.  She denies cough, chest congestion, chest tightness, wheezing, dyspnea with exertion, shortness of breath, or fevers, chills, or night sweats.    Chest x-ray (8/30/2022):   No evidence of acute cardiopulmonary disease. Grossly stable appearance of LEFT upper lobe pulmonary nodule, biopsy-proven residua of coccidioidomycosis. Atherosclerosis    PFT (4/28/2021):  Baseline spirometry shows normal airflows.  No significant bronchodilator response.  Lung volumes are within normal limits.  Diffusion capacity is elevated at 1 her 35% predicted.  Normal pulmonary function testing with normal flow volume loop.  Elevated DLCO is nonspecific but not necessarily pathologic.  This does not rule out reactive airways disease.  Suggest clinical correlation.    ROS: As per HPI and otherwise negative if not stated.    Past Medical History:   Diagnosis Date    Arthritis     osteo, hips, spine    Coccidioidomycosis 12/15    left upper lung ([place] fever), medicated for a year, yearly xrays    Cough 5/29/2015    Diverticulosis     GERD (gastroesophageal reflux disease)     Heart burn     Hypertension     Indigestion     LBP (low back pain)     Lumbar radicular pain 5/29/2015    Muscle disorder     Obesity     Osteoporosis of forearm     Alendronate started 6/18    Pain     Shoulder and numbness to R LE, hip       Past  Surgical History:   Procedure Laterality Date    HIP REVISION TOTAL Left 5/13/2021    Procedure: REVISION, TOTAL ARTHROPLASTY, HIP.;  Surgeon: Amol Hogue M.D.;  Location: [facility];  Service: Orthopedics    GA TOTAL HIP ARTHROPLASTY Left 9/19/2019    Procedure: ARTHROPLASTY, HIP, TOTAL, ANTERIOR APPROACH;  Surgeon: Clarence Vallejo M.D.;  Location: [facility];  Service: Orthopedics    INCISION HERNIA REPAIR Right 11/26/2018    Procedure: INCISION HERNIA REPAIR- FLANK WITH MESH;  Surgeon: Misael Ramírez M.D.;  Location: [facility];  Service: General    HYSTERECTOMY ROBOTIC  6/7/2017    Procedure: HYSTERECTOMY ROBOTIC SI, BILATERAL SALPINGO-OOPHORECTOMY, URETERAL SACRAL LIGAMENT SHORTENING ;  Surgeon: Jerica Hooper M.D.;  Location: [facility];  Service:     CYSTOSCOPY  6/7/2017    Procedure: CYSTOSCOPY;  Surgeon: Jerica Hooper M.D.;  Location: [facility];  Service:     SHOULDER DECOMPRESSION ARTHROSCOPIC Right 12/6/2016    Procedure: SHOULDER DECOMPRESSION ARTHROSCOPIC - SUBACROMIAL, MANJARREZ;  Surgeon: Kyle Claros M.D.;  Location: [facility];  Service:     CLAVICLE DISTAL EXCISION Right 12/6/2016    Procedure: CLAVICLE DISTAL EXCISION;  Surgeon: Kyle Claros M.D.;  Location: [facility];  Service:     SHOULDER ARTHROSCOPY W/ ROTATOR CUFF REPAIR Right 12/6/2016    Procedure: SHOULDER ARTHROSCOPY W/ ROTATOR CUFF REPAIR ;  Surgeon: Kyle Claros M.D.;  Location: [facility];  Service:     SHOULDER ARTHROSCOPY W/ BICIPITAL TENODESIS REPAIR Right 12/6/2016    Procedure: SHOULDER ARTHROSCOPY W/ BICIPITAL TENODESIS REPAIR ;  Surgeon: Kyle Claros M.D.;  Location: [facility];  Service:     FUSION, SPINE, LUMBAR, PLIF  2/9/2016    Procedure: LUMBAR FUSION POSTERIOR PEDICLE SCREW FIXATION (STAGE #2);  Surgeon: Tim Rodriguez M.D.;  Location: [facility];  Service:     LUMBAR LAMINECTOMY  "DISKECTOMY  2/9/2016    Procedure: LUMBAR LAMINECTOMY DISKECTOMY MINI OPEN;  Surgeon: Tim Rodriguez M.D.;  Location: SURGERY [facility];  Service:     FUSION, SPINE, XLIF Right 2/6/2016    Procedure: EXTREME LATERAL INTERBODY FUSION L3-4 (STAGE #1);  Surgeon: Tim Rodriguez M.D.;  Location: SURGERY [facility];  Service:     RECOVERY  12/8/2015    Procedure: CT-CT GUIDED LEFT LUNG BIOPSY-;  Surgeon: Recoveryon Surgery;  Location: SURGERY PRE-POST PROC UNIT [facility];  Service:     REDDY BY LAPAROSCOPY  4/17/2014    Performed by Ankur Lerner M.D. at SURGERY SAME DAY [facility]    EGD WITH ASP/BX  2/4/2014    mild chronic inactive gastritis, scar gastric antrum-    EGD WITH ASP/BX  9/29/2011    possible barrettes, hiatal hernia, gastritis    COLONOSCOPY  9/29/2011    diverticulosis sigmoid colon, hemorrhoids    ARTHROTOMY  9/3/2010    Performed by YARELY MORRISON at SURGERY SAME DAY [facility]    ARTHRODESIS  9/3/2010    Performed by YARELY MORRISON at SURGERY SAME DAY [facility]    ORTHOPEDIC OSTEOTOMY  9/3/2010    Performed by YARELY MORRISON at SURGERY SAME DAY [facility]    BONE SPUR EXCISION  9/3/2010    Performed by YARELY MORRISON at SURGERY SAME DAY [facility]    OTHER ABDOMINAL SURGERY  2000    COLON RESECTION    LAMINOTOMY      OTHER ORTHOPEDIC SURGERY      PARDEEP CARPAL TUNNEL RELEASES       Family History   Problem Relation Age of Onset    Cancer Mother         lymphoma    Stroke Father     Diabetes Father        Allergies as of 09/20/2022    (No Known Allergies)        Vitals:  /84 (BP Location: Right arm, Patient Position: Sitting, BP Cuff Size: Large adult)   Pulse 92   Resp 16   Ht 1.499 m (4' 11\")   Wt 85.7 kg (189 lb)   SpO2 95%     Current medications as of today   Current Outpatient Medications   Medication Sig Dispense Refill    amoxicillin (AMOXIL) 500 MG Cap       atorvastatin (LIPITOR) 20 MG Tab Take 1 Tablet by mouth every evening. 90 Tablet 1    " lansoprazole (PREVACID) 30 MG CAPSULE DELAYED RELEASE TAKE 1 CAPSULE BY MOUTH  DAILY 90 Capsule 3    alendronate (FOSAMAX) 70 MG Tab TAKE 1 TABLET BY MOUTH  WEEKLY WITH 8 OZ OF PLAIN  WATER 30 MINUTES BEFORE  FIRST FOOD, DRINK OR MEDS.  STAY UPRIGHT FOR 30 MINS 12 Tablet 3    lisinopril (PRINIVIL) 10 MG Tab TAKE 1 TABLET BY MOUTH  DAILY 90 Tablet 3    gabapentin (NEURONTIN) 600 MG tablet TAKE 1 TABLET BY MOUTH  DAILY IN THE EVENING 90 Tablet 3    amitriptyline (ELAVIL) 10 MG Tab TAKE 3 TABLETS BY MOUTH AT  BEDTIME AS NEEDED 270 Tablet 3    SUMAtriptan (IMITREX) 25 MG Tab tablet Take  mg by mouth one time as needed for Migraine.      gabapentin (NEURONTIN) 100 MG Cap Take 1 Capsule by mouth at bedtime as needed. 30 Capsule 0    estradiol (ESTRACE) 0.1 MG/GM vaginal cream       asa/apap/caffeine (EXCEDRIN) 250-250-65 MG Tab Take 1 Tab by mouth every 6 hours as needed for Headache.      Multiple Vitamin (MULTI VITAMIN DAILY PO) Take 1 tablet by mouth every day.      Cholecalciferol (VITAMIN D3) 2000 UNIT Cap Take 1 capsule by mouth every day.      clobetasol (TEMOVATE) 0.05 % Cream Apply 1 Application topically as needed (Applies outside vaginal). Indications: Inflammation  1    ascorbic acid (ASCORBIC ACID) 500 MG Tab Take 1,000 mg by mouth every day.       No current facility-administered medications for this visit.         Physical Exam:   Gen:           Alert and oriented, No apparent distress. Mood and affect appropriate, normal interaction with examiner.  Eyes:          PERRL, EOM intact, sclere white, conjunctive moist.  Ears:          Not examined.   Hearing:     Grossly intact.  Nose:          Normal, no lesions or deformities.  Dentition:    Good dentition.  Oropharynx:   Tongue normal, posterior pharynx without erythema or exudate  Neck:        Supple, trachea midline, no masses.  Respiratory Effort: No intercostal retractions or use of accessory muscles.   Lung Auscultation:      Clear to auscultation  bilaterally; no rales, rhonchi or wheezing.  CV:            Regular rate and rhythm.  Systolic aortic murmur noted. no rubs or gallops.  Abd:           Not examined.   Lymphadenopathy: Not examined.  Gait and Station: Normal.  Digits and Nails: No clubbing, cyanosis, petechiae, or nodes.   Cranial Nerves: II-XII grossly intact.  Skin:        No rashes, lesions or ulcers noted.               Ext:           No cyanosis or edema.      Assessment:  1. Need for immunization against influenza  INFLUENZA VACCINE, HIGH DOSE (65+ ONLY)      2. Coccidiomycosis, progressive        3. Body mass index 38.0-38.9, adult                Plan:  Influenza vaccine administered today.  Patient is currently asymptomatic and imaging shows no change in previously identified left upper lobe nodule.  Recommended continue annual surveillance, but can increase frequency of imaging if patient becomes symptomatic.  HCC Gap Form    Diagnosis: E66.01 - Morbid (severe) obesity due to excess calories (HCC)  Z68.38 - Body mass index (BMI) 38.0-38.9, adult  Comorbidity Diagnosis: Unilateral primary osteoarthritis, left hip  The current BMI is 38.17 kg/m2 as of 09/20/22 09:50 PDT  Assessment and plan: Chronic, stable. Encouraged healthy diet and physical activity changes with a goal of weight loss. Follow up at least annually. The comorbid condition is asymptomatic based on today's assessment. Continue current treatment plan. Follow up at least yearly.  Last edited 09/20/22 10:06 PDT by ABY Francis.          Please note that this dictation was created using voice recognition software. I have made every reasonable attempt to correct obvious errors, but it is possible there are errors of grammar and possibly content that I did not discover before finalizing the note.

## 2022-10-17 ENCOUNTER — TELEPHONE (OUTPATIENT)
Dept: SCHEDULING | Facility: IMAGING CENTER | Age: 67
End: 2022-10-17

## 2022-10-31 SDOH — ECONOMIC STABILITY: TRANSPORTATION INSECURITY
IN THE PAST 12 MONTHS, HAS LACK OF TRANSPORTATION KEPT YOU FROM MEETINGS, WORK, OR FROM GETTING THINGS NEEDED FOR DAILY LIVING?: NO

## 2022-10-31 SDOH — ECONOMIC STABILITY: HOUSING INSECURITY: IN THE LAST 12 MONTHS, HOW MANY PLACES HAVE YOU LIVED?: 1

## 2022-10-31 SDOH — ECONOMIC STABILITY: INCOME INSECURITY: IN THE LAST 12 MONTHS, WAS THERE A TIME WHEN YOU WERE NOT ABLE TO PAY THE MORTGAGE OR RENT ON TIME?: NO

## 2022-10-31 SDOH — ECONOMIC STABILITY: TRANSPORTATION INSECURITY
IN THE PAST 12 MONTHS, HAS THE LACK OF TRANSPORTATION KEPT YOU FROM MEDICAL APPOINTMENTS OR FROM GETTING MEDICATIONS?: NO

## 2022-10-31 SDOH — HEALTH STABILITY: PHYSICAL HEALTH: ON AVERAGE, HOW MANY MINUTES DO YOU ENGAGE IN EXERCISE AT THIS LEVEL?: 10 MIN

## 2022-10-31 SDOH — ECONOMIC STABILITY: HOUSING INSECURITY
IN THE LAST 12 MONTHS, WAS THERE A TIME WHEN YOU DID NOT HAVE A STEADY PLACE TO SLEEP OR SLEPT IN A SHELTER (INCLUDING NOW)?: NO

## 2022-10-31 SDOH — ECONOMIC STABILITY: FOOD INSECURITY: WITHIN THE PAST 12 MONTHS, YOU WORRIED THAT YOUR FOOD WOULD RUN OUT BEFORE YOU GOT MONEY TO BUY MORE.: NEVER TRUE

## 2022-10-31 SDOH — HEALTH STABILITY: MENTAL HEALTH
STRESS IS WHEN SOMEONE FEELS TENSE, NERVOUS, ANXIOUS, OR CAN'T SLEEP AT NIGHT BECAUSE THEIR MIND IS TROUBLED. HOW STRESSED ARE YOU?: NOT AT ALL

## 2022-10-31 SDOH — ECONOMIC STABILITY: FOOD INSECURITY: WITHIN THE PAST 12 MONTHS, THE FOOD YOU BOUGHT JUST DIDN'T LAST AND YOU DIDN'T HAVE MONEY TO GET MORE.: NEVER TRUE

## 2022-10-31 SDOH — ECONOMIC STABILITY: INCOME INSECURITY: HOW HARD IS IT FOR YOU TO PAY FOR THE VERY BASICS LIKE FOOD, HOUSING, MEDICAL CARE, AND HEATING?: NOT HARD AT ALL

## 2022-10-31 SDOH — HEALTH STABILITY: PHYSICAL HEALTH: ON AVERAGE, HOW MANY DAYS PER WEEK DO YOU ENGAGE IN MODERATE TO STRENUOUS EXERCISE (LIKE A BRISK WALK)?: 0 DAYS

## 2022-10-31 SDOH — ECONOMIC STABILITY: TRANSPORTATION INSECURITY
IN THE PAST 12 MONTHS, HAS LACK OF RELIABLE TRANSPORTATION KEPT YOU FROM MEDICAL APPOINTMENTS, MEETINGS, WORK OR FROM GETTING THINGS NEEDED FOR DAILY LIVING?: NO

## 2022-10-31 ASSESSMENT — SOCIAL DETERMINANTS OF HEALTH (SDOH)
HOW OFTEN DO YOU HAVE SIX OR MORE DRINKS ON ONE OCCASION: NEVER
HOW OFTEN DO YOU ATTENT MEETINGS OF THE CLUB OR ORGANIZATION YOU BELONG TO?: MORE THAN 4 TIMES PER YEAR
IN A TYPICAL WEEK, HOW MANY TIMES DO YOU TALK ON THE PHONE WITH FAMILY, FRIENDS, OR NEIGHBORS?: MORE THAN THREE TIMES A WEEK
HOW MANY DRINKS CONTAINING ALCOHOL DO YOU HAVE ON A TYPICAL DAY WHEN YOU ARE DRINKING: PATIENT DOES NOT DRINK
HOW HARD IS IT FOR YOU TO PAY FOR THE VERY BASICS LIKE FOOD, HOUSING, MEDICAL CARE, AND HEATING?: NOT HARD AT ALL
HOW OFTEN DO YOU ATTEND CHURCH OR RELIGIOUS SERVICES?: MORE THAN 4 TIMES PER YEAR
DO YOU BELONG TO ANY CLUBS OR ORGANIZATIONS SUCH AS CHURCH GROUPS UNIONS, FRATERNAL OR ATHLETIC GROUPS, OR SCHOOL GROUPS?: YES
IN A TYPICAL WEEK, HOW MANY TIMES DO YOU TALK ON THE PHONE WITH FAMILY, FRIENDS, OR NEIGHBORS?: MORE THAN THREE TIMES A WEEK
WITHIN THE PAST 12 MONTHS, YOU WORRIED THAT YOUR FOOD WOULD RUN OUT BEFORE YOU GOT THE MONEY TO BUY MORE: NEVER TRUE
HOW OFTEN DO YOU ATTENT MEETINGS OF THE CLUB OR ORGANIZATION YOU BELONG TO?: MORE THAN 4 TIMES PER YEAR
HOW OFTEN DO YOU GET TOGETHER WITH FRIENDS OR RELATIVES?: TWICE A WEEK
DO YOU BELONG TO ANY CLUBS OR ORGANIZATIONS SUCH AS CHURCH GROUPS UNIONS, FRATERNAL OR ATHLETIC GROUPS, OR SCHOOL GROUPS?: YES
HOW OFTEN DO YOU ATTEND CHURCH OR RELIGIOUS SERVICES?: MORE THAN 4 TIMES PER YEAR
HOW OFTEN DO YOU HAVE A DRINK CONTAINING ALCOHOL: MONTHLY OR LESS
HOW OFTEN DO YOU GET TOGETHER WITH FRIENDS OR RELATIVES?: TWICE A WEEK

## 2022-10-31 ASSESSMENT — LIFESTYLE VARIABLES
HOW OFTEN DO YOU HAVE SIX OR MORE DRINKS ON ONE OCCASION: NEVER
HOW OFTEN DO YOU HAVE A DRINK CONTAINING ALCOHOL: MONTHLY OR LESS
AUDIT-C TOTAL SCORE: 1
SKIP TO QUESTIONS 9-10: 1
HOW MANY STANDARD DRINKS CONTAINING ALCOHOL DO YOU HAVE ON A TYPICAL DAY: PATIENT DOES NOT DRINK

## 2022-11-03 ENCOUNTER — PATIENT MESSAGE (OUTPATIENT)
Dept: HEALTH INFORMATION MANAGEMENT | Facility: OTHER | Age: 67
End: 2022-11-03

## 2022-11-03 ENCOUNTER — OFFICE VISIT (OUTPATIENT)
Dept: MEDICAL GROUP | Facility: PHYSICIAN GROUP | Age: 67
End: 2022-11-03
Payer: MEDICARE

## 2022-11-03 VITALS
RESPIRATION RATE: 18 BRPM | DIASTOLIC BLOOD PRESSURE: 54 MMHG | WEIGHT: 188 LBS | OXYGEN SATURATION: 96 % | BODY MASS INDEX: 37.9 KG/M2 | HEART RATE: 94 BPM | HEIGHT: 59 IN | TEMPERATURE: 98.8 F | SYSTOLIC BLOOD PRESSURE: 102 MMHG

## 2022-11-03 DIAGNOSIS — K21.9 GASTROESOPHAGEAL REFLUX DISEASE, UNSPECIFIED WHETHER ESOPHAGITIS PRESENT: Chronic | ICD-10-CM

## 2022-11-03 DIAGNOSIS — M54.16 LUMBAR RADICULOPATHY: ICD-10-CM

## 2022-11-03 DIAGNOSIS — Z86.19 HISTORY OF COCCIDIOIDOMYCOSIS: Chronic | ICD-10-CM

## 2022-11-03 DIAGNOSIS — H00.015 HORDEOLUM EXTERNUM OF LEFT LOWER EYELID: ICD-10-CM

## 2022-11-03 DIAGNOSIS — E55.9 VITAMIN D DEFICIENCY: ICD-10-CM

## 2022-11-03 DIAGNOSIS — E78.5 DYSLIPIDEMIA: ICD-10-CM

## 2022-11-03 DIAGNOSIS — I10 ESSENTIAL HYPERTENSION: ICD-10-CM

## 2022-11-03 PROBLEM — M81.0 AGE-RELATED OSTEOPOROSIS WITHOUT CURRENT PATHOLOGICAL FRACTURE: Status: ACTIVE | Noted: 2022-11-03

## 2022-11-03 PROCEDURE — 99214 OFFICE O/P EST MOD 30 MIN: CPT | Performed by: INTERNAL MEDICINE

## 2022-11-03 RX ORDER — AMOXICILLIN AND CLAVULANATE POTASSIUM 875; 125 MG/1; MG/1
1 TABLET, FILM COATED ORAL 2 TIMES DAILY
Qty: 14 TABLET | Refills: 0 | Status: ON HOLD | OUTPATIENT
Start: 2022-11-03 | End: 2024-02-12

## 2022-11-03 ASSESSMENT — FIBROSIS 4 INDEX: FIB4 SCORE: 1.13

## 2022-11-03 NOTE — PROGRESS NOTES
PRIMARY CARE CLINIC VISIT    Chief complaint:    Stye left lower eyelid  Blood pressure check  Medication review      History of Present Illness     Hordeolum externum of left lower eyelid  This is a new condition.  Symptoms started approximately 3 weeks ago.  Patient reported that she had the same condition 1 year ago affecting the same left lower eyelid.  Patient denies recent trauma or injury.  Denies significant change in her vision.    Dyslipidemia  Chronic condition for the patient is taking atorvastatin.  No significant side effects reported.  Recent lab test result discussed with the patient    Essential hypertension  Chronic condition.  The patient is taking lisinopril.  Her blood pressure has been well controlled.    GERD (gastroesophageal reflux disease)  Patient currently taking Prevacid.  She denies nausea vomiting dysphagia or unexplained weight loss.    History of coccidioidomycosis  Patient reported prior history of coccidiomycosis.  The patient currently asymptomatic.  She is being followed by pulmonology service on a yearly basis.  Patient denies shortness of breath wheezing or significant cough.    Current Outpatient Medications on File Prior to Visit   Medication Sig Dispense Refill    atorvastatin (LIPITOR) 20 MG Tab Take 1 Tablet by mouth every evening. 90 Tablet 1    lansoprazole (PREVACID) 30 MG CAPSULE DELAYED RELEASE TAKE 1 CAPSULE BY MOUTH  DAILY 90 Capsule 3    alendronate (FOSAMAX) 70 MG Tab TAKE 1 TABLET BY MOUTH  WEEKLY WITH 8 OZ OF PLAIN  WATER 30 MINUTES BEFORE  FIRST FOOD, DRINK OR MEDS.  STAY UPRIGHT FOR 30 MINS 12 Tablet 3    lisinopril (PRINIVIL) 10 MG Tab TAKE 1 TABLET BY MOUTH  DAILY 90 Tablet 3    gabapentin (NEURONTIN) 600 MG tablet TAKE 1 TABLET BY MOUTH  DAILY IN THE EVENING 90 Tablet 3    amitriptyline (ELAVIL) 10 MG Tab TAKE 3 TABLETS BY MOUTH AT  BEDTIME AS NEEDED 270 Tablet 3    SUMAtriptan (IMITREX) 25 MG Tab tablet Take  mg by mouth one time as needed for  "Migraine.      Multiple Vitamin (MULTI VITAMIN DAILY PO) Take 1 tablet by mouth every day.      estradiol (ESTRACE) 0.1 MG/GM vaginal cream       asa/apap/caffeine (EXCEDRIN) 250-250-65 MG Tab Take 1 Tab by mouth every 6 hours as needed for Headache.      Cholecalciferol (VITAMIN D3) 2000 UNIT Cap Take 1 capsule by mouth every day.      clobetasol (TEMOVATE) 0.05 % Cream Apply 1 Application topically as needed (Applies outside vaginal). Indications: Inflammation  1    ascorbic acid (ASCORBIC ACID) 500 MG Tab Take 1,000 mg by mouth every day.       No current facility-administered medications on file prior to visit.        Allergies: Patient has no known allergies.    ROS  As per HPI above. All other systems reviewed and negative.      Past Medical, Social, and Family history reviewed and updated in EPIC     Objective     /54 (BP Location: Left arm, Patient Position: Sitting, BP Cuff Size: Adult)   Pulse 94   Temp 37.1 °C (98.8 °F) (Temporal)   Resp 18   Ht 1.499 m (4' 11\")   Wt 85.3 kg (188 lb)   SpO2 96%    Body mass index is 37.97 kg/m².    General: alert in no apparent distress.  Cardiovascular: regular rate and rhythm  Pulmonary: lungs : no wheezing   Gastrointestinal: BS present. No obvious mass noted  Left lower eyelid show erythematous papular lesion consistent with a stye.  No discharge or fluctuance noted.  Lesion measuring approximately 3 x 4 mm in size        Assessment and Plan     1. Hordeolum externum of left lower eyelid  This is a new condition.  Advised the patient's to start Augmentin twice daily for 7 days.  Warm compress recommended.  Follow-up if no improvement noted  - amoxicillin-clavulanate (AUGMENTIN) 875-125 MG Tab; Take 1 Tablet by mouth 2 times a day.  Dispense: 14 Tablet; Refill: 0      2. Essential hypertension.  Chronic stable condition.  Continue lisinopril.  - Basic Metabolic Panel; Future  - TSH; Future    3. Dyslipidemia.  Chronic stable condition.  Continue a " atorvastatin.  - Lipid Profile; Future  - ALANINE AMINO-TRANS; Future    4. History of coccidioidomycosis  Patient presently asymptomatic.  Recommend to continue follow-up with pulmonology service on a yearly basis  5. Lumbar radiculopathy  This is a chronic condition.  The patient followed by [facility] [place].  Should take gabapentin and amitriptyline  6. Gastroesophageal reflux disease, unspecified whether esophagitis present  Chronic stable condition.  Continue with Prevacid  Other orders        Follow-up 6 months with lab prior                Healthcare Maintenance       Health Maintenance Due   Topic Date Due    Annual Wellness Visit  10/09/2022               Please note that this dictation was created using voice recognition software. I have made every reasonable attempt to correct obvious errors, but I expect that there are errors of grammar and possibly content that I did not discover before finalizing the note.    Emre Viramontes MD  Internal Medicine  [facility] care [facility]

## 2022-11-03 NOTE — ASSESSMENT & PLAN NOTE
Chronic condition.  The patient is taking lisinopril.  Her blood pressure has been well controlled.

## 2022-11-03 NOTE — ASSESSMENT & PLAN NOTE
Chronic condition for the patient is taking atorvastatin.  No significant side effects reported.  Recent lab test result discussed with the patient

## 2022-11-03 NOTE — ASSESSMENT & PLAN NOTE
Patient currently taking Prevacid.  She denies nausea vomiting dysphagia or unexplained weight loss.

## 2022-11-03 NOTE — ASSESSMENT & PLAN NOTE
Patient reported prior history of coccidiomycosis.  The patient currently asymptomatic.  She is being followed by pulmonology service on a yearly basis.  Patient denies shortness of breath wheezing or significant cough.

## 2022-11-03 NOTE — ASSESSMENT & PLAN NOTE
This is a new condition.  Symptoms started approximately 3 weeks ago.  Patient reported that she had the same condition 1 year ago affecting the same left lower eyelid.  Patient denies recent trauma or injury.  Denies significant change in her vision.

## 2022-12-11 ENCOUNTER — HOSPITAL ENCOUNTER (OUTPATIENT)
Dept: RADIOLOGY | Facility: MEDICAL CENTER | Age: 67
End: 2022-12-11
Attending: PHYSICIAN ASSISTANT
Payer: MEDICARE

## 2022-12-11 DIAGNOSIS — N20.0 CALCULUS OF KIDNEY: ICD-10-CM

## 2022-12-11 PROCEDURE — 74176 CT ABD & PELVIS W/O CONTRAST: CPT

## 2023-02-15 RX ORDER — AMITRIPTYLINE HYDROCHLORIDE 10 MG/1
TABLET, FILM COATED ORAL
Qty: 270 TABLET | Refills: 3 | Status: SHIPPED | OUTPATIENT
Start: 2023-02-15 | End: 2024-01-08

## 2023-03-31 ENCOUNTER — TELEPHONE (OUTPATIENT)
Dept: HEALTH INFORMATION MANAGEMENT | Facility: OTHER | Age: 68
End: 2023-03-31

## 2023-04-11 ENCOUNTER — HOSPITAL ENCOUNTER (OUTPATIENT)
Facility: MEDICAL CENTER | Age: 68
End: 2023-04-11
Attending: PHYSICIAN ASSISTANT
Payer: MEDICARE

## 2023-04-11 PROCEDURE — 82365 CALCULUS SPECTROSCOPY: CPT

## 2023-04-15 LAB
APPEARANCE STONE: NORMAL
COMPN STONE: NORMAL
SPECIMEN WT: 2 MG

## 2023-04-26 RX ORDER — ALENDRONATE SODIUM 70 MG/1
70 TABLET ORAL
Qty: 12 TABLET | Refills: 3 | Status: ON HOLD | OUTPATIENT
Start: 2023-04-26 | End: 2024-02-12

## 2023-05-01 ENCOUNTER — HOSPITAL ENCOUNTER (OUTPATIENT)
Dept: LAB | Facility: MEDICAL CENTER | Age: 68
End: 2023-05-01
Attending: INTERNAL MEDICINE
Payer: MEDICARE

## 2023-05-01 DIAGNOSIS — E78.5 DYSLIPIDEMIA: ICD-10-CM

## 2023-05-01 DIAGNOSIS — E55.9 VITAMIN D DEFICIENCY: ICD-10-CM

## 2023-05-01 DIAGNOSIS — I10 ESSENTIAL HYPERTENSION: ICD-10-CM

## 2023-05-01 LAB
25(OH)D3 SERPL-MCNC: 37 NG/ML (ref 30–100)
ALT SERPL-CCNC: 14 U/L (ref 2–50)
ANION GAP SERPL CALC-SCNC: 11 MMOL/L (ref 7–16)
BUN SERPL-MCNC: 23 MG/DL (ref 8–22)
CALCIUM SERPL-MCNC: 8.7 MG/DL (ref 8.5–10.5)
CHLORIDE SERPL-SCNC: 104 MMOL/L (ref 96–112)
CHOLEST SERPL-MCNC: 167 MG/DL (ref 100–199)
CO2 SERPL-SCNC: 24 MMOL/L (ref 20–33)
CREAT SERPL-MCNC: 0.66 MG/DL (ref 0.5–1.4)
FASTING STATUS PATIENT QL REPORTED: NORMAL
GFR SERPLBLD CREATININE-BSD FMLA CKD-EPI: 94 ML/MIN/1.73 M 2
GLUCOSE SERPL-MCNC: 96 MG/DL (ref 65–99)
HDLC SERPL-MCNC: 63 MG/DL
LDLC SERPL CALC-MCNC: 80 MG/DL
POTASSIUM SERPL-SCNC: 4.2 MMOL/L (ref 3.6–5.5)
SODIUM SERPL-SCNC: 139 MMOL/L (ref 135–145)
TRIGL SERPL-MCNC: 122 MG/DL (ref 0–149)
TSH SERPL DL<=0.005 MIU/L-ACNC: 2.01 UIU/ML (ref 0.38–5.33)

## 2023-05-01 PROCEDURE — 80061 LIPID PANEL: CPT

## 2023-05-01 PROCEDURE — 82306 VITAMIN D 25 HYDROXY: CPT

## 2023-05-01 PROCEDURE — 36415 COLL VENOUS BLD VENIPUNCTURE: CPT

## 2023-05-01 PROCEDURE — 84443 ASSAY THYROID STIM HORMONE: CPT

## 2023-05-01 PROCEDURE — 80048 BASIC METABOLIC PNL TOTAL CA: CPT

## 2023-05-01 PROCEDURE — 84460 ALANINE AMINO (ALT) (SGPT): CPT

## 2023-05-01 RX ORDER — ATORVASTATIN CALCIUM 20 MG/1
20 TABLET, FILM COATED ORAL EVERY EVENING
Qty: 90 TABLET | Refills: 0 | Status: SHIPPED | OUTPATIENT
Start: 2023-05-01 | End: 2023-08-28

## 2023-05-02 DIAGNOSIS — I10 ESSENTIAL HYPERTENSION: ICD-10-CM

## 2023-05-02 RX ORDER — LISINOPRIL 10 MG/1
10 TABLET ORAL DAILY
Qty: 90 TABLET | Refills: 3 | Status: ON HOLD | OUTPATIENT
Start: 2023-05-02 | End: 2024-02-12

## 2023-05-03 ENCOUNTER — OFFICE VISIT (OUTPATIENT)
Dept: MEDICAL GROUP | Facility: PHYSICIAN GROUP | Age: 68
End: 2023-05-03
Payer: MEDICARE

## 2023-05-03 VITALS
OXYGEN SATURATION: 98 % | HEART RATE: 98 BPM | DIASTOLIC BLOOD PRESSURE: 80 MMHG | WEIGHT: 201.5 LBS | TEMPERATURE: 97.7 F | SYSTOLIC BLOOD PRESSURE: 114 MMHG | HEIGHT: 59 IN | RESPIRATION RATE: 20 BRPM | BODY MASS INDEX: 40.62 KG/M2

## 2023-05-03 DIAGNOSIS — I10 ESSENTIAL HYPERTENSION: Chronic | ICD-10-CM

## 2023-05-03 DIAGNOSIS — K21.9 GASTROESOPHAGEAL REFLUX DISEASE, UNSPECIFIED WHETHER ESOPHAGITIS PRESENT: Chronic | ICD-10-CM

## 2023-05-03 DIAGNOSIS — N20.0 KIDNEY STONE: Chronic | ICD-10-CM

## 2023-05-03 DIAGNOSIS — E66.01 SEVERE OBESITY (HCC): ICD-10-CM

## 2023-05-03 DIAGNOSIS — E78.5 DYSLIPIDEMIA: Chronic | ICD-10-CM

## 2023-05-03 PROCEDURE — 99214 OFFICE O/P EST MOD 30 MIN: CPT | Performed by: INTERNAL MEDICINE

## 2023-05-03 RX ORDER — NAPROXEN 500 MG/1
500 TABLET ORAL 2 TIMES DAILY PRN
Qty: 60 TABLET | Refills: 1 | Status: SHIPPED | OUTPATIENT
Start: 2023-05-03 | End: 2024-01-16

## 2023-05-03 RX ORDER — PANTOPRAZOLE SODIUM 40 MG/1
40 TABLET, DELAYED RELEASE ORAL DAILY
Qty: 90 TABLET | Refills: 3 | Status: ON HOLD | OUTPATIENT
Start: 2023-05-03 | End: 2024-02-12

## 2023-05-03 ASSESSMENT — PATIENT HEALTH QUESTIONNAIRE - PHQ9: CLINICAL INTERPRETATION OF PHQ2 SCORE: 0

## 2023-05-03 ASSESSMENT — FIBROSIS 4 INDEX: FIB4 SCORE: 1.24

## 2023-05-03 NOTE — ASSESSMENT & PLAN NOTE
Chronic condition.    Body mass index is 40.7 kg/m².     Counseling on health consequences related to obesity.  Discussed with the patient regarding diet, exercise, and lifestyle modification to help achieve and maintain healthy weight

## 2023-05-03 NOTE — ASSESSMENT & PLAN NOTE
This is a chronic and recurrent condition.  Patient presently followed by urologist.  Patient reported that she had a kidney stone attack last week and the patient collected a 2 mm kidney stone.  The patient stated that she delivered the stone to her urologist and the patient already had urine test done.  The patient denies fever chills or hematuria.  Patient ran out of her pain medication.  Patient denies recent back trauma or injury.  She denies bowel bladder dysfunction.  She denies perineal paresthesia.

## 2023-05-03 NOTE — ASSESSMENT & PLAN NOTE
Chronic condition.  The patient stated that the Prevacid medication does not seem to be very effective.  The patient wishes to change medication patient denies nausea vomiting dysphagia or unexplained weight loss.

## 2023-05-03 NOTE — PROGRESS NOTES
PRIMARY CARE CLINIC VISIT    Chief complaint:    Recurrent kidney stones  Obesity  Acid reflux  Medication review  Follow-up hypertension hyperlipidemia    History of Present Illness     Kidney stone  This is a chronic and recurrent condition.  Patient presently followed by urologist.  Patient reported that she had a kidney stone attack last week and the patient collected a 2 mm kidney stone.  The patient stated that she delivered the stone to her urologist and the patient already had urine test done.  The patient denies fever chills or hematuria.  Patient ran out of her pain medication.  Patient denies recent back trauma or injury.  She denies bowel bladder dysfunction.  She denies perineal paresthesia.    Severe obesity (HCC)  Chronic condition.    Body mass index is 40.7 kg/m².     Counseling on health consequences related to obesity.  Discussed with the patient regarding diet, exercise, and lifestyle modification to help achieve and maintain healthy weight          GERD (gastroesophageal reflux disease)  Chronic condition.  The patient stated that the Prevacid medication does not seem to be very effective.  The patient wishes to change medication patient denies nausea vomiting dysphagia or unexplained weight loss.    Essential hypertension  Chronic condition.  The patient is taking lisinopril 10 mg daily.  Blood pressure has been well controlled.  No significant side effects noted.    Dyslipidemia  This is a chronic condition for the patient is taking atorvastatin 20 Mg daily.    Current Outpatient Medications on File Prior to Visit   Medication Sig Dispense Refill    lisinopril (PRINIVIL) 10 MG Tab Take 1 Tablet by mouth every day. 90 Tablet 3    atorvastatin (LIPITOR) 20 MG Tab Take 1 Tablet by mouth every evening. 90 Tablet 0    alendronate (FOSAMAX) 70 MG Tab Take 1 Tablet by mouth every 7 days. WITH 8 OZ OF PLAIN WATER 30 MINUTES BEFORE FIRST FOOD DRINK OR MEDS STAY UPRIGHT FOR 30 MINS 12 Tablet 3     amitriptyline (ELAVIL) 10 MG Tab TAKE 3 TABLETS BY MOUTH AT  BEDTIME AS NEEDED 270 Tablet 3    gabapentin (NEURONTIN) 600 MG tablet TAKE 1 TABLET BY MOUTH  DAILY IN THE EVENING 90 Tablet 3    amoxicillin-clavulanate (AUGMENTIN) 875-125 MG Tab Take 1 Tablet by mouth 2 times a day. 14 Tablet 0    estradiol (ESTRACE) 0.1 MG/GM vaginal cream       asa/apap/caffeine (EXCEDRIN) 250-250-65 MG Tab Take 1 Tab by mouth every 6 hours as needed for Headache.      Multiple Vitamin (MULTI VITAMIN DAILY PO) Take 1 tablet by mouth every day.      Cholecalciferol (VITAMIN D3) 2000 UNIT Cap Take 1 capsule by mouth every day.      clobetasol (TEMOVATE) 0.05 % Cream Apply 1 Application topically as needed (Applies outside vaginal). Indications: Inflammation  1    ascorbic acid (ASCORBIC ACID) 500 MG Tab Take 1,000 mg by mouth every day.       No current facility-administered medications on file prior to visit.        Allergies: Patient has no known allergies.    Current Outpatient Medications Ordered in Epic   Medication Sig Dispense Refill    naproxen (NAPROSYN) 500 MG Tab Take 1 Tablet by mouth 2 times a day as needed (pain). 60 Tablet 1    pantoprazole (PROTONIX) 40 MG Tablet Delayed Response Take 1 Tablet by mouth every day. 90 Tablet 3    lisinopril (PRINIVIL) 10 MG Tab Take 1 Tablet by mouth every day. 90 Tablet 3    atorvastatin (LIPITOR) 20 MG Tab Take 1 Tablet by mouth every evening. 90 Tablet 0    alendronate (FOSAMAX) 70 MG Tab Take 1 Tablet by mouth every 7 days. WITH 8 OZ OF PLAIN WATER 30 MINUTES BEFORE FIRST FOOD DRINK OR MEDS STAY UPRIGHT FOR 30 MINS 12 Tablet 3    amitriptyline (ELAVIL) 10 MG Tab TAKE 3 TABLETS BY MOUTH AT  BEDTIME AS NEEDED 270 Tablet 3    gabapentin (NEURONTIN) 600 MG tablet TAKE 1 TABLET BY MOUTH  DAILY IN THE EVENING 90 Tablet 3    amoxicillin-clavulanate (AUGMENTIN) 875-125 MG Tab Take 1 Tablet by mouth 2 times a day. 14 Tablet 0    estradiol (ESTRACE) 0.1 MG/GM vaginal cream        asa/apap/caffeine (EXCEDRIN) 250-250-65 MG Tab Take 1 Tab by mouth every 6 hours as needed for Headache.      Multiple Vitamin (MULTI VITAMIN DAILY PO) Take 1 tablet by mouth every day.      Cholecalciferol (VITAMIN D3) 2000 UNIT Cap Take 1 capsule by mouth every day.      clobetasol (TEMOVATE) 0.05 % Cream Apply 1 Application topically as needed (Applies outside vaginal). Indications: Inflammation  1    ascorbic acid (ASCORBIC ACID) 500 MG Tab Take 1,000 mg by mouth every day.       No current [facility]-ordered facility-administered medications on file.       Past Medical History:   Diagnosis Date    Arthritis     osteo, hips, spine    Coccidioidomycosis 12/15    left upper lung ([place] fever), medicated for a year, yearly xrays    Cough 5/29/2015    Diverticulosis     GERD (gastroesophageal reflux disease)     Heart burn     Hypertension     Indigestion     LBP (low back pain)     Lumbar radicular pain 5/29/2015    Muscle disorder     Obesity     Osteoporosis of forearm     Alendronate started 6/18    Pain     Shoulder and numbness to R LE, hip       Past Surgical History:   Procedure Laterality Date    HIP REVISION TOTAL Left 5/13/2021    Procedure: REVISION, TOTAL ARTHROPLASTY, HIP.;  Surgeon: Amol Hogue M.D.;  Location: [facility];  Service: Orthopedics    TX TOTAL HIP ARTHROPLASTY Left 9/19/2019    Procedure: ARTHROPLASTY, HIP, TOTAL, ANTERIOR APPROACH;  Surgeon: Clarence Vallejo M.D.;  Location: [facility];  Service: Orthopedics    INCISION HERNIA REPAIR Right 11/26/2018    Procedure: INCISION HERNIA REPAIR- FLANK WITH MESH;  Surgeon: Misael Ramírez M.D.;  Location: [facility];  Service: General    HYSTERECTOMY ROBOTIC  6/7/2017    Procedure: HYSTERECTOMY ROBOTIC SI, BILATERAL SALPINGO-OOPHORECTOMY, URETERAL SACRAL LIGAMENT SHORTENING ;  Surgeon: Jerica Hooper M.D.;  Location: [facility];  Service:     CYSTOSCOPY  6/7/2017    Procedure:  CYSTOSCOPY;  Surgeon: Jerica Hooper M.D.;  Location: [facility];  Service:     SHOULDER DECOMPRESSION ARTHROSCOPIC Right 12/6/2016    Procedure: SHOULDER DECOMPRESSION ARTHROSCOPIC - SUBACROMIAL, MANJARREZ;  Surgeon: Kyle Claros M.D.;  Location: [facility];  Service:     CLAVICLE DISTAL EXCISION Right 12/6/2016    Procedure: CLAVICLE DISTAL EXCISION;  Surgeon: Kyle Claros M.D.;  Location: [facility];  Service:     SHOULDER ARTHROSCOPY W/ ROTATOR CUFF REPAIR Right 12/6/2016    Procedure: SHOULDER ARTHROSCOPY W/ ROTATOR CUFF REPAIR ;  Surgeon: Kyle Claros M.D.;  Location: [facility];  Service:     SHOULDER ARTHROSCOPY W/ BICIPITAL TENODESIS REPAIR Right 12/6/2016    Procedure: SHOULDER ARTHROSCOPY W/ BICIPITAL TENODESIS REPAIR ;  Surgeon: Kyle Claros M.D.;  Location: [facility];  Service:     FUSION, SPINE, LUMBAR, PLIF  2/9/2016    Procedure: LUMBAR FUSION POSTERIOR PEDICLE SCREW FIXATION (STAGE #2);  Surgeon: Tim Rodriguez M.D.;  Location: [facility];  Service:     LUMBAR LAMINECTOMY DISKECTOMY  2/9/2016    Procedure: LUMBAR LAMINECTOMY DISKECTOMY MINI OPEN;  Surgeon: Tim Rodriguez M.D.;  Location: [facility];  Service:     FUSION, SPINE, XLIF Right 2/6/2016    Procedure: EXTREME LATERAL INTERBODY FUSION L3-4 (STAGE #1);  Surgeon: Tim Rodriguez M.D.;  Location: [facility];  Service:     RECOVERY  12/8/2015    Procedure: CT-CT GUIDED LEFT LUNG BIOPSY-;  Surgeon: Recoveryonly Surgery;  Location: SURGERY PRE-POST PROC UNIT [facility];  Service:     REDDY BY LAPAROSCOPY  4/17/2014    Performed by Ankur Lerner M.D. at SURGERY SAME DAY [facility]    EGD WITH ASP/BX  2/4/2014    mild chronic inactive gastritis, scar gastric antrum-    EGD WITH ASP/BX  9/29/2011    possible barrettes, hiatal hernia, gastritis    COLONOSCOPY  9/29/2011    diverticulosis sigmoid colon, hemorrhoids    ARTHROTOMY  9/3/2010     "Performed by YARELY MORRISON at SURGERY SAME DAY [facility] ORS    ARTHRODESIS  9/3/2010    Performed by YARELY MORRISON at SURGERY SAME DAY [facility] ORS    ORTHOPEDIC OSTEOTOMY  9/3/2010    Performed by YARELY MORRISON at SURGERY SAME DAY [facility] ORS    BONE SPUR EXCISION  9/3/2010    Performed by YARELY MORRISON at SURGERY SAME DAY [facility] ORS    OTHER ABDOMINAL SURGERY  2000    COLON RESECTION    LAMINOTOMY      OTHER ORTHOPEDIC SURGERY      PARDEEP CARPAL TUNNEL RELEASES       Family History   Problem Relation Age of Onset    Cancer Mother         lymphoma    Stroke Father     Diabetes Father        Social History     Tobacco Use   Smoking Status Former    Years: 2.00    Types: Cigarettes    Quit date: 1977    Years since quittin.3   Smokeless Tobacco Never       Social History     Substance and Sexual Activity   Alcohol Use Not Currently    Alcohol/week: 0.0 oz       Review of systems.  As per HPI above. All other systems reviewed and negative.      Past Medical, Social, and Family history reviewed and updated in EPIC     Objective     /80 (BP Location: Left arm, Patient Position: Sitting, BP Cuff Size: Large adult)   Pulse 98   Temp 36.5 °C (97.7 °F) (Temporal)   Resp 20   Ht 1.499 m (4' 11\")   Wt 91.4 kg (201 lb 8 oz)   SpO2 98%    Body mass index is 40.7 kg/m².    General: alert in no apparent distress.  Cardiovascular: regular rate and rhythm  Pulmonary: lungs : no wheezing   Gastrointestinal: BS present. No obvious mass noted        No results found for: HBA1C    Lab Results   Component Value Date/Time    WBC 3.5 (L) 2022 07:39 AM    HEMOGLOBIN 14.9 2022 07:39 AM    HEMATOCRIT 45.3 2022 07:39 AM    MCV 98.5 (H) 2022 07:39 AM    PLATELETCT 267 2022 07:39 AM         Lab Results   Component Value Date/Time    SODIUM 139 2023 07:50 AM    POTASSIUM 4.2 2023 07:50 AM    GLUCOSE 96 2023 07:50 AM    BUN 23 (H) 2023 07:50 AM    " CREATININE 0.66 05/01/2023 07:50 AM       Lab Results   Component Value Date/Time    CHOLSTRLTOT 167 05/01/2023 07:50 AM    LDL 80 05/01/2023 07:50 AM    HDL 63 05/01/2023 07:50 AM    TRIGLYCERIDE 122 05/01/2023 07:50 AM       Lab Results   Component Value Date/Time    ALTSGPT 14 05/01/2023 07:50 AM             Assessment and Plan     1. Kidney stone  Chronic recurrent condition.  - US-RENAL; Future  - naproxen (NAPROSYN) 500 MG Tab; Take 1 Tablet by mouth 2 times a day as needed (pain).  Dispense: 60 Tablet; Refill: 1  Potential side effects of the medication discussed with the patient.  Advised the patient to follow-up with urology service.    2. Gastroesophageal reflux disease, unspecified whether esophagitis present  - DX-UPPER GI-AIR CONTRAST; Future  Chronic condition.  Symptoms not well controlled.  Shared decision making.  Stop Prevacid.  Patient will start Protonix 40 Mg daily.  Follow-up after upper GI test done  - pantoprazole (PROTONIX) 40 MG Tablet Delayed Response; Take 1 Tablet by mouth every day.  Dispense: 90 Tablet; Refill: 3        3. Severe obesity  Chronic condition.  Uncontrolled.  Advised the patient healthy diet and exercise.  Encourage patient to lose weight.    4. Essential hypertension  Chronic stable condition.  Continue with lisinopril 10 mg daily.    5. Dyslipidemia  Chronic stable condition.  Continue with atorvastatin 20 Mg daily.  Lab test result discussed with the patient.                    Please note that this dictation was created using voice recognition software. I have made every reasonable attempt to correct obvious errors, but I expect that there are errors of grammar and possibly content that I did not discover before finalizing the note.    Emre Viramontes MD  Internal Medicine  [facility]

## 2023-05-03 NOTE — ASSESSMENT & PLAN NOTE
Chronic condition.  The patient is taking lisinopril 10 mg daily.  Blood pressure has been well controlled.  No significant side effects noted.

## 2023-05-11 ENCOUNTER — HOSPITAL ENCOUNTER (OUTPATIENT)
Dept: RADIOLOGY | Facility: MEDICAL CENTER | Age: 68
End: 2023-05-11
Attending: PHYSICIAN ASSISTANT
Payer: MEDICARE

## 2023-05-11 DIAGNOSIS — N20.0 CALCULUS OF KIDNEY: ICD-10-CM

## 2023-05-11 PROCEDURE — 74176 CT ABD & PELVIS W/O CONTRAST: CPT

## 2023-05-22 ENCOUNTER — APPOINTMENT (OUTPATIENT)
Dept: ADMISSIONS | Facility: MEDICAL CENTER | Age: 68
End: 2023-05-22
Attending: UROLOGY
Payer: MEDICARE

## 2023-05-23 ENCOUNTER — APPOINTMENT (OUTPATIENT)
Dept: RADIOLOGY | Facility: MEDICAL CENTER | Age: 68
End: 2023-05-23
Attending: INTERNAL MEDICINE
Payer: MEDICARE

## 2023-05-23 DIAGNOSIS — E66.01 SEVERE OBESITY (HCC): Chronic | ICD-10-CM

## 2023-05-23 DIAGNOSIS — K21.9 GASTROESOPHAGEAL REFLUX DISEASE, UNSPECIFIED WHETHER ESOPHAGITIS PRESENT: ICD-10-CM

## 2023-05-23 DIAGNOSIS — K21.9 GASTROESOPHAGEAL REFLUX DISEASE, UNSPECIFIED WHETHER ESOPHAGITIS PRESENT: Chronic | ICD-10-CM

## 2023-05-23 DIAGNOSIS — R93.3 ABNORMAL FINDING ON GI TRACT IMAGING: ICD-10-CM

## 2023-05-23 PROCEDURE — 74246 X-RAY XM UPR GI TRC 2CNTRST: CPT

## 2023-05-23 PROCEDURE — A9270 NON-COVERED ITEM OR SERVICE: HCPCS | Performed by: INTERNAL MEDICINE

## 2023-05-23 PROCEDURE — 700112 HCHG RX REV CODE 229: Performed by: INTERNAL MEDICINE

## 2023-05-23 RX ADMIN — ANTACID/ANTIFLATULENT 1 PACKET: 380; 550; 10; 10 GRANULE, EFFERVESCENT ORAL at 10:10

## 2023-05-30 ENCOUNTER — PRE-ADMISSION TESTING (OUTPATIENT)
Dept: ADMISSIONS | Facility: MEDICAL CENTER | Age: 68
End: 2023-05-30
Attending: UROLOGY
Payer: MEDICARE

## 2023-05-30 VITALS — HEIGHT: 59 IN | WEIGHT: 198 LBS | BODY MASS INDEX: 39.92 KG/M2

## 2023-05-30 ASSESSMENT — FIBROSIS 4 INDEX: FIB4 SCORE: 1.24

## 2023-06-06 ENCOUNTER — PRE-ADMISSION TESTING (OUTPATIENT)
Dept: ADMISSIONS | Facility: MEDICAL CENTER | Age: 68
End: 2023-06-06
Attending: UROLOGY
Payer: MEDICARE

## 2023-06-06 DIAGNOSIS — Z01.810 PRE-OPERATIVE CARDIOVASCULAR EXAMINATION: ICD-10-CM

## 2023-06-06 DIAGNOSIS — Z01.812 PRE-OPERATIVE LABORATORY EXAMINATION: ICD-10-CM

## 2023-06-06 LAB — EKG IMPRESSION: NORMAL

## 2023-06-06 PROCEDURE — 93005 ELECTROCARDIOGRAM TRACING: CPT

## 2023-06-06 PROCEDURE — 93010 ELECTROCARDIOGRAM REPORT: CPT | Performed by: INTERNAL MEDICINE

## 2023-06-06 NOTE — OR NURSING
"Patient states that she had all her \"labs done at Urology office.  She says that she \"only need EKG\".  She said the office will fax over the results  "

## 2023-06-08 ENCOUNTER — ANESTHESIA EVENT (OUTPATIENT)
Dept: SURGERY | Facility: MEDICAL CENTER | Age: 68
End: 2023-06-08
Payer: MEDICARE

## 2023-06-08 ENCOUNTER — ANESTHESIA (OUTPATIENT)
Dept: SURGERY | Facility: MEDICAL CENTER | Age: 68
End: 2023-06-08
Payer: MEDICARE

## 2023-06-08 ENCOUNTER — HOSPITAL ENCOUNTER (OUTPATIENT)
Facility: MEDICAL CENTER | Age: 68
End: 2023-06-08
Attending: UROLOGY | Admitting: UROLOGY
Payer: MEDICARE

## 2023-06-08 ENCOUNTER — APPOINTMENT (OUTPATIENT)
Dept: RADIOLOGY | Facility: MEDICAL CENTER | Age: 68
End: 2023-06-08
Attending: UROLOGY
Payer: MEDICARE

## 2023-06-08 VITALS
RESPIRATION RATE: 18 BRPM | TEMPERATURE: 96.8 F | OXYGEN SATURATION: 92 % | HEIGHT: 59 IN | WEIGHT: 200.4 LBS | DIASTOLIC BLOOD PRESSURE: 84 MMHG | BODY MASS INDEX: 40.4 KG/M2 | SYSTOLIC BLOOD PRESSURE: 185 MMHG | HEART RATE: 80 BPM

## 2023-06-08 DIAGNOSIS — N20.0 KIDNEY STONE: ICD-10-CM

## 2023-06-08 LAB — PATHOLOGY CONSULT NOTE: NORMAL

## 2023-06-08 PROCEDURE — C1758 CATHETER, URETERAL: HCPCS | Performed by: UROLOGY

## 2023-06-08 PROCEDURE — 160046 HCHG PACU - 1ST 60 MINS PHASE II: Performed by: UROLOGY

## 2023-06-08 PROCEDURE — C2617 STENT, NON-COR, TEM W/O DEL: HCPCS | Performed by: UROLOGY

## 2023-06-08 PROCEDURE — 700111 HCHG RX REV CODE 636 W/ 250 OVERRIDE (IP): Performed by: ANESTHESIOLOGY

## 2023-06-08 PROCEDURE — 160002 HCHG RECOVERY MINUTES (STAT): Performed by: UROLOGY

## 2023-06-08 PROCEDURE — A9270 NON-COVERED ITEM OR SERVICE: HCPCS | Performed by: ANESTHESIOLOGY

## 2023-06-08 PROCEDURE — 00918 ANES TRURL PX URTRL CAL RMVL: CPT | Performed by: ANESTHESIOLOGY

## 2023-06-08 PROCEDURE — 160009 HCHG ANES TIME/MIN: Performed by: UROLOGY

## 2023-06-08 PROCEDURE — 160025 RECOVERY II MINUTES (STATS): Performed by: UROLOGY

## 2023-06-08 PROCEDURE — 160028 HCHG SURGERY MINUTES - 1ST 30 MINS LEVEL 3: Performed by: UROLOGY

## 2023-06-08 PROCEDURE — 160048 HCHG OR STATISTICAL LEVEL 1-5: Performed by: UROLOGY

## 2023-06-08 PROCEDURE — C1769 GUIDE WIRE: HCPCS | Performed by: UROLOGY

## 2023-06-08 PROCEDURE — C1747 HCHG SHELL REV 278 C1747: HCPCS | Performed by: UROLOGY

## 2023-06-08 PROCEDURE — 700105 HCHG RX REV CODE 258: Performed by: UROLOGY

## 2023-06-08 PROCEDURE — 99100 ANES PT EXTEME AGE<1 YR&>70: CPT | Performed by: ANESTHESIOLOGY

## 2023-06-08 PROCEDURE — 160036 HCHG PACU - EA ADDL 30 MINS PHASE I: Performed by: UROLOGY

## 2023-06-08 PROCEDURE — 160039 HCHG SURGERY MINUTES - EA ADDL 1 MIN LEVEL 3: Performed by: UROLOGY

## 2023-06-08 PROCEDURE — 82365 CALCULUS SPECTROSCOPY: CPT

## 2023-06-08 PROCEDURE — 110371 HCHG SHELL REV 272: Performed by: UROLOGY

## 2023-06-08 PROCEDURE — 700101 HCHG RX REV CODE 250: Performed by: ANESTHESIOLOGY

## 2023-06-08 PROCEDURE — 88300 SURGICAL PATH GROSS: CPT

## 2023-06-08 PROCEDURE — 160035 HCHG PACU - 1ST 60 MINS PHASE I: Performed by: UROLOGY

## 2023-06-08 PROCEDURE — 700102 HCHG RX REV CODE 250 W/ 637 OVERRIDE(OP): Performed by: ANESTHESIOLOGY

## 2023-06-08 DEVICE — STENT UROLOGICAL POLARIS 6X24  ULTRA: Type: IMPLANTABLE DEVICE | Site: URETER | Status: FUNCTIONAL

## 2023-06-08 RX ORDER — MIDAZOLAM HYDROCHLORIDE 1 MG/ML
INJECTION INTRAMUSCULAR; INTRAVENOUS PRN
Status: DISCONTINUED | OUTPATIENT
Start: 2023-06-08 | End: 2023-06-08 | Stop reason: SURG

## 2023-06-08 RX ORDER — TAMSULOSIN HYDROCHLORIDE 0.4 MG/1
0.4 CAPSULE ORAL DAILY
Qty: 21 CAPSULE | Refills: 1 | Status: SHIPPED | OUTPATIENT
Start: 2023-06-08 | End: 2023-08-28

## 2023-06-08 RX ORDER — HYDROCODONE BITARTRATE AND ACETAMINOPHEN 5; 325 MG/1; MG/1
1 TABLET ORAL EVERY 8 HOURS PRN
Qty: 15 TABLET | Refills: 0 | Status: SHIPPED | OUTPATIENT
Start: 2023-06-08 | End: 2023-06-13

## 2023-06-08 RX ORDER — CEFAZOLIN SODIUM 1 G/3ML
INJECTION, POWDER, FOR SOLUTION INTRAMUSCULAR; INTRAVENOUS PRN
Status: DISCONTINUED | OUTPATIENT
Start: 2023-06-08 | End: 2023-06-08 | Stop reason: SURG

## 2023-06-08 RX ORDER — HYDROMORPHONE HYDROCHLORIDE 1 MG/ML
0.2 INJECTION, SOLUTION INTRAMUSCULAR; INTRAVENOUS; SUBCUTANEOUS
Status: DISCONTINUED | OUTPATIENT
Start: 2023-06-08 | End: 2023-06-08 | Stop reason: HOSPADM

## 2023-06-08 RX ORDER — ROCURONIUM BROMIDE 10 MG/ML
INJECTION, SOLUTION INTRAVENOUS PRN
Status: DISCONTINUED | OUTPATIENT
Start: 2023-06-08 | End: 2023-06-08 | Stop reason: SURG

## 2023-06-08 RX ORDER — PHENAZOPYRIDINE HYDROCHLORIDE 200 MG/1
200 TABLET, FILM COATED ORAL 3 TIMES DAILY PRN
Qty: 9 TABLET | Refills: 0 | Status: SHIPPED | OUTPATIENT
Start: 2023-06-08 | End: 2023-08-28

## 2023-06-08 RX ORDER — ONDANSETRON 2 MG/ML
INJECTION INTRAMUSCULAR; INTRAVENOUS PRN
Status: DISCONTINUED | OUTPATIENT
Start: 2023-06-08 | End: 2023-06-08 | Stop reason: SURG

## 2023-06-08 RX ORDER — SODIUM CHLORIDE, SODIUM GLUCONATE, SODIUM ACETATE, POTASSIUM CHLORIDE AND MAGNESIUM CHLORIDE 526; 502; 368; 37; 30 MG/100ML; MG/100ML; MG/100ML; MG/100ML; MG/100ML
500 INJECTION, SOLUTION INTRAVENOUS CONTINUOUS
Status: DISCONTINUED | OUTPATIENT
Start: 2023-06-08 | End: 2023-06-08 | Stop reason: HOSPADM

## 2023-06-08 RX ORDER — OXYCODONE HCL 5 MG/5 ML
5 SOLUTION, ORAL ORAL
Status: COMPLETED | OUTPATIENT
Start: 2023-06-08 | End: 2023-06-08

## 2023-06-08 RX ORDER — ONDANSETRON 2 MG/ML
4 INJECTION INTRAMUSCULAR; INTRAVENOUS
Status: DISCONTINUED | OUTPATIENT
Start: 2023-06-08 | End: 2023-06-08 | Stop reason: HOSPADM

## 2023-06-08 RX ORDER — OXYCODONE HCL 5 MG/5 ML
10 SOLUTION, ORAL ORAL
Status: COMPLETED | OUTPATIENT
Start: 2023-06-08 | End: 2023-06-08

## 2023-06-08 RX ORDER — IPRATROPIUM BROMIDE AND ALBUTEROL SULFATE 2.5; .5 MG/3ML; MG/3ML
3 SOLUTION RESPIRATORY (INHALATION)
Status: DISCONTINUED | OUTPATIENT
Start: 2023-06-08 | End: 2023-06-08 | Stop reason: HOSPADM

## 2023-06-08 RX ORDER — SODIUM CHLORIDE, SODIUM LACTATE, POTASSIUM CHLORIDE, CALCIUM CHLORIDE 600; 310; 30; 20 MG/100ML; MG/100ML; MG/100ML; MG/100ML
INJECTION, SOLUTION INTRAVENOUS CONTINUOUS
Status: ACTIVE | OUTPATIENT
Start: 2023-06-08 | End: 2023-06-08

## 2023-06-08 RX ORDER — MIDAZOLAM HYDROCHLORIDE 1 MG/ML
1 INJECTION INTRAMUSCULAR; INTRAVENOUS
Status: DISCONTINUED | OUTPATIENT
Start: 2023-06-08 | End: 2023-06-08 | Stop reason: HOSPADM

## 2023-06-08 RX ORDER — OXYBUTYNIN CHLORIDE 5 MG/1
5 TABLET ORAL EVERY 8 HOURS PRN
Qty: 21 TABLET | Refills: 1 | Status: SHIPPED | OUTPATIENT
Start: 2023-06-08 | End: 2023-08-28

## 2023-06-08 RX ORDER — LIDOCAINE HYDROCHLORIDE 20 MG/ML
INJECTION, SOLUTION EPIDURAL; INFILTRATION; INTRACAUDAL; PERINEURAL PRN
Status: DISCONTINUED | OUTPATIENT
Start: 2023-06-08 | End: 2023-06-08 | Stop reason: SURG

## 2023-06-08 RX ORDER — DIPHENHYDRAMINE HYDROCHLORIDE 50 MG/ML
12.5 INJECTION INTRAMUSCULAR; INTRAVENOUS
Status: DISCONTINUED | OUTPATIENT
Start: 2023-06-08 | End: 2023-06-08 | Stop reason: HOSPADM

## 2023-06-08 RX ORDER — HALOPERIDOL 5 MG/ML
1 INJECTION INTRAMUSCULAR
Status: DISCONTINUED | OUTPATIENT
Start: 2023-06-08 | End: 2023-06-08 | Stop reason: HOSPADM

## 2023-06-08 RX ORDER — HYDROMORPHONE HYDROCHLORIDE 1 MG/ML
0.4 INJECTION, SOLUTION INTRAMUSCULAR; INTRAVENOUS; SUBCUTANEOUS
Status: DISCONTINUED | OUTPATIENT
Start: 2023-06-08 | End: 2023-06-08 | Stop reason: HOSPADM

## 2023-06-08 RX ORDER — MEPERIDINE HYDROCHLORIDE 25 MG/ML
12.5 INJECTION INTRAMUSCULAR; INTRAVENOUS; SUBCUTANEOUS
Status: DISCONTINUED | OUTPATIENT
Start: 2023-06-08 | End: 2023-06-08 | Stop reason: HOSPADM

## 2023-06-08 RX ORDER — DEXAMETHASONE SODIUM PHOSPHATE 4 MG/ML
INJECTION, SOLUTION INTRA-ARTICULAR; INTRALESIONAL; INTRAMUSCULAR; INTRAVENOUS; SOFT TISSUE PRN
Status: DISCONTINUED | OUTPATIENT
Start: 2023-06-08 | End: 2023-06-08 | Stop reason: SURG

## 2023-06-08 RX ORDER — HYDROMORPHONE HYDROCHLORIDE 1 MG/ML
1 INJECTION, SOLUTION INTRAMUSCULAR; INTRAVENOUS; SUBCUTANEOUS
Status: DISCONTINUED | OUTPATIENT
Start: 2023-06-08 | End: 2023-06-08 | Stop reason: HOSPADM

## 2023-06-08 RX ORDER — KETOROLAC TROMETHAMINE 30 MG/ML
INJECTION, SOLUTION INTRAMUSCULAR; INTRAVENOUS PRN
Status: DISCONTINUED | OUTPATIENT
Start: 2023-06-08 | End: 2023-06-08 | Stop reason: SURG

## 2023-06-08 RX ADMIN — FENTANYL CITRATE 25 MCG: 50 INJECTION, SOLUTION INTRAMUSCULAR; INTRAVENOUS at 14:06

## 2023-06-08 RX ADMIN — LIDOCAINE HYDROCHLORIDE 40 MG: 20 INJECTION, SOLUTION EPIDURAL; INFILTRATION; INTRACAUDAL at 13:04

## 2023-06-08 RX ADMIN — HYDROMORPHONE HYDROCHLORIDE 0.4 MG: 1 INJECTION, SOLUTION INTRAMUSCULAR; INTRAVENOUS; SUBCUTANEOUS at 15:18

## 2023-06-08 RX ADMIN — ONDANSETRON 4 MG: 2 INJECTION INTRAMUSCULAR; INTRAVENOUS at 13:26

## 2023-06-08 RX ADMIN — FENTANYL CITRATE 25 MCG: 50 INJECTION, SOLUTION INTRAMUSCULAR; INTRAVENOUS at 14:12

## 2023-06-08 RX ADMIN — CEFAZOLIN 2 G: 1 INJECTION, POWDER, FOR SOLUTION INTRAMUSCULAR; INTRAVENOUS at 12:58

## 2023-06-08 RX ADMIN — KETOROLAC TROMETHAMINE 15 MG: 30 INJECTION, SOLUTION INTRAMUSCULAR; INTRAVENOUS at 13:26

## 2023-06-08 RX ADMIN — OXYCODONE HYDROCHLORIDE 5 MG: 5 SOLUTION ORAL at 14:06

## 2023-06-08 RX ADMIN — SODIUM CHLORIDE, POTASSIUM CHLORIDE, SODIUM LACTATE AND CALCIUM CHLORIDE: 600; 310; 30; 20 INJECTION, SOLUTION INTRAVENOUS at 12:53

## 2023-06-08 RX ADMIN — ROCURONIUM BROMIDE 50 MG: 50 INJECTION, SOLUTION INTRAVENOUS at 13:04

## 2023-06-08 RX ADMIN — DEXAMETHASONE SODIUM PHOSPHATE 8 MG: 4 INJECTION INTRA-ARTICULAR; INTRALESIONAL; INTRAMUSCULAR; INTRAVENOUS; SOFT TISSUE at 13:08

## 2023-06-08 RX ADMIN — FENTANYL CITRATE 100 MCG: 50 INJECTION, SOLUTION INTRAMUSCULAR; INTRAVENOUS at 13:00

## 2023-06-08 RX ADMIN — SUGAMMADEX 200 MG: 100 INJECTION, SOLUTION INTRAVENOUS at 13:26

## 2023-06-08 RX ADMIN — MIDAZOLAM 2 MG: 1 INJECTION, SOLUTION INTRAMUSCULAR; INTRAVENOUS at 13:00

## 2023-06-08 RX ADMIN — PROPOFOL 140 MG: 10 INJECTION, EMULSION INTRAVENOUS at 13:04

## 2023-06-08 ASSESSMENT — PAIN DESCRIPTION - PAIN TYPE
TYPE: SURGICAL PAIN

## 2023-06-08 ASSESSMENT — FIBROSIS 4 INDEX: FIB4 SCORE: 1.26

## 2023-06-08 ASSESSMENT — PAIN SCALES - GENERAL: PAIN_LEVEL: 0

## 2023-06-08 NOTE — DISCHARGE INSTRUCTIONS
HOME CARE INSTRUCTIONS    ACTIVITY: Rest and take it easy for the first 24 hours.  A responsible adult is recommended to remain with you during that time.  It is normal to feel sleepy.  We encourage you to not do anything that requires balance, judgment or coordination.    FOR 24 HOURS DO NOT:  Drive, operate machinery or run household appliances.  Drink beer or alcoholic beverages.  Make important decisions or sign legal documents.    SPECIAL INSTRUCTIONS: Lithotripsy, Care After  This sheet gives you information about how to care for yourself after your procedure. Your health care provider may also give you more specific instructions. If you have problems or questions, contact your health care provider.  What can I expect after the procedure?  After the procedure, it is common to have:  Some blood in your urine. This should only last for a few days.  Soreness in your back, sides, or upper abdomen for a few days.  Blotches or bruises on your back where the pressure wave entered the skin.  Pain, discomfort, or nausea when pieces (fragments) of the kidney stone move through the tube that carries urine from the kidney to the bladder (ureter). Stone fragments may pass soon after the procedure, but they may continue to pass for up to 4-8 weeks.  If you have severe pain or nausea, contact your health care provider. This may be caused by a large stone that was not broken up, and this may mean that you need more treatment.  Some pain or discomfort during urination.  Some pain or discomfort in the lower abdomen or (in men) at the base of the penis.  Follow these instructions at home: follow-up 5 days for stent removal and in 6 to 8 weeks with renal ultrasound prior  Medicines  Take over-the-counter and prescription medicines only as told by your health care provider.  If you were prescribed an antibiotic medicine, take it as told by your health care provider. Do not stop taking the antibiotic even if you start to feel  better.  Do not drive for 24 hours if you were given a medicine to help you relax (sedative).  Do not drive or use heavy machinery while taking prescription pain medicine.  Eating and drinking  Drink enough water and fluids to keep your urine clear or pale yellow. This helps any remaining pieces of the stone to pass. It can also help prevent new stones from forming.  Eat plenty of fresh fruits and vegetables.  Follow instructions from your health care provider about eating and drinking restrictions. You may be instructed:  To reduce how much salt (sodium) you eat or drink. Check ingredients and nutrition facts on packaged foods and beverages.  To reduce how much meat you eat.  Eat the recommended amount of calcium for your age and gender. Ask your health care provider how much calcium you should have.  General instructions  Get plenty of rest.  Most people can resume normal activities 1-2 days after the procedure. Ask your health care provider what activities are safe for you.  Your health care provider may direct you to lie in a certain position (postural drainage) and tap firmly (percuss) over your kidney area to help stone fragments pass. Follow instructions as told by your health care provider.  If directed, strain all urine through the strainer that was provided by your health care provider.  Keep all fragments for your health care provider to see. Any stones that are found may be sent to a medical lab for examination. The stone may be as small as a grain of salt.  Keep all follow-up visits as told by your health care provider. This is important.  Contact a health care provider if:  You have pain that is severe or does not get better with medicine.  You have nausea that is severe or does not go away.  You have blood in your urine longer than your health care provider told you to expect.  You have more blood in your urine.  You have pain during urination that does not go away.  You urinate more frequently than  usual and this does not go away.  You develop a rash or any other possible signs of an allergic reaction.  Get help right away if:  You have severe pain in your back, sides, or upper abdomen.  You have severe pain while urinating.  Your urine is very dark red.  You have blood in your stool (feces).  You cannot pass any urine at all.  You feel a strong urge to urinate after emptying your bladder.  You have a fever or chills.  You develop shortness of breath, difficulty breathing, or chest pain.  You have severe nausea that leads to persistent vomiting.  You faint.  Summary  After this procedure, it is common to have some pain, discomfort, or nausea when pieces (fragments) of the kidney stone move through the tube that carries urine from the kidney to the bladder (ureter). If this pain or nausea is severe, however, you should contact your health care provider.  Most people can resume normal activities 1-2 days after the procedure. Ask your health care provider what activities are safe for you.  Drink enough water and fluids to keep your urine clear or pale yellow. This helps any remaining pieces of the stone to pass, and it can help prevent new stones from forming.  If directed, strain your urine and keep all fragments for your health care provider to see. Fragments or stones may be as small as a grain of salt.  Get help right away if you have severe pain in your back, sides, or upper abdomen or have severe pain while urinating.      DIET: To avoid nausea, slowly advance diet as tolerated, avoiding spicy or greasy foods for the first day.  Add more substantial food to your diet according to your physician's instructions.  Babies can be fed formula or breast milk as soon as they are hungry.  INCREASE FLUIDS AND FIBER TO AVOID CONSTIPATION.      MEDICATIONS: Resume taking daily medication.  Take prescribed pain medication with food.  If no medication is prescribed, you may take non-aspirin pain medication if needed.   PAIN MEDICATION CAN BE VERY CONSTIPATING.  Take a stool softener or laxative such as senokot, pericolace, or milk of magnesia if needed.    Prescription given for Norco, Flomax, Ditropan, and Pyridium.  Last pain medication given at 2:06 PM.    A follow-up appointment should be arranged with your doctor in 1-2 weeks; call to schedule.    You should CALL YOUR PHYSICIAN if you develop:  Fever greater than 101 degrees F.  Pain not relieved by medication, or persistent nausea or vomiting.  Excessive bleeding (blood soaking through dressing) or unexpected drainage from the wound.  Extreme redness or swelling around the incision site, drainage of pus or foul smelling drainage.  Inability to urinate or empty your bladder within 8 hours.  Problems with breathing or chest pain.    You should call 911 if you develop problems with breathing or chest pain.  If you are unable to contact your doctor or surgical center, you should go to the nearest emergency room or urgent care center.  Physician's telephone #: [phone]    MILD FLU-LIKE SYMPTOMS ARE NORMAL.  YOU MAY EXPERIENCE GENERALIZED MUSCLE ACHES, THROAT IRRITATION, HEADACHE AND/OR SOME NAUSEA.    If any questions arise, call your doctor.  If your doctor is not available, please feel free to call the Surgical Center at [phone].  The Center is open Monday through Friday from 7AM to 7PM.      A registered nurse may call you a few days after your surgery to see how you are doing after your procedure.    You may also receive a survey in the mail within the next two weeks and we ask that you take a few moments to complete the survey and return it to us.  Our goal is to provide you with very good care and we value your comments.     Depression / Suicide Risk    As you are discharged from this [facility] facility, it is important to learn how to keep safe from harming yourself.    Recognize the warning signs:  Abrupt changes in personality, positive or negative-  including increase in energy   Giving away possessions  Change in eating patterns- significant weight changes-  positive or negative  Change in sleeping patterns- unable to sleep or sleeping all the time   Unwillingness or inability to communicate  Depression  Unusual sadness, discouragement and loneliness  Talk of wanting to die  Neglect of personal appearance   Rebelliousness- reckless behavior  Withdrawal from people/activities they love  Confusion- inability to concentrate     If you or a loved one observes any of these behaviors or has concerns about self-harm, here's what you can do:  Talk about it- your feelings and reasons for harming yourself  Remove any means that you might use to hurt yourself (examples: pills, rope, extension cords, firearm)  Get professional help from the community (Mental Health, Substance Abuse, psychological counseling)  Do not be alone:Call your Safe Contact- someone whom you trust who will be there for you.  Call your local CRISIS HOTLINE 712-9205 or 420-157-7528  Call your local Children's Mobile Crisis Response Team [place] (868) 852-9608 or www.PetMD  Call the toll free National Suicide Prevention Hotlines   National Suicide Prevention Lifeline 606-623-IDCA (8466)  National Hope Line Network 800-SUICIDE (264-9673)    I acknowledge receipt and understanding of these Home Care instructions.

## 2023-06-08 NOTE — ANESTHESIA TIME REPORT
Anesthesia Start and Stop Event Times     Date Time Event    6/8/2023 1150 Ready for Procedure     1253 Anesthesia Start     1336 Anesthesia Stop        Responsible Staff  06/08/23    Name Role Begin End    Prakash Rashid III, M.D. Anesth 1253 1336        Overtime Reason:  no overtime (within assigned shift)    Comments:

## 2023-06-08 NOTE — ANESTHESIA PREPROCEDURE EVALUATION
Case: [id number] Date/Time: 06/08/23 1215    Procedures:       CYSTOSCOPY, WITH RIGHT URETEROSCOPY, WITH LITHOTRIPSY, WITH INSERTION OF RIGHT URETERAL STENT (Right: Bladder)      URETEROSCOPY (Right: Ureter)      LITHOTRIPSY, USING LASER (Right: Ureter)    Anesthesia type: General    Pre-op diagnosis: CALCULUS OF KIDNEY    Location: [facility] OR 18 / SURGERY [facility]    Surgeons: Amol Sullivan M.D.          Relevant Problems   CARDIAC   (positive) Essential hypertension      GI   (positive) GERD (gastroesophageal reflux disease)         (positive) Kidney stone       Physical Exam    Airway   Mallampati: II  TM distance: >3 FB  Neck ROM: full       Cardiovascular - normal exam  Rhythm: regular  Rate: normal  (-) murmur     Dental - normal exam           Pulmonary - normal exam  Breath sounds clear to auscultation     Abdominal   (+) obese     Neurological - normal exam         Other findings: Morbid obesity            Anesthesia Plan    ASA 3       Plan - general       Airway plan will be LMA          Induction: intravenous    Postoperative Plan: Postoperative administration of opioids is intended.    Pertinent diagnostic labs and testing reviewed    Informed Consent:    Anesthetic plan and risks discussed with patient.    Use of blood products discussed with: patient whom consented to blood products.

## 2023-06-08 NOTE — ANESTHESIA POSTPROCEDURE EVALUATION
Patient: Dee Armstrong    Procedure Summary     Date: 06/08/23 Room / Location: [place] [address] / SURGERY [facility]    Anesthesia Start: 1253 Anesthesia Stop: 1336    Procedures:       CYSTOSCOPY, WITH RIGHT URETEROSCOPY, WITH LITHOTRIPSY, WITH INSERTION OF RIGHT URETERAL STENT (Right: Bladder)      URETEROSCOPY (Right: Ureter)      LITHOTRIPSY, USING LASER (Right: Ureter) Diagnosis: (CALCULUS OF KIDNEY)    Surgeons: Amol Sullivan M.D. Responsible Provider: Prakash Rashid III, M.D.    Anesthesia Type: general ASA Status: 3          Final Anesthesia Type: general  Last vitals  BP   Blood Pressure : (!) 164/83    Temp   36.6 °C (97.8 °F)    Pulse   87   Resp   20    SpO2   94 %      Anesthesia Post Evaluation    Patient location during evaluation: PACU  Patient participation: complete - patient participated  Level of consciousness: awake and alert  Pain score: 0    Airway patency: patent  Anesthetic complications: no  Cardiovascular status: hemodynamically stable  Respiratory status: acceptable  Hydration status: euvolemic    PONV: none          There were no known notable events for this encounter.     Nurse Pain Score: 0 (NPRS)

## 2023-06-08 NOTE — ANESTHESIA PROCEDURE NOTES
Airway    Date/Time: 6/8/2023 1:04 PM    Performed by: Prakash Rashid III, M.D.  Authorized by: Prakash Rashid III, M.D.    Location:  OR  Urgency:  Elective  Difficult Airway: No    Indications for Airway Management:  Anesthesia      Spontaneous Ventilation: absent    Sedation Level:  Deep  Preoxygenated: Yes    Final Airway Type:  Supraglottic airway  Final Supraglottic Airway:  Standard LMA    SGA Size:  3  Number of Attempts at Approach:  1

## 2023-06-08 NOTE — OP REPORT
Urologic Surgery Operative Report     Pre-op Diagnosis: Right nephrolithiasis   Post-op Diagnosis: Same as above   Procedure: Right ureteroscopy, holmium laser lithotripsy, renal stone basketing, right ureteral stent placement   Surgeon: Surgeon(s) and Role:     * Amol Sullivan M.D. - Primary   Assistant: Circulator: Svitlana Ford R.N.  Scrub Person: Roseanna Thompson   Anesthesia: General,   Anesthesiologist: Prakash Rashid III, M.D.   Estimated Blood Loss: * No blood loss amount entered *   IV fluids <1L Crystalloid    Specimens: 1. Stone for chemical analysis    Complications: None   Condition: Stable, procedure well tolerated    Drains: 1. [id number] JJ stent((on strings) )  2. No jason   Disposition:  1. PACU, discharge after voiding  2. f/u 5 days for stent removal in 6 to 8 weeks with renal ultrasound prior   Findings 1.  0.5 cm stone at right UPJ  2.  Numerous stones and Robin's plaques ranging from 1 mm to 5 mm in all calyces      Indication:   This is a 68-year-old female with 2 obstructing ureteral calculi here for definitive treatment.  After a full discussion of alternatives, risks, and benefits the patient consented to proceeding with Ureteroscopy, laser lithotripsy and possible JJ stent placement on the respective side.  She understands the risk of inability to access ureter, the need for second procedures, the possibility of negative ureteroscopy, that she may have stent discomfort until this is removed, bleeding, infection, ureteral injury or stricture, bladder injury, post op urinary retention requiring jason catheter, and the general pulmonary and cardiovascular risks associated with anesthesia.     Procedure Details:   The patient was taken to operating room and placed on table in supine position.  Ancef was administered prior to the start of the procedure based on previous urine cultures. Sequential compression devices were placed for deep venous thrombosis prophylaxis. After induction of  general anesthesia, both legs were placed in Chetan stirrups in the standard lithotomy position.  A timeout was held confirming the correct patient, procedure and laterality.   The perineal area was prepped and draped in a sterile fashion. A rigid cystoscope was inserted into the urethra and the bladder was emptied and then distended with sterile saline. Urethral sounds were not needed to dilate the urethral meatus. Cystoscopy revealed normal bladder mucosa, orthotopic ureteral orifices, and no other abnormalities.    0.35 guidewire was advanced through the right ureteral orifice over which a dual-lumen ureteral catheter was advanced a second wire placed.  Flexible ureteroscope advanced over the second wire up to level the renal pelvis pan fluoroscopy revealed to 4 to 5 mm stones which were retropulsed into the kidney as well as numerous Robin's plaques and free-floating calculi ranging from 1 to 4 mm within the kidney.  All identifiable stones were fractured and fragments less than 1 mm with 200 µm holmium laser fiber and at the conclusion the final 2 to 3 mm stone was grasped and removed in its entirety.  Upon withdrawal no damage to the ureter noted    Returnign to rigid cystoscope, we then placed a right-sided JJ stent, [id number] JJ stent (on strings)  under fluoroscopy. We then saw that the JJ stent was in appropriate position, with a good curl visualized in the renal pelvis fluoroscopically and a good curl in the bladder endoscopically.  Stone sent for analysis.  The cystoscope was removed after emptying the bladder.  The patient was awoken from anesthesia and brought to recovery in satisfactory condition.     Okay for discharge home when stable per PACU, follow-up 5 days for stent removal and in 6 to 8 weeks with renal ultrasound prior.       Amol Sullivan M.D.   [address] [facility] [place] [place] [address]   [phone]

## 2023-06-08 NOTE — OR NURSING
1333 Pt arrived from OR via gurney. Report given by anesthesia and RN. Sleeping perrla, 10L mask even non labored breathing. Lungs clear airway patent. SR. Skin pink warm dry. Right uretal string steri stripped to L inner thigh. Piv patent.     1346 awake clear speech, follows commands. DENIES pain and nausea. Given purwick.    1357 given bedpan instead per request     1406, 1412 grimacing, c/o pain, treated per mar     1430 up to bathroom with x2 assist cna, x1 void.     1446 resting comfortably. Pain tolerable. Denies nausea. Even non labored breathing. RA.     1500 sleeping, even non labored breathing. Face relaxed, skin pink warm dry.     1515 sitting up drinking water.     1518 grimacing, c/o pain treated per mar    1530 up to bathroom with cna, steady on feet. X1 void. Pain tolerable. Denies nausea .ready for transfer to phase 2.     1543 updated Narendra, spouse. O2 tank full for transfer .    1545 report given to Jodie DELATORRE, rm 30 phase 2

## 2023-06-08 NOTE — OR NURSING
Arrived from PACU AXO, Pt's BP elevated due to not taking Lisinopril for 2 days, pt will take at home, VSS otherwise; denies N/V; states pain is at tolerable level. Pt voided again at 1625.    D/c orders received. IV dc'd. Pt changed into clothing with assistance. Discharge reviewed, Pt and family verbalized understanding and questions answered. Patient states ready to d/c home. Pt dc'd in w/c with RN.

## 2023-06-11 LAB
APPEARANCE STONE: NORMAL
COMPN STONE: NORMAL
SPECIMEN WT: 2 MG

## 2023-06-20 ENCOUNTER — HOSPITAL ENCOUNTER (OUTPATIENT)
Dept: RADIOLOGY | Facility: MEDICAL CENTER | Age: 68
End: 2023-06-20
Attending: INTERNAL MEDICINE
Payer: MEDICARE

## 2023-06-20 DIAGNOSIS — Z12.31 VISIT FOR SCREENING MAMMOGRAM: ICD-10-CM

## 2023-06-20 PROCEDURE — 77063 BREAST TOMOSYNTHESIS BI: CPT

## 2023-07-05 DIAGNOSIS — R25.2 LEG CRAMPS: ICD-10-CM

## 2023-07-05 RX ORDER — GABAPENTIN 600 MG/1
TABLET ORAL
Qty: 90 TABLET | Refills: 3 | Status: CANCELLED | OUTPATIENT
Start: 2023-07-05

## 2023-07-05 RX ORDER — GABAPENTIN 600 MG/1
TABLET ORAL
Qty: 90 TABLET | Refills: 3 | Status: ON HOLD | OUTPATIENT
Start: 2023-07-05 | End: 2024-02-12

## 2023-07-24 ENCOUNTER — HOSPITAL ENCOUNTER (OUTPATIENT)
Dept: RADIOLOGY | Facility: MEDICAL CENTER | Age: 68
End: 2023-07-24
Attending: UROLOGY
Payer: MEDICARE

## 2023-07-24 DIAGNOSIS — N20.0 CALCULUS OF KIDNEY: ICD-10-CM

## 2023-07-24 PROCEDURE — 76775 US EXAM ABDO BACK WALL LIM: CPT

## 2023-07-25 ENCOUNTER — HOSPITAL ENCOUNTER (OUTPATIENT)
Dept: LAB | Facility: MEDICAL CENTER | Age: 68
End: 2023-07-25
Payer: MEDICARE

## 2023-07-25 LAB
ALBUMIN SERPL BCP-MCNC: 3.9 G/DL (ref 3.2–4.9)
ALBUMIN/GLOB SERPL: 1.6 G/DL
ALP SERPL-CCNC: 76 U/L (ref 30–99)
ALT SERPL-CCNC: 17 U/L (ref 2–50)
ANION GAP SERPL CALC-SCNC: 15 MMOL/L (ref 7–16)
AST SERPL-CCNC: 15 U/L (ref 12–45)
BASOPHILS # BLD AUTO: 1.1 % (ref 0–1.8)
BASOPHILS # BLD: 0.06 K/UL (ref 0–0.12)
BILIRUB SERPL-MCNC: 0.7 MG/DL (ref 0.1–1.5)
BUN SERPL-MCNC: 20 MG/DL (ref 8–22)
CALCIUM ALBUM COR SERPL-MCNC: 9.3 MG/DL (ref 8.5–10.5)
CALCIUM SERPL-MCNC: 9.2 MG/DL (ref 8.5–10.5)
CHLORIDE SERPL-SCNC: 105 MMOL/L (ref 96–112)
CO2 SERPL-SCNC: 19 MMOL/L (ref 20–33)
CREAT SERPL-MCNC: 0.81 MG/DL (ref 0.5–1.4)
EOSINOPHIL # BLD AUTO: 0.2 K/UL (ref 0–0.51)
EOSINOPHIL NFR BLD: 3.8 % (ref 0–6.9)
ERYTHROCYTE [DISTWIDTH] IN BLOOD BY AUTOMATED COUNT: 46.2 FL (ref 35.9–50)
FERRITIN SERPL-MCNC: 88.6 NG/ML (ref 10–291)
GFR SERPLBLD CREATININE-BSD FMLA CKD-EPI: 78 ML/MIN/1.73 M 2
GLOBULIN SER CALC-MCNC: 2.5 G/DL (ref 1.9–3.5)
GLUCOSE SERPL-MCNC: 114 MG/DL (ref 65–99)
HCT VFR BLD AUTO: 44.6 % (ref 37–47)
HGB BLD-MCNC: 14.4 G/DL (ref 12–16)
IMM GRANULOCYTES # BLD AUTO: 0.02 K/UL (ref 0–0.11)
IMM GRANULOCYTES NFR BLD AUTO: 0.4 % (ref 0–0.9)
IRON SATN MFR SERPL: 35 % (ref 15–55)
IRON SERPL-MCNC: 93 UG/DL (ref 40–170)
LYMPHOCYTES # BLD AUTO: 1.55 K/UL (ref 1–4.8)
LYMPHOCYTES NFR BLD: 29.6 % (ref 22–41)
MCH RBC QN AUTO: 32 PG (ref 27–33)
MCHC RBC AUTO-ENTMCNC: 32.3 G/DL (ref 32.2–35.5)
MCV RBC AUTO: 99.1 FL (ref 81.4–97.8)
MONOCYTES # BLD AUTO: 0.31 K/UL (ref 0–0.85)
MONOCYTES NFR BLD AUTO: 5.9 % (ref 0–13.4)
NEUTROPHILS # BLD AUTO: 3.1 K/UL (ref 1.82–7.42)
NEUTROPHILS NFR BLD: 59.2 % (ref 44–72)
NRBC # BLD AUTO: 0 K/UL
NRBC BLD-RTO: 0 /100 WBC (ref 0–0.2)
PLATELET # BLD AUTO: 298 K/UL (ref 164–446)
PMV BLD AUTO: 10.3 FL (ref 9–12.9)
POTASSIUM SERPL-SCNC: 4 MMOL/L (ref 3.6–5.5)
PROT SERPL-MCNC: 6.4 G/DL (ref 6–8.2)
RBC # BLD AUTO: 4.5 M/UL (ref 4.2–5.4)
SODIUM SERPL-SCNC: 139 MMOL/L (ref 135–145)
TIBC SERPL-MCNC: 269 UG/DL (ref 250–450)
TRANSFERRIN SERPL-MCNC: 224 MG/DL (ref 200–370)
UIBC SERPL-MCNC: 176 UG/DL (ref 110–370)
VIT B12 SERPL-MCNC: 349 PG/ML (ref 211–911)
WBC # BLD AUTO: 5.2 K/UL (ref 4.8–10.8)

## 2023-07-25 PROCEDURE — 82728 ASSAY OF FERRITIN: CPT

## 2023-07-25 PROCEDURE — 82607 VITAMIN B-12: CPT

## 2023-07-25 PROCEDURE — 80053 COMPREHEN METABOLIC PANEL: CPT

## 2023-07-25 PROCEDURE — 83540 ASSAY OF IRON: CPT

## 2023-07-25 PROCEDURE — 36415 COLL VENOUS BLD VENIPUNCTURE: CPT

## 2023-07-25 PROCEDURE — 84466 ASSAY OF TRANSFERRIN: CPT

## 2023-07-25 PROCEDURE — 83550 IRON BINDING TEST: CPT

## 2023-07-25 PROCEDURE — 85025 COMPLETE CBC W/AUTO DIFF WBC: CPT

## 2023-07-25 PROCEDURE — 82746 ASSAY OF FOLIC ACID SERUM: CPT

## 2023-07-26 LAB — FOLATE SERPL-MCNC: 20 NG/ML

## 2023-08-23 ENCOUNTER — HOSPITAL ENCOUNTER (OUTPATIENT)
Dept: RADIOLOGY | Facility: MEDICAL CENTER | Age: 68
End: 2023-08-23
Attending: NURSE PRACTITIONER
Payer: MEDICARE

## 2023-08-23 DIAGNOSIS — B38.9 COCCIDIOMYCOSIS, PROGRESSIVE: ICD-10-CM

## 2023-08-23 PROCEDURE — 71046 X-RAY EXAM CHEST 2 VIEWS: CPT

## 2023-08-26 DIAGNOSIS — E78.5 DYSLIPIDEMIA: ICD-10-CM

## 2023-08-28 ENCOUNTER — OFFICE VISIT (OUTPATIENT)
Dept: SLEEP MEDICINE | Facility: MEDICAL CENTER | Age: 68
End: 2023-08-28
Attending: NURSE PRACTITIONER
Payer: MEDICARE

## 2023-08-28 VITALS
BODY MASS INDEX: 40.12 KG/M2 | OXYGEN SATURATION: 96 % | WEIGHT: 199 LBS | SYSTOLIC BLOOD PRESSURE: 106 MMHG | HEART RATE: 116 BPM | HEIGHT: 59 IN | DIASTOLIC BLOOD PRESSURE: 70 MMHG

## 2023-08-28 DIAGNOSIS — I10 ESSENTIAL HYPERTENSION: Chronic | ICD-10-CM

## 2023-08-28 DIAGNOSIS — Z86.19 HISTORY OF COCCIDIOIDOMYCOSIS: Chronic | ICD-10-CM

## 2023-08-28 DIAGNOSIS — Z87.891 FORMER SMOKER: ICD-10-CM

## 2023-08-28 PROCEDURE — 99213 OFFICE O/P EST LOW 20 MIN: CPT | Performed by: NURSE PRACTITIONER

## 2023-08-28 PROCEDURE — 99211 OFF/OP EST MAY X REQ PHY/QHP: CPT | Performed by: NURSE PRACTITIONER

## 2023-08-28 PROCEDURE — 3078F DIAST BP <80 MM HG: CPT | Performed by: NURSE PRACTITIONER

## 2023-08-28 PROCEDURE — 3074F SYST BP LT 130 MM HG: CPT | Performed by: NURSE PRACTITIONER

## 2023-08-28 RX ORDER — ATORVASTATIN CALCIUM 20 MG/1
20 TABLET, FILM COATED ORAL EVERY EVENING
Qty: 90 TABLET | Refills: 0 | Status: SHIPPED | OUTPATIENT
Start: 2023-08-28 | End: 2023-11-25

## 2023-08-28 ASSESSMENT — FIBROSIS 4 INDEX: FIB4 SCORE: 0.85

## 2023-08-28 NOTE — PROGRESS NOTES
Chief Complaint   Patient presents with    Follow-Up      Coccidiomycosis, progressive. Last seen 09/20/22    Results     CXR 08/23/23       HPI:  Dee Armstrong is a 68 y.o. 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  Last OV 9/20/22 with Oswaldo Scales GARRETT     [facility] stGstrstastdstest:st st1st CXR 8/23/23 chroinc left midlung nodule; no new nodules/masses; no acute process.  Patient.  She denies any URI since last office visit.  No significant changes in health over the last year.  She denies shortness of breath with exertion or rest, no cough, phlegm, chest pain, chest tightness or wheezing.    She was initially referred for mass noted on chest x-ray.  Cocci titers were positive and biopsy 12/8/2015 was positive for coccidiomycosis.  She was placed on Diflucan x1 year and completed by November 2016.  CT chest 12/3/2015 noted 3.4 x 2 and 7 cm left lobe mass.  CTA 10/8/2018 noted no PE and 2.0 x 2.9 mass within the left lobe.  Chest x-ray 8/29/2022 noted no evidence of acute process.  Cyst grossly stable appearance of left upper lobe pulmonary nodule.    ROS: As per HPI and otherwise negative if not stated.    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 ([place] fever), medicated for a year, yearly xrays    Cough 05/29/2015    Diverticulosis     GERD (gastroesophageal reflux disease)     Heart burn     High cholesterol     Hypertension     Indigestion     LBP (low back pain)     Lumbar radicular pain 05/29/2015    Muscle disorder     Obesity     Osteoporosis of forearm     Alendronate started 6/18    Pain     Shoulder and numbness to R LE, hip       Past Surgical History:   Procedure Laterality Date    LA CYSTOSCOPY,INSERT URETERAL STENT Right 6/8/2023    Procedure: CYSTOSCOPY, WITH RIGHT URETEROSCOPY, WITH LITHOTRIPSY, WITH INSERTION OF RIGHT URETERAL STENT;  Surgeon: Amol Sullivan M.D.;  Location: SURGERY  [facility];  Service: Urology    TN CYSTO/URETERO/PYELOSCOPY, DX Right 6/8/2023    Procedure: URETEROSCOPY;  Surgeon: Amol Sullivan M.D.;  Location: [facility];  Service: Urology    LASERTRIPSY Right 6/8/2023    Procedure: LITHOTRIPSY, USING LASER;  Surgeon: Amol Sullivan M.D.;  Location: [facility];  Service: Urology    HIP REVISION TOTAL Left 5/13/2021    Procedure: REVISION, TOTAL ARTHROPLASTY, HIP.;  Surgeon: Amol Hogue M.D.;  Location: [facility];  Service: Orthopedics    TN TOTAL HIP ARTHROPLASTY Left 9/19/2019    Procedure: ARTHROPLASTY, HIP, TOTAL, ANTERIOR APPROACH;  Surgeon: Clarence Vallejo M.D.;  Location: [facility];  Service: Orthopedics    INCISION HERNIA REPAIR Right 11/26/2018    Procedure: INCISION HERNIA REPAIR- FLANK WITH MESH;  Surgeon: Misael Ramírez M.D.;  Location: [facility];  Service: General    HYSTERECTOMY ROBOTIC  6/7/2017    Procedure: HYSTERECTOMY ROBOTIC SI, BILATERAL SALPINGO-OOPHORECTOMY, URETERAL SACRAL LIGAMENT SHORTENING ;  Surgeon: Jerica Hooper M.D.;  Location: [facility];  Service:     CYSTOSCOPY  6/7/2017    Procedure: CYSTOSCOPY;  Surgeon: Jerica Hooper M.D.;  Location: [facility];  Service:     SHOULDER DECOMPRESSION ARTHROSCOPIC Right 12/6/2016    Procedure: SHOULDER DECOMPRESSION ARTHROSCOPIC - SUBACROMIAL, MANJARREZ;  Surgeon: Kyle Claros M.D.;  Location: [facility];  Service:     CLAVICLE DISTAL EXCISION Right 12/6/2016    Procedure: CLAVICLE DISTAL EXCISION;  Surgeon: Kyle Calros M.D.;  Location: [facility];  Service:     SHOULDER ARTHROSCOPY W/ ROTATOR CUFF REPAIR Right 12/6/2016    Procedure: SHOULDER ARTHROSCOPY W/ ROTATOR CUFF REPAIR ;  Surgeon: Kyle Claros M.D.;  Location: [facility];  Service:     SHOULDER ARTHROSCOPY W/ BICIPITAL TENODESIS REPAIR Right 12/6/2016    Procedure: SHOULDER ARTHROSCOPY W/ BICIPITAL  TENODESIS REPAIR ;  Surgeon: Kyle Claros M.D.;  Location: SURGERY [facility];  Service:     FUSION, SPINE, LUMBAR, PLIF  2/9/2016    Procedure: LUMBAR FUSION POSTERIOR PEDICLE SCREW FIXATION (STAGE #2);  Surgeon: Tim Rodriguez M.D.;  Location: SURGERY [facility];  Service:     LUMBAR LAMINECTOMY DISKECTOMY  2/9/2016    Procedure: LUMBAR LAMINECTOMY DISKECTOMY MINI OPEN;  Surgeon: Tim Rodriguez M.D.;  Location: SURGERY [facility];  Service:     FUSION, SPINE, XLIF Right 2/6/2016    Procedure: EXTREME LATERAL INTERBODY FUSION L3-4 (STAGE #1);  Surgeon: Tim Rodriguez M.D.;  Location: SURGERY [facility];  Service:     RECOVERY  12/8/2015    Procedure: CT-CT GUIDED LEFT LUNG BIOPSY-;  Surgeon: [facility];  [place] [facility]  Service:     REDDY BY LAPAROSCOPY  4/17/2014    Performed by Ankur Lerner M.D. at SURGERY SAME DAY [facility]    EGD WITH ASP/BX  2/4/2014    mild chronic inactive gastritis, scar gastric antrum-    EGD WITH ASP/BX  9/29/2011    possible barrettes, hiatal hernia, gastritis    COLONOSCOPY  9/29/2011    diverticulosis sigmoid colon, hemorrhoids    ARTHROTOMY  9/3/2010    Performed by YARELY MORRISON at SURGERY SAME DAY [facility]    ARTHRODESIS  9/3/2010    Performed by YARELY MORRISON at SURGERY SAME DAY [facility]    ORTHOPEDIC OSTEOTOMY  9/3/2010    Performed by YARELY MORRISON at SURGERY SAME DAY [facility]    BONE SPUR EXCISION  9/3/2010    Performed by YARELY MORRISON at SURGERY SAME DAY [facility]    OTHER ABDOMINAL SURGERY  2000    COLON RESECTION    LAMINOTOMY      OTHER ORTHOPEDIC SURGERY      PARDEEP CARPAL TUNNEL RELEASES       Family History   Problem Relation Age of Onset    Cancer Mother         lymphoma    Stroke Father     Diabetes Father        Social History     Socioeconomic History    Marital status:      Spouse name: Not on file    Number of children: Not on file    Years of education: Not on  file    Highest education level: 12th grade   Occupational History    Not on file   Tobacco Use    Smoking status: Former     Current packs/day: 0.00     Types: Cigarettes     Quit date: 1977     Years since quittin.6    Smokeless tobacco: Never    Tobacco comments:     2 to 3 cigarettes a month back in -1977   Vaping Use    Vaping Use: Never used   Substance and Sexual Activity    Alcohol use: Yes     Comment: 1 per month    Drug use: No    Sexual activity: Yes     Partners: Male     Comment:    Other Topics Concern    Not on file   Social History Narrative    Not on file     Social Determinants of Health     Financial Resource Strain: Low Risk  (10/31/2022)    Overall Financial Resource Strain (CARDIA)     Difficulty of Paying Living Expenses: Not hard at all   Food Insecurity: No Food Insecurity (10/31/2022)    Hunger Vital Sign     Worried About Running Out of Food in the Last Year: Never true     Ran Out of Food in the Last Year: Never true   Transportation Needs: No Transportation Needs (10/31/2022)    PRAPARE - Transportation     Lack of Transportation (Medical): No     Lack of Transportation (Non-Medical): No   Physical Activity: Inactive (10/31/2022)    Exercise Vital Sign     Days of Exercise per Week: 0 days     Minutes of Exercise per Session: 10 min   Stress: No Stress Concern Present (10/31/2022)    [employer] [place] [employer] - Occupational Stress Questionnaire     Feeling of Stress : Not at all   Social Connections: Socially Integrated (10/31/2022)    Social Connection and Isolation Panel [NHANES]     Frequency of Communication with Friends and Family: More than three times a week     Frequency of Social Gatherings with Friends and Family: Twice a week     Attends Caodaism Services: More than 4 times per year     Active Member of Clubs or Organizations: Yes     Attends Club or Organization Meetings: More than 4 times per year     Marital Status:   "  Intimate Partner Violence: Not on file   Housing Stability: Low Risk  (10/31/2022)    Housing Stability Vital Sign     Unable to Pay for Housing in the Last Year: No     Number of Places Lived in the Last Year: 1     Unstable Housing in the Last Year: No       Allergies as of 08/28/2023    (No Known Allergies)        Vitals:  /70 (BP Location: Left arm, Patient Position: Sitting, BP Cuff Size: Adult)   Pulse (!) 116   Ht 1.499 m (4' 11\")   Wt 90.3 kg (199 lb)   SpO2 96%     Current medications as of today   Current Outpatient Medications   Medication Sig Dispense Refill    gabapentin (NEURONTIN) 600 MG tablet TAKE 1 TABLET BY MOUTH  DAILY IN THE EVENING 90 Tablet 3    naproxen (NAPROSYN) 500 MG Tab Take 1 Tablet by mouth 2 times a day as needed (pain). 60 Tablet 1    pantoprazole (PROTONIX) 40 MG Tablet Delayed Response Take 1 Tablet by mouth every day. 90 Tablet 3    lisinopril (PRINIVIL) 10 MG Tab Take 1 Tablet by mouth every day. 90 Tablet 3    atorvastatin (LIPITOR) 20 MG Tab Take 1 Tablet by mouth every evening. 90 Tablet 0    alendronate (FOSAMAX) 70 MG Tab Take 1 Tablet by mouth every 7 days. WITH 8 OZ OF PLAIN WATER 30 MINUTES BEFORE FIRST FOOD DRINK OR MEDS STAY UPRIGHT FOR 30 MINS 12 Tablet 3    amitriptyline (ELAVIL) 10 MG Tab TAKE 3 TABLETS BY MOUTH AT  BEDTIME AS NEEDED 270 Tablet 3    amoxicillin-clavulanate (AUGMENTIN) 875-125 MG Tab Take 1 Tablet by mouth 2 times a day. (Patient taking differently: Take 1 Tablet by mouth 2 times a day as needed. When having dental procedures) 14 Tablet 0    estradiol (ESTRACE) 0.1 MG/GM vaginal cream one time as needed.      asa/apap/caffeine (EXCEDRIN) 250-250-65 MG Tab Take 1 Tab by mouth every 6 hours as needed for Headache.      Multiple Vitamin (MULTI VITAMIN DAILY PO) Take 1 tablet by mouth every day.      Cholecalciferol (VITAMIN D3) 2000 UNIT Cap Take 1 capsule by mouth every day.      clobetasol (TEMOVATE) 0.05 % Cream Apply 1 Application " topically as needed (Applies outside vaginal). Indications: Inflammation  1    ascorbic acid (ASCORBIC ACID) 500 MG Tab Take 1,000 mg by mouth every day.       No current facility-administered medications for this visit.         Physical Exam:   Gen:           Alert and oriented, No apparent distress. Mood and affect appropriate, normal interaction with examiner.  Eyes:          PERRL, EOM intact, sclere white, conjunctive moist.  Ears:          Not examined.   Hearing:     Grossly intact.  Nose:          Normal, no lesions or deformities.  Dentition:    Not examined.   Oropharynx:   Not examined.   Mallampati Classification: Not examined.   Neck:        Supple, trachea midline, no masses.  Respiratory Effort: No intercostal retractions or use of accessory muscles.   Lung Auscultation:      Clear to auscultation bilaterally; no rales, rhonchi or wheezing.  CV:            Regular rate and rhythm. No murmurs, rubs or gallops.  Abd:           Not examined.  Lymphadenopathy: Not examined.   Gait and Station: Normal.  Digits and Nails: No clubbing, cyanosis, petechiae, or nodes.   Cranial Nerves: II-XII grossly intact.  Skin:        No rashes, lesions or ulcers noted.               Ext:           No cyanosis or edema.      Assessment:  1. History of coccidioidomycosis        2. Essential hypertension        3. BMI 40.0-44.9, adult (HCC)  HEIGHT AND WEIGHT      4. Former smoker                 Immunizations:    Flu:recommend in the fall  Pneumovax 23:2018  Prevnar 13:2017  PCV 20: not due  COVID-19: 11/1/22    Plan:  History of coccidiomycosis is stable and treated.  Patient has completed 1 year of antifungals and more than 5 years of imaging that is showed no changes.  She may follow-up as needed.  Follow-up primary care fungal manage hypertension.  Encourage weight loss or healthy eating regular exercise.  Follow-up as needed.    Please note that this dictation was created using voice recognition software. I have made  every reasonable attempt to correct obvious errors, but it is possible there are errors of grammar and possibly content that I did not discover before finalizing the note.

## 2023-10-06 PROBLEM — K25.7 CHRONIC GASTRIC ULCER WITHOUT HEMORRHAGE AND WITHOUT PERFORATION: Status: ACTIVE | Noted: 2023-10-06

## 2023-11-16 ENCOUNTER — APPOINTMENT (RX ONLY)
Dept: URBAN - METROPOLITAN AREA CLINIC 22 | Facility: CLINIC | Age: 68
Setting detail: DERMATOLOGY
End: 2023-11-16

## 2023-11-16 DIAGNOSIS — D22 MELANOCYTIC NEVI: ICD-10-CM

## 2023-11-16 DIAGNOSIS — Z71.89 OTHER SPECIFIED COUNSELING: ICD-10-CM

## 2023-11-16 DIAGNOSIS — L82.1 OTHER SEBORRHEIC KERATOSIS: ICD-10-CM

## 2023-11-16 DIAGNOSIS — L81.4 OTHER MELANIN HYPERPIGMENTATION: ICD-10-CM

## 2023-11-16 PROBLEM — D48.5 NEOPLASM OF UNCERTAIN BEHAVIOR OF SKIN: Status: ACTIVE | Noted: 2023-11-16

## 2023-11-16 PROBLEM — D22.5 MELANOCYTIC NEVI OF TRUNK: Status: ACTIVE | Noted: 2023-11-16

## 2023-11-16 PROBLEM — D23.61 OTHER BENIGN NEOPLASM OF SKIN OF RIGHT UPPER LIMB, INCLUDING SHOULDER: Status: ACTIVE | Noted: 2023-11-16

## 2023-11-16 PROCEDURE — 11102 TANGNTL BX SKIN SINGLE LES: CPT

## 2023-11-16 PROCEDURE — ? ADDITIONAL NOTES

## 2023-11-16 PROCEDURE — ? SUNSCREEN TREATMENT REGIMEN

## 2023-11-16 PROCEDURE — ? BIOPSY BY SHAVE METHOD

## 2023-11-16 PROCEDURE — 99213 OFFICE O/P EST LOW 20 MIN: CPT | Mod: 25

## 2023-11-16 PROCEDURE — ? COUNSELING

## 2023-11-16 ASSESSMENT — LOCATION ZONE DERM
LOCATION ZONE: TRUNK
LOCATION ZONE: ARM
LOCATION ZONE: FACE

## 2023-11-16 ASSESSMENT — LOCATION DETAILED DESCRIPTION DERM
LOCATION DETAILED: INFERIOR THORACIC SPINE
LOCATION DETAILED: UPPER STERNUM
LOCATION DETAILED: SUPERIOR THORACIC SPINE
LOCATION DETAILED: LEFT VENTRAL PROXIMAL FOREARM
LOCATION DETAILED: INFERIOR MID FOREHEAD
LOCATION DETAILED: RIGHT VENTRAL PROXIMAL FOREARM
LOCATION DETAILED: LEFT ANTERIOR SHOULDER
LOCATION DETAILED: LOWER STERNUM

## 2023-11-16 ASSESSMENT — LOCATION SIMPLE DESCRIPTION DERM
LOCATION SIMPLE: RIGHT FOREARM
LOCATION SIMPLE: UPPER BACK
LOCATION SIMPLE: CHEST
LOCATION SIMPLE: LEFT FOREARM
LOCATION SIMPLE: INFERIOR FOREHEAD
LOCATION SIMPLE: LEFT SHOULDER

## 2023-11-16 NOTE — PROCEDURE: ADDITIONAL NOTES
Additional Notes: Light electrocautery at 4 setting used once for hemostasis
Render Risk Assessment In Note?: no
Detail Level: Detailed

## 2023-11-16 NOTE — PROCEDURE: BIOPSY BY SHAVE METHOD
Detail Level: Detailed
Depth Of Biopsy: dermis
Was A Bandage Applied: Yes
Size Of Lesion In Cm: 0.2
X Size Of Lesion In Cm: 0
Biopsy Type: H and E
Biopsy Method: Personna blade
Anesthesia Type: 0.05% lidocaine without epinephrine
Anesthesia Volume In Cc: 0.5
Hemostasis: Aluminum Chloride
Wound Care: Petrolatum
Dressing: pressure dressing with telfa
Destruction After The Procedure: No
Type Of Destruction Used: Curettage
Curettage Text: The wound bed was treated with curettage after the biopsy was performed.
Cryotherapy Text: The wound bed was treated with cryotherapy after the biopsy was performed.
Electrodesiccation Text: The wound bed was treated with electrodesiccation after the biopsy was performed.
Electrodesiccation And Curettage Text: The wound bed was treated with electrodesiccation and curettage after the biopsy was performed.
Silver Nitrate Text: The wound bed was treated with silver nitrate after the biopsy was performed.
Lab: [id number]
Lab Facility: 
Path Notes (To The Dermatopathologist): Tiny biopsy \\nEyelashes in jar not the sample
Consent: Written consent was obtained and risks were reviewed including but not limited to scarring, infection, bleeding, scabbing, incomplete removal, nerve damage and allergy to anesthesia.
Post-Care Instructions: I reviewed with the patient in detail post-care instructions. Patient is to keep the biopsy site dry overnight. Gentle cleansing daily.  Apply petroleum ointment daily until healed. Patient may apply hydrogen peroxide soaks to remove any crusting.
Notification Instructions: Patient will be notified of biopsy results. However, patient instructed to call the office if not contacted within 2 weeks.
Billing Type: Third-Party Bill
Information: Selecting Yes will display possible errors in your note based on the variables you have selected. This validation is only offered as a suggestion for you. PLEASE NOTE THAT THE VALIDATION TEXT WILL BE REMOVED WHEN YOU FINALIZE YOUR NOTE. IF YOU WANT TO FAX A PRELIMINARY NOTE YOU WILL NEED TO TOGGLE THIS TO 'NO' IF YOU DO NOT WANT IT IN YOUR FAXED NOTE.

## 2023-11-24 DIAGNOSIS — E78.5 DYSLIPIDEMIA: ICD-10-CM

## 2023-11-25 RX ORDER — ATORVASTATIN CALCIUM 20 MG/1
20 TABLET, FILM COATED ORAL EVERY EVENING
Qty: 90 TABLET | Refills: 0 | Status: ON HOLD | OUTPATIENT
Start: 2023-11-25 | End: 2024-02-12

## 2024-01-08 RX ORDER — AMITRIPTYLINE HYDROCHLORIDE 10 MG/1
TABLET, FILM COATED ORAL
Qty: 270 TABLET | Refills: 1 | Status: ON HOLD | OUTPATIENT
Start: 2024-01-08 | End: 2024-02-12

## 2024-01-09 ENCOUNTER — HOSPITAL ENCOUNTER (OUTPATIENT)
Dept: RADIOLOGY | Facility: MEDICAL CENTER | Age: 69
End: 2024-01-09
Attending: UROLOGY
Payer: MEDICARE

## 2024-01-09 DIAGNOSIS — N20.0 CALCULUS OF KIDNEY: ICD-10-CM

## 2024-01-09 PROCEDURE — 74018 RADEX ABDOMEN 1 VIEW: CPT

## 2024-01-11 ENCOUNTER — APPOINTMENT (OUTPATIENT)
Dept: ADMISSIONS | Facility: MEDICAL CENTER | Age: 69
DRG: 454 | End: 2024-01-11
Attending: NEUROLOGICAL SURGERY
Payer: MEDICARE

## 2024-01-16 ENCOUNTER — PRE-ADMISSION TESTING (OUTPATIENT)
Dept: ADMISSIONS | Facility: MEDICAL CENTER | Age: 69
DRG: 454 | End: 2024-01-16
Attending: NEUROLOGICAL SURGERY
Payer: MEDICARE

## 2024-01-16 RX ORDER — SUCRALFATE 1 G/1
1 TABLET ORAL 2 TIMES DAILY
Status: ON HOLD | COMMUNITY
End: 2024-02-12

## 2024-01-17 ENCOUNTER — HOSPITAL ENCOUNTER (OUTPATIENT)
Dept: RADIOLOGY | Facility: MEDICAL CENTER | Age: 69
DRG: 454 | End: 2024-01-17
Attending: NEUROLOGICAL SURGERY | Admitting: NEUROLOGICAL SURGERY
Payer: MEDICARE

## 2024-01-17 ENCOUNTER — PRE-ADMISSION TESTING (OUTPATIENT)
Dept: ADMISSIONS | Facility: MEDICAL CENTER | Age: 69
DRG: 454 | End: 2024-01-17
Attending: NEUROLOGICAL SURGERY
Payer: MEDICARE

## 2024-01-17 DIAGNOSIS — Z01.810 PRE-OPERATIVE CARDIOVASCULAR EXAMINATION: ICD-10-CM

## 2024-01-17 DIAGNOSIS — Z01.812 PRE-OPERATIVE LABORATORY EXAMINATION: ICD-10-CM

## 2024-01-17 DIAGNOSIS — Z01.811 PRE-OPERATIVE RESPIRATORY EXAMINATION: ICD-10-CM

## 2024-01-17 DIAGNOSIS — R82.90 NONSPECIFIC FINDING ON EXAMINATION OF URINE: ICD-10-CM

## 2024-01-17 DIAGNOSIS — R94.31 NONSPECIFIC ABNORMAL ELECTROCARDIOGRAM (ECG) (EKG): ICD-10-CM

## 2024-01-17 DIAGNOSIS — M48.061 SPINAL STENOSIS, LUMBAR REGION, WITHOUT NEUROGENIC CLAUDICATION: ICD-10-CM

## 2024-01-17 DIAGNOSIS — R79.1 ABNORMAL COAGULATION PROFILE: ICD-10-CM

## 2024-01-17 DIAGNOSIS — M54.16 LUMBAR RADICULOPATHY: ICD-10-CM

## 2024-01-17 LAB
ANION GAP SERPL CALC-SCNC: 11 MMOL/L (ref 7–16)
APPEARANCE UR: CLEAR
APTT PPP: 25.4 SEC (ref 24.7–36)
BASOPHILS # BLD AUTO: 0.8 % (ref 0–1.8)
BASOPHILS # BLD: 0.04 K/UL (ref 0–0.12)
BILIRUB UR QL STRIP.AUTO: NEGATIVE
BUN SERPL-MCNC: 19 MG/DL (ref 8–22)
CALCIUM SERPL-MCNC: 9.4 MG/DL (ref 8.5–10.5)
CHLORIDE SERPL-SCNC: 103 MMOL/L (ref 96–112)
CO2 SERPL-SCNC: 25 MMOL/L (ref 20–33)
COLOR UR: YELLOW
CREAT SERPL-MCNC: 0.69 MG/DL (ref 0.5–1.4)
EKG IMPRESSION: NORMAL
EOSINOPHIL # BLD AUTO: 0.13 K/UL (ref 0–0.51)
EOSINOPHIL NFR BLD: 2.7 % (ref 0–6.9)
ERYTHROCYTE [DISTWIDTH] IN BLOOD BY AUTOMATED COUNT: 46.5 FL (ref 35.9–50)
GFR SERPLBLD CREATININE-BSD FMLA CKD-EPI: 93 ML/MIN/1.73 M 2
GLUCOSE SERPL-MCNC: 95 MG/DL (ref 65–99)
GLUCOSE UR STRIP.AUTO-MCNC: NEGATIVE MG/DL
HCT VFR BLD AUTO: 46.5 % (ref 37–47)
HGB BLD-MCNC: 15 G/DL (ref 12–16)
IMM GRANULOCYTES # BLD AUTO: 0.01 K/UL (ref 0–0.11)
IMM GRANULOCYTES NFR BLD AUTO: 0.2 % (ref 0–0.9)
INR PPP: 0.9 (ref 0.87–1.13)
KETONES UR STRIP.AUTO-MCNC: NEGATIVE MG/DL
LEUKOCYTE ESTERASE UR QL STRIP.AUTO: NEGATIVE
LYMPHOCYTES # BLD AUTO: 1.9 K/UL (ref 1–4.8)
LYMPHOCYTES NFR BLD: 40.2 % (ref 22–41)
MCH RBC QN AUTO: 31.2 PG (ref 27–33)
MCHC RBC AUTO-ENTMCNC: 32.3 G/DL (ref 32.2–35.5)
MCV RBC AUTO: 96.7 FL (ref 81.4–97.8)
MICRO URNS: NORMAL
MONOCYTES # BLD AUTO: 0.35 K/UL (ref 0–0.85)
MONOCYTES NFR BLD AUTO: 7.4 % (ref 0–13.4)
NEUTROPHILS # BLD AUTO: 2.3 K/UL (ref 1.82–7.42)
NEUTROPHILS NFR BLD: 48.7 % (ref 44–72)
NITRITE UR QL STRIP.AUTO: NEGATIVE
NRBC # BLD AUTO: 0 K/UL
NRBC BLD-RTO: 0 /100 WBC (ref 0–0.2)
PH UR STRIP.AUTO: 6 [PH] (ref 5–8)
PLATELET # BLD AUTO: 285 K/UL (ref 164–446)
PMV BLD AUTO: 10.1 FL (ref 9–12.9)
POTASSIUM SERPL-SCNC: 4.1 MMOL/L (ref 3.6–5.5)
PROT UR QL STRIP: NEGATIVE MG/DL
PROTHROMBIN TIME: 12.3 SEC (ref 12–14.6)
RBC # BLD AUTO: 4.81 M/UL (ref 4.2–5.4)
RBC UR QL AUTO: NEGATIVE
SODIUM SERPL-SCNC: 139 MMOL/L (ref 135–145)
SP GR UR STRIP.AUTO: 1.01
UROBILINOGEN UR STRIP.AUTO-MCNC: 0.2 MG/DL
WBC # BLD AUTO: 4.7 K/UL (ref 4.8–10.8)

## 2024-01-17 PROCEDURE — 72110 X-RAY EXAM L-2 SPINE 4/>VWS: CPT

## 2024-01-17 PROCEDURE — 85025 COMPLETE CBC W/AUTO DIFF WBC: CPT

## 2024-01-17 PROCEDURE — 93010 ELECTROCARDIOGRAM REPORT: CPT | Performed by: INTERNAL MEDICINE

## 2024-01-17 PROCEDURE — 71046 X-RAY EXAM CHEST 2 VIEWS: CPT

## 2024-01-17 PROCEDURE — 85610 PROTHROMBIN TIME: CPT

## 2024-01-17 PROCEDURE — 85730 THROMBOPLASTIN TIME PARTIAL: CPT

## 2024-01-17 PROCEDURE — 93005 ELECTROCARDIOGRAM TRACING: CPT

## 2024-01-17 PROCEDURE — 80048 BASIC METABOLIC PNL TOTAL CA: CPT

## 2024-01-17 PROCEDURE — 81003 URINALYSIS AUTO W/O SCOPE: CPT

## 2024-01-17 PROCEDURE — 36415 COLL VENOUS BLD VENIPUNCTURE: CPT

## 2024-01-23 ENCOUNTER — HOSPITAL ENCOUNTER (INPATIENT)
Facility: MEDICAL CENTER | Age: 69
LOS: 10 days | DRG: 454 | End: 2024-02-02
Attending: NEUROLOGICAL SURGERY | Admitting: NEUROLOGICAL SURGERY
Payer: MEDICARE

## 2024-01-23 ENCOUNTER — APPOINTMENT (OUTPATIENT)
Dept: RADIOLOGY | Facility: MEDICAL CENTER | Age: 69
DRG: 454 | End: 2024-01-23
Attending: NEUROLOGICAL SURGERY
Payer: MEDICARE

## 2024-01-23 ENCOUNTER — ANESTHESIA (OUTPATIENT)
Dept: SURGERY | Facility: MEDICAL CENTER | Age: 69
DRG: 454 | End: 2024-01-23
Payer: MEDICARE

## 2024-01-23 ENCOUNTER — ANESTHESIA EVENT (OUTPATIENT)
Dept: SURGERY | Facility: MEDICAL CENTER | Age: 69
DRG: 454 | End: 2024-01-23
Payer: MEDICARE

## 2024-01-23 DIAGNOSIS — Z98.1 S/P LUMBAR FUSION: ICD-10-CM

## 2024-01-23 DIAGNOSIS — M54.16 LUMBAR RADICULOPATHY: Primary | ICD-10-CM

## 2024-01-23 DIAGNOSIS — G89.18 ACUTE POSTOPERATIVE PAIN: ICD-10-CM

## 2024-01-23 LAB
ABO + RH BLD: NORMAL
ABO GROUP BLD: NORMAL
BLD GP AB SCN SERPL QL: NORMAL
RH BLD: NORMAL

## 2024-01-23 PROCEDURE — 4A11X4G MONITORING OF PERIPHERAL NERVOUS ELECTRICAL ACTIVITY, INTRAOPERATIVE, EXTERNAL APPROACH: ICD-10-PCS | Performed by: NEUROLOGICAL SURGERY

## 2024-01-23 PROCEDURE — 86901 BLOOD TYPING SEROLOGIC RH(D): CPT

## 2024-01-23 PROCEDURE — 0SG00A0 FUSION OF LUMBAR VERTEBRAL JOINT WITH INTERBODY FUSION DEVICE, ANTERIOR APPROACH, ANTERIOR COLUMN, OPEN APPROACH: ICD-10-PCS | Performed by: NEUROLOGICAL SURGERY

## 2024-01-23 PROCEDURE — C1768 GRAFT, VASCULAR: HCPCS | Performed by: NEUROLOGICAL SURGERY

## 2024-01-23 PROCEDURE — 700105 HCHG RX REV CODE 258: Performed by: PHYSICIAN ASSISTANT

## 2024-01-23 PROCEDURE — 700111 HCHG RX REV CODE 636 W/ 250 OVERRIDE (IP): Performed by: NEUROLOGICAL SURGERY

## 2024-01-23 PROCEDURE — 160048 HCHG OR STATISTICAL LEVEL 1-5: Performed by: NEUROLOGICAL SURGERY

## 2024-01-23 PROCEDURE — 700102 HCHG RX REV CODE 250 W/ 637 OVERRIDE(OP): Performed by: PHYSICIAN ASSISTANT

## 2024-01-23 PROCEDURE — 95955 EEG DURING SURGERY: CPT | Performed by: NEUROLOGICAL SURGERY

## 2024-01-23 PROCEDURE — 700105 HCHG RX REV CODE 258: Performed by: ANESTHESIOLOGY

## 2024-01-23 PROCEDURE — 86850 RBC ANTIBODY SCREEN: CPT

## 2024-01-23 PROCEDURE — 160031 HCHG SURGERY MINUTES - 1ST 30 MINS LEVEL 5: Performed by: NEUROLOGICAL SURGERY

## 2024-01-23 PROCEDURE — A9270 NON-COVERED ITEM OR SERVICE: HCPCS | Performed by: PHYSICIAN ASSISTANT

## 2024-01-23 PROCEDURE — C1821 INTERSPINOUS IMPLANT: HCPCS | Performed by: NEUROLOGICAL SURGERY

## 2024-01-23 PROCEDURE — 110454 HCHG SHELL REV 250: Performed by: NEUROLOGICAL SURGERY

## 2024-01-23 PROCEDURE — 700101 HCHG RX REV CODE 250: Performed by: NEUROLOGICAL SURGERY

## 2024-01-23 PROCEDURE — 74018 RADEX ABDOMEN 1 VIEW: CPT

## 2024-01-23 PROCEDURE — 0SB40ZZ EXCISION OF LUMBOSACRAL DISC, OPEN APPROACH: ICD-10-PCS | Performed by: NEUROLOGICAL SURGERY

## 2024-01-23 PROCEDURE — 160002 HCHG RECOVERY MINUTES (STAT): Performed by: NEUROLOGICAL SURGERY

## 2024-01-23 PROCEDURE — 700111 HCHG RX REV CODE 636 W/ 250 OVERRIDE (IP): Performed by: ANESTHESIOLOGY

## 2024-01-23 PROCEDURE — 95937 NEUROMUSCULAR JUNCTION TEST: CPT | Performed by: NEUROLOGICAL SURGERY

## 2024-01-23 PROCEDURE — C1713 ANCHOR/SCREW BN/BN,TIS/BN: HCPCS | Performed by: NEUROLOGICAL SURGERY

## 2024-01-23 PROCEDURE — A9270 NON-COVERED ITEM OR SERVICE: HCPCS | Performed by: ANESTHESIOLOGY

## 2024-01-23 PROCEDURE — 0SG30A0 FUSION OF LUMBOSACRAL JOINT WITH INTERBODY FUSION DEVICE, ANTERIOR APPROACH, ANTERIOR COLUMN, OPEN APPROACH: ICD-10-PCS | Performed by: NEUROLOGICAL SURGERY

## 2024-01-23 PROCEDURE — 700111 HCHG RX REV CODE 636 W/ 250 OVERRIDE (IP): Performed by: PHYSICIAN ASSISTANT

## 2024-01-23 PROCEDURE — 160009 HCHG ANES TIME/MIN: Performed by: NEUROLOGICAL SURGERY

## 2024-01-23 PROCEDURE — 770001 HCHG ROOM/CARE - MED/SURG/GYN PRIV*

## 2024-01-23 PROCEDURE — 700105 HCHG RX REV CODE 258: Performed by: NEUROLOGICAL SURGERY

## 2024-01-23 PROCEDURE — C1751 CATH, INF, PER/CENT/MIDLINE: HCPCS | Performed by: NEUROLOGICAL SURGERY

## 2024-01-23 PROCEDURE — 700102 HCHG RX REV CODE 250 W/ 637 OVERRIDE(OP): Performed by: ANESTHESIOLOGY

## 2024-01-23 PROCEDURE — 36415 COLL VENOUS BLD VENIPUNCTURE: CPT

## 2024-01-23 PROCEDURE — 95940 IONM IN OPERATNG ROOM 15 MIN: CPT | Performed by: NEUROLOGICAL SURGERY

## 2024-01-23 PROCEDURE — 0SB20ZZ EXCISION OF LUMBAR VERTEBRAL DISC, OPEN APPROACH: ICD-10-PCS | Performed by: NEUROLOGICAL SURGERY

## 2024-01-23 PROCEDURE — 502000 HCHG MISC OR IMPLANTS RC 0278: Performed by: NEUROLOGICAL SURGERY

## 2024-01-23 PROCEDURE — 700101 HCHG RX REV CODE 250: Performed by: ANESTHESIOLOGY

## 2024-01-23 PROCEDURE — 95861 NEEDLE EMG 2 EXTREMITIES: CPT | Performed by: NEUROLOGICAL SURGERY

## 2024-01-23 PROCEDURE — 72100 X-RAY EXAM L-S SPINE 2/3 VWS: CPT

## 2024-01-23 PROCEDURE — 95938 SOMATOSENSORY TESTING: CPT | Performed by: NEUROLOGICAL SURGERY

## 2024-01-23 PROCEDURE — 160042 HCHG SURGERY MINUTES - EA ADDL 1 MIN LEVEL 5: Performed by: NEUROLOGICAL SURGERY

## 2024-01-23 PROCEDURE — 160035 HCHG PACU - 1ST 60 MINS PHASE I: Performed by: NEUROLOGICAL SURGERY

## 2024-01-23 PROCEDURE — 4A1134G MONITORING OF PERIPHERAL NERVOUS ELECTRICAL ACTIVITY, INTRAOPERATIVE, PERCUTANEOUS APPROACH: ICD-10-PCS | Performed by: NEUROLOGICAL SURGERY

## 2024-01-23 PROCEDURE — 86900 BLOOD TYPING SEROLOGIC ABO: CPT

## 2024-01-23 DEVICE — IMPLANTABLE DEVICE: Type: IMPLANTABLE DEVICE | Site: BACK | Status: FUNCTIONAL

## 2024-01-23 DEVICE — GRAFT BONE OSTEOSTRAND PLUS LARGE 10CC (1EA): Type: IMPLANTABLE DEVICE | Site: BACK | Status: FUNCTIONAL

## 2024-01-23 RX ORDER — HYDROMORPHONE HYDROCHLORIDE 2 MG/ML
INJECTION, SOLUTION INTRAMUSCULAR; INTRAVENOUS; SUBCUTANEOUS PRN
Status: DISCONTINUED | OUTPATIENT
Start: 2024-01-23 | End: 2024-01-23 | Stop reason: SURG

## 2024-01-23 RX ORDER — ESTRADIOL 0.1 MG/G
0.1 CREAM VAGINAL DAILY
Status: DISCONTINUED | OUTPATIENT
Start: 2024-01-23 | End: 2024-01-23

## 2024-01-23 RX ORDER — SODIUM CHLORIDE, SODIUM GLUCONATE, SODIUM ACETATE, POTASSIUM CHLORIDE AND MAGNESIUM CHLORIDE 526; 502; 368; 37; 30 MG/100ML; MG/100ML; MG/100ML; MG/100ML; MG/100ML
INJECTION, SOLUTION INTRAVENOUS
Status: DISCONTINUED | OUTPATIENT
Start: 2024-01-23 | End: 2024-01-23 | Stop reason: SURG

## 2024-01-23 RX ORDER — CEFAZOLIN SODIUM 1 G/3ML
INJECTION, POWDER, FOR SOLUTION INTRAMUSCULAR; INTRAVENOUS PRN
Status: DISCONTINUED | OUTPATIENT
Start: 2024-01-23 | End: 2024-01-23 | Stop reason: SURG

## 2024-01-23 RX ORDER — GABAPENTIN 300 MG/1
300 CAPSULE ORAL ONCE
Status: COMPLETED | OUTPATIENT
Start: 2024-01-23 | End: 2024-01-23

## 2024-01-23 RX ORDER — DEXAMETHASONE SODIUM PHOSPHATE 4 MG/ML
INJECTION, SOLUTION INTRA-ARTICULAR; INTRALESIONAL; INTRAMUSCULAR; INTRAVENOUS; SOFT TISSUE PRN
Status: DISCONTINUED | OUTPATIENT
Start: 2024-01-23 | End: 2024-01-23 | Stop reason: SURG

## 2024-01-23 RX ORDER — AMOXICILLIN 250 MG
1 CAPSULE ORAL
Status: DISCONTINUED | OUTPATIENT
Start: 2024-01-23 | End: 2024-02-02 | Stop reason: HOSPADM

## 2024-01-23 RX ORDER — AMITRIPTYLINE HYDROCHLORIDE 10 MG/1
10 TABLET, FILM COATED ORAL NIGHTLY PRN
Status: DISCONTINUED | OUTPATIENT
Start: 2024-01-23 | End: 2024-02-02 | Stop reason: HOSPADM

## 2024-01-23 RX ORDER — SUCCINYLCHOLINE CHLORIDE 20 MG/ML
INJECTION INTRAMUSCULAR; INTRAVENOUS PRN
Status: DISCONTINUED | OUTPATIENT
Start: 2024-01-23 | End: 2024-01-23 | Stop reason: SURG

## 2024-01-23 RX ORDER — HYDROMORPHONE HYDROCHLORIDE 1 MG/ML
0.1 INJECTION, SOLUTION INTRAMUSCULAR; INTRAVENOUS; SUBCUTANEOUS
Status: DISCONTINUED | OUTPATIENT
Start: 2024-01-23 | End: 2024-01-23 | Stop reason: HOSPADM

## 2024-01-23 RX ORDER — ONDANSETRON 2 MG/ML
4 INJECTION INTRAMUSCULAR; INTRAVENOUS EVERY 4 HOURS PRN
Status: DISCONTINUED | OUTPATIENT
Start: 2024-01-23 | End: 2024-02-02 | Stop reason: HOSPADM

## 2024-01-23 RX ORDER — DIPHENHYDRAMINE HYDROCHLORIDE 50 MG/ML
12.5 INJECTION INTRAMUSCULAR; INTRAVENOUS
Status: DISCONTINUED | OUTPATIENT
Start: 2024-01-23 | End: 2024-01-23 | Stop reason: HOSPADM

## 2024-01-23 RX ORDER — DOCUSATE SODIUM 100 MG/1
100 CAPSULE, LIQUID FILLED ORAL 2 TIMES DAILY
Status: DISCONTINUED | OUTPATIENT
Start: 2024-01-23 | End: 2024-02-02 | Stop reason: HOSPADM

## 2024-01-23 RX ORDER — ONDANSETRON 2 MG/ML
INJECTION INTRAMUSCULAR; INTRAVENOUS PRN
Status: DISCONTINUED | OUTPATIENT
Start: 2024-01-23 | End: 2024-01-23 | Stop reason: SURG

## 2024-01-23 RX ORDER — METOPROLOL TARTRATE 1 MG/ML
1 INJECTION, SOLUTION INTRAVENOUS
Status: DISCONTINUED | OUTPATIENT
Start: 2024-01-23 | End: 2024-01-23 | Stop reason: HOSPADM

## 2024-01-23 RX ORDER — HYDRALAZINE HYDROCHLORIDE 20 MG/ML
5 INJECTION INTRAMUSCULAR; INTRAVENOUS
Status: DISCONTINUED | OUTPATIENT
Start: 2024-01-23 | End: 2024-01-23 | Stop reason: HOSPADM

## 2024-01-23 RX ORDER — SODIUM CHLORIDE, SODIUM LACTATE, POTASSIUM CHLORIDE, CALCIUM CHLORIDE 600; 310; 30; 20 MG/100ML; MG/100ML; MG/100ML; MG/100ML
INJECTION, SOLUTION INTRAVENOUS CONTINUOUS
Status: ACTIVE | OUTPATIENT
Start: 2024-01-23 | End: 2024-01-23

## 2024-01-23 RX ORDER — ALENDRONATE SODIUM 70 MG/1
70 TABLET ORAL
Status: DISCONTINUED | OUTPATIENT
Start: 2024-01-23 | End: 2024-01-23

## 2024-01-23 RX ORDER — LIDOCAINE HYDROCHLORIDE 20 MG/ML
INJECTION, SOLUTION EPIDURAL; INFILTRATION; INTRACAUDAL; PERINEURAL PRN
Status: DISCONTINUED | OUTPATIENT
Start: 2024-01-23 | End: 2024-01-23 | Stop reason: SURG

## 2024-01-23 RX ORDER — HYDROMORPHONE HYDROCHLORIDE 1 MG/ML
0.5 INJECTION, SOLUTION INTRAMUSCULAR; INTRAVENOUS; SUBCUTANEOUS
Status: DISCONTINUED | OUTPATIENT
Start: 2024-01-23 | End: 2024-02-01

## 2024-01-23 RX ORDER — ATORVASTATIN CALCIUM 20 MG/1
20 TABLET, FILM COATED ORAL EVERY EVENING
Status: DISCONTINUED | OUTPATIENT
Start: 2024-01-23 | End: 2024-02-02 | Stop reason: HOSPADM

## 2024-01-23 RX ORDER — TRANEXAMIC ACID 100 MG/ML
INJECTION, SOLUTION INTRAVENOUS PRN
Status: DISCONTINUED | OUTPATIENT
Start: 2024-01-23 | End: 2024-01-23 | Stop reason: SURG

## 2024-01-23 RX ORDER — AMOXICILLIN 250 MG
1 CAPSULE ORAL NIGHTLY
Status: DISCONTINUED | OUTPATIENT
Start: 2024-01-23 | End: 2024-02-02 | Stop reason: HOSPADM

## 2024-01-23 RX ORDER — OXYCODONE HYDROCHLORIDE 10 MG/1
10 TABLET ORAL EVERY 4 HOURS PRN
Status: DISCONTINUED | OUTPATIENT
Start: 2024-01-23 | End: 2024-02-02 | Stop reason: HOSPADM

## 2024-01-23 RX ORDER — OXYCODONE HCL 5 MG/5 ML
5 SOLUTION, ORAL ORAL
Status: COMPLETED | OUTPATIENT
Start: 2024-01-23 | End: 2024-01-23

## 2024-01-23 RX ORDER — HYDROMORPHONE HYDROCHLORIDE 1 MG/ML
0.2 INJECTION, SOLUTION INTRAMUSCULAR; INTRAVENOUS; SUBCUTANEOUS
Status: DISCONTINUED | OUTPATIENT
Start: 2024-01-23 | End: 2024-01-23 | Stop reason: HOSPADM

## 2024-01-23 RX ORDER — ONDANSETRON 4 MG/1
4 TABLET, ORALLY DISINTEGRATING ORAL EVERY 4 HOURS PRN
Status: DISCONTINUED | OUTPATIENT
Start: 2024-01-23 | End: 2024-02-02 | Stop reason: HOSPADM

## 2024-01-23 RX ORDER — MEPERIDINE HYDROCHLORIDE 25 MG/ML
6.25 INJECTION INTRAMUSCULAR; INTRAVENOUS; SUBCUTANEOUS
Status: DISCONTINUED | OUTPATIENT
Start: 2024-01-23 | End: 2024-01-23 | Stop reason: HOSPADM

## 2024-01-23 RX ORDER — GABAPENTIN 300 MG/1
600 CAPSULE ORAL EVERY EVENING
Status: DISCONTINUED | OUTPATIENT
Start: 2024-01-23 | End: 2024-02-02 | Stop reason: HOSPADM

## 2024-01-23 RX ORDER — ROCURONIUM BROMIDE 10 MG/ML
INJECTION, SOLUTION INTRAVENOUS PRN
Status: DISCONTINUED | OUTPATIENT
Start: 2024-01-23 | End: 2024-01-23 | Stop reason: SURG

## 2024-01-23 RX ORDER — SUCRALFATE 1 G/1
1 TABLET ORAL 2 TIMES DAILY
Status: DISCONTINUED | OUTPATIENT
Start: 2024-01-23 | End: 2024-02-02 | Stop reason: HOSPADM

## 2024-01-23 RX ORDER — MIDAZOLAM HYDROCHLORIDE 1 MG/ML
INJECTION INTRAMUSCULAR; INTRAVENOUS PRN
Status: DISCONTINUED | OUTPATIENT
Start: 2024-01-23 | End: 2024-01-23 | Stop reason: SURG

## 2024-01-23 RX ORDER — OXYCODONE HCL 5 MG/5 ML
10 SOLUTION, ORAL ORAL
Status: COMPLETED | OUTPATIENT
Start: 2024-01-23 | End: 2024-01-23

## 2024-01-23 RX ORDER — ALPRAZOLAM 0.25 MG/1
0.25 TABLET ORAL 2 TIMES DAILY PRN
Status: DISCONTINUED | OUTPATIENT
Start: 2024-01-23 | End: 2024-01-26

## 2024-01-23 RX ORDER — POLYETHYLENE GLYCOL 3350 17 G/17G
1 POWDER, FOR SOLUTION ORAL 2 TIMES DAILY PRN
Status: DISCONTINUED | OUTPATIENT
Start: 2024-01-23 | End: 2024-01-27

## 2024-01-23 RX ORDER — BISACODYL 10 MG
10 SUPPOSITORY, RECTAL RECTAL
Status: DISCONTINUED | OUTPATIENT
Start: 2024-01-23 | End: 2024-02-02 | Stop reason: HOSPADM

## 2024-01-23 RX ORDER — ACETAMINOPHEN 500 MG
1000 TABLET ORAL ONCE
Status: COMPLETED | OUTPATIENT
Start: 2024-01-23 | End: 2024-01-23

## 2024-01-23 RX ORDER — SODIUM CHLORIDE, SODIUM LACTATE, POTASSIUM CHLORIDE, CALCIUM CHLORIDE 600; 310; 30; 20 MG/100ML; MG/100ML; MG/100ML; MG/100ML
INJECTION, SOLUTION INTRAVENOUS CONTINUOUS
Status: DISCONTINUED | OUTPATIENT
Start: 2024-01-23 | End: 2024-01-23 | Stop reason: HOSPADM

## 2024-01-23 RX ORDER — CEFAZOLIN SODIUM 1 G/3ML
INJECTION, POWDER, FOR SOLUTION INTRAMUSCULAR; INTRAVENOUS
Status: DISCONTINUED | OUTPATIENT
Start: 2024-01-23 | End: 2024-01-23 | Stop reason: HOSPADM

## 2024-01-23 RX ORDER — HALOPERIDOL 5 MG/ML
1 INJECTION INTRAMUSCULAR
Status: DISCONTINUED | OUTPATIENT
Start: 2024-01-23 | End: 2024-01-23 | Stop reason: HOSPADM

## 2024-01-23 RX ORDER — DIPHENHYDRAMINE HYDROCHLORIDE 50 MG/ML
25 INJECTION INTRAMUSCULAR; INTRAVENOUS EVERY 6 HOURS PRN
Status: DISCONTINUED | OUTPATIENT
Start: 2024-01-23 | End: 2024-02-02 | Stop reason: HOSPADM

## 2024-01-23 RX ORDER — ONDANSETRON 2 MG/ML
4 INJECTION INTRAMUSCULAR; INTRAVENOUS
Status: DISCONTINUED | OUTPATIENT
Start: 2024-01-23 | End: 2024-01-23 | Stop reason: HOSPADM

## 2024-01-23 RX ORDER — CALCIUM CARBONATE 500 MG/1
500 TABLET, CHEWABLE ORAL 2 TIMES DAILY
Status: DISCONTINUED | OUTPATIENT
Start: 2024-01-23 | End: 2024-01-24

## 2024-01-23 RX ORDER — HYDROCODONE BITARTRATE AND ACETAMINOPHEN 10; 325 MG/1; MG/1
1 TABLET ORAL EVERY 4 HOURS PRN
Status: DISCONTINUED | OUTPATIENT
Start: 2024-01-23 | End: 2024-02-01

## 2024-01-23 RX ORDER — DIPHENHYDRAMINE HCL 25 MG
25 TABLET ORAL EVERY 6 HOURS PRN
Status: DISCONTINUED | OUTPATIENT
Start: 2024-01-23 | End: 2024-02-02 | Stop reason: HOSPADM

## 2024-01-23 RX ORDER — EPHEDRINE SULFATE 50 MG/ML
5 INJECTION, SOLUTION INTRAVENOUS
Status: DISCONTINUED | OUTPATIENT
Start: 2024-01-23 | End: 2024-01-23 | Stop reason: HOSPADM

## 2024-01-23 RX ORDER — OMEPRAZOLE 20 MG/1
20 CAPSULE, DELAYED RELEASE ORAL DAILY
Status: DISCONTINUED | OUTPATIENT
Start: 2024-01-23 | End: 2024-02-02 | Stop reason: HOSPADM

## 2024-01-23 RX ORDER — ENEMA 19; 7 G/133ML; G/133ML
1 ENEMA RECTAL
Status: DISCONTINUED | OUTPATIENT
Start: 2024-01-23 | End: 2024-02-02 | Stop reason: HOSPADM

## 2024-01-23 RX ORDER — OXYCODONE HYDROCHLORIDE 5 MG/1
5 TABLET ORAL EVERY 4 HOURS PRN
Status: DISCONTINUED | OUTPATIENT
Start: 2024-01-23 | End: 2024-02-02 | Stop reason: HOSPADM

## 2024-01-23 RX ORDER — SODIUM CHLORIDE 9 MG/ML
INJECTION, SOLUTION INTRAVENOUS CONTINUOUS
Status: DISCONTINUED | OUTPATIENT
Start: 2024-01-23 | End: 2024-01-26

## 2024-01-23 RX ORDER — LABETALOL HYDROCHLORIDE 5 MG/ML
10 INJECTION, SOLUTION INTRAVENOUS
Status: DISCONTINUED | OUTPATIENT
Start: 2024-01-23 | End: 2024-02-02 | Stop reason: HOSPADM

## 2024-01-23 RX ORDER — PANTOPRAZOLE SODIUM 40 MG/1
40 TABLET, DELAYED RELEASE ORAL DAILY
Status: DISCONTINUED | OUTPATIENT
Start: 2024-01-23 | End: 2024-01-23

## 2024-01-23 RX ORDER — HYDROMORPHONE HYDROCHLORIDE 1 MG/ML
0.4 INJECTION, SOLUTION INTRAMUSCULAR; INTRAVENOUS; SUBCUTANEOUS
Status: DISCONTINUED | OUTPATIENT
Start: 2024-01-23 | End: 2024-01-23 | Stop reason: HOSPADM

## 2024-01-23 RX ORDER — CLOBETASOL PROPIONATE 0.5 MG/G
1 CREAM TOPICAL
Status: DISCONTINUED | OUTPATIENT
Start: 2024-01-23 | End: 2024-02-02 | Stop reason: HOSPADM

## 2024-01-23 RX ORDER — CYCLOBENZAPRINE HCL 10 MG
10 TABLET ORAL EVERY 8 HOURS PRN
Status: DISCONTINUED | OUTPATIENT
Start: 2024-01-23 | End: 2024-01-31

## 2024-01-23 RX ORDER — LISINOPRIL 10 MG/1
10 TABLET ORAL DAILY
Status: DISCONTINUED | OUTPATIENT
Start: 2024-01-23 | End: 2024-02-02 | Stop reason: HOSPADM

## 2024-01-23 RX ADMIN — CEFAZOLIN 2 G: 1 INJECTION, POWDER, FOR SOLUTION INTRAMUSCULAR; INTRAVENOUS at 07:16

## 2024-01-23 RX ADMIN — HYDROMORPHONE HYDROCHLORIDE 0.4 MG: 2 INJECTION INTRAMUSCULAR; INTRAVENOUS; SUBCUTANEOUS at 07:43

## 2024-01-23 RX ADMIN — CEFAZOLIN 2 G: 2 INJECTION, POWDER, FOR SOLUTION INTRAMUSCULAR; INTRAVENOUS at 15:45

## 2024-01-23 RX ADMIN — LISINOPRIL 10 MG: 10 TABLET ORAL at 17:24

## 2024-01-23 RX ADMIN — DOCUSATE SODIUM 100 MG: 100 CAPSULE, LIQUID FILLED ORAL at 17:25

## 2024-01-23 RX ADMIN — ROCURONIUM BROMIDE 10 MG: 50 INJECTION, SOLUTION INTRAVENOUS at 08:06

## 2024-01-23 RX ADMIN — PROPOFOL 10 MG: 10 INJECTION, EMULSION INTRAVENOUS at 09:37

## 2024-01-23 RX ADMIN — HYDROMORPHONE HYDROCHLORIDE 0.4 MG: 1 INJECTION, SOLUTION INTRAMUSCULAR; INTRAVENOUS; SUBCUTANEOUS at 12:25

## 2024-01-23 RX ADMIN — ROCURONIUM BROMIDE 3 MG: 50 INJECTION, SOLUTION INTRAVENOUS at 07:03

## 2024-01-23 RX ADMIN — HYDROMORPHONE HYDROCHLORIDE 0.4 MG: 2 INJECTION INTRAMUSCULAR; INTRAVENOUS; SUBCUTANEOUS at 09:37

## 2024-01-23 RX ADMIN — ACETAMINOPHEN 1000 MG: 500 TABLET ORAL at 06:26

## 2024-01-23 RX ADMIN — OXYCODONE HYDROCHLORIDE 10 MG: 5 SOLUTION ORAL at 11:03

## 2024-01-23 RX ADMIN — ANTACID TABLETS 500 MG: 500 TABLET, CHEWABLE ORAL at 17:25

## 2024-01-23 RX ADMIN — GABAPENTIN 300 MG: 300 CAPSULE ORAL at 06:26

## 2024-01-23 RX ADMIN — Medication 15 MG: at 08:40

## 2024-01-23 RX ADMIN — PROPOFOL 150 MG: 10 INJECTION, EMULSION INTRAVENOUS at 07:03

## 2024-01-23 RX ADMIN — ROCURONIUM BROMIDE 15 MG: 50 INJECTION, SOLUTION INTRAVENOUS at 08:26

## 2024-01-23 RX ADMIN — Medication 10 MG: at 09:10

## 2024-01-23 RX ADMIN — LIDOCAINE HYDROCHLORIDE 50 MG: 20 INJECTION, SOLUTION EPIDURAL; INFILTRATION; INTRACAUDAL at 07:03

## 2024-01-23 RX ADMIN — ROCURONIUM BROMIDE 10 MG: 50 INJECTION, SOLUTION INTRAVENOUS at 07:55

## 2024-01-23 RX ADMIN — Medication 25 MG: at 07:26

## 2024-01-23 RX ADMIN — PROPOFOL 20 MG: 10 INJECTION, EMULSION INTRAVENOUS at 10:20

## 2024-01-23 RX ADMIN — HYDROMORPHONE HYDROCHLORIDE 0.8 MG: 2 INJECTION INTRAMUSCULAR; INTRAVENOUS; SUBCUTANEOUS at 07:29

## 2024-01-23 RX ADMIN — ROCURONIUM BROMIDE 47 MG: 50 INJECTION, SOLUTION INTRAVENOUS at 07:08

## 2024-01-23 RX ADMIN — FENTANYL CITRATE 50 MCG: 50 INJECTION, SOLUTION INTRAMUSCULAR; INTRAVENOUS at 11:03

## 2024-01-23 RX ADMIN — GABAPENTIN 600 MG: 300 CAPSULE ORAL at 17:24

## 2024-01-23 RX ADMIN — SODIUM CHLORIDE, SODIUM GLUCONATE, SODIUM ACETATE, POTASSIUM CHLORIDE AND MAGNESIUM CHLORIDE: 526; 502; 368; 37; 30 INJECTION, SOLUTION INTRAVENOUS at 08:21

## 2024-01-23 RX ADMIN — SODIUM CHLORIDE, POTASSIUM CHLORIDE, SODIUM LACTATE AND CALCIUM CHLORIDE: 600; 310; 30; 20 INJECTION, SOLUTION INTRAVENOUS at 07:00

## 2024-01-23 RX ADMIN — HYDROMORPHONE HYDROCHLORIDE 0.4 MG: 1 INJECTION, SOLUTION INTRAMUSCULAR; INTRAVENOUS; SUBCUTANEOUS at 11:35

## 2024-01-23 RX ADMIN — HYDROCODONE BITARTRATE AND ACETAMINOPHEN 1 TABLET: 10; 325 TABLET ORAL at 19:50

## 2024-01-23 RX ADMIN — FENTANYL CITRATE 50 MCG: 50 INJECTION, SOLUTION INTRAMUSCULAR; INTRAVENOUS at 07:22

## 2024-01-23 RX ADMIN — DOCUSATE SODIUM 50 MG AND SENNOSIDES 8.6 MG 1 TABLET: 8.6; 5 TABLET, FILM COATED ORAL at 20:39

## 2024-01-23 RX ADMIN — TRANEXAMIC ACID 1000 MG: 100 INJECTION, SOLUTION INTRAVENOUS at 08:12

## 2024-01-23 RX ADMIN — HYDROMORPHONE HYDROCHLORIDE 0.4 MG: 2 INJECTION INTRAMUSCULAR; INTRAVENOUS; SUBCUTANEOUS at 08:40

## 2024-01-23 RX ADMIN — OXYCODONE HYDROCHLORIDE 10 MG: 10 TABLET ORAL at 17:28

## 2024-01-23 RX ADMIN — SUCRALFATE 1 G: 1 TABLET ORAL at 17:25

## 2024-01-23 RX ADMIN — HYDROMORPHONE HYDROCHLORIDE 0.4 MG: 1 INJECTION, SOLUTION INTRAMUSCULAR; INTRAVENOUS; SUBCUTANEOUS at 11:18

## 2024-01-23 RX ADMIN — LIDOCAINE HYDROCHLORIDE 50 MG: 20 INJECTION, SOLUTION EPIDURAL; INFILTRATION; INTRACAUDAL at 10:33

## 2024-01-23 RX ADMIN — ROCURONIUM BROMIDE 10 MG: 50 INJECTION, SOLUTION INTRAVENOUS at 10:12

## 2024-01-23 RX ADMIN — PROPOFOL 10 MG: 10 INJECTION, EMULSION INTRAVENOUS at 07:43

## 2024-01-23 RX ADMIN — SODIUM CHLORIDE, POTASSIUM CHLORIDE, SODIUM LACTATE AND CALCIUM CHLORIDE: 600; 310; 30; 20 INJECTION, SOLUTION INTRAVENOUS at 06:58

## 2024-01-23 RX ADMIN — ATORVASTATIN CALCIUM 20 MG: 20 TABLET, FILM COATED ORAL at 17:25

## 2024-01-23 RX ADMIN — OMEPRAZOLE 20 MG: 20 CAPSULE, DELAYED RELEASE ORAL at 15:36

## 2024-01-23 RX ADMIN — SODIUM CHLORIDE, POTASSIUM CHLORIDE, SODIUM LACTATE AND CALCIUM CHLORIDE: 600; 310; 30; 20 INJECTION, SOLUTION INTRAVENOUS at 08:18

## 2024-01-23 RX ADMIN — SUCCINYLCHOLINE CHLORIDE 110 MG: 20 INJECTION, SOLUTION INTRAMUSCULAR; INTRAVENOUS at 07:03

## 2024-01-23 RX ADMIN — LABETALOL HYDROCHLORIDE 10 MG: 5 INJECTION, SOLUTION INTRAVENOUS at 13:12

## 2024-01-23 RX ADMIN — ROCURONIUM BROMIDE 10 MG: 50 INJECTION, SOLUTION INTRAVENOUS at 09:12

## 2024-01-23 RX ADMIN — ROCURONIUM BROMIDE 15 MG: 50 INJECTION, SOLUTION INTRAVENOUS at 07:33

## 2024-01-23 RX ADMIN — SUGAMMADEX 200 MG: 100 INJECTION, SOLUTION INTRAVENOUS at 10:33

## 2024-01-23 RX ADMIN — MIDAZOLAM HYDROCHLORIDE 2 MG: 1 INJECTION, SOLUTION INTRAMUSCULAR; INTRAVENOUS at 07:01

## 2024-01-23 RX ADMIN — SODIUM CHLORIDE: 9 INJECTION, SOLUTION INTRAVENOUS at 15:44

## 2024-01-23 RX ADMIN — ONDANSETRON 4 MG: 2 INJECTION INTRAMUSCULAR; INTRAVENOUS at 07:39

## 2024-01-23 RX ADMIN — FENTANYL CITRATE 50 MCG: 50 INJECTION, SOLUTION INTRAMUSCULAR; INTRAVENOUS at 07:40

## 2024-01-23 RX ADMIN — DEXAMETHASONE SODIUM PHOSPHATE 8 MG: 4 INJECTION INTRA-ARTICULAR; INTRALESIONAL; INTRAMUSCULAR; INTRAVENOUS; SOFT TISSUE at 07:37

## 2024-01-23 RX ADMIN — HYDROMORPHONE HYDROCHLORIDE 0.4 MG: 1 INJECTION, SOLUTION INTRAMUSCULAR; INTRAVENOUS; SUBCUTANEOUS at 12:47

## 2024-01-23 RX ADMIN — FENTANYL CITRATE 50 MCG: 50 INJECTION, SOLUTION INTRAMUSCULAR; INTRAVENOUS at 11:08

## 2024-01-23 RX ADMIN — HYDROMORPHONE HYDROCHLORIDE 0.4 MG: 1 INJECTION, SOLUTION INTRAMUSCULAR; INTRAVENOUS; SUBCUTANEOUS at 12:33

## 2024-01-23 ASSESSMENT — LIFESTYLE VARIABLES
EVER HAD A DRINK FIRST THING IN THE MORNING TO STEADY YOUR NERVES TO GET RID OF A HANGOVER: NO
TOTAL SCORE: 0
AVERAGE NUMBER OF DAYS PER WEEK YOU HAVE A DRINK CONTAINING ALCOHOL: 0
EVER FELT BAD OR GUILTY ABOUT YOUR DRINKING: NO
ON A TYPICAL DAY WHEN YOU DRINK ALCOHOL HOW MANY DRINKS DO YOU HAVE: 1
TOTAL SCORE: 0
CONSUMPTION TOTAL: NEGATIVE
HOW MANY TIMES IN THE PAST YEAR HAVE YOU HAD 5 OR MORE DRINKS IN A DAY: 0
TOTAL SCORE: 0
DOES PATIENT WANT TO STOP DRINKING: NO
ALCOHOL_USE: YES
HAVE YOU EVER FELT YOU SHOULD CUT DOWN ON YOUR DRINKING: NO
HAVE PEOPLE ANNOYED YOU BY CRITICIZING YOUR DRINKING: NO

## 2024-01-23 ASSESSMENT — PAIN DESCRIPTION - PAIN TYPE
TYPE: ACUTE PAIN;SURGICAL PAIN

## 2024-01-23 ASSESSMENT — PATIENT HEALTH QUESTIONNAIRE - PHQ9
1. LITTLE INTEREST OR PLEASURE IN DOING THINGS: NOT AT ALL
2. FEELING DOWN, DEPRESSED, IRRITABLE, OR HOPELESS: NOT AT ALL
SUM OF ALL RESPONSES TO PHQ9 QUESTIONS 1 AND 2: 0

## 2024-01-23 ASSESSMENT — FIBROSIS 4 INDEX: FIB4 SCORE: 0.89

## 2024-01-23 NOTE — ANESTHESIA PROCEDURE NOTES
Peripheral IV    Date/Time: 1/23/2024 7:12 AM    Performed by: Bry Chery M.D.  Authorized by: Bry Chery M.D.    Size:  18 G  Laterality:  Left  Peripheral IV Location:  Hand  Site Prep:  Alcohol  Technique:  Direct puncture   For additional IV access and COD

## 2024-01-23 NOTE — OR NURSING
1043: Pt arrived from OR post ALIF under anesthesia. Pt is asleep. Abdominal binder in place; dressing is CDI. Cardiac rhythm appears to be SR.     1055: Pt arousable to voice.     1253: RN updated pt's spouse.     1306: Report to NANDINI Abdi.     1326: Pt to floor via gurney with transport. Belongings and brace on gurney; pt on 3L; tank is over half full. Pt was medicated with labetalol. RN updated NANDINI Abdi. Dressing is CDI.

## 2024-01-23 NOTE — OP REPORT
DATE OF SERVICE:  01/23/2024     OPERATING SURGEON:  Rolly Cox M.D.     CO-SURGEON:  Tim Rodriguez M.D.     PROCEDURE:  Midline retroperitoneal exposure for L4-L5 and L5-S1 with   diskectomies and cage fusions -- 22 for redo surgery from previous colon   resection and morbid obesity with a BMI of 40.     POSTOPERATIVE DIAGNOSIS:  Same.     SUMMARY:  This 68-year-old female has chronic back pain and evidence for   collapse at multiple levels.  She has evidence for scoliotic deformity from   degeneration. As a first step, we will undergo anterior lumbar interbody   fusion at the lower 2 levels.     The patient has had a previous midline incision for a colon resection.     She is -- 20 for this reason as well as morbid obesity.     She has had multiple posterior procedures as well.     DESCRIPTION OF PROCEDURE:  The patient was taken to the operating room and   satisfactory general anesthesia induced by endotracheal intubation.  The   patient was prepped and draped.  Midline incision was made through the old   scar and carried down to the left rectus sheath.  This was scored and space   behind the left rectus muscle was created.  Retroperitoneal space was entered   with some difficulty, owing to her obesity and the retroperitoneal contents,   which were markedly scarred brought to the patient's right.     Self-retaining retractors were placed in the depths of the wound and the L5-S1   disk space was identified by mobilizing soft tissue structures overlying the   sacral promontory.     Exposure for L4-L5 was somewhat problematic, but the iliac vessels on the left   were mobilized inferiorly for this exposure.     The appropriate levels were identified in AP and lateral projections and   please see the dictation of Dr. Tim Rodriguez for the diskectomies and cage   fusions.     X-rays were negative for retained foreign bodies and the patient was closed   with running #1 non-looped PDS fascial sutures and  staples.  Dressings were   applied and the patient was sent to the recovery room in good condition.  No   specimens were sent to pathology.        ______________________________  MD NADEGE KENNY/ALBA    DD:  01/23/2024 10:31  DT:  01/23/2024 11:40    Job#:  [id number]    CC:JUNAID LE MD   Cartilage Graft Text: The defect edges were debeveled with a #15 scalpel blade.  Given the location of the defect, shape of the defect, the fact the defect involved a full thickness cartilage defect a cartilage graft was deemed most appropriate.  An appropriate donor site was identified, cleansed, and anesthetized. The cartilage graft was then harvested and transferred to the recipient site, oriented appropriately and then sutured into place.  The secondary defect was then repaired using a primary closure.

## 2024-01-23 NOTE — ANESTHESIA TIME REPORT
Anesthesia Start and Stop Event Times       Date Time Event    1/23/2024 0653 Ready for Procedure     0658 Anesthesia Start     1048 Anesthesia Stop          Responsible Staff  01/23/24      Name Role Begin End    Bry Chery M.D. Anesth 0658 1048          Overtime Reason:  no overtime (within assigned shift)    Comments:

## 2024-01-23 NOTE — CONSULTS
DATE OF SERVICE:  2024     SURGICAL CONSULTATION     TIME:  0620 hours     CHIEF COMPLAINT:  Consult for exposure for spine fusion.     HISTORY OF PRESENT ILLNESS:  This is a 68-year-old female who has had   longstanding difficulties with her back.     At the present time, she complains of inability to walk and constant back   pain.     X-rays show a significant hardware posteriorly and a dramatic degeneration   with scoliosis to the patient's left.     As a first stage, she will undergo an anterior lumbar interbody fusion at   L4-L5 and L5-S1 level.     PAST MEDICAL HISTORY:  OPERATIONS:  Laparotomy and colon resection, hysterectomy,  section   and multiple spine surgeries.     MEDICAL DISEASES:  Obesity, hypertension, hyperlipidemia, chronic pain   syndrome.     MEDICATIONS:  Elavil, Lipitor, Neurontin, Protonix, Prinivil, Fosamax,   Augmentin, Temovate.     ALLERGIES:  None.     FAMILY HISTORY:  Noncontributory.     SOCIAL HISTORY:  The patient is a nonsmoker and retired.     REVIEW OF SYSTEMS:  I reviewed the 10-point AMA/CMS criteria from the chart.     PHYSICAL EXAMINATION:  VITAL SIGNS:  Temperature 98.8, blood pressure 162/94, pulse 90.  HEENT:  Without icterus.  NECK:  Without masses.  CHEST:  Coarse breath sounds.  CARDIAC:  Auscultation muffled.  ABDOMEN:  Pendulous, midline scar.  RECTAL/GENITAL:  Deferred.  EXTREMITIES:  Trace edema.     IMPRESSION:  1.  Severe degenerative lumbosacral disk disease -- multiple levels --   deformation and degeneration with scoliosis to the left.  2.  Multiple spine surgeries.  3.   section.  4.  Hysterectomy.  5.  Morbid obesity.  6.  Hypertension.  7.  Hyperlipidemia.  8.  Reflux.  9.  Osteoporosis.  10.  Morbid obesity.     PLAN:  The patient will have a midline retroperitoneal approach for her   surgery.     Risks of death, bleeding, infection, injury to the ureter, injury to great   vessels, postop wound complications, possibly aborting the  procedure because   of poor exposure have been carefully explained.     Should be a challenging so far, she is obese and has had a colon resection and   we are particularly worried about the ureter and access to the great vessels.     The patient and  understand that if the exposure is not safe, then we   will abort the anterior procedure given the challenges.     Thank you very much for the consultation.        ______________________________  MD NADEGE KENNY/ESTEFANY    DD:  01/23/2024 06:38  DT:  01/23/2024 06:56    Job#:  [id number]

## 2024-01-23 NOTE — ANESTHESIA PROCEDURE NOTES
Airway    Date/Time: 1/23/2024 7:04 AM    Performed by: Bry Chery M.D.  Authorized by: Bry Chery M.D.    Location:  OR  Urgency:  Elective  Indications for Airway Management:  Anesthesia      Spontaneous Ventilation: absent    Sedation Level:  Deep  Preoxygenated: Yes    Patient Position:  Sniffing  Final Airway Type:  Endotracheal airway  Final Endotracheal Airway:  ETT  Cuffed: Yes    Technique Used for Successful ETT Placement:  Direct laryngoscopy  Devices/Methods Used in Placement:  Intubating stylet and cricoid pressure    Insertion Site:  Oral  Blade Type:  Guerrero  Laryngoscope Blade/Videolaryngoscope Blade Size:  2  ETT Size (mm):  7.0  Measured from:  Teeth  ETT to Teeth (cm):  20  Placement Verified by: auscultation and capnometry    Cormack-Lehane Classification:  Grade I - full view of glottis  Number of Attempts at Approach:  1   RSI with cricoid

## 2024-01-23 NOTE — OP REPORT
DATE OF SERVICE:  01/23/2024     PREOPERATIVE DIAGNOSES:  1.  Right L4-5 radiculopathies.  2.  Neurogenic claudication.  3.  L4-5 and L5-S1 degenerative disk disease with collapse and foraminal   stenosis.  4.  L2-3 degenerative disk disease and degenerative scoliosis measured at 35   degrees.  5.  L4-5 spondylolisthesis with significant L5-S1 collapse and foraminal   stenosis.  6.  Low back pain refractory to medical care.     POSTOPERATIVE DIAGNOSES:  1.  Right L4-5 radiculopathies.  2.  Neurogenic claudication.  3.  L4-5 and L5-S1 degenerative disk disease with collapse and foraminal   stenosis.  4.  L2-3 degenerative disk disease and degenerative scoliosis measured at 35   degrees.  5.  L4-5 spondylolisthesis with significant L5-S1 collapse and foraminal   stenosis.  6.  Low back pain refractory to medical care.     PROCEDURES PERFORMED:  Stage 1 of a planned 2-stage procedure for anterior,   posterior, circumferential decompression and fusion in separate stages.  Stage I:  1.  L4-5 and L5-S1 ALIF.  2.  Partial corpectomy of L4.  3.  Partial corpectomy of L5.  4.  Partial corpectomy of S1.  5.  L4-5 and L5-S1 ALIF arthrodesis with SeaSpine titanium interbody cages and   interbody screws and plating.  6.  Insertion of titanium cages at 2 separate levels.  7.  Anterior lumbar plating at L5-S1.  8.  SSEPs and EMG monitoring performed by neuromonitoring associates, which   remained stable throughout.  9.  Modifier-22 for extra degree of difficulty given the patient's body mass   index of 41 in addition to prior abdominal surgery for colectomy, which all   added an extra degree of time, expertise and effort and risk to the surgical   procedure adding well over an hour to the standard surgical procedure.     CO-SURGEONS:  1.  Tim Rodriguez MD, Neurosurgery and Spine Surgery.  2.  Rolly Cox MD, General Vascular Surgery for exposure.     SECOND ASSISTANT:  Riaz Espinosa PA-C     ANESTHESIA:  General  endotracheal anesthesia.     ANESTHESIOLOGIST:  Bry Chery MD     COMPLICATIONS:  None.     ESTIMATED BLOOD LOSS:  500 mL.     PREOPERATIVE NOTE:  This is an extremely pleasant 68-year-old lady who   presents with mechanical low back pain and right L4-5 radiculopathies,   weakness and sensory loss in addition to proximal L2-3 related symptoms.  The   patient's MRI showed degenerative scoliosis.  She had an L3-L4 XLIF performed   7 years ago in 2016 with laminectomies with good recovery.  She now has   developed a progressive scoliosis with collapse at L2-3 and L4-5 and L5-S1   with a grade I spondylolisthesis at L4-L5.  Given the patient failed   conservative care, I discussed surgical procedure, alternatives, goals, risks,   benefits, and complications in detail with the patient.  The patient   understood and consented to surgery for above listed procedure.  Details well   contained in the office visit notes.     DESCRIPTION OF PROCEDURE:  The patient was brought to the operating room and   placed under general endotracheal anesthesia.  She was placed supine on the   operating table with care taken to avoid the bony prominences and peripheral   nerves.  The abdominal area was prepped and draped in the usual sterile   fashion.  Dr. Cox performed an excellent midline incision and left   retroperitoneal exposure at L4-5 and L5-S1 was accomplished despite all the   prior scarring and the rotational scoliosis and difficulty with obtaining   access to the midline.  Given the patient's body mass index of 41 with her   prior surgery and degenerative scoliosis and complexities, an extra hour plus   was taken to the surgical procedure with higher degree of expertise, effort   and time and risk involved and hence a Modifier-22 is appropriate for the   primary code.  Once exposure was achieved at L5-S1, I then incised the disk at   L5-S1, removed the disk from the endplates.  Disk space were quite   arthrodesed and  collapsed down, did not distract up well, however.  Hence,   partial corpectomy of L5, partial corpectomy of S1 was completed, removing a   third of the vertebral body using Midas Pj AM-8 drill bit, Kerrison rongeurs   and curettes.  Bilateral foraminotomies were completed.  I released the   annulus, sequentially distracted.  I sized up for the appropriate lordotic   SeaSpine cage.  Cage was chosen, packed with local morselized autograft and   OsteoStrand plus arthrodesis and delivered under distraction between the body   of L5-S1 for an excellent A plus fit.  Appropriate screws were placed in L5-S1   and locking plate was secured.  AP and lateral x-rays confirmed excellent   position of the cage, was slightly more on the right side, but once the   vessels would allow, I was very happy with that.  It yielded indirect   decompression of L5-S1 and open up the foramen extremely well.     We then moved up to the L4-5 level, obtaining midline was difficult due to the   scar.  I incised the disk at L5-S1 once the vessels were protected.  I   removed the disk from the endplates.  Partial corpectomy of L4 was completed.    I placed the first cage and when we did an AP, the cage was too far on the   left side overall and hence I removed the cage and used a Midas Pj AM-8 drill   bit to complete partial corpectomy and remove the osteophyte at L4-5 and   released the annulus.  I then used the oblique lateral  to place the   cage further over to the right side as best as possible.  This resulted   indirect decompression, open up the foramen well.  The cage was back to the   posterior endplates and I was very happy with the indirect decompression was   achieved.  One screw was placed in L5, securing it.  I was unable to place a   plate over this cage to lock it, but I was very satisfied with the purchase of   the screw, which secured the cage.  AP and lateral x-rays confirmed excellent   position of the cages and the  plates overall. given the patient's   degenerative scoliosis and the issues we are dealing with.  The wound was   irrigated.  Dr. Cox closed the wound and separately dictated.  Swabs,   needles, and instruments correct by two-count.  No complications were   encountered.  The patient tolerated the procedure, was stable and transferred   to recovery room.  The patient will be observed at [facility] until she meets discharge criteria.  We will obtain an interval CT scan   tomorrow and the patient's plan for left sided L2-3 XLIF followed by   posterior percutaneous screws at L2-S1 with removal of prior hardware as the   indirect decompression throughout should be sufficient for the patient   overall.     INTRAOPERATIVE FINDINGS:  Severe degenerative scoliosis with collapse and   rotational deformity, which was corrected as best as possible.  The patient   had 500 mL of blood loss with half of it returned Cell Saver.  Overall, no   complications were encountered.  This is an extremely difficult case as   outlined.        ______________________________  JUNAID LE MD    JJL/SAMANTHA    DD:  01/23/2024 10:58  DT:  01/23/2024 11:55    Job#:  [id number]    CC:Emre Viramontes MD

## 2024-01-23 NOTE — OR SURGEON
Immediate Post OP Note    PreOp Diagnosis: L4-S1 DDD, right radiculopathy.       PostOp Diagnosis: Same.       Procedure(s):  STAGE #1 LUMBAR 4-SACRAL 1 ANTERIOR LUMBAR INTERBODY FUSION - Wound Class: Clean    Surgeon(s):  ZUNILDA Boss M.D.    Anesthesiologist/Type of Anesthesia:  Anesthesiologist: Bry Chery M.D./General    Surgical Staff:  Assistant: Riaz Espinosa P.A.-C.  Cell Saver : Bindu Lima  Circulator: Cris Arias R.N.  Relief Circulator: Keri Azar R.N.  Scrub Person: Jess Chowdhury; Denae Seay  Count Old Glory: Janny Dempsey R.N.  Radiology Technologist: Tegan Guerrero; Aleena Hayden    Specimens removed if any:  * No specimens in log *    Estimated Blood Loss: 600 cc, 300 given back via cell saver.     Findings: L4-S1 DDD, see dictation.     Complications: None.          1/23/2024 10:54 AM Riaz Espinosa P.A.-C.

## 2024-01-24 ENCOUNTER — ANESTHESIA EVENT (OUTPATIENT)
Dept: SURGERY | Facility: MEDICAL CENTER | Age: 69
DRG: 454 | End: 2024-01-24
Payer: MEDICARE

## 2024-01-24 ENCOUNTER — HOSPITAL ENCOUNTER (INPATIENT)
Dept: RADIOLOGY | Facility: MEDICAL CENTER | Age: 69
DRG: 454 | End: 2024-01-24
Attending: PHYSICIAN ASSISTANT | Admitting: NEUROLOGICAL SURGERY
Payer: MEDICARE

## 2024-01-24 PROBLEM — Z98.890 STATUS POST SURGERY: Chronic | Status: ACTIVE | Noted: 2024-01-24

## 2024-01-24 PROBLEM — Z98.890 STATUS POST SURGERY: Status: ACTIVE | Noted: 2024-01-24

## 2024-01-24 LAB
ANION GAP SERPL CALC-SCNC: 9 MMOL/L (ref 7–16)
BUN SERPL-MCNC: 18 MG/DL (ref 8–22)
CALCIUM SERPL-MCNC: 8.3 MG/DL (ref 8.5–10.5)
CHLORIDE SERPL-SCNC: 100 MMOL/L (ref 96–112)
CO2 SERPL-SCNC: 24 MMOL/L (ref 20–33)
CREAT SERPL-MCNC: 0.72 MG/DL (ref 0.5–1.4)
ERYTHROCYTE [DISTWIDTH] IN BLOOD BY AUTOMATED COUNT: 46.5 FL (ref 35.9–50)
GFR SERPLBLD CREATININE-BSD FMLA CKD-EPI: 90 ML/MIN/1.73 M 2
GLUCOSE SERPL-MCNC: 137 MG/DL (ref 65–99)
HCT VFR BLD AUTO: 38.1 % (ref 37–47)
HGB BLD-MCNC: 12.2 G/DL (ref 12–16)
HGB BLD-MCNC: 13 G/DL (ref 12–16)
MCH RBC QN AUTO: 32.9 PG (ref 27–33)
MCHC RBC AUTO-ENTMCNC: 34.1 G/DL (ref 32.2–35.5)
MCV RBC AUTO: 96.5 FL (ref 81.4–97.8)
PLATELET # BLD AUTO: 296 K/UL (ref 164–446)
PMV BLD AUTO: 9.7 FL (ref 9–12.9)
POTASSIUM SERPL-SCNC: 4 MMOL/L (ref 3.6–5.5)
RBC # BLD AUTO: 3.95 M/UL (ref 4.2–5.4)
SODIUM SERPL-SCNC: 133 MMOL/L (ref 135–145)
WBC # BLD AUTO: 13.2 K/UL (ref 4.8–10.8)

## 2024-01-24 PROCEDURE — 700102 HCHG RX REV CODE 250 W/ 637 OVERRIDE(OP): Performed by: PHYSICIAN ASSISTANT

## 2024-01-24 PROCEDURE — 700111 HCHG RX REV CODE 636 W/ 250 OVERRIDE (IP): Performed by: PHYSICIAN ASSISTANT

## 2024-01-24 PROCEDURE — 770001 HCHG ROOM/CARE - MED/SURG/GYN PRIV*

## 2024-01-24 PROCEDURE — 80048 BASIC METABOLIC PNL TOTAL CA: CPT

## 2024-01-24 PROCEDURE — A9270 NON-COVERED ITEM OR SERVICE: HCPCS | Performed by: PHYSICIAN ASSISTANT

## 2024-01-24 PROCEDURE — 72131 CT LUMBAR SPINE W/O DYE: CPT

## 2024-01-24 PROCEDURE — 85018 HEMOGLOBIN: CPT

## 2024-01-24 PROCEDURE — 85027 COMPLETE CBC AUTOMATED: CPT

## 2024-01-24 PROCEDURE — 36415 COLL VENOUS BLD VENIPUNCTURE: CPT

## 2024-01-24 PROCEDURE — 700105 HCHG RX REV CODE 258: Performed by: PHYSICIAN ASSISTANT

## 2024-01-24 RX ORDER — CALCIUM CARBONATE 500 MG/1
1000 TABLET, CHEWABLE ORAL EVERY 4 HOURS PRN
Status: DISCONTINUED | OUTPATIENT
Start: 2024-01-24 | End: 2024-02-02 | Stop reason: HOSPADM

## 2024-01-24 RX ORDER — SODIUM CHLORIDE 1 G/1
1 TABLET ORAL
Status: DISPENSED | OUTPATIENT
Start: 2024-01-24 | End: 2024-01-26

## 2024-01-24 RX ORDER — ALUMINA, MAGNESIA, AND SIMETHICONE 2400; 2400; 240 MG/30ML; MG/30ML; MG/30ML
10 SUSPENSION ORAL 2 TIMES DAILY
Status: DISCONTINUED | OUTPATIENT
Start: 2024-01-24 | End: 2024-02-02 | Stop reason: HOSPADM

## 2024-01-24 RX ADMIN — ATORVASTATIN CALCIUM 20 MG: 20 TABLET, FILM COATED ORAL at 16:12

## 2024-01-24 RX ADMIN — SUCRALFATE 1 G: 1 TABLET ORAL at 16:13

## 2024-01-24 RX ADMIN — SODIUM CHLORIDE 1 G: 1 TABLET ORAL at 14:52

## 2024-01-24 RX ADMIN — HYDROMORPHONE HYDROCHLORIDE 0.5 MG: 1 INJECTION, SOLUTION INTRAMUSCULAR; INTRAVENOUS; SUBCUTANEOUS at 16:46

## 2024-01-24 RX ADMIN — ALUMINUM HYDROXIDE, MAGNESIUM HYDROXIDE, AND DIMETHICONE 10 ML: 400; 400; 40 SUSPENSION ORAL at 16:14

## 2024-01-24 RX ADMIN — DOCUSATE SODIUM 100 MG: 100 CAPSULE, LIQUID FILLED ORAL at 04:28

## 2024-01-24 RX ADMIN — OXYCODONE HYDROCHLORIDE 10 MG: 10 TABLET ORAL at 02:17

## 2024-01-24 RX ADMIN — GABAPENTIN 600 MG: 300 CAPSULE ORAL at 16:13

## 2024-01-24 RX ADMIN — LISINOPRIL 10 MG: 10 TABLET ORAL at 04:28

## 2024-01-24 RX ADMIN — OXYCODONE 5 MG: 5 TABLET ORAL at 15:39

## 2024-01-24 RX ADMIN — OMEPRAZOLE 20 MG: 20 CAPSULE, DELAYED RELEASE ORAL at 04:28

## 2024-01-24 RX ADMIN — OXYCODONE HYDROCHLORIDE 10 MG: 10 TABLET ORAL at 10:19

## 2024-01-24 RX ADMIN — ANTACID TABLETS 500 MG: 500 TABLET, CHEWABLE ORAL at 04:28

## 2024-01-24 RX ADMIN — DOCUSATE SODIUM 50 MG AND SENNOSIDES 8.6 MG 1 TABLET: 8.6; 5 TABLET, FILM COATED ORAL at 21:05

## 2024-01-24 RX ADMIN — CEFAZOLIN 2 G: 2 INJECTION, POWDER, FOR SOLUTION INTRAMUSCULAR; INTRAVENOUS at 00:19

## 2024-01-24 RX ADMIN — DOCUSATE SODIUM 100 MG: 100 CAPSULE, LIQUID FILLED ORAL at 16:13

## 2024-01-24 RX ADMIN — HYDROMORPHONE HYDROCHLORIDE 0.5 MG: 1 INJECTION, SOLUTION INTRAMUSCULAR; INTRAVENOUS; SUBCUTANEOUS at 20:55

## 2024-01-24 RX ADMIN — SUCRALFATE 1 G: 1 TABLET ORAL at 04:28

## 2024-01-24 RX ADMIN — SODIUM CHLORIDE 1 G: 1 TABLET ORAL at 17:30

## 2024-01-24 ASSESSMENT — PAIN DESCRIPTION - PAIN TYPE
TYPE: ACUTE PAIN;SURGICAL PAIN

## 2024-01-24 NOTE — ANESTHESIA POSTPROCEDURE EVALUATION
Patient: Dee Armstrong    Procedure Summary       Date: 01/23/24 Room / Location: [place] [address] / SURGERY [facility]    Anesthesia Start: 0658 Anesthesia Stop: 1048    Procedure: STAGE #1 LUMBAR 4-SACRAL 1 ANTERIOR LUMBAR INTERBODY FUSION (Back) Diagnosis: (LUMBAR STENOSIS, RADICULOPATHY)    Surgeons: Tim Rodriguez M.D. Responsible Provider: Bry Chery M.D.    Anesthesia Type: general ASA Status: 3            Final Anesthesia Type: general  Last vitals  BP   Blood Pressure : 122/69    Temp   36.3 °C (97.3 °F)    Pulse   (!) 107   Resp   16    SpO2   94 %      Anesthesia Post Evaluation    Patient location during evaluation: PACU  Patient participation: complete - patient participated  Level of consciousness: awake and alert    Airway patency: patent  Anesthetic complications: no  Cardiovascular status: hemodynamically stable  Respiratory status: acceptable  Hydration status: euvolemic    PONV: none          No notable events documented.     Nurse Pain Score: 4 (NPRS)

## 2024-01-24 NOTE — PROGRESS NOTES
Assumed care of patient and received report from Yary DELATORRE. Assessment completed.Pt A&Ox 4. [place] assessed and in place. Respirations are mildly unlabored on 3LNC. Pt reports 4/10 pain in abdomen. Medication per MAR. Medical pt, call light and belongings are within reach. POC updated. Pt educated on room and call light, pt verbalized understanding. Needs met.

## 2024-01-24 NOTE — THERAPY
Occupational Therapy Contact Note    Patient Name: Dee Armstrong  Age:  68 y.o., Sex:  female  Medical Record #: [id number]  Today's Date: 1/24/2024    OT orders received. Pt underwent stage 1 sx yesterday (1/23) and is anticipated to return to OR tomorrow (1/25) for stage 2. Will follow up post-op stage 2 when appropriate/able.

## 2024-01-24 NOTE — PROGRESS NOTES
Neurosurgery Progress Note    Subjective:  POD#1 stage 1 L4-S1 ALIF    Patient stable  Notes having bad reflux which she normal has and admits to eating Tums frequently at home  C/o abdominal pain  C/o back pain  No clear radicular pain, but does have discomfort to left proximal thigh   Has long standing right L5 numbness  Minimal mobility     CT completed  Vitals / labs stable; Na 133    Exam:  Patient calm and cooperative   Dressing dry  Motor 5/5  Sensory intact     BP  Min: 95/52  Max: 174/74  Pulse  Av.5  Min: 79  Max: 113  Resp  Avg: 15.6  Min: 10  Max: 22  Temp  Av.4 °C (97.5 °F)  Min: 36.1 °C (97 °F)  Max: 36.7 °C (98.1 °F)  Monitored Temp 2  Av.2 °C (97.2 °F)  Min: 36.1 °C (97 °F)  Max: 36.4 °C (97.5 °F)  SpO2  Av.9 %  Min: 91 %  Max: 96 %    No data recorded    Recent Labs     24  0603   WBC 13.2*   RBC 3.95*   HEMOGLOBIN 13.0   HEMATOCRIT 38.1   MCV 96.5   MCH 32.9   MCHC 34.1   RDW 46.5   PLATELETCT 296   MPV 9.7     Recent Labs     24  0603   SODIUM 133*   POTASSIUM 4.0   CHLORIDE 100   CO2 24   GLUCOSE 137*   BUN 18   CREATININE 0.72   CALCIUM 8.3*               Intake/Output                         24 0700 - 24 0659 24 07 - 24 0659      Total 0558-1391 1406-8142 Total                 Intake    I.V.  2800  -- 2800  --  -- --    Volume (mL) (electrolyte-A (Plasmalyte-A) infusion) 1000 -- 1000 -- -- --    Volume (mL) (lactated ringers infusion) 1800 -- 1800 -- -- --    Blood  300  -- 300  --  -- --    Cell Saver 300 -- 300 -- -- --    Total Intake 3100 -- 3100 -- -- --       Output    Urine  250  900 1150  --  -- --    Urine 100 -- 100 -- -- --    Output (mL) (Urethral Catheter Non-latex 1 Fr.)  -- -- --    Stool  --  -- --  --  -- --    Number of Times Stooled -- 0 x 0 x -- -- --    Blood  650  -- 650  --  -- --    Est. Blood Loss 650 -- 650 -- -- --    Total Output  -- -- --       Net I/O     2200 -900  1300 -- -- --              Intake/Output Summary (Last 24 hours) at 1/24/2024 1141  Last data filed at 1/24/2024 0336  Gross per 24 hour   Intake --   Output 1050 ml   Net -1050 ml             sodium chloride  1 g TID WITH MEALS    calcium carbonate  1,000 mg Q4HRS PRN    mag hydrox-al hydrox-simeth  10 mL BID    amitriptyline  10 mg HS PRN    atorvastatin  20 mg Q EVENING    clobetasol  1 Application  QDAY PRN    gabapentin  600 mg Q EVENING    lisinopril  10 mg DAILY    sucralfate  1 g BID    Pharmacy Consult Request  1 Each PHARMACY TO DOSE    docusate sodium  100 mg BID    senna-docusate  1 Tablet Nightly    senna-docusate  1 Tablet Q24HRS PRN    polyethylene glycol/lytes  1 Packet BID PRN    magnesium hydroxide  30 mL QDAY PRN    bisacodyl  10 mg Q24HRS PRN    sodium phosphate  1 Each Once PRN    NS   Continuous    diphenhydrAMINE  25 mg Q6HRS PRN    Or    diphenhydrAMINE  25 mg Q6HRS PRN    ondansetron  4 mg Q4HRS PRN    ondansetron  4 mg Q4HRS PRN    cyclobenzaprine  10 mg Q8HRS PRN    ALPRAZolam  0.25 mg BID PRN    labetalol  10 mg Q HOUR PRN    benzocaine-menthol  1 Lozenge Q2HRS PRN    oxyCODONE immediate-release  5 mg Q4HRS PRN    HYDROmorphone  0.5 mg Q3HRS PRN    HYDROcodone/acetaminophen  1 Tablet Q4HRS PRN    oxyCODONE immediate-release  10 mg Q4HRS PRN    omeprazole  20 mg DAILY       Assessment and Plan:  Hospital day #2  POD #1  PT/OT  Will adjust meds today   NPO for surgery tomorrow     Prophylactic anticoagulation: yes         Start date/time: post surgery

## 2024-01-24 NOTE — CARE PLAN
Problem: Pain - Standard  Goal: Alleviation of pain or a reduction in pain to the patient’s comfort goal  Outcome: Progressing   Pt educated on 0-10 pain scale.  Pt medicated per MAR. Pt educated on pharmacological and non-pharmacological interventions.  Problem: Fall Risk  Goal: Patient will remain free from falls  Outcome: Progressing  Pt educated on fall precautions and to call for assistance prior to understanding.   The patient is Stable - Low risk of patient condition declining or worsening    Shift Goals  Clinical Goals: Safety, pain control, mobility  Patient Goals: Pain control, comfort    Progress made toward(s) clinical / shift goals:  Pt verbalizes pain control. Pt free from falls.    Patient is not progressing towards the following goals:

## 2024-01-24 NOTE — CARE PLAN
The patient is Stable - Low risk of patient condition declining or worsening    Shift Goals  Clinical Goals: safety, pain control  Patient Goals: pain control, comfort  Family Goals: not present      Problem: Pain - Standard  Goal: Alleviation of pain or a reduction in pain to the patient’s comfort goal  Outcome: Progressing     Problem: Knowledge Deficit - Standard  Goal: Patient and family/care givers will demonstrate understanding of plan of care, disease process/condition, diagnostic tests and medications  Outcome: Progressing     Problem: Fall Risk  Goal: Patient will remain free from falls  Outcome: Progressing

## 2024-01-24 NOTE — THERAPY
Physical Therapy Contact Note    Patient Name: Dee Armstrong  Age:  68 y.o., Sex:  female  Medical Record #: [id number]  Today's Date: 1/24/2024    PT consult received. Pt is scheduled for OR tomorrow for stage 2 spinal sx, RN confirmed. Will f/u post-op as able/appropriate.

## 2024-01-24 NOTE — DISCHARGE PLANNING
Patient discussed in rounds.  Patient is not medically cleared.  Per the report this a.m. the patient is for stage II surgery on 01/25/2024.

## 2024-01-25 ENCOUNTER — APPOINTMENT (OUTPATIENT)
Dept: RADIOLOGY | Facility: MEDICAL CENTER | Age: 69
DRG: 454 | End: 2024-01-25
Attending: PHYSICIAN ASSISTANT
Payer: MEDICARE

## 2024-01-25 ENCOUNTER — APPOINTMENT (OUTPATIENT)
Dept: RADIOLOGY | Facility: MEDICAL CENTER | Age: 69
DRG: 454 | End: 2024-01-25
Attending: NEUROLOGICAL SURGERY
Payer: MEDICARE

## 2024-01-25 ENCOUNTER — ANESTHESIA (OUTPATIENT)
Dept: SURGERY | Facility: MEDICAL CENTER | Age: 69
DRG: 454 | End: 2024-01-25
Payer: MEDICARE

## 2024-01-25 LAB
ANION GAP SERPL CALC-SCNC: 11 MMOL/L (ref 7–16)
BUN SERPL-MCNC: 12 MG/DL (ref 8–22)
CALCIUM SERPL-MCNC: 8 MG/DL (ref 8.5–10.5)
CHLORIDE SERPL-SCNC: 100 MMOL/L (ref 96–112)
CO2 SERPL-SCNC: 24 MMOL/L (ref 20–33)
CREAT SERPL-MCNC: 0.52 MG/DL (ref 0.5–1.4)
ERYTHROCYTE [DISTWIDTH] IN BLOOD BY AUTOMATED COUNT: 46.3 FL (ref 35.9–50)
GFR SERPLBLD CREATININE-BSD FMLA CKD-EPI: 99 ML/MIN/1.73 M 2
GLUCOSE SERPL-MCNC: 118 MG/DL (ref 65–99)
HCT VFR BLD AUTO: 35.5 % (ref 37–47)
HGB BLD-MCNC: 11.8 G/DL (ref 12–16)
MCH RBC QN AUTO: 31.6 PG (ref 27–33)
MCHC RBC AUTO-ENTMCNC: 33.2 G/DL (ref 32.2–35.5)
MCV RBC AUTO: 94.9 FL (ref 81.4–97.8)
PLATELET # BLD AUTO: 231 K/UL (ref 164–446)
PMV BLD AUTO: 9.3 FL (ref 9–12.9)
POTASSIUM SERPL-SCNC: 4 MMOL/L (ref 3.6–5.5)
RBC # BLD AUTO: 3.74 M/UL (ref 4.2–5.4)
SODIUM SERPL-SCNC: 135 MMOL/L (ref 135–145)
WBC # BLD AUTO: 10.7 K/UL (ref 4.8–10.8)

## 2024-01-25 PROCEDURE — 95955 EEG DURING SURGERY: CPT | Performed by: NEUROLOGICAL SURGERY

## 2024-01-25 PROCEDURE — 700102 HCHG RX REV CODE 250 W/ 637 OVERRIDE(OP): Performed by: PHYSICIAN ASSISTANT

## 2024-01-25 PROCEDURE — A9270 NON-COVERED ITEM OR SERVICE: HCPCS | Performed by: PHYSICIAN ASSISTANT

## 2024-01-25 PROCEDURE — 95940 IONM IN OPERATNG ROOM 15 MIN: CPT | Performed by: NEUROLOGICAL SURGERY

## 2024-01-25 PROCEDURE — C1755 CATHETER, INTRASPINAL: HCPCS | Performed by: NEUROLOGICAL SURGERY

## 2024-01-25 PROCEDURE — 700102 HCHG RX REV CODE 250 W/ 637 OVERRIDE(OP): Performed by: ANESTHESIOLOGY

## 2024-01-25 PROCEDURE — 700101 HCHG RX REV CODE 250: Performed by: PHYSICIAN ASSISTANT

## 2024-01-25 PROCEDURE — 700105 HCHG RX REV CODE 258: Performed by: ANESTHESIOLOGY

## 2024-01-25 PROCEDURE — 95938 SOMATOSENSORY TESTING: CPT | Performed by: NEUROLOGICAL SURGERY

## 2024-01-25 PROCEDURE — 160031 HCHG SURGERY MINUTES - 1ST 30 MINS LEVEL 5: Performed by: NEUROLOGICAL SURGERY

## 2024-01-25 PROCEDURE — 700101 HCHG RX REV CODE 250: Performed by: NEUROLOGICAL SURGERY

## 2024-01-25 PROCEDURE — 700101 HCHG RX REV CODE 250: Performed by: ANESTHESIOLOGY

## 2024-01-25 PROCEDURE — 700111 HCHG RX REV CODE 636 W/ 250 OVERRIDE (IP): Performed by: NEUROLOGICAL SURGERY

## 2024-01-25 PROCEDURE — 160009 HCHG ANES TIME/MIN: Performed by: NEUROLOGICAL SURGERY

## 2024-01-25 PROCEDURE — C1713 ANCHOR/SCREW BN/BN,TIS/BN: HCPCS | Performed by: NEUROLOGICAL SURGERY

## 2024-01-25 PROCEDURE — 160042 HCHG SURGERY MINUTES - EA ADDL 1 MIN LEVEL 5: Performed by: NEUROLOGICAL SURGERY

## 2024-01-25 PROCEDURE — C1751 CATH, INF, PER/CENT/MIDLINE: HCPCS | Performed by: NEUROLOGICAL SURGERY

## 2024-01-25 PROCEDURE — 700111 HCHG RX REV CODE 636 W/ 250 OVERRIDE (IP): Performed by: PHYSICIAN ASSISTANT

## 2024-01-25 PROCEDURE — 700111 HCHG RX REV CODE 636 W/ 250 OVERRIDE (IP): Performed by: ANESTHESIOLOGY

## 2024-01-25 PROCEDURE — A9270 NON-COVERED ITEM OR SERVICE: HCPCS | Performed by: ANESTHESIOLOGY

## 2024-01-25 PROCEDURE — 700105 HCHG RX REV CODE 258: Performed by: PHYSICIAN ASSISTANT

## 2024-01-25 PROCEDURE — 160036 HCHG PACU - EA ADDL 30 MINS PHASE I: Performed by: NEUROLOGICAL SURGERY

## 2024-01-25 PROCEDURE — 95861 NEEDLE EMG 2 EXTREMITIES: CPT | Performed by: NEUROLOGICAL SURGERY

## 2024-01-25 PROCEDURE — 85027 COMPLETE CBC AUTOMATED: CPT

## 2024-01-25 PROCEDURE — 502240 HCHG MISC OR SUPPLY RC 0272: Performed by: NEUROLOGICAL SURGERY

## 2024-01-25 PROCEDURE — 160035 HCHG PACU - 1ST 60 MINS PHASE I: Performed by: NEUROLOGICAL SURGERY

## 2024-01-25 PROCEDURE — 95937 NEUROMUSCULAR JUNCTION TEST: CPT | Performed by: NEUROLOGICAL SURGERY

## 2024-01-25 PROCEDURE — 160002 HCHG RECOVERY MINUTES (STAT): Performed by: NEUROLOGICAL SURGERY

## 2024-01-25 PROCEDURE — 110371 HCHG SHELL REV 272: Performed by: NEUROLOGICAL SURGERY

## 2024-01-25 PROCEDURE — 36415 COLL VENOUS BLD VENIPUNCTURE: CPT

## 2024-01-25 PROCEDURE — 502000 HCHG MISC OR IMPLANTS RC 0278: Performed by: NEUROLOGICAL SURGERY

## 2024-01-25 PROCEDURE — 72100 X-RAY EXAM L-S SPINE 2/3 VWS: CPT

## 2024-01-25 PROCEDURE — 71045 X-RAY EXAM CHEST 1 VIEW: CPT

## 2024-01-25 PROCEDURE — 80048 BASIC METABOLIC PNL TOTAL CA: CPT

## 2024-01-25 PROCEDURE — 700111 HCHG RX REV CODE 636 W/ 250 OVERRIDE (IP): Mod: JZ,JG

## 2024-01-25 PROCEDURE — 770001 HCHG ROOM/CARE - MED/SURG/GYN PRIV*

## 2024-01-25 PROCEDURE — 110454 HCHG SHELL REV 250: Performed by: NEUROLOGICAL SURGERY

## 2024-01-25 PROCEDURE — 160048 HCHG OR STATISTICAL LEVEL 1-5: Performed by: NEUROLOGICAL SURGERY

## 2024-01-25 DEVICE — SCREW 5.5 X 50MM VARIABLE RP1 ANGLE: Type: IMPLANTABLE DEVICE | Site: BACK | Status: FUNCTIONAL

## 2024-01-25 DEVICE — PLATE 2-HOLE 10MM LATERAL: Type: IMPLANTABLE DEVICE | Site: BACK | Status: FUNCTIONAL

## 2024-01-25 DEVICE — COVER PLATE 1-HOLE 10MM LOCKING: Type: IMPLANTABLE DEVICE | Site: BACK | Status: FUNCTIONAL

## 2024-01-25 RX ORDER — CEFAZOLIN SODIUM 1 G/3ML
INJECTION, POWDER, FOR SOLUTION INTRAMUSCULAR; INTRAVENOUS
Status: DISCONTINUED | OUTPATIENT
Start: 2024-01-25 | End: 2024-01-25 | Stop reason: HOSPADM

## 2024-01-25 RX ORDER — HALOPERIDOL 5 MG/ML
1 INJECTION INTRAMUSCULAR
Status: DISCONTINUED | OUTPATIENT
Start: 2024-01-25 | End: 2024-01-25 | Stop reason: HOSPADM

## 2024-01-25 RX ORDER — SODIUM CHLORIDE, SODIUM LACTATE, POTASSIUM CHLORIDE, CALCIUM CHLORIDE 600; 310; 30; 20 MG/100ML; MG/100ML; MG/100ML; MG/100ML
INJECTION, SOLUTION INTRAVENOUS
Status: DISCONTINUED | OUTPATIENT
Start: 2024-01-25 | End: 2024-01-25 | Stop reason: SURG

## 2024-01-25 RX ORDER — DEXAMETHASONE SODIUM PHOSPHATE 4 MG/ML
INJECTION, SOLUTION INTRA-ARTICULAR; INTRALESIONAL; INTRAMUSCULAR; INTRAVENOUS; SOFT TISSUE PRN
Status: DISCONTINUED | OUTPATIENT
Start: 2024-01-25 | End: 2024-01-25 | Stop reason: SURG

## 2024-01-25 RX ORDER — CEFAZOLIN SODIUM 1 G/3ML
INJECTION, POWDER, FOR SOLUTION INTRAMUSCULAR; INTRAVENOUS PRN
Status: DISCONTINUED | OUTPATIENT
Start: 2024-01-25 | End: 2024-01-25 | Stop reason: SURG

## 2024-01-25 RX ORDER — ACETAMINOPHEN 10 MG/ML
INJECTION, SOLUTION INTRAVENOUS
Status: COMPLETED
Start: 2024-01-25 | End: 2024-01-25

## 2024-01-25 RX ORDER — METOPROLOL TARTRATE 1 MG/ML
1 INJECTION, SOLUTION INTRAVENOUS
Status: DISCONTINUED | OUTPATIENT
Start: 2024-01-25 | End: 2024-01-25 | Stop reason: HOSPADM

## 2024-01-25 RX ORDER — HYDROMORPHONE HYDROCHLORIDE 1 MG/ML
0.1 INJECTION, SOLUTION INTRAMUSCULAR; INTRAVENOUS; SUBCUTANEOUS
Status: DISCONTINUED | OUTPATIENT
Start: 2024-01-25 | End: 2024-01-25 | Stop reason: HOSPADM

## 2024-01-25 RX ORDER — LABETALOL HYDROCHLORIDE 5 MG/ML
5 INJECTION, SOLUTION INTRAVENOUS
Status: DISCONTINUED | OUTPATIENT
Start: 2024-01-25 | End: 2024-01-25 | Stop reason: HOSPADM

## 2024-01-25 RX ORDER — LIDOCAINE HYDROCHLORIDE 20 MG/ML
INJECTION, SOLUTION EPIDURAL; INFILTRATION; INTRACAUDAL; PERINEURAL PRN
Status: DISCONTINUED | OUTPATIENT
Start: 2024-01-25 | End: 2024-01-25 | Stop reason: SURG

## 2024-01-25 RX ORDER — HYDRALAZINE HYDROCHLORIDE 20 MG/ML
5 INJECTION INTRAMUSCULAR; INTRAVENOUS
Status: DISCONTINUED | OUTPATIENT
Start: 2024-01-25 | End: 2024-01-25 | Stop reason: HOSPADM

## 2024-01-25 RX ORDER — HYDROMORPHONE HYDROCHLORIDE 2 MG/ML
INJECTION, SOLUTION INTRAMUSCULAR; INTRAVENOUS; SUBCUTANEOUS PRN
Status: DISCONTINUED | OUTPATIENT
Start: 2024-01-25 | End: 2024-01-25 | Stop reason: SURG

## 2024-01-25 RX ORDER — HYDROMORPHONE HYDROCHLORIDE 1 MG/ML
0.2 INJECTION, SOLUTION INTRAMUSCULAR; INTRAVENOUS; SUBCUTANEOUS
Status: DISCONTINUED | OUTPATIENT
Start: 2024-01-25 | End: 2024-01-25 | Stop reason: HOSPADM

## 2024-01-25 RX ORDER — DIPHENHYDRAMINE HYDROCHLORIDE 50 MG/ML
12.5 INJECTION INTRAMUSCULAR; INTRAVENOUS
Status: DISCONTINUED | OUTPATIENT
Start: 2024-01-25 | End: 2024-01-25 | Stop reason: HOSPADM

## 2024-01-25 RX ORDER — OXYCODONE HCL 5 MG/5 ML
10 SOLUTION, ORAL ORAL
Status: COMPLETED | OUTPATIENT
Start: 2024-01-25 | End: 2024-01-25

## 2024-01-25 RX ORDER — ENOXAPARIN SODIUM 100 MG/ML
40 INJECTION SUBCUTANEOUS DAILY
Status: DISCONTINUED | OUTPATIENT
Start: 2024-01-26 | End: 2024-01-26

## 2024-01-25 RX ORDER — LIDOCAINE HYDROCHLORIDE 40 MG/ML
SOLUTION TOPICAL PRN
Status: DISCONTINUED | OUTPATIENT
Start: 2024-01-25 | End: 2024-01-25 | Stop reason: SURG

## 2024-01-25 RX ORDER — HYDROMORPHONE HYDROCHLORIDE 1 MG/ML
0.4 INJECTION, SOLUTION INTRAMUSCULAR; INTRAVENOUS; SUBCUTANEOUS
Status: DISCONTINUED | OUTPATIENT
Start: 2024-01-25 | End: 2024-01-25 | Stop reason: HOSPADM

## 2024-01-25 RX ORDER — ONDANSETRON 2 MG/ML
4 INJECTION INTRAMUSCULAR; INTRAVENOUS
Status: DISCONTINUED | OUTPATIENT
Start: 2024-01-25 | End: 2024-01-25 | Stop reason: HOSPADM

## 2024-01-25 RX ORDER — BUPIVACAINE HYDROCHLORIDE AND EPINEPHRINE 5; 5 MG/ML; UG/ML
INJECTION, SOLUTION EPIDURAL; INTRACAUDAL; PERINEURAL
Status: DISCONTINUED | OUTPATIENT
Start: 2024-01-25 | End: 2024-01-25 | Stop reason: HOSPADM

## 2024-01-25 RX ORDER — EPHEDRINE SULFATE 50 MG/ML
INJECTION, SOLUTION INTRAVENOUS PRN
Status: DISCONTINUED | OUTPATIENT
Start: 2024-01-25 | End: 2024-01-25 | Stop reason: SURG

## 2024-01-25 RX ORDER — OXYCODONE HCL 5 MG/5 ML
5 SOLUTION, ORAL ORAL
Status: COMPLETED | OUTPATIENT
Start: 2024-01-25 | End: 2024-01-25

## 2024-01-25 RX ORDER — ROCURONIUM BROMIDE 10 MG/ML
INJECTION, SOLUTION INTRAVENOUS PRN
Status: DISCONTINUED | OUTPATIENT
Start: 2024-01-25 | End: 2024-01-25 | Stop reason: SURG

## 2024-01-25 RX ORDER — MIDAZOLAM HYDROCHLORIDE 1 MG/ML
INJECTION INTRAMUSCULAR; INTRAVENOUS PRN
Status: DISCONTINUED | OUTPATIENT
Start: 2024-01-25 | End: 2024-01-25 | Stop reason: SURG

## 2024-01-25 RX ORDER — ONDANSETRON 2 MG/ML
INJECTION INTRAMUSCULAR; INTRAVENOUS PRN
Status: DISCONTINUED | OUTPATIENT
Start: 2024-01-25 | End: 2024-01-25 | Stop reason: SURG

## 2024-01-25 RX ORDER — SODIUM CHLORIDE AND POTASSIUM CHLORIDE 150; 900 MG/100ML; MG/100ML
INJECTION, SOLUTION INTRAVENOUS CONTINUOUS
Status: DISCONTINUED | OUTPATIENT
Start: 2024-01-25 | End: 2024-02-01 | Stop reason: ALTCHOICE

## 2024-01-25 RX ADMIN — CEFAZOLIN 2 G: 1 INJECTION, POWDER, FOR SOLUTION INTRAMUSCULAR; INTRAVENOUS at 12:12

## 2024-01-25 RX ADMIN — POTASSIUM CHLORIDE AND SODIUM CHLORIDE: 900; 150 INJECTION, SOLUTION INTRAVENOUS at 17:16

## 2024-01-25 RX ADMIN — SUGAMMADEX 50 MG: 100 INJECTION, SOLUTION INTRAVENOUS at 12:59

## 2024-01-25 RX ADMIN — HYDROMORPHONE HYDROCHLORIDE 0.5 MG: 2 INJECTION INTRAMUSCULAR; INTRAVENOUS; SUBCUTANEOUS at 13:11

## 2024-01-25 RX ADMIN — METHOCARBAMOL 1000 MG: 100 INJECTION, SOLUTION INTRAMUSCULAR; INTRAVENOUS at 15:27

## 2024-01-25 RX ADMIN — HYDROMORPHONE HYDROCHLORIDE 0.5 MG: 2 INJECTION INTRAMUSCULAR; INTRAVENOUS; SUBCUTANEOUS at 13:19

## 2024-01-25 RX ADMIN — OXYCODONE HYDROCHLORIDE 10 MG: 10 TABLET ORAL at 20:51

## 2024-01-25 RX ADMIN — SUGAMMADEX 50 MG: 100 INJECTION, SOLUTION INTRAVENOUS at 12:54

## 2024-01-25 RX ADMIN — LIDOCAINE HYDROCHLORIDE 4 ML: 40 SOLUTION TOPICAL at 12:11

## 2024-01-25 RX ADMIN — POTASSIUM CHLORIDE AND SODIUM CHLORIDE: 900; 150 INJECTION, SOLUTION INTRAVENOUS at 16:55

## 2024-01-25 RX ADMIN — HYDROMORPHONE HYDROCHLORIDE 0.5 MG: 1 INJECTION, SOLUTION INTRAMUSCULAR; INTRAVENOUS; SUBCUTANEOUS at 02:21

## 2024-01-25 RX ADMIN — SODIUM CHLORIDE, POTASSIUM CHLORIDE, SODIUM LACTATE AND CALCIUM CHLORIDE: 600; 310; 30; 20 INJECTION, SOLUTION INTRAVENOUS at 12:02

## 2024-01-25 RX ADMIN — EPHEDRINE SULFATE 20 MG: 50 INJECTION, SOLUTION INTRAVENOUS at 13:58

## 2024-01-25 RX ADMIN — ALUMINUM HYDROXIDE, MAGNESIUM HYDROXIDE, AND DIMETHICONE 10 ML: 400; 400; 40 SUSPENSION ORAL at 04:53

## 2024-01-25 RX ADMIN — ACETAMINOPHEN 1000 MG: 1000 INJECTION INTRAVENOUS at 11:53

## 2024-01-25 RX ADMIN — FENTANYL CITRATE 100 MCG: 50 INJECTION, SOLUTION INTRAMUSCULAR; INTRAVENOUS at 12:07

## 2024-01-25 RX ADMIN — ATORVASTATIN CALCIUM 20 MG: 20 TABLET, FILM COATED ORAL at 17:07

## 2024-01-25 RX ADMIN — MIDAZOLAM HYDROCHLORIDE 1 MG: 1 INJECTION, SOLUTION INTRAMUSCULAR; INTRAVENOUS at 12:07

## 2024-01-25 RX ADMIN — CEFAZOLIN 2 G: 2 INJECTION, POWDER, FOR SOLUTION INTRAMUSCULAR; INTRAVENOUS at 20:49

## 2024-01-25 RX ADMIN — ALUMINUM HYDROXIDE, MAGNESIUM HYDROXIDE, AND DIMETHICONE 10 ML: 400; 400; 40 SUSPENSION ORAL at 18:19

## 2024-01-25 RX ADMIN — DOCUSATE SODIUM 50 MG AND SENNOSIDES 8.6 MG 1 TABLET: 8.6; 5 TABLET, FILM COATED ORAL at 20:51

## 2024-01-25 RX ADMIN — HYDROMORPHONE HYDROCHLORIDE 0.5 MG: 1 INJECTION, SOLUTION INTRAMUSCULAR; INTRAVENOUS; SUBCUTANEOUS at 21:38

## 2024-01-25 RX ADMIN — OXYCODONE HYDROCHLORIDE 10 MG: 10 TABLET ORAL at 05:56

## 2024-01-25 RX ADMIN — FENTANYL CITRATE 50 MCG: 50 INJECTION, SOLUTION INTRAMUSCULAR; INTRAVENOUS at 15:00

## 2024-01-25 RX ADMIN — FENTANYL CITRATE 100 MCG: 50 INJECTION, SOLUTION INTRAMUSCULAR; INTRAVENOUS at 12:50

## 2024-01-25 RX ADMIN — PROPOFOL 60 MG: 10 INJECTION, EMULSION INTRAVENOUS at 12:52

## 2024-01-25 RX ADMIN — GABAPENTIN 600 MG: 300 CAPSULE ORAL at 17:07

## 2024-01-25 RX ADMIN — SODIUM CHLORIDE: 9 INJECTION, SOLUTION INTRAVENOUS at 07:24

## 2024-01-25 RX ADMIN — FENTANYL CITRATE 50 MCG: 50 INJECTION, SOLUTION INTRAMUSCULAR; INTRAVENOUS at 15:12

## 2024-01-25 RX ADMIN — PROPOFOL 100 MCG/KG/MIN: 10 INJECTION, EMULSION INTRAVENOUS at 13:09

## 2024-01-25 RX ADMIN — HYDROMORPHONE HYDROCHLORIDE 0.5 MG: 1 INJECTION, SOLUTION INTRAMUSCULAR; INTRAVENOUS; SUBCUTANEOUS at 09:01

## 2024-01-25 RX ADMIN — FENTANYL CITRATE 50 MCG: 50 INJECTION, SOLUTION INTRAMUSCULAR; INTRAVENOUS at 13:04

## 2024-01-25 RX ADMIN — LIDOCAINE HYDROCHLORIDE 60 MG: 20 INJECTION, SOLUTION EPIDURAL; INFILTRATION; INTRACAUDAL at 12:07

## 2024-01-25 RX ADMIN — OMEPRAZOLE 20 MG: 20 CAPSULE, DELAYED RELEASE ORAL at 04:53

## 2024-01-25 RX ADMIN — OXYCODONE HYDROCHLORIDE 10 MG: 5 SOLUTION ORAL at 14:59

## 2024-01-25 RX ADMIN — SUCRALFATE 1 G: 1 TABLET ORAL at 17:07

## 2024-01-25 RX ADMIN — PROPOFOL 140 MG: 10 INJECTION, EMULSION INTRAVENOUS at 12:07

## 2024-01-25 RX ADMIN — SUGAMMADEX 50 MG: 100 INJECTION, SOLUTION INTRAVENOUS at 12:46

## 2024-01-25 RX ADMIN — ROCURONIUM BROMIDE 50 MG: 50 INJECTION, SOLUTION INTRAVENOUS at 12:07

## 2024-01-25 RX ADMIN — SODIUM CHLORIDE 1 G: 1 TABLET ORAL at 17:08

## 2024-01-25 RX ADMIN — ONDANSETRON 4 MG: 2 INJECTION INTRAMUSCULAR; INTRAVENOUS at 13:02

## 2024-01-25 RX ADMIN — SUGAMMADEX 50 MG: 100 INJECTION, SOLUTION INTRAVENOUS at 14:04

## 2024-01-25 RX ADMIN — DOCUSATE SODIUM 100 MG: 100 CAPSULE, LIQUID FILLED ORAL at 04:53

## 2024-01-25 RX ADMIN — SUCRALFATE 1 G: 1 TABLET ORAL at 04:53

## 2024-01-25 RX ADMIN — DOCUSATE SODIUM 100 MG: 100 CAPSULE, LIQUID FILLED ORAL at 17:07

## 2024-01-25 RX ADMIN — DEXAMETHASONE SODIUM PHOSPHATE 8 MG: 4 INJECTION INTRA-ARTICULAR; INTRALESIONAL; INTRAMUSCULAR; INTRAVENOUS; SOFT TISSUE at 13:00

## 2024-01-25 RX ADMIN — OXYCODONE HYDROCHLORIDE 10 MG: 10 TABLET ORAL at 01:14

## 2024-01-25 ASSESSMENT — PAIN DESCRIPTION - PAIN TYPE
TYPE: ACUTE PAIN
TYPE: ACUTE PAIN;SURGICAL PAIN
TYPE: SURGICAL PAIN
TYPE: ACUTE PAIN;SURGICAL PAIN
TYPE: ACUTE PAIN
TYPE: ACUTE PAIN
TYPE: ACUTE PAIN;SURGICAL PAIN
TYPE: ACUTE PAIN

## 2024-01-25 ASSESSMENT — PAIN SCALES - GENERAL: PAIN_LEVEL: 3

## 2024-01-25 NOTE — DISCHARGE PLANNING
[facility] Transitional Care Coordination    Referral from:  Mauro  Insurance Provider on Facesheet:[facility]  Potential Rehab Diagnosis: Other Ortho    Chart review indicates patient may have on going medical management and may have therapy needs to possibly meet inpatient rehab facility criteria with the goal of returning to community.    D/C support: spouse, will need to verify d/c support     Physiatry consultation pended per protocol.   Will need therapy evaluations when medically appropriate.         Thank you for the referral.

## 2024-01-25 NOTE — DISCHARGE PLANNING
Patient discussed in rounds.  Patient is not medically cleared.  Patient for stage II surgery today.   CM will f/u for discharge needs.

## 2024-01-25 NOTE — DISCHARGE PLANNING
Case Management Discharge Planning    Admission Date: 1/23/2024  GMLOS: 5.3  ALOS: 2    6-Clicks ADL Score:    6-Clicks Mobility Score:    PT and/or OT Eval ordered: Yes  Post-acute Referrals Ordered: No  Post-acute Choice Obtained: No  Has referral(s) been sent to post-acute provider:  No      Anticipated Discharge Dispo:      DME Needed: No    Action(s) Taken: Updated Provider/Nurse on Discharge Plan    Escalations Completed: None    Medically Clear: No    Next Steps: f/u for discharge needs post P.T. / O.T. evaluations.     Barriers to Discharge: Medical clearance and Pending Procedures    Is the patient up for discharge tomorrow: No      Patient discussed in rounds.  Patient is not medically cleared.  Patient is to have Stage II surgery today.

## 2024-01-25 NOTE — PROGRESS NOTES
Contacted MD about Lisinopril as it is included in morning medications. Held by Nae Bruno MD per nursing communication.

## 2024-01-25 NOTE — ANESTHESIA PREPROCEDURE EVALUATION
Case: [id number] Date/Time: 01/25/24 1145    Procedures:       STAGE #2 LEFT LUMBAR EXTREME LATERAL INTERBODY FUSION, LUMBAR 2-SACRAL 1 FUSION WITH POSSIBLE REDO RIGHT LUMBAR 2-3 LAMINOTOMIES AND HARDWARE REMOVAL      FUSION, SPINE, XLIF      LAMINECTOMY, SPINE, LUMBAR, WITH DISCECTOMY      REMOVAL, HARDWARE    Pre-op diagnosis: LUMBAR STENOSIS, RADICULOPATHY    [facility]    Surgeons: Tim Rodriguez M.D.            Relevant Problems   CARDIAC   (positive) Essential hypertension      GI   (positive) GERD (gastroesophageal reflux disease)   (positive) Gastric ulcer         (positive) Kidney stone       Physical Exam    Airway   Mallampati: III  TM distance: >3 FB       Cardiovascular   Rhythm: regular  Rate: abnormal     Dental - normal exam           Pulmonary   Breath sounds clear to auscultation     Abdominal   Abdomen: tender     Neurological                Anesthesia Plan    ASA 3   ASA physical status 3 criteria: morbid obesity - BMI greater than or equal to 40    Plan - general       Airway plan will be ETT        Plan Factors:   Patient was not previously instructed to abstain from smoking on day of procedure.      Induction: intravenous    Postoperative Plan: Postoperative administration of opioids is intended.    Pertinent diagnostic labs and testing reviewed    Informed Consent:    Anesthetic plan and risks discussed with patient and spouse.    Use of blood products discussed with: whom consented to blood products.

## 2024-01-25 NOTE — ANESTHESIA PROCEDURE NOTES
Airway    Date/Time: 1/25/2024 12:11 PM    Performed by: Keke Berrios M.D.  Authorized by: Keke Berrios M.D.    Location:  OR  Urgency:  Elective  Difficult Airway: No    Indications for Airway Management:  Anesthesia      Spontaneous Ventilation: absent    Sedation Level:  Deep  Preoxygenated: Yes    Patient Position:  Sniffing  MILS Maintained Throughout: No    Mask Difficulty Assessment:  1 - vent by mask  Final Airway Type:  Endotracheal airway  Final Endotracheal Airway:  ETT  Cuffed: Yes    Technique Used for Successful ETT Placement:  Direct laryngoscopy  Devices/Methods Used in Placement:  Intubating stylet    Insertion Site:  Oral  Blade Type:  Guerrero  Laryngoscope Blade/Videolaryngoscope Blade Size:  2  ETT Size (mm):  6.5  Placement Verified by: capnometry    Cormack-Lehane Classification:  Grade I - full view of glottis  Number of Attempts at Approach:  1

## 2024-01-25 NOTE — OR NURSING
Xlif completed attempted to position pt prone for posterior portion of surgery , pt unable to maintain O2sat in prone position , returned pt to Bed team decision to abort posterior portion till pt breathing and lung status  is optimized.

## 2024-01-25 NOTE — CARE PLAN
The patient is Stable - Low risk of patient condition declining or worsening    Shift Goals  Clinical Goals: Pain control, safety  Patient Goals: Pain control, comfort  Family Goals: N/A    Progress made toward(s) clinical / shift goals:    Problem: Pain - Standard  Goal: Alleviation of pain or a reduction in pain to the patient’s comfort goal  Outcome: Progressing     Problem: Knowledge Deficit - Standard  Goal: Patient and family/care givers will demonstrate understanding of plan of care, disease process/condition, diagnostic tests and medications  Outcome: Progressing     Problem: Fall Risk  Goal: Patient will remain free from falls  Outcome: Progressing       Patient is not progressing towards the following goals:

## 2024-01-25 NOTE — ANESTHESIA TIME REPORT
Anesthesia Start and Stop Event Times       Date Time Event    1/25/2024 1154 Ready for Procedure     1202 Anesthesia Start     1444 Anesthesia Stop          Responsible Staff  01/25/24      Name Role Begin End    Keke Berrios M.D. Anesth 1202 1444          Overtime Reason:  no overtime (within assigned shift)    Comments:

## 2024-01-25 NOTE — PROGRESS NOTES
Neurosurgery Progress Note    Subjective:  POD#2 stage 1 L4-S1 ALIF    Patient stable  Notes having bad reflux which she normal has and admits to eating Tums frequently at home  C/o abdominal pain  C/o back pain  No clear radicular pain, but does have same discomfort to left proximal thigh   Has long standing right L5 numbness  Not mobilizing   + flatus      CT completed  Vitals / labs stable; Na 135 today  NPO for stage 2 surgery today     Exam:  Patient calm and cooperative   Dressing dry  Motor 5/5  Sensory intact     BP  Min: 120/67  Max: 142/77  Pulse  Av.2  Min: 103  Max: 110  Resp  Av.2  Min: 16  Max: 17  Temp  Av.7 °C (98 °F)  Min: 36.4 °C (97.5 °F)  Max: 37.2 °C (99 °F)  SpO2  Av.2 %  Min: 93 %  Max: 96 %    No data recorded    Recent Labs     24  0603 24  1359 24  0544   WBC 13.2*  --  10.7   RBC 3.95*  --  3.74*   HEMOGLOBIN 13.0 12.2 11.8*   HEMATOCRIT 38.1  --  35.5*   MCV 96.5  --  94.9   MCH 32.9  --  31.6   MCHC 34.1  --  33.2   RDW 46.5  --  46.3   PLATELETCT 296  --  231   MPV 9.7  --  9.3       Recent Labs     24  0603 24  0544   SODIUM 133* 135   POTASSIUM 4.0 4.0   CHLORIDE 100 100   CO2  24   GLUCOSE 137* 118*   BUN 18 12   CREATININE 0.72 0.52   CALCIUM 8.3* 8.0*                 Intake/Output                         24 0700 - 24 0659 24 07 - 24 0659      Total  Total                 Intake    Total Intake -- -- -- -- -- --       Output    Urine  800  -- 800  --  -- --    Output (mL) (Urethral Catheter Non-latex 1 Fr.) 800 -- 800 -- -- --    Total Output 800 -- 800 -- -- --       Net I/O     -800 -- -800 -- -- --              Intake/Output Summary (Last 24 hours) at 2024 1033  Last data filed at 2024 1817  Gross per 24 hour   Intake --   Output 800 ml   Net -800 ml               sodium chloride  1 g TID WITH MEALS    calcium carbonate  1,000 mg Q4HRS PRN    mag  hydrox-al hydrox-simeth  10 mL BID    amitriptyline  10 mg HS PRN    atorvastatin  20 mg Q EVENING    clobetasol  1 Application  QDAY PRN    gabapentin  600 mg Q EVENING    lisinopril  10 mg DAILY    sucralfate  1 g BID    Pharmacy Consult Request  1 Each PHARMACY TO DOSE    docusate sodium  100 mg BID    senna-docusate  1 Tablet Nightly    senna-docusate  1 Tablet Q24HRS PRN    polyethylene glycol/lytes  1 Packet BID PRN    magnesium hydroxide  30 mL QDAY PRN    bisacodyl  10 mg Q24HRS PRN    sodium phosphate  1 Each Once PRN    NS   Continuous    diphenhydrAMINE  25 mg Q6HRS PRN    Or    diphenhydrAMINE  25 mg Q6HRS PRN    ondansetron  4 mg Q4HRS PRN    ondansetron  4 mg Q4HRS PRN    cyclobenzaprine  10 mg Q8HRS PRN    ALPRAZolam  0.25 mg BID PRN    labetalol  10 mg Q HOUR PRN    benzocaine-menthol  1 Lozenge Q2HRS PRN    oxyCODONE immediate-release  5 mg Q4HRS PRN    HYDROmorphone  0.5 mg Q3HRS PRN    HYDROcodone/acetaminophen  1 Tablet Q4HRS PRN    oxyCODONE immediate-release  10 mg Q4HRS PRN    omeprazole  20 mg DAILY       Assessment and Plan:  Hospital day #3  POD #2  NPO for surgery today      Prophylactic anticoagulation: yes         Start date/time: post surgery

## 2024-01-25 NOTE — OR SURGEON
Immediate Post OP Note    PreOp Diagnosis: Status post Stage L4-5, L5-S1 ALIF. Prior L3-4 laminectomy and fusion. L2-3 stenosis      PostOp Diagnosis: same      Procedure(s):  STAGE #2 LEFT LUMBAR EXTREME LATERAL INTERBODY FUSION,   Surgeon(s):  Tim Rodriguez M.D.    Anesthesiologist/Type of Anesthesia:  Anesthesiologist: Keke Berrios M.D./General    Surgical Staff:  Assistant: Osorio Rincon P.A.-C.  Circulator: Cris Arias R.N.  Relief Circulator: Janny Dempsey R.N.  Scrub Person: Jess Chowdhury  Radiology Technologist: Tegan Guerrero    Specimens removed if any:  * No specimens in log *    Estimated Blood Loss: ,10cc    Findings: Disc collapse at L2-3. Plan for stage II aborted due to low O2 sat despite anesthesia interventions. Presumed atelectasis. Stable upon extubation post stage I.     Complications: None        1/25/2024 2:37 PM Osorio Rincon P.A.-C.

## 2024-01-26 ENCOUNTER — APPOINTMENT (OUTPATIENT)
Dept: RADIOLOGY | Facility: MEDICAL CENTER | Age: 69
DRG: 454 | End: 2024-01-26
Attending: PHYSICIAN ASSISTANT
Payer: MEDICARE

## 2024-01-26 LAB
ANION GAP SERPL CALC-SCNC: 11 MMOL/L (ref 7–16)
BUN SERPL-MCNC: 11 MG/DL (ref 8–22)
CALCIUM SERPL-MCNC: 7.9 MG/DL (ref 8.5–10.5)
CHLORIDE SERPL-SCNC: 103 MMOL/L (ref 96–112)
CO2 SERPL-SCNC: 22 MMOL/L (ref 20–33)
CREAT SERPL-MCNC: 0.5 MG/DL (ref 0.5–1.4)
ERYTHROCYTE [DISTWIDTH] IN BLOOD BY AUTOMATED COUNT: 46.2 FL (ref 35.9–50)
GFR SERPLBLD CREATININE-BSD FMLA CKD-EPI: 100 ML/MIN/1.73 M 2
GLUCOSE SERPL-MCNC: 132 MG/DL (ref 65–99)
HCT VFR BLD AUTO: 36.2 % (ref 37–47)
HGB BLD-MCNC: 11.9 G/DL (ref 12–16)
MCH RBC QN AUTO: 31.7 PG (ref 27–33)
MCHC RBC AUTO-ENTMCNC: 32.9 G/DL (ref 32.2–35.5)
MCV RBC AUTO: 96.5 FL (ref 81.4–97.8)
PLATELET # BLD AUTO: 193 K/UL (ref 164–446)
PMV BLD AUTO: 9.7 FL (ref 9–12.9)
POTASSIUM SERPL-SCNC: 5 MMOL/L (ref 3.6–5.5)
RBC # BLD AUTO: 3.75 M/UL (ref 4.2–5.4)
SODIUM SERPL-SCNC: 136 MMOL/L (ref 135–145)
WBC # BLD AUTO: 11.1 K/UL (ref 4.8–10.8)

## 2024-01-26 PROCEDURE — 700111 HCHG RX REV CODE 636 W/ 250 OVERRIDE (IP): Performed by: PHYSICIAN ASSISTANT

## 2024-01-26 PROCEDURE — 700105 HCHG RX REV CODE 258: Performed by: PHYSICIAN ASSISTANT

## 2024-01-26 PROCEDURE — 700111 HCHG RX REV CODE 636 W/ 250 OVERRIDE (IP): Mod: JZ | Performed by: PHYSICIAN ASSISTANT

## 2024-01-26 PROCEDURE — 770001 HCHG ROOM/CARE - MED/SURG/GYN PRIV*

## 2024-01-26 PROCEDURE — 97166 OT EVAL MOD COMPLEX 45 MIN: CPT

## 2024-01-26 PROCEDURE — 97535 SELF CARE MNGMENT TRAINING: CPT

## 2024-01-26 PROCEDURE — 85027 COMPLETE CBC AUTOMATED: CPT

## 2024-01-26 PROCEDURE — 80048 BASIC METABOLIC PNL TOTAL CA: CPT

## 2024-01-26 PROCEDURE — A9270 NON-COVERED ITEM OR SERVICE: HCPCS | Performed by: PHYSICIAN ASSISTANT

## 2024-01-26 PROCEDURE — 700102 HCHG RX REV CODE 250 W/ 637 OVERRIDE(OP): Performed by: PHYSICIAN ASSISTANT

## 2024-01-26 PROCEDURE — 36415 COLL VENOUS BLD VENIPUNCTURE: CPT

## 2024-01-26 PROCEDURE — 700101 HCHG RX REV CODE 250: Performed by: PHYSICIAN ASSISTANT

## 2024-01-26 PROCEDURE — 97162 PT EVAL MOD COMPLEX 30 MIN: CPT

## 2024-01-26 PROCEDURE — 97163 PT EVAL HIGH COMPLEX 45 MIN: CPT

## 2024-01-26 RX ORDER — ALPRAZOLAM 0.25 MG/1
0.25 TABLET ORAL EVERY 6 HOURS PRN
Status: DISCONTINUED | OUTPATIENT
Start: 2024-01-26 | End: 2024-02-02 | Stop reason: HOSPADM

## 2024-01-26 RX ORDER — ENOXAPARIN SODIUM 100 MG/ML
40 INJECTION SUBCUTANEOUS EVERY EVENING
Status: DISCONTINUED | OUTPATIENT
Start: 2024-01-26 | End: 2024-02-02 | Stop reason: HOSPADM

## 2024-01-26 RX ORDER — ALBUTEROL SULFATE 90 UG/1
2 AEROSOL, METERED RESPIRATORY (INHALATION)
Status: DISCONTINUED | OUTPATIENT
Start: 2024-01-26 | End: 2024-02-02 | Stop reason: HOSPADM

## 2024-01-26 RX ORDER — KETOROLAC TROMETHAMINE 15 MG/ML
15 INJECTION, SOLUTION INTRAMUSCULAR; INTRAVENOUS EVERY 6 HOURS PRN
Status: DISPENSED | OUTPATIENT
Start: 2024-01-26 | End: 2024-01-31

## 2024-01-26 RX ADMIN — ALBUTEROL SULFATE 2 PUFF: 90 AEROSOL, METERED RESPIRATORY (INHALATION) at 23:27

## 2024-01-26 RX ADMIN — POTASSIUM CHLORIDE AND SODIUM CHLORIDE: 900; 150 INJECTION, SOLUTION INTRAVENOUS at 05:03

## 2024-01-26 RX ADMIN — HYDROMORPHONE HYDROCHLORIDE 0.5 MG: 1 INJECTION, SOLUTION INTRAMUSCULAR; INTRAVENOUS; SUBCUTANEOUS at 11:27

## 2024-01-26 RX ADMIN — OXYCODONE HYDROCHLORIDE 10 MG: 10 TABLET ORAL at 23:26

## 2024-01-26 RX ADMIN — SODIUM CHLORIDE 1 G: 1 TABLET ORAL at 09:17

## 2024-01-26 RX ADMIN — ATORVASTATIN CALCIUM 20 MG: 20 TABLET, FILM COATED ORAL at 17:18

## 2024-01-26 RX ADMIN — DOCUSATE SODIUM 100 MG: 100 CAPSULE, LIQUID FILLED ORAL at 17:19

## 2024-01-26 RX ADMIN — OXYCODONE 5 MG: 5 TABLET ORAL at 00:12

## 2024-01-26 RX ADMIN — OXYCODONE HYDROCHLORIDE 10 MG: 10 TABLET ORAL at 04:55

## 2024-01-26 RX ADMIN — OMEPRAZOLE 20 MG: 20 CAPSULE, DELAYED RELEASE ORAL at 04:57

## 2024-01-26 RX ADMIN — ALUMINUM HYDROXIDE, MAGNESIUM HYDROXIDE, AND DIMETHICONE 10 ML: 400; 400; 40 SUSPENSION ORAL at 04:59

## 2024-01-26 RX ADMIN — CEFAZOLIN 2 G: 2 INJECTION, POWDER, FOR SOLUTION INTRAMUSCULAR; INTRAVENOUS at 05:07

## 2024-01-26 RX ADMIN — GABAPENTIN 600 MG: 300 CAPSULE ORAL at 17:19

## 2024-01-26 RX ADMIN — OXYCODONE HYDROCHLORIDE 10 MG: 10 TABLET ORAL at 16:12

## 2024-01-26 RX ADMIN — OXYCODONE HYDROCHLORIDE 10 MG: 10 TABLET ORAL at 09:17

## 2024-01-26 RX ADMIN — ALBUTEROL SULFATE 2 PUFF: 90 AEROSOL, METERED RESPIRATORY (INHALATION) at 16:12

## 2024-01-26 RX ADMIN — ANTACID TABLETS 1000 MG: 500 TABLET, CHEWABLE ORAL at 19:58

## 2024-01-26 RX ADMIN — DOCUSATE SODIUM 100 MG: 100 CAPSULE, LIQUID FILLED ORAL at 04:56

## 2024-01-26 RX ADMIN — SUCRALFATE 1 G: 1 TABLET ORAL at 04:57

## 2024-01-26 RX ADMIN — DOCUSATE SODIUM 50 MG AND SENNOSIDES 8.6 MG 1 TABLET: 8.6; 5 TABLET, FILM COATED ORAL at 19:59

## 2024-01-26 RX ADMIN — ALBUTEROL SULFATE 2 PUFF: 90 AEROSOL, METERED RESPIRATORY (INHALATION) at 19:59

## 2024-01-26 RX ADMIN — ALUMINUM HYDROXIDE, MAGNESIUM HYDROXIDE, AND DIMETHICONE 10 ML: 400; 400; 40 SUSPENSION ORAL at 17:19

## 2024-01-26 RX ADMIN — DOCUSATE SODIUM 50 MG AND SENNOSIDES 8.6 MG 1 TABLET: 8.6; 5 TABLET, FILM COATED ORAL at 17:18

## 2024-01-26 RX ADMIN — POLYETHYLENE GLYCOL 3350 1 PACKET: 17 POWDER, FOR SOLUTION ORAL at 04:56

## 2024-01-26 RX ADMIN — SUCRALFATE 1 G: 1 TABLET ORAL at 17:20

## 2024-01-26 RX ADMIN — ENOXAPARIN SODIUM 40 MG: 100 INJECTION SUBCUTANEOUS at 17:20

## 2024-01-26 ASSESSMENT — ACTIVITIES OF DAILY LIVING (ADL): TOILETING: INDEPENDENT

## 2024-01-26 ASSESSMENT — COPD QUESTIONNAIRES
DO YOU EVER COUGH UP ANY MUCUS OR PHLEGM?: NO/ONLY WITH OCCASIONAL COLDS OR INFECTIONS
HAVE YOU SMOKED AT LEAST 100 CIGARETTES IN YOUR ENTIRE LIFE: YES
COPD SCREENING SCORE: 4
DURING THE PAST 4 WEEKS HOW MUCH DID YOU FEEL SHORT OF BREATH: NONE/LITTLE OF THE TIME

## 2024-01-26 ASSESSMENT — COGNITIVE AND FUNCTIONAL STATUS - GENERAL
STANDING UP FROM CHAIR USING ARMS: A LITTLE
WALKING IN HOSPITAL ROOM: A LITTLE
HELP NEEDED FOR BATHING: A LOT
PERSONAL GROOMING: A LITTLE
MOVING TO AND FROM BED TO CHAIR: UNABLE
TOILETING: A LOT
TURNING FROM BACK TO SIDE WHILE IN FLAT BAD: UNABLE
SUGGESTED CMS G CODE MODIFIER DAILY ACTIVITY: CK
DAILY ACTIVITIY SCORE: 16
MOVING FROM LYING ON BACK TO SITTING ON SIDE OF FLAT BED: UNABLE
CLIMB 3 TO 5 STEPS WITH RAILING: A LOT
DRESSING REGULAR UPPER BODY CLOTHING: A LITTLE
SUGGESTED CMS G CODE MODIFIER MOBILITY: CL
DRESSING REGULAR LOWER BODY CLOTHING: A LOT
MOBILITY SCORE: 11

## 2024-01-26 ASSESSMENT — PAIN DESCRIPTION - PAIN TYPE
TYPE: ACUTE PAIN;SURGICAL PAIN

## 2024-01-26 ASSESSMENT — GAIT ASSESSMENTS
DISTANCE (FEET): 5
ASSISTIVE DEVICE: FRONT WHEEL WALKER
DEVIATION: BRADYKINETIC;SHUFFLED GAIT
GAIT LEVEL OF ASSIST: MINIMAL ASSIST

## 2024-01-26 NOTE — OR NURSING
Pt recovered well in PACU. Supplemental O2 weaned from 6 L simple mask to 4 L oxymask. Pt educated on IS usage and pulmonary hygiene. Pt able to pull btwn 500-1000 on IS.   Surgical drsgs CDI.   Langley patent and draining clear yellow urine.   Pt's  called and updated.   Report called to NANDINI Garza and pt transported back to T3 via bed.

## 2024-01-26 NOTE — ANESTHESIA POSTPROCEDURE EVALUATION
Patient: Dee Armstrong    Procedure Summary       Date: 01/25/24 Room / Location: [facility] 07 / SURGERY [facility]    Anesthesia Start: 1202 Anesthesia Stop: 1444    Procedures:       STAGE #2 LEFT LUMBAR EXTREME LATERAL INTERBODY FUSION, LUMBAR 2-SACRAL 1 FUSION WITH POSSIBLE REDO RIGHT LUMBAR 2-3 LAMINOTOMIES AND HARDWARE REMOVAL (Left: Back)      FUSION, SPINE, XLIF (Left: Back) Diagnosis: (LUMBAR STENOSIS, RADICULOPATHY)    Surgeons: Tim Rodriguez M.D. Responsible Provider: Keke Berrios M.D.    Anesthesia Type: general ASA Status: 3            Final Anesthesia Type: general  Last vitals  BP   Blood Pressure : 136/82    Temp   36.7 °C (98.1 °F)    Pulse   97   Resp   20    SpO2   92 %      Anesthesia Post Evaluation    Patient location during evaluation: PACU  Patient participation: complete - patient participated  Level of consciousness: awake and alert  Pain score: 3    Airway patency: patent  Anesthetic complications: no  Cardiovascular status: adequate  Respiratory status: acceptable  Hydration status: acceptable    PONV: none          No notable events documented.     Nurse Pain Score: 3 (NPRS)

## 2024-01-26 NOTE — PROGRESS NOTES
Pt transfer from PACU received report form nurse, see new orders, Pt AOX3 family at bedside , jason cath intact, encourage IS use. Will continue to monitor

## 2024-01-26 NOTE — CARE PLAN
The patient is Stable - Low risk of patient condition declining or worsening    Shift Goals  Clinical Goals: Pain control, safety, IS  Patient Goals: Pain control, comfort  Family Goals: N/A    Progress made toward(s) clinical / shift goals:    Problem: Pain - Standard  Goal: Alleviation of pain or a reduction in pain to the patient’s comfort goal  Outcome: Progressing     Problem: Knowledge Deficit - Standard  Goal: Patient and family/care givers will demonstrate understanding of plan of care, disease process/condition, diagnostic tests and medications  Outcome: Progressing     Problem: Respiratory/Oxygenation Function Post-Surgical  Goal: Patient will achieve/maintain normal respiratory rate/effort  Outcome: Progressing     Problem: Skin Integrity  Goal: Skin integrity is maintained or improved  Outcome: Progressing       Patient is not progressing towards the following goals:

## 2024-01-26 NOTE — CARE PLAN
The patient is Stable - Low risk of patient condition declining or worsening    Shift Goals  Clinical Goals: Pain control, safety  Patient Goals: Pain control, comfort  Family Goals: N/A    Progress made toward(s) clinical / shift goals:  Pt will state pain <4 for remaining shift    Patient is not progressing towards the following goals:

## 2024-01-26 NOTE — PROGRESS NOTES
Communication with Geoffrey Wade PA-C, review of pt POC, discussion pt has been bedbound and needs to increase mobility, plan for pt to get oob today, review pt with need of 2 person assist with turning, pt with no restrictions, able to mobilize. Saline lock both IV at this time, IV saline locked, VS review, pt is on 2.5 LPM with adequate oxygenation, states pain is decreasing and denies needs at this time.

## 2024-01-26 NOTE — PROGRESS NOTES
Neurosurgery Progress Note    Subjective:  POD#3 stage 1 L4-S1 ALIF  POD#1 left L2/3 XLIF  Posterior surgery was aborted as patient was becoming hypoxic during surgery.   Chest x-ray does show an area of atelectasis. Patient has been treated for valley fever.   Oxygen saturation is stable now; currently weaned down to 2.5L    Patient stable  Notes having bad reflux which she normal has and admits to eating Tums frequently at home  Abdominal / back pain improving  No clear radicular pain, but does have same discomfort to bilateral proximal thighs   Has long standing right L5 numbness  Not mobilizing   + flatus      CT completed  Vitals / labs stable      Exam:  Patient calm and cooperative   Dressing dry  Motor 5/5  Sensory intact   Abdomen soft     BP  Min: 109/72  Max: 161/84  Pulse  Av.7  Min: 82  Max: 103  Resp  Av.7  Min: 12  Max: 20  Temp  Av.4 °C (97.5 °F)  Min: 36.1 °C (97 °F)  Max: 36.9 °C (98.4 °F)  SpO2  Av.5 %  Min: 90 %  Max: 97 %    No data recorded    Recent Labs     24  0603 24  1359 24  0544 24  0409   WBC 13.2*  --  10.7 11.1*   RBC 3.95*  --  3.74* 3.75*   HEMOGLOBIN 13.0 12.2 11.8* 11.9*   HEMATOCRIT 38.1  --  35.5* 36.2*   MCV 96.5  --  94.9 96.5   MCH 32.9  --  31.6 31.7   MCHC 34.1  --  33.2 32.9   RDW 46.5  --  46.3 46.2   PLATELETCT 296  --  231 193   MPV 9.7  --  9.3 9.7       Recent Labs     24  0603 24  0544 24  0409   SODIUM 133* 135 136   POTASSIUM 4.0 4.0 5.0   CHLORIDE 100 100 103   CO2    GLUCOSE 137* 118* 132*   BUN 18 12 11   CREATININE 0.72 0.52 0.50   CALCIUM 8.3* 8.0* 7.9*                 Intake/Output                         24 0700 - 01/2624 - 24 Total  Total                 Intake    I.V.  800  -- 800  --  -- --    Volume (mL) (Lactated Ringers) 800 -- 800 -- -- --    Total Intake 800 -- 800 -- -- --       Output    Urine  515   900 1415  --  -- --    Urine 400 -- 400 -- -- --    Output (mL) (Urethral Catheter Non-latex 1 Fr.)  -- -- --    Blood  50  -- 50  --  -- --    Est. Blood Loss 50 -- 50 -- -- --    Total Output  -- -- --       Net I/O     235 -900 -665 -- -- --              Intake/Output Summary (Last 24 hours) at 1/26/2024 1212  Last data filed at 1/26/2024 0356  Gross per 24 hour   Intake 800 ml   Output 1465 ml   Net -665 ml               enoxaparin (LOVENOX) injection  40 mg Q EVENING    ALPRAZolam  0.25 mg Q6HRS PRN    albuterol  2 Puff Q4HRS (RT)    ketorolac  15 mg Q6HRS PRN    Pharmacy Consult Request  1 Each PHARMACY TO DOSE    Respiratory Therapy Consult   Continuous RT    0.9 % NaCl with KCl 20 mEq 1,000 mL   Continuous    calcium carbonate  1,000 mg Q4HRS PRN    mag hydrox-al hydrox-simeth  10 mL BID    amitriptyline  10 mg HS PRN    atorvastatin  20 mg Q EVENING    clobetasol  1 Application  QDAY PRN    gabapentin  600 mg Q EVENING    lisinopril  10 mg DAILY    sucralfate  1 g BID    Pharmacy Consult Request  1 Each PHARMACY TO DOSE    docusate sodium  100 mg BID    senna-docusate  1 Tablet Nightly    senna-docusate  1 Tablet Q24HRS PRN    polyethylene glycol/lytes  1 Packet BID PRN    magnesium hydroxide  30 mL QDAY PRN    bisacodyl  10 mg Q24HRS PRN    sodium phosphate  1 Each Once PRN    diphenhydrAMINE  25 mg Q6HRS PRN    Or    diphenhydrAMINE  25 mg Q6HRS PRN    ondansetron  4 mg Q4HRS PRN    ondansetron  4 mg Q4HRS PRN    cyclobenzaprine  10 mg Q8HRS PRN    labetalol  10 mg Q HOUR PRN    benzocaine-menthol  1 Lozenge Q2HRS PRN    oxyCODONE immediate-release  5 mg Q4HRS PRN    HYDROmorphone  0.5 mg Q3HRS PRN    HYDROcodone/acetaminophen  1 Tablet Q4HRS PRN    oxyCODONE immediate-release  10 mg Q4HRS PRN    omeprazole  20 mg DAILY       Assessment and Plan:  Hospital day #4  POD #3/1  Posterior decompression with L2-S1 perc fusion being moved to Tuesday  Repeat CXR today  Albuterol prn  Pt  wound benefit from xanax as she appears anxious  Lounge chair in bed  She has to get out of bed  Should be getting PT/OT daily  Following closely        Prophylactic anticoagulation: yes         Start date/time: on MAR

## 2024-01-26 NOTE — DISCHARGE PLANNING
Physiatry consult remains pending.  Would appreciate therapy notes when medically appropriate.  [facility] remains following

## 2024-01-26 NOTE — THERAPY
"Occupational Therapy Contact Note    Patient Name: Dee Armstrong  Age:  68 y.o., Sex:  female  Medical Record #: [id number]  Today's Date: 1/26/2024 01/26/24 1006   Interdisciplinary Plan of Care Collaboration   Collaboration Comments Stage II attempt yesterday was \"aborted due to low O2 sat despite anesthesia interventions\" per neurosurgery. Will hold OT eval until post-op stage 2.       "

## 2024-01-27 ENCOUNTER — APPOINTMENT (OUTPATIENT)
Dept: RADIOLOGY | Facility: MEDICAL CENTER | Age: 69
DRG: 454 | End: 2024-01-27
Attending: PHYSICIAN ASSISTANT
Payer: MEDICARE

## 2024-01-27 ENCOUNTER — APPOINTMENT (OUTPATIENT)
Dept: RADIOLOGY | Facility: MEDICAL CENTER | Age: 69
DRG: 454 | End: 2024-01-27
Attending: NEUROLOGICAL SURGERY
Payer: MEDICARE

## 2024-01-27 LAB
ANION GAP SERPL CALC-SCNC: 8 MMOL/L (ref 7–16)
BUN SERPL-MCNC: 12 MG/DL (ref 8–22)
CALCIUM SERPL-MCNC: 7.7 MG/DL (ref 8.5–10.5)
CHLORIDE SERPL-SCNC: 104 MMOL/L (ref 96–112)
CO2 SERPL-SCNC: 24 MMOL/L (ref 20–33)
CREAT SERPL-MCNC: 0.48 MG/DL (ref 0.5–1.4)
ERYTHROCYTE [DISTWIDTH] IN BLOOD BY AUTOMATED COUNT: 46.7 FL (ref 35.9–50)
GFR SERPLBLD CREATININE-BSD FMLA CKD-EPI: 101 ML/MIN/1.73 M 2
GLUCOSE SERPL-MCNC: 112 MG/DL (ref 65–99)
HCT VFR BLD AUTO: 32.1 % (ref 37–47)
HGB BLD-MCNC: 10.6 G/DL (ref 12–16)
MCH RBC QN AUTO: 31.6 PG (ref 27–33)
MCHC RBC AUTO-ENTMCNC: 33 G/DL (ref 32.2–35.5)
MCV RBC AUTO: 95.8 FL (ref 81.4–97.8)
PLATELET # BLD AUTO: 283 K/UL (ref 164–446)
PMV BLD AUTO: 9.8 FL (ref 9–12.9)
POTASSIUM SERPL-SCNC: 4 MMOL/L (ref 3.6–5.5)
RBC # BLD AUTO: 3.35 M/UL (ref 4.2–5.4)
SODIUM SERPL-SCNC: 136 MMOL/L (ref 135–145)
WBC # BLD AUTO: 8.5 K/UL (ref 4.8–10.8)

## 2024-01-27 PROCEDURE — 700102 HCHG RX REV CODE 250 W/ 637 OVERRIDE(OP): Performed by: PHYSICIAN ASSISTANT

## 2024-01-27 PROCEDURE — 700111 HCHG RX REV CODE 636 W/ 250 OVERRIDE (IP): Performed by: PHYSICIAN ASSISTANT

## 2024-01-27 PROCEDURE — 85027 COMPLETE CBC AUTOMATED: CPT

## 2024-01-27 PROCEDURE — 80048 BASIC METABOLIC PNL TOTAL CA: CPT

## 2024-01-27 PROCEDURE — 36415 COLL VENOUS BLD VENIPUNCTURE: CPT

## 2024-01-27 PROCEDURE — 770001 HCHG ROOM/CARE - MED/SURG/GYN PRIV*

## 2024-01-27 PROCEDURE — A9270 NON-COVERED ITEM OR SERVICE: HCPCS | Performed by: PHYSICIAN ASSISTANT

## 2024-01-27 PROCEDURE — 71250 CT THORAX DX C-: CPT

## 2024-01-27 PROCEDURE — 700111 HCHG RX REV CODE 636 W/ 250 OVERRIDE (IP): Mod: JZ | Performed by: PHYSICIAN ASSISTANT

## 2024-01-27 RX ORDER — AZITHROMYCIN 250 MG/1
500 TABLET, FILM COATED ORAL DAILY
Status: COMPLETED | OUTPATIENT
Start: 2024-01-27 | End: 2024-01-29

## 2024-01-27 RX ORDER — POLYETHYLENE GLYCOL 3350 17 G/17G
1 POWDER, FOR SOLUTION ORAL
Status: DISCONTINUED | OUTPATIENT
Start: 2024-01-27 | End: 2024-02-02 | Stop reason: HOSPADM

## 2024-01-27 RX ADMIN — ALBUTEROL SULFATE 2 PUFF: 90 AEROSOL, METERED RESPIRATORY (INHALATION) at 04:44

## 2024-01-27 RX ADMIN — DOCUSATE SODIUM 100 MG: 100 CAPSULE, LIQUID FILLED ORAL at 04:46

## 2024-01-27 RX ADMIN — DOCUSATE SODIUM 100 MG: 100 CAPSULE, LIQUID FILLED ORAL at 17:17

## 2024-01-27 RX ADMIN — HYDROCODONE BITARTRATE AND ACETAMINOPHEN 1 TABLET: 10; 325 TABLET ORAL at 22:49

## 2024-01-27 RX ADMIN — AZITHROMYCIN DIHYDRATE 500 MG: 250 TABLET, FILM COATED ORAL at 11:34

## 2024-01-27 RX ADMIN — HYDROCODONE BITARTRATE AND ACETAMINOPHEN 1 TABLET: 10; 325 TABLET ORAL at 11:34

## 2024-01-27 RX ADMIN — GABAPENTIN 600 MG: 300 CAPSULE ORAL at 17:17

## 2024-01-27 RX ADMIN — ENOXAPARIN SODIUM 40 MG: 100 INJECTION SUBCUTANEOUS at 17:17

## 2024-01-27 RX ADMIN — OMEPRAZOLE 20 MG: 20 CAPSULE, DELAYED RELEASE ORAL at 04:46

## 2024-01-27 RX ADMIN — ALUMINUM HYDROXIDE, MAGNESIUM HYDROXIDE, AND DIMETHICONE 10 ML: 400; 400; 40 SUSPENSION ORAL at 17:17

## 2024-01-27 RX ADMIN — ATORVASTATIN CALCIUM 20 MG: 20 TABLET, FILM COATED ORAL at 17:17

## 2024-01-27 RX ADMIN — SUCRALFATE 1 G: 1 TABLET ORAL at 04:46

## 2024-01-27 RX ADMIN — ALBUTEROL SULFATE 2 PUFF: 90 AEROSOL, METERED RESPIRATORY (INHALATION) at 22:44

## 2024-01-27 RX ADMIN — SUCRALFATE 1 G: 1 TABLET ORAL at 17:16

## 2024-01-27 RX ADMIN — ALUMINUM HYDROXIDE, MAGNESIUM HYDROXIDE, AND DIMETHICONE 10 ML: 400; 400; 40 SUSPENSION ORAL at 04:46

## 2024-01-27 RX ADMIN — LISINOPRIL 10 MG: 10 TABLET ORAL at 04:46

## 2024-01-27 RX ADMIN — MAGNESIUM HYDROXIDE 30 ML: 1200 LIQUID ORAL at 04:46

## 2024-01-27 RX ADMIN — ALPRAZOLAM 0.25 MG: 0.25 TABLET ORAL at 11:34

## 2024-01-27 RX ADMIN — DOCUSATE SODIUM 50 MG AND SENNOSIDES 8.6 MG 1 TABLET: 8.6; 5 TABLET, FILM COATED ORAL at 21:08

## 2024-01-27 RX ADMIN — HYDROCODONE BITARTRATE AND ACETAMINOPHEN 1 TABLET: 10; 325 TABLET ORAL at 17:16

## 2024-01-27 RX ADMIN — CYCLOBENZAPRINE 10 MG: 10 TABLET, FILM COATED ORAL at 14:19

## 2024-01-27 RX ADMIN — HYDROMORPHONE HYDROCHLORIDE 0.5 MG: 1 INJECTION, SOLUTION INTRAMUSCULAR; INTRAVENOUS; SUBCUTANEOUS at 09:18

## 2024-01-27 RX ADMIN — POLYETHYLENE GLYCOL 3350 1 PACKET: 17 POWDER, FOR SOLUTION ORAL at 12:05

## 2024-01-27 RX ADMIN — HYDROMORPHONE HYDROCHLORIDE 0.5 MG: 1 INJECTION, SOLUTION INTRAMUSCULAR; INTRAVENOUS; SUBCUTANEOUS at 01:30

## 2024-01-27 ASSESSMENT — PAIN DESCRIPTION - PAIN TYPE
TYPE: ACUTE PAIN
TYPE: ACUTE PAIN;SURGICAL PAIN
TYPE: ACUTE PAIN
TYPE: ACUTE PAIN;SURGICAL PAIN
TYPE: ACUTE PAIN;SURGICAL PAIN

## 2024-01-27 NOTE — THERAPY
"Physical Therapy   Initial Evaluation     Patient Name:Dee Armstrong   Age:  68 y.o., Sex:  female  Medical Record #:[id number]   Today's Date: 1/26/2024     Precautions  Precautions: Fall Risk;Lumbosacral Orthosis;Spinal / Back Precautions     Assessment  Patient is a 68 y.o. female who was admitted s/p stage 1 ALIF, stage 2 L2-3 XLIF with aborted completion of posterior fusion due to oxygenation. Pt pending 3rd stage, potentially next week per pt. PMH significant for prior lumbar surgery, hip replacement, HTN, osteoporosis, obesity, GERD.    Pt received in bed and agreeable to PT evaluation. Pt provided with post-op lumbar spine handout and educated on spinal precautions as they pertain to mobility, log roll technique, discharge recommendations, and activity recommendations while in the hospital and for eventual return home. Pt required max A for bed mobility and min A for transfers with a FWW for support. Pt required extensive time with each mobility task to slow breathing rate and prepare for the next task. At this time, pt will benefit from further rehab prior to return home. Will continue to follow for acute PT to progress.    Plan    Physical Therapy Initial Treatment Plan   Treatment Plan : Bed Mobility, Equipment, Gait Training, Neuro Re-Education / Balance, Self Care / Home Evaluation, Therapeutic Activities, Therapeutic Exercise  Treatment Frequency: 5 Times per Week  Duration: Until Therapy Goals Met    DC Equipment Recommendations: Unable to determine at this time  Discharge Recommendations: Recommend post-acute placement for additional physical therapy services prior to discharge home    Subjective    \"I'm a big [employer] fan\"     Objective       01/26/24 1440   Precautions   Precautions Fall Risk;Lumbosacral Orthosis;Spinal / Back Precautions    Vitals   Pulse (!) 116   Patient BP Position Sitting   Pulse Oximetry 95 %   O2 Delivery Device Room air w/o2 available   Vitals Comments 's at rest, " increased to 110's with mobility. Required cues for breathing technique to slow down rate   Pain 0 - 10 Group   Therapist Pain Assessment Post Activity Pain Same as Prior to Activity;Nurse Notified  (pain with activity, not rated)   Prior Living Situation   Prior Services Home-Independent   Housing / Facility 1 Story House   Steps Into Home 0   Steps In Home 0   Equipment Owned Front-Wheel Walker;4-Wheel Walker   Lives with - Patient's Self Care Capacity Spouse   Comments Spouse able to assist as needed   Prior Level of Functional Mobility   Bed Mobility Independent   Transfer Status Independent   Ambulation Independent   Assistive Devices Used None   Stairs Independent   History of Falls   History of Falls No   Cognition    Level of Consciousness Alert   Comments Pleasant and cooperative, required extra time for each task   Passive ROM Lower Body   Passive ROM Lower Body WDL   Active ROM Lower Body    Active ROM Lower Body  WDL   Strength Lower Body   Comments BLE strength 3+ to 4/5, no buckling in standing   Sensation Lower Body   Comments L5 distribution numbness on the RLE, not new   Lower Body Muscle Tone   Lower Body Muscle Tone  WDL   Coordination Lower Body    Coordination Lower Body  WDL   Balance Assessment   Sitting Balance (Static) Fair   Sitting Balance (Dynamic) Fair -   Standing Balance (Static) Fair -   Standing Balance (Dynamic) Poor +   Weight Shift Sitting Poor   Weight Shift Standing Poor   Comments with FWW   Bed Mobility    Supine to Sit Maximal Assist   Scooting Minimal Assist   Rolling Maximum Assist to Lt.   Comments cues for log roll, HOB elevated per home setup   Gait Analysis   Gait Level Of Assist Minimal Assist   Assistive Device Front Wheel Walker   Distance (Feet) 5   # of Times Distance was Traveled 1   Deviation Bradykinetic;Shuffled Gait   Functional Mobility   Sit to Stand Minimal Assist   Bed, Chair, Wheelchair Transfer Minimal Assist   Transfer Method Stand Step   Mobility  bed>recliner>chair   Comments Cues for hand placement, assist to manage FWW   How much difficulty does the patient currently have...   Turning over in bed (including adjusting bedclothes, sheets and blankets)? 1   Sitting down on and standing up from a chair with arms (e.g., wheelchair, bedside commode, etc.) 1   Moving from lying on back to sitting on the side of the bed? 1   How much help from another person does the patient currently need...   Moving to and from a bed to a chair (including a wheelchair)? 3   Need to walk in a hospital room? 3   Climbing 3-5 steps with a railing? 2   6 clicks Mobility Score 11   Short Term Goals    Short Term Goal # 1 Pt will perform bed mobility with supervision to progress function in 6 visits.   Short Term Goal # 2 Pt will transfer with FWW and supervision to progress function in 6 visits.   Short Term Goal # 3 Pt will ambulate 100ft with FWW and supervision to progress function in 6 visits.   Education Group   Education Provided Role of Physical Therapist;Spine Precautions   Spine Precautions Patient Response Patient;Acceptance;Explanation;Handout;Verbal Demonstration   Role of Physical Therapist Patient Response Patient;Acceptance;Explanation;Verbal Demonstration   Physical Therapy Initial Treatment Plan    Treatment Plan  Bed Mobility;Equipment;Gait Training;Neuro Re-Education / Balance;Self Care / Home Evaluation;Therapeutic Activities;Therapeutic Exercise   Treatment Frequency 5 Times per Week   Duration Until Therapy Goals Met   Anticipated Discharge Equipment and Recommendations   DC Equipment Recommendations Unable to determine at this time   Discharge Recommendations Recommend post-acute placement for additional physical therapy services prior to discharge home   Interdisciplinary Plan of Care Collaboration   IDT Collaboration with  Nursing;Occupational Therapist   Patient Position at End of Therapy Seated;Call Light within Reach;Tray Table within Reach;Phone within  Reach   Collaboration Comments RN updated

## 2024-01-27 NOTE — DISCHARGE PLANNING
Case Management Discharge Planning    Admission Date: 1/23/2024  GMLOS: 5.3  ALOS: 4    6-Clicks ADL Score: 16  6-Clicks Mobility Score: 11  PT and/or OT Eval ordered: Yes  Post-acute Referrals Ordered: Yes  Post-acute Choice Obtained: No  Has referral(s) been sent to post-acute provider:  Yes      Anticipated Discharge Dispo: Discharge Disposition: Disch to  rehab facility or distinct part unit (62)  Discharge Contact Phone Number: [phone]    Action(s) Taken:   Voalte msg to [facility] to inform that PT/OT notes in.      Next Steps:   Follow up with [facility].  Follow up with patient.    Barriers to Discharge: Medical clearance and Pending Placement

## 2024-01-27 NOTE — CARE PLAN
The patient is Stable - Low risk of patient condition declining or worsening    Shift Goals  Clinical Goals: Pain control, safety, IS  Patient Goals: Pain control, comfort  Family Goals: N/A    Progress made toward(s) clinical / shift goals:    Problem: Pain - Standard  Goal: Alleviation of pain or a reduction in pain to the patient’s comfort goal  Outcome: Progressing     Problem: Knowledge Deficit - Standard  Goal: Patient and family/care givers will demonstrate understanding of plan of care, disease process/condition, diagnostic tests and medications  Outcome: Progressing     Problem: Fall Risk  Goal: Patient will remain free from falls  Outcome: Progressing     Problem: Respiratory  Goal: Patient will achieve/maintain optimum respiratory ventilation and gas exchange  Outcome: Progressing       Patient is not progressing towards the following goals:

## 2024-01-27 NOTE — CARE PLAN
The patient is Stable - Low risk of patient condition declining or worsening    Shift Goals  Clinical Goals: mobilize, pain control, PT/OT, PO ABX  Patient Goals: pain control  Family Goals: Stay UTD on POC    Progress made toward(s) clinical / shift goals:  rest    Problem: Pain - Standard  Goal: Alleviation of pain or a reduction in pain to the patient’s comfort goal  Outcome: Progressing  Note: PRN pain medication given for pain management, goal is for pt to be able to mobilize and have pain controled with oral pain medication.      Problem: Knowledge Deficit - Standard  Goal: Patient and family/care givers will demonstrate understanding of plan of care, disease process/condition, diagnostic tests and medications  Outcome: Progressing  Note: Next OR date 2/6, pt to mobilize, work on IS, PT/OT, pain control. Pt and pt family aware. Goal is for pt and pt family to remain updated in the moment with any changes.        Patient is not progressing towards the following goals:

## 2024-01-27 NOTE — PROGRESS NOTES
Pt back on unit via hospital bed with transport from CT with incident. Pt O2 hooked back up to the wall, bed alarm on and call light within reach.

## 2024-01-27 NOTE — THERAPY
Occupational Therapy   Initial Evaluation     Patient Name: Dee Armstrong  Age:  68 y.o., Sex:  female  Medical Record #: [id number]  Today's Date: 1/26/2024     Precautions: Fall Risk, Lumbosacral Orthosis, Spinal / Back Precautions     Assessment    Patient is 68 y.o. female seen for OT eval POD#3 stage 1 L4-S1 ALIF and POD#1 left L2/3 XLIF however posterior surgery was aborted as patient was becoming hypoxic during surgery, pending completion 3rd stage next week. Pt presents to OT eval limited primarily by pain, requires extra time for mobilizing to bedside chair and requires assist for self-care ADLs. Acute OT to follow while admitted, anticipate need for post-acute placement.      Plan    Occupational Therapy Initial Treatment Plan   Treatment Interventions: Self Care / Activities of Daily Living, Adaptive Equipment, Therapeutic Exercises, Therapeutic Activity  Treatment Frequency: 4 Times per Week  Duration: Until Therapy Goals Met    DC Equipment Recommendations: Tub / Shower Seat  Discharge Recommendations: Recommend post-acute placement for additional occupational therapy services prior to discharge home     Objective     01/26/24 1439   Prior Living Situation   Prior Services Home-Independent   Housing / Facility 1 Story House   Steps Into Home 0   Steps In Home 0   Bathroom Set up Walk In Shower;Shower Chair   Equipment Owned Front-Wheel Walker;4-Wheel Walker;Tub / Shower Seat   Lives with - Patient's Self Care Capacity Spouse   Comments spouse able to assist   Prior Level of ADL Function   Self Feeding Independent   Grooming / Hygiene Independent   Bathing Independent   Dressing Independent   Toileting Independent   Prior Level of IADL Function   Medication Management Independent   Laundry Independent   Kitchen Mobility Independent   Finances Independent   Home Management Independent   Shopping Independent   Prior Level Of Mobility Independent Without Device in Community   Precautions   Precautions  Fall Risk;Spinal / Back Precautions ;Lumbosacral Orthosis   Pain 0 - 10 Group   Therapist Pain Assessment During Activity;Nurse Notified  (not rated, reactive to pain)   Cognition    Level of Consciousness Alert   Comments cooperative, extra time 2/2 emotional state/anxiety regarding movement   Strength Upper Body   Upper Body Strength  WDL   Upper Body Muscle Tone   Upper Body Muscle Tone  WDL   Coordination Upper Body   Coordination WDL   Balance Assessment   Sitting Balance (Static) Fair   Sitting Balance (Dynamic) Fair -   Standing Balance (Static) Fair -   Standing Balance (Dynamic) Poor +   Weight Shift Sitting Fair   Weight Shift Standing Poor   Comments FWW   Bed Mobility    Supine to Sit Maximal Assist   Scooting Minimal Assist   Rolling Maximum Assist to Lt.   Comments log roll with assist   ADL Assessment   Eating Modified Independent   Grooming Supervision;Seated   Upper Body Dressing Minimal Assist   Lower Body Dressing Maximal Assist   How much help from another person does the patient currently need...   Putting on and taking off regular lower body clothing? 2   Bathing (including washing, rinsing, and drying)? 2   Toileting, which includes using a toilet, bedpan, or urinal? 2   Putting on and taking off regular upper body clothing? 3   Taking care of personal grooming such as brushing teeth? 3   Eating meals? 4   6 Clicks Daily Activity Score 16   Functional Mobility   Sit to Stand Minimal Assist   Bed, Chair, Wheelchair Transfer Minimal Assist   Transfer Method Stand Step   Mobility pivot only   Activity Tolerance   Sitting in Chair 10+min   Sitting Edge of Bed 10min   Standing 3min x2   Comments not yet functional for independent self-care   Patient / Family Goals   Patient / Family Goal #1 less pain   Short Term Goals   Short Term Goal # 1 pt will complete toileting ADL at SPV level   Short Term Goal # 2 pt will tolerate >5min standing grooming at SPV level   Short Term Goal # 3 pt will complete  LB dress with AE PRN at SPV level   Education Group   Education Provided Activities of Daily Living;Role of Occupational Therapist;Weight Bearing Precautions;Spinal Precautions;Brace Wear and Care   Role of Occupational Therapist Patient Response Patient;Acceptance;Explanation;Action Demonstration;Verbal Demonstration   Spinal Precautions Patient Response Patient;Acceptance;Explanation;Action Demonstration;Verbal Demonstration   Brace Wear & Care Patient Response Patient;Acceptance;Explanation;Action Demonstration;Verbal Demonstration   ADL Patient Response Patient;Acceptance;Explanation;Action Demonstration;Verbal Demonstration   Weight Bearing Precautions Patient Response Patient;Acceptance;Explanation;Action Demonstration;Verbal Demonstration   Occupational Therapy Initial Treatment Plan    Treatment Interventions Self Care / Activities of Daily Living;Adaptive Equipment;Therapeutic Exercises;Therapeutic Activity   Treatment Frequency 4 Times per Week   Duration Until Therapy Goals Met   Problem List   Problem List Decreased Homemaking Skills;Decreased Active Daily Living Skills;Decreased Functional Mobility;Decreased Activity Tolerance;Impaired Postural Control / Balance;Limited Knowledge of Post Op Precautions   Anticipated Discharge Equipment and Recommendations   DC Equipment Recommendations Tub / Shower Seat   Discharge Recommendations Recommend post-acute placement for additional occupational therapy services prior to discharge home

## 2024-01-27 NOTE — CARE PLAN
The patient is Watcher - Medium risk of patient condition declining or worsening    Shift Goals  Clinical Goals: Pt will get oob at least one time by the end of shift.  Patient Goals: Pain control until 3rd surgery.  Family Goals: Decrease o2 needs.    Progress made toward(s) clinical / shift goals:  Pt oob to chair, use of FWW. Pt to lounge chair and difficulty with positioning, choose to use FWW to chair without lounge, able to reach more and more comfortable. Pt with use of 2 person assist to commode and back to bed, no BM. Pt has adequate oxygenation on RA.     Patient is not progressing towards the following goals:progressing.

## 2024-01-27 NOTE — PROGRESS NOTES
Assessment completed. Pt a&o 4, VSS, Assessment completed. Resting comfortably in bed with call light, bedside table in reach. No c/o at this time. Side rails up 2. Instructed to use call light when needing to get OOB verbalized understanding. Bed alarm on, bed in low position. Will continue to monitor.

## 2024-01-27 NOTE — PROGRESS NOTES
Received report, assumed pt care. Pt asleep without any signs of distress. Resting comfortably in bed with call light, bedside table in reach. Side rails up 2.  Bed alarm on, bed in low position. Will continue to monitor.

## 2024-01-27 NOTE — PROGRESS NOTES
Neurosurgery Progress Note    Subjective:  POD#4 stage 1 L4-S1 ALIF  POD#2 left L2/3 XLIF  Posterior surgery was aborted as patient was becoming hypoxic during surgery.   Chest x-ray does show an area of atelectasis. Patient has been treated for valley fever.   Chest CT today stable; similar to cxr   Oxygen saturation is stable now; currently weaned down to 2.5L    Patient anxious appearing today and in pain  She has 2 hand IVs that she finds very painful  Notes having bad reflux which she normal has and admits to eating Tums frequently at home  Abdominal / back pain improving  No clear radicular pain, but does have same discomfort to bilateral proximal thighs   Has long standing right L5 numbness  Not mobilizing   Sat in a chair yesterday  + flatus      Vitals / labs stable  On 1L 02 this morning       Exam:  Patient anxious appearing  Dressing dry  Motor 5/5  Sensory intact   Abdomen soft     BP  Min: 132/66  Max: 150/84  Pulse  Av.7  Min: 90  Max: 116  Resp  Av.3  Min: 16  Max: 18  Temp  Av.5 °C (97.7 °F)  Min: 36.1 °C (97 °F)  Max: 36.7 °C (98.1 °F)  SpO2  Av.9 %  Min: 88 %  Max: 95 %    No data recorded    Recent Labs     24  0544 24  0409 24  0331   WBC 10.7 11.1* 8.5   RBC 3.74* 3.75* 3.35*   HEMOGLOBIN 11.8* 11.9* 10.6*   HEMATOCRIT 35.5* 36.2* 32.1*   MCV 94.9 96.5 95.8   MCH 31.6 31.7 31.6   MCHC 33.2 32.9 33.0   RDW 46.3 46.2 46.7   PLATELETCT 231 193 283   MPV 9.3 9.7 9.8       Recent Labs     24  0544 24  0409 24  0331   SODIUM 135 136 136   POTASSIUM 4.0 5.0 4.0   CHLORIDE 100 103 104   CO2 24  24   GLUCOSE 118* 132* 112*   BUN 12 11 12   CREATININE 0.52 0.50 0.48*   CALCIUM 8.0* 7.9* 7.7*                 Intake/Output                         24 0700 - 24 - 24 06 Total  Total                 Intake    Total Intake -- -- -- -- -- --       Output    Urine  900  539  1450  --  -- --    Urine Void (mL) 900 -- 900 -- -- --    Output (mL) (Urethral Catheter Non-latex 1 Fr.) -- 550 550 -- -- --    Stool  --  -- --  --  -- --    Number of Times Stooled 0 x -- 0 x -- -- --    Total Output  -- -- --       Net I/O     -900 -550 -1450 -- -- --              Intake/Output Summary (Last 24 hours) at 1/27/2024 1110  Last data filed at 1/27/2024 0449  Gross per 24 hour   Intake --   Output 1450 ml   Net -1450 ml               azithromycin  500 mg DAILY    enoxaparin (LOVENOX) injection  40 mg Q EVENING    ALPRAZolam  0.25 mg Q6HRS PRN    albuterol  2 Puff Q4HRS (RT)    ketorolac  15 mg Q6HRS PRN    Pharmacy Consult Request  1 Each PHARMACY TO DOSE    Respiratory Therapy Consult   Continuous RT    0.9 % NaCl with KCl 20 mEq 1,000 mL   Continuous    calcium carbonate  1,000 mg Q4HRS PRN    mag hydrox-al hydrox-simeth  10 mL BID    amitriptyline  10 mg HS PRN    atorvastatin  20 mg Q EVENING    clobetasol  1 Application  QDAY PRN    gabapentin  600 mg Q EVENING    lisinopril  10 mg DAILY    sucralfate  1 g BID    Pharmacy Consult Request  1 Each PHARMACY TO DOSE    docusate sodium  100 mg BID    senna-docusate  1 Tablet Nightly    senna-docusate  1 Tablet Q24HRS PRN    polyethylene glycol/lytes  1 Packet BID PRN    magnesium hydroxide  30 mL QDAY PRN    bisacodyl  10 mg Q24HRS PRN    sodium phosphate  1 Each Once PRN    diphenhydrAMINE  25 mg Q6HRS PRN    Or    diphenhydrAMINE  25 mg Q6HRS PRN    ondansetron  4 mg Q4HRS PRN    ondansetron  4 mg Q4HRS PRN    cyclobenzaprine  10 mg Q8HRS PRN    labetalol  10 mg Q HOUR PRN    benzocaine-menthol  1 Lozenge Q2HRS PRN    oxyCODONE immediate-release  5 mg Q4HRS PRN    HYDROmorphone  0.5 mg Q3HRS PRN    HYDROcodone/acetaminophen  1 Tablet Q4HRS PRN    oxyCODONE immediate-release  10 mg Q4HRS PRN    omeprazole  20 mg DAILY       Assessment and Plan:  Hospital day #5  POD #4/2  Posterior decompression with L2-S1 perc fusion being moved to  Tuesday  Needs midline / deep IV  Remove hand IVs  Albuterol   Continue xanax as she appears anxious  Needs to get OOB and sit in lounge chair throughout the day  Should be getting PT/OT daily  Azithromycin to cover any early pnx   Following closely        Prophylactic anticoagulation: yes         Start date/time: on MAR

## 2024-01-28 PROCEDURE — 700111 HCHG RX REV CODE 636 W/ 250 OVERRIDE (IP): Performed by: PHYSICIAN ASSISTANT

## 2024-01-28 PROCEDURE — A9270 NON-COVERED ITEM OR SERVICE: HCPCS | Performed by: PHYSICIAN ASSISTANT

## 2024-01-28 PROCEDURE — 700102 HCHG RX REV CODE 250 W/ 637 OVERRIDE(OP): Performed by: PHYSICIAN ASSISTANT

## 2024-01-28 PROCEDURE — 770001 HCHG ROOM/CARE - MED/SURG/GYN PRIV*

## 2024-01-28 PROCEDURE — 700111 HCHG RX REV CODE 636 W/ 250 OVERRIDE (IP): Mod: JZ | Performed by: PHYSICIAN ASSISTANT

## 2024-01-28 RX ORDER — VITAMIN B COMPLEX
1000 TABLET ORAL DAILY
Status: DISCONTINUED | OUTPATIENT
Start: 2024-01-28 | End: 2024-02-02 | Stop reason: HOSPADM

## 2024-01-28 RX ORDER — IBUPROFEN 200 MG
950 CAPSULE ORAL DAILY
Status: DISCONTINUED | OUTPATIENT
Start: 2024-01-28 | End: 2024-02-02 | Stop reason: HOSPADM

## 2024-01-28 RX ADMIN — ALBUTEROL SULFATE 2 PUFF: 90 AEROSOL, METERED RESPIRATORY (INHALATION) at 07:35

## 2024-01-28 RX ADMIN — GABAPENTIN 600 MG: 300 CAPSULE ORAL at 17:14

## 2024-01-28 RX ADMIN — OXYCODONE HYDROCHLORIDE 10 MG: 10 TABLET ORAL at 22:54

## 2024-01-28 RX ADMIN — OXYCODONE HYDROCHLORIDE 10 MG: 10 TABLET ORAL at 09:15

## 2024-01-28 RX ADMIN — SUCRALFATE 1 G: 1 TABLET ORAL at 17:14

## 2024-01-28 RX ADMIN — ALUMINUM HYDROXIDE, MAGNESIUM HYDROXIDE, AND DIMETHICONE 10 ML: 400; 400; 40 SUSPENSION ORAL at 17:14

## 2024-01-28 RX ADMIN — LISINOPRIL 10 MG: 10 TABLET ORAL at 04:31

## 2024-01-28 RX ADMIN — ENOXAPARIN SODIUM 40 MG: 100 INJECTION SUBCUTANEOUS at 17:16

## 2024-01-28 RX ADMIN — OMEPRAZOLE 20 MG: 20 CAPSULE, DELAYED RELEASE ORAL at 04:31

## 2024-01-28 RX ADMIN — Medication 1000 UNITS: at 10:49

## 2024-01-28 RX ADMIN — OXYCODONE HYDROCHLORIDE 10 MG: 10 TABLET ORAL at 17:14

## 2024-01-28 RX ADMIN — KETOROLAC TROMETHAMINE 15 MG: 15 INJECTION, SOLUTION INTRAMUSCULAR; INTRAVENOUS at 10:50

## 2024-01-28 RX ADMIN — OXYCODONE 5 MG: 5 TABLET ORAL at 04:37

## 2024-01-28 RX ADMIN — Medication 950 MG: at 10:50

## 2024-01-28 RX ADMIN — DOCUSATE SODIUM 100 MG: 100 CAPSULE, LIQUID FILLED ORAL at 04:31

## 2024-01-28 RX ADMIN — ALUMINUM HYDROXIDE, MAGNESIUM HYDROXIDE, AND DIMETHICONE 10 ML: 400; 400; 40 SUSPENSION ORAL at 04:31

## 2024-01-28 RX ADMIN — ATORVASTATIN CALCIUM 20 MG: 20 TABLET, FILM COATED ORAL at 17:13

## 2024-01-28 RX ADMIN — KETOROLAC TROMETHAMINE 15 MG: 15 INJECTION, SOLUTION INTRAMUSCULAR; INTRAVENOUS at 17:13

## 2024-01-28 RX ADMIN — HYDROMORPHONE HYDROCHLORIDE 0.5 MG: 1 INJECTION, SOLUTION INTRAMUSCULAR; INTRAVENOUS; SUBCUTANEOUS at 17:26

## 2024-01-28 RX ADMIN — SUCRALFATE 1 G: 1 TABLET ORAL at 04:31

## 2024-01-28 RX ADMIN — AZITHROMYCIN DIHYDRATE 500 MG: 250 TABLET, FILM COATED ORAL at 04:31

## 2024-01-28 RX ADMIN — MAGNESIUM HYDROXIDE 30 ML: 1200 LIQUID ORAL at 07:35

## 2024-01-28 ASSESSMENT — PAIN DESCRIPTION - PAIN TYPE
TYPE: ACUTE PAIN;SURGICAL PAIN

## 2024-01-28 NOTE — CARE PLAN
The patient is Stable - Low risk of patient condition declining or worsening    Shift Goals  Clinical Goals: safety, pain control, mobility, IS  Patient Goals: rest  Family Goals: Stay UTD on POC    Progress made toward(s) clinical / shift goals:      Patient is aox4, able to follow command. Denies chest pain and SOB still at o2 via NC @ 1lpm saturating well.    Problem: Pain - Standard  Goal: Alleviation of pain or a reduction in pain to the patient’s comfort goal  Outcome: Progressing   No new numbness and tingling,  right leg numbness present prior to admission, pain is tolerable at the moment. Instructed to call RN if pain is increasing.      Problem: Fall Risk  Goal: Patient will remain free from falls  Outcome: Progressing     Side rails up, bed alarm on and placed in lowest position. Call light and belongings within reach. Advise to call for assistance.    Patient is not progressing towards the following goals:

## 2024-01-28 NOTE — PROGRESS NOTES
Neurosurgery Progress Note    Subjective:  POD#5 stage 1 L4-S1 ALIF  POD#3 left L2/3 XLIF  Posterior surgery was aborted as patient was becoming hypoxic during surgery.   Chest x-ray does show an area of atelectasis. Patient has been treated for valley fever.   Chest CT today stable; similar to cxr   Oxygen saturation is stable now; currently weaned down to 0-1L    Patient is better today  Notes having bad reflux which she normal admits to eating Tums frequently at home  Abdominal / back pain improving  Having intermittent right > left thigh pain or discomfort    Has long standing right L5 numbness  Not mobilizing   Will tsf from bed to chair  Langley in due to immobility and stage 3 surgery pending   + flatus, had BM this morning   She is happy her painful hand IVs are removed.     Vitals / labs stable  Calcium is dropping, 7.7         Exam:  Patient anxious appearing  Dressing dry  Motor 5/5  Sensory intact   Abdomen soft     BP  Min: 137/71  Max: 194/103  Pulse  Av.8  Min: 92  Max: 95  Resp  Av.5  Min: 15  Max: 18  Temp  Av.4 °C (97.5 °F)  Min: 36.2 °C (97.2 °F)  Max: 36.6 °C (97.9 °F)  SpO2  Av.3 %  Min: 94 %  Max: 95 %    No data recorded    Recent Labs     24  0409 24  0331   WBC 11.1* 8.5   RBC 3.75* 3.35*   HEMOGLOBIN 11.9* 10.6*   HEMATOCRIT 36.2* 32.1*   MCV 96.5 95.8   MCH 31.7 31.6   MCHC 32.9 33.0   RDW 46.2 46.7   PLATELETCT 193 283   MPV 9.7 9.8       Recent Labs     24  0409 24  0331   SODIUM 136 136   POTASSIUM 5.0 4.0   CHLORIDE 103 104   CO2 22 24   GLUCOSE 132* 112*   BUN 11 12   CREATININE 0.50 0.48*   CALCIUM 7.9* 7.7*                 Intake/Output                         24 07 - 24 0659 24 07 - 24 0659      Total  Total                 Intake    Total Intake -- -- -- -- -- --       Output    Urine  500  -- 500  1900  -- 1900    Output (mL) (Urethral Catheter Non-latex 1 Fr.) 500 -- 500  1900 -- 1900    Total Output 500 -- 500 1900 -- 1900       Net I/O     -500 -- -500 -1900 -- -1900              Intake/Output Summary (Last 24 hours) at 1/28/2024 1017  Last data filed at 1/28/2024 0919  Gross per 24 hour   Intake --   Output 2400 ml   Net -2400 ml               calcium citrate  950 mg DAILY    vitamin D3  1,000 Units DAILY    azithromycin  500 mg DAILY    polyethylene glycol/lytes  1 Packet DAILY AT NOON    enoxaparin (LOVENOX) injection  40 mg Q EVENING    ALPRAZolam  0.25 mg Q6HRS PRN    albuterol  2 Puff Q4HRS (RT)    ketorolac  15 mg Q6HRS PRN    Pharmacy Consult Request  1 Each PHARMACY TO DOSE    Respiratory Therapy Consult   Continuous RT    0.9 % NaCl with KCl 20 mEq 1,000 mL   Continuous    calcium carbonate  1,000 mg Q4HRS PRN    mag hydrox-al hydrox-simeth  10 mL BID    amitriptyline  10 mg HS PRN    atorvastatin  20 mg Q EVENING    clobetasol  1 Application  QDAY PRN    gabapentin  600 mg Q EVENING    lisinopril  10 mg DAILY    sucralfate  1 g BID    Pharmacy Consult Request  1 Each PHARMACY TO DOSE    docusate sodium  100 mg BID    senna-docusate  1 Tablet Nightly    senna-docusate  1 Tablet Q24HRS PRN    magnesium hydroxide  30 mL QDAY PRN    bisacodyl  10 mg Q24HRS PRN    sodium phosphate  1 Each Once PRN    diphenhydrAMINE  25 mg Q6HRS PRN    Or    diphenhydrAMINE  25 mg Q6HRS PRN    ondansetron  4 mg Q4HRS PRN    ondansetron  4 mg Q4HRS PRN    cyclobenzaprine  10 mg Q8HRS PRN    labetalol  10 mg Q HOUR PRN    benzocaine-menthol  1 Lozenge Q2HRS PRN    oxyCODONE immediate-release  5 mg Q4HRS PRN    HYDROmorphone  0.5 mg Q3HRS PRN    HYDROcodone/acetaminophen  1 Tablet Q4HRS PRN    oxyCODONE immediate-release  10 mg Q4HRS PRN    omeprazole  20 mg DAILY       Assessment and Plan:  Hospital day #6  POD #5/3  Posterior decompression with L2-S1 perc fusion being moved to Tuesday  Continue Albuterol   Continue xanax as she appears anxious  Needs to get OOB and sit in lounge chair  throughout the day  Should be getting PT/OT daily  Azithromycin to cover any early pnx   Will get PTH  Start calcium / Vit D (I suspect she is not taking as many Tums here as she does at home)  BMP for morning   Ultimately would be a good rehab candidate  Following closely        Prophylactic anticoagulation: yes           Start date/time: on MAR

## 2024-01-29 LAB
ANION GAP SERPL CALC-SCNC: 10 MMOL/L (ref 7–16)
BUN SERPL-MCNC: 19 MG/DL (ref 8–22)
CALCIUM SERPL-MCNC: 8.5 MG/DL (ref 8.5–10.5)
CHLORIDE SERPL-SCNC: 100 MMOL/L (ref 96–112)
CO2 SERPL-SCNC: 24 MMOL/L (ref 20–33)
CREAT SERPL-MCNC: 0.53 MG/DL (ref 0.5–1.4)
GFR SERPLBLD CREATININE-BSD FMLA CKD-EPI: 99 ML/MIN/1.73 M 2
GLUCOSE SERPL-MCNC: 142 MG/DL (ref 65–99)
POTASSIUM SERPL-SCNC: 3.8 MMOL/L (ref 3.6–5.5)
PTH-INTACT SERPL-MCNC: 49.5 PG/ML (ref 14–72)
SODIUM SERPL-SCNC: 134 MMOL/L (ref 135–145)

## 2024-01-29 PROCEDURE — 80048 BASIC METABOLIC PNL TOTAL CA: CPT

## 2024-01-29 PROCEDURE — 36415 COLL VENOUS BLD VENIPUNCTURE: CPT

## 2024-01-29 PROCEDURE — 97530 THERAPEUTIC ACTIVITIES: CPT

## 2024-01-29 PROCEDURE — A9270 NON-COVERED ITEM OR SERVICE: HCPCS | Performed by: PHYSICIAN ASSISTANT

## 2024-01-29 PROCEDURE — 770001 HCHG ROOM/CARE - MED/SURG/GYN PRIV*

## 2024-01-29 PROCEDURE — 700102 HCHG RX REV CODE 250 W/ 637 OVERRIDE(OP): Performed by: PHYSICIAN ASSISTANT

## 2024-01-29 PROCEDURE — 97535 SELF CARE MNGMENT TRAINING: CPT

## 2024-01-29 PROCEDURE — 83970 ASSAY OF PARATHORMONE: CPT

## 2024-01-29 PROCEDURE — 700111 HCHG RX REV CODE 636 W/ 250 OVERRIDE (IP): Performed by: PHYSICIAN ASSISTANT

## 2024-01-29 RX ADMIN — DOCUSATE SODIUM 100 MG: 100 CAPSULE, LIQUID FILLED ORAL at 17:16

## 2024-01-29 RX ADMIN — POLYETHYLENE GLYCOL 3350 1 PACKET: 17 POWDER, FOR SOLUTION ORAL at 12:40

## 2024-01-29 RX ADMIN — OXYCODONE HYDROCHLORIDE 10 MG: 10 TABLET ORAL at 23:22

## 2024-01-29 RX ADMIN — ALBUTEROL SULFATE 2 PUFF: 90 AEROSOL, METERED RESPIRATORY (INHALATION) at 15:22

## 2024-01-29 RX ADMIN — ATORVASTATIN CALCIUM 20 MG: 20 TABLET, FILM COATED ORAL at 17:15

## 2024-01-29 RX ADMIN — SUCRALFATE 1 G: 1 TABLET ORAL at 04:07

## 2024-01-29 RX ADMIN — ALBUTEROL SULFATE 2 PUFF: 90 AEROSOL, METERED RESPIRATORY (INHALATION) at 09:03

## 2024-01-29 RX ADMIN — ALUMINUM HYDROXIDE, MAGNESIUM HYDROXIDE, AND DIMETHICONE 10 ML: 400; 400; 40 SUSPENSION ORAL at 04:07

## 2024-01-29 RX ADMIN — Medication 1000 UNITS: at 04:07

## 2024-01-29 RX ADMIN — Medication 950 MG: at 04:08

## 2024-01-29 RX ADMIN — LISINOPRIL 10 MG: 10 TABLET ORAL at 04:07

## 2024-01-29 RX ADMIN — ENOXAPARIN SODIUM 40 MG: 100 INJECTION SUBCUTANEOUS at 17:16

## 2024-01-29 RX ADMIN — OXYCODONE HYDROCHLORIDE 10 MG: 10 TABLET ORAL at 09:07

## 2024-01-29 RX ADMIN — KETOROLAC TROMETHAMINE 15 MG: 15 INJECTION, SOLUTION INTRAMUSCULAR; INTRAVENOUS at 04:07

## 2024-01-29 RX ADMIN — ALBUTEROL SULFATE 2 PUFF: 90 AEROSOL, METERED RESPIRATORY (INHALATION) at 11:36

## 2024-01-29 RX ADMIN — OXYCODONE HYDROCHLORIDE 10 MG: 10 TABLET ORAL at 17:16

## 2024-01-29 RX ADMIN — OMEPRAZOLE 20 MG: 20 CAPSULE, DELAYED RELEASE ORAL at 04:07

## 2024-01-29 RX ADMIN — AZITHROMYCIN DIHYDRATE 500 MG: 250 TABLET, FILM COATED ORAL at 04:07

## 2024-01-29 RX ADMIN — ALBUTEROL SULFATE 2 PUFF: 90 AEROSOL, METERED RESPIRATORY (INHALATION) at 04:07

## 2024-01-29 RX ADMIN — KETOROLAC TROMETHAMINE 15 MG: 15 INJECTION, SOLUTION INTRAMUSCULAR; INTRAVENOUS at 20:32

## 2024-01-29 RX ADMIN — GABAPENTIN 600 MG: 300 CAPSULE ORAL at 17:15

## 2024-01-29 RX ADMIN — ALUMINUM HYDROXIDE, MAGNESIUM HYDROXIDE, AND DIMETHICONE 10 ML: 400; 400; 40 SUSPENSION ORAL at 17:16

## 2024-01-29 RX ADMIN — HYDROCODONE BITARTRATE AND ACETAMINOPHEN 1 TABLET: 10; 325 TABLET ORAL at 11:35

## 2024-01-29 RX ADMIN — SUCRALFATE 1 G: 1 TABLET ORAL at 17:15

## 2024-01-29 ASSESSMENT — COGNITIVE AND FUNCTIONAL STATUS - GENERAL
DAILY ACTIVITIY SCORE: 16
MOVING TO AND FROM BED TO CHAIR: UNABLE
TURNING FROM BACK TO SIDE WHILE IN FLAT BAD: A LOT
SUGGESTED CMS G CODE MODIFIER MOBILITY: CL
TOILETING: A LOT
MOBILITY SCORE: 10
MOVING FROM LYING ON BACK TO SITTING ON SIDE OF FLAT BED: UNABLE
CLIMB 3 TO 5 STEPS WITH RAILING: TOTAL
HELP NEEDED FOR BATHING: A LOT
STANDING UP FROM CHAIR USING ARMS: A LITTLE
SUGGESTED CMS G CODE MODIFIER DAILY ACTIVITY: CK
PERSONAL GROOMING: A LITTLE
DRESSING REGULAR LOWER BODY CLOTHING: A LOT
DRESSING REGULAR UPPER BODY CLOTHING: A LITTLE
WALKING IN HOSPITAL ROOM: A LOT

## 2024-01-29 ASSESSMENT — PAIN DESCRIPTION - PAIN TYPE
TYPE: ACUTE PAIN;SURGICAL PAIN
TYPE: ACUTE PAIN;SURGICAL PAIN
TYPE: ACUTE PAIN
TYPE: ACUTE PAIN;SURGICAL PAIN
TYPE: ACUTE PAIN
TYPE: ACUTE PAIN;SURGICAL PAIN
TYPE: ACUTE PAIN

## 2024-01-29 NOTE — PROGRESS NOTES
Neurosurgery Progress Note    Subjective:  POD#6 stage 1 L4-S1 ALIF  POD#4 left L2/3 XLIF  Plan for stage III surgery redo L2-3 laminotomy and L2-S1 perc fusion tomorrow .   Chest x-ray does show an area of atelectasis. Patient has been treated for valley fever.   Chest CT today stable; similar to cxr   Oxygen saturation is stable now; currently weaned down to 0-1L    Patient is better today  Notes having bad reflux which she normal admits to eating Tums frequently at home - improving  Abdominal / back pain improving  Having intermittent right > left thigh pain or discomfort    Has long standing right L5 numbness  Not mobilizing   Will tsf from bed to chair  Langley in due to immobility and stage 3 surgery pending   + flatus, +BM  She is happy her painful hand IVs are removed.     Vitals / labs stable  Calcium/PTH normal         Exam:  Patient anxious appearing  Dressing dry  Motor 5/5  Sensory intact   Abdomen soft   Langley in place    BP  Min: 112/69  Max: 146/77  Pulse  Av.5  Min: 94  Max: 117  Resp  Av  Min: 16  Max: 18  Temp  Av.4 °C (97.6 °F)  Min: 36.3 °C (97.3 °F)  Max: 36.7 °C (98.1 °F)  Monitored Temp 2  Av.5 °C (97.7 °F)  Min: 36.5 °C (97.7 °F)  Max: 36.5 °C (97.7 °F)  SpO2  Av %  Min: 92 %  Max: 94 %    No data recorded    Recent Labs     24  0331   WBC 8.5   RBC 3.35*   HEMOGLOBIN 10.6*   HEMATOCRIT 32.1*   MCV 95.8   MCH 31.6   MCHC 33.0   RDW 46.7   PLATELETCT 283   MPV 9.8       Recent Labs     24  0331 24  0015   SODIUM 136 134*   POTASSIUM 4.0 3.8   CHLORIDE 104 100   CO2  24   GLUCOSE 112* 142*   BUN 12 19   CREATININE 0.48* 0.53   CALCIUM 7.7* 8.5                 Intake/Output                         24 0700 - 24 0659 24 07 - 24 0659      Total [id number] Total                 Intake    Total Intake -- -- -- -- -- --       Output    Urine  2150  400 2550  --  -- --    Output (mL) (Urethral  Catheter Non-latex 1 Fr.) 2150 400 2550 -- -- --    Total Output 2150 400 2550 -- -- --       Net I/O     -2150 -400 -2550 -- -- --              Intake/Output Summary (Last 24 hours) at 1/29/2024 1109  Last data filed at 1/29/2024 0407  Gross per 24 hour   Intake --   Output 650 ml   Net -650 ml               calcium citrate  950 mg DAILY    vitamin D3  1,000 Units DAILY    polyethylene glycol/lytes  1 Packet DAILY AT NOON    enoxaparin (LOVENOX) injection  40 mg Q EVENING    ALPRAZolam  0.25 mg Q6HRS PRN    albuterol  2 Puff Q4HRS (RT)    ketorolac  15 mg Q6HRS PRN    Pharmacy Consult Request  1 Each PHARMACY TO DOSE    Respiratory Therapy Consult   Continuous RT    0.9 % NaCl with KCl 20 mEq 1,000 mL   Continuous    calcium carbonate  1,000 mg Q4HRS PRN    mag hydrox-al hydrox-simeth  10 mL BID    amitriptyline  10 mg HS PRN    atorvastatin  20 mg Q EVENING    clobetasol  1 Application  QDAY PRN    gabapentin  600 mg Q EVENING    lisinopril  10 mg DAILY    sucralfate  1 g BID    Pharmacy Consult Request  1 Each PHARMACY TO DOSE    docusate sodium  100 mg BID    senna-docusate  1 Tablet Nightly    senna-docusate  1 Tablet Q24HRS PRN    magnesium hydroxide  30 mL QDAY PRN    bisacodyl  10 mg Q24HRS PRN    sodium phosphate  1 Each Once PRN    diphenhydrAMINE  25 mg Q6HRS PRN    Or    diphenhydrAMINE  25 mg Q6HRS PRN    ondansetron  4 mg Q4HRS PRN    ondansetron  4 mg Q4HRS PRN    cyclobenzaprine  10 mg Q8HRS PRN    labetalol  10 mg Q HOUR PRN    benzocaine-menthol  1 Lozenge Q2HRS PRN    oxyCODONE immediate-release  5 mg Q4HRS PRN    HYDROmorphone  0.5 mg Q3HRS PRN    HYDROcodone/acetaminophen  1 Tablet Q4HRS PRN    oxyCODONE immediate-release  10 mg Q4HRS PRN    omeprazole  20 mg DAILY       Assessment and Plan:  Hospital day #7  POD #6/4  Posterior decompression with L2-S1 perc fusion being moved to 1/30  Continue Albuterol   Needs to get OOB and sit in lounge chair throughout the day  Should be getting PT/OT  daily  Azithromycin to cover any early pnx   Start calcium / Vit D (I suspect she is not taking as many Tums here as she does at home)  NPO for stage III surgery 1/30.   Ultimately would be a good rehab candidate  Following closely        Prophylactic anticoagulation: yes           Start date/time: okay to give PM lovenox 1/29.

## 2024-01-29 NOTE — CARE PLAN
Problem: Pain - Standard  Goal: Alleviation of pain or a reduction in pain to the patient’s comfort goal  Outcome: Progressing   Pt educated on 0-10 pain scale. Pt educated on pharmacological and non-pharmacological interventions.  Problem: Fall Risk  Goal: Patient will remain free from falls  Outcome: Progressing  Pt educated on fall precautions. Fall precautions in place.   The patient is Stable - Low risk of patient condition declining or worsening    Shift Goals  Clinical Goals: safety, pain control, mobility, IS  Patient Goals: rest  Family Goals: Stay UTD on POC    Progress made toward(s) clinical / shift goals:  Pt free from falls.    Patient is not progressing towards the following goals:

## 2024-01-29 NOTE — PROGRESS NOTES
Pt verbalizes 10/10 pain. Pt crying, moaning, restless. Pt medicated per MAR. Pt still in distress after oral medication. Pt medicated per MAR with Dilaudid for faster relieve. Pt verbalizes better pain control after 5 min of Dilaudid administration. Pulse oxymetry in place. Fall precautions in place.

## 2024-01-29 NOTE — CARE PLAN
Health Maintenance Due   Topic Date Due   • Shingles Vaccine (2 of 2) 02/16/2021   • Influenza Vaccine (1) 08/01/2021       Patient is due for topics as listed above but is not proceeding with Immunization(s) Influenza and Shingles at this time. Education provided for Immunization(s) Influenza and Shingles.    Vaccinations not available at clinic at this time.              Recent PHQ 2/9 Score    PHQ 2:  Date Adult PHQ 2 Score Adult PHQ 2 Interpretation   8/5/2021 0 No further screening needed       PHQ 9:        The patient is Stable - Low risk of patient condition declining or worsening    Shift Goals  Clinical Goals: Pain control, Mobility  Patient Goals: Rest, Pain control,  Family Goals: Not present    Progress made toward(s) clinical / shift goals:    Problem: Pain - Standard  Goal: Alleviation of pain or a reduction in pain to the patient’s comfort goal  Outcome: Progressing  Note: Patient educated on pain scale and to notify RN of pain. Medicated per MAR and repositioned        Problem: Knowledge Deficit - Standard  Goal: Patient and family/care givers will demonstrate understanding of plan of care, disease process/condition, diagnostic tests and medications  Outcome: Progressing  Note: Plan of care discussed, verbalized understanding. Questions answered        Problem: Fall Risk  Goal: Patient will remain free from falls  Outcome: Progressing     Problem: Neuro Status  Goal: Neuro status will remain stable or improve  Outcome: Progressing     Problem: Skin Integrity  Goal: Skin integrity is maintained or improved  Outcome: Progressing       Patient is not progressing towards the following goals:

## 2024-01-30 ENCOUNTER — APPOINTMENT (OUTPATIENT)
Dept: RADIOLOGY | Facility: MEDICAL CENTER | Age: 69
DRG: 454 | End: 2024-01-30
Attending: NEUROLOGICAL SURGERY
Payer: MEDICARE

## 2024-01-30 ENCOUNTER — ANESTHESIA (OUTPATIENT)
Dept: SURGERY | Facility: MEDICAL CENTER | Age: 69
DRG: 454 | End: 2024-01-30
Payer: MEDICARE

## 2024-01-30 ENCOUNTER — APPOINTMENT (OUTPATIENT)
Dept: RADIOLOGY | Facility: MEDICAL CENTER | Age: 69
DRG: 454 | End: 2024-01-30
Attending: ANESTHESIOLOGY
Payer: MEDICARE

## 2024-01-30 ENCOUNTER — ANESTHESIA EVENT (OUTPATIENT)
Dept: SURGERY | Facility: MEDICAL CENTER | Age: 69
DRG: 454 | End: 2024-01-30
Payer: MEDICARE

## 2024-01-30 LAB
ANION GAP SERPL CALC-SCNC: 12 MMOL/L (ref 7–16)
BUN SERPL-MCNC: 16 MG/DL (ref 8–22)
CALCIUM SERPL-MCNC: 8.1 MG/DL (ref 8.5–10.5)
CHLORIDE SERPL-SCNC: 103 MMOL/L (ref 96–112)
CO2 SERPL-SCNC: 22 MMOL/L (ref 20–33)
CREAT SERPL-MCNC: 0.5 MG/DL (ref 0.5–1.4)
GFR SERPLBLD CREATININE-BSD FMLA CKD-EPI: 100 ML/MIN/1.73 M 2
GLUCOSE SERPL-MCNC: 102 MG/DL (ref 65–99)
POTASSIUM SERPL-SCNC: 4.3 MMOL/L (ref 3.6–5.5)
SODIUM SERPL-SCNC: 137 MMOL/L (ref 135–145)

## 2024-01-30 PROCEDURE — C1755 CATHETER, INTRASPINAL: HCPCS | Performed by: NEUROLOGICAL SURGERY

## 2024-01-30 PROCEDURE — 95907 NVR CNDJ TST 1-2 STUDIES: CPT | Performed by: NEUROLOGICAL SURGERY

## 2024-01-30 PROCEDURE — 700101 HCHG RX REV CODE 250: Performed by: PHYSICIAN ASSISTANT

## 2024-01-30 PROCEDURE — C1713 ANCHOR/SCREW BN/BN,TIS/BN: HCPCS | Performed by: NEUROLOGICAL SURGERY

## 2024-01-30 PROCEDURE — A9270 NON-COVERED ITEM OR SERVICE: HCPCS | Performed by: PHYSICIAN ASSISTANT

## 2024-01-30 PROCEDURE — C1751 CATH, INF, PER/CENT/MIDLINE: HCPCS | Performed by: NEUROLOGICAL SURGERY

## 2024-01-30 PROCEDURE — 95955 EEG DURING SURGERY: CPT | Performed by: NEUROLOGICAL SURGERY

## 2024-01-30 PROCEDURE — 110454 HCHG SHELL REV 250: Performed by: NEUROLOGICAL SURGERY

## 2024-01-30 PROCEDURE — 0SG30K1 FUSION OF LUMBOSACRAL JOINT WITH NONAUTOLOGOUS TISSUE SUBSTITUTE, POSTERIOR APPROACH, POSTERIOR COLUMN, OPEN APPROACH: ICD-10-PCS | Performed by: NEUROLOGICAL SURGERY

## 2024-01-30 PROCEDURE — 700102 HCHG RX REV CODE 250 W/ 637 OVERRIDE(OP): Performed by: PHYSICIAN ASSISTANT

## 2024-01-30 PROCEDURE — 160035 HCHG PACU - 1ST 60 MINS PHASE I: Performed by: NEUROLOGICAL SURGERY

## 2024-01-30 PROCEDURE — 160048 HCHG OR STATISTICAL LEVEL 1-5: Performed by: NEUROLOGICAL SURGERY

## 2024-01-30 PROCEDURE — 80048 BASIC METABOLIC PNL TOTAL CA: CPT

## 2024-01-30 PROCEDURE — 160002 HCHG RECOVERY MINUTES (STAT): Performed by: NEUROLOGICAL SURGERY

## 2024-01-30 PROCEDURE — 502240 HCHG MISC OR SUPPLY RC 0272: Performed by: NEUROLOGICAL SURGERY

## 2024-01-30 PROCEDURE — 700111 HCHG RX REV CODE 636 W/ 250 OVERRIDE (IP): Mod: JZ | Performed by: ANESTHESIOLOGY

## 2024-01-30 PROCEDURE — 700111 HCHG RX REV CODE 636 W/ 250 OVERRIDE (IP): Mod: JG | Performed by: PHYSICIAN ASSISTANT

## 2024-01-30 PROCEDURE — 700101 HCHG RX REV CODE 250: Performed by: NEUROLOGICAL SURGERY

## 2024-01-30 PROCEDURE — 8E0WXBZ COMPUTER ASSISTED PROCEDURE OF TRUNK REGION: ICD-10-PCS | Performed by: NEUROLOGICAL SURGERY

## 2024-01-30 PROCEDURE — 700102 HCHG RX REV CODE 250 W/ 637 OVERRIDE(OP): Performed by: ANESTHESIOLOGY

## 2024-01-30 PROCEDURE — 700111 HCHG RX REV CODE 636 W/ 250 OVERRIDE (IP): Performed by: ANESTHESIOLOGY

## 2024-01-30 PROCEDURE — 160042 HCHG SURGERY MINUTES - EA ADDL 1 MIN LEVEL 5: Performed by: NEUROLOGICAL SURGERY

## 2024-01-30 PROCEDURE — 36415 COLL VENOUS BLD VENIPUNCTURE: CPT

## 2024-01-30 PROCEDURE — 700101 HCHG RX REV CODE 250: Performed by: ANESTHESIOLOGY

## 2024-01-30 PROCEDURE — 700105 HCHG RX REV CODE 258: Performed by: ANESTHESIOLOGY

## 2024-01-30 PROCEDURE — 95861 NEEDLE EMG 2 EXTREMITIES: CPT | Performed by: NEUROLOGICAL SURGERY

## 2024-01-30 PROCEDURE — A9270 NON-COVERED ITEM OR SERVICE: HCPCS | Performed by: ANESTHESIOLOGY

## 2024-01-30 PROCEDURE — 160009 HCHG ANES TIME/MIN: Performed by: NEUROLOGICAL SURGERY

## 2024-01-30 PROCEDURE — 95937 NEUROMUSCULAR JUNCTION TEST: CPT | Performed by: NEUROLOGICAL SURGERY

## 2024-01-30 PROCEDURE — 160036 HCHG PACU - EA ADDL 30 MINS PHASE I: Performed by: NEUROLOGICAL SURGERY

## 2024-01-30 PROCEDURE — 95938 SOMATOSENSORY TESTING: CPT | Performed by: NEUROLOGICAL SURGERY

## 2024-01-30 PROCEDURE — 770001 HCHG ROOM/CARE - MED/SURG/GYN PRIV*

## 2024-01-30 PROCEDURE — 110371 HCHG SHELL REV 272: Performed by: NEUROLOGICAL SURGERY

## 2024-01-30 PROCEDURE — 502000 HCHG MISC OR IMPLANTS RC 0278: Performed by: NEUROLOGICAL SURGERY

## 2024-01-30 PROCEDURE — 95940 IONM IN OPERATNG ROOM 15 MIN: CPT | Performed by: NEUROLOGICAL SURGERY

## 2024-01-30 PROCEDURE — 0SG10K1 FUSION OF 2 OR MORE LUMBAR VERTEBRAL JOINTS WITH NONAUTOLOGOUS TISSUE SUBSTITUTE, POSTERIOR APPROACH, POSTERIOR COLUMN, OPEN APPROACH: ICD-10-PCS | Performed by: NEUROLOGICAL SURGERY

## 2024-01-30 PROCEDURE — 700111 HCHG RX REV CODE 636 W/ 250 OVERRIDE (IP): Mod: JZ | Performed by: NEUROLOGICAL SURGERY

## 2024-01-30 PROCEDURE — 71045 X-RAY EXAM CHEST 1 VIEW: CPT

## 2024-01-30 PROCEDURE — 72100 X-RAY EXAM L-S SPINE 2/3 VWS: CPT

## 2024-01-30 PROCEDURE — 160031 HCHG SURGERY MINUTES - 1ST 30 MINS LEVEL 5: Performed by: NEUROLOGICAL SURGERY

## 2024-01-30 DEVICE — SET SCREW POINTLOCK (1EA): Type: IMPLANTABLE DEVICE | Site: BACK | Status: FUNCTIONAL

## 2024-01-30 DEVICE — IMPLANTABLE DEVICE: Type: IMPLANTABLE DEVICE | Site: BACK | Status: FUNCTIONAL

## 2024-01-30 RX ORDER — LIDOCAINE HYDROCHLORIDE 20 MG/ML
INJECTION, SOLUTION EPIDURAL; INFILTRATION; INTRACAUDAL; PERINEURAL PRN
Status: DISCONTINUED | OUTPATIENT
Start: 2024-01-30 | End: 2024-01-30 | Stop reason: SURG

## 2024-01-30 RX ORDER — SODIUM CHLORIDE, SODIUM LACTATE, POTASSIUM CHLORIDE, CALCIUM CHLORIDE 600; 310; 30; 20 MG/100ML; MG/100ML; MG/100ML; MG/100ML
INJECTION, SOLUTION INTRAVENOUS CONTINUOUS
Status: DISCONTINUED | OUTPATIENT
Start: 2024-01-30 | End: 2024-01-30 | Stop reason: HOSPADM

## 2024-01-30 RX ORDER — DIPHENHYDRAMINE HYDROCHLORIDE 50 MG/ML
12.5 INJECTION INTRAMUSCULAR; INTRAVENOUS
Status: DISCONTINUED | OUTPATIENT
Start: 2024-01-30 | End: 2024-01-30 | Stop reason: HOSPADM

## 2024-01-30 RX ORDER — DEXAMETHASONE SODIUM PHOSPHATE 4 MG/ML
INJECTION, SOLUTION INTRA-ARTICULAR; INTRALESIONAL; INTRAMUSCULAR; INTRAVENOUS; SOFT TISSUE PRN
Status: DISCONTINUED | OUTPATIENT
Start: 2024-01-30 | End: 2024-01-30 | Stop reason: SURG

## 2024-01-30 RX ORDER — VANCOMYCIN HYDROCHLORIDE 1 G/20ML
INJECTION, POWDER, LYOPHILIZED, FOR SOLUTION INTRAVENOUS
Status: COMPLETED | OUTPATIENT
Start: 2024-01-30 | End: 2024-01-30

## 2024-01-30 RX ORDER — EPHEDRINE SULFATE 50 MG/ML
5 INJECTION, SOLUTION INTRAVENOUS
Status: DISCONTINUED | OUTPATIENT
Start: 2024-01-30 | End: 2024-01-30 | Stop reason: HOSPADM

## 2024-01-30 RX ORDER — SIMETHICONE 125 MG
125 TABLET,CHEWABLE ORAL EVERY 6 HOURS PRN
Status: DISCONTINUED | OUTPATIENT
Start: 2024-01-30 | End: 2024-02-02 | Stop reason: HOSPADM

## 2024-01-30 RX ORDER — HYDROMORPHONE HYDROCHLORIDE 1 MG/ML
0.2 INJECTION, SOLUTION INTRAMUSCULAR; INTRAVENOUS; SUBCUTANEOUS
Status: DISCONTINUED | OUTPATIENT
Start: 2024-01-30 | End: 2024-01-30 | Stop reason: HOSPADM

## 2024-01-30 RX ORDER — MEPERIDINE HYDROCHLORIDE 25 MG/ML
12.5 INJECTION INTRAMUSCULAR; INTRAVENOUS; SUBCUTANEOUS
Status: DISCONTINUED | OUTPATIENT
Start: 2024-01-30 | End: 2024-01-30 | Stop reason: HOSPADM

## 2024-01-30 RX ORDER — MIDAZOLAM HYDROCHLORIDE 1 MG/ML
INJECTION INTRAMUSCULAR; INTRAVENOUS PRN
Status: DISCONTINUED | OUTPATIENT
Start: 2024-01-30 | End: 2024-01-30 | Stop reason: SURG

## 2024-01-30 RX ORDER — ROCURONIUM BROMIDE 10 MG/ML
INJECTION, SOLUTION INTRAVENOUS PRN
Status: DISCONTINUED | OUTPATIENT
Start: 2024-01-30 | End: 2024-01-30 | Stop reason: SURG

## 2024-01-30 RX ORDER — CEFAZOLIN SODIUM 1 G/3ML
INJECTION, POWDER, FOR SOLUTION INTRAMUSCULAR; INTRAVENOUS
Status: DISCONTINUED | OUTPATIENT
Start: 2024-01-30 | End: 2024-01-30 | Stop reason: HOSPADM

## 2024-01-30 RX ORDER — IPRATROPIUM BROMIDE AND ALBUTEROL SULFATE 2.5; .5 MG/3ML; MG/3ML
3 SOLUTION RESPIRATORY (INHALATION)
Status: DISCONTINUED | OUTPATIENT
Start: 2024-01-30 | End: 2024-01-30 | Stop reason: HOSPADM

## 2024-01-30 RX ORDER — HYDROMORPHONE HYDROCHLORIDE 1 MG/ML
0.4 INJECTION, SOLUTION INTRAMUSCULAR; INTRAVENOUS; SUBCUTANEOUS
Status: DISCONTINUED | OUTPATIENT
Start: 2024-01-30 | End: 2024-01-30 | Stop reason: HOSPADM

## 2024-01-30 RX ORDER — SODIUM CHLORIDE, SODIUM LACTATE, POTASSIUM CHLORIDE, CALCIUM CHLORIDE 600; 310; 30; 20 MG/100ML; MG/100ML; MG/100ML; MG/100ML
INJECTION, SOLUTION INTRAVENOUS
Status: DISCONTINUED | OUTPATIENT
Start: 2024-01-30 | End: 2024-01-30 | Stop reason: SURG

## 2024-01-30 RX ORDER — ACETAMINOPHEN 500 MG
1000 TABLET ORAL ONCE
Status: DISCONTINUED | OUTPATIENT
Start: 2024-01-30 | End: 2024-01-30 | Stop reason: HOSPADM

## 2024-01-30 RX ORDER — CEFAZOLIN SODIUM 1 G/3ML
INJECTION, POWDER, FOR SOLUTION INTRAMUSCULAR; INTRAVENOUS PRN
Status: DISCONTINUED | OUTPATIENT
Start: 2024-01-30 | End: 2024-01-30 | Stop reason: SURG

## 2024-01-30 RX ORDER — PHENYLEPHRINE HCL IN 0.9% NACL 0.5 MG/5ML
SYRINGE (ML) INTRAVENOUS PRN
Status: DISCONTINUED | OUTPATIENT
Start: 2024-01-30 | End: 2024-01-30 | Stop reason: SURG

## 2024-01-30 RX ORDER — BUPIVACAINE HYDROCHLORIDE AND EPINEPHRINE 5; 5 MG/ML; UG/ML
INJECTION, SOLUTION PERINEURAL
Status: DISCONTINUED | OUTPATIENT
Start: 2024-01-30 | End: 2024-01-30 | Stop reason: HOSPADM

## 2024-01-30 RX ORDER — HYDROMORPHONE HYDROCHLORIDE 2 MG/ML
INJECTION, SOLUTION INTRAMUSCULAR; INTRAVENOUS; SUBCUTANEOUS PRN
Status: DISCONTINUED | OUTPATIENT
Start: 2024-01-30 | End: 2024-01-30 | Stop reason: SURG

## 2024-01-30 RX ORDER — ONDANSETRON 2 MG/ML
4 INJECTION INTRAMUSCULAR; INTRAVENOUS
Status: DISCONTINUED | OUTPATIENT
Start: 2024-01-30 | End: 2024-01-30 | Stop reason: HOSPADM

## 2024-01-30 RX ORDER — SODIUM CHLORIDE, SODIUM GLUCONATE, SODIUM ACETATE, POTASSIUM CHLORIDE AND MAGNESIUM CHLORIDE 526; 502; 368; 37; 30 MG/100ML; MG/100ML; MG/100ML; MG/100ML; MG/100ML
INJECTION, SOLUTION INTRAVENOUS
Status: DISCONTINUED | OUTPATIENT
Start: 2024-01-30 | End: 2024-01-30 | Stop reason: SURG

## 2024-01-30 RX ORDER — HYDROMORPHONE HYDROCHLORIDE 1 MG/ML
0.1 INJECTION, SOLUTION INTRAMUSCULAR; INTRAVENOUS; SUBCUTANEOUS
Status: DISCONTINUED | OUTPATIENT
Start: 2024-01-30 | End: 2024-01-30 | Stop reason: HOSPADM

## 2024-01-30 RX ORDER — HALOPERIDOL 5 MG/ML
1 INJECTION INTRAMUSCULAR
Status: DISCONTINUED | OUTPATIENT
Start: 2024-01-30 | End: 2024-01-30 | Stop reason: HOSPADM

## 2024-01-30 RX ORDER — OXYCODONE HCL 5 MG/5 ML
10 SOLUTION, ORAL ORAL
Status: COMPLETED | OUTPATIENT
Start: 2024-01-30 | End: 2024-01-30

## 2024-01-30 RX ORDER — OXYCODONE HCL 5 MG/5 ML
5 SOLUTION, ORAL ORAL
Status: COMPLETED | OUTPATIENT
Start: 2024-01-30 | End: 2024-01-30

## 2024-01-30 RX ORDER — HYDRALAZINE HYDROCHLORIDE 20 MG/ML
5 INJECTION INTRAMUSCULAR; INTRAVENOUS
Status: DISCONTINUED | OUTPATIENT
Start: 2024-01-30 | End: 2024-01-30 | Stop reason: HOSPADM

## 2024-01-30 RX ADMIN — HYDROMORPHONE HYDROCHLORIDE 0.4 MG: 2 INJECTION INTRAMUSCULAR; INTRAVENOUS; SUBCUTANEOUS at 19:22

## 2024-01-30 RX ADMIN — DEXAMETHASONE SODIUM PHOSPHATE 8 MG: 4 INJECTION INTRA-ARTICULAR; INTRALESIONAL; INTRAMUSCULAR; INTRAVENOUS; SOFT TISSUE at 16:50

## 2024-01-30 RX ADMIN — ALUMINUM HYDROXIDE, MAGNESIUM HYDROXIDE, AND DIMETHICONE 10 ML: 400; 400; 40 SUSPENSION ORAL at 05:14

## 2024-01-30 RX ADMIN — SODIUM CHLORIDE, POTASSIUM CHLORIDE, SODIUM LACTATE AND CALCIUM CHLORIDE: 600; 310; 30; 20 INJECTION, SOLUTION INTRAVENOUS at 16:33

## 2024-01-30 RX ADMIN — SODIUM CHLORIDE, SODIUM GLUCONATE, SODIUM ACETATE, POTASSIUM CHLORIDE AND MAGNESIUM CHLORIDE: 526; 502; 368; 37; 30 INJECTION, SOLUTION INTRAVENOUS at 16:52

## 2024-01-30 RX ADMIN — HYDROMORPHONE HYDROCHLORIDE 0.2 MG: 1 INJECTION, SOLUTION INTRAMUSCULAR; INTRAVENOUS; SUBCUTANEOUS at 21:04

## 2024-01-30 RX ADMIN — Medication 1000 UNITS: at 05:14

## 2024-01-30 RX ADMIN — PROPOFOL 150 MG: 10 INJECTION, EMULSION INTRAVENOUS at 16:42

## 2024-01-30 RX ADMIN — OXYCODONE HYDROCHLORIDE 10 MG: 10 TABLET ORAL at 10:14

## 2024-01-30 RX ADMIN — POTASSIUM CHLORIDE AND SODIUM CHLORIDE: 900; 150 INJECTION, SOLUTION INTRAVENOUS at 13:54

## 2024-01-30 RX ADMIN — Medication 200 MCG: at 18:35

## 2024-01-30 RX ADMIN — HYDROMORPHONE HYDROCHLORIDE 0.4 MG: 1 INJECTION, SOLUTION INTRAMUSCULAR; INTRAVENOUS; SUBCUTANEOUS at 20:41

## 2024-01-30 RX ADMIN — HYDROMORPHONE HYDROCHLORIDE 0.4 MG: 1 INJECTION, SOLUTION INTRAMUSCULAR; INTRAVENOUS; SUBCUTANEOUS at 20:49

## 2024-01-30 RX ADMIN — HYDROMORPHONE HYDROCHLORIDE 0.6 MG: 2 INJECTION INTRAMUSCULAR; INTRAVENOUS; SUBCUTANEOUS at 17:14

## 2024-01-30 RX ADMIN — Medication 200 MCG: at 18:24

## 2024-01-30 RX ADMIN — Medication 950 MG: at 05:14

## 2024-01-30 RX ADMIN — OMEPRAZOLE 20 MG: 20 CAPSULE, DELAYED RELEASE ORAL at 05:14

## 2024-01-30 RX ADMIN — MIDAZOLAM HYDROCHLORIDE 2 MG: 1 INJECTION, SOLUTION INTRAMUSCULAR; INTRAVENOUS at 16:36

## 2024-01-30 RX ADMIN — SUFENTANIL CITRATE 1 MCG/KG/HR: 50 INJECTION EPIDURAL; INTRAVENOUS at 16:54

## 2024-01-30 RX ADMIN — ROCURONIUM BROMIDE 40 MG: 50 INJECTION, SOLUTION INTRAVENOUS at 16:42

## 2024-01-30 RX ADMIN — OXYCODONE 5 MG: 5 TABLET ORAL at 13:53

## 2024-01-30 RX ADMIN — FENTANYL CITRATE 50 MCG: 50 INJECTION, SOLUTION INTRAMUSCULAR; INTRAVENOUS at 16:51

## 2024-01-30 RX ADMIN — SUGAMMADEX 200 MG: 100 INJECTION, SOLUTION INTRAVENOUS at 16:54

## 2024-01-30 RX ADMIN — LABETALOL HYDROCHLORIDE 10 MG: 5 INJECTION, SOLUTION INTRAVENOUS at 23:30

## 2024-01-30 RX ADMIN — LIDOCAINE HYDROCHLORIDE 60 MG: 20 INJECTION, SOLUTION EPIDURAL; INFILTRATION; INTRACAUDAL at 16:42

## 2024-01-30 RX ADMIN — SUCRALFATE 1 G: 1 TABLET ORAL at 05:14

## 2024-01-30 RX ADMIN — POTASSIUM CHLORIDE AND SODIUM CHLORIDE: 900; 150 INJECTION, SOLUTION INTRAVENOUS at 04:05

## 2024-01-30 RX ADMIN — PROPOFOL 140 MCG/KG/MIN: 10 INJECTION, EMULSION INTRAVENOUS at 16:54

## 2024-01-30 RX ADMIN — CEFAZOLIN 2 G: 1 INJECTION, POWDER, FOR SOLUTION INTRAMUSCULAR; INTRAVENOUS at 16:42

## 2024-01-30 RX ADMIN — OXYCODONE HYDROCHLORIDE 10 MG: 5 SOLUTION ORAL at 20:12

## 2024-01-30 RX ADMIN — FENTANYL CITRATE 50 MCG: 50 INJECTION, SOLUTION INTRAMUSCULAR; INTRAVENOUS at 20:08

## 2024-01-30 RX ADMIN — FENTANYL CITRATE 50 MCG: 50 INJECTION, SOLUTION INTRAMUSCULAR; INTRAVENOUS at 20:15

## 2024-01-30 RX ADMIN — Medication 50 MG: at 16:51

## 2024-01-30 RX ADMIN — FENTANYL CITRATE 50 MCG: 50 INJECTION, SOLUTION INTRAMUSCULAR; INTRAVENOUS at 16:38

## 2024-01-30 ASSESSMENT — PAIN DESCRIPTION - PAIN TYPE
TYPE: SURGICAL PAIN
TYPE: ACUTE PAIN;SURGICAL PAIN

## 2024-01-30 ASSESSMENT — PAIN SCALES - GENERAL: PAIN_LEVEL: 6

## 2024-01-30 NOTE — PROGRESS NOTES
Neurosurgery Progress Note    Subjective:  POD#7 stage 1 L4-S1 ALIF  POD#5 left L2/3 XLIF  POD#0 redo L2-3 laminotomy and L2-S1 perc fusion      Chest x-ray does show an area of atelectasis. Patient has been treated for valley fever.   Chest CT today stable; similar to cxr   Oxygen saturation is stable now; currently weaned down to 0-1L    Patient is better today  Notes having bad reflux which she normal admits to eating Tums frequently at home - improving  Having increased abdominal cramping/gas pain this AM.  Increased left anterior thigh pain/discomfort related to XLIF  Has long standing right L5 numbness  Not mobilizing   Will tsf from bed to chair  Langley in due to immobility and stage 3 surgery pending   + flatus, +BM  She is happy her painful hand IVs are removed.     Vitals / labs stable ready for Stage III today  Calcium/PTH normal         Exam:  Patient anxious appearing  Dressing dry  Motor 5/5  Sensory intact   Abdomen soft   Langley in place    BP  Min: 125/64  Max: 151/80  Pulse  Av.5  Min: 82  Max: 94  Resp  Av  Min: 15  Max: 17  Temp  Av.4 °C (97.6 °F)  Min: 36.3 °C (97.3 °F)  Max: 36.6 °C (97.9 °F)  SpO2  Av.2 %  Min: 94 %  Max: 99 %    No data recorded          Recent Labs     24  0015 24  0118   SODIUM 134* 137   POTASSIUM 3.8 4.3   CHLORIDE 100 103   CO2 24 22   GLUCOSE 142* 102*   BUN 19 16   CREATININE 0.53 0.50   CALCIUM 8.5 8.1*                 Intake/Output                         24 0700 - 24 0659 24 07 - 24 0659     [id number] 0123-4125 Total 7408-7137 5854-1491 Total                 Intake    Total Intake -- -- -- -- -- --       Output    Urine  --  672 675  --  -- --    Output (mL) (Urethral Catheter Non-latex 1 Fr.) -- 670 675 -- -- --    Total Output -- 675 675 -- -- --       Net I/O     -- -675 -675 -- -- --              Intake/Output Summary (Last 24 hours) at 2024 1010  Last data filed at 2024 0420  Gross per 24 hour    Intake --   Output 675 ml   Net -675 ml               calcium citrate  950 mg DAILY    vitamin D3  1,000 Units DAILY    polyethylene glycol/lytes  1 Packet DAILY AT NOON    enoxaparin (LOVENOX) injection  40 mg Q EVENING    ALPRAZolam  0.25 mg Q6HRS PRN    albuterol  2 Puff Q4HRS (RT)    ketorolac  15 mg Q6HRS PRN    Pharmacy Consult Request  1 Each PHARMACY TO DOSE    Respiratory Therapy Consult   Continuous RT    0.9 % NaCl with KCl 20 mEq 1,000 mL   Continuous    calcium carbonate  1,000 mg Q4HRS PRN    mag hydrox-al hydrox-simeth  10 mL BID    amitriptyline  10 mg HS PRN    atorvastatin  20 mg Q EVENING    clobetasol  1 Application  QDAY PRN    gabapentin  600 mg Q EVENING    lisinopril  10 mg DAILY    sucralfate  1 g BID    Pharmacy Consult Request  1 Each PHARMACY TO DOSE    docusate sodium  100 mg BID    senna-docusate  1 Tablet Nightly    senna-docusate  1 Tablet Q24HRS PRN    magnesium hydroxide  30 mL QDAY PRN    bisacodyl  10 mg Q24HRS PRN    sodium phosphate  1 Each Once PRN    diphenhydrAMINE  25 mg Q6HRS PRN    Or    diphenhydrAMINE  25 mg Q6HRS PRN    ondansetron  4 mg Q4HRS PRN    ondansetron  4 mg Q4HRS PRN    cyclobenzaprine  10 mg Q8HRS PRN    labetalol  10 mg Q HOUR PRN    benzocaine-menthol  1 Lozenge Q2HRS PRN    oxyCODONE immediate-release  5 mg Q4HRS PRN    HYDROmorphone  0.5 mg Q3HRS PRN    HYDROcodone/acetaminophen  1 Tablet Q4HRS PRN    oxyCODONE immediate-release  10 mg Q4HRS PRN    omeprazole  20 mg DAILY       Assessment and Plan:  Hospital day #8  POD #7/5/1  Posterior decompression with L2-S1 perc fusion today  Continue Albuterol   Needs to get OOB and sit in lounge chair throughout the day  Should be getting PT/OT daily  Azithromycin to cover any early pnx   PT/OT recommending rehab, consult placed.   Adding simethicone for gas pain  VSS, labs stable for stage III surgery today.  Following closely        Prophylactic anticoagulation: yes           Start date/time: okay to give PM  lovenox 1/29.

## 2024-01-30 NOTE — DISCHARGE PLANNING
Case Management Discharge Planning    Admission Date: 1/23/2024  GMLOS: 5.3  ALOS: 7    6-Clicks ADL Score: 16  6-Clicks Mobility Score: 10  PT and/or OT Eval ordered: Yes  Post-acute Referrals Ordered: Yes  Post-acute Choice Obtained: Yes  Has referral(s) been sent to post-acute provider:  No      Anticipated Discharge Dispo: Discharge Disposition: Discharged to home/self care (01)  Discharge Address: TBD  Discharge Contact Phone Number: TBD    DME Needed: No    Action(s) Taken:     LSW completed chart review. Per chart review, patient has stage III surgery scheduled for today. Post-op therapy recommendations pending.  Physiatry consult pending. LSW sent local [place] referrals per protocol in the event that patient is declined from [facility]. IRF choice form scanned in to chart. Plan of care ongoing.    Escalations Completed: None    Medically Clear: No    Next Steps: Pending post op PT/OT, PMR consult, SNF acceptance, medical clearance.     Barriers to Discharge: Medical clearance and Pending PT Evaluation    Is the patient up for discharge tomorrow: No

## 2024-01-30 NOTE — THERAPY
Occupational Therapy  Daily Treatment     Patient Name: Dee Armstrong  Age:  68 y.o., Sex:  female  Medical Record #: [id number]  Today's Date: 1/29/2024     Precautions: Fall Risk, Lumbosacral Orthosis, Spinal / Back Precautions     Assessment    Pt seen for OT tx. Pt received up in chair and eager to participate in session. Pt is primarily limited by pain, weakness, balance deficits, and decreased activity tolerance causing her to require min- max A to complete ADLs, functional mobility, and txfs using FWW. Pt requires substantial time to complete OOB tasks, but is motivated to return to PLOF. Pt is anticipated to return to OR tomorrow (1/30) for stage III redo L2-3 laminotomy and L2-S1 perc fusion. Will follow up post-op for continued OT services and update recommendations as pt progresses.      Plan    Treatment Plan Status: Continue Current Treatment Plan  Type of Treatment: Self Care / Activities of Daily Living, Adaptive Equipment, Therapeutic Exercises, Therapeutic Activity  Treatment Frequency: 4 Times per Week  Treatment Duration: Until Therapy Goals Met    DC Equipment Recommendations: Tub / Shower Seat  Discharge Recommendations: Recommend post-acute placement for additional occupational therapy services prior to discharge home     Objective      Vitals   O2 (LPM) 2   O2 Delivery Device Silicone Nasal Cannula   Pain 0 - 10 Group   Therapist Pain Assessment Post Activity Pain Same as Prior to Activity;Nurse Notified  (Not rated, reported an increase in back pain with activity)   Cognition    Level of Consciousness Alert   Comments Pleasant and cooperative, limited by pain. Requires substantial amount of time to complete all tasks   Balance   Sitting Balance (Static) Fair   Sitting Balance (Dynamic) Fair -   Standing Balance (Static) Fair -   Standing Balance (Dynamic) Fair -   Weight Shift Sitting Poor   Weight Shift Standing Poor   Skilled Intervention Verbal Cuing;Tactile Cuing;Compensatory  Strategies   Comments w/FWW   Bed Mobility    Supine to Sit   (Up to chair post)   Sit to Supine Moderate Assist  (Assist with legs)   Scooting Moderate Assist   Rolling Minimum Assist to Lt.   Skilled Intervention Verbal Cuing;Tactile Cuing   Comments HOB flat, log roll   Activities of Daily Living   Eating Modified Independent   Grooming Supervision;Seated   Upper Body Dressing Minimal Assist  (Doff LSO)   Lower Body Dressing Maximal Assist   Toileting Maximal Assist  (Required assist with pericare aftter attempted BM on BSC)   Skilled Intervention Verbal Cuing;Tactile Cuing;Compensatory Strategies   6 Clicks Daily Activity Score 16   Functional Mobility   Sit to Stand Minimal Assist   Bed, Chair, Wheelchair Transfer Minimal Assist   Toilet Transfers Minimal Assist  (BSC)   Mobility Chair > foot of bed > BSC > EOB   Skilled Intervention Verbal Cuing;Tactile Cuing;Compensatory Strategies   Comments Requires increased time to complete and takes frequent rest breaks due to pain and generalized weakness   Activity Tolerance   Sitting in Chair Up to chair pre   Sitting Edge of Bed 5 min   Standing 15 min   Patient / Family Goals   Patient / Family Goal #1 less pain   Goal #1 Outcome Progressing slower than expected   Short Term Goals   Short Term Goal # 1 pt will complete toileting ADL at SPV level   Goal Outcome # 1 Progressing as expected   Short Term Goal # 2 pt will tolerate >5min standing grooming at SPV level   Goal Outcome # 2 Progressing as expected   Short Term Goal # 3 pt will complete LB dress with AE PRN at SPV level   Goal Outcome # 3 Goal not met   Education Group   Education Provided Role of Occupational Therapist;Activities of Daily Living;Transfers   Role of Occupational Therapist Patient Response Patient;Acceptance;Explanation;Verbal Demonstration   Transfers Patient Response Patient;Acceptance;Explanation;Verbal Demonstration   ADL Patient Response Patient;Acceptance;Explanation;Verbal Demonstration

## 2024-01-30 NOTE — CARE PLAN
The patient is Stable - Low risk of patient condition declining or worsening    Shift Goals  Clinical Goals: pain control, mobility.  Patient Goals: rest, pain control  Family Goals: Not present    Progress made toward(s) clinical / shift goals:    Problem: Skin Integrity  Goal: Skin integrity is maintained or improved  Outcome: Progressing       Patient is not progressing towards the following goals:      Problem: Pain - Standard  Goal: Alleviation of pain or a reduction in pain to the patient’s comfort goal  Outcome: Not Progressing     Pt. Continues to shift her weight appropriately and frequently in bed. Pt. Is able to turn herself.     Pt. Educated more on the importance of calling before she is at a higher level of pain. Pt. Uses pain scale appropriately. Medicated per emar.

## 2024-01-30 NOTE — THERAPY
Physical Therapy   Daily Treatment     Patient Name: Dee Armstrong  Age:  68 y.o., Sex:  female  Medical Record #: [id number]  Today's Date: 1/29/2024     Precautions  Precautions: Fall Risk;Lumbosacral Orthosis;Spinal / Back Precautions   Comments: LSO when OOB    Assessment  Pt seen for PT tx session with mobility detailed down below. Pt continues to be limited by pain and fatigue with mobility, however she was able to mobilize to EOB and remain sitting for ~20 minutes unsupported. Pt educated on importance of increased time OOB each day to improve pt's activity tolerance. Pt with plan for stage III surgery tomorrow 1/30, will follow up post op as appropriate.     Plan    Treatment Plan Status: Continue Current Treatment Plan  Type of Treatment: Bed Mobility, Equipment, Gait Training, Neuro Re-Education / Balance, Self Care / Home Evaluation, Therapeutic Activities, Therapeutic Exercise  Treatment Frequency: 5 Times per Week  Treatment Duration: Until Therapy Goals Met    DC Equipment Recommendations: Unable to determine at this time  Discharge Recommendations: Recommend post-acute placement for additional physical therapy services prior to discharge home        Vitals   O2 (LPM) 2   O2 Delivery Device Silicone Nasal Cannula   Pain 0 - 10 Group   Therapist Pain Assessment Post Activity Pain Same as Prior to Activity   Cognition    Cognition / Consciousness WDL   Balance   Sitting Balance (Static) Fair   Sitting Balance (Dynamic) Fair   Standing Balance (Static) Fair -   Standing Balance (Dynamic) Fair -   Weight Shift Sitting Fair   Weight Shift Standing Poor   Skilled Intervention Verbal Cuing   Comments FWW in standing   Bed Mobility    Supine to Sit Moderate Assist   Scooting Moderate Assist   Rolling Minimum Assist to Lt.   Skilled Intervention Verbal Cuing   Comments HOB slightly elevated, log roll   Gait Analysis   Comments NT   Functional Mobility   Sit to Stand Contact Guard Assist   Bed, Chair,  Wheelchair Transfer Contact Guard Assist   Toilet Transfers Contact Guard Assist   Transfer Method Stand Pivot   Mobility eob>bsc   Skilled Intervention Verbal Cuing   Comments pt able to stand unassisted with slightly raised bed   How much difficulty does the patient currently have...   Turning over in bed (including adjusting bedclothes, sheets and blankets)? 2   Sitting down on and standing up from a chair with arms (e.g., wheelchair, bedside commode, etc.) 1   Moving from lying on back to sitting on the side of the bed? 1   How much help from another person does the patient currently need...   Moving to and from a bed to a chair (including a wheelchair)? 3   Need to walk in a hospital room? 2   Climbing 3-5 steps with a railing? 1   6 clicks Mobility Score 10   Activity Tolerance   Sitting in Chair 15 min (BSC)   Sitting Edge of Bed 10 min   Standing <1 min   Comments limited by pain and fatigue   Short Term Goals    Short Term Goal # 1 Pt will perform bed mobility with supervision to progress function in 6 visits.   Goal Outcome # 1 Progressing slower than expected   Short Term Goal # 2 Pt will transfer with FWW and supervision to progress function in 6 visits.   Goal Outcome # 2 Progressing slower than expected   Short Term Goal # 3 Pt will ambulate 100ft with FWW and supervision to progress function in 6 visits.   Goal Outcome # 3 Goal not met   Physical Therapy Treatment Plan   Physical Therapy Treatment Plan Continue Current Treatment Plan   Anticipated Discharge Equipment and Recommendations   DC Equipment Recommendations Unable to determine at this time   Discharge Recommendations Recommend post-acute placement for additional physical therapy services prior to discharge home   Interdisciplinary Plan of Care Collaboration   IDT Collaboration with  Nursing   Patient Position at End of Therapy Seated  (pt on BSC, RN aware and stating she can get pt back to bed when pt ready)   Collaboration Comments RN  updated   Session Information   Date / Session Number  1/29- 2 (2/5, 2/1)

## 2024-01-30 NOTE — DISCHARGE PLANNING
Care Transition Team Assessment    Information Source  Orientation Level: Oriented X4  Information Given By: Patient  Informant's Name: Dee Armstrong  Who is responsible for making decisions for patient? : Patient    Readmission Evaluation  Is this a readmission?: No    Elopement Risk  Legal Hold: No  Ambulatory or Self Mobile in Wheelchair: Yes  Disoriented: No  Psychiatric Symptoms: None  History of Wandering: No  Elopement this Admit: No  Vocalizing Wanting to Leave: No  Displays Behaviors, Body Language Wanting to Leave: No-Not at Risk for Elopement  Elopement Risk: Not at Risk for Elopement  Personal Belongings: Hospital Clothing Only    Interdisciplinary Discharge Planning  Does Admitting Nurse Feel This Could be a Complex Discharge?: No  Primary Care Physician: YIN BARAJAS M.D.  Lives with - Patient's Self Care Capacity: Spouse  Patient or legal guardian wants to designate a caregiver: No  Support Systems: Spouse / Significant Other  Housing / Facility: [address] (1 step to the front door.)  Name of Care Facility: na  Do You Take your Prescribed Medications Regularly: Yes  Able to Return to Previous ADL's: Future Time w/Therapy  Mobility Issues: No  Prior Services: None  Patient Prefers to be Discharged to:: rehab  Durable Medical Equipment: Not Applicable  DME Provider / Phone: na    Discharge Preparedness  What is your plan after discharge?: Skilled nursing facility  What are your discharge supports?: Spouse  Prior Functional Level: Ambulatory, Independent with Activities of Daily Living    Functional Assesment  Prior Functional Level: Ambulatory, Independent with Activities of Daily Living    Finances  Financial Barriers to Discharge: No  Prescription Coverage: Yes (Uses [facility] on [address]    Vision / Hearing Impairment  Right Eye Vision: Impaired, Wears Glasses  Left Eye Vision: Impaired, Wears Glasses  Hearing Impairment : No    Advance Directive  Advance Directive?: Living Will  (Per the patient her  will serve as her Advanced Directive.)  Durable Power of  Name and Contact : Akil Armstrong [phone]    Domestic Abuse  Have you ever been the victim of abuse or violence?: No  Physical Abuse or Sexual Abuse: No  Verbal Abuse or Emotional Abuse: No  Possible Abuse/Neglect Reported to:: Not Applicable    Discharge Risks or Barriers  Discharge risks or barriers?: No    Anticipated Discharge Information  Discharge Disposition: Discharged to home/self care (01)  Discharge Address: TBD  Discharge Contact Phone Number: TBD    CM met with the patient at the bedside to discuss discharge planning.   Per the patient she is open to going to rehab if needed.   Choice form for rehab was obtained and faxed to the DPA.

## 2024-01-30 NOTE — CARE PLAN
The patient is Watcher - Medium risk of patient condition declining or worsening    Shift Goals  Clinical Goals: pain control, safety, rest, monitor i/os, surgery tomorrow  Patient Goals: pain control, surgery tomorrow  Family Goals: nas    Progress made toward(s) clinical / shift goals:    Problem: Pain - Standard  Goal: Alleviation of pain or a reduction in pain to the patient’s comfort goal  Outcome: Progressing  Note: Pt pain managed utilizing PRN pain medication per MAR. Pt able to voice needs/concerns.      Problem: Knowledge Deficit - Standard  Goal: Patient and family/care givers will demonstrate understanding of plan of care, disease process/condition, diagnostic tests and medications  Outcome: Progressing  Note: Pt updated on POC, all questions answered.      Problem: Fall Risk  Goal: Patient will remain free from falls  Outcome: Progressing  Note: Pt high fall risk per Kalyani Monroe, appropriate fall precautions in place. Pt has not fallen thus far into hospital stay.        Patient is not progressing towards the following goals:

## 2024-01-30 NOTE — CARE PLAN
Problem: Pain - Standard  Goal: Alleviation of pain or a reduction in pain to the patient’s comfort goal  Outcome: Progressing     Problem: Knowledge Deficit - Standard  Goal: Patient and family/care givers will demonstrate understanding of plan of care, disease process/condition, diagnostic tests and medications  Outcome: Progressing       The patient is Watcher - Medium risk of patient condition declining or worsening    Shift Goals  Clinical Goals: Pain control, prepare for surgery  Patient Goals: Pain control  Family Goals: nas    Progress made toward(s) clinical / shift goals:  Taught pt 0-10 pain scale and non-pharmacological method of pain management, encouraged to verbalize when in pain. Administered PRN pain meds as needed. Pt updated on plan of care, verbalizes understanding. CHG bath completed.     Patient is not progressing towards the following goals:

## 2024-01-31 ENCOUNTER — APPOINTMENT (OUTPATIENT)
Dept: RADIOLOGY | Facility: MEDICAL CENTER | Age: 69
DRG: 454 | End: 2024-01-31
Attending: NEUROLOGICAL SURGERY
Payer: MEDICARE

## 2024-01-31 ENCOUNTER — HOSPITAL ENCOUNTER (INPATIENT)
Dept: RADIOLOGY | Facility: MEDICAL CENTER | Age: 69
DRG: 454 | End: 2024-01-31
Attending: PHYSICIAN ASSISTANT | Admitting: NEUROLOGICAL SURGERY
Payer: MEDICARE

## 2024-01-31 PROBLEM — R29.818 NEUROGENIC CLAUDICATION: Status: ACTIVE | Noted: 2024-01-24

## 2024-01-31 PROCEDURE — 770001 HCHG ROOM/CARE - MED/SURG/GYN PRIV*

## 2024-01-31 PROCEDURE — 700102 HCHG RX REV CODE 250 W/ 637 OVERRIDE(OP)

## 2024-01-31 PROCEDURE — 700105 HCHG RX REV CODE 258: Performed by: PHYSICIAN ASSISTANT

## 2024-01-31 PROCEDURE — A9270 NON-COVERED ITEM OR SERVICE: HCPCS | Performed by: PHYSICIAN ASSISTANT

## 2024-01-31 PROCEDURE — 72131 CT LUMBAR SPINE W/O DYE: CPT

## 2024-01-31 PROCEDURE — 700102 HCHG RX REV CODE 250 W/ 637 OVERRIDE(OP): Performed by: PHYSICIAN ASSISTANT

## 2024-01-31 PROCEDURE — 700111 HCHG RX REV CODE 636 W/ 250 OVERRIDE (IP): Performed by: PHYSICIAN ASSISTANT

## 2024-01-31 PROCEDURE — A9270 NON-COVERED ITEM OR SERVICE: HCPCS

## 2024-01-31 PROCEDURE — 97530 THERAPEUTIC ACTIVITIES: CPT | Mod: CQ

## 2024-01-31 PROCEDURE — 700111 HCHG RX REV CODE 636 W/ 250 OVERRIDE (IP): Mod: JZ | Performed by: PHYSICIAN ASSISTANT

## 2024-01-31 RX ORDER — ENOXAPARIN SODIUM 100 MG/ML
40 INJECTION SUBCUTANEOUS DAILY
Status: DISCONTINUED | OUTPATIENT
Start: 2024-02-01 | End: 2024-01-31

## 2024-01-31 RX ORDER — SODIUM CHLORIDE 9 MG/ML
INJECTION, SOLUTION INTRAVENOUS CONTINUOUS
Status: DISCONTINUED | OUTPATIENT
Start: 2024-01-31 | End: 2024-02-01 | Stop reason: ALTCHOICE

## 2024-01-31 RX ORDER — CYCLOBENZAPRINE HCL 10 MG
10 TABLET ORAL EVERY 8 HOURS
Status: DISCONTINUED | OUTPATIENT
Start: 2024-01-31 | End: 2024-02-02 | Stop reason: HOSPADM

## 2024-01-31 RX ADMIN — HYDROMORPHONE HYDROCHLORIDE 0.5 MG: 1 INJECTION, SOLUTION INTRAMUSCULAR; INTRAVENOUS; SUBCUTANEOUS at 12:33

## 2024-01-31 RX ADMIN — CYCLOBENZAPRINE 10 MG: 10 TABLET, FILM COATED ORAL at 20:13

## 2024-01-31 RX ADMIN — Medication 950 MG: at 06:16

## 2024-01-31 RX ADMIN — ATORVASTATIN CALCIUM 20 MG: 20 TABLET, FILM COATED ORAL at 16:23

## 2024-01-31 RX ADMIN — CYCLOBENZAPRINE 10 MG: 10 TABLET, FILM COATED ORAL at 03:19

## 2024-01-31 RX ADMIN — OMEPRAZOLE 20 MG: 20 CAPSULE, DELAYED RELEASE ORAL at 06:16

## 2024-01-31 RX ADMIN — HYDROMORPHONE HYDROCHLORIDE 0.5 MG: 1 INJECTION, SOLUTION INTRAMUSCULAR; INTRAVENOUS; SUBCUTANEOUS at 03:19

## 2024-01-31 RX ADMIN — ALBUTEROL SULFATE 2 PUFF: 90 AEROSOL, METERED RESPIRATORY (INHALATION) at 15:14

## 2024-01-31 RX ADMIN — SUCRALFATE 1 G: 1 TABLET ORAL at 06:17

## 2024-01-31 RX ADMIN — ALBUTEROL SULFATE 2 PUFF: 90 AEROSOL, METERED RESPIRATORY (INHALATION) at 23:48

## 2024-01-31 RX ADMIN — DOCUSATE SODIUM 50 MG AND SENNOSIDES 8.6 MG 1 TABLET: 8.6; 5 TABLET, FILM COATED ORAL at 20:13

## 2024-01-31 RX ADMIN — ALUMINUM HYDROXIDE, MAGNESIUM HYDROXIDE, AND DIMETHICONE 10 ML: 400; 400; 40 SUSPENSION ORAL at 06:17

## 2024-01-31 RX ADMIN — OXYCODONE HYDROCHLORIDE 10 MG: 10 TABLET ORAL at 04:30

## 2024-01-31 RX ADMIN — SUCRALFATE 1 G: 1 TABLET ORAL at 16:22

## 2024-01-31 RX ADMIN — OXYCODONE HYDROCHLORIDE 10 MG: 10 TABLET ORAL at 23:24

## 2024-01-31 RX ADMIN — LISINOPRIL 10 MG: 10 TABLET ORAL at 06:16

## 2024-01-31 RX ADMIN — HYDROMORPHONE HYDROCHLORIDE 0.5 MG: 1 INJECTION, SOLUTION INTRAMUSCULAR; INTRAVENOUS; SUBCUTANEOUS at 18:15

## 2024-01-31 RX ADMIN — DOCUSATE SODIUM 100 MG: 100 CAPSULE, LIQUID FILLED ORAL at 06:16

## 2024-01-31 RX ADMIN — ALPRAZOLAM 0.25 MG: 0.25 TABLET ORAL at 04:30

## 2024-01-31 RX ADMIN — ALBUTEROL SULFATE 2 PUFF: 90 AEROSOL, METERED RESPIRATORY (INHALATION) at 00:44

## 2024-01-31 RX ADMIN — HYDROMORPHONE HYDROCHLORIDE 0.5 MG: 1 INJECTION, SOLUTION INTRAMUSCULAR; INTRAVENOUS; SUBCUTANEOUS at 00:17

## 2024-01-31 RX ADMIN — ENOXAPARIN SODIUM 40 MG: 100 INJECTION SUBCUTANEOUS at 16:23

## 2024-01-31 RX ADMIN — CEFAZOLIN 2 G: 2 INJECTION, POWDER, FOR SOLUTION INTRAMUSCULAR; INTRAVENOUS at 08:35

## 2024-01-31 RX ADMIN — KETOROLAC TROMETHAMINE 15 MG: 15 INJECTION, SOLUTION INTRAMUSCULAR; INTRAVENOUS at 01:00

## 2024-01-31 RX ADMIN — ALBUTEROL SULFATE 2 PUFF: 90 AEROSOL, METERED RESPIRATORY (INHALATION) at 06:16

## 2024-01-31 RX ADMIN — DOCUSATE SODIUM 100 MG: 100 CAPSULE, LIQUID FILLED ORAL at 16:22

## 2024-01-31 RX ADMIN — OXYCODONE HYDROCHLORIDE 10 MG: 10 TABLET ORAL at 15:12

## 2024-01-31 RX ADMIN — CYCLOBENZAPRINE 10 MG: 10 TABLET, FILM COATED ORAL at 11:29

## 2024-01-31 RX ADMIN — SODIUM CHLORIDE: 9 INJECTION, SOLUTION INTRAVENOUS at 00:22

## 2024-01-31 RX ADMIN — POLYETHYLENE GLYCOL 3350 1 PACKET: 17 POWDER, FOR SOLUTION ORAL at 11:29

## 2024-01-31 RX ADMIN — OXYCODONE HYDROCHLORIDE 10 MG: 10 TABLET ORAL at 08:46

## 2024-01-31 RX ADMIN — GABAPENTIN 600 MG: 300 CAPSULE ORAL at 16:22

## 2024-01-31 RX ADMIN — ALUMINUM HYDROXIDE, MAGNESIUM HYDROXIDE, AND DIMETHICONE 10 ML: 400; 400; 40 SUSPENSION ORAL at 16:24

## 2024-01-31 RX ADMIN — HYDROMORPHONE HYDROCHLORIDE 0.5 MG: 1 INJECTION, SOLUTION INTRAMUSCULAR; INTRAVENOUS; SUBCUTANEOUS at 06:17

## 2024-01-31 RX ADMIN — Medication 1000 UNITS: at 06:17

## 2024-01-31 RX ADMIN — CEFAZOLIN 2 G: 2 INJECTION, POWDER, FOR SOLUTION INTRAMUSCULAR; INTRAVENOUS at 00:43

## 2024-01-31 RX ADMIN — ALBUTEROL SULFATE 2 PUFF: 90 AEROSOL, METERED RESPIRATORY (INHALATION) at 03:00

## 2024-01-31 ASSESSMENT — COGNITIVE AND FUNCTIONAL STATUS - GENERAL
CLIMB 3 TO 5 STEPS WITH RAILING: TOTAL
TURNING FROM BACK TO SIDE WHILE IN FLAT BAD: A LOT
MOVING FROM LYING ON BACK TO SITTING ON SIDE OF FLAT BED: UNABLE
MOVING TO AND FROM BED TO CHAIR: UNABLE
STANDING UP FROM CHAIR USING ARMS: A LITTLE
MOBILITY SCORE: 10
SUGGESTED CMS G CODE MODIFIER MOBILITY: CL
WALKING IN HOSPITAL ROOM: A LOT

## 2024-01-31 ASSESSMENT — GAIT ASSESSMENTS: GAIT LEVEL OF ASSIST: UNABLE TO PARTICIPATE

## 2024-01-31 NOTE — ANESTHESIA PREPROCEDURE EVALUATION
Case: [id number] Date/Time: 01/30/24 0048    Procedures:       FUSION, SPINE, LUMBAR, L2-S1      REMOVAL, HARDWARE    Location: [facility] OR 07 / SURGERY [facility]    Surgeons: Tim Rodriguez M.D.            Relevant Problems   CARDIAC   (positive) Essential hypertension      GI   (positive) GERD (gastroesophageal reflux disease)   (positive) Gastric ulcer         (positive) Kidney stone      Other   (positive) DDD (degenerative disc disease), lumbar   (positive) History of coccidioidomycosis   (positive) Lumbar radiculopathy   (positive) Osteoporosis   (positive) Severe obesity (HCC)       Physical Exam    Airway   Mallampati: II  TM distance: >3 FB  Neck ROM: full       Cardiovascular - normal exam  Rhythm: regular  Rate: normal  (-) murmur     Dental - normal exam           Pulmonary - normal exam  Breath sounds clear to auscultation     Abdominal    Neurological - normal exam                   Anesthesia Plan    ASA 3   ASA physical status 3 criteria: morbid obesity - BMI greater than or equal to 40    Plan - general       Airway plan will be ETT          Induction: intravenous    Postoperative Plan: Postoperative administration of opioids is intended.    Pertinent diagnostic labs and testing reviewed    Informed Consent:    Anesthetic plan and risks discussed with patient.    Use of blood products discussed with: patient whom consented to blood products.

## 2024-01-31 NOTE — OP REPORT
DATE OF SERVICE:  01/30/2024     PREOPERATIVE DIAGNOSES:   1.  Bilateral neurogenic claudication.  2.  L2-S1 degenerative scoliosis.  3.  Status post stage 1 L4-5 and L5-S1 ALIF followed by stage II L2-3 XLIF.  4.  Mechanical low back pain, refractive medical care.  5.  Delay in stage II and stage III given that the patient was unable to be   ventilated well when we placed her prone last week and required more pulmonary   toilet for atelectasis and hence we delayed that surgery until the patient   was stabilized medically.     POSTOPERATIVE DIAGNOSES:    1.  Bilateral neurogenic claudication.  2.  L2-S1 degenerative scoliosis.  3.  Status post stage 1 L4-5 and L5-S1 ALIF followed by stage II L2-3 XLIF.  4.  Mechanical low back pain, refractive medical care.  5.  Delay in stage II and stage III given that the patient was unable to be   ventilated well when we placed her prone last week and required more pulmonary   toilet for atelectasis and hence we delayed that surgery until the patient   was stabilized medically.     PROCEDURES:  Stage III of a planned multistage procedure for anterior,   posterior, circumferential decompression and fusion in separate stages.  Stage III:  1.  L2 through S1 posterolateral arthrodesis bilaterally.  2.  L2-S1 posterior pedicle screw fixation.  3.  Modifier-22 for extra degree of difficulty given the patient's body mass   index of 41 with degenerative scoliosis, all added extra hour to the standard   surgical procedure with higher degree of expertise, effort and time and risk   involved.  4.  Intraoperative use of neuronavigation XVision augmented reality system.  5.  Intraoperative CT scan with the O-arm for registration.     SURGEON:  Tim Rodriguez MD, Neurosurgery and Spine Surgery.     ASSISTANT:  Riaz Espinosa PA-C.     ANESTHESIA:  General endotracheal anesthesia.     ANESTHESIOLOGIST:  ____.     COMPLICATIONS:  None.     ESTIMATED BLOOD LOSS:  Less than 100 mL.      PREOPERATIVE NOTE:  This is an extremely pleasant 68-year-old lady presents   with neurogenic claudication, radiculopathies with degenerative scoliosis.    She underwent stage I L4-5 and L5-S1 ALIF last week and did well from that.    She then had stage II L2-3 XLIF on the left side, has done well from that   overall.  She was unable to have stage III at that time as she was poorly   ventilating and oxygenating prone and given her poor lung function, we elected   to hold off that surgery and to have her stabilized over the weekend, which   we have done.     PLAN:  I discussed surgical procedure, alternatives, goals, risks, benefits,   complications in detail with the patient, used a bone model, MRI and   educational handouts to assist the patient with her decision making, answered   all questions to the best of my ability.  The patient understood and consented   to surgery for above listed procedure.  Details are well contained in the   office visit notes.     DESCRIPTION OF PROCEDURE:  The patient was brought to the operating room and   placed under general anesthesia.  She was placed prone on the operating OSI   table with care taken bony prominences, peripheral nerves.  Lumbar region   prepped and draped in the usual sterile fashion.  Following localization, I   placed a small incision over the left posterior iliac crest and placed the   first post in there and stabilized it with the fiducial markers.    Intraoperative CT scan was performed with the O-arm and the system registered.    I then using neuronavigation to make 2 parallel incisions over L2-L5, I   marked out the prior screws.  I incised the fascia.  I then used the XVision   to place screws bilaterally in L2 and right L5 and bilateral S1 screws.  I   tried the left L5, there was really no significant pedicle available with the   scoliosis and collapse that made it very difficult to safely place a screw   here and I opted to leave one out.  The wound  was irrigated, I stimulated the   screws.  They all stimulated well over 20 millivolts.  Precontoured rods was   placed over the polyaxial screw heads at L2-S1 bilaterally.  Final tightening   screws were placed and locked.  X-ray confirmed excellent position of the   screws overall given her deformity and the rods.  I elected not to remove the   L3-4 screws as they were very deep and covered by bone, and more medialized   than the trajectories for the L2-S1 screws and I felt given the patient's body   habitus and her age, and her poor overall lung function that shortening the   surgery would be in her best interest.  Given the anterior construct, I was   very happy to just bridge over from L2-S1 given the strong XLIF in front   anteriorly at L2 with the integrated plate and screws, and hence I was very   comfortable performing dual rods, having dual rods overall with L2-S1 above   the L3-4 rods.  The wound was irrigated.  Immaculate hemostasis achieved.  I   decorticated the transverse process and packed bone L2-S1 for posterolateral   arthrodesis bilaterally L2-S1.  Once immaculate hemostasis achieved, I closed   the fascia with 0 Vicryl and 2-0 Vicryl deep dermal layer, Steri-Strips to   skin.  Small sterile dressing was applied.  Swabs, needles, instruments   correct x2 count.  No complications were encountered.  The patient tolerated   the procedure, stable and transferred to recovery room.  The patient will be   observed at [facility] until she meets discharge criteria.    The patient will follow up at [facility] [place] in 2 weeks and 6 weeks'   time as arranged.        ______________________________  MD JOHN SAMANIEGO/BOBBY    DD:  01/30/2024 19:08  DT:  01/30/2024 19:48    Job#:  [id number]    CC:Emre Viramontes MD

## 2024-01-31 NOTE — ANESTHESIA TIME REPORT
Anesthesia Start and Stop Event Times       Date Time Event    1/30/2024 1606 Ready for Procedure     1633 Anesthesia Start     1947 Anesthesia Stop          Responsible Staff  01/30/24      Name Role Begin End    Nanette Mora M.D. Anesth 1633 1947          Overtime Reason:  no overtime (within assigned shift)    Comments:

## 2024-01-31 NOTE — OR SURGEON
Immediate Post OP Note    PreOp Diagnosis: L2-1 DDD, left radiculopathy.       PostOp Diagnosis: Same.       Procedure(s):  FUSION, SPINE, LUMBAR, L2-S1 - Wound Class: Clean    Surgeon(s):  Tim Rodriguez M.D.    Anesthesiologist/Type of Anesthesia:  Anesthesiologist: Nanette Mora M.D./General    Surgical Staff:  Assistant: Riaz Espinosa P.A.-C.  Circulator: Cris Arias R.N.  Relief Scrub: Yeyo Soriano  Scrub Person: Jess Chowdhury  Radiology Technologist: Sharon Sunshine; Aleena Hayden    Specimens removed if any:  * No specimens in log *    Estimated Blood Loss: 100 cc     Findings: L2-S1 DDD, see dictation.     Complications: None.         1/30/2024 7:27 PM Riaz Espinosa P.A.-C.

## 2024-01-31 NOTE — PROGRESS NOTES
1150 Notified CHRIS Byrd PA-C regarding changes to neuro check, new numbness to anterior left thigh. Per CHRIS Byrd this is due to the ex-lif.

## 2024-01-31 NOTE — PROGRESS NOTES
Neurosurgery Progress Note    Subjective:  POD#8 stage 1 L4-S1 ALIF  POD#6 left L2/3 XLIF  POD#1 redo L2-3 laminotomy and L2-S1 perc fusion      Chest x-ray does show an area of atelectasis. Patient has been treated for valley fever.   Chest CT today stable; similar to cxr   Oxygen saturation is stable now; currently weaned down to 0-1L    Patient is better today  Notes having bad reflux which she normal admits to eating Tums frequently at home - improving  Abdominal cramping/gas pain resolved  Increased left anterior thigh pain/discomfort related to XLIF  Has long standing right L5 numbness  Not mobilizing   Will tsf from bed to chair  Langley in due to immobility, needs to come out today  + flatus, +BM  She is happy her painful hand IVs are removed.     Vitals / labs stable ready   Calcium/PTH normal         Exam:  Patient anxious appearing  Dressing dry  Motor 5/5  Sensory intact   Abdomen soft   Langley in place    BP  Min: 119/62  Max: 167/76  Pulse  Av.7  Min: 70  Max: 97  Resp  Av.9  Min: 12  Max: 22  Temp  Av.3 °C (97.4 °F)  Min: 36.1 °C (97 °F)  Max: 36.9 °C (98.4 °F)  Monitored Temp 2  Av.3 °C (97.3 °F)  Min: 36.3 °C (97.3 °F)  Max: 36.3 °C (97.3 °F)  SpO2  Av.1 %  Min: 92 %  Max: 99 %    No data recorded          Recent Labs     24  0015 24  0118   SODIUM 134* 137   POTASSIUM 3.8 4.3   CHLORIDE 100 103   CO2 24 22   GLUCOSE 142* 102*   BUN 19 16   CREATININE 0.53 0.50   CALCIUM 8.5 8.1*                 Intake/Output                         24 - 24 0659 24 - 24 06 Total  Total                 Intake    P.O.  --  50 50  --  -- --    P.O. -- 50 50 -- -- --    I.V.  --  1600  --  -- --    Volume (mL) (Lactated Ringers) -- 1600 1600 -- -- --    Total Intake -- 1650 1650 -- -- --       Output    Urine  850  350 1200  --  -- --    Output (mL) (Urethral Catheter Non-latex 1 Fr.)  -- --  --    Stool  --  -- --  --  -- --    Number of Times Stooled 1 x -- 1 x -- -- --    Total Output  -- -- --       Net I/O     -850 1300 450 -- -- --              Intake/Output Summary (Last 24 hours) at 1/31/2024 1138  Last data filed at 1/30/2024 2200  Gross per 24 hour   Intake 1650 ml   Output 1200 ml   Net 450 ml               Pharmacy Consult Request  1 Each PHARMACY TO DOSE    MD ANGELIC...DO NOT ADMINISTER NSAIDS or ASPIRIN unless ORDERED By Neurosurgery  1 Each PRN    NS   Continuous    [START ON 2/1/2024] enoxaparin (LOVENOX) injection  40 mg DAILY AT 1800    simethicone  125 mg Q6HRS PRN    calcium citrate  950 mg DAILY    vitamin D3  1,000 Units DAILY    polyethylene glycol/lytes  1 Packet DAILY AT NOON    enoxaparin (LOVENOX) injection  40 mg Q EVENING    ALPRAZolam  0.25 mg Q6HRS PRN    albuterol  2 Puff Q4HRS (RT)    ketorolac  15 mg Q6HRS PRN    Respiratory Therapy Consult   Continuous RT    0.9 % NaCl with KCl 20 mEq 1,000 mL   Continuous    calcium carbonate  1,000 mg Q4HRS PRN    mag hydrox-al hydrox-simeth  10 mL BID    amitriptyline  10 mg HS PRN    atorvastatin  20 mg Q EVENING    clobetasol  1 Application  QDAY PRN    gabapentin  600 mg Q EVENING    lisinopril  10 mg DAILY    sucralfate  1 g BID    docusate sodium  100 mg BID    senna-docusate  1 Tablet Nightly    senna-docusate  1 Tablet Q24HRS PRN    magnesium hydroxide  30 mL QDAY PRN    bisacodyl  10 mg Q24HRS PRN    sodium phosphate  1 Each Once PRN    diphenhydrAMINE  25 mg Q6HRS PRN    Or    diphenhydrAMINE  25 mg Q6HRS PRN    ondansetron  4 mg Q4HRS PRN    ondansetron  4 mg Q4HRS PRN    cyclobenzaprine  10 mg Q8HRS PRN    labetalol  10 mg Q HOUR PRN    benzocaine-menthol  1 Lozenge Q2HRS PRN    oxyCODONE immediate-release  5 mg Q4HRS PRN    HYDROmorphone  0.5 mg Q3HRS PRN    HYDROcodone/acetaminophen  1 Tablet Q4HRS PRN    oxyCODONE immediate-release  10 mg Q4HRS PRN    omeprazole  20 mg DAILY       Assessment and  Plan:  Hospital day #9  POD #8/6/1  Continue Albuterol   Needs to get OOB and sit in lounge chair throughout the day  Should be getting PT/OT daily  Azithromycin to cover any early pnx   PT/OT recommending rehab, consult placed. We prefer the patient go to [facility] over [facility]  Langley trial today.   Following closely        Prophylactic anticoagulation: yes           Start date/time: on mar

## 2024-01-31 NOTE — ANESTHESIA PROCEDURE NOTES
Airway    Date/Time: 1/30/2024 4:43 PM    Performed by: Nanette Mora M.D.  Authorized by: Nanette Mora M.D.    Location:  OR  Urgency:  Elective  Difficult Airway: No    Indications for Airway Management:  Anesthesia      Spontaneous Ventilation: absent    Sedation Level:  Deep  Preoxygenated: Yes    Patient Position:  Sniffing  Final Airway Type:  Endotracheal airway  Final Endotracheal Airway:  ETT  Cuffed: Yes    Technique Used for Successful ETT Placement:  Direct laryngoscopy    Insertion Site:  Oral  Blade Type:  Tanner  Laryngoscope Blade/Videolaryngoscope Blade Size:  3  ETT Size (mm):  7.0  Measured from:  Teeth  ETT to Teeth (cm):  21  Placement Verified by: auscultation and capnometry    Cormack-Lehane Classification:  Grade I - full view of glottis  Number of Attempts at Approach:  1

## 2024-01-31 NOTE — ANESTHESIA PROCEDURE NOTES
Peripheral IV    Date/Time: 1/30/2024 4:52 PM    Performed by: Cris Arias R.N.  Authorized by: Nanette Mora M.D.    Size:  20 G  Laterality:  Right  Peripheral IV Location:  Wrist  Local Anesthetic:  None  Site Prep:  Alcohol  Technique:  Direct puncture  Attempts:  3

## 2024-01-31 NOTE — OR NURSING
Pt needed IV in or prior to positioning . Pt positioned maintaining O2 saturation without difficult.

## 2024-01-31 NOTE — OR NURSING
Patient complaining of large amount of pain in back; medicated as ordered; pain level 8-7/10 decreased to 3/10 on 10 scale.  Patient repositioned to left side for approximately 45 minutes to 1 hour; ice applied to back incision   Patient tolerating small amount of fluid/popcycle; no complaints of nausea  Patient on O2 @ 2 l/m via nasal cannula  Neuro checks as directed: patient able to move all extremities WNL; push pull lower extremities equal and strong;denies numbness      2205  Transferred to room via bed  Narendra Cota, notified of patient's status and transfer to room  O2 tank > 50% full  Pertinent post op orders reviewed with receiving RN

## 2024-01-31 NOTE — ANESTHESIA POSTPROCEDURE EVALUATION
Patient: Dee Armstrong    Procedure Summary       Date: 01/30/24 Room / Location: [facility] 07 / SURGERY [facility]    Anesthesia Start: 1633 Anesthesia Stop: 1947    Procedure: FUSION, SPINE, LUMBAR, L2-S1 (Back) Diagnosis: (Lumbar stenosis , radiculopathy)    Surgeons: Tim Rodriguez M.D. Responsible Provider: Nanette Mora M.D.    Anesthesia Type: general ASA Status: 3            Final Anesthesia Type: general  Last vitals  BP   Blood Pressure : (!) 149/93    Temp   36.2 °C (97.1 °F)    Pulse   86   Resp   (!) 22    SpO2   95 %      Anesthesia Post Evaluation    Patient location during evaluation: PACU  Patient participation: complete - patient participated  Level of consciousness: awake and alert  Pain score: 6    Airway patency: patent  Anesthetic complications: no  Cardiovascular status: hemodynamically stable  Respiratory status: acceptable  Hydration status: euvolemic    PONV: none          No notable events documented.     Nurse Pain Score: 7 (NPRS)

## 2024-01-31 NOTE — THERAPY
Physical Therapy   Daily Treatment     Patient Name: Dee Armstrong  Age:  68 y.o., Sex:  female  Medical Record #: [id number]  Today's Date: 1/31/2024     Precautions  Precautions: Fall Risk;Lumbosacral Orthosis;Spinal / Back Precautions   Comments: LSO when OOB    Assessment    Pt greeted and seen for PT treatment. Pt needed Shaheed for bed mobility and to transfer to chair w/ FWW. She is greatly limited by pain at this time which negatively impacts functional mobility. Pt will continue to benefit from skilled PT to address deficits.       Plan    Treatment Plan Status: Continue Current Treatment Plan  Type of Treatment: Bed Mobility, Equipment, Gait Training, Neuro Re-Education / Balance, Self Care / Home Evaluation, Therapeutic Activities, Therapeutic Exercise  Treatment Frequency: 5 Times per Week  Treatment Duration: Until Therapy Goals Met    DC Equipment Recommendations: Unable to determine at this time  Discharge Recommendations: Recommend post-acute placement for additional physical therapy services prior to discharge home       01/31/24 1454   Cognition    Comments Pleasant and cooperative, limited by pain. Requires substantial amount of time to complete all tasks   Balance   Sitting Balance (Static) Fair   Sitting Balance (Dynamic) Fair   Standing Balance (Static) Fair -   Standing Balance (Dynamic) Poor +   Weight Shift Sitting Fair   Weight Shift Standing Poor   Skilled Intervention Verbal Cuing   Comments w/ FWW   Bed Mobility    Supine to Sit Minimal Assist   Scooting Minimal Assist   Rolling Minimum Assist to Lt.   Skilled Intervention Verbal Cuing;Sequencing;Facilitation   Comments log roll, increased time due to pain. Pt was unable to recite spinal precautions prior to mobility, good carryover at end of session, was able to recall 3/3 spinal precautions at end of session   Gait Analysis   Gait Level Of Assist Unable to Participate   Comments Pt took steps to chair   Functional Mobility   Sit to  Stand Minimal Assist   Bed, Chair, Wheelchair Transfer Minimal Assist   Transfer Method Stand Step   Mobility bed>chair   Skilled Intervention Verbal Cuing;Facilitation;Sequencing   Comments increased time for transferring to chair, pt with eyes closed through most of transfer despite cues to keep eyes open. Increased knee flexion.   Short Term Goals    Short Term Goal # 1 Pt will perform bed mobility with supervision to progress function in 6 visits.   Goal Outcome # 1 Progressing slower than expected   Short Term Goal # 2 Pt will transfer with FWW and supervision to progress function in 6 visits.   Goal Outcome # 2 Progressing slower than expected   Short Term Goal # 3 Pt will ambulate 100ft with FWW and supervision to progress function in 6 visits.   Goal Outcome # 3 Goal not met   Supervising Physical Therapist (PTA Treatments Only)   Supervising Physical Therapist Magalis Guerrero

## 2024-01-31 NOTE — CARE PLAN
Problem: Pain - Standard  Goal: Alleviation of pain or a reduction in pain to the patient’s comfort goal  Outcome: Progressing     Problem: Knowledge Deficit - Standard  Goal: Patient and family/care givers will demonstrate understanding of plan of care, disease process/condition, diagnostic tests and medications  Outcome: Progressing       The patient is Stable - Low risk of patient condition declining or worsening    Shift Goals  Clinical Goals: Pain control, mobility  Patient Goals: Pain control  Family Goals: N/A    Progress made toward(s) clinical / shift goals:  Taught pt 0-10 pain scale and non-pharmacological method of pain management, encouraged to verbalize when in pain. Administered PRN pain meds as needed. Mobilizing x1-2 assist to edge of bed / bedside commode, general weakness.    Patient is not progressing towards the following goals:

## 2024-01-31 NOTE — CARE PLAN
The patient is Stable - Low risk of patient condition declining or worsening    Shift Goals  Clinical Goals: Pain management w/ pain goal of 5/10  Patient Goals: Pain control  Family Goals: N/A    Progress made toward(s) clinical / shift goals: N/A    Patient is not progressing towards the following goals: PO pain medication being administered with IV pains meds to provided better control of patient's pain; still needing frequent administrations of PRN pain meds for good relief.

## 2024-01-31 NOTE — PROGRESS NOTES
Patient arrived to unit at 2216 on hospital bed via patient transport. Assessment performed. Patient is sleepy but oriented x4 with no signs of labored breathing or distress. Patient denies any dizziness or chest pain. Patient oriented to unit as well as updated on plan of care. Patient states all of her questions/concerns have been addressed and answered appropriately. Patient states all of her needs are being met at this time. Safety precautions in place including patient call light within reach, personal possessions nearby, bed in low position and locked, fall identifiers in place, patient rounding in practice, and non-skid socks in place.

## 2024-01-31 NOTE — ANESTHESIA TIME REPORT
Anesthesia Start and Stop Event Times       Date Time Event    1/30/2024 1606 Ready for Procedure     1633 Anesthesia Start          Responsible Staff  01/30/24      Name Role Begin End    Nanette Mora M.D. Anesth 1633           Overtime Reason:  no overtime (within assigned shift)    Comments:

## 2024-01-31 NOTE — OP REPORT
DATE OF SERVICE:  01/25/2024     PREOPERATIVE DIAGNOSES:  1.  L2-L3 lumbar stenosis.  2.  Degenerative dextroscoliosis.  3.  Status post stage I L4-L5 and L5-S1 ALIF.     POSTOPERATIVE DIAGNOSES:  1.  L2-3 lumbar stenosis.  2.  Degenerative dextroscoliosis.  3.  Status post stage I L4-5 and L5-S1 ALIF.     PROCEDURES:  Rafa II of a planned multistage procedure for anterior,   posterior, circumferential decompression and fusion.     STAGE II:  1.  Left L2-3 XLIF.  2.  Partial corpectomy of L2.  3.  Partial corpectomy of L3.  4.  L2-3 arthrodesis with a SeaSpine titanium interbody cage, local morselized   autograft and OsteoStrand plus arthrodesis.  5.  Insertion of titanium cage at one level.  6.  L2-3 lateral plating system with bicortical screws.  7.  SSEPs, EMG monitoring performed by neuromonitoring associates, which   remained stable throughout.  8.  Modifier-22 for extra degree of difficulty given the patient's body mass   index of 41, which added an extra hour to the standard surgical procedure   overall with higher degree of expertise, effort, time and risk involved in   addition to her degenerative scoliosis, which was marked.     SURGEON:  Tim Rodriguez MD, Neurosurgery and Spine Surgery     ASSISTANT:  Osorio Rincon PA-C     ANESTHESIA:  General endotracheal anesthesia.     ANESTHESIOLOGIST:  Keke Berrios MD     COMPLICATIONS:  None.     ESTIMATED BLOOD LOSS:  Less than 20 mL.     PREOPERATIVE NOTE:  This extremely pleasant 68-year-old lady presents with   chronic mechanical low back pain and lower extremity radiculopathies   proximally, particularly on the right L2-3 level from the L2-3 collapse and   degenerative scoliosis.  Given the patient failed conservative care, I offered   the patient multiple procedures as described.  Details well contained in the   office visit notes.     DESCRIPTION OF PROCEDURE:  The patient was brought to the operating room and   placed under general endotracheal  anesthesia.  She was placed in a true   lateral position with the left side up with great care taken to avoid the bony   prominences, peripheral nerves.  Left flank region was prepped and draped in   the usual sterile fashion.  Following localization of cross table fluoroscopy,   a small incision was made over L2-3 and I entered the left retroperitoneal   space identifying the psoas muscle with continuous EMG monitoring.  I mapped   out the lumbar plexus posteriorly very safely.  Sequential dilation was   achieved.  The Lattus retractor system was applied.  The blades opened up   allowing excellent exposure at L2-3.  The elver was placed in the disc space at   L2-3 for an excellent position.  The ALL retractor was placed anteriorly.  I   incised the disk at L2-3, removed the disk from the endplates.  The disk   spaces were quite arthrodesed, collapsed down and did not distract up well,   however.  Hence, partial corpectomy of L2, partial corpectomy of L3 was   required and completed in standard fashion removing approximately one-third of   vertebral bodies.  I released the annulus, sequentially distracted.  I then   sized up for the appropriate lordotic cage.  The appropriate lordotic cage was   chosen and packed with local morselized autograft and OsteoStrand plus   arthrodesis and delivered under distraction between the body of L2-3 for an   excellent A plus fit.  A lateral plating system was secured with bicortical   screws.  The cage then was secured and tightened to the plate and the locking   cap applied.  AP and lateral x-rays confirmed excellent position of the cage   and the plate with excellent restored lordosis.  Wound was irrigated,   hemostasis achieved.  Retractor was gently closed and wound was closed with 0   Vicryl to deep fascia, 2-0 Vicryl to deep dermal layer, Steri-Strips to skin.    Small sterile dressing was applied.  Swabs, needles, instruments correct by   two-count.  No complications were  encountered.  The patient tolerated the   procedure, was stable and transferred to recovery room.  The patient will be   observed at [facility] until she meets discharge criteria.    Stage III will take place in next week as we tried to place the patient   prone for stage III today but she was not oxygenating well and ventilating and   given her body mass index, size and her lungs were likely add some   atelectasis the anesthesiologist felt it would be best to stabilize the   patient over the next few days on the weekend to allow sufficient pulmonary   toilet to stabilize her.  I felt this is in the best interest of the patient   as well, and we contacted the patient's family and we elected to hold off on   her III stage procedure until next week.  The patient will follow up at [facility] [place] in 2 weeks and 6 weeks' time as arranged.        ______________________________  MD JOHN SAMANIEGO/MICHELLE/YULI    DD:  01/30/2024 19:36  DT:  01/30/2024 20:50    Job#:  [id number]

## 2024-01-31 NOTE — DISCHARGE PLANNING
Case Management Discharge Planning    Admission Date: 1/23/2024  GMLOS: 5.3  ALOS: 8    6-Clicks ADL Score: 16  6-Clicks Mobility Score: 10  PT and/or OT Eval ordered: Yes  Post-acute Referrals Ordered: Yes  Post-acute Choice Obtained: Yes  Has referral(s) been sent to post-acute provider:  Yes      Anticipated Discharge Dispo: Discharge Disposition: Discharged to home/self care (01)  Discharge Address: TBD  Discharge Contact Phone Number: TBD    DME Needed: No    Action(s) Taken: Updated Provider/Nurse on Discharge Plan and Patient Conference    Escalations Completed: None    Medically Clear: No    Next Steps: request a PMR consult    Barriers to Discharge: None    Is the patient up for discharge tomorrow: No      CM met with the patient to discuss discharge planning.   Choice form signed for pending SNFs.   CM requested a PMR consult for the patient.   Will f/u for discharge needs.     ~ CM reached out to [facility]. Per Cristina, physiatry is waiting for P.T. and O.T. evaluations.

## 2024-01-31 NOTE — DISCHARGE PLANNING
Physiatry consult pended, pending post-op therapy evals as appropriate. Pt s/p stage 3 surgery yesterday.

## 2024-02-01 LAB
ANION GAP SERPL CALC-SCNC: 11 MMOL/L (ref 7–16)
BASOPHILS # BLD AUTO: 0.5 % (ref 0–1.8)
BASOPHILS # BLD: 0.04 K/UL (ref 0–0.12)
BUN SERPL-MCNC: 15 MG/DL (ref 8–22)
CALCIUM SERPL-MCNC: 8.2 MG/DL (ref 8.5–10.5)
CHLORIDE SERPL-SCNC: 99 MMOL/L (ref 96–112)
CO2 SERPL-SCNC: 24 MMOL/L (ref 20–33)
CREAT SERPL-MCNC: 0.58 MG/DL (ref 0.5–1.4)
EOSINOPHIL # BLD AUTO: 0.07 K/UL (ref 0–0.51)
EOSINOPHIL NFR BLD: 0.9 % (ref 0–6.9)
ERYTHROCYTE [DISTWIDTH] IN BLOOD BY AUTOMATED COUNT: 44.9 FL (ref 35.9–50)
GFR SERPLBLD CREATININE-BSD FMLA CKD-EPI: 97 ML/MIN/1.73 M 2
GLUCOSE SERPL-MCNC: 114 MG/DL (ref 65–99)
HCT VFR BLD AUTO: 32.5 % (ref 37–47)
HGB BLD-MCNC: 10.7 G/DL (ref 12–16)
IMM GRANULOCYTES # BLD AUTO: 0.07 K/UL (ref 0–0.11)
IMM GRANULOCYTES NFR BLD AUTO: 0.9 % (ref 0–0.9)
LYMPHOCYTES # BLD AUTO: 1.47 K/UL (ref 1–4.8)
LYMPHOCYTES NFR BLD: 18.8 % (ref 22–41)
MCH RBC QN AUTO: 31.6 PG (ref 27–33)
MCHC RBC AUTO-ENTMCNC: 32.9 G/DL (ref 32.2–35.5)
MCV RBC AUTO: 95.9 FL (ref 81.4–97.8)
MONOCYTES # BLD AUTO: 0.68 K/UL (ref 0–0.85)
MONOCYTES NFR BLD AUTO: 8.7 % (ref 0–13.4)
NEUTROPHILS # BLD AUTO: 5.47 K/UL (ref 1.82–7.42)
NEUTROPHILS NFR BLD: 70.2 % (ref 44–72)
NRBC # BLD AUTO: 0 K/UL
NRBC BLD-RTO: 0 /100 WBC (ref 0–0.2)
PLATELET # BLD AUTO: 361 K/UL (ref 164–446)
PMV BLD AUTO: 8.7 FL (ref 9–12.9)
POTASSIUM SERPL-SCNC: 4.2 MMOL/L (ref 3.6–5.5)
RBC # BLD AUTO: 3.39 M/UL (ref 4.2–5.4)
SODIUM SERPL-SCNC: 134 MMOL/L (ref 135–145)
WBC # BLD AUTO: 7.8 K/UL (ref 4.8–10.8)

## 2024-02-01 PROCEDURE — 700102 HCHG RX REV CODE 250 W/ 637 OVERRIDE(OP): Performed by: PHYSICIAN ASSISTANT

## 2024-02-01 PROCEDURE — A9270 NON-COVERED ITEM OR SERVICE: HCPCS

## 2024-02-01 PROCEDURE — 700111 HCHG RX REV CODE 636 W/ 250 OVERRIDE (IP): Mod: JZ | Performed by: PHYSICIAN ASSISTANT

## 2024-02-01 PROCEDURE — 85025 COMPLETE CBC W/AUTO DIFF WBC: CPT

## 2024-02-01 PROCEDURE — 770001 HCHG ROOM/CARE - MED/SURG/GYN PRIV*

## 2024-02-01 PROCEDURE — 80048 BASIC METABOLIC PNL TOTAL CA: CPT

## 2024-02-01 PROCEDURE — 99223 1ST HOSP IP/OBS HIGH 75: CPT | Performed by: PHYSICAL MEDICINE & REHABILITATION

## 2024-02-01 PROCEDURE — A9270 NON-COVERED ITEM OR SERVICE: HCPCS | Performed by: PHYSICIAN ASSISTANT

## 2024-02-01 PROCEDURE — 700111 HCHG RX REV CODE 636 W/ 250 OVERRIDE (IP): Performed by: PHYSICIAN ASSISTANT

## 2024-02-01 PROCEDURE — 97530 THERAPEUTIC ACTIVITIES: CPT | Mod: CQ

## 2024-02-01 PROCEDURE — A9270 NON-COVERED ITEM OR SERVICE: HCPCS | Performed by: PHYSICAL MEDICINE & REHABILITATION

## 2024-02-01 PROCEDURE — 700102 HCHG RX REV CODE 250 W/ 637 OVERRIDE(OP)

## 2024-02-01 PROCEDURE — 36415 COLL VENOUS BLD VENIPUNCTURE: CPT

## 2024-02-01 PROCEDURE — 97116 GAIT TRAINING THERAPY: CPT | Mod: CQ

## 2024-02-01 PROCEDURE — 700102 HCHG RX REV CODE 250 W/ 637 OVERRIDE(OP): Performed by: PHYSICAL MEDICINE & REHABILITATION

## 2024-02-01 PROCEDURE — 97535 SELF CARE MNGMENT TRAINING: CPT

## 2024-02-01 RX ORDER — ACETAMINOPHEN 325 MG/1
650 TABLET ORAL EVERY 6 HOURS
Status: DISCONTINUED | OUTPATIENT
Start: 2024-02-01 | End: 2024-02-02 | Stop reason: HOSPADM

## 2024-02-01 RX ADMIN — ATORVASTATIN CALCIUM 20 MG: 20 TABLET, FILM COATED ORAL at 18:17

## 2024-02-01 RX ADMIN — OMEPRAZOLE 20 MG: 20 CAPSULE, DELAYED RELEASE ORAL at 04:10

## 2024-02-01 RX ADMIN — LISINOPRIL 10 MG: 10 TABLET ORAL at 04:13

## 2024-02-01 RX ADMIN — HYDROCODONE BITARTRATE AND ACETAMINOPHEN 1 TABLET: 10; 325 TABLET ORAL at 13:55

## 2024-02-01 RX ADMIN — OXYCODONE HYDROCHLORIDE 10 MG: 10 TABLET ORAL at 04:17

## 2024-02-01 RX ADMIN — HYDROMORPHONE HYDROCHLORIDE 0.5 MG: 1 INJECTION, SOLUTION INTRAMUSCULAR; INTRAVENOUS; SUBCUTANEOUS at 09:38

## 2024-02-01 RX ADMIN — ENOXAPARIN SODIUM 40 MG: 100 INJECTION SUBCUTANEOUS at 18:19

## 2024-02-01 RX ADMIN — OXYCODONE HYDROCHLORIDE 10 MG: 10 TABLET ORAL at 08:10

## 2024-02-01 RX ADMIN — ACETAMINOPHEN 650 MG: 325 TABLET, FILM COATED ORAL at 18:18

## 2024-02-01 RX ADMIN — OXYCODONE 5 MG: 5 TABLET ORAL at 21:36

## 2024-02-01 RX ADMIN — ALBUTEROL SULFATE 2 PUFF: 90 AEROSOL, METERED RESPIRATORY (INHALATION) at 04:18

## 2024-02-01 RX ADMIN — Medication 1000 UNITS: at 04:11

## 2024-02-01 RX ADMIN — ALBUTEROL SULFATE 2 PUFF: 90 AEROSOL, METERED RESPIRATORY (INHALATION) at 16:53

## 2024-02-01 RX ADMIN — CYCLOBENZAPRINE 10 MG: 10 TABLET, FILM COATED ORAL at 20:55

## 2024-02-01 RX ADMIN — Medication 950 MG: at 04:09

## 2024-02-01 RX ADMIN — GABAPENTIN 600 MG: 300 CAPSULE ORAL at 18:18

## 2024-02-01 RX ADMIN — ALUMINUM HYDROXIDE, MAGNESIUM HYDROXIDE, AND DIMETHICONE 10 ML: 400; 400; 40 SUSPENSION ORAL at 04:13

## 2024-02-01 RX ADMIN — CYCLOBENZAPRINE 10 MG: 10 TABLET, FILM COATED ORAL at 12:23

## 2024-02-01 RX ADMIN — ALBUTEROL SULFATE 2 PUFF: 90 AEROSOL, METERED RESPIRATORY (INHALATION) at 08:10

## 2024-02-01 RX ADMIN — OXYCODONE HYDROCHLORIDE 10 MG: 10 TABLET ORAL at 16:53

## 2024-02-01 RX ADMIN — SUCRALFATE 1 G: 1 TABLET ORAL at 18:17

## 2024-02-01 RX ADMIN — POLYETHYLENE GLYCOL 3350 1 PACKET: 17 POWDER, FOR SOLUTION ORAL at 12:23

## 2024-02-01 RX ADMIN — CYCLOBENZAPRINE 10 MG: 10 TABLET, FILM COATED ORAL at 04:10

## 2024-02-01 RX ADMIN — DOCUSATE SODIUM 100 MG: 100 CAPSULE, LIQUID FILLED ORAL at 18:18

## 2024-02-01 RX ADMIN — SUCRALFATE 1 G: 1 TABLET ORAL at 04:10

## 2024-02-01 RX ADMIN — ALUMINUM HYDROXIDE, MAGNESIUM HYDROXIDE, AND DIMETHICONE 10 ML: 400; 400; 40 SUSPENSION ORAL at 18:16

## 2024-02-01 RX ADMIN — OXYCODONE HYDROCHLORIDE 10 MG: 10 TABLET ORAL at 12:23

## 2024-02-01 RX ADMIN — DOCUSATE SODIUM 50 MG AND SENNOSIDES 8.6 MG 1 TABLET: 8.6; 5 TABLET, FILM COATED ORAL at 20:55

## 2024-02-01 RX ADMIN — DOCUSATE SODIUM 100 MG: 100 CAPSULE, LIQUID FILLED ORAL at 04:10

## 2024-02-01 ASSESSMENT — GAIT ASSESSMENTS
GAIT LEVEL OF ASSIST: CONTACT GUARD ASSIST
DEVIATION: SHUFFLED GAIT;BRADYKINETIC
ASSISTIVE DEVICE: FRONT WHEEL WALKER
DISTANCE (FEET): 18

## 2024-02-01 ASSESSMENT — COGNITIVE AND FUNCTIONAL STATUS - GENERAL
SUGGESTED CMS G CODE MODIFIER MOBILITY: CL
DRESSING REGULAR LOWER BODY CLOTHING: A LOT
SUGGESTED CMS G CODE MODIFIER DAILY ACTIVITY: CK
TURNING FROM BACK TO SIDE WHILE IN FLAT BAD: A LOT
WALKING IN HOSPITAL ROOM: A LITTLE
HELP NEEDED FOR BATHING: A LOT
DRESSING REGULAR UPPER BODY CLOTHING: A LITTLE
MOVING TO AND FROM BED TO CHAIR: UNABLE
MOBILITY SCORE: 11
CLIMB 3 TO 5 STEPS WITH RAILING: TOTAL
TOILETING: A LITTLE
STANDING UP FROM CHAIR USING ARMS: A LITTLE
DAILY ACTIVITIY SCORE: 18
MOVING FROM LYING ON BACK TO SITTING ON SIDE OF FLAT BED: UNABLE

## 2024-02-01 NOTE — CARE PLAN
The patient is Watcher - Medium risk of patient condition declining or worsening    Shift Goals  Clinical Goals: pain control, safety  Patient Goals: rest, comfort  Family Goals: no family present    Progress made toward(s) clinical / shift goals:    Problem: Pain - Standard  Goal: Alleviation of pain or a reduction in pain to the patient’s comfort goal  Outcome: Progressing  Note: Document on Vitals flowsheet 1. Document pain using the appropriate pain scale per order or unit policy 2. Educate and implement non-pharmacologic comfort measures (i.e. relaxation, distraction, massage, cold/heat therapy, etc.) 3. Pain management medications as ordered 4. Reassess pain after pain med administration per policy 5. If opiods administered assess patient's response to pain medication is appropriate per POSS sedation scale 6. Follow pain management plan developed in collaboration with patient and interdisciplinary team (including palliative care or pain specialists if applicable)      Problem: Neuro Status  Goal: Neuro status will remain stable or improve  Outcome: Progressing  Note: Document on Neuro assessment in the Assessment flowsheet 1. Assess and monitor neurologic status per provider order/protocol/unit policy 2. Assess level of consciousness and orientation 3. Assess for speech, dysarthria, dysphagia, facial symmetry 4. Assess visual field, eye movements, gaze preference, pupil reaction and size 5. Assess muscle strength and motor response in all four extremities 6. Assess for sensation (numbness and tingling) 7. Assess basic neuro reflexes (cough, gag, corneal) 8. Identify changes in neuro status and report to provider for testing/treatment orders        Patient is not progressing towards the following goals:

## 2024-02-01 NOTE — DISCHARGE PLANNING
0906: Following for post-op OT eval, per ALEX Byrd pt is cleared for IPR. Contacted spouse Narendra to verify dc support. Pt lives in a [facility] with no JULIÁN. Spouse is retired and has no physical limitations to assist patient at home. Patient's children are currently visiting from the [place] area. Discussed expectations for IPR and answered questions. TCC will continue to follow.    1030: Dr Cruz reviewed, concerned patient hasn't had labs since 1/27 and is still on IV dilaudid. Reported concerns to JENNIFER Byrd, Dr Cruz plans to come see the patient. TCC will follow.    1521: Patient has been accepted by Dr Cruz at [facility]. Transport set for 13/1330 tomorrow 2/2/24, T w/c nurse report [id number]. Notified care team.

## 2024-02-01 NOTE — CONSULTS
Physical Medicine and Rehabilitation Consultation              Date of initial consultation: 2/1/2024  Requesting provider: ordered by Geoffrey Wade P.A.-C. at 02/01/24 1030    Consulting provider: Sobeida Cruz D.O.  Reason for consultation: assess for acute inpatient rehab appropriateness  LOS: 9 Day(s)    Chief complaint: s/p Lumbar ALIF     HPI: The patient is a 68 y.o. female with a past medical history of lumbar stenosis and HTN, ;  who presented on 1/23/2024  4:51 AM for scheduled lumbar surgery. Per documentation, patient has been followed in the outpatient setting for lumbar stenosis and back pain. Patient had worsening radiculopathy that failed conservative care and patient was deemed appropriate for surgical intervention. Patient underwent aL4-S1  ALIF on 1/23 and second stage L2-3 XLIF fusion for an L2-3 disc collapse on 1/25. Patient returned to the OR for posterior fusion but surgery was aborted due to hypoxia with anesthesia. CXR showed atelectasis. Patient required 2 L o2 at basline, CT chest  showed R>L bilateral atelectasis. Patient's respiratory status was improved, and was cleared to return to the OR on 1/30 for redo L2-3 laminotomy and fusion.  Post op, patient has acute blood loss anemia with Hgb 10.7, hypocalcemia, and hyponatremia. Her BP has had intermittent HTN.     Patient seen and examined at bedside. Patient tells me she had intractable pain today. Reports that her pain is worse since being off diluaidid. Patient reports she would not be able to tolerate getting out of bed at her current level of pain. Patient reports the oral pain meds help some. Reviewed plan to schedule tylenol and use oxycodone PRN, and to not get behind on pain meds, patient agreeable.   Patient does want rehab, but reports she would not able to tolerate therapy in her current state of pain.     Social Hx:  Patient lives with spouse in a 1 story house with no JULIÁN.   0 JULIÁN  At prior level of function  patient was Independent with mobility and ADLs.     Tobacco: Denies   Alcohol: Denies   Drugs: Denies     THERAPY:  Restrictions: Fall Risk, LSO with OOB   PT: Functional mobility   2/1 CGA with FWW x 18 ft, CGA sit to stand     OT: ADLs  2/1 Mod A upper body dressing , Max A lower body dressing     SLP:   None     IMAGING:  CT-LSPINE W/O PLUS RECONS  Narrative: 1/31/2024 9:54 AM    HISTORY/REASON FOR EXAM:  s/p L2-S1 fusion..  Back pain    TECHNIQUE/EXAM DESCRIPTION AND NUMBER OF VIEWS:  CT lumbar spine without contrast, with reconstructions.    The study was performed on a helical multidetector CT scanner. Thin-section helical scanning was performed from T12-L1 to the sacrum. Sagittal and coronal multiplanar reconstructions were generated from the axial images.    Low dose optimization technique was utilized for this CT exam including automated exposure control and adjustment of the mA and/or kV according to patient size.    COMPARISON: 5    FINDINGS:  5 nonrib bearing lumbar type vertebral bodies are counted.    There is thoracolumbar levoscoliosis which appears decreased from prior.    There is interval postoperative changes at L2-L3 with discectomy, intervertebral disc space cage device and left lateral plate and interbody screw fixation. There are also now changes of posterior spinal fusion with posterior rods from the L2 through the   S1 level with transpedicular screws at L2 and S1..    Redemonstrated are postsurgical changes at L3-L4 with posterior spinal fusion, right lateral plate and interbody screw fixation and L3-S1 laminectomies. There are postoperative changes at L4-L5 and L5-S1 with discectomy, intervertebral disc space cage   devices and anterior fusion.    There is no immediate postoperative hardware complication seen.    There is an oblique fracture of the left anterolateral L5 vertebral body again noted.    There are L3-S1 laminectomies.    There is some foraminal narrowing at L4-S1 on the right  and L2-L3 on the left.    Atherosclerosis. There is extraperitoneal fluid and gas within the retroperitoneum and left pelvis likely postoperative changes.  Impression: 1.  Extension of spinal fusion as described.  2.  There is an oblique nondisplaced fracture of the L5 vertebral body on the left again noted.  3.  Extraperitoneal fluid and gas in keeping with recent postoperative changes.  4.  Scoliosis and multilevel degenerative changes.  IR-US GUIDED PIV  Narrative: EXAMINATION:                                                                          HISTORY/REASON FOR EXAM:  Ultrasound Guided PIV.     TECHNIQUE/EXAM DESCRIPTION AND NUMBER OF VIEWS:  Peripheral IV insertion   with ultrasound guidance.  The procedure was prepared using maximal   sterile barrier technique including sterile gown, mask, cap, and donning   of sterile gloves following appropriate hand hygiene and/or sterile scrub.   Patient skin site was prepped with 2% Chlorhexidine solution.       FINDINGS: Peripheral IV insertion with Ultrasound Guidance was performed   by qualified imaging nursing staff without the assistance of a   Radiologist.  Impression:  Ultrasound-guided PERIPHERAL IV INSERTION performed by   qualified nursing staff as above.  DX-O-ARM  Narrative: 1/30/2024 5:00 PM    HISTORY/REASON FOR EXAM:  Lumbar fusion    TECHNIQUE/EXAM DESCRIPTION AND NUMBER OF VIEWS:  Portable O-arm    FINDINGS:  The O-arm unit was provided for intraoperative guidance in the OR for less than one hour. Actual fluoro time was 9 seconds.    2D Fluoroscopy dose(DAP): 4080.88 Gy*cm^2  3D Fluoroscopy dose(DAP): 659.23 Gy*cm^2  Impression: Portable O-arm utilized for 9 seconds.    INTERPRETING LOCATION: [address] [place] [address], [address]  DX-PORTABLE FLUORO > 1 HOUR  Narrative: 1/30/2024 6:20 PM    HISTORY/REASON FOR EXAM:  Lumbar fusion    TECHNIQUE/EXAM DESCRIPTION AND NUMBER OF VIEWS:  Portable fluoroscopy for greater than one hour in OR.    FINDINGS:  The  portable fluoroscopy unit was obligated to the procedure for greater than one hour. Actual fluoro time was 7 seconds.    Fluoroscopy dose(DAP): 0.8402 Gy*cm^2  Impression: Portable fluoroscopy utilized for 7 seconds.    INTERPRETING LOCATION: [address] [place] [address], [id number]        PROCEDURES:  1/23 stage 1 L4-S1 ALIF by Dr. Rodriguez   1/25  left L2/3 XLIF by Dr. Rodriguez   1/30 redo L2-3 laminotomy and L2-S1 perc fusion by Dr. 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 ([place] fever), medicated for a year, yearly xrays    Cough 05/29/2015    Diverticulosis     GERD (gastroesophageal reflux disease)     Heart burn     Hiatus hernia syndrome 2023    High cholesterol     Hypertension     Indigestion     LBP (low back pain)     Lumbar radicular pain 05/29/2015    Muscle disorder     Obesity     Osteoporosis of forearm     Alendronate started 6/18    Pain     Shoulder and numbness to R LE, hip       PSH:  Past Surgical History:   Procedure Laterality Date    FUSION, SPINE, LUMBAR, PLIF N/A 1/30/2024    Procedure: FUSION, SPINE, LUMBAR, L2-S1;  Surgeon: Tim Rodriguez M.D.;  Location: [facility];  Service: Neurosurgery    LUMBAR FUSION O-ARM Left 1/25/2024    Procedure: STAGE #2 LEFT LUMBAR EXTREME LATERAL INTERBODY FUSION, LUMBAR 2-SACRAL 1 FUSION WITH POSSIBLE REDO RIGHT LUMBAR 2-3 LAMINOTOMIES AND HARDWARE REMOVAL;  Surgeon: Tim Rodriguez M.D.;  Location: [facility];  Service: Neurosurgery    FUSION, SPINE, XLIF Left 1/25/2024    Procedure: FUSION, SPINE, XLIF;  Surgeon: Tim Rodriguez M.D.;  Location: [facility];  Service: Neurosurgery    LUMBAR FUSION ANTERIOR N/A 1/23/2024    Procedure: STAGE #1 LUMBAR 4-SACRAL 1 ANTERIOR LUMBAR INTERBODY FUSION;  Surgeon: Tim Rodriguez M.D.;  Location: [facility];  Service: Neurosurgery    MI CYSTOSCOPY,INSERT URETERAL STENT Right 06/08/2023     Procedure: CYSTOSCOPY, WITH RIGHT URETEROSCOPY, WITH LITHOTRIPSY, WITH INSERTION OF RIGHT URETERAL STENT;  Surgeon: Amol Sullivan M.D.;  Location: [facility];  Service: Urology    OK CYSTO/URETERO/PYELOSCOPY, DX Right 06/08/2023    Procedure: URETEROSCOPY;  Surgeon: Amol Sullivan M.D.;  Location: [facility];  Service: Urology    LASERTRIPSY Right 06/08/2023    Procedure: LITHOTRIPSY, USING LASER;  Surgeon: Amol Sullivan M.D.;  Location: [facility];  Service: Urology    HIP REVISION TOTAL Left 05/13/2021    Procedure: REVISION, TOTAL ARTHROPLASTY, HIP.;  Surgeon: Amol Hogue M.D.;  Location: [facility];  Service: Orthopedics    OK TOTAL HIP ARTHROPLASTY Left 09/19/2019    Procedure: ARTHROPLASTY, HIP, TOTAL, ANTERIOR APPROACH;  Surgeon: Clarence Vallejo M.D.;  Location: [facility];  Service: Orthopedics    INCISION HERNIA REPAIR Right 11/26/2018    Procedure: INCISION HERNIA REPAIR- FLANK WITH MESH;  Surgeon: Misael Ramírez M.D.;  Location: [facility];  Service: General    HYSTERECTOMY ROBOTIC  06/07/2017    Procedure: HYSTERECTOMY ROBOTIC SI, BILATERAL SALPINGO-OOPHORECTOMY, URETERAL SACRAL LIGAMENT SHORTENING ;  Surgeon: Jerica Hooper M.D.;  Location: [facility];  Service:     CYSTOSCOPY  06/07/2017    Procedure: CYSTOSCOPY;  Surgeon: Jerica Hooper M.D.;  Location: [facility];  Service:     SHOULDER DECOMPRESSION ARTHROSCOPIC Right 12/06/2016    Procedure: SHOULDER DECOMPRESSION ARTHROSCOPIC - SUBACROMIAL, MANJARREZ;  Surgeon: Kyle Claros M.D.;  Location: [facility];  Service:     CLAVICLE DISTAL EXCISION Right 12/06/2016    Procedure: CLAVICLE DISTAL EXCISION;  Surgeon: Kyle Claros M.D.;  Location: [facility];  Service:     SHOULDER ARTHROSCOPY W/ ROTATOR CUFF REPAIR Right 12/06/2016    Procedure: SHOULDER ARTHROSCOPY W/ ROTATOR CUFF REPAIR ;  Surgeon: Kyle Claros M.D.;   Location: SURGERY [facility];  Service:     SHOULDER ARTHROSCOPY W/ BICIPITAL TENODESIS REPAIR Right 12/06/2016    Procedure: SHOULDER ARTHROSCOPY W/ BICIPITAL TENODESIS REPAIR ;  Surgeon: Kyle Claros M.D.;  Location: SURGERY [facility];  Service:     FUSION, SPINE, LUMBAR, PLIF  02/09/2016    Procedure: LUMBAR FUSION POSTERIOR PEDICLE SCREW FIXATION (STAGE #2);  Surgeon: Tim Rodriguez M.D.;  Location: SURGERY [facility];  Service:     LUMBAR LAMINECTOMY DISKECTOMY  02/09/2016    Procedure: LUMBAR LAMINECTOMY DISKECTOMY MINI OPEN;  Surgeon: Tim Rodriguez M.D.;  Location: SURGERY [facility];  Service:     FUSION, SPINE, XLIF Right 02/06/2016    Procedure: EXTREME LATERAL INTERBODY FUSION L3-4 (STAGE #1);  Surgeon: Tim Rodriguez M.D.;  Location: SURGERY [facility];  Service:     RECOVERY  12/08/2015    Procedure: CT-CT GUIDED LEFT LUNG BIOPSY-;  Surgeon: [facility] Surgery;  Location: SURGERY [facility];  Service:     REDDY BY LAPAROSCOPY  04/17/2014    Performed by Ankur Lerner M.D. at SURGERY SAME DAY [facility]    EGD WITH ASP/BX  02/04/2014    mild chronic inactive gastritis, scar gastric antrum-    EGD WITH ASP/BX  09/29/2011    possible barrettes, hiatal hernia, gastritis    COLONOSCOPY  09/29/2011    diverticulosis sigmoid colon, hemorrhoids    ARTHROTOMY  09/03/2010    Performed by YARELY MORRISON at SURGERY SAME DAY [facility]    ARTHRODESIS  09/03/2010    Performed by YARELY MORRISON at SURGERY SAME DAY [facility]    ORTHOPEDIC OSTEOTOMY  09/03/2010    Performed by YARELY MORRISON at SURGERY SAME DAY [facility]    BONE SPUR EXCISION  09/03/2010    Performed by YARELY MORRISON at SURGERY SAME DAY [facility]    OTHER ABDOMINAL SURGERY  2000    COLON RESECTION    LAMINOTOMY      OTHER  2000    Diverticulitis    OTHER ORTHOPEDIC SURGERY      PARDEEP CARPAL TUNNEL RELEASES       FHX:  Family History   Problem Relation Age of Onset    Cancer  Mother         lymphoma    Stroke Father     Diabetes Father        Medications:  Current Facility-Administered Medications   Medication Dose    Pharmacy Consult Request ...Pain Management Review 1 Each  1 Each    MD ALERT...DO NOT ADMINISTER NSAIDS or ASPIRIN unless ORDERED By Neurosurgery 1 Each  1 Each    cyclobenzaprine (Flexeril) tablet 10 mg  10 mg    simethicone (Mylicon) chewable tablet 125 mg  125 mg    calcium citrate (Calcitrate) tablet 950 mg  950 mg    vitamin D3 (Cholecalciferol) tablet 1,000 Units  1,000 Units    polyethylene glycol/lytes (Miralax) Packet 1 Packet  1 Packet    enoxaparin (Lovenox) inj 40 mg  40 mg    ALPRAZolam (Xanax) tablet 0.25 mg  0.25 mg    albuterol inhaler 2 Puff  2 Puff    Respiratory Therapy Consult      calcium carbonate (Tums) chewable tab 1,000 mg  1,000 mg    mag hydrox-al hydrox-simeth (Maalox Plus Es Or Mylanta Ds) suspension 10 mL  10 mL    amitriptyline (Elavil) tablet 10 mg  10 mg    atorvastatin (Lipitor) tablet 20 mg  20 mg    clobetasol (Temovate) 0.05 % cream 1 Application   1 Application     gabapentin (Neurontin) capsule 600 mg  600 mg    lisinopril (Prinivil) tablet 10 mg  10 mg    sucralfate (Carafate) tablet 1 g  1 g    docusate sodium (Colace) capsule 100 mg  100 mg    senna-docusate (Pericolace Or Senokot S) 8.6-50 MG per tablet 1 Tablet  1 Tablet    senna-docusate (Pericolace Or Senokot S) 8.6-50 MG per tablet 1 Tablet  1 Tablet    magnesium hydroxide (Milk Of Magnesia) suspension 30 mL  30 mL    bisacodyl (Dulcolax) suppository 10 mg  10 mg    sodium phosphate (Fleet) enema 133 mL  1 Each    diphenhydrAMINE (Benadryl) tablet/capsule 25 mg  25 mg    Or    diphenhydrAMINE (Benadryl) injection 25 mg  25 mg    ondansetron (Zofran) syringe/vial injection 4 mg  4 mg    ondansetron (Zofran ODT) dispertab 4 mg  4 mg    labetalol (Normodyne/Trandate) injection 10 mg  10 mg    benzocaine-menthol (Cepacol) lozenge 1 Lozenge  1 Lozenge    oxyCODONE immediate-release  "(Roxicodone) tablet 5 mg  5 mg    HYDROcodone/acetaminophen (Norco)  MG per tablet 1 Tablet  1 Tablet    oxyCODONE immediate release (Roxicodone) tablet 10 mg  10 mg    omeprazole (PriLOSEC) capsule 20 mg  20 mg       Allergies:  No Known Allergies    Physical Exam:  Vitals: /73   Pulse 99   Temp 36.9 °C (98.4 °F) (Temporal)   Resp (!) 28   Ht 1.499 m (4' 11\")   Wt 91.7 kg (202 lb 2.6 oz)   SpO2 89%   Gen: NAD, laying in bed,restless due to pain   Head:  NC/AT  Eyes/ Nose/ Mouth: PERRLA, moist mucous membranes  Cardio: RRR, good distal perfusion, warm extremities  Pulm: normal respiratory effort, no cyanosis, on 1 L   Abd: Soft NTND   Ext: No peripheral edema. No calf tenderness. No clubbing.    Mental status:  A&Ox4 (person, place, date, situation) answers questions appropriately follows commands  Speech: fluent, no aphasia or dysarthria    CRANIAL NERVES:  2,3: visual acuity grossly intact, PERRL  3,4,6: EOMI bilaterally, no nystagmus or diplopia  5: sensation intact to light touch bilaterally and symmetric  7: no facial asymmetry  8: hearing grossly intact    Motor:      Upper Extremity  Myotome R L   Shoulder flexion C5 5/5 5/5   Elbow flexion C5 5/5 5/5   Wrist extension C6 5/5 5/5   Elbow extension C7 5/5 5/5   Finger flexion C8 5/5 5/5   Finger abduction T1 5/5 5/5     Lower Extremity Myotome R L   Hip flexion L2 4/5 3/5   Knee extension L3 4/5 3/5   Ankle dorsiflexion L4 5/5 5/5   Toe extension L5 5/5 5/5   Ankle plantarflexion S1 5/5 5/5       Negative Pronator drift bilaterally    Sensory:   intact to light touch through out    DTRs: 2+ in bilateral  biceps  No clonus at bilateral ankles  Negative Weber b/l     Tone: no spasticity noted    Coordination:   intact finger to nose bilaterally  intact fine motor with fingers bilaterally      Labs: Reviewed and significant for   Recent Labs     02/01/24  1128   RBC 3.39*   HEMOGLOBIN 10.7*   HEMATOCRIT 32.5*   PLATELETCT 361     Recent Labs "     01/30/24  0118 02/01/24  1128   SODIUM 137 134*   POTASSIUM 4.3 4.2   CHLORIDE 103 99   CO2 22 24   GLUCOSE 102* 114*   BUN 16 15   CREATININE 0.50 0.58   CALCIUM 8.1* 8.2*     Recent Results (from the past 24 hour(s))   CBC WITH DIFFERENTIAL    Collection Time: 02/01/24 11:28 AM   Result Value Ref Range    WBC 7.8 4.8 - 10.8 K/uL    RBC 3.39 (L) 4.20 - 5.40 M/uL    Hemoglobin 10.7 (L) 12.0 - 16.0 g/dL    Hematocrit 32.5 (L) 37.0 - 47.0 %    MCV 95.9 81.4 - 97.8 fL    MCH 31.6 27.0 - 33.0 pg    MCHC 32.9 32.2 - 35.5 g/dL    RDW 44.9 35.9 - 50.0 fL    Platelet Count 361 164 - 446 K/uL    MPV 8.7 (L) 9.0 - 12.9 fL    Neutrophils-Polys 70.20 44.00 - 72.00 %    Lymphocytes 18.80 (L) 22.00 - 41.00 %    Monocytes 8.70 0.00 - 13.40 %    Eosinophils 0.90 0.00 - 6.90 %    Basophils 0.50 0.00 - 1.80 %    Immature Granulocytes 0.90 0.00 - 0.90 %    Nucleated RBC 0.00 0.00 - 0.20 /100 WBC    Neutrophils (Absolute) 5.47 1.82 - 7.42 K/uL    Lymphs (Absolute) 1.47 1.00 - 4.80 K/uL    Monos (Absolute) 0.68 0.00 - 0.85 K/uL    Eos (Absolute) 0.07 0.00 - 0.51 K/uL    Baso (Absolute) 0.04 0.00 - 0.12 K/uL    Immature Granulocytes (abs) 0.07 0.00 - 0.11 K/uL    NRBC (Absolute) 0.00 K/uL   Basic Metabolic Panel    Collection Time: 02/01/24 11:28 AM   Result Value Ref Range    Sodium 134 (L) 135 - 145 mmol/L    Potassium 4.2 3.6 - 5.5 mmol/L    Chloride 99 96 - 112 mmol/L    Co2 24 20 - 33 mmol/L    Glucose 114 (H) 65 - 99 mg/dL    Bun 15 8 - 22 mg/dL    Creatinine 0.58 0.50 - 1.40 mg/dL    Calcium 8.2 (L) 8.5 - 10.5 mg/dL    Anion Gap 11.0 7.0 - 16.0   ESTIMATED GFR    Collection Time: 02/01/24 11:28 AM   Result Value Ref Range    GFR (CKD-EPI) 97 >60 mL/min/1.73 m 2         ASSESSMENT:  Patient is a 68 y.o. female admitted with lumbar stenosis     [facility] Code / Diagnosis to Support: 0004.111 - Spinal Cord Dysfunction, Non-Traumatic: Paraplegia, Incomplete    Rehabilitation: Impaired ADLs and mobility  Patient is a good candidate for  inpatient rehab based on needs for PT, OT, pending pain controll of IV meds and confirmation of DC support.     Barriers to transfer include: Insurance authorization, TCCs to verify disposition, medical clearance and bed availability     Additional Recommendations:  Lumbar stenosis   S/p lumbar fusion   Lumbar fracture   - history of back pain with radicuopathy   - no MRI to review   - 1/23 stage 1 L4-S1 ALIF by Dr. Rodriguez   1/25  left L2/3 XLIF by Dr. Rodriguez   1/30 redo L2-3 laminotomy and L2-S1 perc fusion by Dr. Rodriguez   - most recent CT L spine obtained on 1/31 showed oblique nondisplaced L5 vertebral body fracture   - continue with PT/OT     Hypoxia   - hypoxia, worse with anesthesia, causing 3rd surgery to be aborted   - now on albuterol   - stable on 1 L o2   - CT chest negative for PE     ABLA   - post op drop in Hgb   - 2/1 Hgb 10.7   - primary team monitoring CBC     HTN   - on home dose lisinopril   - fluctuating hyper and hypotension     Bowel  - constipation prevention with scheduled senna   - bowel meds PRN   - Last BM 1/31     Pain  - was on IV diluadid until 2/1 AM   - significant increase in pain, since off IV pain meds   - changed meds to scheduled Tylenol 650 q6,   - Dc'ed Norco, patient now on oxycodone 10mg q 104     Dispo:  - patient is currently functioning below their level of baseline, recommend post acute rehab  - recommend IRF level therapy with 3hr of therapy 5 days per week  - piror to acceptance to IRF, will need  pain controlled off IV pain meds and confirmation of DC support   - TCC to assist with insurance auth and DC support         Medical Complexity:  Lumbar stenosis   S/p lumbar fusion   Hypoxia   ABLA   HTN   Impaired mobility and ADLs       DVT PPX: Lovenox       Thank you for allowing us to participate in the care of this patient.     Patient was seen for 81 minutes on unit/floor of which > 50% of time was spent on counseling and coordination of care regarding the above,  including prognosis, risk reduction, benefits of treatment, and options for next stage of care.    Sobeida Cruz D.O.   Physical Medicine and Rehabilitation     Please note that this dictation was created using voice recognition software. I have made every reasonable attempt to correct obvious errors, but there may be errors of grammar and possibly content that I did not discover before finalizing the note.

## 2024-02-01 NOTE — DISCHARGE PLANNING
Case Management Discharge Planning    Admission Date: 1/23/2024  GMLOS: 3.7  ALOS: 9    6-Clicks ADL Score: 18  6-Clicks Mobility Score: 10  PT and/or OT Eval ordered: Yes  Post-acute Referrals Ordered: Yes  Post-acute Choice Obtained: Yes  Has referral(s) been sent to post-acute provider:  Yes      Anticipated Discharge Dispo: Discharge Disposition: Discharged to home/self care (01)  Discharge Address: TBD  Discharge Contact Phone Number: TBD    DME Needed: No    Action(s) Taken: Updated Provider/Nurse on Discharge Plan    Escalations Completed: None    Medically Clear: No    Next Steps: Pending PMR consult    Barriers to Discharge: Medical clearance    Is the patient up for discharge tomorrow: No      Patient discussed in rounds.  Patient is not medically cleared.  PMR consult pending.  CM will f/u for discharge needs.   ~Per the PMR consult notes, the patient will need to be yoly IV pain meds before admission to [facility].  ~CM placed a cll to the patient's  and informed him of the pending discharge for tomorrow. CM informed the patient of the pending discharge for tomorrow between 13:00 - 13:30.

## 2024-02-01 NOTE — CARE PLAN
The patient is Stable - Low risk of patient condition declining or worsening    Shift Goals  Clinical Goals: pain control, safety  Patient Goals: rest, comfort  Family Goals: no family present    Progress made toward(s) clinical / shift goals:    Problem: Pain - Standard  Goal: Alleviation of pain or a reduction in pain to the patient’s comfort goal  Outcome: Progressing    Administer pain medications per MAR.     Problem: Fall Risk  Goal: Patient will remain free from falls  Outcome: Progressing    Kept side rails up and bed to its lowest position. Kept call bell within reach. Instructed not to go out of bed alone.     Problem: Skin Integrity  Goal: Skin integrity is maintained or improved  Outcome: Progressing    Educate the importance of mobility and scheduled turning.       Patient is not progressing towards the following goals:

## 2024-02-01 NOTE — THERAPY
Physical Therapy   Daily Treatment     Patient Name: Dee Armstrong  Age:  68 y.o., Sex:  female  Medical Record #: [id number]  Today's Date: 2/1/2024     Precautions  Precautions: Fall Risk;Lumbosacral Orthosis;Spinal / Back Precautions   Comments: LSO when OOB    Assessment    Pt greeted and seen for PT treatment. Pt demo'd improved tolerance to mobility however still limited by pain. She needed CGA to stand from the chair and ambulate 18ft w/ FWW at a decreased pace. She needed Shaheed to bring B LE into bed, good awareness of spinal precautions.   Pt currently limited by pain, impaired balance and decreased strength which negatively impacts functional mobility. Pt will continue to benefit from skilled PT to address deficits.       Plan    Treatment Plan Status: Continue Current Treatment Plan  Type of Treatment: Bed Mobility, Equipment, Gait Training, Neuro Re-Education / Balance, Self Care / Home Evaluation, Therapeutic Activities, Therapeutic Exercise  Treatment Frequency: 5 Times per Week  Treatment Duration: Until Therapy Goals Met    DC Equipment Recommendations: Unable to determine at this time  Discharge Recommendations: Recommend post-acute placement for additional physical therapy services prior to discharge home       02/01/24 1056   Cognition    Comments cooperative, very pleasant, motivated   Balance   Sitting Balance (Static) Fair   Sitting Balance (Dynamic) Fair   Standing Balance (Static) Fair -   Standing Balance (Dynamic) Fair -   Weight Shift Sitting Fair   Weight Shift Standing Fair   Skilled Intervention Verbal Cuing   Comments w/ FWW   Bed Mobility    Sit to Supine Minimal Assist   Scooting Standby Assist   Rolling Standby Assist   Skilled Intervention Verbal Cuing;Sequencing;Facilitation   Comments flat HOB, use of rails   Gait Analysis   Gait Level Of Assist Contact Guard Assist   Assistive Device Front Wheel Walker   Distance (Feet) 18   # of Times Distance was Traveled 1   Deviation  Shuffled Gait;Bradykinetic  (very slow pace)   Skilled Intervention Verbal Cuing   Comments limited by pain   Functional Mobility   Sit to Stand Contact Guard Assist   Bed, Chair, Wheelchair Transfer Contact Guard Assist   Transfer Method Stand Step   Mobility chair>walking>bed   Skilled Intervention Verbal Cuing;Tactile Cuing   Comments w/ FWW   Short Term Goals    Short Term Goal # 1 Pt will perform bed mobility with supervision to progress function in 6 visits.   Goal Outcome # 1 Progressing as expected   Short Term Goal # 2 Pt will transfer with FWW and supervision to progress function in 6 visits.   Goal Outcome # 2 Progressing as expected   Short Term Goal # 3 Pt will ambulate 100ft with FWW and supervision to progress function in 6 visits.   Goal Outcome # 3 Goal not met   Supervising Physical Therapist (PTA Treatments Only)   Supervising Physical Therapist Virginia Loomis

## 2024-02-01 NOTE — PROGRESS NOTES
Neurosurgery Progress Note    Subjective:  POD#9 stage 1 L4-S1 ALIF  POD#7 left L2/3 XLIF  POD#2 redo L2-3 laminotomy and L2-S1 perc fusion      Chest x-ray does show an area of atelectasis. Patient has been treated for valley fever.   Chest CT today stable; similar to cxr   Oxygen saturation is stable now; currently weaned down to 0-1L    Patient is better today  Notes having bad reflux which she normal admits to eating Tums frequently at home - improving  Abdominal cramping/gas pain resolved  Increased left anterior thigh pain/discomfort related to XLIF  Has long standing right L5 numbness  Not mobilizing   Will tsf from bed to chair  [place] out, urinating independently  + flatus, +BM  She is happy her painful hand IVs are removed.     Vitals / labs stable ready   Calcium/PTH normal         Exam:  Patient sitting up in chair with brace on  Dressing dry  Motor 5/5  Sensory intact   Abdomen soft       BP  Min: 103/68  Max: 158/87  Pulse  Av.5  Min: 91  Max: 118  Resp  Av  Min: 16  Max: 20  Temp  Av.6 °C (97.8 °F)  Min: 36.4 °C (97.5 °F)  Max: 36.7 °C (98.1 °F)  Monitored Temp 2  Av.5 °C (97.7 °F)  Min: 36.5 °C (97.7 °F)  Max: 36.5 °C (97.7 °F)  SpO2  Av.3 %  Min: 90 %  Max: 96 %    No data recorded          Recent Labs     24  0118   SODIUM 137   POTASSIUM 4.3   CHLORIDE 103   CO2 22   GLUCOSE 102*   BUN 16   CREATININE 0.50   CALCIUM 8.1*                 Intake/Output                         24 07 - 24 0659 24 07 - 24 0659     [id number] [id number] Total [id number] [id number] Total                 Intake    Total Intake -- -- -- -- -- --       Output    Urine  800  -- 800  --  -- --    Number of Times Voided 2 x 1 x 3 x 1 x -- 1 x    Output (mL) ([REMOVED] Urethral Catheter Non-latex 1 Fr. 24 1140) 800 -- 800 -- -- --    Stool  --  -- --  --  -- --    Number of Times Stooled -- 1 x 1 x -- -- --    Total Output 800 -- 800 -- -- --       Net I/O     -800 --  -800 -- -- --            No intake or output data in the 24 hours ending 02/01/24 1123            Pharmacy Consult Request  1 Each PHARMACY TO DOSE    MD ALERT...DO NOT ADMINISTER NSAIDS or ASPIRIN unless ORDERED By Neurosurgery  1 Each PRN    cyclobenzaprine  10 mg Q8HRS    simethicone  125 mg Q6HRS PRN    calcium citrate  950 mg DAILY    vitamin D3  1,000 Units DAILY    polyethylene glycol/lytes  1 Packet DAILY AT NOON    enoxaparin (LOVENOX) injection  40 mg Q EVENING    ALPRAZolam  0.25 mg Q6HRS PRN    albuterol  2 Puff Q4HRS (RT)    Respiratory Therapy Consult   Continuous RT    calcium carbonate  1,000 mg Q4HRS PRN    mag hydrox-al hydrox-simeth  10 mL BID    amitriptyline  10 mg HS PRN    atorvastatin  20 mg Q EVENING    clobetasol  1 Application  QDAY PRN    gabapentin  600 mg Q EVENING    lisinopril  10 mg DAILY    sucralfate  1 g BID    docusate sodium  100 mg BID    senna-docusate  1 Tablet Nightly    senna-docusate  1 Tablet Q24HRS PRN    magnesium hydroxide  30 mL QDAY PRN    bisacodyl  10 mg Q24HRS PRN    sodium phosphate  1 Each Once PRN    diphenhydrAMINE  25 mg Q6HRS PRN    Or    diphenhydrAMINE  25 mg Q6HRS PRN    ondansetron  4 mg Q4HRS PRN    ondansetron  4 mg Q4HRS PRN    labetalol  10 mg Q HOUR PRN    benzocaine-menthol  1 Lozenge Q2HRS PRN    oxyCODONE immediate-release  5 mg Q4HRS PRN    HYDROcodone/acetaminophen  1 Tablet Q4HRS PRN    oxyCODONE immediate-release  10 mg Q4HRS PRN    omeprazole  20 mg DAILY       Assessment and Plan:  Hospital day #10  POD #9/7/2  Continue Albuterol   Needs to get OOB and sit in lounge chair throughout the day  Should be getting PT/OT daily  PT/OT recommending rehab, consult placed. We prefer the patient go to [facility] over SNF  Rehab to consult today, labs ordered and IV dilaudid removed. Encourage PO meds over IV.   She is clear for transfer when accepted to rehab.   Following closely        Prophylactic anticoagulation: yes           Start date/time: on  mar

## 2024-02-01 NOTE — THERAPY
"Occupational Therapy  Daily Treatment     Patient Name: Dee Armstrong  Age:  68 y.o., Sex:  female  Medical Record #: [id number]  Today's Date: 2/1/2024       Precautions: Fall Risk, Lumbosacral Orthosis, Spinal / Back Precautions   Comments: LSO when OOB    Assessment  Pt was seen for OT tx, pt was up in chair w/o LSO, and receiving pain meds from RN. Reviewed importance of donning brace for OOB activity, focused on modifications to LB dressing and changing positions. Pain appears to be primary limitation for pt, mobility and ADl's remain below baseline. OT will continue to follow.     Plan  Treatment Plan Status: Continue Current Treatment Plan  Type of Treatment: Self Care / Activities of Daily Living, Adaptive Equipment, Therapeutic Exercises, Therapeutic Activity  Treatment Frequency: 4 Times per Week  Treatment Duration: Until Therapy Goals Met    DC Equipment Recommendations: Tub / Shower Seat  Discharge Recommendations: Recommend post-acute placement for additional occupational therapy services prior to discharge home    Subjective  \" I am in pain\"      Objective     02/01/24 1005   Treatment Charges   Charges Yes   OT Self Care / ADL (Units) 2   Total Time Spent   OT Time Spent Yes   OT Self Care / ADL (Minutes) 26   OT Total Time Spent (Calculated) 26   Precautions   Precautions Fall Risk;Lumbosacral Orthosis;Spinal / Back Precautions    Comments LSO when OOB   Pain 0 - 10 Group   Location Back   Location Orientation Lower   Therapist Pain Assessment During Activity;Nurse Notified;4   Cognition    Cognition / Consciousness WDL   Level of Consciousness Alert   Comments cooperative but tends to be distracted, verbose and tangential   Passive ROM Upper Body   Passive ROM Upper Body WDL   Active ROM Upper Body   Active ROM Upper Body  WDL   Strength Upper Body   Upper Body Strength  WDL   Other Treatments   Other Treatments Provided Reviewed spinal prec, LSO managment and importance of changing positions " through out the day for pain managment   Balance   Sitting Balance (Static) Fair   Sitting Balance (Dynamic) Fair   Standing Balance (Static) Fair -   Standing Balance (Dynamic) Poor +   Weight Shift Sitting Fair   Weight Shift Standing Poor   Skilled Intervention Verbal Cuing;Tactile Cuing;Facilitation   Comments w/fww   Bed Mobility    Comments up in chair pre/post   Activities of Daily Living   Eating Modified Independent   Grooming Supervision;Seated   Upper Body Dressing Moderate Assist   Lower Body Dressing Maximal Assist   Toileting   (NT)   Skilled Intervention Verbal Cuing;Tactile Cuing;Facilitation;Compensatory Strategies   Comments focused on LB dressing and use of  AE as well as re-education on LSO as pt did not initally have it on   How much help from another person does the patient currently need...   6 Clicks Daily Activity Score 18   Functional Mobility   Sit to Stand Minimal Assist   Bed, Chair, Wheelchair Transfer Minimal Assist   Mobility sit>Stand x2 from chair marching near chair   Skilled Intervention Verbal Cuing;Tactile Cuing;Facilitation   Activity Tolerance   Comments c/o pain through out   Patient / Family Goals   Patient / Family Goal #1 less pain   Goal #1 Outcome Progressing slower than expected   Short Term Goals   Short Term Goal # 1 pt will complete toileting ADL at SPV level   Goal Outcome # 1 Progressing as expected   Short Term Goal # 2 pt will tolerate >5min standing grooming at SPV level   Goal Outcome # 2 Progressing as expected   Short Term Goal # 3 pt will complete LB dress with AE PRN at SPV level   Goal Outcome # 3 Goal not met   Education Group   Role of Occupational Therapist Patient Response Patient;Acceptance;Explanation;Verbal Demonstration   Spinal Precautions Patient Response Patient;Acceptance;Explanation;Action Demonstration;Verbal Demonstration   Brace Wear & Care Patient Response Patient;Acceptance;Explanation;Action Demonstration;Verbal Demonstration   ADL  Patient Response Patient;Acceptance;Explanation;Verbal Demonstration   Occupational Therapy Treatment Plan    O.T. Treatment Plan Continue Current Treatment Plan   Anticipated Discharge Equipment and Recommendations   Discharge Recommendations Recommend post-acute placement for additional occupational therapy services prior to discharge home   Interdisciplinary Plan of Care Collaboration   IDT Collaboration with  Nursing   Patient Position at End of Therapy Call Light within Reach;Tray Table within Reach;Phone within Reach;Seated   Collaboration Comments RN aware of OT tx and pts efforts   Session Information   Date / Session Number  2/1 #3 (3/4, 2/1)

## 2024-02-01 NOTE — PREADMISSION SCREENING NOTE
"  Pre-Admission Screening Form    Patient Information:   Name: Dee Armstrong     MRN: [id number]       : 1955      Age: 68 y.o.   Gender: female      Race: White [7]       Marital Status:  [2]  Family Contact: Akil Armstrong \"Narendra\"        Relationship: Spouse [17]  Home Phone:            Cell Phone: [phone]  Advanced Directives: Copy in Chart  Code Status:  FULL  Current Attending Provider: Tim Rodriguez M.D.  Referring Physician: ALEX Wade       Referral Date: 24  Primary Payor Source:  MEDICARE  Secondary Payor Source:  [facility]    Medical Information:   Date of Admission to Acute Care [place]2024  Room Number: T301/01  Rehabilitation Diagnosis: 0004.111 - Spinal Cord Dysfunction, Non-Traumatic: Paraplegia, Incomplete   Immunization History   Administered Date(s) Administered    INFLUENZA TIV (IM) 11/10/2016    Influenza (IM) Preservative Free - HISTORICAL DATA 10/25/2010, 10/09/2014, 11/10/2016    Influenza Seasonal Injectable - Historical Data 10/18/2011, 10/18/2012, 10/15/2013, 11/10/2016    Influenza Vaccine Adult HD 2015, 10/08/2018, 10/15/2019, 2020, 10/08/2021, 2022    Influenza Vaccine H1N1 - HISTORICAL DATA 2010    Influenza Vaccine Pediatric Split - Historical Data 2003, 2008    Influenza Vaccine Quad Inj (Pf) 10/23/2015, 11/10/2016, 10/09/2017    MODERNA BIVALENT BOOSTER SARS-COV-2 VACCINE (6+) 2022    MODERNA SARS-COV-2 VACCINE (12+) 2021, 2021, 10/22/2021, 2022    Pneumococcal Conjugate Vaccine (Prevnar/PCV-13) 2017    Pneumococcal polysaccharide vaccine (PPSV-23) 2018    Tdap Vaccine 2014    Zoster Vaccine Live (ZVL) (Zostavax) - HISTORICAL DATA 2013    Zoster Vaccine Recombinant (RZV) (SHINGRIX) 2018, 10/11/2018     No Known Allergies  Past Medical History:   Diagnosis Date    Arthritis     osteo, hips, spine    Coccidioidomycosis 2015    left upper lung ([place] " fever), medicated for a year, yearly xrays    Cough 05/29/2015    Diverticulosis     GERD (gastroesophageal reflux disease)     Heart burn     Hiatus hernia syndrome 2023    High cholesterol     Hypertension     Indigestion     LBP (low back pain)     Lumbar radicular pain 05/29/2015    Muscle disorder     Obesity     Osteoporosis of forearm     Alendronate started 6/18    Pain     Shoulder and numbness to R LE, hip     Past Surgical History:   Procedure Laterality Date    FUSION, SPINE, LUMBAR, PLIF N/A 1/30/2024    Procedure: FUSION, SPINE, LUMBAR, L2-S1;  Surgeon: Tim Rodriguez M.D.;  Location: [facility];  Service: Neurosurgery    LUMBAR FUSION O-ARM Left 1/25/2024    Procedure: STAGE #2 LEFT LUMBAR EXTREME LATERAL INTERBODY FUSION, LUMBAR 2-SACRAL 1 FUSION WITH POSSIBLE REDO RIGHT LUMBAR 2-3 LAMINOTOMIES AND HARDWARE REMOVAL;  Surgeon: Tim Rodriguez M.D.;  Location: [facility];  Service: Neurosurgery    FUSION, SPINE, XLIF Left 1/25/2024    Procedure: FUSION, SPINE, XLIF;  Surgeon: Tim Rodriguez M.D.;  Location: [facility];  Service: Neurosurgery    LUMBAR FUSION ANTERIOR N/A 1/23/2024    Procedure: STAGE #1 LUMBAR 4-SACRAL 1 ANTERIOR LUMBAR INTERBODY FUSION;  Surgeon: Tim Rodriguez M.D.;  Location: [facility];  Service: Neurosurgery    OR CYSTOSCOPY,INSERT URETERAL STENT Right 06/08/2023    Procedure: CYSTOSCOPY, WITH RIGHT URETEROSCOPY, WITH LITHOTRIPSY, WITH INSERTION OF RIGHT URETERAL STENT;  Surgeon: Amol Sullivan M.D.;  Location: [facility];  Service: Urology    OR CYSTO/URETERO/PYELOSCOPY, DX Right 06/08/2023    Procedure: URETEROSCOPY;  Surgeon: Amol Sullivan M.D.;  Location: [facility];  Service: Urology    LASERTRIPSY Right 06/08/2023    Procedure: LITHOTRIPSY, USING LASER;  Surgeon: Amol Sullivan M.D.;  Location: [facility];  Service: Urology    HIP REVISION TOTAL Left 05/13/2021    Procedure: REVISION, TOTAL  ARTHROPLASTY, HIP.;  Surgeon: Amol Hogue M.D.;  Location: [facility];  Service: Orthopedics    MD TOTAL HIP ARTHROPLASTY Left 09/19/2019    Procedure: ARTHROPLASTY, HIP, TOTAL, ANTERIOR APPROACH;  Surgeon: Clarence Vallejo M.D.;  Location: [facility];  Service: Orthopedics    INCISION HERNIA REPAIR Right 11/26/2018    Procedure: INCISION HERNIA REPAIR- FLANK WITH MESH;  Surgeon: Misael Ramírez M.D.;  Location: [facility];  Service: General    HYSTERECTOMY ROBOTIC  06/07/2017    Procedure: HYSTERECTOMY ROBOTIC SI, BILATERAL SALPINGO-OOPHORECTOMY, URETERAL SACRAL LIGAMENT SHORTENING ;  Surgeon: Jerica Hooper M.D.;  Location: [facility];  Service:     CYSTOSCOPY  06/07/2017    Procedure: CYSTOSCOPY;  Surgeon: Jerica Hooper M.D.;  Location: [facility];  Service:     SHOULDER DECOMPRESSION ARTHROSCOPIC Right 12/06/2016    Procedure: SHOULDER DECOMPRESSION ARTHROSCOPIC - SUBACROMIAL, MANJARREZ;  Surgeon: Kyle Claros M.D.;  Location: [facility];  Service:     CLAVICLE DISTAL EXCISION Right 12/06/2016    Procedure: CLAVICLE DISTAL EXCISION;  Surgeon: Kyle Claros M.D.;  Location: [facility];  Service:     SHOULDER ARTHROSCOPY W/ ROTATOR CUFF REPAIR Right 12/06/2016    Procedure: SHOULDER ARTHROSCOPY W/ ROTATOR CUFF REPAIR ;  Surgeon: Kyle Claros M.D.;  Location: [facility];  Service:     SHOULDER ARTHROSCOPY W/ BICIPITAL TENODESIS REPAIR Right 12/06/2016    Procedure: SHOULDER ARTHROSCOPY W/ BICIPITAL TENODESIS REPAIR ;  Surgeon: Kyle Claros M.D.;  Location: [facility];  Service:     FUSION, SPINE, LUMBAR, PLIF  02/09/2016    Procedure: LUMBAR FUSION POSTERIOR PEDICLE SCREW FIXATION (STAGE #2);  Surgeon: Tim Rodriguez M.D.;  Location: [facility];  Service:     LUMBAR LAMINECTOMY DISKECTOMY  02/09/2016    Procedure: LUMBAR LAMINECTOMY DISKECTOMY MINI OPEN;  Surgeon: Tim Rodriguez  M.D.;  Location: SURGERY [facility];  Service:     FUSION, SPINE, XLIF Right 02/06/2016    Procedure: EXTREME LATERAL INTERBODY FUSION L3-4 (STAGE #1);  Surgeon: Tim Rodriguez M.D.;  Location: SURGERY [facility];  Service:     RECOVERY  12/08/2015    Procedure: CT-CT GUIDED LEFT LUNG BIOPSY-;  Surgeon: Recoveryonly Surgery;  Location: SURGERY PRE-POST PROC UNIT [facility];  Service:     REDDY BY LAPAROSCOPY  04/17/2014    Performed by Ankur Lerner M.D. at SURGERY SAME DAY [facility]    EGD WITH ASP/BX  02/04/2014    mild chronic inactive gastritis, scar gastric antrum-    EGD WITH ASP/BX  09/29/2011    possible barrettes, hiatal hernia, gastritis    COLONOSCOPY  09/29/2011    diverticulosis sigmoid colon, hemorrhoids    ARTHROTOMY  09/03/2010    Performed by YARELY MORRISON at SURGERY SAME DAY [facility]    ARTHRODESIS  09/03/2010    Performed by YARELY MORRISON at SURGERY SAME DAY [facility]    ORTHOPEDIC OSTEOTOMY  09/03/2010    Performed by YARELY MORRISON at SURGERY SAME DAY [facility]    BONE SPUR EXCISION  09/03/2010    Performed by YARELY MORRISON at SURGERY SAME DAY [facility]    OTHER ABDOMINAL SURGERY  2000    COLON RESECTION    LAMINOTOMY      OTHER  2000    Diverticulitis    OTHER ORTHOPEDIC SURGERY      PARDEEP CARPAL TUNNEL RELEASES       History Leading to Admission, Conditions that Caused the Need for Rehab (CMS):     DATE OF SERVICE:  01/23/2024      SURGICAL CONSULTATION     TIME:  0620 hours     CHIEF COMPLAINT:  Consult for exposure for spine fusion.     HISTORY OF PRESENT ILLNESS:  This is a 68-year-old female who has had   longstanding difficulties with her back.     At the present time, she complains of inability to walk and constant back   pain.     X-rays show a significant hardware posteriorly and a dramatic degeneration   with scoliosis to the patient's left.     As a first stage, she will undergo an anterior lumbar interbody fusion at   L4-L5 and L5-S1 level.      PAST MEDICAL HISTORY:  OPERATIONS:  Laparotomy and colon resection, hysterectomy,  section   and multiple spine surgeries.     MEDICAL DISEASES:  Obesity, hypertension, hyperlipidemia, chronic pain   syndrome.     MEDICATIONS:  Elavil, Lipitor, Neurontin, Protonix, Prinivil, Fosamax,   Augmentin, Temovate.     ALLERGIES:  None.     FAMILY HISTORY:  Noncontributory.     SOCIAL HISTORY:  The patient is a nonsmoker and retired.     REVIEW OF SYSTEMS:  I reviewed the 10-point AMA/CMS criteria from the chart.     PHYSICAL EXAMINATION:  VITAL SIGNS:  Temperature 98.8, blood pressure 162/94, pulse 90.  HEENT:  Without icterus.  NECK:  Without masses.  CHEST:  Coarse breath sounds.  CARDIAC:  Auscultation muffled.  ABDOMEN:  Pendulous, midline scar.  RECTAL/GENITAL:  Deferred.  EXTREMITIES:  Trace edema.     IMPRESSION:  1.  Severe degenerative lumbosacral disk disease -- multiple levels --   deformation and degeneration with scoliosis to the left.  2.  Multiple spine surgeries.  3.   section.  4.  Hysterectomy.  5.  Morbid obesity.  6.  Hypertension.  7.  Hyperlipidemia.  8.  Reflux.  9.  Osteoporosis.  10.  Morbid obesity.     PLAN:  The patient will have a midline retroperitoneal approach for her   surgery.     Risks of death, bleeding, infection, injury to the ureter, injury to great   vessels, postop wound complications, possibly aborting the procedure because   of poor exposure have been carefully explained.     Should be a challenging so far, she is obese and has had a colon resection and   we are particularly worried about the ureter and access to the great vessels.     The patient and  understand that if the exposure is not safe, then we   will abort the anterior procedure given the challenges.     Thank you very much for the consultation.           ______________________________  MD NADEGE KENNY/ESTEFANY     DD:  2024 06:38  DT:  2024 06:56     Job#:   [id number]        DATE OF SERVICE:  01/23/2024      OPERATING SURGEON:  Rolly Cox M.D.     CO-SURGEON:  Tim Rodriguez M.D.     PROCEDURE:  Midline retroperitoneal exposure for L4-L5 and L5-S1 with   diskectomies and cage fusions -- 22 for redo surgery from previous colon   resection and morbid obesity with a BMI of 40.     POSTOPERATIVE DIAGNOSIS:  Same.     SUMMARY:  This 68-year-old female has chronic back pain and evidence for   collapse at multiple levels.  She has evidence for scoliotic deformity from   degeneration. As a first step, we will undergo anterior lumbar interbody   fusion at the lower 2 levels.     The patient has had a previous midline incision for a colon resection.     She is -- 22 for this reason as well as morbid obesity.     She has had multiple posterior procedures as well.     DESCRIPTION OF PROCEDURE:  The patient was taken to the operating room and   satisfactory general anesthesia induced by endotracheal intubation.  The   patient was prepped and draped.  Midline incision was made through the old   scar and carried down to the left rectus sheath.  This was scored and space   behind the left rectus muscle was created.  Retroperitoneal space was entered   with some difficulty, owing to her obesity and the retroperitoneal contents,   which were markedly scarred brought to the patient's right.     Self-retaining retractors were placed in the depths of the wound and the L5-S1   disk space was identified by mobilizing soft tissue structures overlying the   sacral promontory.     Exposure for L4-L5 was somewhat problematic, but the iliac vessels on the left   were mobilized inferiorly for this exposure.     The appropriate levels were identified in AP and lateral projections and   please see the dictation of Dr. Tim Rodriguez for the diskectomies and cage   fusions.     X-rays were negative for retained foreign bodies and the patient was closed   with running #1 non-looped PDS fascial  sutures and staples.  Dressings were   applied and the patient was sent to the recovery room in good condition.  No   specimens were sent to pathology.           ______________________________  MD NADEGE KENNY/ALBA     DD:  01/23/2024 10:31  DT:  01/23/2024 11:40     Job#:  [id number]     CC:JUNAID LE MD      Immediate Post OP Note     PreOp Diagnosis: L4-S1 DDD, right radiculopathy.         PostOp Diagnosis: Same.         Procedure(s):  STAGE #1 LUMBAR 4-SACRAL 1 ANTERIOR LUMBAR INTERBODY FUSION - Wound Class: Clean     Surgeon(s):  ZUNILDA Boss M.D.     Anesthesiologist/Type of Anesthesia:  Anesthesiologist: Bry Chery M.D./General     Surgical Staff:  Assistant: Riaz Espinosa P.A.-C.  Cell Saver : Bindu Lima  Circulator: Cris Arias R.N.  Relief Circulator: Keri Azar R.N.  Scrub Person: Jess Chowdhury; Denae Seay  Count Wilmont: Janny Dempsey R.N.  Radiology Technologist: Tegan Guerrero; Aleena Hayden     Specimens removed if any:  * No specimens in log *     Estimated Blood Loss: 600 cc, 300 given back via cell saver.      Findings: L4-S1 DDD, see dictation.      Complications: None.           DATE OF SERVICE:  01/23/2024      PREOPERATIVE DIAGNOSES:  1.  Right L4-5 radiculopathies.  2.  Neurogenic claudication.  3.  L4-5 and L5-S1 degenerative disk disease with collapse and foraminal   stenosis.  4.  L2-3 degenerative disk disease and degenerative scoliosis measured at 35   degrees.  5.  L4-5 spondylolisthesis with significant L5-S1 collapse and foraminal   stenosis.  6.  Low back pain refractory to medical care.     POSTOPERATIVE DIAGNOSES:  1.  Right L4-5 radiculopathies.  2.  Neurogenic claudication.  3.  L4-5 and L5-S1 degenerative disk disease with collapse and foraminal   stenosis.  4.  L2-3 degenerative disk disease and degenerative scoliosis measured at 35   degrees.  5.  L4-5 spondylolisthesis with  significant L5-S1 collapse and foraminal   stenosis.  6.  Low back pain refractory to medical care.     PROCEDURES PERFORMED:  Stage 1 of a planned 2-stage procedure for anterior,   posterior, circumferential decompression and fusion in separate stages.  Stage I:  1.  L4-5 and L5-S1 ALIF.  2.  Partial corpectomy of L4.  3.  Partial corpectomy of L5.  4.  Partial corpectomy of S1.  5.  L4-5 and L5-S1 ALIF arthrodesis with SeaSpine titanium interbody cages and   interbody screws and plating.  6.  Insertion of titanium cages at 2 separate levels.  7.  Anterior lumbar plating at L5-S1.  8.  SSEPs and EMG monitoring performed by neuromonitoring associates, which   remained stable throughout.  9.  Modifier-22 for extra degree of difficulty given the patient's body mass   index of 41 in addition to prior abdominal surgery for colectomy, which all   added an extra degree of time, expertise and effort and risk to the surgical   procedure adding well over an hour to the standard surgical procedure.     CO-SURGEONS:  1.  Tim Rodriguez MD, Neurosurgery and Spine Surgery.  2.  Rolly Cox MD, General Vascular Surgery for exposure.     SECOND ASSISTANT:  Riaz Espinosa PA-C     ANESTHESIA:  General endotracheal anesthesia.     ANESTHESIOLOGIST:  Bry Chery MD     COMPLICATIONS:  None.     ESTIMATED BLOOD LOSS:  500 mL.     PREOPERATIVE NOTE:  This is an extremely pleasant 68-year-old lady who   presents with mechanical low back pain and right L4-5 radiculopathies,   weakness and sensory loss in addition to proximal L2-3 related symptoms.  The   patient's MRI showed degenerative scoliosis.  She had an L3-L4 XLIF performed   7 years ago in 2016 with laminectomies with good recovery.  She now has   developed a progressive scoliosis with collapse at L2-3 and L4-5 and L5-S1   with a grade I spondylolisthesis at L4-L5.  Given the patient failed   conservative care, I discussed surgical procedure, alternatives, goals,  risks,   benefits, and complications in detail with the patient.  The patient   understood and consented to surgery for above listed procedure.  Details well   contained in the office visit notes.     DESCRIPTION OF PROCEDURE:  The patient was brought to the operating room and   placed under general endotracheal anesthesia.  She was placed supine on the   operating table with care taken to avoid the bony prominences and peripheral   nerves.  The abdominal area was prepped and draped in the usual sterile   fashion.  Dr. Cox performed an excellent midline incision and left   retroperitoneal exposure at L4-5 and L5-S1 was accomplished despite all the   prior scarring and the rotational scoliosis and difficulty with obtaining   access to the midline.  Given the patient's body mass index of 41 with her   prior surgery and degenerative scoliosis and complexities, an extra hour plus   was taken to the surgical procedure with higher degree of expertise, effort   and time and risk involved and hence a Modifier-22 is appropriate for the   primary code.  Once exposure was achieved at L5-S1, I then incised the disk at   L5-S1, removed the disk from the endplates.  Disk space were quite   arthrodesed and collapsed down, did not distract up well, however.  Hence,   partial corpectomy of L5, partial corpectomy of S1 was completed, removing a   third of the vertebral body using Midas Pj AM-8 drill bit, Kerrison rongeurs   and curettes.  Bilateral foraminotomies were completed.  I released the   annulus, sequentially distracted.  I sized up for the appropriate lordotic   SeaSpine cage.  Cage was chosen, packed with local morselized autograft and   OsteoStrand plus arthrodesis and delivered under distraction between the body   of L5-S1 for an excellent A plus fit.  Appropriate screws were placed in L5-S1   and locking plate was secured.  AP and lateral x-rays confirmed excellent   position of the cage, was slightly more on the  right side, but once the   vessels would allow, I was very happy with that.  It yielded indirect   decompression of L5-S1 and open up the foramen extremely well.     We then moved up to the L4-5 level, obtaining midline was difficult due to the   scar.  I incised the disk at L5-S1 once the vessels were protected.  I   removed the disk from the endplates.  Partial corpectomy of L4 was completed.    I placed the first cage and when we did an AP, the cage was too far on the   left side overall and hence I removed the cage and used a Midas Pj AM-8 drill   bit to complete partial corpectomy and remove the osteophyte at L4-5 and   released the annulus.  I then used the oblique lateral  to place the   cage further over to the right side as best as possible.  This resulted   indirect decompression, open up the foramen well.  The cage was back to the   posterior endplates and I was very happy with the indirect decompression was   achieved.  One screw was placed in L5, securing it.  I was unable to place a   plate over this cage to lock it, but I was very satisfied with the purchase of   the screw, which secured the cage.  AP and lateral x-rays confirmed excellent   position of the cages and the plates overall. given the patient's   degenerative scoliosis and the issues we are dealing with.  The wound was   irrigated.  Dr. Cox closed the wound and separately dictated.  Swabs,   needles, and instruments correct by two-count.  No complications were   encountered.  The patient tolerated the procedure, was stable and transferred   to recovery room.  The patient will be observed at [facility] until she meets discharge criteria.  We will obtain an interval CT scan   tomorrow and the patient's plan for left sided L2-3 XLIF followed by   posterior percutaneous screws at L2-S1 with removal of prior hardware as the   indirect decompression throughout should be sufficient for the patient   overall.      INTRAOPERATIVE FINDINGS:  Severe degenerative scoliosis with collapse and   rotational deformity, which was corrected as best as possible.  The patient   had 500 mL of blood loss with half of it returned Cell Saver.  Overall, no   complications were encountered.  This is an extremely difficult case as   outlined.           ______________________________  JUNAID RODIRGUEZ MD     JJL/SAMANTHA     DD:  01/23/2024 10:58  DT:  01/23/2024 11:55     Job#:  [id number]     CC:Emre Viramontes MD         PreOp Diagnosis: Status post Stage L4-5, L5-S1 ALIF. Prior L3-4 laminectomy and fusion. L2-3 stenosis        PostOp Diagnosis: same        Procedure(s):  STAGE #2 LEFT LUMBAR EXTREME LATERAL INTERBODY FUSION,   Surgeon(s):  Junaid Rodriguez M.D.     Anesthesiologist/Type of Anesthesia:  Anesthesiologist: Keke Berrios M.D./General     Surgical Staff:  Assistant: Osorio Rincon P.A.-C.  Circulator: Cris Arias R.N.  Relief Circulator: Janny Dempsey R.N.  Scrub Person: Jess Chowdhury  Radiology Technologist: Tegan Guerrero     Specimens removed if any:  * No specimens in log *     Estimated Blood Loss: ,10cc     Findings: Disc collapse at L2-3. Plan for stage II aborted due to low O2 sat despite anesthesia interventions. Presumed atelectasis. Stable upon extubation post stage I.      Complications: None           1/25/2024 2:37 PM Osorio Rincon P.A.-C.    Cosigned by: Junaid Rodriguez M.D. at 1/30/2024  7:00 PM          DATE OF SERVICE:  01/30/2024      PREOPERATIVE DIAGNOSES:   1.  Bilateral neurogenic claudication.  2.  L2-S1 degenerative scoliosis.  3.  Status post stage 1 L4-5 and L5-S1 ALIF followed by stage II L2-3 XLIF.  4.  Mechanical low back pain, refractive medical care.  5.  Delay in stage II and stage III given that the patient was unable to be   ventilated well when we placed her prone last week and required more pulmonary   toilet for atelectasis and hence we delayed that surgery until the  patient   was stabilized medically.     POSTOPERATIVE DIAGNOSES:    1.  Bilateral neurogenic claudication.  2.  L2-S1 degenerative scoliosis.  3.  Status post stage 1 L4-5 and L5-S1 ALIF followed by stage II L2-3 XLIF.  4.  Mechanical low back pain, refractive medical care.  5.  Delay in stage II and stage III given that the patient was unable to be   ventilated well when we placed her prone last week and required more pulmonary   toilet for atelectasis and hence we delayed that surgery until the patient   was stabilized medically.     PROCEDURES:  Stage III of a planned multistage procedure for anterior,   posterior, circumferential decompression and fusion in separate stages.  Stage III:  1.  L2 through S1 posterolateral arthrodesis bilaterally.  2.  L2-S1 posterior pedicle screw fixation.  3.  Modifier-22 for extra degree of difficulty given the patient's body mass   index of 41 with degenerative scoliosis, all added extra hour to the standard   surgical procedure with higher degree of expertise, effort and time and risk   involved.  4.  Intraoperative use of neuronavigation XVision augmented reality system.  5.  Intraoperative CT scan with the O-arm for registration.     SURGEON:  Tim Rodriguez MD, Neurosurgery and Spine Surgery.     ASSISTANT:  Riaz Espinosa PA-C.     ANESTHESIA:  General endotracheal anesthesia.     ANESTHESIOLOGIST:  ____.     COMPLICATIONS:  None.     ESTIMATED BLOOD LOSS:  Less than 100 mL.     PREOPERATIVE NOTE:  This is an extremely pleasant 68-year-old lady presents   with neurogenic claudication, radiculopathies with degenerative scoliosis.    She underwent stage I L4-5 and L5-S1 ALIF last week and did well from that.    She then had stage II L2-3 XLIF on the left side, has done well from that   overall.  She was unable to have stage III at that time as she was poorly   ventilating and oxygenating prone and given her poor lung function, we elected   to hold off that surgery and to  have her stabilized over the weekend, which   we have done.     PLAN:  I discussed surgical procedure, alternatives, goals, risks, benefits,   complications in detail with the patient, used a bone model, MRI and   educational handouts to assist the patient with her decision making, answered   all questions to the best of my ability.  The patient understood and consented   to surgery for above listed procedure.  Details are well contained in the   office visit notes.     DESCRIPTION OF PROCEDURE:  The patient was brought to the operating room and   placed under general anesthesia.  She was placed prone on the operating OSI   table with care taken bony prominences, peripheral nerves.  Lumbar region   prepped and draped in the usual sterile fashion.  Following localization, I   placed a small incision over the left posterior iliac crest and placed the   first post in there and stabilized it with the fiducial markers.    Intraoperative CT scan was performed with the O-arm and the system registered.    I then using neuronavigation to make 2 parallel incisions over L2-L5, I   marked out the prior screws.  I incised the fascia.  I then used the XVision   to place screws bilaterally in L2 and right L5 and bilateral S1 screws.  I   tried the left L5, there was really no significant pedicle available with the   scoliosis and collapse that made it very difficult to safely place a screw   here and I opted to leave one out.  The wound was irrigated, I stimulated the   screws.  They all stimulated well over 20 millivolts.  Precontoured rods was   placed over the polyaxial screw heads at L2-S1 bilaterally.  Final tightening   screws were placed and locked.  X-ray confirmed excellent position of the   screws overall given her deformity and the rods.  I elected not to remove the   L3-4 screws as they were very deep and covered by bone, and more medialized   than the trajectories for the L2-S1 screws and I felt given the patient's  body   habitus and her age, and her poor overall lung function that shortening the   surgery would be in her best interest.  Given the anterior construct, I was   very happy to just bridge over from L2-S1 given the strong XLIF in front   anteriorly at L2 with the integrated plate and screws, and hence I was very   comfortable performing dual rods, having dual rods overall with L2-S1 above   the L3-4 rods.  The wound was irrigated.  Immaculate hemostasis achieved.  I   decorticated the transverse process and packed bone L2-S1 for posterolateral   arthrodesis bilaterally L2-S1.  Once immaculate hemostasis achieved, I closed   the fascia with 0 Vicryl and 2-0 Vicryl deep dermal layer, Steri-Strips to   skin.  Small sterile dressing was applied.  Swabs, needles, instruments   correct x2 count.  No complications were encountered.  The patient tolerated   the procedure, stable and transferred to recovery room.  The patient will be   observed at [facility] until she meets discharge criteria.    The patient will follow up at [facility] in 2 weeks and 6 weeks'   time as arranged.           ______________________________  MD JOHN SAMANIEGO/BOBBY     DD:  01/30/2024 19:08  DT:  01/30/2024 19:48     Job#:  [id number]     CC:Emre Viramontes MD         Immediate Post OP Note     PreOp Diagnosis: L2-1 DDD, left radiculopathy.         PostOp Diagnosis: Same.         Procedure(s):  FUSION, SPINE, LUMBAR, L2-S1 - Wound Class: Clean     Surgeon(s):  Tim Rodriguez M.D.     Anesthesiologist/Type of Anesthesia:  Anesthesiologist: Nanette Mora M.D./General     Surgical Staff:  Assistant: Riaz Espinosa P.A.-C.  Circulator: Cris Arias R.N.  Relief Scrub: Yeyo Soriano  Scrub Person: Jess Chowdhury  Radiology Technologist: Sharon Sunshine; Aleena Hayden     Specimens removed if any:  * No specimens in log *     Estimated Blood Loss: 100 cc      Findings: L2-S1 DDD, see dictation.       Complications: None.            1/30/2024 7:27 PM Riaz Espinosa P.A.-C.        DATE OF SERVICE:  01/25/2024      PREOPERATIVE DIAGNOSES:  1.  L2-L3 lumbar stenosis.  2.  Degenerative dextroscoliosis.  3.  Status post stage I L4-L5 and L5-S1 ALIF.     POSTOPERATIVE DIAGNOSES:  1.  L2-3 lumbar stenosis.  2.  Degenerative dextroscoliosis.  3.  Status post stage I L4-5 and L5-S1 ALIF.     PROCEDURES:  Rafa II of a planned multistage procedure for anterior,   posterior, circumferential decompression and fusion.     STAGE II:  1.  Left L2-3 XLIF.  2.  Partial corpectomy of L2.  3.  Partial corpectomy of L3.  4.  L2-3 arthrodesis with a [facility] titanium interbody cage, local morselized   autograft and OsteoStrand plus arthrodesis.  5.  Insertion of titanium cage at one level.  6.  L2-3 lateral plating system with bicortical screws.  7.  SSEPs, EMG monitoring performed by neuromonitoring associates, which   remained stable throughout.  8.  Modifier-22 for extra degree of difficulty given the patient's body mass   index of 41, which added an extra hour to the standard surgical procedure   overall with higher degree of expertise, effort, time and risk involved in   addition to her degenerative scoliosis, which was marked.     SURGEON:  Tim Rodriguez MD, Neurosurgery and Spine Surgery     ASSISTANT:  Osorio Rincon PA-C     ANESTHESIA:  General endotracheal anesthesia.     ANESTHESIOLOGIST:  Keke Berrios MD     COMPLICATIONS:  None.     ESTIMATED BLOOD LOSS:  Less than 20 mL.     PREOPERATIVE NOTE:  This extremely pleasant 68-year-old lady presents with   chronic mechanical low back pain and lower extremity radiculopathies   proximally, particularly on the right L2-3 level from the L2-3 collapse and   degenerative scoliosis.  Given the patient failed conservative care, I offered   the patient multiple procedures as described.  Details well contained in the   office visit notes.     DESCRIPTION OF PROCEDURE:   The patient was brought to the operating room and   placed under general endotracheal anesthesia.  She was placed in a true   lateral position with the left side up with great care taken to avoid the bony   prominences, peripheral nerves.  Left flank region was prepped and draped in   the usual sterile fashion.  Following localization of cross table fluoroscopy,   a small incision was made over L2-3 and I entered the left retroperitoneal   space identifying the psoas muscle with continuous EMG monitoring.  I mapped   out the lumbar plexus posteriorly very safely.  Sequential dilation was   achieved.  The Lattus retractor system was applied.  The blades opened up   allowing excellent exposure at L2-3.  The elver was placed in the disc space at   L2-3 for an excellent position.  The ALL retractor was placed anteriorly.  I   incised the disk at L2-3, removed the disk from the endplates.  The disk   spaces were quite arthrodesed, collapsed down and did not distract up well,   however.  Hence, partial corpectomy of L2, partial corpectomy of L3 was   required and completed in standard fashion removing approximately one-third of   vertebral bodies.  I released the annulus, sequentially distracted.  I then   sized up for the appropriate lordotic cage.  The appropriate lordotic cage was   chosen and packed with local morselized autograft and OsteoStrand plus   arthrodesis and delivered under distraction between the body of L2-3 for an   excellent A plus fit.  A lateral plating system was secured with bicortical   screws.  The cage then was secured and tightened to the plate and the locking   cap applied.  AP and lateral x-rays confirmed excellent position of the cage   and the plate with excellent restored lordosis.  Wound was irrigated,   hemostasis achieved.  Retractor was gently closed and wound was closed with 0   Vicryl to deep fascia, 2-0 Vicryl to deep dermal layer, Steri-Strips to skin.    Small sterile dressing was  applied.  Swabs, needles, instruments correct by   two-count.  No complications were encountered.  The patient tolerated the   procedure, was stable and transferred to recovery room.  The patient will be   observed at [facility] until she meets discharge criteria.    Stage III will take place in next week as we tried to place the patient   prone for stage III today but she was not oxygenating well and ventilating and   given her body mass index, size and her lungs were likely add some   atelectasis the anesthesiologist felt it would be best to stabilize the   patient over the next few days on the weekend to allow sufficient pulmonary   toilet to stabilize her.  I felt this is in the best interest of the patient   as well, and we contacted the patient's family and we elected to hold off on   her III stage procedure until next week.  The patient will follow up at [facility] in 2 weeks and 6 weeks' time as arranged.           ______________________________  MD JOHN SAMANIEGO/MICHELLE/VIS     DD:  01/30/2024 19:36  DT:  01/30/2024 20:50     Job#:  [id number]       Last signed by: Tim Rodriguez M.D. at 2/1/2024  8:43 AM         Physical Medicine and Rehabilitation Consultation                                                                                  Date of initial consultation: 2/1/2024  Requesting provider: ordered by Geoffrey Waed P.A.-C. at 02/01/24 1030    Consulting provider: Sobeida Cruz D.O.  Reason for consultation: assess for acute inpatient rehab appropriateness  LOS: 9 Day(s)     Chief complaint: s/p Lumbar ALIF      HPI: The patient is a 68 y.o. female with a past medical history of lumbar stenosis and HTN, ;  who presented on 1/23/2024  4:51 AM for scheduled lumbar surgery. Per documentation, patient has been followed in the outpatient setting for lumbar stenosis and back pain. Patient had worsening radiculopathy that failed conservative care and patient  was deemed appropriate for surgical intervention. Patient underwent aL4-S1  ALIF on 1/23 and second stage L2-3 XLIF fusion for an L2-3 disc collapse on 1/25. Patient returned to the OR for posterior fusion but surgery was aborted due to hypoxia with anesthesia. CXR showed atelectasis. Patient required 2 L o2 at basline, CT chest  showed R>L bilateral atelectasis. Patient's respiratory status was improved, and was cleared to return to the OR on 1/30 for redo L2-3 laminotomy and fusion.  Post op, patient has acute blood loss anemia with Hgb 10.7, hypocalcemia, and hyponatremia. Her BP has had intermittent HTN.      Patient seen and examined at bedside. Patient tells me she had intractable pain today. Reports that her pain is worse since being off diluaidid. Patient reports she would not be able to tolerate getting out of bed at her current level of pain. Patient reports the oral pain meds help some. Reviewed plan to schedule tylenol and use oxycodone PRN, and to not get behind on pain meds, patient agreeable.   Patient does want rehab, but reports she would not able to tolerate therapy in her current state of pain.      Social Hx:  Patient lives with spouse in a 1 story house with no JULIÁN.   0 JULIÁN  At prior level of function patient was Independent with mobility and ADLs.      Tobacco: Denies   Alcohol: Denies   Drugs: Denies      THERAPY:  Restrictions: Fall Risk, LSO with OOB   PT: Functional mobility   2/1 CGA with FWW x 18 ft, CGA sit to stand      OT: ADLs  2/1 Mod A upper body dressing , Max A lower body dressing      SLP:   None      IMAGING:  CT-LSPINE W/O PLUS RECONS  Narrative: 1/31/2024 9:54 AM     HISTORY/REASON FOR EXAM:  s/p L2-S1 fusion..  Back pain     TECHNIQUE/EXAM DESCRIPTION AND NUMBER OF VIEWS:  CT lumbar spine without contrast, with reconstructions.     The study was performed on a helical multidetector CT scanner. Thin-section helical scanning was performed from T12-L1 to the sacrum. Sagittal and  coronal multiplanar reconstructions were generated from the axial images.     Low dose optimization technique was utilized for this CT exam including automated exposure control and adjustment of the mA and/or kV according to patient size.     COMPARISON: 5     FINDINGS:  5 nonrib bearing lumbar type vertebral bodies are counted.     There is thoracolumbar levoscoliosis which appears decreased from prior.     There is interval postoperative changes at L2-L3 with discectomy, intervertebral disc space cage device and left lateral plate and interbody screw fixation. There are also now changes of posterior spinal fusion with posterior rods from the L2 through the   S1 level with transpedicular screws at L2 and S1..     Redemonstrated are postsurgical changes at L3-L4 with posterior spinal fusion, right lateral plate and interbody screw fixation and L3-S1 laminectomies. There are postoperative changes at L4-L5 and L5-S1 with discectomy, intervertebral disc space cage   devices and anterior fusion.     There is no immediate postoperative hardware complication seen.     There is an oblique fracture of the left anterolateral L5 vertebral body again noted.     There are L3-S1 laminectomies.     There is some foraminal narrowing at L4-S1 on the right and L2-L3 on the left.     Atherosclerosis. There is extraperitoneal fluid and gas within the retroperitoneum and left pelvis likely postoperative changes.  Impression: 1.  Extension of spinal fusion as described.  2.  There is an oblique nondisplaced fracture of the L5 vertebral body on the left again noted.  3.  Extraperitoneal fluid and gas in keeping with recent postoperative changes.  4.  Scoliosis and multilevel degenerative changes.  IR-US GUIDED PIV  Narrative: EXAMINATION:                                                                          HISTORY/REASON FOR EXAM:  Ultrasound Guided PIV.     TECHNIQUE/EXAM DESCRIPTION AND NUMBER OF VIEWS:  Peripheral IV insertion    with ultrasound guidance.  The procedure was prepared using maximal   sterile barrier technique including sterile gown, mask, cap, and donning   of sterile gloves following appropriate hand hygiene and/or sterile scrub.   Patient skin site was prepped with 2% Chlorhexidine solution.       FINDINGS: Peripheral IV insertion with Ultrasound Guidance was performed   by qualified imaging nursing staff without the assistance of a   Radiologist.  Impression:  Ultrasound-guided PERIPHERAL IV INSERTION performed by   qualified nursing staff as above.  DX-O-ARM  Narrative: 1/30/2024 5:00 PM     HISTORY/REASON FOR EXAM:  Lumbar fusion     TECHNIQUE/EXAM DESCRIPTION AND NUMBER OF VIEWS:  Portable O-arm     FINDINGS:  The O-arm unit was provided for intraoperative guidance in the OR for less than one hour. Actual fluoro time was 9 seconds.     2D Fluoroscopy dose(DAP): 4080.88 Gy*cm^2  3D Fluoroscopy dose(DAP): 659.23 Gy*cm^2  Impression: Portable O-arm utilized for 9 seconds.     INTERPRETING LOCATION: [address] [place] [address], [address]  DX-PORTABLE FLUORO > 1 HOUR  Narrative: 1/30/2024 6:20 PM     HISTORY/REASON FOR EXAM:  Lumbar fusion     TECHNIQUE/EXAM DESCRIPTION AND NUMBER OF VIEWS:  Portable fluoroscopy for greater than one hour in OR.     FINDINGS:  The portable fluoroscopy unit was obligated to the procedure for greater than one hour. Actual fluoro time was 7 seconds.     Fluoroscopy dose(DAP): 0.8402 Gy*cm^2  Impression: Portable fluoroscopy utilized for 7 seconds.     INTERPRETING LOCATION: [address] [place] [address], [id number]           PROCEDURES:  1/23 stage 1 L4-S1 ALIF by Dr. Rodriguez   1/25  left L2/3 XLIF by Dr. Rodriguez   1/30 redo L2-3 laminotomy and L2-S1 perc fusion by Dr. 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     left upper lung ([place] fever), medicated for a year, yearly xrays    Cough 05/29/2015     Diverticulosis      GERD (gastroesophageal reflux disease)      Heart burn      Hiatus hernia syndrome 2023    High cholesterol      Hypertension      Indigestion      LBP (low back pain)      Lumbar radicular pain 05/29/2015    Muscle disorder      Obesity      Osteoporosis of forearm       Alendronate started 6/18    Pain       Shoulder and numbness to R LE, hip            PSH:  Past Surgical History         Past Surgical History:   Procedure Laterality Date    FUSION, SPINE, LUMBAR, PLIF N/A 1/30/2024     Procedure: FUSION, SPINE, LUMBAR, L2-S1;  Surgeon: Tim Rodriguez M.D.;  Location: [facility];  Service: Neurosurgery    LUMBAR FUSION O-ARM Left 1/25/2024     Procedure: STAGE #2 LEFT LUMBAR EXTREME LATERAL INTERBODY FUSION, LUMBAR 2-SACRAL 1 FUSION WITH POSSIBLE REDO RIGHT LUMBAR 2-3 LAMINOTOMIES AND HARDWARE REMOVAL;  Surgeon: Tim Rodriguez M.D.;  Location: [facility];  Service: Neurosurgery    FUSION, SPINE, XLIF Left 1/25/2024     Procedure: FUSION, SPINE, XLIF;  Surgeon: Tim Rodriguez M.D.;  Location: [facility];  Service: Neurosurgery    LUMBAR FUSION ANTERIOR N/A 1/23/2024     Procedure: STAGE #1 LUMBAR 4-SACRAL 1 ANTERIOR LUMBAR INTERBODY FUSION;  Surgeon: Tim Rodriguez M.D.;  Location: [facility];  Service: Neurosurgery    ID CYSTOSCOPY,INSERT URETERAL STENT Right 06/08/2023     Procedure: CYSTOSCOPY, WITH RIGHT URETEROSCOPY, WITH LITHOTRIPSY, WITH INSERTION OF RIGHT URETERAL STENT;  Surgeon: Amol Sullivan M.D.;  Location: [facility];  Service: Urology    ID CYSTO/URETERO/PYELOSCOPY, DX Right 06/08/2023     Procedure: URETEROSCOPY;  Surgeon: Amol Sullivan M.D.;  Location: [facility];  Service: Urology    LASERTRIPSY Right 06/08/2023     Procedure: LITHOTRIPSY, USING LASER;  Surgeon: Amol Sullivan M.D.;  Location: [facility];  Service: Urology    HIP REVISION TOTAL Left 05/13/2021     Procedure: REVISION, TOTAL  ARTHROPLASTY, HIP.;  Surgeon: Amol Hogue M.D.;  Location: [facility];  Service: Orthopedics    TN TOTAL HIP ARTHROPLASTY Left 09/19/2019     Procedure: ARTHROPLASTY, HIP, TOTAL, ANTERIOR APPROACH;  Surgeon: Clarence Vallejo M.D.;  Location: [facility];  Service: Orthopedics    INCISION HERNIA REPAIR Right 11/26/2018     Procedure: INCISION HERNIA REPAIR- FLANK WITH MESH;  Surgeon: Misael Ramírez M.D.;  Location: [facility];  Service: General    HYSTERECTOMY ROBOTIC   06/07/2017     Procedure: HYSTERECTOMY ROBOTIC SI, BILATERAL SALPINGO-OOPHORECTOMY, URETERAL SACRAL LIGAMENT SHORTENING ;  Surgeon: Jerica Hooper M.D.;  Location: [facility];  Service:     CYSTOSCOPY   06/07/2017     Procedure: CYSTOSCOPY;  Surgeon: Jerica Hooper M.D.;  Location: [facility];  Service:     SHOULDER DECOMPRESSION ARTHROSCOPIC Right 12/06/2016     Procedure: SHOULDER DECOMPRESSION ARTHROSCOPIC - SUBACROMIAL, MANJARREZ;  Surgeon: Kyle Claros M.D.;  Location: [facility];  Service:     CLAVICLE DISTAL EXCISION Right 12/06/2016     Procedure: CLAVICLE DISTAL EXCISION;  Surgeon: Kyle Claros M.D.;  Location: [facility];  Service:     SHOULDER ARTHROSCOPY W/ ROTATOR CUFF REPAIR Right 12/06/2016     Procedure: SHOULDER ARTHROSCOPY W/ ROTATOR CUFF REPAIR ;  Surgeon: Kyle Claros M.D.;  Location: [facility];  Service:     SHOULDER ARTHROSCOPY W/ BICIPITAL TENODESIS REPAIR Right 12/06/2016     Procedure: SHOULDER ARTHROSCOPY W/ BICIPITAL TENODESIS REPAIR ;  Surgeon: Kyle Claros M.D.;  Location: [facility];  Service:     FUSION, SPINE, LUMBAR, PLIF   02/09/2016     Procedure: LUMBAR FUSION POSTERIOR PEDICLE SCREW FIXATION (STAGE #2);  Surgeon: Tim Rodriguez M.D.;  Location: [facility];  Service:     LUMBAR LAMINECTOMY DISKECTOMY   02/09/2016     Procedure: LUMBAR LAMINECTOMY DISKECTOMY MINI OPEN;  Surgeon:  Tim Rodriguez M.D.;  Location: SURGERY [facility];  Service:     FUSION, SPINE, XLIF Right 02/06/2016     Procedure: EXTREME LATERAL INTERBODY FUSION L3-4 (STAGE #1);  Surgeon: Tim Rodriguez M.D.;  Location: SURGERY [facility];  Service:     RECOVERY   12/08/2015     Procedure: CT-CT GUIDED LEFT LUNG BIOPSY-;  Surgeon: Recoveryon Surgery;  Location: SURGERY [facility] PROC UNIT [facility];  Service:     REDDY BY LAPAROSCOPY   04/17/2014     Performed by Ankur Lerner M.D. at SURGERY SAME DAY [facility]    EGD WITH ASP/BX   02/04/2014     mild chronic inactive gastritis, scar gastric antrum-    EGD WITH ASP/BX   09/29/2011     possible barrettes, hiatal hernia, gastritis    COLONOSCOPY   09/29/2011     diverticulosis sigmoid colon, hemorrhoids    ARTHROTOMY   09/03/2010     Performed by YARELY MORRISON at SURGERY SAME DAY [facility]    ARTHRODESIS   09/03/2010     Performed by YARELY MORRISON at SURGERY SAME DAY [facility]    ORTHOPEDIC OSTEOTOMY   09/03/2010     Performed by YARELY MORRISON at SURGERY SAME DAY [facility]    BONE SPUR EXCISION   09/03/2010     Performed by YARELY MORRISON at SURGERY SAME DAY [facility]    OTHER ABDOMINAL SURGERY   2000     COLON RESECTION    LAMINOTOMY        OTHER   2000     Diverticulitis    OTHER ORTHOPEDIC SURGERY         PARDEEP CARPAL TUNNEL RELEASES            FHX:  Family History         Family History   Problem Relation Age of Onset    Cancer Mother           lymphoma    Stroke Father      Diabetes Father              Medications:       Current Facility-Administered Medications   Medication Dose    Pharmacy Consult Request ...Pain Management Review 1 Each  1 Each    MD ALERT...DO NOT ADMINISTER NSAIDS or ASPIRIN unless ORDERED By Neurosurgery 1 Each  1 Each    cyclobenzaprine (Flexeril) tablet 10 mg  10 mg    simethicone (Mylicon) chewable tablet 125 mg  125 mg    calcium citrate (Calcitrate) tablet 950 mg  950 mg    vitamin D3 (Cholecalciferol)  "tablet 1,000 Units  1,000 Units    polyethylene glycol/lytes (Miralax) Packet 1 Packet  1 Packet    enoxaparin (Lovenox) inj 40 mg  40 mg    ALPRAZolam (Xanax) tablet 0.25 mg  0.25 mg    albuterol inhaler 2 Puff  2 Puff    Respiratory Therapy Consult      calcium carbonate (Tums) chewable tab 1,000 mg  1,000 mg    mag hydrox-al hydrox-simeth (Maalox Plus Es Or Mylanta Ds) suspension 10 mL  10 mL    amitriptyline (Elavil) tablet 10 mg  10 mg    atorvastatin (Lipitor) tablet 20 mg  20 mg    clobetasol (Temovate) 0.05 % cream 1 Application   1 Application     gabapentin (Neurontin) capsule 600 mg  600 mg    lisinopril (Prinivil) tablet 10 mg  10 mg    sucralfate (Carafate) tablet 1 g  1 g    docusate sodium (Colace) capsule 100 mg  100 mg    senna-docusate (Pericolace Or Senokot S) 8.6-50 MG per tablet 1 Tablet  1 Tablet    senna-docusate (Pericolace Or Senokot S) 8.6-50 MG per tablet 1 Tablet  1 Tablet    magnesium hydroxide (Milk Of Magnesia) suspension 30 mL  30 mL    bisacodyl (Dulcolax) suppository 10 mg  10 mg    sodium phosphate (Fleet) enema 133 mL  1 Each    diphenhydrAMINE (Benadryl) tablet/capsule 25 mg  25 mg     Or    diphenhydrAMINE (Benadryl) injection 25 mg  25 mg    ondansetron (Zofran) syringe/vial injection 4 mg  4 mg    ondansetron (Zofran ODT) dispertab 4 mg  4 mg    labetalol (Normodyne/Trandate) injection 10 mg  10 mg    benzocaine-menthol (Cepacol) lozenge 1 Lozenge  1 Lozenge    oxyCODONE immediate-release (Roxicodone) tablet 5 mg  5 mg    HYDROcodone/acetaminophen (Norco)  MG per tablet 1 Tablet  1 Tablet    oxyCODONE immediate release (Roxicodone) tablet 10 mg  10 mg    omeprazole (PriLOSEC) capsule 20 mg  20 mg         Allergies:  No Known Allergies     Physical Exam:  Vitals: /73   Pulse 99   Temp 36.9 °C (98.4 °F) (Temporal)   Resp (!) 28   Ht 1.499 m (4' 11\")   Wt 91.7 kg (202 lb 2.6 oz)   SpO2 89%   Gen: NAD, laying in bed,restless due to pain   Head:  NC/AT  Eyes/ " Nose/ Mouth: PERRLA, moist mucous membranes  Cardio: RRR, good distal perfusion, warm extremities  Pulm: normal respiratory effort, no cyanosis, on 1 L   Abd: Soft NTND   Ext: No peripheral edema. No calf tenderness. No clubbing.     Mental status:  A&Ox4 (person, place, date, situation) answers questions appropriately follows commands  Speech: fluent, no aphasia or dysarthria     CRANIAL NERVES:  2,3: visual acuity grossly intact, PERRL  3,4,6: EOMI bilaterally, no nystagmus or diplopia  5: sensation intact to light touch bilaterally and symmetric  7: no facial asymmetry  8: hearing grossly intact     Motor:                            Upper Extremity  Myotome R L   Shoulder flexion C5 5/5 5/5   Elbow flexion C5 5/5 5/5   Wrist extension C6 5/5 5/5   Elbow extension C7 5/5 5/5   Finger flexion C8 5/5 5/5   Finger abduction T1 5/5 5/5      Lower Extremity Myotome R L   Hip flexion L2 4/5 3/5   Knee extension L3 4/5 3/5   Ankle dorsiflexion L4 5/5 5/5   Toe extension L5 5/5 5/5   Ankle plantarflexion S1 5/5 5/5         Negative Pronator drift bilaterally     Sensory:   intact to light touch through out     DTRs: 2+ in bilateral  biceps  No clonus at bilateral ankles  Negative Weber b/l      Tone: no spasticity noted     Coordination:   intact finger to nose bilaterally  intact fine motor with fingers bilaterally        Labs: Reviewed and significant for       Recent Labs     02/01/24  1128   RBC 3.39*   HEMOGLOBIN 10.7*   HEMATOCRIT 32.5*   PLATELETCT 361           Recent Labs     01/30/24  0118 02/01/24  1128   SODIUM 137 134*   POTASSIUM 4.3 4.2   CHLORIDE 103 99   CO2 22 24   GLUCOSE 102* 114*   BUN 16 15   CREATININE 0.50 0.58   CALCIUM 8.1* 8.2*      Recent Results         Recent Results (from the past 24 hour(s))   CBC WITH DIFFERENTIAL     Collection Time: 02/01/24 11:28 AM   Result Value Ref Range     WBC 7.8 4.8 - 10.8 K/uL     RBC 3.39 (L) 4.20 - 5.40 M/uL     Hemoglobin 10.7 (L) 12.0 - 16.0 g/dL      Hematocrit 32.5 (L) 37.0 - 47.0 %     MCV 95.9 81.4 - 97.8 fL     MCH 31.6 27.0 - 33.0 pg     MCHC 32.9 32.2 - 35.5 g/dL     RDW 44.9 35.9 - 50.0 fL     Platelet Count 361 164 - 446 K/uL     MPV 8.7 (L) 9.0 - 12.9 fL     Neutrophils-Polys 70.20 44.00 - 72.00 %     Lymphocytes 18.80 (L) 22.00 - 41.00 %     Monocytes 8.70 0.00 - 13.40 %     Eosinophils 0.90 0.00 - 6.90 %     Basophils 0.50 0.00 - 1.80 %     Immature Granulocytes 0.90 0.00 - 0.90 %     Nucleated RBC 0.00 0.00 - 0.20 /100 WBC     Neutrophils (Absolute) 5.47 1.82 - 7.42 K/uL     Lymphs (Absolute) 1.47 1.00 - 4.80 K/uL     Monos (Absolute) 0.68 0.00 - 0.85 K/uL     Eos (Absolute) 0.07 0.00 - 0.51 K/uL     Baso (Absolute) 0.04 0.00 - 0.12 K/uL     Immature Granulocytes (abs) 0.07 0.00 - 0.11 K/uL     NRBC (Absolute) 0.00 K/uL   Basic Metabolic Panel     Collection Time: 02/01/24 11:28 AM   Result Value Ref Range     Sodium 134 (L) 135 - 145 mmol/L     Potassium 4.2 3.6 - 5.5 mmol/L     Chloride 99 96 - 112 mmol/L     Co2 24 20 - 33 mmol/L     Glucose 114 (H) 65 - 99 mg/dL     Bun 15 8 - 22 mg/dL     Creatinine 0.58 0.50 - 1.40 mg/dL     Calcium 8.2 (L) 8.5 - 10.5 mg/dL     Anion Gap 11.0 7.0 - 16.0   ESTIMATED GFR     Collection Time: 02/01/24 11:28 AM   Result Value Ref Range     GFR (CKD-EPI) 97 >60 mL/min/1.73 m 2               ASSESSMENT:  Patient is a 68 y.o. female admitted with lumbar stenosis      Psychiatric Code / Diagnosis to Support: 0004.111 - Spinal Cord Dysfunction, Non-Traumatic: Paraplegia, Incomplete     Rehabilitation: Impaired ADLs and mobility  Patient is a good candidate for inpatient rehab based on needs for PT, OT, pending pain controll of IV meds and confirmation of DC support.      Barriers to transfer include: Insurance authorization, TCCs to verify disposition, medical clearance and bed availability      Additional Recommendations:  Lumbar stenosis   S/p lumbar fusion   Lumbar fracture   - history of back pain with radicuopathy   - no  MRI to review   - 1/23 stage 1 L4-S1 ALIF by Dr. Rodriguez   1/25  left L2/3 XLIF by Dr. Rodriguez   1/30 redo L2-3 laminotomy and L2-S1 perc fusion by Dr. Rodriguez   - most recent CT L spine obtained on 1/31 showed oblique nondisplaced L5 vertebral body fracture   - continue with PT/OT      Hypoxia   - hypoxia, worse with anesthesia, causing 3rd surgery to be aborted   - now on albuterol   - stable on 1 L o2   - CT chest negative for PE      ABLA   - post op drop in Hgb   - 2/1 Hgb 10.7   - primary team monitoring CBC      HTN   - on home dose lisinopril   - fluctuating hyper and hypotension      Bowel  - constipation prevention with scheduled senna   - bowel meds PRN   - Last BM 1/31      Pain  - was on IV diluadid until 2/1 AM   - significant increase in pain, since off IV pain meds   - changed meds to scheduled Tylenol 650 q6,   - Dc'ed Norco, patient now on oxycodone 10mg q 104      Dispo:  - patient is currently functioning below their level of baseline, recommend post acute rehab  - recommend IRF level therapy with 3hr of therapy 5 days per week  - piror to acceptance to IRF, will need  pain controlled off IV pain meds and confirmation of DC support   - TCC to assist with insurance auth and DC support            Medical Complexity:  Lumbar stenosis   S/p lumbar fusion   Hypoxia   ABLA   HTN   Impaired mobility and ADLs         DVT PPX: Lovenox         Thank you for allowing us to participate in the care of this patient.      Patient was seen for 81 minutes on unit/floor of which > 50% of time was spent on counseling and coordination of care regarding the above, including prognosis, risk reduction, benefits of treatment, and options for next stage of care.     Sobeida Cruz D.O.   Physical Medicine and Rehabilitation      Please note that this dictation was created using voice recognition software. I have made every reasonable attempt to correct obvious errors, but there may be errors of grammar and possibly content  "that I did not discover before finalizing the note.       Co-morbidities:  See PMH  Potential Risk - Complications: Contractures, Pain, Paralysis, Pneumonia, Pressure Ulcer, and Urinary Tract Infection  Level of Risk: High    Ongoing Medical Management Needed (Medical/Nursing Needs):   Patient Active Problem List    Diagnosis Date Noted    Neurogenic claudication 01/24/2024    Gastric ulcer 10/06/2023    Abnormal finding on GI tract imaging 05/23/2023    Essential hypertension 11/03/2022    Dyslipidemia 11/03/2022    Hordeolum externum of left lower eyelid 11/03/2022    Osteoporosis 11/03/2022    Kidney stone 04/19/2022    Unilateral primary osteoarthritis, left hip 06/23/2021    Mechanical loosening of prosthetic hip (HCC) 06/23/2021    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    Lumbar radiculopathy 02/09/2018    Incomplete rotator cuff tear or rupture of right shoulder, not specified as traumatic 12/06/2016    DDD (degenerative disc disease), lumbar 02/06/2016    Vitamin D deficiency 12/09/2013    Severe obesity (HCC)     GERD (gastroesophageal reflux disease)     Diverticulosis      A/o  Current Vital Signs:   Temperature: 36.7 °C (98.1 °F) Pulse: 92 Respiration: 16 Blood Pressure : 124/69  Weight: 91.7 kg (202 lb 2.6 oz) Height: 149.9 cm (4' 11\")  Pulse Oximetry: 94 % O2 (LPM): 0      Completed Laboratory Reports:  Recent Labs     01/30/24  0118   SODIUM 137   POTASSIUM 4.3   BUN 16   CREATININE 0.50   GLUCOSE 102*     Additional Labs: Not Applicable    Prior Living Situation:   Housing / Facility: 1 Story House (1 step to the front door.)  Steps Into Home: 0  Steps In Home: 0  Lives with - Patient's Self Care Capacity: Spouse  Equipment Owned: Front-Wheel Walker, 4-Wheel Walker    Prior Level of Function / Living Situation:   Physical Therapy: Prior Services: None  Housing / Facility: 1 Story House (1 step to the front door.)  Steps Into Home: 0  Steps In Home: 0  Bathroom Set up: Walk In Shower, Shower " Chair  Equipment Owned: Front-Wheel Walker, 4-Wheel Walker  Lives with - Patient's Self Care Capacity: Spouse  Bed Mobility: Independent  Transfer Status: Independent  Ambulation: Independent  Assistive Devices Used: None  Stairs: Independent  Current Level of Function:   Gait Level Of Assist: Unable to Participate  Assistive Device: Front Wheel Walker  Distance (Feet): 5  Deviation: Bradykinetic, Shuffled Gait  Supine to Sit: Minimal Assist  Sit to Supine: Moderate Assist (Assist with legs)  Scooting: Minimal Assist  Rolling: Minimum Assist to Lt.  Skilled Intervention: Verbal Cuing, Sequencing, Facilitation  Comments: log roll, increased time due to pain. Pt was unable to recite spinal precautions prior to mobility, good carryover at end of session, was able to recall 3/3 spinal precautions at end of session  Sit to Stand: Minimal Assist  Bed, Chair, Wheelchair Transfer: Minimal Assist  Toilet Transfers: Contact Guard Assist  Transfer Method: Stand Step  Skilled Intervention: Verbal Cuing, Facilitation, Sequencing  Sitting in Chair: 15 min (BSC)  Sitting Edge of Bed: 10 min  Standing: <1 min  Occupational Therapy:   Self Feeding: Independent  Grooming / Hygiene: Independent  Bathing: Independent  Dressing: Independent  Toileting: Independent  Medication Management: Independent  Laundry: Independent  Kitchen Mobility: Independent  Finances: Independent  Home Management: Independent  Shopping: Independent  Prior Level Of Mobility: Independent Without Device in Community  Prior Services: None  Housing / Facility: 1 Story House (1 step to the front door.)  Current Level of Function:   Eating: Modified Independent  Upper Body Dressing: Minimal Assist (Doff LSO)  Lower Body Dressing: Maximal Assist  Toileting: Maximal Assist (Required assist with pericare aftter attempted BM on BSC)  Skilled Intervention: Verbal Cuing, Tactile Cuing, Compensatory Strategies  Speech Language Pathology:      Rehabilitation  Prognosis/Potential: Good  Estimated Length of Stay: 10-14 days    Nursing:      Continent    Scope/Intensity of Services Recommended:  Physical Therapy: 1.5 hr / day  5 days / week. Therapeutic Interventions Required: Maximize Endurance, Mobility, Strength, and Safety  Occupational Therapy: 1.5 hr / day 5 days / week. Therapeutic Interventions Required: Maximize Self Care, ADLs, IADLs, and Energy Conservation  Rehabilitation Nursin/7. Therapeutic Interventions Required: Monitor Pain, Skin, Vital Signs, Intake and Output, Labs, Safety, and Family Training  Rehabilitation Physician: 3 - 5 days / week. Therapeutic Interventions Required: Medical Management    She requires 24-hour rehabilitation nursing to manage bowel and bladder function, skin care, surgical incision, nutrition and fluid intake, pain control, safety, medication management, and patient/family goals. In addition, rehabilitation nursing will reiterate and reinforce therapy skills and equipment use, including ADLs, as well as provide education to the patient and family. Dee Armstrong is willing to participate in and is able to tolerate the proposed plan of care.    Rehabilitation Goals and Plan (Expected frequency & duration of treatment in the IRF):   Return to the Community, Minimal Assist Level of Care, and Family Able to Provide 24/ Assistance  Anticipated Date of Rehabilitation Admission: 24  Patient/Family oriented IRF level of care/facility/plan: Yes  Patient/Family willing to participate in IRF care/facility/plan: Yes  Patient able to tolerate IRF level of care proposed: Yes  Patient has potential to benefit IRF level of care proposed: Yes  Comments: Not Applicable    Special Needs or Precautions - Medical Necessity:  Safety Concerns/Precautions:  Fall Risk / High Risk for Falls and Balance  Pain Management  Current Medications:    Current Facility-Administered Medications Ordered in Epic   Medication Dose Route Frequency Provider  Last Rate Last Admin    Pharmacy Consult Request ...Pain Management Review 1 Each  1 Each Other PHARMACY TO DOSE Riaz Espinosa P.A.-C.        MD ALERT...DO NOT ADMINISTER NSAIDS or ASPIRIN unless ORDERED By Neurosurgery 1 Each  1 Each Other PRN EL Paz.-C.        NS infusion   Intravenous Continuous EL Paz.-C. 100 mL/hr at 01/31/24 0022 New Bag at 01/31/24 0022    cyclobenzaprine (Flexeril) tablet 10 mg  10 mg Oral Q8HRS Julieta Byrd P.A.-C.   10 mg at 02/01/24 0410    simethicone (Mylicon) chewable tablet 125 mg  125 mg Oral Q6HRS PRN PABLO Noble.A.-C.        calcium citrate (Calcitrate) tablet 950 mg  950 mg Oral DAILY Geoffrey Wade P.A.-C.   950 mg at 02/01/24 0409    vitamin D3 (Cholecalciferol) tablet 1,000 Units  1,000 Units Oral DAILY Geoffrey Wade P.A.-C.   1,000 Units at 02/01/24 0411    polyethylene glycol/lytes (Miralax) Packet 1 Packet  1 Packet Oral DAILY AT NOON Geoffrey Wade P.A.-C.   1 Packet at 01/31/24 1129    enoxaparin (Lovenox) inj 40 mg  40 mg Subcutaneous Q EVENING Geoffrey Wade P.A.-C.   40 mg at 01/31/24 1623    ALPRAZolam (Xanax) tablet 0.25 mg  0.25 mg Oral Q6HRS PRN Geoffrey Wade P.A.-C.   0.25 mg at 01/31/24 0430    albuterol inhaler 2 Puff  2 Puff Inhalation Q4HRS (RT) PABLO Silva.A.-C.   2 Puff at 02/01/24 0810    Respiratory Therapy Consult   Nebulization Continuous RT OLESYA GaleanoCSimone        0.9 % NaCl with KCl 20 mEq infusion   Intravenous Continuous TAINA GaleanoA.TIP   Stopped at 01/31/24 0029    calcium carbonate (Tums) chewable tab 1,000 mg  1,000 mg Oral Q4HRS PRN Geoffrey Wade P.A.-C.   1,000 mg at 01/26/24 1958    mag hydrox-al hydrox-simeth (Maalox Plus Es Or Mylanta Ds) suspension 10 mL  10 mL Oral BID Geoffrey Wade P.A.-C.   10 mL at 02/01/24 0413    amitriptyline (Elavil) tablet 10 mg  10 mg Oral HS PRN Riaz Espinosa P.A.-C.        atorvastatin (Lipitor)  tablet 20 mg  20 mg Oral Q EVENING Riaz Espinosa, P.A.-C.   20 mg at 01/31/24 1623    clobetasol (Temovate) 0.05 % cream 1 Application   1 Application  Topical QDAY PRN Riaz Espinosa P.A.-C.        gabapentin (Neurontin) capsule 600 mg  600 mg Oral Q EVENING Riaz Espinosa, P.A.-C.   600 mg at 01/31/24 1622    lisinopril (Prinivil) tablet 10 mg  10 mg Oral DAILY Riaz Espinosa, P.A.-C.   10 mg at 02/01/24 0413    sucralfate (Carafate) tablet 1 g  1 g Oral BID Riaz Espinosa, P.A.-C.   1 g at 02/01/24 0410    docusate sodium (Colace) capsule 100 mg  100 mg Oral BID Riaz Espinosa P.A.-C.   100 mg at 02/01/24 0410    senna-docusate (Pericolace Or Senokot S) 8.6-50 MG per tablet 1 Tablet  1 Tablet Oral Nightly Riaz Espinosa, P.A.-C.   1 Tablet at 01/31/24 2013    senna-docusate (Pericolace Or Senokot S) 8.6-50 MG per tablet 1 Tablet  1 Tablet Oral Q24HRS PRN Riaz Espinosa P.A.-C.   1 Tablet at 01/26/24 1718    magnesium hydroxide (Milk Of Magnesia) suspension 30 mL  30 mL Oral QDAY PRN Riaz Espinosa, P.A.-C.   30 mL at 01/28/24 0735    bisacodyl (Dulcolax) suppository 10 mg  10 mg Rectal Q24HRS PRN Riaz Espinosa, P.A.-C.        sodium phosphate (Fleet) enema 133 mL  1 Each Rectal Once PRN Riaz Espinosa, P.A.-C.        diphenhydrAMINE (Benadryl) tablet/capsule 25 mg  25 mg Oral Q6HRS PRN Riaz Espinosa P.A.-C.        Or    diphenhydrAMINE (Benadryl) injection 25 mg  25 mg Intravenous Q6HRS PRN Riaz Espinosa P.A.-C.        ondansetron (Zofran) syringe/vial injection 4 mg  4 mg Intravenous Q4HRS PRN Riaz Espinosa P.A.-C.        ondansetron (Zofran ODT) dispertab 4 mg  4 mg Oral Q4HRS PRN Riaz Espinosa P.A.-C.        labetalol (Normodyne/Trandate) injection 10 mg  10 mg Intravenous Q HOUR PRN Riaz Espinosa, P.A.-C.   10 mg at 01/30/24 2330    benzocaine-menthol (Cepacol) lozenge 1 Lozenge  1 Lozenge Mouth/Throat Q2HRS PRN Riaz Espinosa,  P.A.-C.        oxyCODONE immediate-release (Roxicodone) tablet 5 mg  5 mg Oral Q4HRS PRN Riaz Espinosa, P.A.-C.   5 mg at 01/30/24 1353    HYDROmorphone (Dilaudid) injection 0.5 mg  0.5 mg Intravenous Q3HRS PRN Riaz Espinosa, P.A.-C.   0.5 mg at 01/31/24 1815    HYDROcodone/acetaminophen (Norco)  MG per tablet 1 Tablet  1 Tablet Oral Q4HRS PRN Riaz Espinosa, P.A.-C.   1 Tablet at 01/29/24 1135    oxyCODONE immediate release (Roxicodone) tablet 10 mg  10 mg Oral Q4HRS PRN Riaz Espinosa, P.A.-C.   10 mg at 02/01/24 0810    omeprazole (PriLOSEC) capsule 20 mg  20 mg Oral DAILY Riaz Espinosa, P.A.-C.   20 mg at 02/01/24 0410     No current [facility]-ordered outpatient medications on file.     Diet:   DIET ORDERS (From admission to next 24h)       Start     Ordered    01/31/24 0648  Diet Order Diet: Regular  ALL MEALS        Question:  Diet:  Answer:  Regular    01/31/24 0647                    Anticipated Discharge Destination / Patient/Family Goal:  Destination: Home with Assistance Support System: Spouse and Family   Anticipated home health services: OT, PT, and Nursing  Previously used HH service/ provider: Not Applicable  Anticipated DME Needs: Walker, Wheelchair, and Commode  Outpatient Services: OT and PT  Alternative resources to address additional identified needs:   No future appointments.   Pre-Screen Completed: 2/1/2024 9:29 AM Cindy Gonzalez

## 2024-02-02 ENCOUNTER — HOSPITAL ENCOUNTER (INPATIENT)
Facility: REHABILITATION | Age: 69
LOS: 11 days | DRG: 052 | End: 2024-02-13
Attending: PHYSICAL MEDICINE & REHABILITATION | Admitting: PHYSICAL MEDICINE & REHABILITATION
Payer: MEDICARE

## 2024-02-02 VITALS
TEMPERATURE: 97.3 F | HEIGHT: 59 IN | OXYGEN SATURATION: 95 % | SYSTOLIC BLOOD PRESSURE: 110 MMHG | BODY MASS INDEX: 40.76 KG/M2 | WEIGHT: 202.16 LBS | DIASTOLIC BLOOD PRESSURE: 59 MMHG | HEART RATE: 80 BPM | RESPIRATION RATE: 16 BRPM

## 2024-02-02 DIAGNOSIS — G89.18 ACUTE POSTOPERATIVE PAIN: ICD-10-CM

## 2024-02-02 DIAGNOSIS — Z98.1 S/P LUMBAR FUSION: ICD-10-CM

## 2024-02-02 DIAGNOSIS — E78.5 DYSLIPIDEMIA: ICD-10-CM

## 2024-02-02 PROCEDURE — 700111 HCHG RX REV CODE 636 W/ 250 OVERRIDE (IP): Mod: JZ | Performed by: PHYSICAL MEDICINE & REHABILITATION

## 2024-02-02 PROCEDURE — A9270 NON-COVERED ITEM OR SERVICE: HCPCS | Performed by: PHYSICIAN ASSISTANT

## 2024-02-02 PROCEDURE — 700102 HCHG RX REV CODE 250 W/ 637 OVERRIDE(OP)

## 2024-02-02 PROCEDURE — 770010 HCHG ROOM/CARE - REHAB SEMI PRIVAT*

## 2024-02-02 PROCEDURE — 99223 1ST HOSP IP/OBS HIGH 75: CPT | Performed by: PHYSICAL MEDICINE & REHABILITATION

## 2024-02-02 PROCEDURE — A9270 NON-COVERED ITEM OR SERVICE: HCPCS | Performed by: PHYSICAL MEDICINE & REHABILITATION

## 2024-02-02 PROCEDURE — 94760 N-INVAS EAR/PLS OXIMETRY 1: CPT

## 2024-02-02 PROCEDURE — 700102 HCHG RX REV CODE 250 W/ 637 OVERRIDE(OP): Performed by: PHYSICIAN ASSISTANT

## 2024-02-02 PROCEDURE — 700102 HCHG RX REV CODE 250 W/ 637 OVERRIDE(OP): Performed by: PHYSICAL MEDICINE & REHABILITATION

## 2024-02-02 PROCEDURE — A9270 NON-COVERED ITEM OR SERVICE: HCPCS

## 2024-02-02 RX ORDER — OMEPRAZOLE 20 MG/1
20 CAPSULE, DELAYED RELEASE ORAL DAILY
Status: DISCONTINUED | OUTPATIENT
Start: 2024-02-02 | End: 2024-02-13 | Stop reason: HOSPADM

## 2024-02-02 RX ORDER — IBUPROFEN 200 MG
950 CAPSULE ORAL DAILY
Status: DISCONTINUED | OUTPATIENT
Start: 2024-02-03 | End: 2024-02-05

## 2024-02-02 RX ORDER — SUCRALFATE 1 G/1
1 TABLET ORAL 2 TIMES DAILY
Status: DISCONTINUED | OUTPATIENT
Start: 2024-02-02 | End: 2024-02-02

## 2024-02-02 RX ORDER — ALBUTEROL SULFATE 90 UG/1
2 AEROSOL, METERED RESPIRATORY (INHALATION) EVERY 4 HOURS
Qty: 8.5 G | Status: ON HOLD
Start: 2024-02-02 | End: 2024-02-12

## 2024-02-02 RX ORDER — OMEPRAZOLE 20 MG/1
20 CAPSULE, DELAYED RELEASE ORAL DAILY
Qty: 30 CAPSULE | Status: SHIPPED
Start: 2024-02-03

## 2024-02-02 RX ORDER — POLYETHYLENE GLYCOL 3350 17 G/17G
1 POWDER, FOR SOLUTION ORAL
Status: DISCONTINUED | OUTPATIENT
Start: 2024-02-02 | End: 2024-02-13 | Stop reason: HOSPADM

## 2024-02-02 RX ORDER — ENOXAPARIN SODIUM 100 MG/ML
40 INJECTION SUBCUTANEOUS EVERY EVENING
Status: DISCONTINUED | OUTPATIENT
Start: 2024-02-02 | End: 2024-02-13 | Stop reason: HOSPADM

## 2024-02-02 RX ORDER — ACETAMINOPHEN 325 MG/1
650 TABLET ORAL EVERY 6 HOURS
Qty: 30 TABLET | Refills: 0 | Status: SHIPPED
Start: 2024-02-02

## 2024-02-02 RX ORDER — ACETAMINOPHEN 500 MG
1000 TABLET ORAL EVERY 6 HOURS
Status: DISCONTINUED | OUTPATIENT
Start: 2024-02-02 | End: 2024-02-07

## 2024-02-02 RX ORDER — GABAPENTIN 300 MG/1
600 CAPSULE ORAL EVERY EVENING
Status: CANCELLED | OUTPATIENT
Start: 2024-02-02

## 2024-02-02 RX ORDER — OXYCODONE HYDROCHLORIDE 5 MG/1
5 TABLET ORAL
Status: DISCONTINUED | OUTPATIENT
Start: 2024-02-02 | End: 2024-02-06

## 2024-02-02 RX ORDER — ALUMINA, MAGNESIA, AND SIMETHICONE 2400; 2400; 240 MG/30ML; MG/30ML; MG/30ML
20 SUSPENSION ORAL
Status: DISCONTINUED | OUTPATIENT
Start: 2024-02-02 | End: 2024-02-13 | Stop reason: HOSPADM

## 2024-02-02 RX ORDER — ATORVASTATIN CALCIUM 20 MG/1
20 TABLET, FILM COATED ORAL EVERY EVENING
Status: CANCELLED | OUTPATIENT
Start: 2024-02-02

## 2024-02-02 RX ORDER — IBUPROFEN 200 MG
950 CAPSULE ORAL DAILY
Status: CANCELLED | OUTPATIENT
Start: 2024-02-03

## 2024-02-02 RX ORDER — ONDANSETRON 4 MG/1
4 TABLET, ORALLY DISINTEGRATING ORAL 4 TIMES DAILY PRN
Status: DISCONTINUED | OUTPATIENT
Start: 2024-02-02 | End: 2024-02-13 | Stop reason: HOSPADM

## 2024-02-02 RX ORDER — CYCLOBENZAPRINE HCL 10 MG
10 TABLET ORAL EVERY 8 HOURS PRN
Status: DISCONTINUED | OUTPATIENT
Start: 2024-02-02 | End: 2024-02-04

## 2024-02-02 RX ORDER — PSEUDOEPHEDRINE HCL 30 MG
100 TABLET ORAL 2 TIMES DAILY
Qty: 60 CAPSULE | Status: ON HOLD
Start: 2024-02-02 | End: 2024-02-12

## 2024-02-02 RX ORDER — VITAMIN B COMPLEX
1000 TABLET ORAL DAILY
Status: DISCONTINUED | OUTPATIENT
Start: 2024-02-03 | End: 2024-02-03

## 2024-02-02 RX ORDER — LISINOPRIL 5 MG/1
10 TABLET ORAL DAILY
Status: DISCONTINUED | OUTPATIENT
Start: 2024-02-03 | End: 2024-02-06

## 2024-02-02 RX ORDER — TRAZODONE HYDROCHLORIDE 50 MG/1
50 TABLET ORAL
Status: DISCONTINUED | OUTPATIENT
Start: 2024-02-02 | End: 2024-02-13 | Stop reason: HOSPADM

## 2024-02-02 RX ORDER — CYCLOBENZAPRINE HCL 10 MG
10 TABLET ORAL EVERY 8 HOURS
Status: DISCONTINUED | OUTPATIENT
Start: 2024-02-02 | End: 2024-02-02

## 2024-02-02 RX ORDER — SUCRALFATE 1 G/1
1 TABLET ORAL 2 TIMES DAILY
Status: CANCELLED | OUTPATIENT
Start: 2024-02-02

## 2024-02-02 RX ORDER — ECHINACEA PURPUREA EXTRACT 125 MG
2 TABLET ORAL PRN
Status: DISCONTINUED | OUTPATIENT
Start: 2024-02-02 | End: 2024-02-13 | Stop reason: HOSPADM

## 2024-02-02 RX ORDER — ONDANSETRON 2 MG/ML
4 INJECTION INTRAMUSCULAR; INTRAVENOUS 4 TIMES DAILY PRN
Status: DISCONTINUED | OUTPATIENT
Start: 2024-02-02 | End: 2024-02-13 | Stop reason: HOSPADM

## 2024-02-02 RX ORDER — CYCLOBENZAPRINE HCL 10 MG
10 TABLET ORAL EVERY 8 HOURS
Qty: 30 TABLET | Refills: 0 | Status: ON HOLD
Start: 2024-02-02 | End: 2024-02-12

## 2024-02-02 RX ORDER — VITAMIN B COMPLEX
1000 TABLET ORAL DAILY
Status: CANCELLED | OUTPATIENT
Start: 2024-02-03

## 2024-02-02 RX ORDER — SIMETHICONE 125 MG
125 TABLET,CHEWABLE ORAL EVERY 6 HOURS PRN
Qty: 120 TABLET | Status: ON HOLD
Start: 2024-02-02 | End: 2024-02-12

## 2024-02-02 RX ORDER — OXYCODONE HYDROCHLORIDE 5 MG/1
5 TABLET ORAL EVERY 4 HOURS PRN
Qty: 30 TABLET | Refills: 0 | Status: ON HOLD
Start: 2024-02-02 | End: 2024-02-12

## 2024-02-02 RX ORDER — ATORVASTATIN CALCIUM 10 MG/1
20 TABLET, FILM COATED ORAL EVERY EVENING
Status: DISCONTINUED | OUTPATIENT
Start: 2024-02-02 | End: 2024-02-13 | Stop reason: HOSPADM

## 2024-02-02 RX ORDER — GABAPENTIN 300 MG/1
600 CAPSULE ORAL EVERY EVENING
Status: DISCONTINUED | OUTPATIENT
Start: 2024-02-02 | End: 2024-02-07

## 2024-02-02 RX ORDER — AMITRIPTYLINE HYDROCHLORIDE 10 MG/1
10 TABLET, FILM COATED ORAL NIGHTLY PRN
Qty: 30 TABLET | Status: ON HOLD
Start: 2024-02-02 | End: 2024-02-12

## 2024-02-02 RX ORDER — ENOXAPARIN SODIUM 100 MG/ML
40 INJECTION SUBCUTANEOUS EVERY EVENING
Status: CANCELLED | OUTPATIENT
Start: 2024-02-02

## 2024-02-02 RX ORDER — AMOXICILLIN 250 MG
2 CAPSULE ORAL EVERY EVENING
Status: DISCONTINUED | OUTPATIENT
Start: 2024-02-02 | End: 2024-02-13 | Stop reason: HOSPADM

## 2024-02-02 RX ORDER — LISINOPRIL 10 MG/1
10 TABLET ORAL DAILY
Status: CANCELLED | OUTPATIENT
Start: 2024-02-03

## 2024-02-02 RX ORDER — CYCLOBENZAPRINE HCL 10 MG
10 TABLET ORAL EVERY 8 HOURS
Status: CANCELLED | OUTPATIENT
Start: 2024-02-02

## 2024-02-02 RX ORDER — ENOXAPARIN SODIUM 100 MG/ML
40 INJECTION SUBCUTANEOUS EVERY EVENING
Status: ON HOLD | DISCHARGE
Start: 2024-02-02 | End: 2024-02-12

## 2024-02-02 RX ORDER — OXYCODONE HYDROCHLORIDE 10 MG/1
10 TABLET ORAL EVERY 4 HOURS PRN
Qty: 28 TABLET | Refills: 0 | Status: ON HOLD
Start: 2024-02-02 | End: 2024-02-12

## 2024-02-02 RX ORDER — ACETAMINOPHEN 500 MG
1000 TABLET ORAL EVERY 6 HOURS PRN
Status: DISCONTINUED | OUTPATIENT
Start: 2024-02-07 | End: 2024-02-07

## 2024-02-02 RX ORDER — DIPHENHYDRAMINE HCL 25 MG
25 TABLET ORAL EVERY 6 HOURS PRN
Qty: 30 TABLET | Refills: 0 | Status: ON HOLD
Start: 2024-02-02 | End: 2024-02-12

## 2024-02-02 RX ORDER — ALUMINA, MAGNESIA, AND SIMETHICONE 2400; 2400; 240 MG/30ML; MG/30ML; MG/30ML
10 SUSPENSION ORAL 2 TIMES DAILY
Qty: 560 ML | Status: ON HOLD
Start: 2024-02-02 | End: 2024-02-12

## 2024-02-02 RX ORDER — OXYCODONE HYDROCHLORIDE 5 MG/1
2.5 TABLET ORAL
Status: DISCONTINUED | OUTPATIENT
Start: 2024-02-02 | End: 2024-02-06

## 2024-02-02 RX ADMIN — Medication 950 MG: at 04:37

## 2024-02-02 RX ADMIN — CYCLOBENZAPRINE 10 MG: 10 TABLET, FILM COATED ORAL at 11:46

## 2024-02-02 RX ADMIN — SUCRALFATE 1 G: 1 TABLET ORAL at 04:29

## 2024-02-02 RX ADMIN — ACETAMINOPHEN 650 MG: 325 TABLET, FILM COATED ORAL at 00:11

## 2024-02-02 RX ADMIN — GABAPENTIN 600 MG: 300 CAPSULE ORAL at 21:56

## 2024-02-02 RX ADMIN — DOCUSATE SODIUM 100 MG: 100 CAPSULE, LIQUID FILLED ORAL at 04:29

## 2024-02-02 RX ADMIN — ALUMINUM HYDROXIDE, MAGNESIUM HYDROXIDE, AND DIMETHICONE 10 ML: 400; 400; 40 SUSPENSION ORAL at 04:30

## 2024-02-02 RX ADMIN — OMEPRAZOLE 20 MG: 20 CAPSULE, DELAYED RELEASE ORAL at 14:36

## 2024-02-02 RX ADMIN — ACETAMINOPHEN 1000 MG: 500 TABLET, FILM COATED ORAL at 17:48

## 2024-02-02 RX ADMIN — ENOXAPARIN SODIUM 40 MG: 100 INJECTION SUBCUTANEOUS at 21:57

## 2024-02-02 RX ADMIN — Medication 1000 UNITS: at 04:29

## 2024-02-02 RX ADMIN — OXYCODONE HYDROCHLORIDE 5 MG: 5 TABLET ORAL at 14:36

## 2024-02-02 RX ADMIN — OMEPRAZOLE 20 MG: 20 CAPSULE, DELAYED RELEASE ORAL at 04:29

## 2024-02-02 RX ADMIN — OXYCODONE HYDROCHLORIDE 10 MG: 10 TABLET ORAL at 11:45

## 2024-02-02 RX ADMIN — OXYCODONE HYDROCHLORIDE 5 MG: 5 TABLET ORAL at 21:57

## 2024-02-02 RX ADMIN — OXYCODONE 5 MG: 5 TABLET ORAL at 04:57

## 2024-02-02 RX ADMIN — ACETAMINOPHEN 650 MG: 325 TABLET, FILM COATED ORAL at 04:29

## 2024-02-02 RX ADMIN — LISINOPRIL 10 MG: 10 TABLET ORAL at 04:29

## 2024-02-02 RX ADMIN — ATORVASTATIN CALCIUM 20 MG: 10 TABLET, FILM COATED ORAL at 21:56

## 2024-02-02 RX ADMIN — ALBUTEROL SULFATE 2 PUFF: 90 AEROSOL, METERED RESPIRATORY (INHALATION) at 09:38

## 2024-02-02 RX ADMIN — OXYCODONE HYDROCHLORIDE 5 MG: 5 TABLET ORAL at 17:48

## 2024-02-02 RX ADMIN — ALBUTEROL SULFATE 2 PUFF: 90 AEROSOL, METERED RESPIRATORY (INHALATION) at 04:37

## 2024-02-02 RX ADMIN — CYCLOBENZAPRINE 10 MG: 10 TABLET, FILM COATED ORAL at 04:29

## 2024-02-02 RX ADMIN — ACETAMINOPHEN 650 MG: 325 TABLET, FILM COATED ORAL at 11:46

## 2024-02-02 RX ADMIN — POLYETHYLENE GLYCOL 3350 1 PACKET: 17 POWDER, FOR SOLUTION ORAL at 11:46

## 2024-02-02 ASSESSMENT — LIFESTYLE VARIABLES
HOW MANY TIMES IN THE PAST YEAR HAVE YOU HAD 5 OR MORE DRINKS IN A DAY: 0
ALCOHOL_USE: YES
TOTAL SCORE: 0
TOTAL SCORE: 0
HAVE PEOPLE ANNOYED YOU BY CRITICIZING YOUR DRINKING: NO
AVERAGE NUMBER OF DAYS PER WEEK YOU HAVE A DRINK CONTAINING ALCOHOL: 0
EVER HAD A DRINK FIRST THING IN THE MORNING TO STEADY YOUR NERVES TO GET RID OF A HANGOVER: NO
CONSUMPTION TOTAL: NEGATIVE
HAVE YOU EVER FELT YOU SHOULD CUT DOWN ON YOUR DRINKING: NO
ON A TYPICAL DAY WHEN YOU DRINK ALCOHOL HOW MANY DRINKS DO YOU HAVE: 1
TOTAL SCORE: 0
EVER FELT BAD OR GUILTY ABOUT YOUR DRINKING: NO

## 2024-02-02 ASSESSMENT — FIBROSIS 4 INDEX: FIB4 SCORE: 0.71

## 2024-02-02 ASSESSMENT — PAIN DESCRIPTION - PAIN TYPE
TYPE: SURGICAL PAIN
TYPE: ACUTE PAIN

## 2024-02-02 ASSESSMENT — COPD QUESTIONNAIRES
DURING THE PAST 4 WEEKS HOW MUCH DID YOU FEEL SHORT OF BREATH: NONE/LITTLE OF THE TIME
DO YOU EVER COUGH UP ANY MUCUS OR PHLEGM?: NO/ONLY WITH OCCASIONAL COLDS OR INFECTIONS
COPD SCREENING SCORE: 2
HAVE YOU SMOKED AT LEAST 100 CIGARETTES IN YOUR ENTIRE LIFE: NO/DON'T KNOW

## 2024-02-02 ASSESSMENT — PATIENT HEALTH QUESTIONNAIRE - PHQ9
2. FEELING DOWN, DEPRESSED, IRRITABLE, OR HOPELESS: NOT AT ALL
1. LITTLE INTEREST OR PLEASURE IN DOING THINGS: NOT AT ALL
SUM OF ALL RESPONSES TO PHQ9 QUESTIONS 1 AND 2: 0
SUM OF ALL RESPONSES TO PHQ9 QUESTIONS 1 AND 2: 0
2. FEELING DOWN, DEPRESSED, IRRITABLE, OR HOPELESS: NOT AT ALL
1. LITTLE INTEREST OR PLEASURE IN DOING THINGS: NOT AT ALL

## 2024-02-02 NOTE — CARE PLAN
The patient is Stable - Low risk of patient condition declining or worsening    Shift Goals  Clinical Goals: pain control, safety, mobility  Patient Goals: rest, comfort  Family Goals: NALINI    Progress made toward(s) clinical / shift goals:  Pain medicated per MAR only as needed. Pt mobile and ambulating to bathroom.      Problem: Pain - Standard  Goal: Alleviation of pain or a reduction in pain to the patient’s comfort goal  2/1/2024 2229 by Dominique Garcia R.N.  Outcome: Progressing  2/1/2024 2208 by Dominique Garcia R.N.  Outcome: Progressing     Problem: Knowledge Deficit - Standard  Goal: Patient and family/care givers will demonstrate understanding of plan of care, disease process/condition, diagnostic tests and medications  2/1/2024 2229 by Dominique Garcia R.N.  Outcome: Progressing  2/1/2024 2208 by THANIA KimN.  Outcome: Progressing     Problem: Fall Risk  Goal: Patient will remain free from falls  2/1/2024 2229 by Dominique Garcia R.N.  Outcome: Progressing  2/1/2024 2208 by Dominique Garcia R.N.  Outcome: Progressing     Problem: Neuro Status  Goal: Neuro status will remain stable or improve  Outcome: Progressing     Problem: Respiratory  Goal: Patient will achieve/maintain optimum respiratory ventilation and gas exchange  Outcome: Progressing

## 2024-02-02 NOTE — H&P
Physical Medicine & Rehabilitation  History and Physical (H&P)  &     Post Admission Physician Evaluation (LEOLA)       Date of Admission: 2/2/2024  Date of Service: 2/2/2024   Dee Armstrong     [facility] Code to Support Admission: 0004.111 - Spinal Cord Dysfunction, Non-Traumatic: Paraplegia, Incomplete  Etiologic Diagnosis: Lumbar radiculopathy    _______________________________________________    Chief Complaint: back pain after lumbar fusion     History of Present Illness:  Per my consult note:  The patient is a 68 y.o. female with a past medical history of lumbar stenosis and HTN, ;  who presented on 1/23/2024  4:51 AM for scheduled lumbar surgery. Per documentation, patient has been followed in the outpatient setting for lumbar stenosis and back pain. Patient had worsening radiculopathy that failed conservative care and patient was deemed appropriate for surgical intervention. Patient underwent aL4-S1  ALIF on 1/23 and second stage L2-3 XLIF fusion for an L2-3 disc collapse on 1/25. Patient returned to the OR for posterior fusion but surgery was aborted due to hypoxia with anesthesia. CXR showed atelectasis. Patient required 2 L o2 at basline, CT chest  showed R>L bilateral atelectasis. Patient's respiratory status was improved, and was cleared to return to the OR on 1/30 for redo L2-3 laminotomy and fusion.  Post op, patient has acute blood loss anemia with Hgb 10.7, hypocalcemia, and hyponatremia. Her BP has had intermittent HTN.  While at [facility], patient has been on Carafate, neurosurgery team unsure about why patient is on Carafate.  Planning to stop this medication as patient has no signs of GI bleed.  Patient is now off IV pain meds, she has been transitioned to scheduled Tylenol with oxycodone.  Patient's pain is currently controlled, has some RLE radiular pain, but is controlled on current tylenol + oxycodone + gabapentin. Denies changes in numbness/tingling/weakness. Does not report HA,  lightheadedness, SOB, CP, abdominal pain, or changes in numbness/tingling/weakness. Denies constipation.        Review of Systems:     Comprehensive 14 point ROS was reviewed and all were negative except as noted elsewhere in this document.     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 ([place] fever), medicated for a year, yearly xrays    Cough 05/29/2015    Diverticulosis     GERD (gastroesophageal reflux disease)     Heart burn     Hiatus hernia syndrome 2023    High cholesterol     Hypertension     Indigestion     LBP (low back pain)     Lumbar radicular pain 05/29/2015    Muscle disorder     Obesity     Osteoporosis of forearm     Alendronate started 6/18    Pain     Shoulder and numbness to R LE, hip       Past Surgical History:  Past Surgical History:   Procedure Laterality Date    FUSION, SPINE, LUMBAR, PLIF N/A 1/30/2024    Procedure: FUSION, SPINE, LUMBAR, L2-S1;  Surgeon: Tim Rodriguez M.D.;  Location: [facility];  Service: Neurosurgery    LUMBAR FUSION O-ARM Left 1/25/2024    Procedure: STAGE #2 LEFT LUMBAR EXTREME LATERAL INTERBODY FUSION, LUMBAR 2-SACRAL 1 FUSION WITH POSSIBLE REDO RIGHT LUMBAR 2-3 LAMINOTOMIES AND HARDWARE REMOVAL;  Surgeon: Tim Rodriguez M.D.;  Location: [facility];  Service: Neurosurgery    FUSION, SPINE, XLIF Left 1/25/2024    Procedure: FUSION, SPINE, XLIF;  Surgeon: Tim Rodriguez M.D.;  Location: [facility];  Service: Neurosurgery    LUMBAR FUSION ANTERIOR N/A 1/23/2024    Procedure: STAGE #1 LUMBAR 4-SACRAL 1 ANTERIOR LUMBAR INTERBODY FUSION;  Surgeon: Tim Rodriguez M.D.;  Location: [facility];  Service: Neurosurgery    NE CYSTOSCOPY,INSERT URETERAL STENT Right 06/08/2023    Procedure: CYSTOSCOPY, WITH RIGHT URETEROSCOPY, WITH LITHOTRIPSY, WITH INSERTION OF RIGHT URETERAL STENT;  Surgeon: Amol Sullivan M.D.;  Location:  [facility];  Service: Urology    MT CYSTO/URETERO/PYELOSCOPY, DX Right 06/08/2023    Procedure: URETEROSCOPY;  Surgeon: Amol Sullivan M.D.;  Location: [facility];  Service: Urology    LASERTRIPSY Right 06/08/2023    Procedure: LITHOTRIPSY, USING LASER;  Surgeon: Amol Sullivan M.D.;  Location: [facility];  Service: Urology    HIP REVISION TOTAL Left 05/13/2021    Procedure: REVISION, TOTAL ARTHROPLASTY, HIP.;  Surgeon: Amol Hogue M.D.;  Location: [facility];  Service: Orthopedics    MT TOTAL HIP ARTHROPLASTY Left 09/19/2019    Procedure: ARTHROPLASTY, HIP, TOTAL, ANTERIOR APPROACH;  Surgeon: Clarence Vallejo M.D.;  Location: [facility];  Service: Orthopedics    INCISION HERNIA REPAIR Right 11/26/2018    Procedure: INCISION HERNIA REPAIR- FLANK WITH MESH;  Surgeon: Misael Ramírez M.D.;  Location: [facility];  Service: General    HYSTERECTOMY ROBOTIC  06/07/2017    Procedure: HYSTERECTOMY ROBOTIC SI, BILATERAL SALPINGO-OOPHORECTOMY, URETERAL SACRAL LIGAMENT SHORTENING ;  Surgeon: Jerica Hooper M.D.;  Location: [facility];  Service:     CYSTOSCOPY  06/07/2017    Procedure: CYSTOSCOPY;  Surgeon: Jerica Hooper M.D.;  Location: [facility];  Service:     SHOULDER DECOMPRESSION ARTHROSCOPIC Right 12/06/2016    Procedure: SHOULDER DECOMPRESSION ARTHROSCOPIC - SUBACROMIAL, MANJARREZ;  Surgeon: Kyle Claros M.D.;  Location: [facility];  Service:     CLAVICLE DISTAL EXCISION Right 12/06/2016    Procedure: CLAVICLE DISTAL EXCISION;  Surgeon: Kyle Claros M.D.;  Location: [facility];  Service:     SHOULDER ARTHROSCOPY W/ ROTATOR CUFF REPAIR Right 12/06/2016    Procedure: SHOULDER ARTHROSCOPY W/ ROTATOR CUFF REPAIR ;  Surgeon: Kyle Claros M.D.;  Location: [facility];  Service:     SHOULDER ARTHROSCOPY W/ BICIPITAL TENODESIS REPAIR Right 12/06/2016    Procedure: SHOULDER  ARTHROSCOPY W/ BICIPITAL TENODESIS REPAIR ;  Surgeon: Kyle Claros M.D.;  Location: SURGERY [facility];  Service:     FUSION, SPINE, LUMBAR, PLIF  02/09/2016    Procedure: LUMBAR FUSION POSTERIOR PEDICLE SCREW FIXATION (STAGE #2);  Surgeon: Tim Rodriguez M.D.;  Location: SURGERY [facility];  Service:     LUMBAR LAMINECTOMY DISKECTOMY  02/09/2016    Procedure: LUMBAR LAMINECTOMY DISKECTOMY MINI OPEN;  Surgeon: Tim Rodriguez M.D.;  Location: SURGERY [facility];  Service:     FUSION, SPINE, XLIF Right 02/06/2016    Procedure: EXTREME LATERAL INTERBODY FUSION L3-4 (STAGE #1);  Surgeon: Tim Rodriguez M.D.;  Location: SURGERY [facility];  Service:     RECOVERY  12/08/2015    Procedure: CT-CT GUIDED LEFT LUNG BIOPSY-;  Surgeon: Recoveryonly Surgery;  Location: SURGERY PRE-POST PROC UNIT [facility];  Service:     REDDY BY LAPAROSCOPY  04/17/2014    Performed by Ankur Lerner M.D. at SURGERY SAME DAY [facility]    EGD WITH ASP/BX  02/04/2014    mild chronic inactive gastritis, scar gastric antrum-    EGD WITH ASP/BX  09/29/2011    possible barrettes, hiatal hernia, gastritis    COLONOSCOPY  09/29/2011    diverticulosis sigmoid colon, hemorrhoids    ARTHROTOMY  09/03/2010    Performed by YARELY MORRISON at SURGERY SAME DAY [facility]    ARTHRODESIS  09/03/2010    Performed by YARELY MORRISON at SURGERY SAME DAY [facility]    ORTHOPEDIC OSTEOTOMY  09/03/2010    Performed by YARELY MORRISON at SURGERY SAME DAY [facility]    BONE SPUR EXCISION  09/03/2010    Performed by YARELY MORRISON at SURGERY SAME DAY [facility]    OTHER ABDOMINAL SURGERY  2000    COLON RESECTION    LAMINOTOMY      OTHER  2000    Diverticulitis    OTHER ORTHOPEDIC SURGERY      PARDEEP CARPAL TUNNEL RELEASES       Family History:  Family History   Problem Relation Age of Onset    Cancer Mother         lymphoma    Stroke Father     Diabetes Father        Medications:  Current Facility-Administered Medications    Medication Dose    Respiratory Therapy Consult      Pharmacy Consult Request ...Pain Management Review 1 Each  1 Each    senna-docusate (Pericolace Or Senokot S) 8.6-50 MG per tablet 2 Tablet  2 Tablet    And    polyethylene glycol/lytes (Miralax) Packet 1 Packet  1 Packet    omeprazole (PriLOSEC) capsule 20 mg  20 mg    mag hydrox-al hydrox-simeth (Maalox Plus Es Or Mylanta Ds) suspension 20 mL  20 mL    ondansetron (Zofran ODT) dispertab 4 mg  4 mg    Or    ondansetron (Zofran) syringe/vial injection 4 mg  4 mg    traZODone (Desyrel) tablet 50 mg  50 mg    sodium chloride (Ocean) 0.65 % nasal spray 2 Spray  2 Spray    acetaminophen (Tylenol) tablet 1,000 mg  1,000 mg    Followed by    [START ON 2/7/2024] acetaminophen (Tylenol) tablet 1,000 mg  1,000 mg    oxyCODONE immediate-release (Roxicodone) tablet 2.5 mg  2.5 mg    Or    oxyCODONE immediate-release (Roxicodone) tablet 5 mg  5 mg    cyclobenzaprine (Flexeril) tablet 10 mg  10 mg    enoxaparin (Lovenox) inj 40 mg  40 mg    atorvastatin (Lipitor) tablet 20 mg  20 mg    [START ON 2/3/2024] calcium citrate (Calcitrate) tablet 950 mg  950 mg    gabapentin (Neurontin) capsule 600 mg  600 mg    [START ON 2/3/2024] lisinopril (Prinivil) tablet 10 mg  10 mg    [START ON 2/3/2024] vitamin D3 (Cholecalciferol) tablet 1,000 Units  1,000 Units       Allergies:  Patient has no known allergies.    Psychosocial History:  Patient lives with spouse in a 1 story house with no JULIÁN.   0 JULIÁN  At prior level of function patient was Independent with mobility and ADLs.      Tobacco: Denies   Alcohol: Denies   Drugs: Denies     Level of Function Prior to Disability:  patient was Independent with mobility and ADLs.       Level of Function Prior to Admission to [facility]:  Restrictions: Fall Risk, LSO with OOB   PT: Functional mobility   2/1 CGA with FWW x 18 ft, CGA sit to stand      OT: ADLs  2/1 Mod A upper body dressing , Max A lower body dressing      SLP:  "  None     CURRENT LEVEL OF FUNCTION:   Same as level of function prior to admission to [facility]    Physical Examination:   Physical Exam:  Vitals: /54   Pulse (!) 105   Temp 36.4 °C (97.6 °F) (Oral)   Resp 16   Ht 1.499 m (4' 11\")   Wt 97.5 kg (214 lb 15.2 oz)   SpO2 94%   Gen: NAD, laying comfortably in bed   Head:  NC/AT  Eyes/ Nose/ Mouth: PERRLA, moist mucous membranes  Cardio: RRR, good distal perfusion, warm extremities  Pulm: normal respiratory effort, no cyanosis, on RA   Abd: Soft NTND, negative borborygmi   Ext: No peripheral edema. No calf tenderness. No clubbing.    Mental status:  A&Ox4 (person, place, date, situation) answers questions appropriately follows commands  Speech: fluent, no aphasia or dysarthria    CRANIAL NERVES:  2,3: visual acuity grossly intact, PERRL  3,4,6: EOMI bilaterally, no nystagmus or diplopia  5: sensation intact to light touch bilaterally and symmetric  7: no facial asymmetry  8: hearing grossly intact    Motor:      Upper Extremity  Myotome R L   Shoulder flexion C5 5/5 5/5   Elbow flexion C5 5/5 5/5   Wrist extension C6 5/5 5/5   Elbow extension C7 5/5 5/5   Finger flexion C8 5/5 5/5   Finger abduction T1 5/5 5/5     Lower Extremity Myotome R L   Hip flexion L2 4/5 3/5   Knee extension L3 5/5 3/5   Ankle dorsiflexion L4 5/5 5/5   Toe extension L5 5/5 5/5   Ankle plantarflexion S1 5/5 5/5       Sensory:   intact to light touch through out    DTRs: 2+ in bilateral  biceps  No clonus at bilateral ankles  Negative Weber b/l     Tone: no spasticity noted    Coordination:   intact finger to nose bilaterally  intact fine motor with fingers bilaterally      Radiology:  CT-LSPINE W/O PLUS RECONS  Narrative: 1/31/2024 9:54 AM    HISTORY/REASON FOR EXAM:  s/p L2-S1 fusion..  Back pain    TECHNIQUE/EXAM DESCRIPTION AND NUMBER OF VIEWS:  CT lumbar spine without contrast, with reconstructions.    The study was performed on a helical multidetector CT " scanner. Thin-section helical scanning was performed from T12-L1 to the sacrum. Sagittal and coronal multiplanar reconstructions were generated from the axial images.    Low dose optimization technique was utilized for this CT exam including automated exposure control and adjustment of the mA and/or kV according to patient size.    COMPARISON: 5    FINDINGS:  5 nonrib bearing lumbar type vertebral bodies are counted.    There is thoracolumbar levoscoliosis which appears decreased from prior.    There is interval postoperative changes at L2-L3 with discectomy, intervertebral disc space cage device and left lateral plate and interbody screw fixation. There are also now changes of posterior spinal fusion with posterior rods from the L2 through the   S1 level with transpedicular screws at L2 and S1..    Redemonstrated are postsurgical changes at L3-L4 with posterior spinal fusion, right lateral plate and interbody screw fixation and L3-S1 laminectomies. There are postoperative changes at L4-L5 and L5-S1 with discectomy, intervertebral disc space cage   devices and anterior fusion.    There is no immediate postoperative hardware complication seen.    There is an oblique fracture of the left anterolateral L5 vertebral body again noted.    There are L3-S1 laminectomies.    There is some foraminal narrowing at L4-S1 on the right and L2-L3 on the left.    Atherosclerosis. There is extraperitoneal fluid and gas within the retroperitoneum and left pelvis likely postoperative changes.  Impression: 1.  Extension of spinal fusion as described.  2.  There is an oblique nondisplaced fracture of the L5 vertebral body on the left again noted.  3.  Extraperitoneal fluid and gas in keeping with recent postoperative changes.  4.  Scoliosis and multilevel degenerative changes.  IR-US GUIDED PIV  Narrative: EXAMINATION:                                                                          HISTORY/REASON FOR EXAM:  Ultrasound Guided  PIV.     TECHNIQUE/EXAM DESCRIPTION AND NUMBER OF VIEWS:  Peripheral IV insertion   with ultrasound guidance.  The procedure was prepared using maximal   sterile barrier technique including sterile gown, mask, cap, and donning   of sterile gloves following appropriate hand hygiene and/or sterile scrub.   Patient skin site was prepped with 2% Chlorhexidine solution.       FINDINGS: Peripheral IV insertion with Ultrasound Guidance was performed   by qualified imaging nursing staff without the assistance of a   Radiologist.  Impression:  Ultrasound-guided PERIPHERAL IV INSERTION performed by   qualified nursing staff as above.  DX-O-ARM  Narrative: 1/30/2024 5:00 PM    HISTORY/REASON FOR EXAM:  Lumbar fusion    TECHNIQUE/EXAM DESCRIPTION AND NUMBER OF VIEWS:  Portable O-arm    FINDINGS:  The O-arm unit was provided for intraoperative guidance in the OR for less than one hour. Actual fluoro time was 9 seconds.    2D Fluoroscopy dose(DAP): 4080.88 Gy*cm^2  3D Fluoroscopy dose(DAP): 659.23 Gy*cm^2  Impression: Portable O-arm utilized for 9 seconds.    INTERPRETING LOCATION: [address] [place] [address]  DX-PORTABLE FLUORO > 1 HOUR  Narrative: 1/30/2024 6:20 PM    HISTORY/REASON FOR EXAM:  Lumbar fusion    TECHNIQUE/EXAM DESCRIPTION AND NUMBER OF VIEWS:  Portable fluoroscopy for greater than one hour in OR.    FINDINGS:  The portable fluoroscopy unit was obligated to the procedure for greater than one hour. Actual fluoro time was 7 seconds.    Fluoroscopy dose(DAP): 0.8402 Gy*cm^2  Impression: Portable fluoroscopy utilized for 7 seconds.    INTERPRETING LOCATION: [address] [place] [address], [id number]        Laboratory Values:  Recent Labs     02/01/24  1128   SODIUM 134*   POTASSIUM 4.2   CHLORIDE 99   CO2 24   GLUCOSE 114*   BUN 15   CREATININE 0.58   CALCIUM 8.2*     Recent Labs     02/01/24  1128   WBC 7.8   RBC 3.39*   HEMOGLOBIN 10.7*   HEMATOCRIT 32.5*   MCV 95.9   MCH 31.6   MCHC 32.9   RDW 44.9   PLATELETCT 361   MPV 8.7*            Primary Rehabilitation Diagnosis:    This patient is a 68 y.o. female admitted for acute inpatient rehabilitation with Lumbar radiculopathy.    Impairments:   ADLs/IADLs  Mobility    Secondary Diagnosis/Medical Co-morbidities Affecting Function  Lumbar stenosis   S/p lumbar fusion   Hypoxia   ABLA   HTN     Relevant Changes Since Preadmission Evaluation:    Status unchanged    The patient's rehabilitation potential is Very Good  The patient's medical prognosis is good    Rehabilitation Plan:   Discussion and Recommendations:   1. The patient requires an acute inpatient rehabilitation program with a coordinated program of care at an intensity and frequency not available at a lower level of care. This recommendation is substantiated by the patient's medical physicians who recommend that the patient's intervention and assessment of medical issues needs to be done at an acute level of care for patient's safety and maximum outcome.   2. A coordinated program of care will be supplied by an interdisciplinary team of physical therapy, occupational therapy, rehab physician, rehab nursing, and, if needed, speech therapy and rehab psychology. Rehab team presents a patient-specific rehabilitation and education program concentrating on prevention of future problems related to accessibility, mobility, skin, bowel, bladder, sexuality, and psychosocial and medical/surgical problems.   3. Need for Rehabilitation Physician: The rehab physician will be evaluating the patient on a multi-weekly basis to help coordinate the program of care. The rehab physician communicates between medical physicians, therapists, and nurses to maximize the patient's potential outcome. Specific areas in which the rehab physician will be providing daily assessment include the following:   A. Assessing the patient's heart rate and blood pressure response (vitals monitoring) to activity and making adjustments in medications or conservative measures  as needed.   B. The rehab physician will be assessing the frequency at which the program can be increased to allow the patient to reach optimal functional outcome.   C. The rehab physician will also provide assessments in daily skin care, especially in light of patient's impairments in mobility.   D. The rehab physician will provide special expertise in understanding how to work with functional impairment and recommend appropriate interventions, compensatory techniques, and education that will facilitate the patient's outcome.   4. Rehab R.N.   The rehab RN will be working with patient to carry over in room mobility and activities of daily living when the patient is not in 3 hours of skilled therapy. Rehab nursing will be working in conjunction with rehab physician to address all the medical issues above and continue to assess laboratory work and discuss abnormalities with the treating physicians, assess vitals, and response to activity, and discuss and report abnormalities with the rehab physician. Rehab RN will also continue daily skin care, supervise bladder/bowel program, instruct in medication administration, and ensure patient safety.   5. Rehab Therapy: Therapies to treat at intensity and frequency of (may change after completion of evaluation by all therapeutic disciplines):       PT:  Physical therapy to address mobility, transfer, gait training and evaluation for adaptive equipment needs 1.5 hour/day at least 5 days/week for the duration of the ELOS (see below)       OT:  Occupational therapy to address ADLs, self-care, home management training, functional mobility/transfers and assistive device evaluation, and community re-integration 1.5 hour/day at least 5 days/week for the duration of the ELOS (see below).        ST/Dysphagia:  Speech therapy to address speech, language, and cognitive deficits as well as swallowing difficulties with retraining/dysphagia management and community re-integration with  comprehension, expression, cognitive training 1.5 hour/day at least 5 days/week for the duration of the ELOS (see below).     Medical management / Rehabilitation Issues/ Adverse Potential as part of rehabilitation plan     Rehabilitation Issues/Adverse Potential  1.  S/p lumbar surgery with LE weakness: Patient demonstrates functional deficits in strength, balance, coordination, and ADL's. Patient is admitted to [facility] for comprehensive rehabilitation therapy as described below.   Rehabilitation nursing monitors bowel and bladder control, educates on medication administration, co-morbidities and monitors patient safety.      2.  Neurostimulants: None at this time but continue to assess daily for need to initiate should status change.    3.  DVT prophylaxis:  Patient is on Lovenox  for anticoagulation upon transfer. Encourage OOB. Monitor daily for signs and symptoms of DVT including but not limited to swelling and pain to prevent the development of DVT that may interfere with therapies.    4.  GI prophylaxis:  On prilosec to prevent gastritis/dyspepsia which may interfere with therapies.    5.  Pain: No issues with pain currently / Controlled with Tylenol and oxycodone     6.  Nutrition/Dysphagia: Dietician monitors nutrient intake, recommend supplements prn and provide nutrition education to pt/family to promote optimal nutrition for wound healing/recovery.     7.  Bladder/bowel:  Start bowel and bladder program as described below, to prevent constipation, urinary retention (which may lead to UTI), and urinary incontinence (which will impact upon pt's functional independence).   - TV Q3h while awake with post void bladder scans, I&O cath for PVRs >400  - up to commode after meal     8.  Skin/dermal ulcer prophylaxis: Monitor for new skin conditions with q.2 h. turns as required to prevent the development of skin breakdown.     9.  Cognition/Behavior: As needed psychologist provides  adjustment counseling to illness and psychosocial barriers that may be potential barriers to rehabilitation.     10. Respiratory therapy: RT performs O2 management prn, breathing retraining, pulmonary hygiene and bronchospasm management prn to optimize participation in therapies.     Medical Co-Morbidities/Adverse Potential Affecting Function:  Lumbar stenosis   S/p lumbar fusion   Lumbar fracture   - history of back pain with radicuopathy   - no MRI to review   - 1/23 stage 1 L4-S1 ALIF by Dr. Rodriguez   1/25  left L2/3 XLIF by Dr. Rodriguez   1/30 redo L2-3 laminotomy and L2-S1 perc fusion by Dr. Rodriguez   - LSO when OOB   - most recent CT L spine obtained on 1/31 showed oblique nondisplaced L5 vertebral body fracture   - comprehnsive IRF level therapy with 3hrs of therapy 5 days per week      Hypoxia   - hypoxia, worse with anesthesia, causing 3rd surgery to be aborted   - now on albuterol   - stable on 1 L o2   - CT chest negative for PE      ABLA   - post op drop in Hgb   - 2/1 Hgb 10.7   - recheck cbc in AM with admission labs      HTN   - on home dose lisinopril   - fluctuating hyper and hypotension       Vit D deficiency   - check vit D level in AM   - transferred on vit d supplementations    Hypocalcemia  - transferred on calcitrate   - check Ca level in AM      Pain  - was on IV diluadid until 2/1 AM   - on scheduled Tylenol and PRN oxycodone   - gabapentin 600mg qhs   - flexeril 10mg q8hrs prn     Bowel  - constipation prevention with scheduled senna   - bowel meds PRN   - Last BM 1/31     Bladder   - timed void to prevent falls and incont epsidoes   -bladder scan if no void > 6hrs, PVR, and cath if retaining > 200ml     Skin - Patient at risk for skin breakdown due to debility in areas including sacrum, achilles, elbows and head in addition to other sites. Nursing to assess skin daily.     GI Ppx - Patient on Prilosec for GERD prophylaxis. Patient on Senna-docusate for constipation prophylaxis.        DVT Ppx  -lovenox     I personally performed a complete drug regimen review and no potential clinically significant medication issues were identified.     Goals/Expected Level of Function Based on Current Medical and Functional Status:  (may change based on patient's medical status and rate of impairment recovery)  Transfers:   Modified Independent  Mobility/Gait:   Modified Independent  ADL's:   Modified Independent    DISPOSITION: Discharge to pre-morbid independent living setting with the supportive care of patient's spouse.    ELOS: 10-14  ____________________________________    Sobeida Cruz D.O.    ____________________________________    Pt was seen today for 80 min, and entire time spent in face-to-face contact was >50% in counseling and coordination of care as detailed in A/P above.

## 2024-02-02 NOTE — DISCHARGE INSTRUCTIONS
Diet  Resume your normal diet as tolerated.  A diet low in cholesterol, fat, and sodium is recommended for good health.     Instructions  Wear LSO brace when upright/OOB. May drive when comfortable or no longer taking narcotic pain medication. Leave operative dressing on for 72 hours. May shower and get wound wet after 24 hours. No bath tub or hot tub. Ice to incision frequently. No bending/lifting/twisting greater than 10 pounds. Walk often. Take pain medication as prescribed. Stool softeners prn. Return to [facility] for already scheduled post-operative appointment. Return to ER with chest pain, shortness of breath, leg or arm swelling. Call with questions/concerns [phone].

## 2024-02-02 NOTE — PROGRESS NOTES
1330 Pt arrived at [facility] from [facility] via transport. Dr. Cruz to follow . Initial assessment initiated. Pt oriented to room and facility routine and safety measures; pt education binder provided and discussed. Pt A/O x 4, continent of bowel and bladder. Min assist for transfers. All wounds photographed and documented; photos uploaded to . Pt denies pain or discomfort at this time. Pt positioned for comfort in bed. Call light within reach, safety measures in place. Will continue to monitor.

## 2024-02-02 NOTE — PROGRESS NOTES
Neurosurgery Progress Note    Subjective:  POD#10 stage 1 L4-S1 ALIF  POD#8 left L2/3 XLIF  POD#3 redo L2-3 laminotomy and L2-S1 perc fusion      Chest x-ray does show an area of atelectasis. Patient has been treated for valley fever.   Chest CT today stable; similar to cxr   Oxygen saturation is stable now; currently weaned down to 0-1L    Patient is better today  Notes having bad reflux which she normal admits to eating Tums frequently at home - improving  Abdominal cramping/gas pain resolved  Increased left anterior thigh pain/discomfort related to XLIF  Has long standing right L5 numbness  Not mobilizing   Will tsf from bed to chair  [place] out, urinating independently  + flatus, +BM  She is happy her painful hand IVs are removed.     Vitals / labs stable ready   Calcium/PTH normal     Accepted to [facility]    Exam:  Patient sitting up in chair with brace on  Dressing dry  Motor 5/5  Sensory intact   Abdomen soft       BP  Min: 110/59  Max: 126/73  Pulse  Av.2  Min: 80  Max: 102  Resp  Av.3  Min: 16  Max: 28  Temp  Av.6 °C (97.8 °F)  Min: 36.3 °C (97.3 °F)  Max: 36.9 °C (98.4 °F)  Monitored Temp 2  Av.6 °C (97.9 °F)  Min: 36.6 °C (97.9 °F)  Max: 36.6 °C (97.9 °F)  SpO2  Av.4 %  Min: 89 %  Max: 95 %    No data recorded    Recent Labs     24  1128   WBC 7.8   RBC 3.39*   HEMOGLOBIN 10.7*   HEMATOCRIT 32.5*   MCV 95.9   MCH 31.6   MCHC 32.9   RDW 44.9   PLATELETCT 361   MPV 8.7*       Recent Labs     24  1128   SODIUM 134*   POTASSIUM 4.2   CHLORIDE 99   CO2 24   GLUCOSE 114*   BUN 15   CREATININE 0.58   CALCIUM 8.2*                 Intake/Output                         24 07 - 24 0659 24 07 - 24 0659      Total  Total                 Intake    Total Intake -- -- -- -- -- --       Output    Urine  --  -- --  --  -- --    Number of Times Voided 1 x 1 x 2 x -- -- --    Stool  --  -- --  --  -- --    Number of  Times Stooled -- 1 x 1 x -- -- --    Total Output -- -- -- -- -- --       Net I/O     -- -- -- -- -- --            No intake or output data in the 24 hours ending 02/02/24 1142            acetaminophen  650 mg Q6HRS    Pharmacy Consult Request  1 Each PHARMACY TO DOSE    MD ALERT...DO NOT ADMINISTER NSAIDS or ASPIRIN unless ORDERED By Neurosurgery  1 Each PRN    cyclobenzaprine  10 mg Q8HRS    simethicone  125 mg Q6HRS PRN    calcium citrate  950 mg DAILY    vitamin D3  1,000 Units DAILY    polyethylene glycol/lytes  1 Packet DAILY AT NOON    enoxaparin (LOVENOX) injection  40 mg Q EVENING    ALPRAZolam  0.25 mg Q6HRS PRN    albuterol  2 Puff Q4HRS (RT)    Respiratory Therapy Consult   Continuous RT    calcium carbonate  1,000 mg Q4HRS PRN    mag hydrox-al hydrox-simeth  10 mL BID    amitriptyline  10 mg HS PRN    atorvastatin  20 mg Q EVENING    clobetasol  1 Application  QDAY PRN    gabapentin  600 mg Q EVENING    lisinopril  10 mg DAILY    sucralfate  1 g BID    docusate sodium  100 mg BID    senna-docusate  1 Tablet Nightly    senna-docusate  1 Tablet Q24HRS PRN    magnesium hydroxide  30 mL QDAY PRN    bisacodyl  10 mg Q24HRS PRN    sodium phosphate  1 Each Once PRN    diphenhydrAMINE  25 mg Q6HRS PRN    Or    diphenhydrAMINE  25 mg Q6HRS PRN    ondansetron  4 mg Q4HRS PRN    ondansetron  4 mg Q4HRS PRN    labetalol  10 mg Q HOUR PRN    benzocaine-menthol  1 Lozenge Q2HRS PRN    oxyCODONE immediate-release  5 mg Q4HRS PRN    oxyCODONE immediate-release  10 mg Q4HRS PRN    omeprazole  20 mg DAILY       Assessment and Plan:  Hospital day #11  POD #10/8/3  Continue Albuterol   Needs to get OOB and sit in lounge chair throughout the day  Should be getting PT/OT daily  PT/OT recommending rehab, consult placed. Has been accepted to [facility]  She is clear for transfer when accepted to rehab.   She will follow up with [facility] for post operative evaluation once discharged from rehab. Call with  questions/concerns [phone].       Prophylactic anticoagulation: yes           Start date/time: on mar

## 2024-02-02 NOTE — DISCHARGE SUMMARY
Date of Admission: 1/23/2024.  Date of Discharge: 2/2/2024.    Discharge Diagnosis: lumbar stenosis, lumbar spondylosis, lumbar radiculopathy, low back pain.    Surgery Performed: Stage I L4-S1 ALIF, Stage II L2-3 XLIF via left approach, Stage III redo L2-3 laminotomy with L2-S1 percutaneous fusion.    Complications: None.    Reason for Admission: This is a pleasant 68 -year-old woman who gives history of progressive back pain with bilateral lower extremtiy pain, paresthesias and weakness. The patient has tried and failed extensive conservative management. Their preoperative work-up showed significant degenerative changes at L2-S1 with severe stenosis and the patient was hereby indicated for the above surgery. The patient understood risks versus benefits and treatment alternatives and elected to proceed with the operation.    Hospital Course: The patient was admitted on the day of surgery and underwent the above surgery without complication.  After surgery the patient was transferred to the [facility] floor.  Here on the [facility], the patient has made a good recovery postoperatively. Now, on postoperative day #10/8/3, the patient's pain is well controlled on oral pain medications.  The patient is ambulating, voiding, tolerating oral food and medications well. Motor exam is 5/5. The patient's incision is clean, dry, and intact. The patient is now cleared for discharge to inpatient rehab under stable condition after an uneventful hospital stay.    Discharge Instructions: The patient is to ambulate as much as tolerated.   The patient should avoid repetitive bending at the waist. They should avoid pushing, pulling or lifting greater than 15 pounds.   They should wear their LSO brace when out of bed. It is okay to shower but the patient is not to submerge the wound.  The patient shoud ice their incision for 10-15 minutes multiple times per day. It is okay to drive in 2 weeks' time or when comfortable and when no longer  taking narcotic pain medication. They should take over-the-counter stool softeners while taking narcotic pain medications. The patient is to eat a normal diet as tolerated. The patient has a postoperative appointment in 2 weeks' time at [facility]. The patient should call our office or go to the nearest emergency department with any emergent changes. The patient was given these discharge instructions verbally and voiced understanding of them.      Discharge Medications: All medications were sent to [facility].

## 2024-02-02 NOTE — DISCHARGE PLANNING
Case Management Discharge Planning    Admission Date: 1/23/2024  GMLOS: 3.7  ALOS: 10    6-Clicks ADL Score: 18  6-Clicks Mobility Score: 11  PT and/or OT Eval ordered: Yes  Post-acute Referrals Ordered: Yes  Post-acute Choice Obtained: Yes  Has referral(s) been sent to post-acute provider:  Yes      Anticipated Discharge Dispo: Discharge Disposition: Discharged to home/self care (01)  Discharge Address: TBD  Discharge Contact Phone Number: TBD    DME Needed: No    Action(s) Taken: Updated Provider/Nurse on Discharge Plan and Patient Conference    Escalations Completed: None    Medically Clear: Yes    Next Steps: n/a    Barriers to Discharge: None    Is the patient up for discharge today: Yes    Is transport arranged for discharge disposition: Yes      Patient discussed in rounds.  Patient is medically cleared.  Patient is scheduled with [facility] between 13:00 - 13:30 today to be transported to [facility].   Patient and her  was made aware of the transport time on yesterday.

## 2024-02-02 NOTE — CARE PLAN
Problem: Pain - Standard  Goal: Alleviation of pain or a reduction in pain to the patient’s comfort goal  Outcome: Progressing     Problem: Knowledge Deficit - Standard  Goal: Patient and family/care givers will demonstrate understanding of plan of care, disease process/condition, diagnostic tests and medications  Outcome: Progressing       The patient is Stable - Low risk of patient condition declining or worsening    Shift Goals  Clinical Goals: Pain control  Patient Goals: Pain control  Family Goals: NALINI    Progress made toward(s) clinical / shift goals:  Taught pt 0-10 pain scale and non-pharmacological method of pain management, encouraged to verbalize when in pain. Administered PRN pain meds as needed.     Patient is not progressing towards the following goals:

## 2024-02-03 ENCOUNTER — APPOINTMENT (OUTPATIENT)
Dept: OCCUPATIONAL THERAPY | Facility: REHABILITATION | Age: 69
DRG: 052 | End: 2024-02-03
Attending: PHYSICAL MEDICINE & REHABILITATION
Payer: MEDICARE

## 2024-02-03 ENCOUNTER — APPOINTMENT (OUTPATIENT)
Dept: PHYSICAL THERAPY | Facility: REHABILITATION | Age: 69
DRG: 052 | End: 2024-02-03
Attending: PHYSICAL MEDICINE & REHABILITATION
Payer: MEDICARE

## 2024-02-03 LAB
25(OH)D3 SERPL-MCNC: 25 NG/ML (ref 30–100)
ALBUMIN SERPL BCP-MCNC: 2.7 G/DL (ref 3.2–4.9)
ALBUMIN/GLOB SERPL: 0.9 G/DL
ALP SERPL-CCNC: 109 U/L (ref 30–99)
ALT SERPL-CCNC: 20 U/L (ref 2–50)
ANION GAP SERPL CALC-SCNC: 8 MMOL/L (ref 7–16)
AST SERPL-CCNC: 16 U/L (ref 12–45)
BILIRUB SERPL-MCNC: 0.5 MG/DL (ref 0.1–1.5)
BUN SERPL-MCNC: 10 MG/DL (ref 8–22)
CALCIUM ALBUM COR SERPL-MCNC: 9.3 MG/DL (ref 8.5–10.5)
CALCIUM SERPL-MCNC: 8.3 MG/DL (ref 8.5–10.5)
CHLORIDE SERPL-SCNC: 102 MMOL/L (ref 96–112)
CO2 SERPL-SCNC: 25 MMOL/L (ref 20–33)
CREAT SERPL-MCNC: 0.47 MG/DL (ref 0.5–1.4)
GFR SERPLBLD CREATININE-BSD FMLA CKD-EPI: 102 ML/MIN/1.73 M 2
GLOBULIN SER CALC-MCNC: 3 G/DL (ref 1.9–3.5)
GLUCOSE SERPL-MCNC: 100 MG/DL (ref 65–99)
MAGNESIUM SERPL-MCNC: 2 MG/DL (ref 1.5–2.5)
PHOSPHATE SERPL-MCNC: 3.6 MG/DL (ref 2.5–4.5)
POTASSIUM SERPL-SCNC: 4 MMOL/L (ref 3.6–5.5)
PROT SERPL-MCNC: 5.7 G/DL (ref 6–8.2)
SODIUM SERPL-SCNC: 135 MMOL/L (ref 135–145)

## 2024-02-03 PROCEDURE — 97530 THERAPEUTIC ACTIVITIES: CPT

## 2024-02-03 PROCEDURE — 80053 COMPREHEN METABOLIC PANEL: CPT

## 2024-02-03 PROCEDURE — 97166 OT EVAL MOD COMPLEX 45 MIN: CPT

## 2024-02-03 PROCEDURE — 82306 VITAMIN D 25 HYDROXY: CPT

## 2024-02-03 PROCEDURE — 700111 HCHG RX REV CODE 636 W/ 250 OVERRIDE (IP): Mod: JZ | Performed by: PHYSICAL MEDICINE & REHABILITATION

## 2024-02-03 PROCEDURE — 84100 ASSAY OF PHOSPHORUS: CPT

## 2024-02-03 PROCEDURE — 99232 SBSQ HOSP IP/OBS MODERATE 35: CPT | Performed by: STUDENT IN AN ORGANIZED HEALTH CARE EDUCATION/TRAINING PROGRAM

## 2024-02-03 PROCEDURE — 97162 PT EVAL MOD COMPLEX 30 MIN: CPT

## 2024-02-03 PROCEDURE — 700102 HCHG RX REV CODE 250 W/ 637 OVERRIDE(OP): Performed by: PHYSICAL MEDICINE & REHABILITATION

## 2024-02-03 PROCEDURE — 36415 COLL VENOUS BLD VENIPUNCTURE: CPT

## 2024-02-03 PROCEDURE — 770010 HCHG ROOM/CARE - REHAB SEMI PRIVAT*

## 2024-02-03 PROCEDURE — 83735 ASSAY OF MAGNESIUM: CPT

## 2024-02-03 PROCEDURE — A9270 NON-COVERED ITEM OR SERVICE: HCPCS | Performed by: PHYSICAL MEDICINE & REHABILITATION

## 2024-02-03 PROCEDURE — 97535 SELF CARE MNGMENT TRAINING: CPT

## 2024-02-03 PROCEDURE — 97116 GAIT TRAINING THERAPY: CPT

## 2024-02-03 RX ORDER — VITAMIN B COMPLEX
2000 TABLET ORAL DAILY
Status: DISCONTINUED | OUTPATIENT
Start: 2024-02-04 | End: 2024-02-13 | Stop reason: HOSPADM

## 2024-02-03 RX ADMIN — OMEPRAZOLE 20 MG: 20 CAPSULE, DELAYED RELEASE ORAL at 08:23

## 2024-02-03 RX ADMIN — SENNOSIDES AND DOCUSATE SODIUM 2 TABLET: 50; 8.6 TABLET ORAL at 20:17

## 2024-02-03 RX ADMIN — ACETAMINOPHEN 1000 MG: 500 TABLET, FILM COATED ORAL at 11:48

## 2024-02-03 RX ADMIN — ENOXAPARIN SODIUM 40 MG: 100 INJECTION SUBCUTANEOUS at 20:31

## 2024-02-03 RX ADMIN — OXYCODONE HYDROCHLORIDE 5 MG: 5 TABLET ORAL at 11:49

## 2024-02-03 RX ADMIN — ACETAMINOPHEN 1000 MG: 500 TABLET, FILM COATED ORAL at 00:04

## 2024-02-03 RX ADMIN — OXYCODONE HYDROCHLORIDE 5 MG: 5 TABLET ORAL at 20:30

## 2024-02-03 RX ADMIN — OXYCODONE HYDROCHLORIDE 5 MG: 5 TABLET ORAL at 04:44

## 2024-02-03 RX ADMIN — OXYCODONE HYDROCHLORIDE 5 MG: 5 TABLET ORAL at 17:27

## 2024-02-03 RX ADMIN — LISINOPRIL 10 MG: 5 TABLET ORAL at 05:15

## 2024-02-03 RX ADMIN — CYCLOBENZAPRINE 10 MG: 10 TABLET, FILM COATED ORAL at 20:17

## 2024-02-03 RX ADMIN — Medication 1000 UNITS: at 08:22

## 2024-02-03 RX ADMIN — GABAPENTIN 600 MG: 300 CAPSULE ORAL at 20:17

## 2024-02-03 RX ADMIN — ACETAMINOPHEN 1000 MG: 500 TABLET, FILM COATED ORAL at 05:14

## 2024-02-03 RX ADMIN — Medication 950 MG: at 08:22

## 2024-02-03 RX ADMIN — OXYCODONE HYDROCHLORIDE 5 MG: 5 TABLET ORAL at 07:58

## 2024-02-03 RX ADMIN — ACETAMINOPHEN 1000 MG: 500 TABLET, FILM COATED ORAL at 17:28

## 2024-02-03 RX ADMIN — ATORVASTATIN CALCIUM 20 MG: 10 TABLET, FILM COATED ORAL at 20:17

## 2024-02-03 ASSESSMENT — ACTIVITIES OF DAILY LIVING (ADL)
TUB_SHOWER_TRANSFER_DESCRIPTION: GRAB BAR;SHOWER BENCH;INCREASED TIME;VERBAL CUEING;SUPERVISION FOR SAFETY
TOILETING: INDEPENDENT
TOILET_TRANSFER_DESCRIPTION: INCREASED TIME;GRAB BAR;VERBAL CUEING;SUPERVISION FOR SAFETY
BED_CHAIR_WHEELCHAIR_TRANSFER_DESCRIPTION: ADAPTIVE EQUIPMENT;INCREASED TIME;INITIAL PREPARATION FOR TASK;SUPERVISION FOR SAFETY;VERBAL CUEING
TOILET_TRANSFER_DESCRIPTION: GRAB BAR;INCREASED TIME;VERBAL CUEING;SUPERVISION FOR SAFETY
BED_CHAIR_WHEELCHAIR_TRANSFER_DESCRIPTION: INCREASED TIME;INITIAL PREPARATION FOR TASK;SUPERVISION FOR SAFETY;VERBAL CUEING
TOILETING_LEVEL_OF_ASSIST_DESCRIPTION: ASSIST TO PULL PANTS UP;ASSIST FOR STANDING BALANCE;GRAB BAR;INCREASED TIME;INITIAL PREPARATION FOR TASK;SET-UP OF EQUIPMENT;SUPERVISION FOR SAFETY;VERBAL CUEING
BED_CHAIR_WHEELCHAIR_TRANSFER_DESCRIPTION: INCREASED TIME;INITIAL PREPARATION FOR TASK;SUPERVISION FOR SAFETY;VERBAL CUEING

## 2024-02-03 ASSESSMENT — BRIEF INTERVIEW FOR MENTAL STATUS (BIMS)
WHAT YEAR IS IT: CORRECT
INITIAL REPETITION OF BED BLUE SOCK - FIRST ATTEMPT: 3
ASKED TO RECALL SOCK: YES, NO CUE REQUIRED
ASKED TO RECALL BLUE: YES, NO CUE REQUIRED
BIMS SUMMARY SCORE: 15
WHAT MONTH IS IT: ACCURATE WITHIN 5 DAYS
ASKED TO RECALL BED: YES, NO CUE REQUIRED
WHAT DAY OF THE WEEK IS IT: CORRECT

## 2024-02-03 ASSESSMENT — BALANCE ASSESSMENTS
STANDING UNSUPPORTED WITH EYES CLOSED: 3
STANDING UNSUPPORTED ONE FOOT IN FRONT: 1
SITTING TO STANDING: 3
LONG VERSION TOTAL SCORE (MAX 56): 19
TURN 360 DEGREES: 0
LONG VERSION TOTAL SCORE (MAX 56): 19
LOOK OVER LEFT AND RIGHT SHOULDERS WHILE STANDING: 0
STANDING TO SITTING: 3
SITTING UNSUPPORTED: 2
MEDICARE IMPAIRMENT PERCENTAGE: 66
REACHING FORWARD WITH OUTSTRETCHED ARM WHILE STANDING: 2
PICK UP OBJECT FROM THE FLOOR FROM A STANDING POSITION: 0
STANDING UNSUPPORTED: 0
TRANSFERS: 3
STANDING ON ONE LEG: 1
STANDING UNSUPPORTED WITH FEET TOGETHER: 1
PLACE ALTERNATE FOOT ON STEP OR STOOL WHILE STANDING UNSUPPORTED: 0

## 2024-02-03 ASSESSMENT — GAIT ASSESSMENTS
GAIT LEVEL OF ASSIST: MINIMAL ASSIST
ASSISTIVE DEVICE: PARALLEL BARS
DISTANCE (FEET): 40

## 2024-02-03 ASSESSMENT — PAIN DESCRIPTION - PAIN TYPE
TYPE: ACUTE PAIN
TYPE: ACUTE PAIN

## 2024-02-03 NOTE — PROGRESS NOTES
4 Eyes Skin Assessment Completed by NANDINI Moran and NANDINI Ricardo.    Head WDL  Ears WDL  Nose WDL  Mouth WDL  Neck WDL  Breast/Chest WDL  Shoulder Blades WDL  Spine Incision  (R) Arm/Elbow/Hand WDL  (L) Arm/Elbow/Hand WDL  Abdomen Incision  Groin redness  Scrotum/Coccyx/Buttocks Redness  (R) Leg WDL  (L) Leg WDL  (R) Heel/Foot/Toe Redness, scrape  (L) Heel/Foot/Toe Redness, swelling          Devices In Places Na    Interventions In Place Waffle Overlay    Possible Skin Injury No    Pictures Uploaded Into Epic Yes  Wound Consult Placed N/A  RN Wound Prevention Protocol Ordered Yes

## 2024-02-03 NOTE — THERAPY
Physical Therapy   Initial Evaluation     Patient Name: Dee Armstrong  Age:  68 y.o., Sex:  female  Medical Record #: [id number]  Today's Date: 2/3/2024     Subjective    Patient is found seated up in chair, just finishing breakfast. Is agreeable to participate, reports she was here about 7 years ago for her first back surgery.      Objective       02/03/24 0831   PT Charge Group   PT Gait Training (Units) 1   PT Evaluation PT Evaluation Mod   PT Total Time Spent   PT Individual Total Time Spent (Mins) 60   Prior Living Situation   Prior Services Home-Independent   Housing / Facility 1 Story House   Steps Into Home 1  (3-4 inch threshold step)   Bathroom Set up Tub Transfer Bench;Bathtub / Shower Combination;Shower Glass Doors  (reports she holds on to the shower doors for stability at home)   Equipment Owned Front-Wheel Walker;4-Wheel Walker;Tub Transfer Bench   Lives with - Patient's Self Care Capacity Spouse   Prior Level of Functional Mobility   Bed Mobility Independent   Transfer Status Independent   Ambulation Independent   Distance Ambulation (Feet)   (community ambulator)   Assistive Devices Used None   Stairs   (reports she avoids stairs/ curb steps and holds on to her car at baseline if she has to climb a curb)   Prior Functioning: Everyday Activities   Self Care Independent   Indoor Mobility (Ambulation) Independent   Stairs Not applicable   Functional Cognition Independent   Prior Device Use None of the given options   Active ROM Lower Body    Active ROM Lower Body  WDL   Strength Lower Body   Lower Body Strength  X   Gross Strength Generalized Weakness, Equal Bilaterally   Sensation Lower Body   Lower Extremity Sensation   WDL   Comments denies sensation changes/ peripheral neuropathy   Balance Assessment   Sitting Balance (Static) Fair +   Sitting Balance (Dynamic) Fair   Standing Balance (Static) Poor +   Standing Balance (Dynamic) Poor   Weight Shift Sitting Good   Weight Shift Standing Fair    Comments Cummings 19/56   Bed Mobility    Supine to Sit Moderate Assist  (assist with legs due to pain)   Sit to Supine Moderate Assist   Sit to Stand Contact Guard Assist   Scooting Minimal Assist  (min A for supine scooting, supervision for seated scooting)   Rolling Contact Guard Assist  (uses railings on bed)   Coordination Lower Body    Coordination Lower Body  WDL   Roll Left and Right   Assistance Needed Adaptive equipment;Supervision   CARE Score - Roll Left and Right 4   Roll Left and Right Discharge Goal   Discharge Goal 6   Sit to Lying   Assistance Needed Physical assistance   Physical Assistance Level 26%-50%   CARE Score - Sit to Lying 3   Sit to Lying Discharge Goal   Discharge Goal 6   Lying to Sitting on Side of Bed   Assistance Needed Physical assistance   Physical Assistance Level 26%-50%   CARE Score - Lying to Sitting on Side of Bed 3   Lying to Sitting on Side of Bed Discharge Goal   Discharge Goal 6   Sit to Stand   Assistance Needed Physical assistance   Physical Assistance Level 25% or less   CARE Score - Sit to Stand 3   Sit to Stand Discharge Goal   Discharge Goal 6   Chair/Bed-to-Chair Transfer   Assistance Needed Physical assistance   Physical Assistance Level 25% or less   CARE Score - Chair/Bed-to-Chair Transfer 3   Chair/Bed-to-Chair Transfer Discharge Goal   Discharge Goal 6   Toilet Transfer   Assistance Needed Physical assistance   Physical Assistance Level 25% or less   CARE Score - Toilet Transfer 3   Toilet Transfer Discharge Goal   Discharge Goal 6   Car Transfer   Assistance Needed Physical assistance   Physical Assistance Level 26%-50%   CARE Score - Car Transfer 3   Car Transfer Discharge Goal   Discharge Goal 6   Walk 10 Feet   Assistance Needed Physical assistance   Physical Assistance Level 25% or less   CARE Score - Walk 10 Feet 3   Walk 10 Feet Discharge Goal   Discharge Goal 6   Walk 50 Feet with Two Turns   Assistance Needed Physical assistance   Physical Assistance  Level 26%-50%   CARE Score - Walk 50 Feet with Two Turns 3   Walk 50 Feet with Two Turns Discharge Goal   Discharge Goal 6   Walk 150 Feet   Assistance Needed Physical assistance   Physical Assistance Level Total assistance   CARE Score - Walk 150 Feet 1   Walk 150 Feet Discharge Goal   Discharge Goal 6   Walking 10 Feet on Uneven Surfaces   Assistance Needed Physical assistance   Physical Assistance Level Total assistance   CARE Score - Walking 10 Feet on Uneven Surfaces 1   Walking 10 Feet on Uneven Surfaces Discharge Goal   Discharge Goal 6   1 Step (Curb)   Assistance Needed Physical assistance   Physical Assistance Level Total assistance   CARE Score - 1 Step (Curb) 1   1 Step (Curb) Discharge Goal   Discharge Goal 6   4 Steps   Reason if not Attempted Activity not applicable   CARE Score - 4 Steps 9   12 Steps   Reason if not Attempted Activity not applicable   CARE Score - 12 Steps 9   Picking Up Object   Assistance Needed Adaptive equipment;Physical assistance   Physical Assistance Level 26%-50%   CARE Score - Picking Up Object 3   Picking Up Object Discharge Goal   Discharge Goal 6   Wheel 50 Feet with Two Turns   Reason if not Attempted Activity not applicable   CARE Score - Wheel 50 Feet with Two Turns 9   Wheel 150 Feet   Reason if not Attempted Activity not applicable   CARE Score - Wheel 150 Feet 9   Gait Functional Level of Assist    Gait Level Of Assist Minimal Assist   Assistive Device Parallel Bars   Distance (Feet) 40  (20+20, seated rest break between bouts)   Transfer Functional Level of Assist   Bed, Chair, Wheelchair Transfer Minimal Assist   Bed Chair Wheelchair Transfer Description Adaptive equipment;Increased time;Initial preparation for task;Supervision for safety;Verbal cueing   Toilet Transfers Minimal Assist   Toilet Transfer Description Increased time;Grab bar;Verbal cueing;Supervision for safety   Problem List    Problems Pain;Impaired Bed Mobility;Impaired Transfers;Impaired  Ambulation;Functional Strength Deficit;Impaired Balance;Decreased Activity Tolerance;Safety Awareness Deficits / Cognition   Precautions   Precautions Fall Risk;Spinal / Back Precautions ;Lumbosacral Orthosis   Current Discharge Plan   Current Discharge Plan Return to Prior Living Situation   Interdisciplinary Plan of Care Collaboration   IDT Collaboration with  Occupational Therapist;Nursing   Patient Position at End of Therapy In Bed;Bed Alarm On;Call Light within Reach;Tray Table within Reach;Phone within Reach   Collaboration Comments spoke with OT re: POC, CLOF. Spoke with RN re: pain management   PT DME Recommendations   Wheelchair   (no DME anticipated)   Physical Therapist Assigned   Assigned PT / Treatment Time / Comments EB 60-90   Benefit   Therapy Benefit Patient Would Benefit from Inpatient Rehabilitation Physical Therapy to Maximize Functional [place] with ADLs, IADLs and Mobility.   Strengths & Barriers   Strengths Adequate strength;Alert and oriented;Effective communication skills;Good insight into deficits/needs;Independent prior level of function;Making steady progress towards goals;Motivated for self care and independence;Pleasant and cooperative;Supportive family;Willingly participates in therapeutic activities;Manages pain appropriately;Able to follow instructions   Barriers Decreased endurance;Generalized weakness;Impaired activity tolerance;Impaired balance;Pain;Limited mobility       Assessment  Patient is 68 y.o. female with a past medical history of lumbar stenosis and HTN, ;  who presented on 1/23/2024  4:51 AM for scheduled lumbar surgery. Per documentation, patient has been followed in the outpatient setting for lumbar stenosis and back pain. Patient had worsening radiculopathy that failed conservative care and patient was deemed appropriate for surgical intervention. Patient underwent aL4-S1  ALIF on 1/23 and second stage L2-3 XLIF fusion for an L2-3 disc collapse on 1/25. Patient  returned to the OR for posterior fusion but surgery was aborted due to hypoxia with anesthesia. CXR showed atelectasis. Patient required 2 L o2 at basline, CT chest  showed R>L bilateral atelectasis. Patient's respiratory status was improved, and was cleared to return to the OR on 1/30 for redo L2-3 laminotomy and fusion.  Post op, patient has acute blood loss anemia with Hgb 10.7, hypocalcemia, and hyponatremia. Her BP has had intermittent HTN.  While at [facility], patient has been on Carafate, neurosurgery team unsure about why patient is on Carafate.  Planning to stop this medication as patient has no signs of GI bleed.  Patient is now off IV pain meds, she has been transitioned to scheduled Tylenol with oxycodone.  Patient's pain is currently controlled, has some RLE radiular pain, but is controlled on current tylenol + oxycodone + gabapentin. Denies changes in numbness/tingling/weakness. Does not report HA, lightheadedness, SOB, CP, abdominal pain, or changes in numbness/tingling/weakness. Denies constipation.       Additional factors influencing patient status / progress (ie: cognitive factors, co-morbidities, social support, etc): good family support, .      Plan  Recommend Physical Therapy  minutes per day 5-7 days per week for 10-14 days for the following treatments:  PT Group Therapy, PT Gait Training, PT Self Care/Home Eval, PT Therapeutic Exercises, PT Neuro Re-Ed/Balance, PT Therapeutic Activity, and PT Evaluation.    Passport items to be completed:  Get in/out of bed safely, in/out of a vehicle, safely use mobility device, walk or wheel around home/community, navigate up and down stairs, show how to get up/down from the ground, ensure home is accessible, demonstrate HEP, complete caregiver training    Goals:  Long term and short term goals have been discussed with patient and they are in agreement.    Physical Therapy Problems (Active)       Problem: Balance       Dates: Start:  02/03/24          Goal: STG-Within one week, patient will improve Cummings by 10 points (19/56 on eval)       Dates: Start:  02/03/24               Problem: Mobility       Dates: Start:  02/03/24         Goal: STG-Within one week, patient will ambulate 50 ft with FWW and CGA between rest breaks       Dates: Start:  02/03/24            Goal: STG-Within one week, patient will ambulate up/down a curb with FWW and min assist       Dates: Start:  02/03/24               Problem: Mobility Transfers       Dates: Start:  02/03/24         Goal: STG-Within one week, patient will perform sit to supine via log roll with supervision assist       Dates: Start:  02/03/24               Problem: PT-Long Term Goals       Dates: Start:  02/03/24         Goal: LTG-By discharge, patient will tolerate Cummings >36/56       Dates: Start:  02/03/24            Goal: LTG-By discharge, patient will ambulate 150 ft between seated rest breaks with FWW and supervision assist       Dates: Start:  02/03/24            Goal: LTG-By discharge, patient will perform home exercise program from handouts with set up assist       Dates: Start:  02/03/24            Goal: LTG-By discharge, patient will transfer in/out of a car with supervision assist and FWW       Dates: Start:  02/03/24

## 2024-02-03 NOTE — THERAPY
Physical Therapy   Daily Treatment     Patient Name: Dee Armstrong  Age:  68 y.o., Sex:  female  Medical Record #: [id number]  Today's Date: 2/3/2024     Precautions  Precautions: Fall Risk, Spinal / Back Precautions , Lumbosacral Orthosis    Subjective    Agreeable to participate, is tearful this afternoon. Cummings scale performed during PT eval, specific item scores noted in this treatment note due to format of flowsheet.      Objective     02/03/24 1231   PT Charge Group   PT Therapeutic Activities (Units) 2   PT Total Time Spent   PT Individual Total Time Spent (Mins) 30   Transfer Functional Level of Assist   Bed, Chair, Wheelchair Transfer Minimal Assist   Bed Chair Wheelchair Transfer Description Increased time;Initial preparation for task;Supervision for safety;Verbal cueing   Toilet Transfers Minimal Assist   Toilet Transfer Description Grab bar;Increased time;Verbal cueing;Supervision for safety   Outcome Measures   Outcome Measures Utilized Cummings Balance Scale   Cummings Balance Scale   Sitting Unsupported (Score 0-4) 2   Change Of Positon: Sitting To Standing (Score 0-4) 3   Change Of Positon: Standing To Sitting (Score 0-4) 3   Transfers (Score 0-4) 3   Standing Unsupported (Score 0-4) 0   Standing With Eyes Closed (Score 0-4) 3   Standing With Feet Together (Score 0-4) 1   Tandem Standing (Score 0-4) 1   Standing On One Leg (Score 0-4) 1   Turning Trunk (Feet Fixed) (Score 0-4) 0   Retrieving Objects From Floor (Score 0-4) 0   Turning 360 Degrees (Score 0-4) 0   Stool Stepping (Score 0-4) 0   Reaching Forward While Standing (Score 0-4) 2   Cummings Balance Total Score (0-56) 19   Interdisciplinary Plan of Care Collaboration   Patient Position at End of Therapy In Bed;Bed Alarm On;Call Light within Reach;Tray Table within Reach;Phone within Reach   Collaboration Comments ice pack under back due to pain       Assessment    Patient is limited by pain this afternoon, is unable to participate with much at this time.  Will benefit from further strength/ balance training as pain is controlled better.     Strengths: Adequate strength, Alert and oriented, Effective communication skills, Good insight into deficits/needs, Independent prior level of function, Making steady progress towards goals, Motivated for self care and independence, Pleasant and cooperative, Supportive family, Willingly participates in therapeutic activities, Manages pain appropriately, Able to follow instructions  Barriers: Decreased endurance, Generalized weakness, Impaired activity tolerance, Impaired balance, Pain, Limited mobility    Plan    Balance, gait, curb step, bed mobility    DME  PT DME Recommendations  Wheelchair:  (no DME anticipated)    Passport items to be completed:  Get in/out of bed safely, in/out of a vehicle, safely use mobility device, walk or wheel around home/community, navigate up and down stairs, show how to get up/down from the ground, ensure home is accessible, demonstrate HEP, complete caregiver training    Physical Therapy Problems (Active)       Problem: Balance       Dates: Start:  02/03/24         Goal: STG-Within one week, patient will improve Cummings by 10 points (19/56 on eval)       Dates: Start:  02/03/24               Problem: Mobility       Dates: Start:  02/03/24         Goal: STG-Within one week, patient will ambulate 50 ft with FWW and CGA between rest breaks       Dates: Start:  02/03/24            Goal: STG-Within one week, patient will ambulate up/down a curb with FWW and min assist       Dates: Start:  02/03/24               Problem: Mobility Transfers       Dates: Start:  02/03/24         Goal: STG-Within one week, patient will perform sit to supine via log roll with supervision assist       Dates: Start:  02/03/24               Problem: PT-Long Term Goals       Dates: Start:  02/03/24         Goal: LTG-By discharge, patient will tolerate Cummings >36/56       Dates: Start:  02/03/24            Goal: LTG-By discharge,  patient will ambulate 150 ft between seated rest breaks with FWW and supervision assist       Dates: Start:  02/03/24            Goal: LTG-By discharge, patient will perform home exercise program from handouts with set up assist       Dates: Start:  02/03/24            Goal: LTG-By discharge, patient will transfer in/out of a car with supervision assist and FWW       Dates: Start:  02/03/24

## 2024-02-03 NOTE — PROGRESS NOTES
"Rehab Progress Note     Encounter Date: 2/3/2024     CC: \"my stomach hurts\"    Interval Events (Subjective)  Pt seen today lying in bed tearful, saying her lower abdomen hurts, feeling achy. She states it is consistent with her fluctuating lower abdominal pain since her surgery which involved anterior approach and has been managed with meds.      Objective:  VITAL SIGNS:   Vitals:    02/02/24 1325 02/02/24 1340 02/02/24 1556 02/03/24 0447   BP: 119/70 110/54  129/61   Pulse: 93 (!) 105 (!) 105 88   Resp: 18 18 16 18   Temp: 36.4 °C (97.6 °F) 36.4 °C (97.6 °F)  36.4 °C (97.6 °F)   TempSrc: Oral Oral  Oral   SpO2: 96% 94% 94% 96%   Weight: 97.5 kg (214 lb 15.2 oz)      Height: 1.499 m (4' 11\")            Constitutional: No apparent distress  Cardiovascular: Regular rate and rhythm, normal S1/S2, no murmurs.  Thorax & Lungs: Clear to auscultation bilaterally   Abdomen: soft, non-tender, non-distended  Neurologic: A+O x 3  Moving all extremities spontaneously    Recent Results (from the past 72 hour(s))   CBC WITH DIFFERENTIAL    Collection Time: 02/01/24 11:28 AM   Result Value Ref Range    WBC 7.8 4.8 - 10.8 K/uL    RBC 3.39 (L) 4.20 - 5.40 M/uL    Hemoglobin 10.7 (L) 12.0 - 16.0 g/dL    Hematocrit 32.5 (L) 37.0 - 47.0 %    MCV 95.9 81.4 - 97.8 fL    MCH 31.6 27.0 - 33.0 pg    MCHC 32.9 32.2 - 35.5 g/dL    RDW 44.9 35.9 - 50.0 fL    Platelet Count 361 164 - 446 K/uL    MPV 8.7 (L) 9.0 - 12.9 fL    Neutrophils-Polys 70.20 44.00 - 72.00 %    Lymphocytes 18.80 (L) 22.00 - 41.00 %    Monocytes 8.70 0.00 - 13.40 %    Eosinophils 0.90 0.00 - 6.90 %    Basophils 0.50 0.00 - 1.80 %    Immature Granulocytes 0.90 0.00 - 0.90 %    Nucleated RBC 0.00 0.00 - 0.20 /100 WBC    Neutrophils (Absolute) 5.47 1.82 - 7.42 K/uL    Lymphs (Absolute) 1.47 1.00 - 4.80 K/uL    Monos (Absolute) 0.68 0.00 - 0.85 K/uL    Eos (Absolute) 0.07 0.00 - 0.51 K/uL    Baso (Absolute) 0.04 0.00 - 0.12 K/uL    Immature Granulocytes (abs) 0.07 0.00 - 0.11 " K/uL    NRBC (Absolute) 0.00 K/uL   Basic Metabolic Panel    Collection Time: 02/01/24 11:28 AM   Result Value Ref Range    Sodium 134 (L) 135 - 145 mmol/L    Potassium 4.2 3.6 - 5.5 mmol/L    Chloride 99 96 - 112 mmol/L    Co2 24 20 - 33 mmol/L    Glucose 114 (H) 65 - 99 mg/dL    Bun 15 8 - 22 mg/dL    Creatinine 0.58 0.50 - 1.40 mg/dL    Calcium 8.2 (L) 8.5 - 10.5 mg/dL    Anion Gap 11.0 7.0 - 16.0   ESTIMATED GFR    Collection Time: 02/01/24 11:28 AM   Result Value Ref Range    GFR (CKD-EPI) 97 >60 mL/min/1.73 m 2   Comp Metabolic Panel (CMP)    Collection Time: 02/03/24  5:41 AM   Result Value Ref Range    Sodium 135 135 - 145 mmol/L    Potassium 4.0 3.6 - 5.5 mmol/L    Chloride 102 96 - 112 mmol/L    Co2 25 20 - 33 mmol/L    Anion Gap 8.0 7.0 - 16.0    Glucose 100 (H) 65 - 99 mg/dL    Bun 10 8 - 22 mg/dL    Creatinine 0.47 (L) 0.50 - 1.40 mg/dL    Calcium 8.3 (L) 8.5 - 10.5 mg/dL    Correct Calcium 9.3 8.5 - 10.5 mg/dL    AST(SGOT) 16 12 - 45 U/L    ALT(SGPT) 20 2 - 50 U/L    Alkaline Phosphatase 109 (H) 30 - 99 U/L    Total Bilirubin 0.5 0.1 - 1.5 mg/dL    Albumin 2.7 (L) 3.2 - 4.9 g/dL    Total Protein 5.7 (L) 6.0 - 8.2 g/dL    Globulin 3.0 1.9 - 3.5 g/dL    A-G Ratio 0.9 g/dL   Vitamin D, 25-hydroxy (blood)    Collection Time: 02/03/24  5:41 AM   Result Value Ref Range    25-Hydroxy   Vitamin D 25 25 (L) 30 - 100 ng/mL   Magnesium    Collection Time: 02/03/24  5:41 AM   Result Value Ref Range    Magnesium 2.0 1.5 - 2.5 mg/dL   Phosphorus    Collection Time: 02/03/24  5:41 AM   Result Value Ref Range    Phosphorus 3.6 2.5 - 4.5 mg/dL   ESTIMATED GFR    Collection Time: 02/03/24  5:41 AM   Result Value Ref Range    GFR (CKD-EPI) 102 >60 mL/min/1.73 m 2       Current Facility-Administered Medications   Medication Frequency    Respiratory Therapy Consult Continuous RT    Pharmacy Consult Request ...Pain Management Review 1 Each PHARMACY TO DOSE    senna-docusate (Pericolace Or Senokot S) 8.6-50 MG per tablet 2  Tablet Q EVENING    And    polyethylene glycol/lytes (Miralax) Packet 1 Packet QDAY PRN    omeprazole (PriLOSEC) capsule 20 mg DAILY    mag hydrox-al hydrox-simeth (Maalox Plus Es Or Mylanta Ds) suspension 20 mL Q2HRS PRN    ondansetron (Zofran ODT) dispertab 4 mg 4X/DAY PRN    Or    ondansetron (Zofran) syringe/vial injection 4 mg 4X/DAY PRN    traZODone (Desyrel) tablet 50 mg QHS PRN    sodium chloride (Ocean) 0.65 % nasal spray 2 Spray PRN    acetaminophen (Tylenol) tablet 1,000 mg Q6HRS    Followed by    [START ON 2/7/2024] acetaminophen (Tylenol) tablet 1,000 mg Q6HRS PRN    oxyCODONE immediate-release (Roxicodone) tablet 2.5 mg Q3HRS PRN    Or    oxyCODONE immediate-release (Roxicodone) tablet 5 mg Q3HRS PRN    enoxaparin (Lovenox) inj 40 mg Q EVENING    atorvastatin (Lipitor) tablet 20 mg Q EVENING    calcium citrate (Calcitrate) tablet 950 mg DAILY    gabapentin (Neurontin) capsule 600 mg Q EVENING    lisinopril (Prinivil) tablet 10 mg DAILY    vitamin D3 (Cholecalciferol) tablet 1,000 Units DAILY    cyclobenzaprine (Flexeril) tablet 10 mg Q8HRS PRN       Orders Placed This Encounter   Procedures    Diet Order Diet: Regular     Standing Status:   Standing     Number of Occurrences:   1     Order Specific Question:   Diet:     Answer:   Regular [1]         Intake/Output Summary (Last 24 hours) at 2/3/2024 0903  Last data filed at 2/2/2024 1400  Gross per 24 hour   Intake 180 ml   Output 0 ml   Net 180 ml         Assessment:  Active Hospital Problems    Diagnosis     *Lumbar radiculopathy     S/P lumbar fusion     Neurogenic claudication     Essential hypertension     Dyslipidemia     DDD (degenerative disc disease), lumbar     Vitamin D deficiency     Severe obesity (HCC)     GERD (gastroesophageal reflux disease)        Medical Decision Making and Plan:  Rehabilitation Plan:   Discussion and Recommendations:   1. The patient requires an acute inpatient rehabilitation program with a coordinated program of  care at an intensity and frequency not available at a lower level of care. This recommendation is substantiated by the patient's medical physicians who recommend that the patient's intervention and assessment of medical issues needs to be done at an acute level of care for patient's safety and maximum outcome.   2. A coordinated program of care will be supplied by an interdisciplinary team of physical therapy, occupational therapy, rehab physician, rehab nursing, and, if needed, speech therapy and rehab psychology. Rehab team presents a patient-specific rehabilitation and education program concentrating on prevention of future problems related to accessibility, mobility, skin, bowel, bladder, sexuality, and psychosocial and medical/surgical problems.   3. Need for Rehabilitation Physician: The rehab physician will be evaluating the patient on a multi-weekly basis to help coordinate the program of care. The rehab physician communicates between medical physicians, therapists, and nurses to maximize the patient's potential outcome. Specific areas in which the rehab physician will be providing daily assessment include the following:   A. Assessing the patient's heart rate and blood pressure response (vitals monitoring) to activity and making adjustments in medications or conservative measures as needed.   B. The rehab physician will be assessing the frequency at which the program can be increased to allow the patient to reach optimal functional outcome.   C. The rehab physician will also provide assessments in daily skin care, especially in light of patient's impairments in mobility.   D. The rehab physician will provide special expertise in understanding how to work with functional impairment and recommend appropriate interventions, compensatory techniques, and education that will facilitate the patient's outcome.   4. Rehab R.N.   The rehab RN will be working with patient to carry over in room mobility and activities  of daily living when the patient is not in 3 hours of skilled therapy. Rehab nursing will be working in conjunction with rehab physician to address all the medical issues above and continue to assess laboratory work and discuss abnormalities with the treating physicians, assess vitals, and response to activity, and discuss and report abnormalities with the rehab physician. Rehab RN will also continue daily skin care, supervise bladder/bowel program, instruct in medication administration, and ensure patient safety.   5. Rehab Therapy: Therapies to treat at intensity and frequency of (may change after completion of evaluation by all therapeutic disciplines):       PT:  Physical therapy to address mobility, transfer, gait training and evaluation for adaptive equipment needs 1.5 hour/day at least 5 days/week for the duration of the ELOS (see below)       OT:  Occupational therapy to address ADLs, self-care, home management training, functional mobility/transfers and assistive device evaluation, and community re-integration 1.5 hour/day at least 5 days/week for the duration of the ELOS (see below).        ST/Dysphagia:  Speech therapy to address speech, language, and cognitive deficits as well as swallowing difficulties with retraining/dysphagia management and community re-integration with comprehension, expression, cognitive training 1.5 hour/day at least 5 days/week for the duration of the ELOS (see below).      Medical management / Rehabilitation Issues/ Adverse Potential as part of rehabilitation plan      Rehabilitation Issues/Adverse Potential  1.  S/p lumbar surgery with LE weakness: Patient demonstrates functional deficits in strength, balance, coordination, and ADL's. Patient is admitted to [facility] for comprehensive rehabilitation therapy as described below.   Rehabilitation nursing monitors bowel and bladder control, educates on medication administration, co-morbidities and monitors  patient safety.        2.  Neurostimulants: None at this time but continue to assess daily for need to initiate should status change.     3.  DVT prophylaxis:  Patient is on Lovenox  for anticoagulation upon transfer. Encourage OOB. Monitor daily for signs and symptoms of DVT including but not limited to swelling and pain to prevent the development of DVT that may interfere with therapies.     4.  GI prophylaxis:  On prilosec to prevent gastritis/dyspepsia which may interfere with therapies.     5.  Pain: pain manageable and controlled with Tylenol and oxycodone      6.  Nutrition/Dysphagia: Dietician monitors nutrient intake, recommend supplements prn and provide nutrition education to pt/family to promote optimal nutrition for wound healing/recovery.      7.  Bladder/bowel:  Start bowel and bladder program as described below, to prevent constipation, urinary retention (which may lead to UTI), and urinary incontinence (which will impact upon pt's functional independence).   - TV Q3h while awake with post void bladder scans, I&O cath for PVRs >400  - up to commode after meal      8.  Skin/dermal ulcer prophylaxis: Monitor for new skin conditions with q.2 h. turns as required to prevent the development of skin breakdown.      9.  Cognition/Behavior: As needed psychologist provides adjustment counseling to illness and psychosocial barriers that may be potential barriers to rehabilitation.      10. Respiratory therapy: RT performs O2 management prn, breathing retraining, pulmonary hygiene and bronchospasm management prn to optimize participation in therapies.      Medical Co-Morbidities/Adverse Potential Affecting Function:  Lumbar stenosis   S/p lumbar fusion   Lumbar fracture   - history of back pain with radicuopathy   - no MRI to review   - 1/23 stage 1 L4-S1 ALIF by Dr. Rodriguez   1/25  left L2/3 XLIF by Dr. Rodriguez   1/30 redo L2-3 laminotomy and L2-S1 perc fusion by Dr. Rodriguez   - LSO when OOB   - most recent CT L  spine obtained on 1/31 showed oblique nondisplaced L5 vertebral body fracture   - comprehnsive IRF level therapy with 3hrs of therapy 5 days per week      Hypoxia   - hypoxia, worse with anesthesia, causing 3rd surgery to be aborted   - now on albuterol   - stable on 1 L o2   - CT chest negative for PE      ABLA   - post op drop in Hgb   - 2/1 Hgb 10.7   - recheck cbc in AM with admission labs - still pending as of 2/3/24 11am     HTN   - on home dose lisinopril   - fluctuating hyper and hypotension         Vit D deficiency   -Vitamin D 25 on admission, 2/3/24 increase supplementation to vitamin D 2000 IU daily     Hypocalcemia  - transferred on calcitrate   -Calcium improving on admission, 8.3 on 2/3/2024     Pain  - was on IV diluadid until 2/1 AM   - on scheduled Tylenol and PRN oxycodone   - gabapentin 600mg qhs   - flexeril 10mg q8hrs prn      Bowel  - constipation prevention with scheduled senna   - bowel meds PRN      Bladder   - timed void to prevent falls and incont epsidoes   -bladder scan if no void > 6hrs, PVR, and cath if retaining > 200ml      Skin - Patient at risk for skin breakdown due to debility in areas including sacrum, achilles, elbows and head in addition to other sites. Nursing to assess skin daily.      GI Ppx - Patient on Prilosec for GERD prophylaxis. Patient on Senna-docusate for constipation prophylaxis.      DVT Ppx -lovenox    No problem-specific Assessment & Plan notes found for this encounter.      Total time:  35 minutes. Time spent included pre-rounding review of vitals and tests, unit/floor time, face-to-face time with the patient including physical examination, care coordination, counseling of patient and/or family, ordering medications/procedures/tests, discussion with CM, PT, OT, SLP and/or other healthcare providers, and documentation in the electronic medical record. Topics discussed included ab pain, Vit D supp    Aylin SILVERMAN M.D., personally performed a complete drug  regimen review and no potential clinically significant medication issues were identified.    Aylin Castilol M.D.  Physical medicine rehabilitation  [facility]

## 2024-02-03 NOTE — PROGRESS NOTES
1300 Pt A&Ox4, VSS, on room air. Discharge education provided. Discussed follow-up appointments and prescriptions. All questions answered. Pt discharged to [facility] via gurney by [facility]. Pt left with all belongings. IV removed. Discharge AVS and controlled substance form signed. COBRA given to transporters.

## 2024-02-03 NOTE — PROGRESS NOTES
Pt has a fear of needles. Was apprehensive about doing Enoxaparin 0.4 injection. Ask MD if any change can be made.

## 2024-02-03 NOTE — CARE PLAN
The patient is Stable - Low risk of patient condition declining or worsening    Shift Goals  Clinical Goals: safety, orient to rehab, pain mgmt, sleep  Patient Goals: pain mgmt, sleep      Patient is not progressing towards the following goals:    Problem: Psychosocial  Goal: Patient's level of anxiety will decrease  Outcome: Not Progressing  Flowsheets (Taken 2/2/2024 1905)  Patient Behaviors: (pt is fearful of needles)   Anxious   Fearful  Note: Pt continues to be anxious. Pt became fearful when it was time to receive her Lovenox injection this evening. Leaving not to AM RN to speak with HCP about it tomorrow. POC ongoing. Call light within reach.

## 2024-02-03 NOTE — THERAPY
"Occupational Therapy   Initial Evaluation     Patient Name: Dee Armstrong  Age:  68 y.o., Sex:  female  Medical Record #: [id number]  Today's Date: 2/3/2024     Subjective    Pt seen 2x this day, initially at 0700 for OT eval and tx and again at 1000 for tx. Pt agreeable to both sessions. Second session focused on AE education/trial seated EOB, education to don/doff LSO, Passport to ind, and fall card review.     Objective       02/03/24 0701   OT Charge Group   Charges Yes   OT Self Care / ADL (Units) 3   OT Evaluation OT Evaluation Mod   OT Total Time Spent   OT Individual Total Time Spent (Mins) 90   Prior Living Situation   Prior Services Home-Independent   Housing / Facility 1 Story House   Steps Into Home 1  (3-4 inch threshold)   Bathroom Set up Bathtub / Shower Combination;Shower Glass Doors;Tub Transfer Bench;Built-In Shower Chair   Equipment Owned Front-Wheel Walker;4-Wheel Walker;Tub Transfer Bench;Reacher   Lives with - Patient's Self Care Capacity Spouse  (\"Narendra\")   Prior Level of ADL Function   Self Feeding Independent   Grooming / Hygiene Independent   Bathing Requires Assist  (Supervision only)   Dressing Independent   Toileting Independent   Prior Level of IADL Function   Medication Management Independent   Laundry Independent   Kitchen Mobility Independent   Finances Dependent  ( completes)   Home Management Requires Assist   Shopping Independent   Prior Level Of Mobility Independent Without Device in Community;Independent Without Device in Home   Driving / Transportation Unable To Determine At This Time   Occupation (Pre-Hospital Vocational) Unable To Determine At This Time   Prior Functioning: Everyday Activities   Self Care Independent   Indoor Mobility (Ambulation) Independent   Stairs Not applicable   Functional Cognition Independent   Prior Device Use None of the given options   Pain   Intervention Medication (see MAR);Emotional Support;Distraction;Nurse Notified;Rest;Repositioned " "  Pain 0 - 10 Group   Location Abdomen;Back   Location Orientation Right;Left;Anterior;Posterior   Pain Rating Scale (NPRS) 7   Description Aching;Sore   Comfort Goal Perform Activity;Comfort with Movement   Therapist Pain Assessment During Activity;Nurse Notified   Cognition    Cognition / Consciousness X   Level of Consciousness Alert   Safety Awareness Impaired   Attention Impaired   Comments Cues for spinal precautions in function. Limtited attention due to high pain levels.   Cognitive Pattern Assessment   Cognitive Pattern Assessment Used BIMS   Brief Interview for Mental Status (BIMS)   Repetition of Three Words (First Attempt) 3   Temporal Orientation: Year Correct   Temporal Orientation: Month Accurate within 5 days   Temporal Orientation: Day Correct   Recall: \"Sock\" Yes, no cue required   Recall: \"Blue\" Yes, no cue required   Recall: \"Bed\" Yes, no cue required   BIMS Summary Score 15   Confusion Assessment Method (CAM)   Is there evidence of an acute change in mental status from the patient's baseline? No   Inattention Behavior not present   Disorganized thinking Behavior not present   Altered level of consciousness Behavior not present   Vision Screen   Vision Not tested  (Pt had lasic in the past and only wears reading glasses. No overt deficits noted in function)   Passive ROM Upper Body   Passive ROM Upper Body Not Tested   Comments Observed to be WFL during ADLs and functional transfers.   Active ROM Upper Body   Active ROM Upper Body  Not Tested   Dominant Hand Right   Comments Observed to be WFL during ADLs and functional transfers.   Strength Upper Body   Upper Body Strength  Not Tested   Comments Observed to be WFL during ADLs and functional transfers.   Sensation Upper Body   Upper Extremity Sensation  Not Tested   Comments Pt reports no changes in UE sensation   Upper Body Muscle Tone   Upper Body Muscle Tone  WDL   Comments BUES   Bed Mobility    Supine to Sit Contact Guard Assist   Sit to " Supine Moderate Assist  (assist with legs due to pain)   Sit to Stand Contact Guard Assist   Scooting Standby Assist   Rolling Contact Guard Assist  (with bed rails)   Coordination Upper Body   Coordination WDL   Comments BUES   Eating   Assistance Needed Independent   CARE Score - Eating 6   Eating Discharge Goal   Discharge Goal 6   Oral Hygiene   Assistance Needed Set-up / clean-up;Supervision   CARE Score - Oral Hygiene 4   Oral Hygiene Discharge Goal   Discharge Goal 6   Shower/Bathe Self   Assistance Needed Physical assistance   Physical Assistance Level 26%-50%   CARE Score - Shower/Bathe Self 3   Shower/Bathe Self Discharge Goal   Discharge Goal 6   Upper Body Dressing   Assistance Needed Physical assistance   Physical Assistance Level 25% or less   CARE Score - Upper Body Dressing 3   Upper Body Dressing Discharge Goal   Discharge Goal 6   Lower Body Dressing   Assistance Needed Physical assistance   Physical Assistance Level 76% or more   CARE Score - Lower Body Dressing 2   Lower Body Dressing Discharge Goal   Discharge Goal 6   Putting On/Taking Off Footwear   Assistance Needed Physical assistance   Physical Assistance Level Total assistance   CARE Score - Putting On/Taking Off Footwear 1   Putting On/Taking Off Footwear Discharge Goal   Discharge Goal 6   Toileting Hygiene   Assistance Needed Physical assistance   Physical Assistance Level 76% or more   CARE Score - Toileting Hygiene 2   Toileting Hygiene Discharge Goal   Discharge Goal 6   Toilet Transfer   Assistance Needed Physical assistance   Physical Assistance Level 25% or less   CARE Score - Toilet Transfer 3   Toilet Transfer Discharge Goal   Discharge Goal 6   Hearing, Speech, and Vision   Ability to Hear Adequate   Ability to See in Adequate Light Adequate   Expression of Ideas and Wants Without difficulty   Understanding Verbal and Non-Verbal Content Understands   Functional Level of Assist   Eating Independent   Grooming Standby  Assist;Seated   Grooming Description Increased time;Seated in wheelchair at sink;Supervision for safety;Verbal cueing  (Vc for spinal precautions during oral care)   Bathing Moderate Assist   Bathing Description Grab bar;Hand held shower;Tub bench;Assit with back;Assit wtih lower extremities;Assit with perineal;Increased time;Set-up of equipment;Set up for wound protection;Supervision for safety;Verbal cueing  (Mod A for rear, back, and knee to toe. Cues for spinal precautions 1x. Pt with high pain during bathing.)   Upper Body Dressing Minimal Assist   Upper Body Dressing Description Application of orthotic or brace;Increased time;Supervision for safety;Verbal cueing   Lower Body Dressing Maximal Assist   Lower Body Dressing Description Verbal cueing;Supervision for safety;Set-up of equipment;Increased time;Assist with closures;Assist with threading into pant leg;Cues for spinal precautions;Grab bar   Toileting Maximal Assist   Toileting Description Assist to pull pants up;Assist for standing balance;Grab bar;Increased time;Initial preparation for task;Set-up of equipment;Supervision for safety;Verbal cueing   Bed, Chair, Wheelchair Transfer Minimal Assist   Bed Chair Wheelchair Transfer Description Increased time;Initial preparation for task;Supervision for safety;Verbal cueing   Toilet Transfers Minimal Assist   Toilet Transfer Description Grab bar;Increased time;Supervision for safety;Verbal cueing;Verbal cues for spinal precautions   Tub / Shower Transfers Minimal Assist   Tub Shower Transfer Description Grab bar;Shower bench;Increased time;Verbal cueing;Supervision for safety   Comprehension Modified Independent   Comprehension Description Glasses   Expression Independent   Problem Solving Supervision   Problem Solving Description Supervision;Verbal cueing   Memory Supervision   Memory Description Verbal cueing;Supervision   Problem List   Problem List Decreased Active Daily Living Skills;Decreased Homemaking  Skills;Decreased Upper Extremity Strength Right;Decreased Upper Extremity Strength Left;Decreased Functional Mobility;Decreased Activity Tolerance;Impaired Postural Control / Balance;Limited Knowledge of Post Op Precautions   Precautions   Precautions Fall Risk;Spinal / Back Precautions ;Lumbosacral Orthosis   Comments Strong fear of needles   Current Discharge Plan   Current Discharge Plan Return to Prior Living Situation   Benefit    Therapy Benefit Patient Would Benefit from Inpatient Rehab Occupational Therapy to Maximize [place] with ADLs, IADLs and Functional Mobility.   Interdisciplinary Plan of Care Collaboration   IDT Collaboration with  Physical Therapist;Nursing   Patient Position at End of Therapy Seated;Chair Alarm On;Self Releasing Lap Belt Applied;Call Light within Reach;Tray Table within Reach;Phone within Reach   Collaboration Comments re: CLOF, POC   Strengths & Barriers   Strengths Able to follow instructions;Adequate strength;Alert and oriented;Effective communication skills;Good insight into deficits/needs;Independent prior level of function;Making steady progress towards goals;Motivated for self care and independence;Pleasant and cooperative;Supportive family;Willingly participates in therapeutic activities   Barriers Decreased endurance;Fatigue;Generalized weakness;Emotional lability;Impaired activity tolerance;Impaired balance;Limited mobility;Pain;Pain poorly managed       Patient education: reviewed OT plan of care, rehab expectations, goal setting, ADL needs, orientation to schedule, role of OT in recovery, fall risk and use of call light. OTR educated pt on the Passport to [place] program. OTR explained each discipline briefly and then explained occupational therapy's role in more detail. OTR answered questions pt had about pt's stay and educated pt on expectations at rehab.   OTR signed off of self-feeding during session and reviewed fall card, with education on  interdisciplinary approach.   Initiated AE training using sock aid and reacher for sock management only, seated EOB. Completed 2x trials, but became limited by increasing pain. Pt also completed don/doff of LSO with SBA and cues for sequencing initially. Pt left supine in bed with call light in reach.    Assessment  Patient is 68 y.o. Female with a diagnosis of Lumbar radiculopathy s/p surgical intervention.  Additional factors influencing patient status / progress (ie: cognitive factors, co-morbidities, social support, etc): Per Dr. Cruz H&P - Pt with a past medical history of lumbar stenosis and HTN, ;  who presented on 1/23/2024  4:51 AM for scheduled lumbar surgery. Per documentation, patient has been followed in the outpatient setting for lumbar stenosis and back pain. Patient had worsening radiculopathy that failed conservative care and patient was deemed appropriate for surgical intervention. Patient underwent aL4-S1  ALIF on 1/23 and second stage L2-3 XLIF fusion for an L2-3 disc collapse on 1/25. Patient returned to the OR for posterior fusion but surgery was aborted due to hypoxia with anesthesia. CXR showed atelectasis. Patient required 2 L o2 at basline, CT chest  showed R>L bilateral atelectasis. Patient's respiratory status was improved, and was cleared to return to the OR on 1/30 for redo L2-3 laminotomy and fusion.  Post op, patient has acute blood loss anemia with Hgb 10.7, hypocalcemia, and hyponatremia. Her BP has had intermittent HTN.  While at [facility], patient has been on Carafate, neurosurgery team unsure about why patient is on Carafate.  Planning to stop this medication as patient has no signs of GI bleed.  Patient is now off IV pain meds, she has been transitioned to scheduled Tylenol with oxycodone.  Patient's pain is currently controlled, has some RLE radiular pain, but is controlled on current tylenol + oxycodone + gabapentin. Denies changes in numbness/tingling/weakness. Does  not report HA, lightheadedness, SOB, CP, abdominal pain, or changes in numbness/tingling/weakness. Denies constipation..     OT evaluation performed today; functional performance at today's assessment is as above. Pt presents to rehab below her normal baseline ind level, currently functioning between Min A-Total A for ADLs, and CGA for functional transfers. Pt is limited by decreased strength, decreased balance, decreased endurance, pain, emotional lability, problem solving, limited knowledge of post-op precautions, and decreased LB strength and spinal precautions. Pt lives in a HOUSING FACILITY: 1 Story House with 1STE and spouse who can assist 24/7. Pt will benefit from ongoing care from this skilled interdisciplinary high intensity rehabilitation program to address the aforementioned deficits in order to maximize ind with ADLs, IADLs, and household/community mobility while reducing burden of care, supporting a safe return home upon DC.      Plan  Recommend Occupational Therapy  minutes per day 5-7 days per week for 10-14 days for the following treatments:  OT E Stim Attended, OT Group Therapy, OT Self Care/ADL, OT Community Reintegration, OT Manual Ther Technique, OT Neuro Re-Ed/Balance, OT Therapeutic Activity, OT Evaluation, and OT Therapeutic Exercise.    Passport items to be completed:  Perform bathroom transfers, complete dressing, complete feeding, get ready for the day, prepare a simple meal, participate in household tasks, adapt home for safety needs, demonstrate home exercise program, complete caregiver training     Goals:  Long term and short term goals have been discussed with patient and they are in agreement.    Occupational Therapy Goals (Active)       Problem: Bathing       Dates: Start:  02/03/24         Goal: STG-Within one week, patient will bathe at Min A using DME/AE PRN.       Dates: Start:  02/03/24               Problem: Dressing       Dates: Start:  02/03/24         Goal: STG-Within  one week, patient will dress UB at [facility] using DME/AE PRN.       Dates: Start:  02/03/24            Goal: STG-Within one week, patient will dress LB at Min A using DME/AE PRN.       Dates: Start:  02/03/24               Problem: Functional Transfers       Dates: Start:  02/03/24         Goal: STG-Within one week, patient will transfer to toilet at [facility].       Dates: Start:  02/03/24            Goal: STG-Within one week, patient will transfer to step in shower at [facility].       Dates: Start:  02/03/24               Problem: OT Long Term Goals       Dates: Start:  02/03/24         Goal: LTG-By discharge, patient will complete basic self care tasks at [facility].       Dates: Start:  02/03/24            Goal: LTG-By discharge, patient will perform bathroom transfers  at [facility].       Dates: Start:  02/03/24               Problem: Toileting       Dates: Start:  02/03/24         Goal: STG-Within one week, patient will complete toileting tasks at [facility] using DME/AE PRN.       Dates: Start:  02/03/24

## 2024-02-03 NOTE — PROGRESS NOTES
NURSING DAILY NOTE    Name: Dee Armstrong   Date of Admission: 2/2/2024   Admitting Diagnosis: Lumbar radiculopathy  Attending Physician: Sobeida Cruz D.o.  Allergies: Patient has no known allergies.    Safety  Patient Assist     Patient Precautions     Precaution Comments     Bed Transfer Status     Toilet Transfer Status      Assistive Devices  Rails, Wheelchair, Wheelchair push  Oxygen  None - Room Air  Diet/Therapeutic Dining  Current Diet Order   Procedures    Diet Order Diet: Regular     Pill Administration  whole  Agitated Behavioral Scale     ABS Level of Severity       Fall Risk  Has the patient had a fall this admission?      Kalyani Monroe Fall Risk Scoring  6, NO RISK  Fall Risk Safety Measures  bed alarm and chair alarm    Vitals  Temperature: 36.4 °C (97.6 °F)  Temp src: Oral  Pulse: (!) 105  Respiration: 16  Blood Pressure : 110/54  Blood Pressure MAP (Calculated): 73 MM HG  BP Location: Right, Upper Arm  Patient BP Position: Supine     Oxygen  Pulse Oximetry: 94 %  O2 (LPM): 0  O2 Delivery Device: None - Room Air    Bowel and Bladder  Last Bowel Movement     Stool Type     Bowel Device     Continent  Bladder: Did not void   Bowel: No movement  Bladder Function  Urine Void (mL): 0 ml  Number of Times Voided: 1  Genitourinary Assessment   $ Bladder Scan Results (mL): 7    Skin  Vlad Score   17  Sensory Interventions   Bed Types: Standard/Trauma Mattress  Skin Preventative Measures: Pillows in Use for Support / Positioning, Waffle Overlay  Moisture Interventions  Moisturizers/Barriers: Moisturizer       Pain  Pain Rating Scale  7 - Focus of attention, prevents doing daily activities  Pain Location  Back  Pain Location Orientation  Posterior  Pain Interventions   Medication (see MAR)    ADLs    Bathing      Linen Change      Personal Hygiene     Chlorhexidine Bath      Oral Care     Teeth/Dentures     Shave     Nutrition Percentage  Eaten  *  * Meal *  *, Lunch, 0% Consumed, Refused  Environmental Precautions  Treaded Slipper Socks on Patient, Personal Belongings, Wastebasket, Call Bell etc. in Easy Reach, Transferred to Stronger Side, Report Given to Other Health Care Providers Regarding Fall Risk, Bed in Low Position  Patient Turns/Positioning  Patient Turns Self from Side to Side  Patient Turns Assistance/Tolerance     Bed Positions  Bed Controls On, Bed Locked  Head of Bed Elevated  Self regulated      Psychosocial/Neurologic Assessment  Psychosocial Assessment  Psychosocial (WDL):  WDL Except  Patient Behaviors: Anxious  Family Behaviors: Family Present  Neurologic Assessment  Neuro (WDL): Exceptions to WDL  Level of Consciousness: Alert  Orientation Level: Oriented X4  Cognition: Appropriate judgement, Appropriate safety awareness  Speech: Clear  EENT (WDL):  WDL Except    Cardio/Pulmonary Assessment  Edema      Respiratory Breath Sounds     Cardiac Assessment   Cardiac (WDL):  Within Defined Limits

## 2024-02-03 NOTE — FLOWSHEET NOTE
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 No   COPD Population Screener   During the past 4 weeks, how much did you feel short of breath? 0   Do you ever cough up any mucus or phlegm? 0   In the past 12 months, you do less than you used to because of your breathing problems 0   Have you smoked at least 100 cigarettes in your entire life? 0   How old are you? 1   COPD Screening Score 2   COPD Coordinator Not Recommended Yes

## 2024-02-03 NOTE — CARE PLAN
Problem: Skin Integrity  Goal: Skin integrity is maintained or improved  Outcome: Progressing - sx incisions intact and all wound care provided post shower this morning     Problem: Infection  Goal: Patient will remain free from infection  Outcome: Progressing - sx incisions present with no SOI. Wound provided this morning post shower.

## 2024-02-03 NOTE — FLOWSHEET NOTE
02/02/24 1556   Events/Summary/Plan   Events/Summary/Plan RT Assessment   Vital Signs   Pulse (!) 105   Respiration 16   Pulse Oximetry 94 %   $ Pulse Oximetry (Spot Check) Yes   Respiratory Assessment   Respiratory Pattern Within Normal Limits   Level of Consciousness Alert   Chest Exam   Work Of Breathing / Effort Within Normal Limits   Secretions   Cough Strong;Non Productive   Oxygen   O2 Delivery Device None - Room Air

## 2024-02-03 NOTE — PROGRESS NOTES
Patient admitted to facility at 1315 via gurney; accompanied by hospital transport. Patient assisted to room and positioned in bed for comfort and safety, call light within reach. Patient assisted with stowing belongings and oriented to room and facility. Admission assessment performed and documented in computer. Admission paperwork completed, signed copies placed in chart. Will continue to monitor.

## 2024-02-04 LAB
BASOPHILS # BLD AUTO: 0.7 % (ref 0–1.8)
BASOPHILS # BLD: 0.03 K/UL (ref 0–0.12)
EOSINOPHIL # BLD AUTO: 0.16 K/UL (ref 0–0.51)
EOSINOPHIL NFR BLD: 3.7 % (ref 0–6.9)
ERYTHROCYTE [DISTWIDTH] IN BLOOD BY AUTOMATED COUNT: 46 FL (ref 35.9–50)
EST. AVERAGE GLUCOSE BLD GHB EST-MCNC: 123 MG/DL
HBA1C MFR BLD: 5.9 % (ref 4–5.6)
HCT VFR BLD AUTO: 31.8 % (ref 37–47)
HGB BLD-MCNC: 10.5 G/DL (ref 12–16)
IMM GRANULOCYTES # BLD AUTO: 0.03 K/UL (ref 0–0.11)
IMM GRANULOCYTES NFR BLD AUTO: 0.7 % (ref 0–0.9)
LYMPHOCYTES # BLD AUTO: 1.35 K/UL (ref 1–4.8)
LYMPHOCYTES NFR BLD: 31 % (ref 22–41)
MCH RBC QN AUTO: 31.7 PG (ref 27–33)
MCHC RBC AUTO-ENTMCNC: 33 G/DL (ref 32.2–35.5)
MCV RBC AUTO: 96.1 FL (ref 81.4–97.8)
MONOCYTES # BLD AUTO: 0.39 K/UL (ref 0–0.85)
MONOCYTES NFR BLD AUTO: 9 % (ref 0–13.4)
NEUTROPHILS # BLD AUTO: 2.39 K/UL (ref 1.82–7.42)
NEUTROPHILS NFR BLD: 54.9 % (ref 44–72)
NRBC # BLD AUTO: 0 K/UL
NRBC BLD-RTO: 0 /100 WBC (ref 0–0.2)
PLATELET # BLD AUTO: 426 K/UL (ref 164–446)
PMV BLD AUTO: 8.8 FL (ref 9–12.9)
RBC # BLD AUTO: 3.31 M/UL (ref 4.2–5.4)
WBC # BLD AUTO: 4.4 K/UL (ref 4.8–10.8)

## 2024-02-04 PROCEDURE — 770010 HCHG ROOM/CARE - REHAB SEMI PRIVAT*

## 2024-02-04 PROCEDURE — 700111 HCHG RX REV CODE 636 W/ 250 OVERRIDE (IP): Mod: JZ | Performed by: PHYSICAL MEDICINE & REHABILITATION

## 2024-02-04 PROCEDURE — 700102 HCHG RX REV CODE 250 W/ 637 OVERRIDE(OP): Performed by: STUDENT IN AN ORGANIZED HEALTH CARE EDUCATION/TRAINING PROGRAM

## 2024-02-04 PROCEDURE — 99232 SBSQ HOSP IP/OBS MODERATE 35: CPT | Performed by: STUDENT IN AN ORGANIZED HEALTH CARE EDUCATION/TRAINING PROGRAM

## 2024-02-04 PROCEDURE — 85025 COMPLETE CBC W/AUTO DIFF WBC: CPT

## 2024-02-04 PROCEDURE — 36415 COLL VENOUS BLD VENIPUNCTURE: CPT

## 2024-02-04 PROCEDURE — 83036 HEMOGLOBIN GLYCOSYLATED A1C: CPT

## 2024-02-04 PROCEDURE — 700102 HCHG RX REV CODE 250 W/ 637 OVERRIDE(OP): Performed by: PHYSICAL MEDICINE & REHABILITATION

## 2024-02-04 PROCEDURE — A9270 NON-COVERED ITEM OR SERVICE: HCPCS | Performed by: PHYSICAL MEDICINE & REHABILITATION

## 2024-02-04 PROCEDURE — A9270 NON-COVERED ITEM OR SERVICE: HCPCS | Performed by: STUDENT IN AN ORGANIZED HEALTH CARE EDUCATION/TRAINING PROGRAM

## 2024-02-04 RX ORDER — BACLOFEN 10 MG/1
10 TABLET ORAL 3 TIMES DAILY PRN
Status: DISCONTINUED | OUTPATIENT
Start: 2024-02-04 | End: 2024-02-04

## 2024-02-04 RX ORDER — CYCLOBENZAPRINE HCL 10 MG
10 TABLET ORAL 3 TIMES DAILY PRN
Status: DISCONTINUED | OUTPATIENT
Start: 2024-02-04 | End: 2024-02-06

## 2024-02-04 RX ORDER — GABAPENTIN 300 MG/1
300 CAPSULE ORAL 2 TIMES DAILY
Status: DISCONTINUED | OUTPATIENT
Start: 2024-02-04 | End: 2024-02-06

## 2024-02-04 RX ORDER — CAPSAICIN 0.025 %
CREAM (GRAM) TOPICAL 3 TIMES DAILY PRN
Status: DISCONTINUED | OUTPATIENT
Start: 2024-02-04 | End: 2024-02-13 | Stop reason: HOSPADM

## 2024-02-04 RX ADMIN — ENOXAPARIN SODIUM 40 MG: 100 INJECTION SUBCUTANEOUS at 19:22

## 2024-02-04 RX ADMIN — OXYCODONE HYDROCHLORIDE 5 MG: 5 TABLET ORAL at 19:22

## 2024-02-04 RX ADMIN — CYCLOBENZAPRINE 10 MG: 10 TABLET, FILM COATED ORAL at 14:55

## 2024-02-04 RX ADMIN — CYCLOBENZAPRINE 10 MG: 10 TABLET, FILM COATED ORAL at 05:18

## 2024-02-04 RX ADMIN — GABAPENTIN 300 MG: 300 CAPSULE ORAL at 11:42

## 2024-02-04 RX ADMIN — Medication 950 MG: at 07:58

## 2024-02-04 RX ADMIN — ACETAMINOPHEN 1000 MG: 500 TABLET, FILM COATED ORAL at 11:42

## 2024-02-04 RX ADMIN — ACETAMINOPHEN 1000 MG: 500 TABLET, FILM COATED ORAL at 05:18

## 2024-02-04 RX ADMIN — OMEPRAZOLE 20 MG: 20 CAPSULE, DELAYED RELEASE ORAL at 07:58

## 2024-02-04 RX ADMIN — OXYCODONE HYDROCHLORIDE 5 MG: 5 TABLET ORAL at 05:18

## 2024-02-04 RX ADMIN — ACETAMINOPHEN 1000 MG: 500 TABLET, FILM COATED ORAL at 17:31

## 2024-02-04 RX ADMIN — GABAPENTIN 600 MG: 300 CAPSULE ORAL at 19:22

## 2024-02-04 RX ADMIN — OXYCODONE HYDROCHLORIDE 5 MG: 5 TABLET ORAL at 14:55

## 2024-02-04 RX ADMIN — Medication 2000 UNITS: at 09:03

## 2024-02-04 RX ADMIN — ATORVASTATIN CALCIUM 20 MG: 10 TABLET, FILM COATED ORAL at 19:22

## 2024-02-04 RX ADMIN — LISINOPRIL 10 MG: 5 TABLET ORAL at 05:18

## 2024-02-04 ASSESSMENT — FIBROSIS 4 INDEX: FIB4 SCORE: 0.59

## 2024-02-04 NOTE — PROGRESS NOTES
"Rehab Progress Note     Encounter Date: 2/4/2024     CC: \"left shin pain\"    Interval Events (Subjective)  Pt seen today lying in bed noting that her left shin hurts. Feels allodynic. Has been on gabapentin 600mg QHS for a long time. Amenable to increasing gabapentin to try to cover her left shin pain.  Also amenable to trying as needed capsaicin cream.  Feels very apprehensive about needles and is amenable to continuing lovenox for another day before discussing with primary team tomorrow.    Abdominal pain at baseline.      Objective:  VITAL SIGNS:   Vitals:    02/03/24 0447 02/03/24 1045 02/03/24 1548 02/04/24 0502   BP: 129/61 103/50 100/65 137/69   Pulse: 88 69 84 92   Resp: 18 16 18 18   Temp: 36.4 °C (97.6 °F) 36.6 °C (97.9 °F) 37.1 °C (98.7 °F) 36.5 °C (97.7 °F)   TempSrc: Oral Oral Oral Oral   SpO2: 96% 92% 97% 98%   Weight:       Height:             Constitutional: No apparent distress  Cardiovascular: Regular rate and rhythm, normal S1/S2, no murmurs.  Thorax & Lungs: Clear to auscultation bilaterally   Abdomen: soft, non-tender, non-distended  Neurologic: A+O x 3  Moving all extremities spontaneously  Allodynia to LT at left shin, no visible lesions at area of pain    Recent Results (from the past 72 hour(s))   CBC WITH DIFFERENTIAL    Collection Time: 02/01/24 11:28 AM   Result Value Ref Range    WBC 7.8 4.8 - 10.8 K/uL    RBC 3.39 (L) 4.20 - 5.40 M/uL    Hemoglobin 10.7 (L) 12.0 - 16.0 g/dL    Hematocrit 32.5 (L) 37.0 - 47.0 %    MCV 95.9 81.4 - 97.8 fL    MCH 31.6 27.0 - 33.0 pg    MCHC 32.9 32.2 - 35.5 g/dL    RDW 44.9 35.9 - 50.0 fL    Platelet Count 361 164 - 446 K/uL    MPV 8.7 (L) 9.0 - 12.9 fL    Neutrophils-Polys 70.20 44.00 - 72.00 %    Lymphocytes 18.80 (L) 22.00 - 41.00 %    Monocytes 8.70 0.00 - 13.40 %    Eosinophils 0.90 0.00 - 6.90 %    Basophils 0.50 0.00 - 1.80 %    Immature Granulocytes 0.90 0.00 - 0.90 %    Nucleated RBC 0.00 0.00 - 0.20 /100 WBC    Neutrophils (Absolute) 5.47 1.82 - " 7.42 K/uL    Lymphs (Absolute) 1.47 1.00 - 4.80 K/uL    Monos (Absolute) 0.68 0.00 - 0.85 K/uL    Eos (Absolute) 0.07 0.00 - 0.51 K/uL    Baso (Absolute) 0.04 0.00 - 0.12 K/uL    Immature Granulocytes (abs) 0.07 0.00 - 0.11 K/uL    NRBC (Absolute) 0.00 K/uL   Basic Metabolic Panel    Collection Time: 02/01/24 11:28 AM   Result Value Ref Range    Sodium 134 (L) 135 - 145 mmol/L    Potassium 4.2 3.6 - 5.5 mmol/L    Chloride 99 96 - 112 mmol/L    Co2 24 20 - 33 mmol/L    Glucose 114 (H) 65 - 99 mg/dL    Bun 15 8 - 22 mg/dL    Creatinine 0.58 0.50 - 1.40 mg/dL    Calcium 8.2 (L) 8.5 - 10.5 mg/dL    Anion Gap 11.0 7.0 - 16.0   ESTIMATED GFR    Collection Time: 02/01/24 11:28 AM   Result Value Ref Range    GFR (CKD-EPI) 97 >60 mL/min/1.73 m 2   Comp Metabolic Panel (CMP)    Collection Time: 02/03/24  5:41 AM   Result Value Ref Range    Sodium 135 135 - 145 mmol/L    Potassium 4.0 3.6 - 5.5 mmol/L    Chloride 102 96 - 112 mmol/L    Co2 25 20 - 33 mmol/L    Anion Gap 8.0 7.0 - 16.0    Glucose 100 (H) 65 - 99 mg/dL    Bun 10 8 - 22 mg/dL    Creatinine 0.47 (L) 0.50 - 1.40 mg/dL    Calcium 8.3 (L) 8.5 - 10.5 mg/dL    Correct Calcium 9.3 8.5 - 10.5 mg/dL    AST(SGOT) 16 12 - 45 U/L    ALT(SGPT) 20 2 - 50 U/L    Alkaline Phosphatase 109 (H) 30 - 99 U/L    Total Bilirubin 0.5 0.1 - 1.5 mg/dL    Albumin 2.7 (L) 3.2 - 4.9 g/dL    Total Protein 5.7 (L) 6.0 - 8.2 g/dL    Globulin 3.0 1.9 - 3.5 g/dL    A-G Ratio 0.9 g/dL   Vitamin D, 25-hydroxy (blood)    Collection Time: 02/03/24  5:41 AM   Result Value Ref Range    25-Hydroxy   Vitamin D 25 25 (L) 30 - 100 ng/mL   Magnesium    Collection Time: 02/03/24  5:41 AM   Result Value Ref Range    Magnesium 2.0 1.5 - 2.5 mg/dL   Phosphorus    Collection Time: 02/03/24  5:41 AM   Result Value Ref Range    Phosphorus 3.6 2.5 - 4.5 mg/dL   ESTIMATED GFR    Collection Time: 02/03/24  5:41 AM   Result Value Ref Range    GFR (CKD-EPI) 102 >60 mL/min/1.73 m 2   CBC WITH DIFFERENTIAL    Collection  Time: 02/04/24  5:17 AM   Result Value Ref Range    WBC 4.4 (L) 4.8 - 10.8 K/uL    RBC 3.31 (L) 4.20 - 5.40 M/uL    Hemoglobin 10.5 (L) 12.0 - 16.0 g/dL    Hematocrit 31.8 (L) 37.0 - 47.0 %    MCV 96.1 81.4 - 97.8 fL    MCH 31.7 27.0 - 33.0 pg    MCHC 33.0 32.2 - 35.5 g/dL    RDW 46.0 35.9 - 50.0 fL    Platelet Count 426 164 - 446 K/uL    MPV 8.8 (L) 9.0 - 12.9 fL    Neutrophils-Polys 54.90 44.00 - 72.00 %    Lymphocytes 31.00 22.00 - 41.00 %    Monocytes 9.00 0.00 - 13.40 %    Eosinophils 3.70 0.00 - 6.90 %    Basophils 0.70 0.00 - 1.80 %    Immature Granulocytes 0.70 0.00 - 0.90 %    Nucleated RBC 0.00 0.00 - 0.20 /100 WBC    Neutrophils (Absolute) 2.39 1.82 - 7.42 K/uL    Lymphs (Absolute) 1.35 1.00 - 4.80 K/uL    Monos (Absolute) 0.39 0.00 - 0.85 K/uL    Eos (Absolute) 0.16 0.00 - 0.51 K/uL    Baso (Absolute) 0.03 0.00 - 0.12 K/uL    Immature Granulocytes (abs) 0.03 0.00 - 0.11 K/uL    NRBC (Absolute) 0.00 K/uL   HEMOGLOBIN A1C    Collection Time: 02/04/24  5:17 AM   Result Value Ref Range    Glycohemoglobin 5.9 (H) 4.0 - 5.6 %    Est Avg Glucose 123 mg/dL       Current Facility-Administered Medications   Medication Frequency    baclofen (Lioresal) tablet 10 mg TID PRN    vitamin D3 (Cholecalciferol) tablet 2,000 Units DAILY    Respiratory Therapy Consult Continuous RT    Pharmacy Consult Request ...Pain Management Review 1 Each PHARMACY TO DOSE    senna-docusate (Pericolace Or Senokot S) 8.6-50 MG per tablet 2 Tablet Q EVENING    And    polyethylene glycol/lytes (Miralax) Packet 1 Packet QDAY PRN    omeprazole (PriLOSEC) capsule 20 mg DAILY    mag hydrox-al hydrox-simeth (Maalox Plus Es Or Mylanta Ds) suspension 20 mL Q2HRS PRN    ondansetron (Zofran ODT) dispertab 4 mg 4X/DAY PRN    Or    ondansetron (Zofran) syringe/vial injection 4 mg 4X/DAY PRN    traZODone (Desyrel) tablet 50 mg QHS PRN    sodium chloride (Ocean) 0.65 % nasal spray 2 Spray PRN    acetaminophen (Tylenol) tablet 1,000 mg Q6HRS    Followed by     [START ON 2/7/2024] acetaminophen (Tylenol) tablet 1,000 mg Q6HRS PRN    oxyCODONE immediate-release (Roxicodone) tablet 2.5 mg Q3HRS PRN    Or    oxyCODONE immediate-release (Roxicodone) tablet 5 mg Q3HRS PRN    enoxaparin (Lovenox) inj 40 mg Q EVENING    atorvastatin (Lipitor) tablet 20 mg Q EVENING    calcium citrate (Calcitrate) tablet 950 mg DAILY    gabapentin (Neurontin) capsule 600 mg Q EVENING    lisinopril (Prinivil) tablet 10 mg DAILY       Orders Placed This Encounter   Procedures    Diet Order Diet: Regular     Standing Status:   Standing     Number of Occurrences:   1     Order Specific Question:   Diet:     Answer:   Regular [1]         Intake/Output Summary (Last 24 hours) at 2/4/2024 0952  Last data filed at 2/4/2024 0900  Gross per 24 hour   Intake 960 ml   Output --   Net 960 ml         Assessment:  Active Hospital Problems    Diagnosis     *Lumbar radiculopathy     S/P lumbar fusion     Neurogenic claudication     Essential hypertension     Dyslipidemia     DDD (degenerative disc disease), lumbar     Vitamin D deficiency     Severe obesity (HCC)     GERD (gastroesophageal reflux disease)        Medical Decision Making and Plan:  Rehabilitation Plan:   Discussion and Recommendations:   1. The patient requires an acute inpatient rehabilitation program with a coordinated program of care at an intensity and frequency not available at a lower level of care. This recommendation is substantiated by the patient's medical physicians who recommend that the patient's intervention and assessment of medical issues needs to be done at an acute level of care for patient's safety and maximum outcome.   2. A coordinated program of care will be supplied by an interdisciplinary team of physical therapy, occupational therapy, rehab physician, rehab nursing, and, if needed, speech therapy and rehab psychology. Rehab team presents a patient-specific rehabilitation and education program concentrating on prevention  of future problems related to accessibility, mobility, skin, bowel, bladder, sexuality, and psychosocial and medical/surgical problems.   3. Need for Rehabilitation Physician: The rehab physician will be evaluating the patient on a multi-weekly basis to help coordinate the program of care. The rehab physician communicates between medical physicians, therapists, and nurses to maximize the patient's potential outcome. Specific areas in which the rehab physician will be providing daily assessment include the following:   A. Assessing the patient's heart rate and blood pressure response (vitals monitoring) to activity and making adjustments in medications or conservative measures as needed.   B. The rehab physician will be assessing the frequency at which the program can be increased to allow the patient to reach optimal functional outcome.   C. The rehab physician will also provide assessments in daily skin care, especially in light of patient's impairments in mobility.   D. The rehab physician will provide special expertise in understanding how to work with functional impairment and recommend appropriate interventions, compensatory techniques, and education that will facilitate the patient's outcome.   4. Rehab R.N.   The rehab RN will be working with patient to carry over in room mobility and activities of daily living when the patient is not in 3 hours of skilled therapy. Rehab nursing will be working in conjunction with rehab physician to address all the medical issues above and continue to assess laboratory work and discuss abnormalities with the treating physicians, assess vitals, and response to activity, and discuss and report abnormalities with the rehab physician. Rehab RN will also continue daily skin care, supervise bladder/bowel program, instruct in medication administration, and ensure patient safety.   5. Rehab Therapy: Therapies to treat at intensity and frequency of (may change after completion of  evaluation by all therapeutic disciplines):       PT:  Physical therapy to address mobility, transfer, gait training and evaluation for adaptive equipment needs 1.5 hour/day at least 5 days/week for the duration of the ELOS (see below)       OT:  Occupational therapy to address ADLs, self-care, home management training, functional mobility/transfers and assistive device evaluation, and community re-integration 1.5 hour/day at least 5 days/week for the duration of the ELOS (see below).        ST/Dysphagia:  Speech therapy to address speech, language, and cognitive deficits as well as swallowing difficulties with retraining/dysphagia management and community re-integration with comprehension, expression, cognitive training 1.5 hour/day at least 5 days/week for the duration of the ELOS (see below).      Medical management / Rehabilitation Issues/ Adverse Potential as part of rehabilitation plan      Rehabilitation Issues/Adverse Potential  1.  S/p lumbar surgery with LE weakness: Patient demonstrates functional deficits in strength, balance, coordination, and ADL's. Patient is admitted to [facility] for comprehensive rehabilitation therapy as described below.   Rehabilitation nursing monitors bowel and bladder control, educates on medication administration, co-morbidities and monitors patient safety.        2.  Neurostimulants: None at this time but continue to assess daily for need to initiate should status change.     3.  DVT prophylaxis:  Patient is on Lovenox  for anticoagulation upon transfer. Encourage OOB. Monitor daily for signs and symptoms of DVT including but not limited to swelling and pain to prevent the development of DVT that may interfere with therapies.     4.  GI prophylaxis:  On prilosec to prevent gastritis/dyspepsia which may interfere with therapies.     5.  Pain: pain manageable and controlled with Tylenol and oxycodone      6.  Nutrition/Dysphagia: Dietician monitors nutrient  intake, recommend supplements prn and provide nutrition education to pt/family to promote optimal nutrition for wound healing/recovery.      7.  Bladder/bowel:  Start bowel and bladder program as described below, to prevent constipation, urinary retention (which may lead to UTI), and urinary incontinence (which will impact upon pt's functional independence).   - TV Q3h while awake with post void bladder scans, I&O cath for PVRs >400  - up to commode after meal      8.  Skin/dermal ulcer prophylaxis: Monitor for new skin conditions with q.2 h. turns as required to prevent the development of skin breakdown.      9.  Cognition/Behavior: As needed psychologist provides adjustment counseling to illness and psychosocial barriers that may be potential barriers to rehabilitation.      10. Respiratory therapy: RT performs O2 management prn, breathing retraining, pulmonary hygiene and bronchospasm management prn to optimize participation in therapies.      Medical Co-Morbidities/Adverse Potential Affecting Function:  Lumbar stenosis   S/p lumbar fusion   Lumbar fracture   - history of back pain with radicuopathy   - no MRI to review   - 1/23 stage 1 L4-S1 ALIF by Dr. Rodriguez   1/25  left L2/3 XLIF by Dr. Rodriguez   1/30 redo L2-3 laminotomy and L2-S1 perc fusion by Dr. Rodriguez   - LSO when OOB   - most recent CT L spine obtained on 1/31 showed oblique nondisplaced L5 vertebral body fracture   - comprehnsive IRF level therapy with 3hrs of therapy 5 days per week      Hypoxia   - hypoxia, worse with anesthesia, causing 3rd surgery to be aborted   - now on albuterol   - stable on 1 L o2   - CT chest negative for PE      ABLA   - post op drop in Hgb   - 2/1 Hgb 10.7   - recheck cbc in AM with admission labs - still pending as of 2/3/24 11am     HTN   - on home dose lisinopril   - fluctuating hyper and hypotension         Vit D deficiency   -Vitamin D 25 on admission, 2/3/24 increase supplementation to vitamin D 2000 IU daily      Hypocalcemia  - transferred on calcitrate   -Calcium improving on admission, 8.3 on 2/3/2024     Pain  - was on IV diluadid until 2/1 AM   - on scheduled Tylenol and PRN oxycodone   - gabapentin 600mg qhs -2/4/2024 add gabapentin 300 mg every morning and every afternoon, continue 600 mg nightly for worsening allodynic pain at left lower extremity  - flexeril 10mg q8hrs prn   -2/4/2024 add trial of topical capsaicin cream 3 times daily as needed to left lower extremity     Bowel  - constipation prevention with scheduled senna   - bowel meds PRN      Bladder   - timed void to prevent falls and incont epsidoes   -bladder scan if no void > 6hrs, PVR, and cath if retaining > 200ml      Skin - Patient at risk for skin breakdown due to debility in areas including sacrum, achilles, elbows and head in addition to other sites. Nursing to assess skin daily.      GI Ppx - Patient on Prilosec for GERD prophylaxis. Patient on Senna-docusate for constipation prophylaxis.      DVT Ppx -lovenox    No problem-specific Assessment & Plan notes found for this encounter.      Total time:  35 minutes. Time spent included pre-rounding review of vitals and tests, unit/floor time, face-to-face time with the patient including physical examination, care coordination, counseling of patient and/or family, ordering medications/procedures/tests, discussion with CM, PT, OT, SLP and/or other healthcare providers, and documentation in the electronic medical record. Topics discussed included ab pain, Vit D supp    I, Aylin Castillo M.D., personally performed a complete drug regimen review and no potential clinically significant medication issues were identified.    Aylin Castillo M.D.  Physical medicine rehabilitation  [facility]

## 2024-02-04 NOTE — CARE PLAN
"The patient is Stable - Low risk of patient condition declining or worsening    Shift Goals  Clinical Goals: safety, orient to rehab, pain mgmt, sleep  Patient Goals: pain mgmt, sleep    Patient is not progressing towards the following goals:    Problem: Fall Risk - Rehab  Goal: Patient will remain free from falls  Outcome: Not Met  Note: Kalyani Monroe Fall risk Assessment Score: 11    Moderate fall risk Interventions  - Bed and strip alarm   - Yellow sign by the door   - Yellow wrist band \"Fall risk\"  - Room near to the nurse station  - Fall risk education provided     "

## 2024-02-04 NOTE — CARE PLAN
Problem: Pain - Standard  Goal: Alleviation of pain or a reduction in pain to the patient’s comfort goal  Note: Patient c/o severe restless leg syndrome (left leg). Patient takes PRN Flexeril. Dr. CHRIS Castillo was notified. New order for capsaicin cream PRN available.      Problem: Skin Integrity  Goal: Skin integrity is maintained or improved  Note: Abdominal incision approximated. Staples present. POD # 12.   Back and flank incision with steri strips.      The patient is Stable - Low risk of patient condition declining or worsening    Shift Goals  Clinical Goals: Pain control  Patient Goals: pain mgmt, sleep

## 2024-02-04 NOTE — PROGRESS NOTES
NURSING DAILY NOTE    Name: Dee Armstrong   Date of Admission: 2/2/2024   Admitting Diagnosis: Lumbar radiculopathy  Attending Physician: Sobeida Cruz D.o.  Allergies: Patient has no known allergies.    Safety  Patient Assist  Mod A  Patient Precautions  Fall Risk, Spinal / Back Precautions , Lumbosacral Orthosis  Precaution Comments  Strong fear of needles  Bed Transfer Status  Minimal Assist  Toilet Transfer Status   Minimal Assist  Assistive Devices  Rails, Wheelchair  Oxygen  None - Room Air  Diet/Therapeutic Dining  Current Diet Order   Procedures    Diet Order Diet: Regular     Pill Administration  whole  Agitated Behavioral Scale     ABS Level of Severity       Fall Risk  Has the patient had a fall this admission?   No  Kalyani Monroe Fall Risk Scoring  11, MODERATE RISK  Fall Risk Safety Measures  bed alarm and chair alarm    Vitals  Temperature: 37.1 °C (98.7 °F)  Temp src: Oral  Pulse: 84  Respiration: 18  Blood Pressure : 100/65  Blood Pressure MAP (Calculated): 77 MM HG  BP Location: Right, Upper Arm  Patient BP Position: Supine     Oxygen  Pulse Oximetry: 97 %  O2 (LPM): 0  O2 Delivery Device: None - Room Air    Bowel and Bladder  Last Bowel Movement  02/03/24  Stool Type     Bowel Device     Continent  Bladder: Did not void   Bowel: No movement  Bladder Function  Urine Void (mL):  (Large)  Number of Times Voided: 1  Urine Color: Unable To Evaluate  Genitourinary Assessment   Bladder Assessment (WDL):  Within Defined Limits  Urine Color: Unable To Evaluate  Time Void: No  Bladder Scan: Post Void  $ Bladder Scan Results (mL): 10    Skin  Vlad Score   17  Sensory Interventions   Bed Types: Standard/Trauma Mattress with Overlay  Skin Preventative Measures: Waffle Overlay, Pillows in Use for Support / Positioning  Moisture Interventions  Moisturizers/Barriers: Moisturizer       Pain  Pain Rating Scale  7 - Focus of attention, prevents doing  daily activities  Pain Location  Abdomen, Back  Pain Location Orientation  Right, Left, Posterior  Pain Interventions   Rest    ADLs    Bathing   Staff, Shower  Linen Change      Personal Hygiene  Moist Sammie Wipes, Perineal Care  Chlorhexidine Bath      Oral Care     Teeth/Dentures     Shave     Nutrition Percentage Eaten  *  * Meal *  *, Lunch  Environmental Precautions  Treaded Slipper Socks on Patient, Personal Belongings, Wastebasket, Call Bell etc. in Easy Reach, Report Given to Other Health Care Providers Regarding Fall Risk, Bed in Low Position, Mobility Assessed & Appropriate Sign Placed  Patient Turns/Positioning  Patient Turns Self from Side to Side  Patient Turns Assistance/Tolerance     Bed Positions  Bed Controls On, Bed Locked  Head of Bed Elevated  Self regulated      Psychosocial/Neurologic Assessment  Psychosocial Assessment  Psychosocial (WDL):  Within Defined Limits  Patient Behaviors: Anxious, Fearful (pt is fearful of needles)  Family Behaviors: Family Present  Neurologic Assessment  Neuro (WDL): Exceptions to WDL  Level of Consciousness: Alert  Orientation Level: Oriented X4  Cognition: Appropriate judgement, Appropriate safety awareness  Speech: Clear  RUE Sensation: Full sensation  Muscle Strength Right Arm: Normal Strength Against Gravity and Full Resistance  LUE Sensation: Full sensation  Muscle Strength Left Arm: Normal Strength Against Gravity and Full Resistance  RLE Sensation: Numbness (states from Sx 7 yrs ago)  Muscle Strength Right Leg: Weak Movement but Not Against Gravity or Resistance  LLE Sensation: Tingling, Pain (Pt states from this Sx)  Muscle Strength Left Leg: Weak Movement but Not Against Gravity or Resistance  EENT (WDL):  WDL Except    Cardio/Pulmonary Assessment  Edema      Respiratory Breath Sounds     Cardiac Assessment   Cardiac (WDL):  WDL Except (Hx of HTN)

## 2024-02-04 NOTE — PROGRESS NOTES
..                                                         NURSING DAILY NOTE    Name: Dee Armstrong   Date of Admission: 2/2/2024   Admitting Diagnosis: Lumbar radiculopathy  Attending Physician: Sobeida Cruz D.o.  Allergies: Patient has no known allergies.    Safety  Patient Assist  Min A  Patient Precautions  Fall Risk, Spinal / Back Precautions , Lumbosacral Orthosis  Precaution Comments  Strong fear of needles  Bed Transfer Status  Minimal Assist  Toilet Transfer Status   Minimal Assist  Assistive Devices  Rails, Wheelchair  Oxygen  None - Room Air  Diet/Therapeutic Dining  Current Diet Order   Procedures    Diet Order Diet: Regular     Pill Administration  whole  Agitated Behavioral Scale     ABS Level of Severity       Fall Risk  Has the patient had a fall this admission?   No  Kalyani Monroe Fall Risk Scoring  11, MODERATE RISK  Fall Risk Safety Measures  bed alarm, chair alarm, poor balance, and ok to leave pt in bathroom    Vitals  Temperature: 36.5 °C (97.7 °F)  Temp src: Oral  Pulse: 92  Respiration: 18  Blood Pressure : 137/69  Blood Pressure MAP (Calculated): 92 MM HG  BP Location: Right, Upper Arm  Patient BP Position: Supine     Oxygen  Pulse Oximetry: 98 %  O2 (LPM): 0  O2 Delivery Device: None - Room Air    Bowel and Bladder  Last Bowel Movement  02/04/24  Stool Type     Bowel Device     Continent  Bladder: Did not void   Bowel: No movement  Bladder Function  Urine Void (mL):  (Large)  Number of Times Voided: 1  Urine Color: Yellow  Genitourinary Assessment   Bladder Assessment (WDL):  Within Defined Limits  Urine Color: Yellow  Time Void: No  Bladder Scan: Post Void  $ Bladder Scan Results (mL): 10    Skin  Vlad Score   19  Sensory Interventions   Bed Types: Standard/Trauma Mattress with Overlay  Skin Preventative Measures: Waffle Overlay, Pillows in Use for Support / Positioning  Moisture Interventions  Moisturizers/Barriers: Moisturizer       Pain  Pain Rating Scale  7 - Focus of  attention, prevents doing daily activities  Pain Location  Leg, Back, Abdomen  Pain Location Orientation  Left, Lower  Pain Interventions   Medication (see MAR)    ADLs    Bathing   Staff, Shower  Linen Change      Personal Hygiene  Moist Sammie Wipes, Perineal Care  Chlorhexidine Bath      Oral Care     Teeth/Dentures     Shave     Nutrition Percentage Eaten  *  * Meal *  *, Lunch  Environmental Precautions  Treaded Slipper Socks on Patient, Personal Belongings, Wastebasket, Call Bell etc. in Easy Reach, Report Given to Other Health Care Providers Regarding Fall Risk, Bed in Low Position, Mobility Assessed & Appropriate Sign Placed  Patient Turns/Positioning  Patient Turns Self from Side to Side  Patient Turns Assistance/Tolerance     Bed Positions  Bed Controls On, Bed Locked  Head of Bed Elevated  Self regulated      Psychosocial/Neurologic Assessment  Psychosocial Assessment  Psychosocial (WDL):  Within Defined Limits  Patient Behaviors: Anxious, Fearful (pt is fearful of needles)  Family Behaviors: Family Present  Neurologic Assessment  Neuro (WDL): Exceptions to WDL  Level of Consciousness: Alert  Orientation Level: Oriented X4  Cognition: Appropriate judgement, Appropriate safety awareness  Speech: Clear  RUE Sensation: Full sensation  Muscle Strength Right Arm: Normal Strength Against Gravity and Full Resistance  LUE Sensation: Full sensation  Muscle Strength Left Arm: Normal Strength Against Gravity and Full Resistance  RLE Sensation: Numbness (states from Sx 7 yrs ago)  Muscle Strength Right Leg: Good Strength Against Gravity and Moderate Resistance  LLE Sensation: Tingling, Pain (Pt states from this Sx)  Muscle Strength Left Leg: Good Strength Against Gravity and Moderate Resistance  EENT (WDL):  WDL Except    Cardio/Pulmonary Assessment  Edema      Respiratory Breath Sounds     Cardiac Assessment   Cardiac (WDL):  WDL Except (Hx of HTN)

## 2024-02-05 ENCOUNTER — APPOINTMENT (OUTPATIENT)
Dept: OCCUPATIONAL THERAPY | Facility: REHABILITATION | Age: 69
DRG: 052 | End: 2024-02-05
Attending: PHYSICAL MEDICINE & REHABILITATION
Payer: MEDICARE

## 2024-02-05 ENCOUNTER — APPOINTMENT (OUTPATIENT)
Dept: PHYSICAL THERAPY | Facility: REHABILITATION | Age: 69
DRG: 052 | End: 2024-02-05
Attending: PHYSICAL MEDICINE & REHABILITATION
Payer: MEDICARE

## 2024-02-05 PROCEDURE — 97530 THERAPEUTIC ACTIVITIES: CPT

## 2024-02-05 PROCEDURE — 770010 HCHG ROOM/CARE - REHAB SEMI PRIVAT*

## 2024-02-05 PROCEDURE — A9270 NON-COVERED ITEM OR SERVICE: HCPCS | Performed by: STUDENT IN AN ORGANIZED HEALTH CARE EDUCATION/TRAINING PROGRAM

## 2024-02-05 PROCEDURE — A9270 NON-COVERED ITEM OR SERVICE: HCPCS | Performed by: PHYSICAL MEDICINE & REHABILITATION

## 2024-02-05 PROCEDURE — 99232 SBSQ HOSP IP/OBS MODERATE 35: CPT | Performed by: PHYSICAL MEDICINE & REHABILITATION

## 2024-02-05 PROCEDURE — 97110 THERAPEUTIC EXERCISES: CPT

## 2024-02-05 PROCEDURE — 97116 GAIT TRAINING THERAPY: CPT

## 2024-02-05 PROCEDURE — 700111 HCHG RX REV CODE 636 W/ 250 OVERRIDE (IP): Mod: JZ | Performed by: PHYSICAL MEDICINE & REHABILITATION

## 2024-02-05 PROCEDURE — 700102 HCHG RX REV CODE 250 W/ 637 OVERRIDE(OP): Performed by: STUDENT IN AN ORGANIZED HEALTH CARE EDUCATION/TRAINING PROGRAM

## 2024-02-05 PROCEDURE — 700102 HCHG RX REV CODE 250 W/ 637 OVERRIDE(OP): Performed by: PHYSICAL MEDICINE & REHABILITATION

## 2024-02-05 PROCEDURE — 97535 SELF CARE MNGMENT TRAINING: CPT

## 2024-02-05 RX ADMIN — ACETAMINOPHEN 1000 MG: 500 TABLET, FILM COATED ORAL at 05:26

## 2024-02-05 RX ADMIN — OXYCODONE HYDROCHLORIDE 5 MG: 5 TABLET ORAL at 07:38

## 2024-02-05 RX ADMIN — GABAPENTIN 300 MG: 300 CAPSULE ORAL at 07:38

## 2024-02-05 RX ADMIN — LISINOPRIL 10 MG: 5 TABLET ORAL at 05:26

## 2024-02-05 RX ADMIN — Medication 950 MG: at 07:38

## 2024-02-05 RX ADMIN — GABAPENTIN 600 MG: 300 CAPSULE ORAL at 19:26

## 2024-02-05 RX ADMIN — ATORVASTATIN CALCIUM 20 MG: 10 TABLET, FILM COATED ORAL at 19:26

## 2024-02-05 RX ADMIN — ACETAMINOPHEN 1000 MG: 500 TABLET, FILM COATED ORAL at 17:11

## 2024-02-05 RX ADMIN — ENOXAPARIN SODIUM 40 MG: 100 INJECTION SUBCUTANEOUS at 19:26

## 2024-02-05 RX ADMIN — CYCLOBENZAPRINE 10 MG: 10 TABLET, FILM COATED ORAL at 07:42

## 2024-02-05 RX ADMIN — OMEPRAZOLE 20 MG: 20 CAPSULE, DELAYED RELEASE ORAL at 07:38

## 2024-02-05 RX ADMIN — ACETAMINOPHEN 1000 MG: 500 TABLET, FILM COATED ORAL at 11:36

## 2024-02-05 RX ADMIN — GABAPENTIN 300 MG: 300 CAPSULE ORAL at 11:36

## 2024-02-05 RX ADMIN — OXYCODONE HYDROCHLORIDE 5 MG: 5 TABLET ORAL at 17:11

## 2024-02-05 RX ADMIN — OXYCODONE HYDROCHLORIDE 5 MG: 5 TABLET ORAL at 11:37

## 2024-02-05 RX ADMIN — OXYCODONE HYDROCHLORIDE 5 MG: 5 TABLET ORAL at 02:03

## 2024-02-05 RX ADMIN — Medication 2000 UNITS: at 07:38

## 2024-02-05 ASSESSMENT — GAIT ASSESSMENTS
GAIT LEVEL OF ASSIST: CONTACT GUARD ASSIST
DEVIATION: DECREASED HEEL STRIKE;DECREASED TOE OFF;BRADYKINETIC;ANTALGIC
ASSISTIVE DEVICE: FRONT WHEEL WALKER
DISTANCE (FEET): 30

## 2024-02-05 ASSESSMENT — ACTIVITIES OF DAILY LIVING (ADL): BED_CHAIR_WHEELCHAIR_TRANSFER_DESCRIPTION: VERBAL CUEING;SUPERVISION FOR SAFETY;ADAPTIVE EQUIPMENT;INCREASED TIME

## 2024-02-05 NOTE — THERAPY
"Occupational Therapy  Daily Treatment     Patient Name: Dee Armstrong  Age:  68 y.o., Sex:  female  Medical Record #: [id number]  Today's Date: 2/5/2024     Precautions  Precautions: Fall Risk, Spinal / Back Precautions , Lumbosacral Orthosis  Comments: Strong fear of needles         Subjective    Pt seated in w/c upon arrival, pleasant and cooperative, agreeable to therapy and shower. \"I know I have to move, but I cry doing it because I am in so much pain. It's better if I just let out\"     Objective       02/05/24 1431   OT Charge Group   OT Self Care / ADL (Units) 4   OT Total Time Spent   OT Individual Total Time Spent (Mins) 60   Functional Level of Assist   Grooming Supervision;Seated   Bathing Standby Assist   Upper Body Dressing Stand by Assist   Lower Body Dressing Minimal Assist   Lower Body Dressing Description Grab bar;Reacher;Shoe horn   Toileting Contact Guard Assist   Toilet Transfers Contact Guard Assist  (stand pivot w/c<>toilet with GB)   Tub / Shower Transfers Contact Guard Assist  (stand pivot w/c<>fold down bench with GB)   Interdisciplinary Plan of Care Collaboration   Patient Position at End of Therapy Seated;Call Light within Reach;Tray Table within Reach;Chair Alarm On;Self Releasing Lap Belt Applied   Occupational Therapist Assigned   Assigned OT / Treatment Time / Comments  30/60     Functional mobility at w/c level: [facility] room<>bathroom     Assessment    Pt completed shower routine at [facility] for ADL's and CGA for bathroom transfers overall using w/c, shower bench, hand-held shower head, GB's, hip kit AE (reacher, LH sponge, LH shoe horn). Pt's functional performance was impacted by significant pain . Maintains good adherence to spinal precautions.     Strengths: Able to follow instructions, Adequate strength, Alert and oriented, Effective communication skills, Good insight into deficits/needs, Independent prior level of function, Making steady progress towards goals, Motivated for self " care and independence, Pleasant and cooperative, Supportive family, Willingly participates in therapeutic activities  Barriers: Decreased endurance, Fatigue, Generalized weakness, Emotional lability, Impaired activity tolerance, Impaired balance, Limited mobility, Pain, Pain poorly managed    Plan    Cont overall strength/endurance and standing balance to improve ADL's/IADL's and functional mobility at FWW level while maintaining spinal precautions; pain mgmt    Pt lives in 1 story with RAINA Mackey, adult children live in [place], 1 threshold (3-4 inch) to enter, walk-in shower/chair/glass door, has FWW, 4WW, reacher    DME     TBD    Occupational Therapy Goals (Active)       Problem: Bathing       Dates: Start:  02/03/24         Goal: STG-Within one week, patient will bathe at [facility] using DME/AE PRN.       Dates: Start:  02/03/24               Problem: Dressing       Dates: Start:  02/03/24         Goal: STG-Within one week, patient will dress UB at [facility] using DME/AE PRN.       Dates: Start:  02/03/24            Goal: STG-Within one week, patient will dress LB at [facility] using DME/AE PRN.       Dates: Start:  02/03/24               Problem: Functional Transfers       Dates: Start:  02/03/24         Goal: STG-Within one week, patient will transfer to toilet at [facility].       Dates: Start:  02/03/24            Goal: STG-Within one week, patient will transfer to step in shower at [facility].       Dates: Start:  02/03/24               Problem: OT Long Term Goals       Dates: Start:  02/03/24         Goal: LTG-By discharge, patient will complete basic self care tasks at [facility].       Dates: Start:  02/03/24            Goal: LTG-By discharge, patient will perform bathroom transfers  at [facility].       Dates: Start:  02/03/24               Problem: Toileting       Dates: Start:  02/03/24         Goal: STG-Within one week, patient will complete toileting tasks at [facility] using DME/AE PRN.       Dates: Start:  02/03/24

## 2024-02-05 NOTE — PROGRESS NOTES
..                                                         NURSING DAILY NOTE    Name: Dee Armstrong   Date of Admission: 2/2/2024   Admitting Diagnosis: Lumbar radiculopathy  Attending Physician: Sobeida Cruz D.o.  Allergies: Patient has no known allergies.    Safety  Patient Assist  Min A  Patient Precautions  Fall Risk, Spinal / Back Precautions , Lumbosacral Orthosis  Precaution Comments  Strong fear of needles  Bed Transfer Status  Minimal Assist  Toilet Transfer Status   Minimal Assist  Assistive Devices  Rails, Wheelchair  Oxygen  None - Room Air  Diet/Therapeutic Dining  Current Diet Order   Procedures    Diet Order Diet: Regular     Pill Administration  whole  Agitated Behavioral Scale     ABS Level of Severity       Fall Risk  Has the patient had a fall this admission?   No  Kalyani Monroe Fall Risk Scoring  11, MODERATE RISK  Fall Risk Safety Measures  bed alarm, chair alarm, and ok to leave pt in bathroom    Vitals  Temperature: 36.9 °C (98.5 °F)  Temp src: Oral  Pulse: 93  Respiration: 18  Blood Pressure : 112/59  Blood Pressure MAP (Calculated): 77 MM HG  BP Location: Right, Forearm  Patient BP Position: Yusuf's Position     Oxygen  Pulse Oximetry: 90 %  O2 (LPM): 0  O2 Delivery Device: None - Room Air    Bowel and Bladder  Last Bowel Movement  02/04/24  Stool Type  Type 5: Soft blob with clear cut edges (passed easily)  Bowel Device     Continent  Bladder: Did not void   Bowel: No movement  Bladder Function  Urine Void (mL):  (Large)  Number of Times Voided: 1  Urine Color: Yellow  Genitourinary Assessment   Bladder Assessment (WDL):  Within Defined Limits  Urine Color: Yellow  Time Void: No  Bladder Scan: Post Void  $ Bladder Scan Results (mL): 10    Skin  Vlad Score   19  Sensory Interventions   Bed Types: Standard/Trauma Mattress  Skin Preventative Measures: Pillows in Use for Support / Positioning  Moisture Interventions  Moisturizers/Barriers: Moisturizer       Pain  Pain Rating  Scale  8 - Awful, hard to do anything  Pain Location  Leg  Pain Location Orientation  Left, Right  Pain Interventions   Medication (see MAR)    ADLs    Bathing   Staff, Shower  Linen Change      Personal Hygiene  Moist Sammie Wipes, Perineal Care  Chlorhexidine Bath      Oral Care     Teeth/Dentures     Shave     Nutrition Percentage Eaten  *  * Meal *  *, Dinner, Between 50-75% Consumed  Environmental Precautions  Treaded Slipper Socks on Patient, Personal Belongings, Wastebasket, Call Bell etc. in Easy Reach, Report Given to Other Health Care Providers Regarding Fall Risk, Bed in Low Position, Mobility Assessed & Appropriate Sign Placed  Patient Turns/Positioning  Patient Turns Self from Side to Side  Patient Turns Assistance/Tolerance     Bed Positions  Bed Controls On, Bed Locked  Head of Bed Elevated  Self regulated      Psychosocial/Neurologic Assessment  Psychosocial Assessment  Psychosocial (WDL):  Within Defined Limits  Patient Behaviors: Restless  Family Behaviors: Family Present  Neurologic Assessment  Neuro (WDL): Exceptions to WDL  Level of Consciousness: Alert  Orientation Level: Oriented X4  Cognition: Appropriate judgement, Appropriate safety awareness  Speech: Clear  RUE Sensation: Full sensation  Muscle Strength Right Arm: Good Strength Against Gravity and Moderate Resistance  LUE Sensation: Full sensation  Muscle Strength Left Arm: Good Strength Against Gravity and Moderate Resistance  RLE Sensation: Numbness (states from Sx 7 yrs ago)  Muscle Strength Right Leg: Good Strength Against Gravity and Moderate Resistance  LLE Motor Response: Spastic  LLE Sensation: Tingling, Pain (Pt states from this Sx)  Muscle Strength Left Leg: Good Strength Against Gravity and Moderate Resistance  EENT (WDL):  WDL Except    Cardio/Pulmonary Assessment  Edema      Respiratory Breath Sounds     Cardiac Assessment   Cardiac (WDL):  WDL Except (Hx of HTN)

## 2024-02-05 NOTE — DISCHARGE PLANNING
"CASE MANAGEMENT INITIAL ASSESSMENT    Admit Date:  2/2/2024     I met with patient to discuss role of case management / discharge planning / team conference.   Patient is a  68 y.o. female transferred from [facility].    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    Lumbar radiculopathy 02/09/2018    Incomplete rotator cuff tear or rupture of right shoulder, not specified as traumatic 12/06/2016    DDD (degenerative disc disease), lumbar 02/06/2016    Vitamin D deficiency 12/09/2013    Severe obesity (HCC)     GERD (gastroesophageal reflux disease)     Diverticulosis      Prior Living Situation:  Housing / Facility: [address]  Lives with - Patient's Self Care Capacity: Spouse    Prior Level of Function:  Medication Management: Independent  Finances: Dependent ( completes)  Home Management: Requires Assist  Shopping: Independent  Prior Level Of Mobility: Independent Without Device in Community, Independent Without Device in Home  Driving / Transportation: Unable To Determine At This Time    Support Systems:  Primary : Akil \"Narendra\"-spouse: [phone] or [phone].       Previous Services Utilized:   Equipment Owned: Front-Wheel Walker, 4-Wheel Walker, Tub Transfer Bench  Prior Services: Home-Independent    Other Information:  Occupation (Pre-Hospital Vocational): Unable To Determine At This Time     Primary Payor Source: Medicare A, Medicare " B  Secondary Payor Source: Supplemental Insurance  Primary Care Practitioner : Emre Viramontes MD    Patient / Family Goal:  Patient / Family Goal: Return home    Plan:  1. Continue to follow patient through hospitalization and provide discharge planning in collaboration with patient, family, physicians and ancillary services.     2. Utilize community resources to ensure a safe discharge.

## 2024-02-05 NOTE — CARE PLAN
"The patient is Stable - Low risk of patient condition declining or worsening    Shift Goals  Clinical Goals: Pain control  Patient Goals: pain mgmt, sleep    Patient is not progressing towards the following goals:    Problem: Pain - Standard  Goal: Alleviation of pain or a reduction in pain to the patient’s comfort goal  Outcome: Not Progressing  Note: Patient in high levels of pain whenever assessed. Utilizing PRN Oxy 5mg for pain management.      Problem: Fall Risk - Rehab  Goal: Patient will remain free from falls  Outcome: Not Met  Note: Kalyani Monroe Fall risk Assessment Score: 11    Moderate fall risk Interventions  - Bed and strip alarm   - Yellow sign by the door   - Yellow wrist band \"Fall risk\"  - Room near to the nurse station  - Fall risk education provided     "

## 2024-02-05 NOTE — THERAPY
Physical Therapy   Daily Treatment     Patient Name: Dee Armstrong  Age:  68 y.o., Sex:  female  Medical Record #: [id number]  Today's Date: 2/5/2024     Precautions  Precautions: (P) Fall Risk, Spinal / Back Precautions , Lumbosacral Orthosis  Comments: (P) Strong fear of needles    Subjective      Pt reports that she took a pain meds during lunch. She is agreeable to participate in therapy. Pt tells this PT that her pain tolerance is low and don't take her reaction personally.      Objective       02/05/24 1401   PT Charge Group   Charges Yes   PT Gait Training (Units) 1   PT Therapeutic Exercise (Units) 1   PT Total Time Spent   PT Individual Total Time Spent (Mins) 30   Precautions   Precautions Fall Risk;Spinal / Back Precautions ;Lumbosacral Orthosis   Comments Strong fear of needles   Pain   Intervention Medication (see MAR)   Pain 0 - 10 Group   Location Abdomen;Back   Location Orientation Left;Lower   Pain Rating Scale (NPRS) 4   Description Aching   Comfort Goal Comfort with Movement;Perform Activity   Non Verbal Descriptors   Non Verbal Scale  Unlabored Breathing;Grimacing   Gait Functional Level of Assist    Gait Level Of Assist Contact Guard Assist   Assistive Device Front Wheel Walker   Distance (Feet) 30   # of Times Distance was Traveled 1   Deviation Decreased Heel Strike;Decreased Toe Off;Bradykinetic;Antalgic   Sitting Lower Body Exercises   Ankle Pumps 2 sets of 10;Bilateral   Hip Flexion 2 sets of 10;Bilateral  (AAROM on L LE)   Hip Abduction 2 sets of 10;Bilateral   Long Arc Quad 2 sets of 10;Bilateral   Interdisciplinary Plan of Care Collaboration   IDT Collaboration with  Occupational Therapist   Patient Position at End of Therapy Seated;Self Releasing Lap Belt Applied;Call Light within Reach;Tray Table within Reach;Phone within Reach   Collaboration Comments CLOF         Assessment    Pt completed seated exercises with frequent rest breaks and pursed lip breathing. Performed sit to stand  transfer using FWW, CGA with cueing for sequence and hand placement. Gait training using FWW x 30 feet, CGA with cueing for increasing step length and LIAM for stability. Pt was moaning and grimacing during gait training but tolerated it. Pain still limits her mobility    Strengths: Adequate strength, Alert and oriented, Effective communication skills, Good insight into deficits/needs, Independent prior level of function, Making steady progress towards goals, Motivated for self care and independence, Pleasant and cooperative, Supportive family, Willingly participates in therapeutic activities, Manages pain appropriately, Able to follow instructions  Barriers: Decreased endurance, Generalized weakness, Impaired activity tolerance, Impaired balance, Pain, Limited mobility    Plan    Balance, gait, curb step, bed mobility   Pain management  Strengthening       DME  PT DME Recommendations  Wheelchair:  (no DME anticipated)    Passport items to be completed:  Get in/out of bed safely, in/out of a vehicle, safely use mobility device, walk or wheel around home/community, navigate up and down stairs, show how to get up/down from the ground, ensure home is accessible, demonstrate HEP, complete caregiver training    Physical Therapy Problems (Active)       Problem: Balance       Dates: Start:  02/03/24         Goal: STG-Within one week, patient will improve Cummings by 10 points (19/56 on eval)       Dates: Start:  02/03/24               Problem: Mobility       Dates: Start:  02/03/24         Goal: STG-Within one week, patient will ambulate 50 ft with FWW and CGA between rest breaks       Dates: Start:  02/03/24            Goal: STG-Within one week, patient will ambulate up/down a curb with FWW and min assist       Dates: Start:  02/03/24               Problem: Mobility Transfers       Dates: Start:  02/03/24         Goal: STG-Within one week, patient will perform sit to supine via log roll with supervision assist       Dates:  Start:  02/03/24               Problem: PT-Long Term Goals       Dates: Start:  02/03/24         Goal: LTG-By discharge, patient will tolerate Cummings >36/56       Dates: Start:  02/03/24            Goal: LTG-By discharge, patient will ambulate 150 ft between seated rest breaks with FWW and supervision assist       Dates: Start:  02/03/24            Goal: LTG-By discharge, patient will perform home exercise program from handouts with set up assist       Dates: Start:  02/03/24            Goal: LTG-By discharge, patient will transfer in/out of a car with supervision assist and FWW       Dates: Start:  02/03/24

## 2024-02-05 NOTE — PROGRESS NOTES
.                                                         NURSING DAILY NOTE    Name: Dee Armstrong   Date of Admission: 2/2/2024   Admitting Diagnosis: Lumbar radiculopathy  Attending Physician: Sobeida Cruz D.o.  Allergies: Patient has no known allergies.    Safety  Patient Assist  Min A  Patient Precautions  Fall Risk, Spinal / Back Precautions , Lumbosacral Orthosis  Precaution Comments  Strong fear of needles  Bed Transfer Status  Minimal Assist  Toilet Transfer Status   Minimal Assist  Assistive Devices  Rails, Wheelchair  Oxygen  None - Room Air  Diet/Therapeutic Dining  Current Diet Order   Procedures    Diet Order Diet: Regular     Pill Administration  whole  Agitated Behavioral Scale     ABS Level of Severity       Fall Risk  Has the patient had a fall this admission?   No  Kalyani Monroe Fall Risk Scoring  11, MODERATE RISK  Fall Risk Safety Measures  bed alarm and chair alarm    Vitals  Temperature: 36.8 °C (98.3 °F)  Temp src: Oral  Pulse: 97  Respiration: 17  Blood Pressure : 121/72  Blood Pressure MAP (Calculated): 88 MM HG  BP Location: Right, Forearm  Patient BP Position: Sitting     Oxygen  Pulse Oximetry: 95 %  O2 (LPM): 0  O2 Delivery Device: None - Room Air    Bowel and Bladder  Last Bowel Movement  02/04/24  Stool Type  Type 5: Soft blob with clear cut edges (passed easily)  Bowel Device     Continent  Bladder: Did not void   Bowel: No movement  Bladder Function  Urine Void (mL):  (Large)  Number of Times Voided: 1  Urine Color: Unable To Evaluate  Genitourinary Assessment   Bladder Assessment (WDL):  Within Defined Limits  Urine Color: Unable To Evaluate  Time Void: No  Bladder Scan: Post Void  $ Bladder Scan Results (mL): 10    Skin  Vlad Score   19  Sensory Interventions   Bed Types: Standard/Trauma Mattress  Skin Preventative Measures: Waffle Overlay, Pillows in Use for Support / Positioning  Moisture Interventions  Moisturizers/Barriers: Moisturizer       Pain  Pain Rating  Scale  8 - Awful, hard to do anything  Pain Location  Leg, Abdomen  Pain Location Orientation  Left  Pain Interventions   Medication (see MAR)    ADLs    Bathing   Staff, Shower  Linen Change      Personal Hygiene  Moist Sammie Wipes, Perineal Care  Chlorhexidine Bath      Oral Care     Teeth/Dentures     Shave     Nutrition Percentage Eaten  *  * Meal *  *, Lunch, Between 50-75% Consumed  Environmental Precautions  Treaded Slipper Socks on Patient, Personal Belongings, Wastebasket, Call Bell etc. in Easy Reach, Report Given to Other Health Care Providers Regarding Fall Risk, Bed in Low Position, Mobility Assessed & Appropriate Sign Placed  Patient Turns/Positioning  Patient Turns Self from Side to Side  Patient Turns Assistance/Tolerance     Bed Positions  Bed Controls On, Bed Locked  Head of Bed Elevated  Self regulated      Psychosocial/Neurologic Assessment  Psychosocial Assessment  Psychosocial (WDL):  WDL Except  Patient Behaviors: Restless  Family Behaviors: Family Present  Neurologic Assessment  Neuro (WDL): Exceptions to WDL  Level of Consciousness: Alert  Orientation Level: Oriented X4  Cognition: Follows commands  Speech: Clear  RUE Sensation: Full sensation  Muscle Strength Right Arm: Good Strength Against Gravity and Moderate Resistance  LUE Sensation: Full sensation  Muscle Strength Left Arm: Good Strength Against Gravity and Moderate Resistance  RLE Sensation: Numbness (states from Sx 7 yrs ago)  Muscle Strength Right Leg: Fair Strength against Gravity but No Resistance  LLE Motor Response: Spastic  LLE Sensation: Tingling, Pain (Pt states from this Sx)  Muscle Strength Left Leg: Weak Movement but Not Against Gravity or Resistance  EENT (WDL):  WDL Except    Cardio/Pulmonary Assessment  Edema      Respiratory Breath Sounds     Cardiac Assessment   Cardiac (WDL):  WDL Except (Hx of HTN)

## 2024-02-05 NOTE — PROGRESS NOTES
"  Physical Medicine & Rehabilitation Progress Note    Encounter Date: 2/5/2024    Chief Complaint:  s/p lumbar fusion     Interval Events (Subjective):  Vitals Reviewed : WNL,   Labs reviewed: 2/4 Hgb 10.5, Vit D 25     Patient seen and examined in room, patient reports LLE pain, mostly at her shin. Does not impair her ability to do therapy, but causes  her to be unable to get comfortably in bed. Pain feels \"deep to her shin\". Discussed plan to order LLE US to eval any vascular reasons to cause pain. Patient agreeable.   Does not report HA, lightheadedness, SOB, CP, abdominal pain.       Objective:  Physical Exam:  Vitals: /59   Pulse 93   Temp 36.9 °C (98.5 °F) (Oral)   Resp 18   Ht 1.499 m (4' 11\")   Wt 102 kg (224 lb)   SpO2 90%   Gen: NAD, seated comfortably in MWC  Head:  NC/AT  Eyes/ Nose/ Mouth: PERRLA, moist mucous membranes  Cardio: RRR, good distal perfusion, warm extremities  Pulm: normal respiratory effort, no cyanosis, on RA   Abd: Soft NTND, LSO in place   Ext: No peripheral edema. No calf tenderness. No clubbing. Pain with palpation to   No ankle clonus     Mental status:  A&Ox4 (person, place, date, situation) answers questions appropriately follows commands  Speech: fluent, no aphasia or dysarthria    CRANIAL NERVES:  2,3: visual acuity grossly intact, PERRL  3,4,6: EOMI bilaterally, no nystagmus or diplopia  5: sensation intact to light touch bilaterally and symmetric  7: no facial asymmetry  8: hearing grossly intact          Laboratory Values:  Recent Results (from the past 72 hour(s))   Comp Metabolic Panel (CMP)    Collection Time: 02/03/24  5:41 AM   Result Value Ref Range    Sodium 135 135 - 145 mmol/L    Potassium 4.0 3.6 - 5.5 mmol/L    Chloride 102 96 - 112 mmol/L    Co2 25 20 - 33 mmol/L    Anion Gap 8.0 7.0 - 16.0    Glucose 100 (H) 65 - 99 mg/dL    Bun 10 8 - 22 mg/dL    Creatinine 0.47 (L) 0.50 - 1.40 mg/dL    Calcium 8.3 (L) 8.5 - 10.5 mg/dL    Correct Calcium 9.3 8.5 - " 10.5 mg/dL    AST(SGOT) 16 12 - 45 U/L    ALT(SGPT) 20 2 - 50 U/L    Alkaline Phosphatase 109 (H) 30 - 99 U/L    Total Bilirubin 0.5 0.1 - 1.5 mg/dL    Albumin 2.7 (L) 3.2 - 4.9 g/dL    Total Protein 5.7 (L) 6.0 - 8.2 g/dL    Globulin 3.0 1.9 - 3.5 g/dL    A-G Ratio 0.9 g/dL   Vitamin D, 25-hydroxy (blood)    Collection Time: 02/03/24  5:41 AM   Result Value Ref Range    25-Hydroxy   Vitamin D 25 25 (L) 30 - 100 ng/mL   Magnesium    Collection Time: 02/03/24  5:41 AM   Result Value Ref Range    Magnesium 2.0 1.5 - 2.5 mg/dL   Phosphorus    Collection Time: 02/03/24  5:41 AM   Result Value Ref Range    Phosphorus 3.6 2.5 - 4.5 mg/dL   ESTIMATED GFR    Collection Time: 02/03/24  5:41 AM   Result Value Ref Range    GFR (CKD-EPI) 102 >60 mL/min/1.73 m 2   CBC WITH DIFFERENTIAL    Collection Time: 02/04/24  5:17 AM   Result Value Ref Range    WBC 4.4 (L) 4.8 - 10.8 K/uL    RBC 3.31 (L) 4.20 - 5.40 M/uL    Hemoglobin 10.5 (L) 12.0 - 16.0 g/dL    Hematocrit 31.8 (L) 37.0 - 47.0 %    MCV 96.1 81.4 - 97.8 fL    MCH 31.7 27.0 - 33.0 pg    MCHC 33.0 32.2 - 35.5 g/dL    RDW 46.0 35.9 - 50.0 fL    Platelet Count 426 164 - 446 K/uL    MPV 8.8 (L) 9.0 - 12.9 fL    Neutrophils-Polys 54.90 44.00 - 72.00 %    Lymphocytes 31.00 22.00 - 41.00 %    Monocytes 9.00 0.00 - 13.40 %    Eosinophils 3.70 0.00 - 6.90 %    Basophils 0.70 0.00 - 1.80 %    Immature Granulocytes 0.70 0.00 - 0.90 %    Nucleated RBC 0.00 0.00 - 0.20 /100 WBC    Neutrophils (Absolute) 2.39 1.82 - 7.42 K/uL    Lymphs (Absolute) 1.35 1.00 - 4.80 K/uL    Monos (Absolute) 0.39 0.00 - 0.85 K/uL    Eos (Absolute) 0.16 0.00 - 0.51 K/uL    Baso (Absolute) 0.03 0.00 - 0.12 K/uL    Immature Granulocytes (abs) 0.03 0.00 - 0.11 K/uL    NRBC (Absolute) 0.00 K/uL   HEMOGLOBIN A1C    Collection Time: 02/04/24  5:17 AM   Result Value Ref Range    Glycohemoglobin 5.9 (H) 4.0 - 5.6 %    Est Avg Glucose 123 mg/dL       Medications:  Scheduled Medications   Medication Dose Frequency     gabapentin  300 mg BID    vitamin D3  2,000 Units DAILY    Pharmacy Consult Request  1 Each PHARMACY TO DOSE    senna-docusate  2 Tablet Q EVENING    omeprazole  20 mg DAILY    acetaminophen  1,000 mg Q6HRS    enoxaparin (LOVENOX) injection  40 mg Q EVENING    atorvastatin  20 mg Q EVENING    calcium citrate  950 mg DAILY    gabapentin  600 mg Q EVENING    lisinopril  10 mg DAILY     PRN medications: capsaicin, cyclobenzaprine, Respiratory Therapy Consult, senna-docusate **AND** polyethylene glycol/lytes, mag hydrox-al hydrox-simeth, ondansetron **OR** ondansetron, traZODone, sodium chloride, acetaminophen **FOLLOWED BY** [START ON 2/7/2024] acetaminophen, oxyCODONE immediate-release **OR** oxyCODONE immediate-release    Diet:  Current Diet Order   Procedures    Diet Order Diet: Regular       Medical Decision Making and Plan:  Lumbar stenosis   S/p lumbar fusion   Lumbar fracture   - history of back pain with radicuopathy   - no MRI to review   - 1/23 stage 1 L4-S1 ALIF by Dr. Rodriguez   1/25  left L2/3 XLIF by Dr. Rodriguez   1/30 redo L2-3 laminotomy and L2-S1 perc fusion by Dr. Rodriguez   - LSO when OOB   - most recent CT L spine obtained on 1/31 showed oblique nondisplaced L5 vertebral body fracture   - comprehnsive IRF level therapy with 3hrs of therapy 5 days per week      Hypoxia   - hypoxia, worse with anesthesia, causing 3rd surgery to be aborted   - now on albuterol   - stable on 1 L o2  , weaned down to RA   - CT chest negative for PE      ABLA   - post op drop in Hgb   - 2/1 Hgb 10.7  -> 10.5 2/4      HTN   - on home dose lisinopril   - 2/5 BP well controlled       Vit D deficiency   - Vit D 25  - continue with supplementation      Hypocalcemia  - transferred on calcitrate   - Ca WNL, stopping  calcitrate      Pain  - was on IV diluadid until 2/1 AM   - on scheduled Tylenol and PRN oxycodone   - gabapentin 600mg qhs , additional 300mt BID started 2/4, monitor Cr   - flexeril 10mg q8hrs prn   - pain is located  LLE  "\"deep to shin\", will check LE dopplers      Bowel  - constipation prevention with scheduled senna   - bowel meds PRN   - Last BM 2/4       Bladder   - timed void to prevent falls and incont epsidoes   -bladder scan if no void > 6hrs, PVR, and cath if retaining > 200ml   - PVR 10     Morbid Obesity   - weight loss education      Skin - Patient at risk for skin breakdown due to debility in areas including sacrum, achilles, elbows and head in addition to other sites. Nursing to assess skin daily.      GI Ppx - Patient on Prilosec for GERD prophylaxis. Patient on Senna-docusate for constipation prophylaxis.      DVT Ppx -lovenox       Patient was seen for 37 minutes on unit/floor of which > 50% of time was spent on counseling and coordination of care regarding the above, including prognosis, risk reduction, benefits of treatment, and options for next stage of care.    ____________________________________    Sobeida Cruz D.O.    ____________________________________    "

## 2024-02-05 NOTE — THERAPY
Occupational Therapy  Daily Treatment     Patient Name: Dee Armstrong  Age:  68 y.o., Sex:  female  Medical Record #: [id number]  Today's Date: 2/5/2024     Precautions  Precautions: Fall Risk, Spinal / Back Precautions , Lumbosacral Orthosis  Comments: Strong fear of needles         Subjective  Pt supine in bed upon arrival, agreeable to OT session. Reports that getting out of bed is no challenge, but that getting into bed is a challenge; she endorses difficulty lifting LLE.      Objective     02/05/24 0831   OT Charge Group   OT Self Care / ADL (Units) 2   OT Total Time Spent   OT Individual Total Time Spent (Mins) 30   Functional Level of Assist   Upper Body Dressing Contact Guard Assist  (to don LSO, set-up to don/doff t shirt)   Toileting Standby Assist   Bed, Chair, Wheelchair Transfer Standby Assist  (stand pivot EOB > w/c)   Toilet Transfers Standby Assist  (w//c <> toilet via GB)   Bed Mobility    Sit to Supine Minimal Assist  (with use of leg )   Interdisciplinary Plan of Care Collaboration   IDT Collaboration with  Occupational Therapist   Patient Position at End of Therapy Seated;Chair Alarm On;Self Releasing Lap Belt Applied;Call Light within Reach;Tray Table within Reach;Phone within Reach   Collaboration Comments tx planning     Patient provided with leg  to facilitate independence with bed mobility with improved performance with use of AE. She adhered to spinal prx well and recalled 3/3 prx without further cueing.     Assessment  Patient had good tolerance to session. She presented with activity tolerance deficits and fatigue with ADLs but improved with toileting and upper body dressing scores. She will benefit from use of long handled sponge for safety with shower.     Strengths: Able to follow instructions, Adequate strength, Alert and oriented, Effective communication skills, Good insight into deficits/needs, Independent prior level of function, Making steady progress towards goals,  Motivated for self care and independence, Pleasant and cooperative, Supportive family, Willingly participates in therapeutic activities  Barriers: Decreased endurance, Fatigue, Generalized weakness, Emotional lability, Impaired activity tolerance, Impaired balance, Limited mobility, Pain, Pain poorly managed    Plan  Refer to primary OT goals/POC    DME       Passport items to be completed:  Perform bathroom transfers, complete dressing, complete feeding, get ready for the day, prepare a simple meal, participate in household tasks, adapt home for safety needs, demonstrate home exercise program, complete caregiver training     Occupational Therapy Goals (Active)       Problem: Bathing       Dates: Start:  02/03/24         Goal: STG-Within one week, patient will bathe at [facility] using DME/AE PRN.       Dates: Start:  02/03/24               Problem: Dressing       Dates: Start:  02/03/24         Goal: STG-Within one week, patient will dress UB at [facility] using DME/AE PRN.       Dates: Start:  02/03/24            Goal: STG-Within one week, patient will dress LB at [facility] using DME/AE PRN.       Dates: Start:  02/03/24               Problem: Functional Transfers       Dates: Start:  02/03/24         Goal: STG-Within one week, patient will transfer to toilet at [facility].       Dates: Start:  02/03/24            Goal: STG-Within one week, patient will transfer to step in shower at [facility].       Dates: Start:  02/03/24               Problem: OT Long Term Goals       Dates: Start:  02/03/24         Goal: LTG-By discharge, patient will complete basic self care tasks at [facility].       Dates: Start:  02/03/24            Goal: LTG-By discharge, patient will perform bathroom transfers  at [facility].       Dates: Start:  02/03/24               Problem: Toileting       Dates: Start:  02/03/24         Goal: STG-Within one week, patient will complete toileting tasks at [facility] using DME/AE PRN.       Dates: Start:  02/03/24

## 2024-02-05 NOTE — THERAPY
Physical Therapy   Daily Treatment     Patient Name: Dee Armstrong  Age:  68 y.o., Sex:  female  Medical Record #: [id number]  Today's Date: 2/5/2024     Precautions  Precautions: Fall Risk, Spinal / Back Precautions , Lumbosacral Orthosis  Comments: Strong fear of needles    Subjective    Pt is in her wc and agreeable to participate in therapy. She reports aching pain on lower back and abdominal area PS 5/10 and increase during activity.     Objective       02/05/24 1031   PT Charge Group   PT Therapeutic Exercise (Units) 2   PT Therapeutic Activities (Units) 2   PT Total Time Spent   PT Individual Total Time Spent (Mins) 60   Precautions   Precautions Fall Risk;Spinal / Back Precautions ;Lumbosacral Orthosis   Comments Strong fear of needles   Pain   Intervention Medication (see MAR)   Pain 0 - 10 Group   Location Abdomen;Back   Location Orientation Left;Lower   Pain Rating Scale (NPRS) 5   Description Aching   Comfort Goal Comfort with Movement;Perform Activity   Therapist Pain Assessment Prior to Activity;Post Activity Pain Same as Prior to Activity;During Activity;Nurse Notified;8  (increaase during thera ex)   Non Verbal Descriptors   Non Verbal Scale  Crying;Grimacing;Moaning;Unlabored Breathing   Transfer Functional Level of Assist   Bed, Chair, Wheelchair Transfer Contact Guard Assist   Bed Chair Wheelchair Transfer Description Verbal cueing;Supervision for safety;Adaptive equipment;Increased time  (wc<>mat)   Supine Lower Body Exercise   Pelvic Tilt 1 set of 10;Bilateral  (abdominal isometrics)   Short Arc Quad 1 set of 10;Bilateral  (with 5s hold)   Heel Slide 1 set of 10;Bilateral   Ankle Pumps 1 set of 10;Bilateral   Gluteal Isometrics 1 set of 10;Bilateral   Quadriceps Isometrics 1 set of 10;Bilateral   Bed Mobility    Supine to Sit Moderate Assist   Sit to Supine Moderate Assist   Sit to Stand Contact Guard Assist   Scooting Minimal Assist   Rolling Minimal Assist to Rt.   Interdisciplinary Plan of  Care Collaboration   IDT Collaboration with  Nursing   Patient Position at End of Therapy Seated;Chair Alarm On;Self Releasing Lap Belt Applied  (left at the dining area)   Collaboration Comments collaborated regarding severe pain druing session         Assessment    Pt is in a lot of pain during session with non labored breathing and crying. PT communicated with nursing to pre medicate an hour before next session this afternoon. She had difficulty performing bed mobility and transfer wc<>mat due to pain. She completed 1 set of exercises with several rest breaks and pursed lip breathing.   Strengths: Adequate strength, Alert and oriented, Effective communication skills, Good insight into deficits/needs, Independent prior level of function, Making steady progress towards goals, Motivated for self care and independence, Pleasant and cooperative, Supportive family, Willingly participates in therapeutic activities, Manages pain appropriately, Able to follow instructions  Barriers: Decreased endurance, Generalized weakness, Impaired activity tolerance, Impaired balance, Pain, Limited mobility    Plan    Balance, gait, curb step, bed mobility   Pain management  Strengthening    DME  PT DME Recommendations  Wheelchair:  (no DME anticipated)    Passport items to be completed:  Get in/out of bed safely, in/out of a vehicle, safely use mobility device, walk or wheel around home/community, navigate up and down stairs, show how to get up/down from the ground, ensure home is accessible, demonstrate HEP, complete caregiver training    Physical Therapy Problems (Active)       Problem: Balance       Dates: Start:  02/03/24         Goal: STG-Within one week, patient will improve Cummings by 10 points (19/56 on eval)       Dates: Start:  02/03/24               Problem: Mobility       Dates: Start:  02/03/24         Goal: STG-Within one week, patient will ambulate 50 ft with FWW and CGA between rest breaks       Dates: Start:  02/03/24             Goal: STG-Within one week, patient will ambulate up/down a curb with FWW and min assist       Dates: Start:  02/03/24               Problem: Mobility Transfers       Dates: Start:  02/03/24         Goal: STG-Within one week, patient will perform sit to supine via log roll with supervision assist       Dates: Start:  02/03/24               Problem: PT-Long Term Goals       Dates: Start:  02/03/24         Goal: LTG-By discharge, patient will tolerate Cummings >36/56       Dates: Start:  02/03/24            Goal: LTG-By discharge, patient will ambulate 150 ft between seated rest breaks with FWW and supervision assist       Dates: Start:  02/03/24            Goal: LTG-By discharge, patient will perform home exercise program from handouts with set up assist       Dates: Start:  02/03/24            Goal: LTG-By discharge, patient will transfer in/out of a car with supervision assist and FWW       Dates: Start:  02/03/24

## 2024-02-05 NOTE — DIETARY
NUTRITION SERVICES: BMI - Pt with BMI >40 (=Body mass index is 45.24 kg/m².), Class III obesity. Weight loss counseling not appropriate in acute care setting. RECOMMEND - If appropriate at DC please refer to outpatient nutrition services for weight management.

## 2024-02-06 ENCOUNTER — ANCILLARY PROCEDURE (OUTPATIENT)
Dept: CARDIOLOGY | Facility: REHABILITATION | Age: 69
DRG: 052 | End: 2024-02-06
Attending: PHYSICAL MEDICINE & REHABILITATION
Payer: MEDICARE

## 2024-02-06 ENCOUNTER — APPOINTMENT (OUTPATIENT)
Dept: OCCUPATIONAL THERAPY | Facility: REHABILITATION | Age: 69
DRG: 052 | End: 2024-02-06
Attending: PHYSICAL MEDICINE & REHABILITATION
Payer: MEDICARE

## 2024-02-06 ENCOUNTER — APPOINTMENT (OUTPATIENT)
Dept: RADIOLOGY | Facility: MEDICAL CENTER | Age: 69
DRG: 052 | End: 2024-02-06
Attending: PHYSICAL MEDICINE & REHABILITATION
Payer: MEDICARE

## 2024-02-06 ENCOUNTER — APPOINTMENT (OUTPATIENT)
Dept: PHYSICAL THERAPY | Facility: REHABILITATION | Age: 69
DRG: 052 | End: 2024-02-06
Attending: PHYSICAL MEDICINE & REHABILITATION
Payer: MEDICARE

## 2024-02-06 PROCEDURE — A9270 NON-COVERED ITEM OR SERVICE: HCPCS | Performed by: PHYSICAL MEDICINE & REHABILITATION

## 2024-02-06 PROCEDURE — 97112 NEUROMUSCULAR REEDUCATION: CPT

## 2024-02-06 PROCEDURE — 97535 SELF CARE MNGMENT TRAINING: CPT

## 2024-02-06 PROCEDURE — 700102 HCHG RX REV CODE 250 W/ 637 OVERRIDE(OP): Performed by: PHYSICAL MEDICINE & REHABILITATION

## 2024-02-06 PROCEDURE — 700111 HCHG RX REV CODE 636 W/ 250 OVERRIDE (IP): Mod: JZ | Performed by: PHYSICAL MEDICINE & REHABILITATION

## 2024-02-06 PROCEDURE — 700102 HCHG RX REV CODE 250 W/ 637 OVERRIDE(OP): Performed by: STUDENT IN AN ORGANIZED HEALTH CARE EDUCATION/TRAINING PROGRAM

## 2024-02-06 PROCEDURE — 99233 SBSQ HOSP IP/OBS HIGH 50: CPT | Performed by: PHYSICAL MEDICINE & REHABILITATION

## 2024-02-06 PROCEDURE — 97116 GAIT TRAINING THERAPY: CPT

## 2024-02-06 PROCEDURE — 770010 HCHG ROOM/CARE - REHAB SEMI PRIVAT*

## 2024-02-06 PROCEDURE — 700101 HCHG RX REV CODE 250: Performed by: PHYSICAL MEDICINE & REHABILITATION

## 2024-02-06 PROCEDURE — 93970 EXTREMITY STUDY: CPT

## 2024-02-06 PROCEDURE — 97530 THERAPEUTIC ACTIVITIES: CPT

## 2024-02-06 PROCEDURE — 97110 THERAPEUTIC EXERCISES: CPT

## 2024-02-06 PROCEDURE — A9270 NON-COVERED ITEM OR SERVICE: HCPCS | Performed by: STUDENT IN AN ORGANIZED HEALTH CARE EDUCATION/TRAINING PROGRAM

## 2024-02-06 RX ORDER — OXYCODONE HYDROCHLORIDE 5 MG/1
2.5 TABLET ORAL
Status: DISCONTINUED | OUTPATIENT
Start: 2024-02-06 | End: 2024-02-12

## 2024-02-06 RX ORDER — BACLOFEN 10 MG/1
5 TABLET ORAL
Status: DISCONTINUED | OUTPATIENT
Start: 2024-02-06 | End: 2024-02-07

## 2024-02-06 RX ORDER — GABAPENTIN 400 MG/1
400 CAPSULE ORAL 2 TIMES DAILY
Status: DISCONTINUED | OUTPATIENT
Start: 2024-02-06 | End: 2024-02-07

## 2024-02-06 RX ORDER — LIDOCAINE 4 G/G
1 PATCH TOPICAL EVERY 24 HOURS
Status: DISCONTINUED | OUTPATIENT
Start: 2024-02-06 | End: 2024-02-13 | Stop reason: HOSPADM

## 2024-02-06 RX ORDER — BACLOFEN 10 MG/1
5 TABLET ORAL 3 TIMES DAILY PRN
Status: DISCONTINUED | OUTPATIENT
Start: 2024-02-06 | End: 2024-02-07

## 2024-02-06 RX ORDER — OXYCODONE HYDROCHLORIDE 5 MG/1
7.5 TABLET ORAL
Status: DISCONTINUED | OUTPATIENT
Start: 2024-02-06 | End: 2024-02-12

## 2024-02-06 RX ADMIN — OXYCODONE HYDROCHLORIDE 5 MG: 5 TABLET ORAL at 05:13

## 2024-02-06 RX ADMIN — OXYCODONE HYDROCHLORIDE 5 MG: 5 TABLET ORAL at 01:23

## 2024-02-06 RX ADMIN — CYCLOBENZAPRINE 10 MG: 10 TABLET, FILM COATED ORAL at 08:15

## 2024-02-06 RX ADMIN — ACETAMINOPHEN 1000 MG: 500 TABLET, FILM COATED ORAL at 17:29

## 2024-02-06 RX ADMIN — ACETAMINOPHEN 1000 MG: 500 TABLET, FILM COATED ORAL at 11:46

## 2024-02-06 RX ADMIN — OXYCODONE 7.5 MG: 5 TABLET ORAL at 11:46

## 2024-02-06 RX ADMIN — ACETAMINOPHEN 1000 MG: 500 TABLET, FILM COATED ORAL at 05:14

## 2024-02-06 RX ADMIN — LIDOCAINE 1 PATCH: 4 PATCH TOPICAL at 12:28

## 2024-02-06 RX ADMIN — BACLOFEN 5 MG: 10 TABLET ORAL at 19:57

## 2024-02-06 RX ADMIN — OXYCODONE 7.5 MG: 5 TABLET ORAL at 22:04

## 2024-02-06 RX ADMIN — Medication 2000 UNITS: at 08:12

## 2024-02-06 RX ADMIN — OXYCODONE 7.5 MG: 5 TABLET ORAL at 17:29

## 2024-02-06 RX ADMIN — SENNOSIDES AND DOCUSATE SODIUM 2 TABLET: 50; 8.6 TABLET ORAL at 19:56

## 2024-02-06 RX ADMIN — OXYCODONE HYDROCHLORIDE 5 MG: 5 TABLET ORAL at 08:13

## 2024-02-06 RX ADMIN — OMEPRAZOLE 20 MG: 20 CAPSULE, DELAYED RELEASE ORAL at 08:12

## 2024-02-06 RX ADMIN — GABAPENTIN 400 MG: 400 CAPSULE ORAL at 11:46

## 2024-02-06 RX ADMIN — GABAPENTIN 300 MG: 300 CAPSULE ORAL at 08:12

## 2024-02-06 RX ADMIN — ATORVASTATIN CALCIUM 20 MG: 10 TABLET, FILM COATED ORAL at 19:57

## 2024-02-06 RX ADMIN — ENOXAPARIN SODIUM 40 MG: 100 INJECTION SUBCUTANEOUS at 19:57

## 2024-02-06 RX ADMIN — GABAPENTIN 600 MG: 300 CAPSULE ORAL at 19:57

## 2024-02-06 ASSESSMENT — GAIT ASSESSMENTS
DEVIATION: ANTALGIC
DISTANCE (FEET): 110
GAIT LEVEL OF ASSIST: STANDBY ASSIST
GAIT LEVEL OF ASSIST: CONTACT GUARD ASSIST
ASSISTIVE DEVICE: FRONT WHEEL WALKER
DEVIATION: ANTALGIC
ASSISTIVE DEVICE: FRONT WHEEL WALKER

## 2024-02-06 ASSESSMENT — ACTIVITIES OF DAILY LIVING (ADL)
BED_CHAIR_WHEELCHAIR_TRANSFER_DESCRIPTION: ADAPTIVE EQUIPMENT;SUPERVISION FOR SAFETY;VERBAL CUEING
BED_CHAIR_WHEELCHAIR_TRANSFER_DESCRIPTION: ADAPTIVE EQUIPMENT;INCREASED TIME;INITIAL PREPARATION FOR TASK;SUPERVISION FOR SAFETY
TOILET_TRANSFER_DESCRIPTION: GRAB BAR;ADAPTIVE EQUIPMENT;INCREASED TIME;SUPERVISION FOR SAFETY

## 2024-02-06 NOTE — CARE PLAN
Problem: Pain - Standard  Goal: Alleviation of pain or a reduction in pain to the patient’s comfort goal  Outcome: Progressing     Problem: Fall Risk - Rehab  Goal: Patient will remain free from falls  Outcome: Progressing       The patient is Stable - Low risk of patient condition declining or worsening    Shift Goals  Clinical Goals: Pain control  Patient Goals: pain mgmt, sleep

## 2024-02-06 NOTE — PROGRESS NOTES
NURSING DAILY NOTE    Name: Dee Armstrong   Date of Admission: 2/2/2024   Admitting Diagnosis: Lumbar radiculopathy  Attending Physician: Sobeida Cruz D.o.  Allergies: Patient has no known allergies.    Safety  Patient Assist  [facility] A  Patient Precautions  Fall Risk, Spinal / Back Precautions , Lumbosacral Orthosis  Precaution Comments  Strong fear of needles  Bed Transfer Status  Contact Guard Assist (SBA-CGA EOB > FWW)  Toilet Transfer Status   Contact Guard Assist (stand pivot w/c<>toilet with GB)  Assistive Devices  Rails, Wheelchair  Oxygen  None - Room Air  Diet/Therapeutic Dining  Current Diet Order   Procedures    Diet Order Diet: Regular     Pill Administration  whole  Agitated Behavioral Scale     ABS Level of Severity       Fall Risk  Has the patient had a fall this admission?   No  Kalyani Monroe Fall Risk Scoring  11, MODERATE RISK  Fall Risk Safety Measures  bed alarm and chair alarm    Vitals  Temperature: 36.7 °C (98 °F)  Temp src: Oral  Pulse: 95  Respiration: 18  Blood Pressure : 130/74  Blood Pressure MAP (Calculated): 93 MM HG  BP Location: Right, Forearm  Patient BP Position: Supine     Oxygen  Pulse Oximetry: 94 %  O2 (LPM): 0  O2 Delivery Device: None - Room Air    Bowel and Bladder  Last Bowel Movement  02/04/24  Stool Type  Type 5: Soft blob with clear cut edges (passed easily)  Bowel Device     Continent  Bladder: Did not void   Bowel: No movement  Bladder Function  Urine Void (mL):  (Large)  Number of Times Voided: 1  Urine Color: Yellow  Genitourinary Assessment   Bladder Assessment (WDL):  Within Defined Limits  Urine Color: Yellow  Time Void: No  Bladder Scan: Post Void  $ Bladder Scan Results (mL): 10    Skin  Vlad Score   19  Sensory Interventions   Bed Types: Standard/Trauma Mattress  Skin Preventative Measures: Pillows in Use for Support / Positioning  Moisture Interventions  Moisturizers/Barriers: Moisturizer        Pain  Pain Rating Scale  4 - Distracts me, can do usual activities  Pain Location  Abdomen, Back  Pain Location Orientation  Left, Lower  Pain Interventions   Medication (see MAR)    ADLs    Bathing   Staff, Shower  Linen Change      Personal Hygiene  Moist Sammie Wipes, Perineal Care  Chlorhexidine Bath      Oral Care     Teeth/Dentures     Shave     Nutrition Percentage Eaten  *  * Meal *  *, Between 50-75% Consumed  Environmental Precautions  Treaded Slipper Socks on Patient, Personal Belongings, Wastebasket, Call Bell etc. in Easy Reach, Report Given to Other Health Care Providers Regarding Fall Risk, Bed in Low Position, Mobility Assessed & Appropriate Sign Placed  Patient Turns/Positioning  Patient Turns Self from Side to Side  Patient Turns Assistance/Tolerance     Bed Positions  Bed Controls On, Bed Locked  Head of Bed Elevated  Self regulated      Psychosocial/Neurologic Assessment  Psychosocial Assessment  Psychosocial (WDL):  WDL Except  Patient Behaviors: Restless  Family Behaviors: Family Present  Neurologic Assessment  Neuro (WDL): Exceptions to WDL  Level of Consciousness: Alert  Orientation Level: Oriented X4  Cognition: Follows commands  Speech: Clear  RUE Sensation: Full sensation  Muscle Strength Right Arm: Good Strength Against Gravity and Moderate Resistance  LUE Sensation: Full sensation  Muscle Strength Left Arm: Good Strength Against Gravity and Moderate Resistance  RLE Sensation: Numbness (states from Sx 7 yrs ago)  Muscle Strength Right Leg: Fair Strength against Gravity but No Resistance  LLE Motor Response: Spastic  LLE Sensation: Tingling, Pain (Pt states from this Sx)  Muscle Strength Left Leg: Weak Movement but Not Against Gravity or Resistance  EENT (WDL):  WDL Except    Cardio/Pulmonary Assessment  Edema      Respiratory Breath Sounds     Cardiac Assessment   Cardiac (WDL):  WDL Except (Hx of HTN)

## 2024-02-06 NOTE — PROGRESS NOTES
..                                                         NURSING DAILY NOTE    Name: Dee Armstrong   Date of Admission: 2/2/2024   Admitting Diagnosis: Lumbar radiculopathy  Attending Physician: Sobeida Cruz D.o.  Allergies: Patient has no known allergies.    Safety  Patient Assist  Min A  Patient Precautions  Fall Risk, Spinal / Back Precautions , Lumbosacral Orthosis  Precaution Comments  Strong fear of needles  Bed Transfer Status  Contact Guard Assist (SBA-CGA EOB > FWW)  Toilet Transfer Status   Contact Guard Assist (stand pivot w/c<>toilet with GB)  Assistive Devices  Wheelchair  Oxygen  None - Room Air  Diet/Therapeutic Dining  Current Diet Order   Procedures    Diet Order Diet: Regular     Pill Administration  whole  Agitated Behavioral Scale     ABS Level of Severity       Fall Risk  Has the patient had a fall this admission?   No  Kalyani Monroe Fall Risk Scoring  11, MODERATE RISK  Fall Risk Safety Measures  bed alarm, chair alarm, poor balance, and ok to leave pt in bathroom    Vitals  Temperature: 36.6 °C (97.9 °F)  Temp src: Oral  Pulse: 98  Respiration: 18  Blood Pressure : 94/56  Blood Pressure MAP (Calculated): 69 MM HG  BP Location: Right, Upper Arm  Patient BP Position: Supine     Oxygen  Pulse Oximetry: 92 %  O2 (LPM): 0  O2 Delivery Device: None - Room Air    Bowel and Bladder  Last Bowel Movement  02/04/24  Stool Type  Type 5: Soft blob with clear cut edges (passed easily)  Bowel Device     Continent  Bladder: Did not void   Bowel: No movement  Bladder Function  Urine Void (mL):  (Large)  Number of Times Voided: 1  Urine Color: Yellow  Number of Times Incontinent of Urine: 0  Genitourinary Assessment   Bladder Assessment (WDL):  Within Defined Limits  Urine Color: Yellow  Number of Bladder Accidents: 0  Total Number of Bladder of Accidents in Last 7 Days: 0  Number of Times Incontinent of Urine: 0  Bladder Device: Bathroom  Time Void: No  Bladder Scan: Post Void  $ Bladder Scan Results  (mL): 10    Skin  Vlad Score   19  Sensory Interventions   Bed Types: Standard/Trauma Mattress with Overlay  Skin Preventative Measures: Waffle Overlay  Moisture Interventions  Moisturizers/Barriers: Moisturizer       Pain  Pain Rating Scale  7 - Focus of attention, prevents doing daily activities  Pain Location  Abdomen, Back, Leg  Pain Location Orientation  Lower, Left  Pain Interventions   Medication (see MAR)    ADLs    Bathing   Staff, Shower  Linen Change      Personal Hygiene  Moist Sammie Wipes, Perineal Care  Chlorhexidine Bath      Oral Care     Teeth/Dentures     Shave     Nutrition Percentage Eaten  *  * Meal *  *, Between 50-75% Consumed  Environmental Precautions  Treaded Slipper Socks on Patient, Personal Belongings, Wastebasket, Call Bell etc. in Easy Reach, Report Given to Other Health Care Providers Regarding Fall Risk, Bed in Low Position, Mobility Assessed & Appropriate Sign Placed  Patient Turns/Positioning  Patient Turns Self from Side to Side  Patient Turns Assistance/Tolerance     Bed Positions  Bed Controls On, Bed Locked  Head of Bed Elevated  Self regulated      Psychosocial/Neurologic Assessment  Psychosocial Assessment  Psychosocial (WDL):  Within Defined Limits  Patient Behaviors: Restless  Family Behaviors: Family Present  Neurologic Assessment  Neuro (WDL): Exceptions to WDL  Level of Consciousness: Alert  Orientation Level: Oriented X4  Cognition: Appropriate judgement, Appropriate safety awareness  Speech: Clear  RUE Sensation: Full sensation  Muscle Strength Right Arm: Good Strength Against Gravity and Moderate Resistance  LUE Sensation: Full sensation  Muscle Strength Left Arm: Good Strength Against Gravity and Moderate Resistance  RLE Sensation: Numbness (states from Sx 7 yrs ago)  Muscle Strength Right Leg: Good Strength Against Gravity and Moderate Resistance  LLE Motor Response: Spastic  LLE Sensation: Tingling, Pain (Pt states from this Sx)  Muscle Strength Left Leg: Good  Strength Against Gravity and Moderate Resistance  EENT (WDL):  WDL Except    Cardio/Pulmonary Assessment  Edema      Respiratory Breath Sounds     Cardiac Assessment   Cardiac (WDL):  WDL Except (Hx of HTN)

## 2024-02-06 NOTE — PROGRESS NOTES
"  Physical Medicine & Rehabilitation Progress Note  _____________________________________  Interdisciplinary Team Conference   Most recent IDT on 2/6/2024    IWard M.D., was present and led the interdisciplinary team conference on 2/6/2024.  I led the IDT conference and agree with the IDT conference documentation and plan of care as noted below.     Nursing:  Diet Current Diet Order   Procedures    Diet Order Diet: Regular       Eating ADL Independent      % of Last Meal  Oral Nutrition: *  * Meal *  *, Breakfast, Between % Consumed   Sleep    Bowel Last BM: 02/05/24   Bladder    Barriers to Discharge Home:Weakness      Physical Therapy:  Bed Mobility    Transfers Standby Assist  Adaptive equipment, Supervision for safety, Verbal cueing (SPT w/ FWW)   Mobility Contact Guard Assist (to SBA)   Stairs    Barriers to Discharge Home:Weakness      Occupational Therapy:  Grooming Supervision, Standing   Bathing Standby Assist   UB Dressing Stand by Assist   LB Dressing Minimal Assist   Toileting Supervision (Distant supervision, FWW level)   Shower & Transfer    Barriers to Discharge Home: weakness and pain      Respiratory Therapy:  O2 (LPM): 0  O2 Delivery Device: None - Room Air    Case Management:  Continues to work on disposition and DME needs.      Discharge Date/Disposition:  2/13/24  _____________________________________   Encounter Date: 2/6/2024    Chief Complaint: Weakness    Interval Events (Subjective):  Seen in bed. Lower back pain and radicular pain present.     Objective:  VITAL SIGNS: /78   Pulse 98   Temp 37.1 °C (98.8 °F) (Oral)   Resp 18   Ht 1.499 m (4' 11\")   Wt 102 kg (224 lb)   SpO2 93%   BMI 45.24 kg/m²   Gen: No acute distress, well developed well nourished adult  HEENT: Normal Cephalic Atraumatic, Normal conjunctiva.   CV: warm extremities, well perfused, no edema  Resp: symmetric chest rise, breathing comfortably on room air  Abd: Soft, Non distended  Extremities: " normal bulk, no atrophy  Skin: no visible rashes or lesions.   Neuro: alert, awake  Psych: Mood and affect appropriate and congruent    Laboratory Values:  Recent Results (from the past 72 hour(s))   CBC WITH DIFFERENTIAL    Collection Time: 02/04/24  5:17 AM   Result Value Ref Range    WBC 4.4 (L) 4.8 - 10.8 K/uL    RBC 3.31 (L) 4.20 - 5.40 M/uL    Hemoglobin 10.5 (L) 12.0 - 16.0 g/dL    Hematocrit 31.8 (L) 37.0 - 47.0 %    MCV 96.1 81.4 - 97.8 fL    MCH 31.7 27.0 - 33.0 pg    MCHC 33.0 32.2 - 35.5 g/dL    RDW 46.0 35.9 - 50.0 fL    Platelet Count 426 164 - 446 K/uL    MPV 8.8 (L) 9.0 - 12.9 fL    Neutrophils-Polys 54.90 44.00 - 72.00 %    Lymphocytes 31.00 22.00 - 41.00 %    Monocytes 9.00 0.00 - 13.40 %    Eosinophils 3.70 0.00 - 6.90 %    Basophils 0.70 0.00 - 1.80 %    Immature Granulocytes 0.70 0.00 - 0.90 %    Nucleated RBC 0.00 0.00 - 0.20 /100 WBC    Neutrophils (Absolute) 2.39 1.82 - 7.42 K/uL    Lymphs (Absolute) 1.35 1.00 - 4.80 K/uL    Monos (Absolute) 0.39 0.00 - 0.85 K/uL    Eos (Absolute) 0.16 0.00 - 0.51 K/uL    Baso (Absolute) 0.03 0.00 - 0.12 K/uL    Immature Granulocytes (abs) 0.03 0.00 - 0.11 K/uL    NRBC (Absolute) 0.00 K/uL   HEMOGLOBIN A1C    Collection Time: 02/04/24  5:17 AM   Result Value Ref Range    Glycohemoglobin 5.9 (H) 4.0 - 5.6 %    Est Avg Glucose 123 mg/dL       Medications:  Scheduled Medications   Medication Dose Frequency    lidocaine  1 Patch Q24HR    gabapentin  300 mg BID    vitamin D3  2,000 Units DAILY    Pharmacy Consult Request  1 Each PHARMACY TO DOSE    senna-docusate  2 Tablet Q EVENING    omeprazole  20 mg DAILY    acetaminophen  1,000 mg Q6HRS    enoxaparin (LOVENOX) injection  40 mg Q EVENING    atorvastatin  20 mg Q EVENING    gabapentin  600 mg Q EVENING     PRN medications: oxyCODONE immediate-release **OR** oxyCODONE immediate-release, capsaicin, cyclobenzaprine, Respiratory Therapy Consult, senna-docusate **AND** polyethylene glycol/lytes, mag hydrox-al  "hydrox-simeth, ondansetron **OR** ondansetron, traZODone, sodium chloride, acetaminophen **FOLLOWED BY** [START ON 2/7/2024] acetaminophen    Diet:  Current Diet Order   Procedures    Diet Order Diet: Regular       Medical Decision Making and Plan:  Lumbar stenosis   S/p lumbar fusion   Lumbar fracture   - history of back pain with radicuopathy   - no MRI to review   - 1/23 stage 1 L4-S1 ALIF by Dr. Rodriguez   1/25  left L2/3 XLIF by Dr. Rodriguez   1/30 redo L2-3 laminotomy and L2-S1 perc fusion by Dr. Rodriguez   - LSO when OOB   - most recent CT L spine obtained on 1/31 showed oblique nondisplaced L5 vertebral body fracture   - comprehnsive IRF level therapy with 3hrs of therapy 5 days per week      Hypoxia   - hypoxia, worse with anesthesia, causing 3rd surgery to be aborted   - now on albuterol   - stable on 1 L o2  , weaned down to RA   - CT chest negative for PE      ABLA   - post op drop in Hgb   - 2/1 Hgb 10.7  -> 10.5 2/4      HTN   - 2/5 BP well controlled   -2/6- Decontinue Lisinopril 10mg Daily      Vit D deficiency   - Vit D 25  - continue with supplementation      Hypocalcemia  - transferred on calcitrate   - Ca WNL, stopping  calcitrate      Pain  - was on IV diluadid until 2/1 AM   - on scheduled Tylenol and PRN oxycodone   - gabapentin 600mg qhs , additional 300mt BID started 2/4  -2/6 increase gabapentin to 400mg BID and 600mg at night  - flexeril 10mg q8hrs prn   - pain is located  LLE \"deep to shin\"  -Follow up on bilateral dopplers  -2/6- increase oxycodone from 5mg to 7.5mg PRN     Bowel  - constipation prevention with scheduled senna   - bowel meds PRN   - Last BM 2/4       Bladder   - timed void to prevent falls and incont epsidoes   -bladder scan if no void > 6hrs, PVR, and cath if retaining > 200ml   - PVR 10      Morbid Obesity   - weight loss education      Skin - Patient at risk for skin breakdown due to debility in areas including sacrum, achilles, elbows and head in addition to other sites. " Nursing to assess skin daily.      GI Ppx - Patient on Prilosec for GERD prophylaxis. Patient on Senna-docusate for constipation prophylaxis.      DVT Ppxcontinue lovenox       ____________________________________    Ward Boone MD  Physical Medicine & Rehabilitation   Brain Injury Medicine   ____________________________________    Total time:  60 minutes. Time spent included pre-rounding, review of vitals and tests, unit/floor time, face-to-face time with the patient including physical examination, care coordination, counseling of patient and/or family, ordering medications/procedures/tests, discussion with CM, PT, OT, SLP and/or other healthcare providers, and documentation in the electronic medical record. Topics discussed included dispostopm.

## 2024-02-06 NOTE — THERAPY
Occupational Therapy  Daily Treatment     Patient Name: Dee Armstrong  Age:  68 y.o., Sex:  female  Medical Record #: [id number]  Today's Date: 2/6/2024     Precautions  Precautions: Fall Risk, Spinal / Back Precautions , Lumbosacral Orthosis  Comments: Strong fear of needles         Subjective    Pt seated in w/c upon arrival, pleasant and cooperative, agreeable to therapy. Spouse present at end of session       Objective       02/06/24 1231   OT Charge Group   OT Therapy Activity (Units) 4   OT Total Time Spent   OT Individual Total Time Spent (Mins) 60   Interdisciplinary Plan of Care Collaboration   Patient Position at End of Therapy Seated;Call Light within Reach;Tray Table within Reach;Family / Friend in Room;Chair Alarm On;Self Releasing Lap Belt Applied     Edu provided on:  - recommended bathroom DME: grab bars at shower/toilet, shower chair  - recommended hip kit AE: reacher, LH sponge, LH lotion application, LH shoehorn  - return to driving barriers: spinal precautions, pain medication, pain mgmt, address further with MD  - home safety strategies at FWW level - requires supervision  - walker safety AE and strategies for carrying items  - spinal precautions strategies  - in case of emergency at home strategies     Functional mobility at FWW level: SBA main gym 2 sets x 120ft    Assessment    Pt verbalized good understanding of edu and demo good safety awareness     Strengths: Able to follow instructions, Adequate strength, Alert and oriented, Effective communication skills, Good insight into deficits/needs, Independent prior level of function, Making steady progress towards goals, Motivated for self care and independence, Pleasant and cooperative, Supportive family, Willingly participates in therapeutic activities  Barriers: Decreased endurance, Fatigue, Generalized weakness, Emotional lability, Impaired activity tolerance, Impaired balance, Limited mobility, Pain, Pain poorly managed    Plan    Cont  overall strength/endurance and standing balance to improve ADL's/IADL's and functional mobility at FWW level while maintaining spinal precautions; pain mgmt     Pt lives in 1 story with RAINA Mackey, adult children live in [place], 1 threshold (3-4 inch) to enter, walk-in shower/chair/glass door, has FWW, 4WW, reacher     DME     TBD    Occupational Therapy Goals (Active)       Problem: Bathing       Dates: Start:  02/05/24         Goal: STG-Within one week, patient will bathe at [facility] using DME/AE PRN       Dates: Start:  02/05/24               Problem: Dressing       Dates: Start:  02/03/24         Goal: STG-Within one week, patient will dress UB at [facility] using DME/AE PRN.       Dates: Start:  02/03/24         Goal Note filed on 02/05/24 1614 by Amada Leary MS,OTR/L       Requires SBA              Goal: STG-Within one week, patient will dress LB at [facility] using DME/AE PRN       Dates: Start:  02/05/24               Problem: Functional Transfers       Dates: Start:  02/03/24         Goal: STG-Within one week, patient will transfer to toilet at A.       Dates: Start:  02/03/24         Goal Note filed on 02/05/24 1614 by Amada Leary MS,OTR/L       Requires CGA              Goal: STG-Within one week, patient will transfer to step in shower at SBA.       Dates: Start:  02/03/24         Goal Note filed on 02/05/24 1614 by Amada Leary MS,OTR/L       Not yet addressed                  Problem: IADL's       Dates: Start:  02/05/24         Goal: STG-Within one week, patient will access kitchen area at Min A using DME/AE PRN       Dates: Start:  02/05/24               Problem: OT Long Term Goals       Dates: Start:  02/03/24         Goal: LTG-By discharge, patient will complete basic self care tasks at \[facility]\""-Mod I.       Dates: Start:  02/03/24            Goal: LTG-By discharge, patient will perform bathroom transfers  at \[facility]\""-Mod I.       Dates: Start:  02/03/24               Problem: Toileting       Dates: Start:   02/05/24         Goal: STG-Within one week, patient will complete toileting tasks at [facility] using DME/AE PRN       Dates: Start:  02/05/24

## 2024-02-06 NOTE — THERAPY
Physical Therapy   Daily Treatment     Patient Name: Dee Armstrong  Age:  68 y.o., Sex:  female  Medical Record #: [id number]  Today's Date: 2/6/2024     Precautions  Precautions: Fall Risk, Spinal / Back Precautions , Lumbosacral Orthosis  Comments: Strong fear of needles    Subjective    Received pt from OT, agreeable to session but does c/o fatigue.     Objective       02/06/24 1031   PT Charge Group   PT Gait Training (Units) 2   Supervising Physical Therapist Whit Monroe   PT Total Time Spent   PT Individual Total Time Spent (Mins) 30   Cognition    Level of Consciousness Alert   Gait Functional Level of Assist    Gait Level Of Assist Contact Guard Assist  (to SBA)   Assistive Device Front Wheel Walker   Distance (Feet) 110   # of Times Distance was Traveled 2   Deviation Antalgic   Transfer Functional Level of Assist   Bed, Chair, Wheelchair Transfer Standby Assist   Bed Chair Wheelchair Transfer Description Adaptive equipment;Supervision for safety;Verbal cueing  (SPT w/ FWW)   Bed Mobility    Sit to Stand Standby Assist   Interdisciplinary Plan of Care Collaboration   Patient Position at End of Therapy Seated;Call Light within Reach;Tray Table within Reach;Phone within Reach     Review spinal precaution, pt recalled 3/3 during session.   Provided ice pack at the end of session.    Assessment    Pt tolerated session well, good motivation for self care and does has good safety awareness d/t fear of falling.       Strengths: Adequate strength, Alert and oriented, Effective communication skills, Good insight into deficits/needs, Independent prior level of function, Making steady progress towards goals, Motivated for self care and independence, Pleasant and cooperative, Supportive family, Willingly participates in therapeutic activities, Manages pain appropriately, Able to follow instructions  Barriers: Decreased endurance, Generalized weakness, Impaired activity tolerance, Impaired balance, Pain, Limited  mobility    Plan    Balance, gait, curb step, bed mobility   Pain management  Strengthening        DME  PT DME Recommendations  Wheelchair:  (no DME anticipated)     Passport items to be completed:  Get in/out of bed safely, in/out of a vehicle, safely use mobility device, walk or wheel around home/community, navigate up and down stairs, show how to get up/down from the ground, ensure home is accessible, demonstrate HEP, complete caregiver training    Physical Therapy Problems (Active)       Problem: Balance       Dates: Start:  02/03/24         Goal: STG-Within one week, patient will improve Cummings by 10 points (19/56 on eval)       Dates: Start:  02/03/24               Problem: Mobility       Dates: Start:  02/03/24         Goal: STG-Within one week, patient will ambulate up/down a curb with FWW and min assist       Dates: Start:  02/03/24               Problem: Mobility Transfers       Dates: Start:  02/03/24         Goal: STG-Within one week, patient will perform sit to supine via log roll with supervision assist       Dates: Start:  02/03/24               Problem: PT-Long Term Goals       Dates: Start:  02/03/24         Goal: LTG-By discharge, patient will tolerate Cummings >36/56       Dates: Start:  02/03/24            Goal: LTG-By discharge, patient will ambulate 150 ft between seated rest breaks with FWW and supervision assist       Dates: Start:  02/03/24            Goal: LTG-By discharge, patient will perform home exercise program from handouts with set up assist       Dates: Start:  02/03/24            Goal: LTG-By discharge, patient will transfer in/out of a car with supervision assist and FWW       Dates: Start:  02/03/24

## 2024-02-06 NOTE — CARE PLAN
"The patient is Stable - Low risk of patient condition declining or worsening    Shift Goals  Clinical Goals: Pain control  Patient Goals: pain mgmt, sleep    Patient is not progressing towards the following goals:    Problem: Fall Risk - Rehab  Goal: Patient will remain free from falls  Outcome: Not Met  Note: Kalyani Monroe Fall risk Assessment Score: 11    Moderate fall risk Interventions  - Bed and strip alarm   - Yellow sign by the door   - Yellow wrist band \"Fall risk\"  - Room near to the nurse station  - Fall risk education provided     "

## 2024-02-06 NOTE — CARE PLAN
Problem: Balance  Goal: STG-Within one week, patient will improve Cummings by 10 points (19/56 on eval)  Outcome: Not Met     Problem: Mobility  Goal: STG-Within one week, patient will ambulate up/down a curb with FWW and min assist  Outcome: Not Met     Problem: Mobility Transfers  Goal: STG-Within one week, patient will perform sit to supine via log roll with supervision assist  Outcome: Not Met     Problem: Mobility  Goal: STG-Within one week, patient will ambulate 50 ft with FWW and CGA between rest breaks  Outcome: Met

## 2024-02-06 NOTE — CARE PLAN
Problem: Bathing  Goal: STG-Within one week, patient will bathe at Min A using DME/AE PRN.  Outcome: Met     Problem: Dressing  Goal: STG-Within one week, patient will dress UB at [facility] using DME/AE PRN.  Outcome: Not Met  Note: Requires SBA  Goal: STG-Within one week, patient will dress LB at Min A using DME/AE PRN.  Outcome: Met     Problem: Toileting  Goal: STG-Within one week, patient will complete toileting tasks at Mod A using DME/AE PRN.  Outcome: Met     Problem: Functional Transfers  Goal: STG-Within one week, patient will transfer to toilet at SBA.  Outcome: Not Met  Note: Requires CGA  Goal: STG-Within one week, patient will transfer to step in shower at SBA.  Outcome: Not Met  Note: Not yet addressed

## 2024-02-06 NOTE — THERAPY
Occupational Therapy  Daily Treatment     Patient Name: Dee Armstrong  Age:  68 y.o., Sex:  female  Medical Record #: [id number]  Today's Date: 2/6/2024     Precautions  Precautions: (P) Fall Risk, Spinal / Back Precautions , Lumbosacral Orthosis  Comments: (P) Strong fear of needles    Subjective    Patient seated in w/c upon arrival, finishing grooming routine. Agreeable to participate in OT. Tearful @ end of session due to c/o LLE pain.      Objective     02/06/24 1001   OT Charge Group   OT Self Care / ADL (Units) 1   OT Neuromuscular Re-education / Balance (Units) 1   OT Total Time Spent   OT Individual Total Time Spent (Mins) 30   Precautions   Precautions Fall Risk;Spinal / Back Precautions ;Lumbosacral Orthosis   Comments Strong fear of needles   Cognition    Level of Consciousness Alert   Functional Level of Assist   Grooming Supervision;Standing   Toileting Supervision  (Distant supervision, FWW level)   Toilet Transfers Standby Assist  (FWW level)   Tub / Shower Transfers Standby Assist  (Dry WIS txfr w/ shower chair (to simulate home setup); side step technique w/ FWW)   Interdisciplinary Plan of Care Collaboration   Patient Position at End of Therapy Seated;Self Releasing Lap Belt Applied     SBA for FWW mobility room > ADL bathroom > main therapy gym, no overt LOB noted.     Assessment    Patient tolerated OT session well overall w/ focus on ADLs, FWW mobility and dry WIS txfr. Patient SBA to supervision overall @ FWW level w/ no overt LOB noted. Significant shoulder elevation observed during FWW mobility, mild-mod improvement noted when therapist modified height of FWW. Patient tearful @ end of session due to 7/10 LLE pain. Declined ice pack however reported he may use one following PT session (immediately after OT).   Strengths: Able to follow instructions, Adequate strength, Alert and oriented, Effective communication skills, Good insight into deficits/needs, Independent prior level of function,  Making steady progress towards goals, Motivated for self care and independence, Pleasant and cooperative, Supportive family, Willingly participates in therapeutic activities  Barriers: Decreased endurance, Fatigue, Generalized weakness, Emotional lability, Impaired activity tolerance, Impaired balance, Limited mobility, Pain, Pain poorly managed    Plan    Cont overall strength/endurance and standing balance to improve ADL's/IADL's and functional mobility at FWW level while maintaining spinal precautions; pain mgmt     Pt lives in 1 story with RAINA Mackey, adult children live in [place], 1 threshold (3-4 inch) to enter, walk-in shower/chair/glass door, has FWW, 4WW, reacher    DME    Passport items to be completed:  Perform bathroom transfers, complete dressing, complete feeding, get ready for the day, prepare a simple meal, participate in household tasks, adapt home for safety needs, demonstrate home exercise program, complete caregiver training     Occupational Therapy Goals (Active)       Problem: Bathing       Dates: Start:  02/05/24         Goal: STG-Within one week, patient will bathe at [facility] using DME/AE PRN       Dates: Start:  02/05/24               Problem: Dressing       Dates: Start:  02/03/24         Goal: STG-Within one week, patient will dress UB at [facility] using DME/AE PRN.       Dates: Start:  02/03/24         Goal Note filed on 02/05/24 1614 by Amada Leary MS,OTR/L       Requires SBA              Goal: STG-Within one week, patient will dress LB at [facility] using DME/AE PRN       Dates: Start:  02/05/24               Problem: Functional Transfers       Dates: Start:  02/03/24         Goal: STG-Within one week, patient will transfer to toilet at SBA.       Dates: Start:  02/03/24         Goal Note filed on 02/05/24 1614 by Amada Leary MS,OTR/L       Requires CGA              Goal: STG-Within one week, patient will transfer to step in shower at SBA.       Dates: Start:  02/03/24         Goal Note filed  on 02/05/24 1614 by Amada Leary, MS,OTR/L       Not yet addressed                  Problem: IADL's       Dates: Start:  02/05/24         Goal: STG-Within one week, patient will access kitchen area at Min A using DME/AE PRN       Dates: Start:  02/05/24               Problem: OT Long Term Goals       Dates: Start:  02/03/24         Goal: LTG-By discharge, patient will complete basic self care tasks at [facility].       Dates: Start:  02/03/24            Goal: LTG-By discharge, patient will perform bathroom transfers  at [facility].       Dates: Start:  02/03/24               Problem: Toileting       Dates: Start:  02/05/24         Goal: STG-Within one week, patient will complete toileting tasks at [facility] using DME/AE PRN       Dates: Start:  02/05/24

## 2024-02-06 NOTE — THERAPY
"Physical Therapy   Daily Treatment     Patient Name: Dee Armstrong  Age:  68 y.o., Sex:  female  Medical Record #: [id number]  Today's Date: 2/6/2024     Precautions  Precautions: (P) Fall Risk, Spinal / Back Precautions , Lumbosacral Orthosis  Comments: (P) Strong fear of needles    Subjective    Patient in bed and agreeable to therapy.     Objective       02/06/24 0701   PT Charge Group   PT Gait Training (Units) 1   PT Therapeutic Exercise (Units) 2   PT Therapeutic Activities (Units) 1   PT Total Time Spent   PT Individual Total Time Spent (Mins) 60   Precautions   Precautions Fall Risk;Spinal / Back Precautions ;Lumbosacral Orthosis   Comments Strong fear of needles   Pain 0 - 10 Group   Location Back   Location Orientation Lower   Gait Functional Level of Assist    Gait Level Of Assist Standby Assist  (to CGA)   Assistive Device Front Wheel Walker   Distance (Feet)   (200 ftx1, 75 ftx1)   Deviation Antalgic  (slow dewayne)   Transfer Functional Level of Assist   Bed, Chair, Wheelchair Transfer Standby Assist  (to CGA)   Bed Chair Wheelchair Transfer Description Adaptive equipment;Increased time;Initial preparation for task;Supervision for safety  (FWW)   Toilet Transfers Standby Assist  (to CGA)   Toilet Transfer Description Grab bar;Adaptive equipment;Increased time;Supervision for safety  (FWW approach)   Supine Lower Body Exercise   Gluteal Isometrics Bilateral  (2x5)   Bed Mobility    Supine to Sit Standby Assist   Sit to Supine Minimal Assist  (for LLE due to not having leg  available)   Sit to Stand Standby Assist   Scooting Standby Assist   Interdisciplinary Plan of Care Collaboration   Patient Position at End of Therapy Chair Alarm On  (in dining room)     Donning of LSO at edge of bed and in standing with SBA, performed x2 during session.    Toileting at FWW level with SBA-CGA including standing hand hygiene with UE support on counter.    Discussed bedroom home set up, utilizing 4\" step to " improve hip clearance on edge of bed due to higher bed height. Also recommending bed rail as patient required FWW support as modified bedrail to perform bed mobility.    Supine therex:   - BUE pulldown with green theraband for ab activation 2x5   - Isometric BUE pulldown with alternating marching 2x5 each leg   - BLE bent knee fall out with green theraband 2x5 each leg    Moist heat pack applied to low back initially for pain management.    Assessment    Patient tolerated session well, improved back pain carrying over to mobility however did require extra time for bed mobility.     Strengths: Adequate strength, Alert and oriented, Effective communication skills, Good insight into deficits/needs, Independent prior level of function, Making steady progress towards goals, Motivated for self care and independence, Pleasant and cooperative, Supportive family, Willingly participates in therapeutic activities, Manages pain appropriately, Able to follow instructions  Barriers: Decreased endurance, Generalized weakness, Impaired activity tolerance, Impaired balance, Pain, Limited mobility    Plan    Balance, gait, curb step, bed mobility   Pain management  Strengthening        DME  PT DME Recommendations  Wheelchair:  (no DME anticipated)     Passport items to be completed:  Get in/out of bed safely, in/out of a vehicle, safely use mobility device, walk or wheel around home/community, navigate up and down stairs, show how to get up/down from the ground, ensure home is accessible, demonstrate HEP, complete caregiver training    Physical Therapy Problems (Active)       Problem: Balance       Dates: Start:  02/03/24         Goal: STG-Within one week, patient will improve Cummings by 10 points (19/56 on eval)       Dates: Start:  02/03/24               Problem: Mobility       Dates: Start:  02/03/24         Goal: STG-Within one week, patient will ambulate 50 ft with FWW and CGA between rest breaks       Dates: Start:  02/03/24             Goal: STG-Within one week, patient will ambulate up/down a curb with FWW and min assist       Dates: Start:  02/03/24               Problem: Mobility Transfers       Dates: Start:  02/03/24         Goal: STG-Within one week, patient will perform sit to supine via log roll with supervision assist       Dates: Start:  02/03/24               Problem: PT-Long Term Goals       Dates: Start:  02/03/24         Goal: LTG-By discharge, patient will tolerate Cummings >36/56       Dates: Start:  02/03/24            Goal: LTG-By discharge, patient will ambulate 150 ft between seated rest breaks with FWW and supervision assist       Dates: Start:  02/03/24            Goal: LTG-By discharge, patient will perform home exercise program from handouts with set up assist       Dates: Start:  02/03/24            Goal: LTG-By discharge, patient will transfer in/out of a car with supervision assist and FWW       Dates: Start:  02/03/24

## 2024-02-06 NOTE — CARE PLAN
Problem: Pain - Standard  Goal: Alleviation of pain or a reduction in pain to the patient’s comfort goal  Outcome: Progressing     Problem: Mobility  Goal: Patient's capacity to carry out activities will improve  Outcome: Progressing     Problem: Fall Risk - Rehab  Goal: Patient will remain free from falls  Outcome: Progressing       The patient is Stable - Low risk of patient condition declining or worsening    Shift Goals  Clinical Goals: Pain control  Patient Goals: pain mgmt, sleep

## 2024-02-06 NOTE — THERAPY
Occupational Therapy  Daily Treatment     Patient Name: Dee Armstrong  Age:  68 y.o., Sex:  female  Medical Record #: [id number]  Today's Date: 2/5/2024     Precautions  Precautions: Fall Risk, Spinal / Back Precautions , Lumbosacral Orthosis  Comments: Strong fear of needles         Subjective  Patient seated in w/c upon arrival, pleasant and agreeable.      Objective     02/05/24 1601   OT Charge Group   OT Self Care / ADL (Units) 2   OT Total Time Spent   OT Individual Total Time Spent (Mins) 30   Functional Level of Assist   Bed, Chair, Wheelchair Transfer Contact Guard Assist  (SBA-CGA EOB > FWW)   Bed Mobility    Supine to Sit Standby Assist   Sit to Supine Contact Guard Assist  (with use of leg )   Skilled Intervention   (blocked training on standard bed to simulate home environment)   Interdisciplinary Plan of Care Collaboration   Patient Position at End of Therapy Seated;Chair Alarm On;Self Releasing Lap Belt Applied;Call Light within Reach;Tray Table within Reach;Phone within Reach         Assessment  Patient had good tolerance to session with blocked txfr training with bed mobility with use of leg  to maximize carryover between sessions. She reports her  is building a new bed, lower than the one she previously had. She will bring measurements when the bed is built. She benefits from use of leg  to support bed mobility but this may not be necessary if her bed if significantly lower. Additionally, she demonstrates good adherence to spinal prx with complains of significant discomfort with TLSO.   Strengths: Able to follow instructions, Adequate strength, Alert and oriented, Effective communication skills, Good insight into deficits/needs, Independent prior level of function, Making steady progress towards goals, Motivated for self care and independence, Pleasant and cooperative, Supportive family, Willingly participates in therapeutic activities  Barriers: Decreased endurance,  Fatigue, Generalized weakness, Emotional lability, Impaired activity tolerance, Impaired balance, Limited mobility, Pain, Pain poorly managed    Plan  Cont overall strength/endurance and standing balance to improve ADL's/IADL's and functional mobility at FWW level while maintaining spinal precautions; pain mgmt     Pt lives in 1 story with RAINA Mackey, adult children live in [place], 1 threshold (3-4 inch) to enter, walk-in shower/chair/glass door, has FWW, 4WW, reacher    DME       Passport items to be completed:  Perform bathroom transfers, complete dressing, complete feeding, get ready for the day, prepare a simple meal, participate in household tasks, adapt home for safety needs, demonstrate home exercise program, complete caregiver training     Occupational Therapy Goals (Active)       Problem: Bathing       Dates: Start:  02/05/24         Goal: STG-Within one week, patient will bathe at [facility] using DME/AE PRN       Dates: Start:  02/05/24               Problem: Dressing       Dates: Start:  02/03/24         Goal: STG-Within one week, patient will dress UB at [facility] using DME/AE PRN.       Dates: Start:  02/03/24         Goal Note filed on 02/05/24 1614 by Amada Leary MS,OTR/L       Requires SBA              Goal: STG-Within one week, patient will dress LB at [facility] using DME/AE PRN       Dates: Start:  02/05/24               Problem: Functional Transfers       Dates: Start:  02/03/24         Goal: STG-Within one week, patient will transfer to toilet at SBA.       Dates: Start:  02/03/24         Goal Note filed on 02/05/24 1614 by Amada Leary MS,OTR/L       Requires CGA              Goal: STG-Within one week, patient will transfer to step in shower at SBA.       Dates: Start:  02/03/24         Goal Note filed on 02/05/24 1614 by Amada Leary MS,OTR/L       Not yet addressed                  Problem: IADL's       Dates: Start:  02/05/24         Goal: STG-Within one week, patient will access kitchen area  at [facility] using DME/AE PRN       Dates: Start:  02/05/24               Problem: OT Long Term Goals       Dates: Start:  02/03/24         Goal: LTG-By discharge, patient will complete basic self care tasks at [facility].       Dates: Start:  02/03/24            Goal: LTG-By discharge, patient will perform bathroom transfers  at [facility].       Dates: Start:  02/03/24               Problem: Toileting       Dates: Start:  02/05/24         Goal: STG-Within one week, patient will complete toileting tasks at [facility] using DME/AE PRN       Dates: Start:  02/05/24

## 2024-02-07 ENCOUNTER — APPOINTMENT (OUTPATIENT)
Dept: OCCUPATIONAL THERAPY | Facility: REHABILITATION | Age: 69
DRG: 052 | End: 2024-02-07
Attending: PHYSICAL MEDICINE & REHABILITATION
Payer: MEDICARE

## 2024-02-07 ENCOUNTER — APPOINTMENT (OUTPATIENT)
Dept: PHYSICAL THERAPY | Facility: REHABILITATION | Age: 69
DRG: 052 | End: 2024-02-07
Attending: PHYSICAL MEDICINE & REHABILITATION
Payer: MEDICARE

## 2024-02-07 PROCEDURE — 97535 SELF CARE MNGMENT TRAINING: CPT

## 2024-02-07 PROCEDURE — 700101 HCHG RX REV CODE 250: Performed by: PHYSICAL MEDICINE & REHABILITATION

## 2024-02-07 PROCEDURE — 97116 GAIT TRAINING THERAPY: CPT

## 2024-02-07 PROCEDURE — 97530 THERAPEUTIC ACTIVITIES: CPT

## 2024-02-07 PROCEDURE — 700102 HCHG RX REV CODE 250 W/ 637 OVERRIDE(OP): Performed by: PHYSICAL MEDICINE & REHABILITATION

## 2024-02-07 PROCEDURE — A9270 NON-COVERED ITEM OR SERVICE: HCPCS | Performed by: STUDENT IN AN ORGANIZED HEALTH CARE EDUCATION/TRAINING PROGRAM

## 2024-02-07 PROCEDURE — 770010 HCHG ROOM/CARE - REHAB SEMI PRIVAT*

## 2024-02-07 PROCEDURE — A9270 NON-COVERED ITEM OR SERVICE: HCPCS | Performed by: PHYSICAL MEDICINE & REHABILITATION

## 2024-02-07 PROCEDURE — 99232 SBSQ HOSP IP/OBS MODERATE 35: CPT | Performed by: PHYSICAL MEDICINE & REHABILITATION

## 2024-02-07 PROCEDURE — 97110 THERAPEUTIC EXERCISES: CPT

## 2024-02-07 PROCEDURE — 700102 HCHG RX REV CODE 250 W/ 637 OVERRIDE(OP): Performed by: STUDENT IN AN ORGANIZED HEALTH CARE EDUCATION/TRAINING PROGRAM

## 2024-02-07 PROCEDURE — 700111 HCHG RX REV CODE 636 W/ 250 OVERRIDE (IP): Mod: JZ | Performed by: PHYSICAL MEDICINE & REHABILITATION

## 2024-02-07 RX ORDER — GABAPENTIN 100 MG/1
200 CAPSULE ORAL 2 TIMES DAILY
Status: DISCONTINUED | OUTPATIENT
Start: 2024-02-07 | End: 2024-02-12

## 2024-02-07 RX ORDER — GABAPENTIN 300 MG/1
300 CAPSULE ORAL EVERY EVENING
Status: DISCONTINUED | OUTPATIENT
Start: 2024-02-07 | End: 2024-02-13 | Stop reason: HOSPADM

## 2024-02-07 RX ORDER — OXYCODONE HYDROCHLORIDE 10 MG/1
10 TABLET ORAL 2 TIMES DAILY
Status: DISCONTINUED | OUTPATIENT
Start: 2024-02-07 | End: 2024-02-09

## 2024-02-07 RX ORDER — BACLOFEN 10 MG/1
10 TABLET ORAL 2 TIMES DAILY
Status: DISCONTINUED | OUTPATIENT
Start: 2024-02-07 | End: 2024-02-08

## 2024-02-07 RX ORDER — BACLOFEN 10 MG/1
10 TABLET ORAL
Status: DISCONTINUED | OUTPATIENT
Start: 2024-02-07 | End: 2024-02-07

## 2024-02-07 RX ADMIN — ATORVASTATIN CALCIUM 20 MG: 10 TABLET, FILM COATED ORAL at 20:03

## 2024-02-07 RX ADMIN — OXYCODONE 7.5 MG: 5 TABLET ORAL at 16:25

## 2024-02-07 RX ADMIN — GABAPENTIN 400 MG: 400 CAPSULE ORAL at 07:58

## 2024-02-07 RX ADMIN — OXYCODONE 7.5 MG: 5 TABLET ORAL at 20:06

## 2024-02-07 RX ADMIN — GABAPENTIN 300 MG: 300 CAPSULE ORAL at 20:03

## 2024-02-07 RX ADMIN — BACLOFEN 10 MG: 10 TABLET ORAL at 20:03

## 2024-02-07 RX ADMIN — OXYCODONE 7.5 MG: 5 TABLET ORAL at 05:13

## 2024-02-07 RX ADMIN — LIDOCAINE 1 PATCH: 4 PATCH TOPICAL at 11:28

## 2024-02-07 RX ADMIN — BACLOFEN 10 MG: 10 TABLET ORAL at 11:28

## 2024-02-07 RX ADMIN — GABAPENTIN 200 MG: 100 CAPSULE ORAL at 11:27

## 2024-02-07 RX ADMIN — ENOXAPARIN SODIUM 40 MG: 100 INJECTION SUBCUTANEOUS at 20:04

## 2024-02-07 RX ADMIN — OMEPRAZOLE 20 MG: 20 CAPSULE, DELAYED RELEASE ORAL at 07:58

## 2024-02-07 RX ADMIN — OXYCODONE HYDROCHLORIDE 10 MG: 10 TABLET ORAL at 11:28

## 2024-02-07 RX ADMIN — ACETAMINOPHEN 1000 MG: 500 TABLET, FILM COATED ORAL at 05:09

## 2024-02-07 RX ADMIN — OXYCODONE 7.5 MG: 5 TABLET ORAL at 08:02

## 2024-02-07 RX ADMIN — Medication 2000 UNITS: at 07:58

## 2024-02-07 ASSESSMENT — PAIN SCALES - WONG BAKER
WONGBAKER_NUMERICALRESPONSE: HURTS A LITTLE MORE

## 2024-02-07 ASSESSMENT — ACTIVITIES OF DAILY LIVING (ADL)
TOILET_TRANSFER_DESCRIPTION: GRAB BAR;INCREASED TIME;SUPERVISION FOR SAFETY;VERBAL CUEING;INITIAL PREPARATION FOR TASK
TOILET_TRANSFER_DESCRIPTION: GRAB BAR;INCREASED TIME;VERBAL CUEING

## 2024-02-07 ASSESSMENT — GAIT ASSESSMENTS
DEVIATION: BRADYKINETIC;ANTALGIC
GAIT LEVEL OF ASSIST: SUPERVISED
DISTANCE (FEET): 250
ASSISTIVE DEVICE: FRONT WHEEL WALKER

## 2024-02-07 NOTE — PROGRESS NOTES
"  Physical Medicine & Rehabilitation Progress Note    Encounter Date: 2/7/2024    Chief Complaint: Weakness    Interval Events (Subjective):  Seen in chair. Pain tolerable. Sleep continues to be an issues. Otherwise participating in therapy    Objective:  VITAL SIGNS: /58   Pulse 88   Temp 37.1 °C (98.8 °F) (Oral)   Resp 18   Ht 1.499 m (4' 11\")   Wt 102 kg (224 lb)   SpO2 99%   BMI 45.24 kg/m²   Gen: No acute distress, well developed well nourished adult  HEENT: Normal Cephalic Atraumatic, Normal conjunctiva.   CV: warm extremities, well perfused, no edema  Resp: symmetric chest rise, breathing comfortably on room air  Abd: Soft, Non distended  Extremities: normal bulk, no atrophy  Skin: no visible rashes or lesions.   Neuro: alert, awake  Psych: Mood and affect appropriate and congruent    Laboratory Values:  No results found for this or any previous visit (from the past 72 hour(s)).    Medications:  Scheduled Medications   Medication Dose Frequency    lidocaine  1 Patch Q24HR    gabapentin  400 mg BID    baclofen  5 mg QHS    vitamin D3  2,000 Units DAILY    Pharmacy Consult Request  1 Each PHARMACY TO DOSE    senna-docusate  2 Tablet Q EVENING    omeprazole  20 mg DAILY    acetaminophen  1,000 mg Q6HRS    enoxaparin (LOVENOX) injection  40 mg Q EVENING    atorvastatin  20 mg Q EVENING    gabapentin  600 mg Q EVENING     PRN medications: oxyCODONE immediate-release **OR** oxyCODONE immediate-release, baclofen, capsaicin, Respiratory Therapy Consult, senna-docusate **AND** polyethylene glycol/lytes, mag hydrox-al hydrox-simeth, ondansetron **OR** ondansetron, traZODone, sodium chloride, acetaminophen **FOLLOWED BY** acetaminophen    Diet:  Current Diet Order   Procedures    Diet Order Diet: Regular       Medical Decision Making and Plan:  Lumbar stenosis   S/p lumbar fusion   Lumbar fracture   - history of back pain with radicuopathy   - no MRI to review   - 1/23 stage 1 L4-S1 ALIF by Dr. Rodriguez   1/25 " " left L2/3 XLIF by Dr. Rodriguez   1/30 redo L2-3 laminotomy and L2-S1 perc fusion by Dr. Rodriguez   - LSO when OOB   - most recent CT L spine obtained on 1/31 showed oblique nondisplaced L5 vertebral body fracture   - comprehnsive IRF level therapy with 3hrs of therapy 5 days per week      Hypoxia   - hypoxia, worse with anesthesia, causing 3rd surgery to be aborted   - now on albuterol   - stable on 1 L o2  , weaned down to RA   - CT chest negative for PE      ABLA   - post op drop in Hgb   - 2/1 Hgb 10.7  -> 10.5 2/4      HTN   - 2/5 BP well controlled   -2/6- Decontinue Lisinopril 10mg Daily      Vit D deficiency   - Vit D 25  - continue with supplementation      Hypocalcemia  - transferred on calcitrate   - Ca WNL, stopping  calcitrate      Pain  - was on IV diluadid until 2/1 AM   - on scheduled Tylenol and PRN oxycodone   - gabapentin 600mg qhs , additional 300mt BID started 2/4  -2/6 increase gabapentin to 400mg BID and 600mg at night  - flexeril 10mg q8hrs prn   - pain is located  LLE \"deep to shin\"  -Follow up on bilateral dopplers  -2/6- increase oxycodone from 5mg to 7.5mg PRN    Insomnia  2/6 Started Baclofen 5mg QHS  -3/7 poor sleep increased to Baclofen 10mg QHS     Bowel  - constipation prevention with scheduled senna   - bowel meds PRN   - Last BM 2/4       Bladder   - timed void to prevent falls and incont epsidoes   -bladder scan if no void > 6hrs, PVR, and cath if retaining > 200ml   - PVR 10      Morbid Obesity   - weight loss education      Skin - Patient at risk for skin breakdown due to debility in areas including sacrum, achilles, elbows and head in addition to other sites. Nursing to assess skin daily.      GI Ppx - Patient on Prilosec for GERD prophylaxis. Patient on Senna-docusate for constipation prophylaxis.      DVT Ppx: continue lovenox    ____________________________________    Ward Boone MD  Physical Medicine & Rehabilitation   Brain Injury Medicine "   ____________________________________

## 2024-02-07 NOTE — PROGRESS NOTES
Patient care assumed. Report received from [facility] DARIN Madrigal. Patient is alert and calm, resting in bed. Call light and bedside table within reach. Will continue to monitor.

## 2024-02-07 NOTE — PROGRESS NOTES
NURSING DAILY NOTE    Name: Dee Armstrong   Date of Admission: 2/2/2024   Admitting Diagnosis: Lumbar radiculopathy  Attending Physician: Sobeida Cruz D.o.  Allergies: Patient has no known allergies.    Safety  Patient Assist  Min A  Patient Precautions  Fall Risk, Spinal / Back Precautions , Lumbosacral Orthosis  Precaution Comments  Strong fear of needles  Bed Transfer Status  Standby Assist  Toilet Transfer Status   Standby Assist (FWW level)  Assistive Devices  Wheelchair  Oxygen  None - Room Air  Diet/Therapeutic Dining  Current Diet Order   Procedures    Diet Order Diet: Regular     Pill Administration  whole  Agitated Behavioral Scale     ABS Level of Severity       Fall Risk  Has the patient had a fall this admission?   No  Kalyani Monroe Fall Risk Scoring  11, MODERATE RISK  Fall Risk Safety Measures  bed alarm and chair alarm    Vitals  Temperature: 36.2 °C (97.2 °F)  Temp src: Oral  Pulse: 80  Respiration: 18  Blood Pressure : 120/60  Blood Pressure MAP (Calculated): 80 MM HG  BP Location: Right, Upper Arm  Patient BP Position: Supine     Oxygen  Pulse Oximetry: 94 %  O2 (LPM): 0  O2 Delivery Device: None - Room Air    Bowel and Bladder  Last Bowel Movement  02/05/24  Stool Type  Patient stated  Bowel Device     Continent  Bladder: Did not void   Bowel: No movement  Bladder Function  Urine Void (mL):  (Large)  Number of Times Voided: 1  Urine Color: Unable To Evaluate  Number of Times Incontinent of Urine: 0  Genitourinary Assessment   Bladder Assessment (WDL):  Within Defined Limits  Urine Color: Unable To Evaluate  Number of Bladder Accidents: 0  Total Number of Bladder of Accidents in Last 7 Days: 0  Number of Times Incontinent of Urine: 0  Bladder Device: Bathroom  Time Void: No  Bladder Scan: Post Void  $ Bladder Scan Results (mL): 10    Skin  Vlad Score   19  Sensory Interventions   Bed Types: Standard/Trauma Mattress  Skin  Preventative Measures: Waffle Overlay  Moisture Interventions  Moisturizers/Barriers: Moisturizer       Pain  Pain Rating Scale  7 - Focus of attention, prevents doing daily activities  Pain Location  Back  Pain Location Orientation  Lower  Pain Interventions   Medication (see MAR)    ADLs    Bathing   Staff, Shower  Linen Change      Personal Hygiene  Moist Sammie Wipes, Perineal Care  Chlorhexidine Bath      Oral Care     Teeth/Dentures     Shave     Nutrition Percentage Eaten  *  * Meal *  *, Breakfast, Between % Consumed  Environmental Precautions  Treaded Slipper Socks on Patient  Patient Turns/Positioning  Patient Turns Self from Side to Side  Patient Turns Assistance/Tolerance     Bed Positions  Bed Controls On, Bed Locked  Head of Bed Elevated  Self regulated      Psychosocial/Neurologic Assessment  Psychosocial Assessment  Psychosocial (WDL):  WDL Except  Patient Behaviors: Restless  Family Behaviors: Family Present  Neurologic Assessment  Neuro (WDL): Exceptions to WDL  Level of Consciousness: Alert  Orientation Level: Oriented X4  Cognition: Follows commands  Speech: Clear  RUE Sensation: Full sensation  Muscle Strength Right Arm: Good Strength Against Gravity and Moderate Resistance  LUE Sensation: Full sensation  Muscle Strength Left Arm: Good Strength Against Gravity and Moderate Resistance  RLE Sensation: Numbness (states from Sx 7 yrs ago)  Muscle Strength Right Leg: Fair Strength against Gravity but No Resistance  LLE Motor Response: Spastic  LLE Sensation: Tingling, Pain (Pt states from this Sx)  Muscle Strength Left Leg: Weak Movement but Not Against Gravity or Resistance  EENT (WDL):  WDL Except    Cardio/Pulmonary Assessment  Edema      Respiratory Breath Sounds     Cardiac Assessment   Cardiac (WDL):  WDL Except (Hx of HTN)

## 2024-02-07 NOTE — PROGRESS NOTES
..                                                         NURSING DAILY NOTE    Name: Dee Armstrong   Date of Admission: 2/2/2024   Admitting Diagnosis: Lumbar radiculopathy  Attending Physician: Sobeida Cruz D.o.  Allergies: Patient has no known allergies.    Safety  Patient Assist  [facility]  Patient Precautions  Fall Risk, Spinal / Back Precautions , Lumbosacral Orthosis  Precaution Comments  Strong fear of needles  Bed Transfer Status  Standby Assist  Toilet Transfer Status   Standby Assist (FWW level)  Assistive Devices  Rails, Wheelchair  Oxygen  None - Room Air  Diet/Therapeutic Dining  Current Diet Order   Procedures    Diet Order Diet: Regular     Pill Administration  whole  Agitated Behavioral Scale     ABS Level of Severity       Fall Risk  Has the patient had a fall this admission?   No  Kalyani Monroe Fall Risk Scoring  11, MODERATE RISK  Fall Risk Safety Measures  bed alarm, chair alarm, poor balance, and ok to leave pt in bathroom    Vitals  Temperature: 36.2 °C (97.2 °F)  Temp src: Oral  Pulse: 80  Respiration: 18  Blood Pressure : 120/60  Blood Pressure MAP (Calculated): 80 MM HG  BP Location: Right, Upper Arm  Patient BP Position: Supine     Oxygen  Pulse Oximetry: 94 %  O2 (LPM): 0  O2 Delivery Device: None - Room Air    Bowel and Bladder  Last Bowel Movement  02/05/24  Stool Type  Patient stated  Bowel Device     Continent  Bladder: Did not void   Bowel: No movement  Bladder Function  Urine Void (mL):  (large)  Number of Times Voided: 1  Urine Color: Unable To Evaluate  Number of Times Incontinent of Urine: 0  Genitourinary Assessment   Bladder Assessment (WDL):  Within Defined Limits  Urine Color: Unable To Evaluate  Number of Bladder Accidents: 0  Total Number of Bladder of Accidents in Last 7 Days: 0  Number of Times Incontinent of Urine: 0  Bladder Device: Bathroom  Time Void: No  Bladder Scan: Post Void  $ Bladder Scan Results (mL): 10    Skin  Vlad Score   19  Sensory Interventions    Bed Types: Standard/Trauma Mattress with Overlay  Skin Preventative Measures: Waffle Overlay, Pillows in Use to Float Heels, Pillows in Use for Support / Positioning  Moisture Interventions  Moisturizers/Barriers: Moisturizer       Pain  Pain Rating Scale  9 - Can't bear the pain, unable to do anything  Pain Location  Back, Leg  Pain Location Orientation  Left, Lower  Pain Interventions   Medication (see MAR)    ADLs    Bathing   Staff, Shower  Linen Change      Personal Hygiene  Moist Sammie Wipes, Perineal Care  Chlorhexidine Bath      Oral Care  Brushed Teeth  Teeth/Dentures     Shave     Nutrition Percentage Eaten  *  * Meal *  *, Breakfast, Between % Consumed  Environmental Precautions  Treaded Slipper Socks on Patient  Patient Turns/Positioning  Patient Turns Self from Side to Side  Patient Turns Assistance/Tolerance     Bed Positions  Bed Controls On, Bed Locked  Head of Bed Elevated  Self regulated      Psychosocial/Neurologic Assessment  Psychosocial Assessment  Psychosocial (WDL):  Within Defined Limits  Patient Behaviors: Restless  Family Behaviors: Family Present  Neurologic Assessment  Neuro (WDL): Exceptions to WDL  Level of Consciousness: Alert  Orientation Level: Oriented X4  Cognition: Appropriate judgement, Appropriate safety awareness  Speech: Clear  RUE Sensation: Full sensation  Muscle Strength Right Arm: Good Strength Against Gravity and Moderate Resistance  LUE Sensation: Full sensation  Muscle Strength Left Arm: Good Strength Against Gravity and Moderate Resistance  RLE Sensation: Numbness (states from Sx 7 yrs ago)  Muscle Strength Right Leg: Good Strength Against Gravity and Moderate Resistance  LLE Motor Response: Spastic  LLE Sensation: Tingling, Pain (Pt states from this Sx)  Muscle Strength Left Leg: Good Strength Against Gravity and Moderate Resistance  EENT (WDL):  WDL Except    Cardio/Pulmonary Assessment  Edema      Respiratory Breath Sounds     Cardiac Assessment   Cardiac  (WDL):  WDL Except (Hx of HTN)

## 2024-02-07 NOTE — THERAPY
"Occupational Therapy  Daily Treatment     Patient Name: Dee Armstrong  Age:  68 y.o., Sex:  female  Medical Record #: [id number]  Today's Date: 2/7/2024     Precautions  Precautions: Fall Risk, Spinal / Back Precautions , Lumbosacral Orthosis  Comments: Strong fear of needles         Subjective  Patient seated in w/c upon arrival, agreeable to OT session. \"If you asked me to do that last night, it wouldn't go so well, you would have heard me screaming.\" Continuing to report back pain but with improvements compared to last night.      Objective     02/07/24 1301   OT Charge Group   OT Self Care / ADL (Units) 1   OT Therapeutic Exercise (Units) 1   OT Total Time Spent   OT Individual Total Time Spent (Mins) 30   Functional Level of Assist   Toileting Standby Assist  (sup for seated BM and hygiene; SBA for pant management)   Toileting Description Adaptive equipment;Increased time;Grab bar;Initial preparation for task;Supervision for safety;Verbal cueing   Toilet Transfers Contact Guard Assist  (w/c <> toilet via GB)   Toilet Transfer Description Grab bar;Increased time;Supervision for safety;Verbal cueing;Initial preparation for task   Sitting Upper Body Exercises   Sitting Upper Body Exercises Yes   Chest Fly 1 set of 10;Medium Resistance Theraband  (while seated in w/c with cues for pursed lip breathing)   Interdisciplinary Plan of Care Collaboration   Patient Position at End of Therapy Seated;Chair Alarm On;Self Releasing Lap Belt Applied;Call Light within Reach;Tray Table within Reach         Assessment  Patient provided with increased assist and use of GB versus FWW on this date/session for toileting tasks due to increased back pain and decreased activity tolerance. Patient continues to be motivated and hardworking, however, she presents with some anxiety, fear, and general emotional lability. She is receptive to any and all strategies for pain management. She benefited from pursed lip breathing instruction " during UE strengthening exercises to assist with stress tolerance.     Strengths: Able to follow instructions, Adequate strength, Alert and oriented, Effective communication skills, Good insight into deficits/needs, Independent prior level of function, Making steady progress towards goals, Motivated for self care and independence, Pleasant and cooperative, Supportive family, Willingly participates in therapeutic activities  Barriers: Decreased endurance, Fatigue, Generalized weakness, Emotional lability, Impaired activity tolerance, Impaired balance, Limited mobility, Pain, Pain poorly managed    Plan    Cont overall strength/endurance and standing balance to improve ADL's/IADL's and functional mobility at FWW level while maintaining spinal precautions; pain mgmt     Pt lives in 1 story with RAINA Mackey, adult children live in [place], 1 threshold (3-4 inch) to enter, walk-in shower/chair/glass door, has FWW, 4WW, reacher    DME       Passport items to be completed:  Perform bathroom transfers, complete dressing, complete feeding, get ready for the day, prepare a simple meal, participate in household tasks, adapt home for safety needs, demonstrate home exercise program, complete caregiver training     Occupational Therapy Goals (Active)       Problem: Bathing       Dates: Start:  02/05/24         Goal: STG-Within one week, patient will bathe at [facility] using DME/AE PRN       Dates: Start:  02/05/24               Problem: Dressing       Dates: Start:  02/03/24         Goal: STG-Within one week, patient will dress UB at [facility] using DME/AE PRN.       Dates: Start:  02/03/24         Goal Note filed on 02/05/24 1614 by Amada Leary MS,OTR/L       Requires SBA              Goal: STG-Within one week, patient will dress LB at [facility] using DME/AE PRN       Dates: Start:  02/05/24               Problem: Functional Transfers       Dates: Start:  02/03/24         Goal: STG-Within one week, patient will transfer to toilet at [facility].        Dates: Start:  02/03/24         Goal Note filed on 02/05/24 1614 by Amada Leary, MS,OTR/L       Requires CGA              Goal: STG-Within one week, patient will transfer to step in shower at [facility].       Dates: Start:  02/03/24         Goal Note filed on 02/05/24 1614 by Amada Leary, MS,OTR/L       Not yet addressed                  Problem: IADL's       Dates: Start:  02/05/24         Goal: STG-Within one week, patient will access kitchen area at [facility] using DME/AE PRN       Dates: Start:  02/05/24               Problem: OT Long Term Goals       Dates: Start:  02/03/24         Goal: LTG-By discharge, patient will complete basic self care tasks at [facility].       Dates: Start:  02/03/24            Goal: LTG-By discharge, patient will perform bathroom transfers  at [facility].       Dates: Start:  02/03/24               Problem: Toileting       Dates: Start:  02/05/24         Goal: STG-Within one week, patient will complete toileting tasks at [facility] using DME/AE PRN       Dates: Start:  02/05/24

## 2024-02-07 NOTE — CARE PLAN
The patient is Stable - Low risk of patient condition declining or worsening    Shift Goals  Clinical Goals: safety  Patient Goals: safety    Problem: Pain - Standard  Goal: Alleviation of pain or a reduction in pain to the patient’s comfort goal  Outcome: Progressing patient having increased pain in right lower back, Dr Boone aware, patient given scheduled and prn oxicodone with good results.     Problem: Mobility  Goal: Patient's capacity to carry out activities will improve  Outcome: Progressing patient has been up participating in therapies, educated to take rest periods in between to avoid fatigue.

## 2024-02-07 NOTE — THERAPY
Occupational Therapy  Daily Treatment     Patient Name: Dee Armstrong  Age:  68 y.o., Sex:  female  Medical Record #: [id number]  Today's Date: 2/7/2024     Precautions  Precautions: Fall Risk, Spinal / Back Precautions , Lumbosacral Orthosis  Comments: Strong fear of needles         Subjective    Pt on toilet upon arrival, pleasant and cooperative, agreeable to therapy. Pt reports significant pain since last night after rolling in bed and had immediate sharp pain. Could not tolerate sleeping in bed, therefore slept in w/c starting at 3am.        Objective       02/07/24 0931   OT Charge Group   OT Self Care / ADL (Units) 1   OT Therapy Activity (Units) 2   OT Therapeutic Exercise (Units) 1   OT Total Time Spent   OT Individual Total Time Spent (Mins) 60   Functional Level of Assist   Grooming Supervision;Seated   Toileting Moderate Assist   Toilet Transfers Minimal Assist  (stand pivot w/c<>toilet with GB)   Sitting Upper Body Exercises   Internal Shoulder Rotation 1 set of 10;Bilateral;Weight (See Comments for lbs)  (3lb dumbbell)   Bicep Curls 1 set of 10;Bilateral;Weight (See Comments for lbs)  (3lb dumbbell)   Interdisciplinary Plan of Care Collaboration   Patient Position at End of Therapy Seated;Call Light within Reach;Tray Table within Reach;Chair Alarm On;Self Releasing Lap Belt Applied     Edu on relaxation techniques: awareness of tension throughout body - start with trying to relax clinching at jaw and relaxing , use of hot/cold pack modalities on lower back throughout day, SO can massage back (not over dressing and incisions areas), listening to music/reading/or other interests. Cold pack was provided x 20 min at lower back which pt tolerated well. Collaborated with MD and RN re: scheduled pain meds    Assessment    Pt with new severe pain at R mid-lower back paraspinal/QL region, pain sounds muscular related. Edu provided re: pain mgmt and progression, relaxation techniques which pt was open to  trying. New pain is impacting her bathroom transfer safety      Strengths: Able to follow instructions, Adequate strength, Alert and oriented, Effective communication skills, Good insight into deficits/needs, Independent prior level of function, Making steady progress towards goals, Motivated for self care and independence, Pleasant and cooperative, Supportive family, Willingly participates in therapeutic activities  Barriers: Decreased endurance, Fatigue, Generalized weakness, Emotional lability, Impaired activity tolerance, Impaired balance, Limited mobility, Pain, Pain poorly managed    Plan    Cont overall strength/endurance and standing balance to improve ADL's/IADL's and functional mobility at FWW level while maintaining spinal precautions; pain mgmt     Pt lives in 1 story with RAINA Mackey, adult children live in [place], 1 threshold (3-4 inch) to enter, walk-in shower/chair/glass door, has FWW, 4WW, reacher     DME     - recommended bathroom DME: grab bars at shower/toilet, shower chair  - recommended hip kit AE: reacher, LH sponge, LH lotion application, LH shoehorn    Occupational Therapy Goals (Active)       Problem: Bathing       Dates: Start:  02/05/24         Goal: STG-Within one week, patient will bathe at [facility] using [facility]/AE PRN       Dates: Start:  02/05/24               Problem: Dressing       Dates: Start:  02/03/24         Goal: STG-Within one week, patient will dress UB at [facility] using DME/AE PRN.       Dates: Start:  02/03/24         Goal Note filed on 02/05/24 1614 by Amada Leary MS,OTR/L       Requires SBA              Goal: STG-Within one week, patient will dress LB at [facility] using DME/AE PRN       Dates: Start:  02/05/24               Problem: Functional Transfers       Dates: Start:  02/03/24         Goal: STG-Within one week, patient will transfer to toilet at [facility].       Dates: Start:  02/03/24         Goal Note filed on 02/05/24 1614 by Amada Leary MS,OTR/L       Requires CGA               Goal: STG-Within one week, patient will transfer to step in shower at [facility].       Dates: Start:  02/03/24         Goal Note filed on 02/05/24 1614 by Amada Leary MS,OTR/L       Not yet addressed                  Problem: IADL's       Dates: Start:  02/05/24         Goal: STG-Within one week, patient will access kitchen area at [facility] using DME/AE PRN       Dates: Start:  02/05/24               Problem: OT Long Term Goals       Dates: Start:  02/03/24         Goal: LTG-By discharge, patient will complete [facility] self care tasks at [facility]       Dates: Start:  02/03/24            Goal: LTG-By discharge, patient will perform bathroom transfers  at [facility]       Dates: Start:  02/03/24               Problem: Toileting       Dates: Start:  02/05/24         Goal: STG-Within one week, patient will complete toileting tasks at hospitals using DME/AE PRN       Dates: Start:  02/05/24

## 2024-02-08 ENCOUNTER — APPOINTMENT (OUTPATIENT)
Dept: OCCUPATIONAL THERAPY | Facility: REHABILITATION | Age: 69
DRG: 052 | End: 2024-02-08
Attending: PHYSICAL MEDICINE & REHABILITATION
Payer: MEDICARE

## 2024-02-08 ENCOUNTER — APPOINTMENT (OUTPATIENT)
Dept: PHYSICAL THERAPY | Facility: REHABILITATION | Age: 69
DRG: 052 | End: 2024-02-08
Attending: PHYSICAL MEDICINE & REHABILITATION
Payer: MEDICARE

## 2024-02-08 PROCEDURE — 700102 HCHG RX REV CODE 250 W/ 637 OVERRIDE(OP): Performed by: PHYSICAL MEDICINE & REHABILITATION

## 2024-02-08 PROCEDURE — A9270 NON-COVERED ITEM OR SERVICE: HCPCS | Performed by: STUDENT IN AN ORGANIZED HEALTH CARE EDUCATION/TRAINING PROGRAM

## 2024-02-08 PROCEDURE — 97110 THERAPEUTIC EXERCISES: CPT

## 2024-02-08 PROCEDURE — 700111 HCHG RX REV CODE 636 W/ 250 OVERRIDE (IP): Mod: JZ | Performed by: PHYSICAL MEDICINE & REHABILITATION

## 2024-02-08 PROCEDURE — 700102 HCHG RX REV CODE 250 W/ 637 OVERRIDE(OP): Performed by: STUDENT IN AN ORGANIZED HEALTH CARE EDUCATION/TRAINING PROGRAM

## 2024-02-08 PROCEDURE — 97116 GAIT TRAINING THERAPY: CPT

## 2024-02-08 PROCEDURE — 770010 HCHG ROOM/CARE - REHAB SEMI PRIVAT*

## 2024-02-08 PROCEDURE — 97530 THERAPEUTIC ACTIVITIES: CPT

## 2024-02-08 PROCEDURE — 99232 SBSQ HOSP IP/OBS MODERATE 35: CPT | Performed by: PHYSICAL MEDICINE & REHABILITATION

## 2024-02-08 PROCEDURE — 97535 SELF CARE MNGMENT TRAINING: CPT

## 2024-02-08 PROCEDURE — A9270 NON-COVERED ITEM OR SERVICE: HCPCS | Performed by: PHYSICAL MEDICINE & REHABILITATION

## 2024-02-08 PROCEDURE — 97112 NEUROMUSCULAR REEDUCATION: CPT

## 2024-02-08 PROCEDURE — 700101 HCHG RX REV CODE 250: Performed by: PHYSICAL MEDICINE & REHABILITATION

## 2024-02-08 RX ORDER — BACLOFEN 10 MG/1
10 TABLET ORAL 3 TIMES DAILY
Status: DISCONTINUED | OUTPATIENT
Start: 2024-02-08 | End: 2024-02-13 | Stop reason: HOSPADM

## 2024-02-08 RX ADMIN — OXYCODONE 7.5 MG: 5 TABLET ORAL at 08:26

## 2024-02-08 RX ADMIN — OXYCODONE 7.5 MG: 5 TABLET ORAL at 13:31

## 2024-02-08 RX ADMIN — OXYCODONE HYDROCHLORIDE 10 MG: 10 TABLET ORAL at 06:29

## 2024-02-08 RX ADMIN — GABAPENTIN 300 MG: 300 CAPSULE ORAL at 20:12

## 2024-02-08 RX ADMIN — SENNOSIDES AND DOCUSATE SODIUM 2 TABLET: 50; 8.6 TABLET ORAL at 20:15

## 2024-02-08 RX ADMIN — OMEPRAZOLE 20 MG: 20 CAPSULE, DELAYED RELEASE ORAL at 08:20

## 2024-02-08 RX ADMIN — OXYCODONE 7.5 MG: 5 TABLET ORAL at 20:30

## 2024-02-08 RX ADMIN — LIDOCAINE 1 PATCH: 4 PATCH TOPICAL at 11:08

## 2024-02-08 RX ADMIN — GABAPENTIN 200 MG: 100 CAPSULE ORAL at 11:07

## 2024-02-08 RX ADMIN — BACLOFEN 10 MG: 10 TABLET ORAL at 14:24

## 2024-02-08 RX ADMIN — ENOXAPARIN SODIUM 40 MG: 100 INJECTION SUBCUTANEOUS at 20:13

## 2024-02-08 RX ADMIN — OXYCODONE 7.5 MG: 5 TABLET ORAL at 16:44

## 2024-02-08 RX ADMIN — POLYETHYLENE GLYCOL 3350 1 PACKET: 17 POWDER, FOR SOLUTION ORAL at 17:28

## 2024-02-08 RX ADMIN — Medication 2000 UNITS: at 08:20

## 2024-02-08 RX ADMIN — BACLOFEN 10 MG: 10 TABLET ORAL at 08:19

## 2024-02-08 RX ADMIN — GABAPENTIN 200 MG: 100 CAPSULE ORAL at 08:19

## 2024-02-08 RX ADMIN — OXYCODONE HYDROCHLORIDE 10 MG: 10 TABLET ORAL at 01:44

## 2024-02-08 RX ADMIN — BACLOFEN 10 MG: 10 TABLET ORAL at 20:13

## 2024-02-08 RX ADMIN — ATORVASTATIN CALCIUM 20 MG: 10 TABLET, FILM COATED ORAL at 20:13

## 2024-02-08 ASSESSMENT — GAIT ASSESSMENTS
DISTANCE (FEET): 250
GAIT LEVEL OF ASSIST: SUPERVISED
ASSISTIVE DEVICE: FRONT WHEEL WALKER

## 2024-02-08 NOTE — THERAPY
Occupational Therapy  Daily Treatment     Patient Name: Dee Armstrong  Age:  68 y.o., Sex:  female  Medical Record #: [id number]  Today's Date: 2/8/2024     Precautions  Precautions: Fall Risk, Spinal / Back Precautions , Lumbosacral Orthosis  Comments: Strong fear of needles         Subjective    Pt seated in w/c upon arrival, pleasant and cooperative, agreeable to therapy and shower     Objective       02/08/24 0901   OT Charge Group   OT Self Care / ADL (Units) 4   OT Total Time Spent   OT Individual Total Time Spent (Mins) 60   Functional Level of Assist   Grooming Modified Independent;Standing   Bathing Supervision   Upper Body Dressing Supervision   Lower Body Dressing Supervision   Toileting Supervision   Toilet Transfers Supervised  (With FWW)   Tub / Shower Transfers Supervised  (With FWW)   Interdisciplinary Plan of Care Collaboration   Patient Position at End of Therapy Seated;Call Light within Reach;Tray Table within Reach;Chair Alarm On;Self Releasing Lap Belt Applied     Functional mobility at FWW level: supervision room<>bathroom     Assessment    Pt completed shower routine at supervision for ADL's and bathroom transfers overall using FWW, shower bench, hand-held shower head, GB's, hip kit AE (reacher, LH sponge). Pt's functional performance was impacted by pain - but pt reports improvement with pain mgmt last night and today. Occasional distress throughout session, but otherwise pain tolerable    Strengths: Able to follow instructions, Adequate strength, Alert and oriented, Effective communication skills, Good insight into deficits/needs, Independent prior level of function, Making steady progress towards goals, Motivated for self care and independence, Pleasant and cooperative, Supportive family, Willingly participates in therapeutic activities  Barriers: Decreased endurance, Fatigue, Generalized weakness, Emotional lability, Impaired activity tolerance, Impaired balance, Limited mobility, Pain,  Pain poorly managed    Plan    Cont overall strength/endurance and standing balance to improve ADL's/IADL's and functional mobility at FWW level while maintaining spinal precautions; pain mgmt     Pt lives in 1 story with RAINA Mackey, adult children live in [place], 1 threshold (3-4 inch) to enter, walk-in shower/chair/glass door, has FWW, 4WW, reacher     DME     - recommended bathroom DME: grab bars at shower/toilet, shower chair  - recommended hip kit AE: reacher, LH sponge, LH lotion application, LH shoehorn    Occupational Therapy Goals (Active)       Problem: Bathing       Dates: Start:  02/05/24         Goal: STG-Within one week, patient will bathe at [facility] using DME/AE PRN       Dates: Start:  02/05/24               Problem: Dressing       Dates: Start:  02/03/24         Goal: STG-Within one week, patient will dress UB at [facility] using DME/AE PRN.       Dates: Start:  02/03/24         Goal Note filed on 02/05/24 1614 by Amada Leary MS,OTR/L       Requires SBA              Goal: STG-Within one week, patient will dress LB at [facility] using DME/AE PRN       Dates: Start:  02/05/24               Problem: Functional Transfers       Dates: Start:  02/03/24         Goal: STG-Within one week, patient will transfer to toilet at [facility].       Dates: Start:  02/03/24         Goal Note filed on 02/05/24 1614 by Amada Leary MS,OTR/L       Requires CGA              Goal: STG-Within one week, patient will transfer to step in shower at [facility].       Dates: Start:  02/03/24         Goal Note filed on 02/05/24 1614 by Amada Leary MS,OTR/L       Not yet addressed                  Problem: IADL's       Dates: Start:  02/05/24         Goal: STG-Within one week, patient will access kitchen area at [facility] using DME/AE PRN       Dates: Start:  02/05/24               Problem: OT Long Term Goals       Dates: Start:  02/03/24         Goal: LTG-By discharge, patient will complete basic self care tasks at [facility].       Dates: Start:   02/03/24            Goal: LTG-By discharge, patient will perform bathroom transfers  at [facility].       Dates: Start:  02/03/24               Problem: Toileting       Dates: Start:  02/05/24         Goal: STG-Within one week, patient will complete toileting tasks at [facility] using DME/AE PRN       Dates: Start:  02/05/24

## 2024-02-08 NOTE — THERAPY
"Occupational Therapy  Daily Treatment     Patient Name: Dee Armstrong  Age:  68 y.o., Sex:  female  Medical Record #: [id number]  Today's Date: 2/8/2024     Precautions  Precautions: Fall Risk, Spinal / Back Precautions , Lumbosacral Orthosis  Comments: Strong fear of needles         Subjective    \"I'm not sleeping great, but it's getting better.\"      Objective       02/08/24 1431   OT Charge Group   OT Therapy Activity (Units) 2   OT Total Time Spent   OT Individual Total Time Spent (Mins) 30   Interdisciplinary Plan of Care Collaboration   Patient Position at End of Therapy Seated;Chair Alarm On;Self Releasing Lap Belt Applied;Call Light within Reach;Tray Table within Reach;Phone within Reach     Dsicsussed pt's home set-up and DME:  - pt reported having all needed equipement for safe d/c home Tuesday.   - 4WW, FWW, TTB, will take leg  and LH sponge home,  ordered reacher and possibly LH shoe horn   - discussed pt's bed set-up- plans to have  measure bed height but bought a shorted box spring to bring down the height of the bed. Pt has step stool with non-skid decals on it. Discussed having pt practice bed xfer with step stool prior to d/c. Pt reported  bought bed rail and is going to install a hook next to bed to hand leg    - educated pt on the importance of adhering to BLT and wearing brace throughout the day   - pt reported not needing to work on cooking/cleaning/laundry-  will do all these tasks at discharge.   - discussed pt's sleep- pt reported bed was too hard- air let out of waffle mattress- educated pt on the importance of sleep and staying on top of pain medications   - discussed pts current levels with dressing- pt reported being able to use reacher and LH shoe horn to assist with dressing. Educated pt on making sure to put weaker leg (LLE) into pants first during dressing to increase ease/independence.     Assessment    Pt tolerated session well. Tx focused " on DME/AD/AE needed for safe d/c home- pt reported having all needed equipment and  has already ordered everything they might need. Pt reported her biggest barriers are LLE strength, standing balance and pain mgmt.     Strengths: Able to follow instructions, Adequate strength, Alert and oriented, Effective communication skills, Good insight into deficits/needs, Independent prior level of function, Making steady progress towards goals, Motivated for self care and independence, Pleasant and cooperative, Supportive family, Willingly participates in therapeutic activities  Barriers: Decreased endurance, Fatigue, Generalized weakness, Emotional lability, Impaired activity tolerance, Impaired balance, Limited mobility, Pain, Pain poorly managed    Plan    Cont overall strength/endurance and standing balance to improve ADL's/IADL's and functional mobility at FWW level while maintaining spinal precautions; pain mgmt     Pt lives in 1 story with RAINA Mackey, adult children live in [place], 1 threshold (3-4 inch) to enter, walk-in shower/chair/glass door, has FWW, 4WW, reacher     DME     - recommended bathroom DME: grab bars at shower/toilet, shower chair  - recommended hip kit AE: reacher, LH sponge, LH lotion application, LH shoehorn    Occupational Therapy Goals (Active)       Problem: Bathing       Dates: Start:  02/05/24         Goal: STG-Within one week, patient will bathe at [facility] using DME/AE PRN       Dates: Start:  02/05/24               Problem: Dressing       Dates: Start:  02/03/24         Goal: STG-Within one week, patient will dress UB at [facility] using DME/AE PRN.       Dates: Start:  02/03/24         Goal Note filed on 02/05/24 2804 by Amada Leary MS,OTR/L       Requires SBA              Goal: STG-Within one week, patient will dress LB at [facility] using DME/AE PRN       Dates: Start:  02/05/24               Problem: Functional Transfers       Dates: Start:  02/03/24         Goal: STG-Within one week, patient will  transfer to toilet at [facility] [place].       Dates: Start:  02/03/24         Goal Note filed on 02/05/24 1614 by Amada Leary, MS,OTR/L       Requires CGA              Goal: STG-Within one week, patient will transfer to step in shower at [facility] [place].       Dates: Start:  02/03/24         Goal Note filed on 02/05/24 1614 by Amada Leary, MS,OTR/L       Not yet addressed                  Problem: IADL's       Dates: Start:  02/05/24         Goal: STG-Within one week, patient will access kitchen area at [facility] using DME/AE PRN       Dates: Start:  02/05/24               Problem: OT Long Term Goals       Dates: Start:  02/03/24         Goal: LTG-By discharge, patient will complete basic self care tasks at [facility].       Dates: Start:  02/03/24            Goal: LTG-By discharge, patient will perform bathroom transfers  at [facility].       Dates: Start:  02/03/24               Problem: Toileting       Dates: Start:  02/05/24         Goal: STG-Within one week, patient will complete toileting tasks at [facility] using DME/AE PRN       Dates: Start:  02/05/24

## 2024-02-08 NOTE — PROGRESS NOTES
Patient care assumed. Report received from [facility] DARIN Madrigal. Patient is alert and calm, resting in bed. Call light and bedside table within reach. Will continue to monitor.

## 2024-02-08 NOTE — THERAPY
"Physical Therapy   Daily Treatment     Patient Name: Dee Armstrong  Age:  68 y.o., Sex:  female  Medical Record #: [id number]  Today's Date: 2/8/2024     Precautions  Precautions: Fall Risk, Spinal / Back Precautions , Lumbosacral Orthosis  Comments: Strong fear of needles    Subjective    Patient is agreeable to participate with therapy, wants to talk about discharge planning and to work on stability with walking.      Objective       02/08/24 1101 02/08/24 1301   PT Charge Group   PT Gait Training (Units)  --  2   PT Therapeutic Exercise (Units) 1 1   PT Neuromuscular Re-Education / Balance (Units)  --  1   PT Therapeutic Activities (Units) 1  --    PT Total Time Spent   PT Individual Total Time Spent (Mins) 30 60   Gait Functional Level of Assist    Gait Level Of Assist  --  Supervised   Assistive Device  --  Front Wheel Walker   Distance (Feet)  --  250  (+175 while strictly focusing on preventing trendelenburg, used green theraband to actively assist R hip with hip extension/abduction during stance phase (band around R hip, pulling her to the L with stance on R))   Deviation  --  Bradykinetic;Antalgic;Trendelenberg   Sitting Lower Body Exercises   Marching  --  1 set of 10   Hamstring Curl  --  2 sets of 10;Bilateral;Medium Resistance Theraband  (1 set with band, one set without)   Sit to Stand  --  2 sets of 10   Nustep Resistance Level 3  (5 min, 205 steps)  --    Standing Lower Body Exercises   Hip Abduction  --  1 set of 10;Bilateral   Heel Rise  --  1 set of 10;Bilateral   Other Exercises  --  hip hike x 10B from 4\" block with tactile cues at hips   Interdisciplinary Plan of Care Collaboration   IDT Collaboration with  ;Physician  --    Patient Position at End of Therapy Seated;Chair Alarm On;Self Releasing Lap Belt Applied  (in dining room for lunch) Seated;Self Releasing Lap Belt Applied;Call Light within Reach;Tray Table within Reach;Phone within Reach   Collaboration Comments spoke with " CM and MD re: patient's discharge date and DME update  --    PT DME Recommendations   Wheelchair   (no wheelchair necessary, has FWW. No DME needs)  --          Assessment    Patient requires active assist for correcting trendelenburg gait, is open and eager to practice HEP. Will benefit from further hip/ core strengthening for stability with standing/ gait as well as long term back pain reduction.     Strengths: Adequate strength, Alert and oriented, Effective communication skills, Good insight into deficits/needs, Independent prior level of function, Making steady progress towards goals, Motivated for self care and independence, Pleasant and cooperative, Supportive family, Willingly participates in therapeutic activities, Manages pain appropriately, Able to follow instructions  Barriers: Decreased endurance, Generalized weakness, Impaired activity tolerance, Impaired balance, Pain, Limited mobility    Plan    Balance, gait, curb step, bed mobility   Pain management  Strengthening  Repeat melchor, curb step    DME  PT DME Recommendations  Wheelchair:  (no wheelchair necessary, has FWW. No DME needs)    Passport items to be completed:  Get in/out of bed safely, in/out of a vehicle, safely use mobility device, walk or wheel around home/community, navigate up and down stairs, show how to get up/down from the ground, ensure home is accessible, demonstrate HEP, complete caregiver training    Physical Therapy Problems (Active)       Problem: Balance       Dates: Start:  02/03/24         Goal: STG-Within one week, patient will improve Melchor by 10 points (19/56 on eval)       Dates: Start:  02/03/24               Problem: Mobility       Dates: Start:  02/03/24         Goal: STG-Within one week, patient will ambulate up/down a curb with FWW and min assist       Dates: Start:  02/03/24               Problem: Mobility Transfers       Dates: Start:  02/03/24         Goal: STG-Within one week, patient will perform sit to supine via  log roll with supervision assist       Dates: Start:  02/03/24               Problem: PT-Long Term Goals       Dates: Start:  02/03/24         Goal: LTG-By discharge, patient will tolerate Cummings >36/56       Dates: Start:  02/03/24            Goal: LTG-By discharge, patient will ambulate 150 ft between seated rest breaks with FWW and supervision assist       Dates: Start:  02/03/24            Goal: LTG-By discharge, patient will perform home exercise program from handouts with set up assist       Dates: Start:  02/03/24            Goal: LTG-By discharge, patient will transfer in/out of a car with supervision assist and FWW       Dates: Start:  02/03/24

## 2024-02-08 NOTE — DISCHARGE PLANNING
CM met with patient to update on IDT and DC date of 2/13/24.  Discussed DC needs, WC, home health, follow ups.  CM will continue to monitor for DC needs.

## 2024-02-08 NOTE — PROGRESS NOTES
..                                                         NURSING DAILY NOTE    Name: Dee Armstrong   Date of Admission: 2/2/2024   Admitting Diagnosis: Lumbar radiculopathy  Attending Physician: Sobeida Cruz D.o.  Allergies: Patient has no known allergies.    Safety  Patient Assist  MIn assist  Patient Precautions  Fall Risk, Spinal / Back Precautions , Lumbosacral Orthosis  Precaution Comments  Strong fear of needles  Bed Transfer Status  Standby Assist  Toilet Transfer Status   Contact Guard Assist  Assistive Devices  Wheelchair  Oxygen  None - Room Air  Diet/Therapeutic Dining  Current Diet Order   Procedures    Diet Order Diet: Regular     Pill Administration  whole  Agitated Behavioral Scale     ABS Level of Severity       Fall Risk  Has the patient had a fall this admission?   No  Kalyani Monroe Fall Risk Scoring  11, MODERATE RISK  Fall Risk Safety Measures  bed alarm, chair alarm, poor balance, and ok to leave pt in bathroom    Vitals  Temperature: 36.8 °C (98.2 °F)  Temp src: Oral  Pulse: 77  Respiration: 18  Blood Pressure : 120/60  Blood Pressure MAP (Calculated): 80 MM HG  BP Location: Right, Upper Arm  Patient BP Position: Supine     Oxygen  Pulse Oximetry: 97 %  O2 (LPM): 0  O2 Delivery Device: None - Room Air    Bowel and Bladder  Last Bowel Movement  02/07/24  Stool Type  Type 4: Like a sausage or snake, smooth and soft  Bowel Device     Continent  Bladder: Did not void   Bowel: No movement  Bladder Function  Urine Void (mL):  (large)  Number of Times Voided: 1  Urine Color: Unable To Evaluate  Number of Times Incontinent of Urine: 0  Genitourinary Assessment   Bladder Assessment (WDL):  Within Defined Limits  Urine Color: Unable To Evaluate  Number of Bladder Accidents: 0  Total Number of Bladder of Accidents in Last 7 Days: 0  Number of Times Incontinent of Urine: 0  Bladder Device: Bathroom  Time Void: No  Bladder Scan: Post Void  $ Bladder Scan Results (mL): 10    Skin  Vlad Score    19  Sensory Interventions   Bed Types: Standard/Trauma Mattress with Overlay  Skin Preventative Measures: Waffle Overlay, Pillows in Use to Float Heels, Pillows in Use for Support / Positioning  Moisture Interventions  Moisturizers/Barriers: Barrier Wipes      Pain  Pain Rating Scale  6 - Hard to ignore, avoid usual activities  Pain Location  Back, Leg  Pain Location Orientation  Left, Lower  Pain Interventions   Medication (see MAR)    ADLs    Bathing   Staff, Shower  Linen Change      Personal Hygiene  Moist Sammie Wipes, Perineal Care  Chlorhexidine Bath      Oral Care  Brushed Teeth  Teeth/Dentures     Shave     Nutrition Percentage Eaten  Dinner, Less than 25% Consumed  Environmental Precautions  Treaded Slipper Socks on Patient  Patient Turns/Positioning  Patient Turns Self from Side to Side  Patient Turns Assistance/Tolerance     Bed Positions  Bed Controls On  Head of Bed Elevated  Self regulated      Psychosocial/Neurologic Assessment  Psychosocial Assessment  Psychosocial (WDL):  Within Defined Limits  Patient Behaviors: Crying  Family Behaviors: Family Present  Neurologic Assessment  Neuro (WDL): Exceptions to WDL  Level of Consciousness: Alert  Orientation Level: Oriented X4  Cognition: Appropriate judgement, Appropriate safety awareness  Speech: Clear  RUE Sensation: Full sensation  Muscle Strength Right Arm: Good Strength Against Gravity and Moderate Resistance  LUE Sensation: Full sensation  Muscle Strength Left Arm: Good Strength Against Gravity and Moderate Resistance  RLE Sensation: Numbness (states from Sx 7 yrs ago)  Muscle Strength Right Leg: Good Strength Against Gravity and Moderate Resistance  LLE Motor Response: Spastic  LLE Sensation: Tingling, Pain (Pt states from this Sx)  Muscle Strength Left Leg: Good Strength Against Gravity and Moderate Resistance  EENT (WDL):  WDL Except    Cardio/Pulmonary Assessment  Edema      Respiratory Breath Sounds     Cardiac Assessment   Cardiac (WDL):  WDL  Except (Hx of HTN)

## 2024-02-08 NOTE — THERAPY
Physical Therapy   Daily Treatment     Patient Name: Dee Armstrong  Age:  68 y.o., Sex:  female  Medical Record #: [id number]  Today's Date: 2/7/2024     Precautions  Precautions: Fall Risk, Spinal / Back Precautions , Lumbosacral Orthosis  Comments: Strong fear of needles    Subjective    Pt reports she slept horribly last night and is not sure how well she will perform in PT     Objective       02/07/24 1401   PT Charge Group   PT Gait Training (Units) 2   PT Therapeutic Activities (Units) 2   PT Total Time Spent   PT Individual Total Time Spent (Mins) 60   Gait Functional Level of Assist    Gait Level Of Assist Supervised   Assistive Device Front Wheel Walker   Distance (Feet) 250   # of Times Distance was Traveled 3   Deviation Bradykinetic;Antalgic  (increased reliance on BUEs)   Transfer Functional Level of Assist   Toilet Transfers Contact Guard Assist   Toilet Transfer Description Grab bar;Increased time;Verbal cueing   Bed Mobility    Sit to Stand Supervised  (Repeated practice - cues for focusing on utilizing BLEs for exercise and maintiaing good posture and core engagementt to decrease LBP and increase BLOE strength)   Interdisciplinary Plan of Care Collaboration   IDT Collaboration with  Therapy Tech   Patient Position at End of Therapy Seated;Chair Alarm On;Self Releasing Lap Belt Applied;Call Light within Reach;Tray Table within Reach   Collaboration Comments Asked for techs to adjust brakes for increased safety with wc     Issued new FWW with better fit - per pt old FWW was causing her shoulders to be too tense. Issued shorter FWW for improved positioning and posture during gait.     Assessment    Pt performed well with gait and STS exercise and practice despite concerns over poor sleep last night. Pt demod increased distance demoing improved endurance as well as decreased level of assist to SPV with gait and transfers this session. Pt cont to be limited by LBP with general mobility and continues  to require cues for posture and core engagement to limit pain during mobility.    Strengths: Adequate strength, Alert and oriented, Effective communication skills, Good insight into deficits/needs, Independent prior level of function, Making steady progress towards goals, Motivated for self care and independence, Pleasant and cooperative, Supportive family, Willingly participates in therapeutic activities, Manages pain appropriately, Able to follow instructions  Barriers: Decreased endurance, Generalized weakness, Impaired activity tolerance, Impaired balance, Pain, Limited mobility    Plan    Balance, gait, curb step, bed mobility   Pain management  Strengthening        DME  PT DME Recommendations  Wheelchair:  (no DME anticipated)     Passport items to be completed:  Get in/out of bed safely, in/out of a vehicle, safely use mobility device, walk or wheel around home/community, navigate up and down stairs, show how to get up/down from the ground, ensure home is accessible, demonstrate HEP, complete caregiver training    Physical Therapy Problems (Active)       Problem: Balance       Dates: Start:  02/03/24         Goal: STG-Within one week, patient will improve Cummings by 10 points (19/56 on eval)       Dates: Start:  02/03/24               Problem: Mobility       Dates: Start:  02/03/24         Goal: STG-Within one week, patient will ambulate up/down a curb with FWW and min assist       Dates: Start:  02/03/24               Problem: Mobility Transfers       Dates: Start:  02/03/24         Goal: STG-Within one week, patient will perform sit to supine via log roll with supervision assist       Dates: Start:  02/03/24               Problem: PT-Long Term Goals       Dates: Start:  02/03/24         Goal: LTG-By discharge, patient will tolerate Cummings >36/56       Dates: Start:  02/03/24            Goal: LTG-By discharge, patient will ambulate 150 ft between seated rest breaks with FWW and supervision assist       Dates:  Start:  02/03/24            Goal: LTG-By discharge, patient will perform home exercise program from handouts with set up assist       Dates: Start:  02/03/24            Goal: LTG-By discharge, patient will transfer in/out of a car with supervision assist and FWW       Dates: Start:  02/03/24

## 2024-02-08 NOTE — CARE PLAN
The patient is Stable - Low risk of patient condition declining or worsening    Shift Goals  Clinical Goals: safety  Patient Goals: pain management      Problem: Pain - Standard  Goal: Alleviation of pain or a reduction in pain to the patient’s comfort goal  Outcome: Progressing Patient able to verbalize pain level and verbalize an acceptable level of pain.     Problem: Fall Risk - Rehab  Goal: Patient will remain free from falls  Outcome: Progressing Pt uses call light consistently and appropriately. Waits for assistance does not attempt self transfer this shift. Able to verbalize needs.

## 2024-02-08 NOTE — PROGRESS NOTES
"  Physical Medicine & Rehabilitation Progress Note    Encounter Date: 2/8/2024    Chief Complaint: Weakness    Interval Events (Subjective):  Lower back pain continues to be the primary barrier. Describes muscle spasms in the back. Fear of pain present.    Objective:  VITAL SIGNS: /60   Pulse 77   Temp 37.2 °C (98.9 °F) (Oral)   Resp 18   Ht 1.499 m (4' 11\")   Wt 102 kg (224 lb)   SpO2 92%   BMI 45.24 kg/m²   Gen: No acute distress, well developed well nourished adult  HEENT: Normal Cephalic Atraumatic, Normal conjunctiva.   CV: warm extremities, well perfused, no edema  Resp: symmetric chest rise, breathing comfortably on room air  Abd: Soft, Non distended  Extremities: normal bulk, no atrophy  Skin: no visible rashes or lesions.   Neuro: alert, awake  Psych: Mood and affect appropriate and congruent    Laboratory Values:  No results found for this or any previous visit (from the past 72 hour(s)).    Medications:  Scheduled Medications   Medication Dose Frequency    oxyCODONE immediate-release  10 mg BID    baclofen  10 mg BID    gabapentin  200 mg BID    gabapentin  300 mg Q EVENING    lidocaine  1 Patch Q24HR    vitamin D3  2,000 Units DAILY    Pharmacy Consult Request  1 Each PHARMACY TO DOSE    senna-docusate  2 Tablet Q EVENING    omeprazole  20 mg DAILY    enoxaparin (LOVENOX) injection  40 mg Q EVENING    atorvastatin  20 mg Q EVENING     PRN medications: oxyCODONE immediate-release **OR** oxyCODONE immediate-release, capsaicin, Respiratory Therapy Consult, senna-docusate **AND** polyethylene glycol/lytes, mag hydrox-al hydrox-simeth, ondansetron **OR** ondansetron, traZODone, sodium chloride    Diet:  Current Diet Order   Procedures    Diet Order Diet: Regular       Medical Decision Making and Plan:  Lumbar stenosis   S/p lumbar fusion   Lumbar fracture   - history of back pain with radicuopathy   - no MRI to review   - 1/23 stage 1 L4-S1 ALIF by Dr. Rodriguez   1/25  left L2/3 XLIF by Dr. Rodriguez " "  1/30 redo L2-3 laminotomy and L2-S1 perc fusion by Dr. Rodriguez   - LSO when OOB   - most recent CT L spine obtained on 1/31 showed oblique nondisplaced L5 vertebral body fracture   - comprehnsive IRF level therapy with 3hrs of therapy 5 days per week      Hypoxia   - hypoxia, worse with anesthesia, causing 3rd surgery to be aborted   - now on albuterol   - stable on 1 L o2  , weaned down to RA   - CT chest negative for PE      ABLA   - post op drop in Hgb   - 2/1 Hgb 10.7  -> 10.5 2/4      HTN   - 2/5 BP well controlled   -2/6- Decontinue Lisinopril 10mg Daily      Vit D deficiency   - Vit D 25  - continue with supplementation      Hypocalcemia  - transferred on calcitrate   - Ca WNL, stopping  calcitrate      Pain  - was on IV diluadid until 2/1 AM   - on scheduled Tylenol and PRN oxycodone   - gabapentin 600mg qhs , additional 300mt BID started 2/4  -2/6 increase gabapentin to 400mg BID and 600mg at night  -2/7 decreased gabapentin to 200mg BID during the da  - pain is located  LLE \"deep to shin\", US negative  -2/6- increase oxycodone from 5mg to 7.5mg PRN  -2/8- Oxycodone 10mg BID scheduled at 7am and 1pm     Insomnia  2/6 Started Baclofen 5mg QHS  -3/7 poor sleep increased to Baclofen 10mg QHS  -2/8 Baclofen 10mg TID scheduled     Bowel  - constipation prevention with scheduled senna   - bowel meds PRN   - Last BM 2/4       Bladder   - timed void to prevent falls and incont epsidoes   -bladder scan if no void > 6hrs, PVR, and cath if retaining > 200ml   - PVR 10      Morbid Obesity   - weight loss education      Skin - Patient at risk for skin breakdown due to debility in areas including sacrum, achilles, elbows and head in addition to other sites. Nursing to assess skin daily.      GI Ppx - Patient on Prilosec for GERD prophylaxis. Patient on Senna-docusate for constipation prophylaxis.      DVT Ppx continue lovenox       ____________________________________    Ward Boone MD  Physical Medicine & " Rehabilitation   Brain Injury Medicine   ____________________________________

## 2024-02-08 NOTE — CARE PLAN
"The patient is Stable - Low risk of patient condition declining or worsening    Shift Goals  Clinical Goals: safety  Patient Goals: safety    Patient is not progressing towards the following goals:    Problem: Fall Risk - Rehab  Goal: Patient will remain free from falls  Outcome: Not Met  Note: Kalyani Monroe Fall risk Assessment Score: 11    Moderate fall risk Interventions  - Bed and strip alarm   - Yellow sign by the door   - Yellow wrist band \"Fall risk\"  - Room near to the nurse station  - Fall risk education provided     "

## 2024-02-09 ENCOUNTER — APPOINTMENT (OUTPATIENT)
Dept: PHYSICAL THERAPY | Facility: REHABILITATION | Age: 69
DRG: 052 | End: 2024-02-09
Attending: PHYSICAL MEDICINE & REHABILITATION
Payer: MEDICARE

## 2024-02-09 ENCOUNTER — APPOINTMENT (OUTPATIENT)
Dept: OCCUPATIONAL THERAPY | Facility: REHABILITATION | Age: 69
DRG: 052 | End: 2024-02-09
Attending: PHYSICAL MEDICINE & REHABILITATION
Payer: MEDICARE

## 2024-02-09 PROCEDURE — 97116 GAIT TRAINING THERAPY: CPT

## 2024-02-09 PROCEDURE — 700111 HCHG RX REV CODE 636 W/ 250 OVERRIDE (IP): Mod: JZ | Performed by: PHYSICAL MEDICINE & REHABILITATION

## 2024-02-09 PROCEDURE — 97535 SELF CARE MNGMENT TRAINING: CPT

## 2024-02-09 PROCEDURE — 97110 THERAPEUTIC EXERCISES: CPT

## 2024-02-09 PROCEDURE — A9270 NON-COVERED ITEM OR SERVICE: HCPCS | Performed by: PHYSICAL MEDICINE & REHABILITATION

## 2024-02-09 PROCEDURE — 700102 HCHG RX REV CODE 250 W/ 637 OVERRIDE(OP): Performed by: PHYSICAL MEDICINE & REHABILITATION

## 2024-02-09 PROCEDURE — 97530 THERAPEUTIC ACTIVITIES: CPT

## 2024-02-09 PROCEDURE — 99232 SBSQ HOSP IP/OBS MODERATE 35: CPT | Performed by: PHYSICAL MEDICINE & REHABILITATION

## 2024-02-09 PROCEDURE — A9270 NON-COVERED ITEM OR SERVICE: HCPCS | Performed by: STUDENT IN AN ORGANIZED HEALTH CARE EDUCATION/TRAINING PROGRAM

## 2024-02-09 PROCEDURE — 700101 HCHG RX REV CODE 250: Performed by: PHYSICAL MEDICINE & REHABILITATION

## 2024-02-09 PROCEDURE — 770010 HCHG ROOM/CARE - REHAB SEMI PRIVAT*

## 2024-02-09 PROCEDURE — 700102 HCHG RX REV CODE 250 W/ 637 OVERRIDE(OP): Performed by: STUDENT IN AN ORGANIZED HEALTH CARE EDUCATION/TRAINING PROGRAM

## 2024-02-09 RX ORDER — OXYCODONE HYDROCHLORIDE 10 MG/1
10 TABLET ORAL 2 TIMES DAILY
Status: DISCONTINUED | OUTPATIENT
Start: 2024-02-09 | End: 2024-02-13 | Stop reason: HOSPADM

## 2024-02-09 RX ADMIN — LIDOCAINE 1 PATCH: 4 PATCH TOPICAL at 12:00

## 2024-02-09 RX ADMIN — ATORVASTATIN CALCIUM 20 MG: 10 TABLET, FILM COATED ORAL at 21:18

## 2024-02-09 RX ADMIN — BACLOFEN 10 MG: 10 TABLET ORAL at 08:11

## 2024-02-09 RX ADMIN — OXYCODONE HYDROCHLORIDE 10 MG: 10 TABLET ORAL at 01:11

## 2024-02-09 RX ADMIN — OXYCODONE 7.5 MG: 5 TABLET ORAL at 16:43

## 2024-02-09 RX ADMIN — OXYCODONE HYDROCHLORIDE 10 MG: 10 TABLET ORAL at 06:26

## 2024-02-09 RX ADMIN — OXYCODONE 7.5 MG: 5 TABLET ORAL at 21:33

## 2024-02-09 RX ADMIN — BACLOFEN 10 MG: 10 TABLET ORAL at 21:19

## 2024-02-09 RX ADMIN — GABAPENTIN 300 MG: 300 CAPSULE ORAL at 21:32

## 2024-02-09 RX ADMIN — OXYCODONE HYDROCHLORIDE 10 MG: 10 TABLET ORAL at 12:40

## 2024-02-09 RX ADMIN — OMEPRAZOLE 20 MG: 20 CAPSULE, DELAYED RELEASE ORAL at 08:08

## 2024-02-09 RX ADMIN — SENNOSIDES AND DOCUSATE SODIUM 2 TABLET: 50; 8.6 TABLET ORAL at 21:19

## 2024-02-09 RX ADMIN — BACLOFEN 10 MG: 10 TABLET ORAL at 13:06

## 2024-02-09 RX ADMIN — GABAPENTIN 200 MG: 100 CAPSULE ORAL at 12:41

## 2024-02-09 RX ADMIN — GABAPENTIN 200 MG: 100 CAPSULE ORAL at 08:08

## 2024-02-09 RX ADMIN — Medication 2000 UNITS: at 08:08

## 2024-02-09 RX ADMIN — ENOXAPARIN SODIUM 40 MG: 100 INJECTION SUBCUTANEOUS at 21:19

## 2024-02-09 ASSESSMENT — PATIENT HEALTH QUESTIONNAIRE - PHQ9
1. LITTLE INTEREST OR PLEASURE IN DOING THINGS: NOT AT ALL
2. FEELING DOWN, DEPRESSED, IRRITABLE, OR HOPELESS: NOT AT ALL
1. LITTLE INTEREST OR PLEASURE IN DOING THINGS: NOT AT ALL
SUM OF ALL RESPONSES TO PHQ9 QUESTIONS 1 AND 2: 0
SUM OF ALL RESPONSES TO PHQ9 QUESTIONS 1 AND 2: 0
2. FEELING DOWN, DEPRESSED, IRRITABLE, OR HOPELESS: NOT AT ALL

## 2024-02-09 ASSESSMENT — GAIT ASSESSMENTS
DISTANCE (FEET): 150
GAIT LEVEL OF ASSIST: STANDBY ASSIST
ASSISTIVE DEVICE: FRONT WHEEL WALKER

## 2024-02-09 ASSESSMENT — ACTIVITIES OF DAILY LIVING (ADL)
TUB_SHOWER_TRANSFER_DESCRIPTION: GRAB BAR;SHOWER BENCH;INCREASED TIME;INITIAL PREPARATION FOR TASK;SET-UP OF EQUIPMENT;SUPERVISION FOR SAFETY
TOILETING_LEVEL_OF_ASSIST_DESCRIPTION: GRAB BAR;INCREASED TIME;INITIAL PREPARATION FOR TASK
BED_CHAIR_WHEELCHAIR_TRANSFER_DESCRIPTION: INCREASED TIME;INITIAL PREPARATION FOR TASK;SET-UP OF EQUIPMENT;SUPERVISION FOR SAFETY
TOILET_TRANSFER_DESCRIPTION: GRAB BAR;INCREASED TIME;INITIAL PREPARATION FOR TASK;SET-UP OF EQUIPMENT;SUPERVISION FOR SAFETY

## 2024-02-09 NOTE — THERAPY
"Occupational Therapy  Daily Treatment     Patient Name: Dee Armstrong  Age:  68 y.o., Sex:  female  Medical Record #: [id number]  Today's Date: 2/9/2024     Precautions  Precautions: Fall Risk, Spinal / Back Precautions , Lumbosacral Orthosis  Comments: Strong fear of needles         Subjective    \"I'm glad I took a shower.\"      Objective       02/09/24 1301   OT Charge Group   OT Self Care / ADL (Units) 4   OT Total Time Spent   OT Individual Total Time Spent (Mins) 60   Pain   Intervention Emotional Support;Distraction;Cold Pack;Shower   Pain 0 - 10 Group   Location Back;Leg   Location Orientation Left;Lower   Pain Rating Scale (NPRS) 8   Description Aching;Constant;Sharp   Comfort Goal Comfort with Movement;Perform Activity;Sleep Comfortably   Therapist Pain Assessment Post Activity Pain Same as Prior to Activity  (pain meds given prior to session)   Non Verbal Descriptors   Non Verbal Scale  Crying;Grimacing;Moaning;Noisy Breathing;Restlessness   Cognition    Level of Consciousness Alert   Functional Level of Assist   Grooming Seated;Modified Independent   Grooming Description Increased time   Bathing Supervision   Bathing Description Grab bar;Hand held shower;Increased time;Long handled bath tool;Supervision for safety   Upper Body Dressing Supervision   Upper Body Dressing Description Increased time;Initial preparation for task;Set-up of equipment;Application of orthotic or brace   Lower Body Dressing Standby Assist   Lower Body Dressing Description Grab bar;Dressing stick;Reacher;Increased time;Initial preparation for task;Supervision for safety;Set-up of equipment   Toileting Supervision   Toileting Description Grab bar;Increased time;Initial preparation for task   Bed, Chair, Wheelchair Transfer Standby Assist   Bed Chair Wheelchair Transfer Description Increased time;Initial preparation for task;Set-up of equipment;Supervision for safety   Toilet Transfers Supervised   Toilet Transfer Description Grab " bar;Increased time;Initial preparation for task;Set-up of equipment;Supervision for safety   Tub / Shower Transfers Standby Assist   Tub Shower Transfer Description Grab bar;Shower bench;Increased time;Initial preparation for task;Set-up of equipment;Supervision for safety   Bed Mobility    Sit to Supine Moderate Assist  (assist for BLE into the bed; leg lfiter used for LLE)   Scooting Standby Assist   Interdisciplinary Plan of Care Collaboration   IDT Collaboration with  Physical Therapist   Patient Position at End of Therapy In Bed;Bed Alarm On;Call Light within Reach;Tray Table within Reach;Phone within Reach   Collaboration Comments CLOF- pain levels         Assessment    Pt in pain at start of session- discussed various pain mgmt strategies. Pt reported having pain meds prior to session. Pt agreeable to shower for session for pain mgmt- pt reported feeling slightly better after shower. Pt breathing heavy and moaning throughout session 2/2 pain. Pt reported having pushed herself too hard the last couple of days. Pt assisted back into bed at end of session with cold pack behind back.     Strengths: Able to follow instructions, Adequate strength, Alert and oriented, Effective communication skills, Good insight into deficits/needs, Independent prior level of function, Making steady progress towards goals, Motivated for self care and independence, Pleasant and cooperative, Supportive family, Willingly participates in therapeutic activities  Barriers: Decreased endurance, Fatigue, Generalized weakness, Emotional lability, Impaired activity tolerance, Impaired balance, Limited mobility, Pain, Pain poorly managed    Plan    Cont overall strength/endurance and standing balance to improve ADL's/IADL's and functional mobility at FWW level while maintaining spinal precautions; pain mgmt     Pt lives in 1 story with RAINA Castillock, adult children live in [place], 1 threshold (3-4 inch) to enter, walk-in shower/chair/glass door, has  FWW, 4WW, reacher     DME     - recommended bathroom DME: grab bars at shower/toilet, shower chair  - recommended hip kit AE: reacher, LH sponge, LH lotion application, LH shoehorn    Occupational Therapy Goals (Active)       Problem: Bathing       Dates: Start:  02/05/24         Goal: STG-Within one week, patient will bathe at [facility] using DME/AE PRN       Dates: Start:  02/05/24               Problem: Dressing       Dates: Start:  02/03/24         Goal: STG-Within one week, patient will dress UB at [facility] using DME/AE PRN.       Dates: Start:  02/03/24         Goal Note filed on 02/05/24 1614 by Amada Leary MS,OTR/L       Requires SBA              Goal: STG-Within one week, patient will dress LB at [facility] using DME/AE PRN       Dates: Start:  02/05/24               Problem: Functional Transfers       Dates: Start:  02/03/24         Goal: STG-Within one week, patient will transfer to toilet at [facility].       Dates: Start:  02/03/24         Goal Note filed on 02/05/24 1614 by Amada Leary MS,OTR/L       Requires CGA              Goal: STG-Within one week, patient will transfer to step in shower at [facility].       Dates: Start:  02/03/24         Goal Note filed on 02/05/24 1614 by Amada Leary MS,OTR/L       Not yet addressed                  Problem: IADL's       Dates: Start:  02/05/24         Goal: STG-Within one week, patient will access kitchen area at [facility] using DME/AE PRN       Dates: Start:  02/05/24               Problem: OT Long Term Goals       Dates: Start:  02/03/24         Goal: LTG-By discharge, patient will complete basic self care tasks at [facility].       Dates: Start:  02/03/24            Goal: LTG-By discharge, patient will perform bathroom transfers  at [facility].       Dates: Start:  02/03/24               Problem: Toileting       Dates: Start:  02/05/24         Goal: STG-Within one week, patient will complete toileting tasks at [facility] using DME/AE PRN       Dates: Start:  02/05/24

## 2024-02-09 NOTE — CARE PLAN
The patient is Stable - Low risk of patient condition declining or worsening    Shift Goals  Clinical Goals: safety  Patient Goals: sleep well, safety, pain control    Progress made toward(s) clinical / shift goals:    Problem: Pain - Standard  Goal: Alleviation of pain or a reduction in pain to the patient’s comfort goal  Outcome: Progressing. Patient pain better controlled this shift. Patient receives scheduled bid oxycodone 10 overnight as well as prn oxy as needed, as well as scheduled and increased baclofen tid for muscle spasms.      Problem: Fall Risk - Rehab  Goal: Patient will remain free from falls  Outcome: Progressing. Patient bed in lowest locked position with bed alarm on and call light within reach. Patient calls appropriately with call light for needs and has not attempted to self transfer over shift.

## 2024-02-09 NOTE — PROGRESS NOTES
NURSING DAILY NOTE    Name: Dee Armstrong   Date of Admission: 2/2/2024   Admitting Diagnosis: Lumbar radiculopathy  Attending Physician: Ward Boone M.d.  Allergies: Patient has no known allergies.    Safety  Patient Assist  Min assist  Patient Precautions  Fall Risk, Spinal / Back Precautions , Lumbosacral Orthosis  Precaution Comments  Strong fear of needles  Bed Transfer Status  Standby Assist  Toilet Transfer Status   [facility]  Assistive Devices  Rails, Wheelchair  Oxygen  None - Room Air  Diet/Therapeutic Dining  Current Diet Order   Procedures    Diet Order Diet: Regular     Pill Administration  whole  Agitated Behavioral Scale     ABS Level of Severity       Fall Risk  Has the patient had a fall this admission?   No  Kalyani Monroe Fall Risk Scoring  11, MODERATE RISK  Fall Risk Safety Measures  bed alarm, chair alarm, and seatbelt alarm    Vitals  Temperature: 37.1 °C (98.7 °F)  Temp src: Oral  Pulse: 83  Respiration: 18  Blood Pressure : 120/63  Blood Pressure MAP (Calculated): 82 MM HG  BP Location: Right, Upper Arm  Patient BP Position: Supine     Oxygen  Pulse Oximetry: 93 %  O2 (LPM): 0  O2 Delivery Device: None - Room Air    Bowel and Bladder  Last Bowel Movement  02/08/24  Stool Type  Type 4: Like a sausage or snake, smooth and soft  Bowel Device  Bathroom  Continent  Bladder: Did not void   Bowel: No movement  Bladder Function  Urine Void (mL):  (large void)  Number of Times Voided: 1  Urine Color: Yellow  Number of Times Incontinent of Urine: 0  Genitourinary Assessment   Bladder Assessment (WDL):  Within Defined Limits  Langley Catheter: Not Applicable  Urine Color: Yellow  Number of Bladder Accidents: 0  Total Number of Bladder of Accidents in Last 7 Days: 0  Number of Times Incontinent of Urine: 0  Bladder Device: Bathroom  Time Void: No  Bladder Scan: Post Void  $ Bladder Scan Results (mL): 10    Skin  Vlad Score    19  Sensory Interventions   Bed Types: Standard/Trauma Mattress  Skin Preventative Measures: Pillows in Use for Support / Positioning  Moisture Interventions  Moisturizers/Barriers: Barrier Wipes      Pain  Pain Rating Scale  6 - Hard to ignore, avoid usual activities  Pain Location  Back, Leg  Pain Location Orientation  Left, Lower  Pain Interventions   Emotional Support    ADLs    Bathing   Staff, Shower  Linen Change      Personal Hygiene  Moist Sammie Wipes, Perineal Care  Chlorhexidine Bath      Oral Care  Brushed Teeth  Teeth/Dentures     Shave     Nutrition Percentage Eaten  *  * Meal *  *, Dinner, Between % Consumed  Environmental Precautions  Treaded Slipper Socks on Patient  Patient Turns/Positioning  Patient Turns Self from Side to Side  Patient Turns Assistance/Tolerance     Bed Positions  Bed Controls On  Head of Bed Elevated  Self regulated      Psychosocial/Neurologic Assessment  Psychosocial Assessment  Psychosocial (WDL):  Within Defined Limits  Patient Behaviors: Anxious, Fatigue  Family Behaviors: No Family Present  Neurologic Assessment  Neuro (WDL): Exceptions to WDL  Level of Consciousness: Alert  Orientation Level: Oriented X4  Cognition: Appropriate judgement  Speech: Clear  Motor Function/Sensation Assessment: Motor strength  RUE Sensation: Full sensation  Muscle Strength Right Arm: Good Strength Against Gravity and Moderate Resistance  LUE Sensation: Full sensation  Muscle Strength Left Arm: Good Strength Against Gravity and Moderate Resistance  RLE Sensation: Numbness  Muscle Strength Right Leg: Good Strength Against Gravity and Moderate Resistance  LLE Motor Response: Spastic  LLE Sensation: Pain, Tingling  Muscle Strength Left Leg: Good Strength Against Gravity and Moderate Resistance  EENT (WDL):  WDL Except    Cardio/Pulmonary Assessment  Edema      Respiratory Breath Sounds     Cardiac Assessment   Cardiac (WDL):  WDL Except

## 2024-02-09 NOTE — CARE PLAN
Problem: Skin Integrity  Goal: Skin integrity is maintained or improved  Outcome: Progressing  Patient repositions to sides to prevent pressure areas.     Problem: Pain - Standard  Goal: Alleviation of pain or a reduction in pain to the patient’s comfort goal  Outcome: Progressing  On scheduled pain meds/muscle relaxers, will continue to monitor.   The patient is Stable - Low risk of patient condition declining or worsening    Shift Goals  Clinical Goals: safety  Patient Goals: sleep well, safety, pain control    Progress made toward(s) clinical / shift goals:      Patient is not progressing towards the following goals:

## 2024-02-09 NOTE — PROGRESS NOTES
"  Physical Medicine & Rehabilitation Progress Note    Encounter Date: 2/9/2024    Chief Complaint: Weakness    Interval Events (Subjective):  Seen in bed. Pain present but tolerable    Objective:  VITAL SIGNS: /63   Pulse 83   Temp 37.1 °C (98.7 °F) (Oral)   Resp 18   Ht 1.499 m (4' 11\")   Wt 102 kg (224 lb)   SpO2 93%   BMI 45.24 kg/m²   Gen: No acute distress, well developed well nourished adult  HEENT: Normal Cephalic Atraumatic, Normal conjunctiva.   CV: warm extremities, well perfused, no edema  Resp: symmetric chest rise, breathing comfortably on room air  Abd: Soft, Non distended  Extremities: normal bulk, no atrophy  Skin: no visible rashes or lesions.   Neuro: alert, awake  Psych: Mood and affect appropriate and congruent    Laboratory Values:  No results found for this or any previous visit (from the past 72 hour(s)).    Medications:  Scheduled Medications   Medication Dose Frequency    oxyCODONE immediate-release  10 mg BID    baclofen  10 mg TID    gabapentin  200 mg BID    gabapentin  300 mg Q EVENING    lidocaine  1 Patch Q24HR    vitamin D3  2,000 Units DAILY    Pharmacy Consult Request  1 Each PHARMACY TO DOSE    senna-docusate  2 Tablet Q EVENING    omeprazole  20 mg DAILY    enoxaparin (LOVENOX) injection  40 mg Q EVENING    atorvastatin  20 mg Q EVENING     PRN medications: oxyCODONE immediate-release **OR** oxyCODONE immediate-release, capsaicin, Respiratory Therapy Consult, senna-docusate **AND** polyethylene glycol/lytes, mag hydrox-al hydrox-simeth, ondansetron **OR** ondansetron, traZODone, sodium chloride    Diet:  Current Diet Order   Procedures    Diet Order Diet: Regular       Medical Decision Making and Plan:  Lumbar stenosis   S/p lumbar fusion   Lumbar fracture   - history of back pain with radicuopathy   - no MRI to review   - 1/23 stage 1 L4-S1 ALIF by Dr. Rodriguez   1/25  left L2/3 XLIF by Dr. Rodriguez   1/30 redo L2-3 laminotomy and L2-S1 perc fusion by Dr. Rodriguez   - LSO " "when OOB   - most recent CT L spine obtained on 1/31 showed oblique nondisplaced L5 vertebral body fracture   - comprehnsive IRF level therapy with 3hrs of therapy 5 days per week      Hypoxia   - hypoxia, worse with anesthesia, causing 3rd surgery to be aborted   - now on albuterol   - stable on 1 L o2  , weaned down to RA   - CT chest negative for PE      ABLA   - post op drop in Hgb   - 2/1 Hgb 10.7  -> 10.5 2/4      HTN   - 2/5 BP well controlled   -2/6- Decontinue Lisinopril 10mg Daily      Vit D deficiency   - Vit D 25  - continue with supplementation      Hypocalcemia  - transferred on calcitrate   - Ca WNL, stopping  calcitrate      Pain  - was on IV diluadid until 2/1 AM   - on scheduled Tylenol and PRN oxycodone   - gabapentin 600mg qhs , additional 300mt BID started 2/4  -2/6 increase gabapentin to 400mg BID and 600mg at night  -2/7 decreased gabapentin to 200mg BID during the da  - pain is located  LLE \"deep to shin\", US negative  -2/6- increase oxycodone from 5mg to 7.5mg PRN  -2/8- Oxycodone 10mg BID scheduled at 7am and 1pm  -continue oxycodone     Insomnia  2/6 Started Baclofen 5mg QHS  -3/7 poor sleep increased to Baclofen 10mg QHS  -2/8 Baclofen 10mg TID scheduled     Bowel  - constipation prevention with scheduled senna   - bowel meds PRN   - Last BM 2/4       Bladder   - timed void to prevent falls and incont epsidoes   -bladder scan if no void > 6hrs, PVR, and cath if retaining > 200ml   - PVR 10      Morbid Obesity   - weight loss education      Skin - Patient at risk for skin breakdown due to debility in areas including sacrum, achilles, elbows and head in addition to other sites. Nursing to assess skin daily.      GI Ppx - Patient on Prilosec for GERD prophylaxis. Patient on Senna-docusate for constipation prophylaxis.      DVT Ppx continue lovenox       ____________________________________    Ward Boone MD  Physical Medicine & Rehabilitation   Brain Injury Medicine "   ____________________________________

## 2024-02-09 NOTE — PROGRESS NOTES
NURSING DAILY NOTE    Name: Dee Armstrong  Date of Admission: 2/2/2024  Admitting Diagnosis: Lumbar radiculopathy  Attending Physician: Ward Boone M.d.  Allergies: Patient has no known allergies.    Safety  Patient Assist  oMin assist  Patient Precautions  oFall Risk, Spinal / Back Precautions , Lumbosacral Orthosis  Precaution Comments  oStrong fear of needles  Bed Transfer Status  oStandby Assist  Toilet Transfer Status  oSupervised (With FWW)  Assistive Devices  oRails, Wheelchair  Oxygen  oNone - Room Air  Diet/Therapeutic Dining  o  Current Diet Order   Procedures    Diet Order Diet: Regular     Pill Administration  owhole  Agitated Behavioral Scale  o   ABS Level of Severity  o     Fall Risk  Has the patient had a fall this admission?  Heri  Kalyani Monroe Fall Risk Scoring  o11, MODERATE RISK  Fall Risk Safety Measures  monique alarm and chair alarm    Vitals  Temperature: 37.1 °C (98.7 °F)  Temp src: Oral  Pulse: 83  Respiration: 18  Blood Pressure : 120/63  Blood Pressure MAP (Calculated): 82 MM HG  BP Location: Right, Upper Arm  Patient BP Position: Supine    Oxygen  Pulse Oximetry: 93 %  O2 (LPM): 0  O2 Delivery Device: None - Room Air    Bowel and Bladder  Last Bowel Movement  o02/08/24  Stool Type  oType 4: Like a sausage or snake, smooth and soft  Bowel Device  oBathroom  Continent  oBladder: Did not void  oBowel: No movement  Bladder Function  oUrine Void (mL):  (large void)  Number of Times Voided: 1  Urine Color: Yellow  Number of Times Incontinent of Urine: 0  Genitourinary Assessment  oBladder Assessment (WDL):  Within Defined Limits  Langley Catheter: Not Applicable  Urine Color: Yellow  Number of Bladder Accidents: 0  Total Number of Bladder of Accidents in Last 7 Days: 0  Number of Times Incontinent of Urine: 0  Bladder Device: Bathroom  Time Void: No  Bladder Scan: Post Void  $ Bladder Scan Results (mL): 10    Skin  Vlad Score  o 19  Sensory Interventions  o Bed Types: Standard/Trauma  Mattress  Skin Preventative Measures: Pillows in Use for Support / Positioning, Waffle Overlay  Moisture Interventions  oMoisturizers/Barriers: Barrier Wipes      Pain  Pain Rating Scale  o6 - Hard to ignore, avoid usual activities  Pain Location  oBack, Leg  Pain Location Orientation  oLeft, Lower  Pain Interventions  oMedication (see MAR)    ADLs    Bathing  oStaff, Shower  Linen Change  o   Personal Hygiene  oMoist Sammie Wipes, Perineal Care  Chlorhexidine Bath  o   Oral Care  oBrushed Teeth  Teeth/Dentures  o   Shave  o   Nutrition Percentage Eaten  o*  * Meal *  *, Dinner, Between % Consumed  Environmental Precautions  oTreaded Slipper Socks on Patient, Personal Belongings, Wastebasket, Call Bell etc. in Easy Reach, Transferred to Stronger Side, Bed in Low Position  Patient Turns/Positioning  oPatient Turns Self from Side to Side  Patient Turns Assistance/Tolerance  o   Bed Positions  Cesar Controls On  Head of Bed Elevated  oSelf regulated      Psychosocial/Neurologic Assessment  Psychosocial Assessment  oPsychosocial (WDL):  Within Defined Limits  Patient Behaviors: Anxious, Fatigue  Family Behaviors: No Family Present  Neurologic Assessment  oNeuro (WDL): Exceptions to WDL  Level of Consciousness: Alert  Orientation Level: Oriented X4  Cognition: Appropriate judgement, Appropriate safety awareness  Speech: Clear  Motor Function/Sensation Assessment: Motor strength  RUE Sensation: Full sensation  Muscle Strength Right Arm: Good Strength Against Gravity and Moderate Resistance  LUE Sensation: Full sensation  Muscle Strength Left Arm: Good Strength Against Gravity and Moderate Resistance  RLE Sensation: Numbness  Muscle Strength Right Leg: Good Strength Against Gravity and Moderate Resistance  LLE Motor Response: Spastic  LLE Sensation: Tingling, Pain  Muscle Strength Left Leg: Good Strength Against Gravity and Moderate Resistance  oEENT (WDL):  WDL Except    Cardio/Pulmonary Assessment  Edema  o    Respiratory Breath Sounds  o   Cardiac Assessment  oCardiac (WDL):  WDL Except

## 2024-02-09 NOTE — THERAPY
Occupational Therapy  Daily Treatment     Patient Name: Dee Armstrong  Age:  68 y.o., Sex:  female  Medical Record #: [id number]  Today's Date: 2/9/2024     Precautions  Precautions: Fall Risk, Spinal / Back Precautions , Lumbosacral Orthosis  Comments: Strong fear of needles         Subjective    Pt seated in w/c upon arrival, pleasant and cooperative, agreeable to therapy       Objective       02/09/24 1031   OT Charge Group   OT Self Care / ADL (Units) 1   OT Therapy Activity (Units) 1   OT Total Time Spent   OT Individual Total Time Spent (Mins) 30   Functional Level of Assist   Grooming Supervision;Standing   Toileting Supervision   Toilet Transfers Supervised  ([facility]WW)   Interdisciplinary Plan of Care Collaboration   Patient Position at End of Therapy Seated;Call Light within Reach;Tray Table within Reach;Chair Alarm On;Self Releasing Lap Belt Applied     Functional mobility at FWW level: supervision room<>bathroom     Pain mgmt - ice pack at lower back.     Edu re: car transfer sequence - pt has limited LLE hip mobility, may require assist to lift LE into car. Also discussed home health therapies vs outpatient, and setting up a timer at home for every 30-60 min standing and mobility when doing her seated craft work to assist with keeping track of time and promote increase mobility as part of HEP    Assessment    Pt with significant pain improvement and able to tolerate full participation at FWW level    Strengths: Able to follow instructions, Adequate strength, Alert and oriented, Effective communication skills, Good insight into deficits/needs, Independent prior level of function, Making steady progress towards goals, Motivated for self care and independence, Pleasant and cooperative, Supportive family, Willingly participates in therapeutic activities  Barriers: Decreased endurance, Fatigue, Generalized weakness, Emotional lability, Impaired activity tolerance, Impaired balance, Limited mobility, Pain,  Pain poorly managed    Plan    Cont overall strength/endurance and standing balance to improve ADL's/IADL's and functional mobility at FWW level while maintaining spinal precautions; pain mgmt     Pt lives in 1 story with RAINA Mackey, adult children live in [place], 1 threshold (3-4 inch) to enter, walk-in shower/chair/glass door, has FWW, 4WW, reacher     DME     - recommended bathroom DME: grab bars at shower/toilet, shower chair  - recommended hip kit AE: reacher, LH sponge, LH lotion application, LH shoehorn    Occupational Therapy Goals (Active)       Problem: Bathing       Dates: Start:  02/05/24         Goal: STG-Within one week, patient will bathe at [facility] using DME/AE PRN       Dates: Start:  02/05/24               Problem: Dressing       Dates: Start:  02/03/24         Goal: STG-Within one week, patient will dress UB at [facility] using DME/AE PRN.       Dates: Start:  02/03/24         Goal Note filed on 02/05/24 1614 by Amada Leary MS,OTR/L       Requires SBA              Goal: STG-Within one week, patient will dress LB at [facility] using DME/AE PRN       Dates: Start:  02/05/24               Problem: Functional Transfers       Dates: Start:  02/03/24         Goal: STG-Within one week, patient will transfer to toilet at [facility].       Dates: Start:  02/03/24         Goal Note filed on 02/05/24 1614 by Amada Leary MS,OTR/L       Requires CGA              Goal: STG-Within one week, patient will transfer to step in shower at [facility].       Dates: Start:  02/03/24         Goal Note filed on 02/05/24 1614 by Amada Leary MS,OTR/L       Not yet addressed                  Problem: IADL's       Dates: Start:  02/05/24         Goal: STG-Within one week, patient will access kitchen area at [facility] using DME/AE PRN       Dates: Start:  02/05/24               Problem: OT Long Term Goals       Dates: Start:  02/03/24         Goal: LTG-By discharge, patient will complete basic self care tasks at [facility].       Dates: Start:   02/03/24            Goal: LTG-By discharge, patient will perform bathroom transfers  at [facility].       Dates: Start:  02/03/24               Problem: Toileting       Dates: Start:  02/05/24         Goal: STG-Within one week, patient will complete toileting tasks at [facility] using DME/AE PRN       Dates: Start:  02/05/24

## 2024-02-10 PROCEDURE — 700102 HCHG RX REV CODE 250 W/ 637 OVERRIDE(OP): Performed by: PHYSICAL MEDICINE & REHABILITATION

## 2024-02-10 PROCEDURE — 700111 HCHG RX REV CODE 636 W/ 250 OVERRIDE (IP): Mod: JZ | Performed by: PHYSICAL MEDICINE & REHABILITATION

## 2024-02-10 PROCEDURE — A9270 NON-COVERED ITEM OR SERVICE: HCPCS | Performed by: PHYSICAL MEDICINE & REHABILITATION

## 2024-02-10 PROCEDURE — 99232 SBSQ HOSP IP/OBS MODERATE 35: CPT | Performed by: PHYSICAL MEDICINE & REHABILITATION

## 2024-02-10 PROCEDURE — A9270 NON-COVERED ITEM OR SERVICE: HCPCS | Performed by: STUDENT IN AN ORGANIZED HEALTH CARE EDUCATION/TRAINING PROGRAM

## 2024-02-10 PROCEDURE — 770010 HCHG ROOM/CARE - REHAB SEMI PRIVAT*

## 2024-02-10 PROCEDURE — 700101 HCHG RX REV CODE 250: Performed by: PHYSICAL MEDICINE & REHABILITATION

## 2024-02-10 PROCEDURE — 700102 HCHG RX REV CODE 250 W/ 637 OVERRIDE(OP): Performed by: STUDENT IN AN ORGANIZED HEALTH CARE EDUCATION/TRAINING PROGRAM

## 2024-02-10 RX ADMIN — GABAPENTIN 200 MG: 100 CAPSULE ORAL at 08:10

## 2024-02-10 RX ADMIN — BACLOFEN 10 MG: 10 TABLET ORAL at 15:00

## 2024-02-10 RX ADMIN — SENNOSIDES AND DOCUSATE SODIUM 2 TABLET: 50; 8.6 TABLET ORAL at 20:25

## 2024-02-10 RX ADMIN — OXYCODONE 7.5 MG: 5 TABLET ORAL at 20:35

## 2024-02-10 RX ADMIN — BACLOFEN 10 MG: 10 TABLET ORAL at 08:11

## 2024-02-10 RX ADMIN — LIDOCAINE 1 PATCH: 4 PATCH TOPICAL at 12:00

## 2024-02-10 RX ADMIN — OXYCODONE HYDROCHLORIDE 10 MG: 10 TABLET ORAL at 13:00

## 2024-02-10 RX ADMIN — ATORVASTATIN CALCIUM 20 MG: 10 TABLET, FILM COATED ORAL at 20:23

## 2024-02-10 RX ADMIN — OMEPRAZOLE 20 MG: 20 CAPSULE, DELAYED RELEASE ORAL at 08:11

## 2024-02-10 RX ADMIN — OXYCODONE HYDROCHLORIDE 10 MG: 10 TABLET ORAL at 06:17

## 2024-02-10 RX ADMIN — GABAPENTIN 200 MG: 100 CAPSULE ORAL at 13:00

## 2024-02-10 RX ADMIN — ENOXAPARIN SODIUM 40 MG: 100 INJECTION SUBCUTANEOUS at 20:26

## 2024-02-10 RX ADMIN — Medication 2000 UNITS: at 08:11

## 2024-02-10 RX ADMIN — BACLOFEN 10 MG: 10 TABLET ORAL at 20:26

## 2024-02-10 RX ADMIN — GABAPENTIN 300 MG: 300 CAPSULE ORAL at 20:35

## 2024-02-10 ASSESSMENT — PATIENT HEALTH QUESTIONNAIRE - PHQ9
2. FEELING DOWN, DEPRESSED, IRRITABLE, OR HOPELESS: NOT AT ALL
SUM OF ALL RESPONSES TO PHQ9 QUESTIONS 1 AND 2: 0
2. FEELING DOWN, DEPRESSED, IRRITABLE, OR HOPELESS: NOT AT ALL
1. LITTLE INTEREST OR PLEASURE IN DOING THINGS: NOT AT ALL
SUM OF ALL RESPONSES TO PHQ9 QUESTIONS 1 AND 2: 0
1. LITTLE INTEREST OR PLEASURE IN DOING THINGS: NOT AT ALL

## 2024-02-10 NOTE — PROGRESS NOTES
"  Physical Medicine & Rehabilitation Progress Note    Encounter Date: 2/10/2024    Chief Complaint: Weakness    Interval Events (Subjective):  Seen in room, in WC. Complains of left leg and foot pain but states it is improving.     Objective:  VITAL SIGNS: /69   Pulse 80   Temp 36.9 °C (98.5 °F) (Temporal)   Resp 18   Ht 1.499 m (4' 11\")   Wt 102 kg (224 lb)   SpO2 97%   BMI 45.24 kg/m²   Gen: No acute distress, well developed well nourished adult  HEENT: Normal Cephalic Atraumatic, Normal conjunctiva.   CV: warm extremities, well perfused, no edema  Resp: symmetric chest rise, breathing comfortably on room air  Abd: Soft, Non distended  Extremities: normal bulk, no atrophy  Skin: no visible rashes or lesions.   Neuro: alert, awake  Psych: Mood and affect appropriate and congruent    Laboratory Values:  No results found for this or any previous visit (from the past 72 hour(s)).    Medications:  Scheduled Medications   Medication Dose Frequency    oxyCODONE immediate-release  10 mg BID    baclofen  10 mg TID    gabapentin  200 mg BID    gabapentin  300 mg Q EVENING    lidocaine  1 Patch Q24HR    vitamin D3  2,000 Units DAILY    Pharmacy Consult Request  1 Each PHARMACY TO DOSE    senna-docusate  2 Tablet Q EVENING    omeprazole  20 mg DAILY    enoxaparin (LOVENOX) injection  40 mg Q EVENING    atorvastatin  20 mg Q EVENING     PRN medications: oxyCODONE immediate-release **OR** oxyCODONE immediate-release, capsaicin, Respiratory Therapy Consult, senna-docusate **AND** polyethylene glycol/lytes, mag hydrox-al hydrox-simeth, ondansetron **OR** ondansetron, traZODone, sodium chloride    Diet:  Current Diet Order   Procedures    Diet Order Diet: Regular       Medical Decision Making and Plan:  Lumbar stenosis   S/p lumbar fusion   Lumbar fracture   - history of back pain with radicuopathy   - no MRI to review   - 1/23 stage 1 L4-S1 ALIF by Dr. Rodriguez   1/25  left L2/3 XLIF by Dr. Rodriguez   1/30 redo L2-3 " "laminotomy and L2-S1 perc fusion by Dr. Rodriguez   - LSO when OOB   - most recent CT L spine obtained on 1/31 showed oblique nondisplaced L5 vertebral body fracture   - comprehnsive IRF level therapy with 3hrs of therapy 5 days per week      Hypoxia   - hypoxia, worse with anesthesia, causing 3rd surgery to be aborted   - now on albuterol   - stable on 1 L o2  , weaned down to RA   - CT chest negative for PE      ABLA   - post op drop in Hgb   - 2/1 Hgb 10.7  -> 10.5 2/4      HTN   - 2/5 BP well controlled   -2/6- Decontinue Lisinopril 10mg Daily      Vit D deficiency   - Vit D 25  - continue with supplementation      Hypocalcemia  - transferred on calcitrate   - Ca WNL, stopping  calcitrate      Pain  - was on IV diluadid until 2/1 AM   - on scheduled Tylenol and PRN oxycodone   - gabapentin 600mg qhs , additional 300mt BID started 2/4  -2/6 increase gabapentin to 400mg BID and 600mg at night  -2/7 decreased gabapentin to 200mg BID during the da  - pain is located  LLE \"deep to shin\", US negative  -2/6- increase oxycodone from 5mg to 7.5mg PRN  -2/8- Oxycodone 10mg BID scheduled at 7am and 1pm  -continue oxycodone     Insomnia  2/6 Started Baclofen 5mg QHS  -3/7 poor sleep increased to Baclofen 10mg QHS  -2/8 Baclofen 10mg TID scheduled     Bowel  - constipation prevention with scheduled senna   - bowel meds PRN   - Last BM 2/4       Bladder   - timed void to prevent falls and incont epsidoes   -bladder scan if no void > 6hrs, PVR, and cath if retaining > 200ml   - PVR 10      Morbid Obesity   - weight loss education      Skin - Patient at risk for skin breakdown due to debility in areas including sacrum, achilles, elbows and head in addition to other sites. Nursing to assess skin daily.      GI Ppx - Patient on Prilosec for GERD prophylaxis. Patient on Senna-docusate for constipation prophylaxis.      DVT Ppx continue lovenox       ____________________________________  Eamon WorDO alfie MS  [facility] - Physical Medicine " and Rehabilitation     ____________________________________      Patient was seen for >35 minutes on unit/floor of which > 50% of time was spent on counseling and coordination of care regarding the above, including prognosis, risk reduction, benefits of treatment, and options for next stage of care.

## 2024-02-10 NOTE — CARE PLAN
"  Problem: Pain - Standard  Goal: Alleviation of pain or a reduction in pain to the patient’s comfort goal  Outcome: Progressing  Note: Assessed for pain and discomfort , medicated with oxycodone 7,5 mg for groin and back pain with relief[ see mar] .Abdominal staples well approximated , island dressing in place, back incision line with steri strips, STERLING .Needs anticipated and attended . Cont monitored.     Problem: Fall Risk - Rehab  Goal: Patient will remain free from falls  Outcome: Progressing  Note: Kalyani Monroe Fall risk Assessment Score: 11    Moderate fall risk Interventions  - Bed and strip alarm   - Yellow sign by the door   - Yellow wrist band \"Fall risk\"  - Room near to the nurse station  - Do not leave patient unattended in the bathroom  - Fall risk education provided      The patient is Stable - Low risk of patient condition declining or worsening    Shift Goals  Clinical Goals: safety  Patient Goals: sleep well, safety, pain control    Progress made toward(s) clinical / shift goals:  Pt free from fall and injury.    "

## 2024-02-10 NOTE — PROGRESS NOTES
NURSING DAILY NOTE    Name: Dee Armstrong   Date of Admission: 2/2/2024   Admitting Diagnosis: Lumbar radiculopathy  Attending Physician: Ward Boone M.d.  Allergies: Patient has no known allergies.    Safety  Patient Assist  Min assist  Patient Precautions  Fall Risk, Spinal / Back Precautions , Lumbosacral Orthosis  Precaution Comments  Strong fear of needles  Bed Transfer Status  Standby Assist  Toilet Transfer Status   Supervised  Assistive Devices  Rails, Wheelchair  Oxygen  None - Room Air  Diet/Therapeutic Dining  Current Diet Order   Procedures    Diet Order Diet: Regular     Pill Administration  whole  Agitated Behavioral Scale     ABS Level of Severity       Fall Risk  Has the patient had a fall this admission?   No  Kalyani Monroe Fall Risk Scoring  11, MODERATE RISK  Fall Risk Safety Measures  bed alarm, chair alarm, and poor balance    Vitals  Temperature: 36.9 °C (98.5 °F)  Temp src: Temporal  Pulse: 80  Respiration: 18  Blood Pressure : 104/69  Blood Pressure MAP (Calculated): 81 MM HG  BP Location: Left, Upper Arm  Patient BP Position: Yusuf's Position     Oxygen  Pulse Oximetry: 97 %  O2 (LPM): 0  O2 Delivery Device: None - Room Air    Bowel and Bladder  Last Bowel Movement  02/09/24  Stool Type  Type 4: Like a sausage or snake, smooth and soft  Bowel Device  Bathroom  Continent  Bladder: Did not void   Bowel: No movement  Bladder Function  Urine Void (mL):  (nas bathroom)  Number of Times Voided: 1  Urine Color: Unable To Evaluate  Number of Times Incontinent of Urine: 0  Genitourinary Assessment   Bladder Assessment (WDL):  Within Defined Limits  Langley Catheter: Not Applicable  Urine Color: Unable To Evaluate  Number of Bladder Accidents: 0  Total Number of Bladder of Accidents in Last 7 Days: 0  Number of Times Incontinent of Urine: 0  Bladder Device: Bathroom  Time Void: No  Bladder Scan: Post Void  $ Bladder Scan Results (mL):   (none)    Skin  Vlad Score   19  Sensory Interventions   Bed Types: Standard/Trauma Mattress  Skin Preventative Measures: Pillows in Use for Support / Positioning  Moisture Interventions  Moisturizers/Barriers: Barrier Wipes, Barrier Paste      Pain  Pain Rating Scale  7 - Focus of attention, prevents doing daily activities  Pain Location  Back, Knee, Hip  Pain Location Orientation  Left  Pain Interventions   Medication (see MAR)    ADLs    Bathing   Shower, * * With Assistance from, Staff  Linen Change   Partial  Personal Hygiene  Perineal Care, Change Sammie Pads  Chlorhexidine Bath      Oral Care  Brushed Teeth  Teeth/Dentures     Shave     Nutrition Percentage Eaten  *  * Meal *  *, Dinner, Between % Consumed  Environmental Precautions  Treaded Slipper Socks on Patient  Patient Turns/Positioning  Patient Turns Self from Side to Side  Patient Turns Assistance/Tolerance     Bed Positions  Bed Controls On  Head of Bed Elevated  Self regulated      Psychosocial/Neurologic Assessment  Psychosocial Assessment  Psychosocial (WDL):  Within Defined Limits  Patient Behaviors: Anxious, Fatigue  Family Behaviors: No Family Present  Neurologic Assessment  Neuro (WDL): Exceptions to WDL  Level of Consciousness: Alert  Orientation Level: Oriented X4  Cognition: Appropriate judgement  Speech: Clear  Motor Function/Sensation Assessment: Motor strength  RUE Sensation: Full sensation  Muscle Strength Right Arm: Good Strength Against Gravity and Moderate Resistance  LUE Sensation: Full sensation  Muscle Strength Left Arm: Good Strength Against Gravity and Moderate Resistance  RLE Sensation: Numbness  Muscle Strength Right Leg: Good Strength Against Gravity and Moderate Resistance  LLE Motor Response: Spastic  LLE Sensation: Pain, Tingling  Muscle Strength Left Leg: Good Strength Against Gravity and Moderate Resistance  EENT (WDL):  WDL Except    Cardio/Pulmonary Assessment  Edema      Respiratory Breath Sounds     Cardiac  Assessment   Cardiac (WDL):  WDL Except

## 2024-02-10 NOTE — THERAPY
Physical Therapy   Daily Treatment     Patient Name: Dee Armstrong  Age:  68 y.o., Sex:  female  Medical Record #: [id number]  Today's Date: 2/9/2024     Precautions  Precautions: Fall Risk, Spinal / Back Precautions , Lumbosacral Orthosis  Comments: Strong fear of needles    Subjective    Agreeable to participate, reports this morning that our exercises yesterday did not leave her in much pain, is willing to do more work on her Trendelenburg gait. This afternoon, reports significant pain and is willing to review HEP handout     Objective       02/09/24 0931 02/09/24 1415   PT Charge Group   PT Gait Training (Units) 2  --    PT Therapeutic Exercise (Units) 1 3   PT Total Time Spent   PT Individual Total Time Spent (Mins) 45 45   Gait Functional Level of Assist    Gait Level Of Assist Standby Assist  --    Assistive Device Front Wheel Walker  (mirror and tactile cues for addressing trendelenburg gait)  --    Distance (Feet) 150  --    # of Times Distance was Traveled 1  --    Deviation Bradykinetic;Antalgic;Trendelenberg  (R hip weakness/ L hip drop in R stance)  --    Supine Lower Body Exercise   Comments  --  reviewed HEP handout   Sitting Lower Body Exercises   Nustep Resistance Level 6;Resistance Level 4  (level6 x first 5 mins, level 4 for last 5 mins (ten min total), 475 total steps)  --    Comments  --  reviewed HEP handout   Standing Lower Body Exercises   Comments  --  reviewed HEP handout   Neuro-Muscular Treatments   Comments  --  reviewed HEP handout for balance   Interdisciplinary Plan of Care Collaboration   IDT Collaboration with   --  Occupational Therapist   Patient Position at End of Therapy Seated;Chair Alarm On;Self Releasing Lap Belt Applied;Call Light within Reach;Tray Table within Reach;Phone within Reach Seated;Chair Alarm On;Phone within Reach   Collaboration Comments left seated up in chair in room seated up in wheelchair in dining room with family. Spoke with OT re: pain          Assessment    Patient is motivated to work on finer points of gait kinematics, benefits from walking towards mirror with theraband tied around her waist as a reference for horizontal so she can see her L hip drop with R stance phase. She is better able to correct trendelenburg gait with visual feedback. Verbalizes understanding with review of home exercise program handouts, took several notes on exercise sheets to improve comprehension.     Strengths: Adequate strength, Alert and oriented, Effective communication skills, Good insight into deficits/needs, Independent prior level of function, Making steady progress towards goals, Motivated for self care and independence, Pleasant and cooperative, Supportive family, Willingly participates in therapeutic activities, Manages pain appropriately, Able to follow instructions  Barriers: Decreased endurance, Generalized weakness, Impaired activity tolerance, Impaired balance, Pain, Limited mobility    Plan    Dynamic balance, trendelenburg gait (R hip weak, L hip drop with R stance), core/ hip stability, repeat Cummings    DME  PT DME Recommendations  Wheelchair:  (no wheelchair necessary, has FWW. No DME needs)    Passport items to be completed:  Get in/out of bed safely, in/out of a vehicle, safely use mobility device, walk or wheel around home/community, navigate up and down stairs, show how to get up/down from the ground, ensure home is accessible, demonstrate HEP, complete caregiver training    Physical Therapy Problems (Active)       Problem: Balance       Dates: Start:  02/03/24         Goal: STG-Within one week, patient will improve Cummings by 10 points (19/56 on eval)       Dates: Start:  02/03/24               Problem: Mobility       Dates: Start:  02/03/24         Goal: STG-Within one week, patient will ambulate up/down a curb with FWW and min assist       Dates: Start:  02/03/24               Problem: Mobility Transfers       Dates: Start:  02/03/24         Goal:  STG-Within one week, patient will perform sit to supine via log roll with supervision assist       Dates: Start:  02/03/24               Problem: PT-Long Term Goals       Dates: Start:  02/03/24         Goal: LTG-By discharge, patient will tolerate Cummings >36/56       Dates: Start:  02/03/24            Goal: LTG-By discharge, patient will ambulate 150 ft between seated rest breaks with FWW and supervision assist       Dates: Start:  02/03/24            Goal: LTG-By discharge, patient will perform home exercise program from handouts with set up assist       Dates: Start:  02/03/24            Goal: LTG-By discharge, patient will transfer in/out of a car with supervision assist and FWW       Dates: Start:  02/03/24

## 2024-02-11 ENCOUNTER — APPOINTMENT (OUTPATIENT)
Dept: OCCUPATIONAL THERAPY | Facility: REHABILITATION | Age: 69
DRG: 052 | End: 2024-02-11
Attending: PHYSICAL MEDICINE & REHABILITATION
Payer: MEDICARE

## 2024-02-11 PROCEDURE — A9270 NON-COVERED ITEM OR SERVICE: HCPCS | Performed by: PHYSICAL MEDICINE & REHABILITATION

## 2024-02-11 PROCEDURE — A9270 NON-COVERED ITEM OR SERVICE: HCPCS | Performed by: STUDENT IN AN ORGANIZED HEALTH CARE EDUCATION/TRAINING PROGRAM

## 2024-02-11 PROCEDURE — 770010 HCHG ROOM/CARE - REHAB SEMI PRIVAT*

## 2024-02-11 PROCEDURE — 97535 SELF CARE MNGMENT TRAINING: CPT

## 2024-02-11 PROCEDURE — 700101 HCHG RX REV CODE 250: Performed by: PHYSICAL MEDICINE & REHABILITATION

## 2024-02-11 PROCEDURE — 700102 HCHG RX REV CODE 250 W/ 637 OVERRIDE(OP): Performed by: PHYSICAL MEDICINE & REHABILITATION

## 2024-02-11 PROCEDURE — 700111 HCHG RX REV CODE 636 W/ 250 OVERRIDE (IP): Mod: JZ | Performed by: PHYSICAL MEDICINE & REHABILITATION

## 2024-02-11 PROCEDURE — 700102 HCHG RX REV CODE 250 W/ 637 OVERRIDE(OP): Performed by: STUDENT IN AN ORGANIZED HEALTH CARE EDUCATION/TRAINING PROGRAM

## 2024-02-11 PROCEDURE — 97530 THERAPEUTIC ACTIVITIES: CPT

## 2024-02-11 PROCEDURE — 99232 SBSQ HOSP IP/OBS MODERATE 35: CPT | Performed by: PHYSICAL MEDICINE & REHABILITATION

## 2024-02-11 RX ADMIN — LIDOCAINE 1 PATCH: 4 PATCH TOPICAL at 12:55

## 2024-02-11 RX ADMIN — ENOXAPARIN SODIUM 40 MG: 100 INJECTION SUBCUTANEOUS at 20:33

## 2024-02-11 RX ADMIN — BACLOFEN 10 MG: 10 TABLET ORAL at 20:34

## 2024-02-11 RX ADMIN — BACLOFEN 10 MG: 10 TABLET ORAL at 08:49

## 2024-02-11 RX ADMIN — ATORVASTATIN CALCIUM 20 MG: 10 TABLET, FILM COATED ORAL at 20:34

## 2024-02-11 RX ADMIN — OXYCODONE HYDROCHLORIDE 10 MG: 10 TABLET ORAL at 12:56

## 2024-02-11 RX ADMIN — OMEPRAZOLE 20 MG: 20 CAPSULE, DELAYED RELEASE ORAL at 08:49

## 2024-02-11 RX ADMIN — BACLOFEN 10 MG: 10 TABLET ORAL at 14:35

## 2024-02-11 RX ADMIN — Medication 2000 UNITS: at 08:48

## 2024-02-11 RX ADMIN — GABAPENTIN 200 MG: 100 CAPSULE ORAL at 08:48

## 2024-02-11 RX ADMIN — GABAPENTIN 200 MG: 100 CAPSULE ORAL at 12:55

## 2024-02-11 RX ADMIN — OXYCODONE HYDROCHLORIDE 10 MG: 10 TABLET ORAL at 06:05

## 2024-02-11 RX ADMIN — OXYCODONE 7.5 MG: 5 TABLET ORAL at 20:34

## 2024-02-11 RX ADMIN — GABAPENTIN 300 MG: 300 CAPSULE ORAL at 20:34

## 2024-02-11 ASSESSMENT — PAIN DESCRIPTION - PAIN TYPE
TYPE: ACUTE PAIN

## 2024-02-11 NOTE — THERAPY
Occupational Therapy  Daily Treatment     Patient Name: Dee Armstrong  Age:  68 y.o., Sex:  female  Medical Record #: [id number]  Today's Date: 2/11/2024     Precautions  Precautions: Fall Risk, Spinal / Back Precautions , Lumbosacral Orthosis  Comments: Strong fear of needles         Subjective    Pt seated in w/c upon arrival, pleasant and cooperative, agreeable to therapy and shower     Objective       02/11/24 1031   OT Charge Group   OT Self Care / ADL (Units) 3   OT Therapy Activity (Units) 1   OT Total Time Spent   OT Individual Total Time Spent (Mins) 60   Functional Level of Assist   Eating Independent   Grooming Modified Independent;Standing   Bathing Modified Independent   Upper Body Dressing Independent   Lower Body Dressing Modified Independent   Lower Body Dressing Description Reacher   Toileting Modified Independent   Toilet Transfers Modified Independent  (with FWW)   Tub / Shower Transfers Modified Independent  (with FWW)   Bed Mobility    Supine to Sit Modified Independent   Sit to Supine Modified Independent   Scooting Modified Independent   Rolling Modified Independent   Interdisciplinary Plan of Care Collaboration   IDT Collaboration with  Nursing;Certified Nursing Assistant   Patient Position at End of Therapy Call Light within Reach;Tray Table within Reach;In Bed   Collaboration Comments re: Mod I in room     Problem solved and rearranged room for improve space since pt was cleared Mod I in room - pt able to demo back sliding side table, folding walker, reaching and moving TV safely     Assessment    Pt completed shower routine at Mod I level for ADL and bathroom transfers overall using FWW, shower bench, hand-held shower head, GB's, hip kit AE. Pt's functional performance was impacted by pain - but much more tolerable. Pt cleared Mod I in room at FWW level to promote increase independence      Strengths: Able to follow instructions, Adequate strength, Alert and oriented, Effective  communication skills, Good insight into deficits/needs, Independent prior level of function, Making steady progress towards goals, Motivated for self care and independence, Pleasant and cooperative, Supportive family, Willingly participates in therapeutic activities  Barriers: Decreased endurance, Fatigue, Generalized weakness, Emotional lability, Impaired activity tolerance, Impaired balance, Limited mobility, Pain, Pain poorly managed    Plan    Cont overall strength/endurance and standing balance to improve ADL's/IADL's and functional mobility at FWW level while maintaining spinal precautions; pain mgmt     Pt lives in 1 story with RAINA Mackey, adult children live in [place], 1 threshold (3-4 inch) to enter, walk-in shower/chair/glass door, has FWW, 4WW, reacher     DME     - recommended bathroom DME: grab bars at shower/toilet, shower chair  - recommended hip kit AE: reacher, LH sponge, LH lotion application, LH shoehorn    Occupational Therapy Goals (Active)       Problem: Bathing       Dates: Start:  02/05/24         Goal: STG-Within one week, patient will bathe at [facility] using DME/AE PRN       Dates: Start:  02/05/24               Problem: Dressing       Dates: Start:  02/03/24         Goal: STG-Within one week, patient will dress UB at [facility] using DME/AE PRN.       Dates: Start:  02/03/24         Goal Note filed on 02/05/24 1614 by Amada Leary MS,OTR/L       Requires SBA              Goal: STG-Within one week, patient will dress LB at [facility] using DME/AE PRN       Dates: Start:  02/05/24               Problem: Functional Transfers       Dates: Start:  02/03/24         Goal: STG-Within one week, patient will transfer to toilet at [facility].       Dates: Start:  02/03/24         Goal Note filed on 02/05/24 1614 by Amada Leary MS,OTR/L       Requires CGA              Goal: STG-Within one week, patient will transfer to step in shower at [facility].       Dates: Start:  02/03/24         Goal Note filed on 02/05/24 1614 by  Amada Leary, MS,OTR/L       Not yet addressed                  Problem: IADL's       Dates: Start:  02/05/24         Goal: STG-Within one week, patient will access kitchen area at [facility] using DME/AE PRN       Dates: Start:  02/05/24               Problem: OT Long Term Goals       Dates: Start:  02/03/24         Goal: LTG-By discharge, patient will complete basic self care tasks at [facility]-Mod I.       Dates: Start:  02/03/24            Goal: LTG-By discharge, patient will perform bathroom transfers  at [facility]-Mod I.       Dates: Start:  02/03/24               Problem: Toileting       Dates: Start:  02/05/24         Goal: STG-Within one week, patient will complete toileting tasks at [facility] using DME/AE PRN       Dates: Start:  02/05/24

## 2024-02-11 NOTE — PROGRESS NOTES
NURSING DAILY NOTE    Name: Dee Armstrong   Date of Admission: 2/2/2024   Admitting Diagnosis: Lumbar radiculopathy  Attending Physician: Ward Boone M.d.  Allergies: Patient has no known allergies.    Safety  Patient Assist  Min assist  Patient Precautions  Fall Risk, Spinal / Back Precautions , Lumbosacral Orthosis  Precaution Comments  Strong fear of needles  Bed Transfer Status  Standby Assist  Toilet Transfer Status   Supervised  Assistive Devices  Rails, Wheelchair  Oxygen  None - Room Air  Diet/Therapeutic Dining  Current Diet Order   Procedures    Diet Order Diet: Regular     Pill Administration  whole  Agitated Behavioral Scale     ABS Level of Severity       Fall Risk  Has the patient had a fall this admission?   No  Kalyani Monroe Fall Risk Scoring  11, MODERATE RISK  Fall Risk Safety Measures  bed alarm, chair alarm, and seatbelt alarm    Vitals  Temperature: 36.6 °C (97.8 °F)  Temp src: Temporal  Pulse: (!) 102  Respiration: 16  Blood Pressure : (!) 142/76  Blood Pressure MAP (Calculated): 98 MM HG  BP Location: Left, Upper Arm  Patient BP Position: Sitting     Oxygen  Pulse Oximetry: 92 %  O2 (LPM): 0  O2 Delivery Device: None - Room Air    Bowel and Bladder  Last Bowel Movement  02/09/24  Stool Type  Type 4: Like a sausage or snake, smooth and soft  Bowel Device  Bathroom  Continent  Bladder: Did not void   Bowel: No movement  Bladder Function  Urine Void (mL):  (nas bathroom)  Number of Times Voided: 1  Urine Color: Unable To Evaluate  Urine Clarity: Clots  Number of Times Incontinent of Urine: 0  Genitourinary Assessment   Bladder Assessment (WDL):  Within Defined Limits  Langley Catheter: Not Applicable  Urine Color: Unable To Evaluate  Urine Clarity: Clots  Number of Bladder Accidents: 0  Total Number of Bladder of Accidents in Last 7 Days: 0  Number of Times Incontinent of Urine: 0  Bladder Device: Bathroom  Time Void: No  Bladder  Scan: Post Void  $ Bladder Scan Results (mL):  (none)    Skin  Vlad Score   19  Sensory Interventions   Bed Types: Standard/Trauma Mattress  Skin Preventative Measures: Pillows in Use for Support / Positioning  Moisture Interventions  Moisturizers/Barriers: Barrier Wipes, Barrier Cream      Pain  Pain Rating Scale  6 - Hard to ignore, avoid usual activities  Pain Location  Back, Knee, Hip  Pain Location Orientation  Left  Pain Interventions   Declines    ADLs    Bathing   Shower, * * With Assistance from, Staff  Linen Change   Partial  Personal Hygiene  Perineal Care, Change Sammie Pads  Chlorhexidine Bath      Oral Care  Brushed Teeth  Teeth/Dentures     Shave     Nutrition Percentage Eaten  *  * Meal *  *, Dinner, Between % Consumed  Environmental Precautions  Treaded Slipper Socks on Patient  Patient Turns/Positioning  Patient Turns Self from Side to Side  Patient Turns Assistance/Tolerance     Bed Positions  Bed Controls On  Head of Bed Elevated  Self regulated      Psychosocial/Neurologic Assessment  Psychosocial Assessment  Psychosocial (WDL):  Within Defined Limits  Patient Behaviors: Anxious, Fatigue  Family Behaviors: No Family Present  Neurologic Assessment  Neuro (WDL): Exceptions to WDL  Level of Consciousness: Alert  Orientation Level: Oriented X4  Cognition: Appropriate judgement  Speech: Clear  Motor Function/Sensation Assessment: Motor strength  RUE Sensation: Full sensation  Muscle Strength Right Arm: Good Strength Against Gravity and Moderate Resistance  LUE Sensation: Full sensation  Muscle Strength Left Arm: Good Strength Against Gravity and Moderate Resistance  RLE Sensation: Numbness  Muscle Strength Right Leg: Good Strength Against Gravity and Moderate Resistance  LLE Motor Response: Spastic  LLE Sensation: Pain, Tingling  Muscle Strength Left Leg: Good Strength Against Gravity and Moderate Resistance  EENT (WDL):  WDL Except    Cardio/Pulmonary Assessment  Edema      Respiratory Breath  Sounds     Cardiac Assessment   Cardiac (WDL):  WDL Except

## 2024-02-11 NOTE — PROGRESS NOTES
NURSING DAILY NOTE    Name: Dee Armstrong   Date of Admission: 2/2/2024   Admitting Diagnosis: Lumbar radiculopathy  Attending Physician: Ward Boone M.d.  Allergies: Patient has no known allergies.    Safety  Patient Assist  Min assist  Patient Precautions  Fall Risk, Spinal / Back Precautions , Lumbosacral Orthosis  Precaution Comments  Strong fear of needles  Bed Transfer Status  Standby Assist  Toilet Transfer Status   Modified Independent (with FWW)  Assistive Devices  Rails, Wheelchair  Oxygen  None - Room Air  Diet/Therapeutic Dining  Current Diet Order   Procedures    Diet Order Diet: Regular     Pill Administration  whole  Agitated Behavioral Scale     ABS Level of Severity       Fall Risk  Has the patient had a fall this admission?   No  Kalyani Monroe Fall Risk Scoring  11, MODERATE RISK  Fall Risk Safety Measures  bed alarm and chair alarm    Vitals  Temperature: 36.9 °C (98.5 °F)  Temp src: Oral  Pulse: 95  Respiration: 16  Blood Pressure : 138/68  Blood Pressure MAP (Calculated): 91 MM HG  BP Location: Left, Upper Arm  Patient BP Position: Supine     Oxygen  Pulse Oximetry: 94 %  O2 (LPM): 0  O2 Delivery Device: None - Room Air    Bowel and Bladder  Last Bowel Movement  02/10/24  Stool Type  Type 5: Soft blob with clear cut edges (passed easily)  Bowel Device  Bathroom  Continent  Bladder: Did not void   Bowel: No movement  Bladder Function  Urine Void (mL):  (nas bathroom)  Number of Times Voided: 1  Urine Color: Yellow  Urine Clarity: Clots  Number of Times Incontinent of Urine: 0  Genitourinary Assessment   Bladder Assessment (WDL):  Within Defined Limits  Langley Catheter: Not Applicable  Urine Color: Yellow  Urine Clarity: Clots  Number of Bladder Accidents: 0  Total Number of Bladder of Accidents in Last 7 Days: 0  Number of Times Incontinent of Urine: 0  Bladder Device: Bathroom  Time Void: No  Bladder Scan: Post Void  $ Bladder Scan  Results (mL):  (none)    Skin  Vlad Score   19  Sensory Interventions   Bed Types: Standard/Trauma Mattress  Skin Preventative Measures: Pillows in Use for Support / Positioning  Moisture Interventions  Moisturizers/Barriers: Moisturizer       Pain  Pain Rating Scale  8 - Awful, hard to do anything  Pain Location  Leg  Pain Location Orientation  Left  Pain Interventions   Medication (see MAR)    ADLs    Bathing   Shower, * * With Assistance from, Staff  Linen Change   Partial  Personal Hygiene  Perineal Care, Change Sammie Pads  Chlorhexidine Bath      Oral Care  Brushed Teeth  Teeth/Dentures     Shave     Nutrition Percentage Eaten  *  * Meal *  *, Dinner, Between % Consumed  Environmental Precautions  Treaded Slipper Socks on Patient  Patient Turns/Positioning  Patient Turns Self from Side to Side  Patient Turns Assistance/Tolerance     Bed Positions  Bed Controls On, Bed Locked  Head of Bed Elevated  Self regulated      Psychosocial/Neurologic Assessment  Psychosocial Assessment  Psychosocial (WDL):  Within Defined Limits  Patient Behaviors: Anxious, Fatigue  Family Behaviors: No Family Present  Neurologic Assessment  Neuro (WDL): Exceptions to WDL  Level of Consciousness: Alert  Orientation Level: Oriented X4  Cognition: Appropriate judgement  Speech: Clear  Motor Function/Sensation Assessment: Motor strength  RUE Sensation: Full sensation  Muscle Strength Right Arm: Good Strength Against Gravity and Moderate Resistance  LUE Sensation: Full sensation  Muscle Strength Left Arm: Good Strength Against Gravity and Moderate Resistance  RLE Sensation: Numbness  Muscle Strength Right Leg: Good Strength Against Gravity and Moderate Resistance  LLE Motor Response: Spastic  LLE Sensation: Pain, Tingling  Muscle Strength Left Leg: Good Strength Against Gravity and Moderate Resistance  EENT (WDL):  WDL Except    Cardio/Pulmonary Assessment  Edema      Respiratory Breath Sounds     Cardiac Assessment   Cardiac (WDL):   WDL Except

## 2024-02-11 NOTE — CARE PLAN
"  Problem: Pain - Standard  Goal: Alleviation of pain or a reduction in pain to the patient’s comfort goal  Outcome: Progressing  Note: Assessed for pain and discomfort , oxycodone 7.5 mg p.o with relief [ see mar]. Needs anticipated and attended.      Problem: Fall Risk - Rehab  Goal: Patient will remain free from falls  Outcome: Progressing  Note: Kalyani Monroe Fall risk Assessment Score: 11    Moderate fall risk Interventions  - Bed and strip alarm   - Yellow sign by the door   - Yellow wrist band \"Fall risk\"  - Room near to the nurse station  - Do not leave patient unattended in the bathroom  - Fall risk education provided      The patient is Stable - Low risk of patient condition declining or worsening    Shift Goals  Clinical Goals: safety  Patient Goals: sleep well, safety, pain control    Progress made toward(s) clinical / shift goals:  Pt fee from fall and injury.    "

## 2024-02-11 NOTE — CARE PLAN
Problem: Skin Integrity  Goal: Skin integrity is maintained or improved  2/10/2024 1656 by Vicki Lee V, R.N.  Outcome: Progressing  2/10/2024 1656 by Vicki Lee V, R.N.  Outcome: Progressing     Problem: Pain - Standard  Goal: Alleviation of pain or a reduction in pain to the patient’s comfort goal  Outcome: Progressing   The patient is Stable - Low risk of patient condition declining or worsening    Shift Goals  Clinical Goals: safety  Patient Goals: sleep well, safety, pain control    Progress made toward(s) clinical / shift goals:      Patient is not progressing towards the following goals:

## 2024-02-11 NOTE — CARE PLAN
The patient is Stable - Low risk of patient condition declining or worsening    Shift Goals  Clinical Goals: Safety, pain control  Patient Goals: Safety  Family Goals: Education    Progress made toward(s) clinical / shift goals: VSS, pain controlled with scheduled pain medication, skin intact.

## 2024-02-11 NOTE — PROGRESS NOTES
NURSING DAILY NOTE    Name: Dee Armstrong   Date of Admission: 2/2/2024   Admitting Diagnosis: Lumbar radiculopathy  Attending Physician: Ward Boone M.d.  Allergies: Patient has no known allergies.    Safety  Patient Assist  Min assist  Patient Precautions  Fall Risk, Spinal / Back Precautions , Lumbosacral Orthosis  Precaution Comments  Strong fear of needles  Bed Transfer Status  Standby Assist  Toilet Transfer Status   Supervised  Assistive Devices  Rails, Wheelchair  Oxygen  None - Room Air  Diet/Therapeutic Dining  Current Diet Order   Procedures    Diet Order Diet: Regular     Pill Administration  whole  Agitated Behavioral Scale     ABS Level of Severity       Fall Risk  Has the patient had a fall this admission?   No  Kalyani Monroe Fall Risk Scoring  11, MODERATE RISK  Fall Risk Safety Measures  bed alarm, chair alarm, and poor balance    Vitals  Temperature: 36.9 °C (98.5 °F)  Temp src: Oral  Pulse: 95  Respiration: 16  Blood Pressure : 138/68  Blood Pressure MAP (Calculated): 91 MM HG  BP Location: Left, Upper Arm  Patient BP Position: Supine     Oxygen  Pulse Oximetry: 94 %  O2 (LPM): 0  O2 Delivery Device: None - Room Air    Bowel and Bladder  Last Bowel Movement  02/10/24  Stool Type  Type 5: Soft blob with clear cut edges (passed easily)  Bowel Device  Bathroom  Continent  Bladder: Did not void   Bowel: No movement  Bladder Function  Urine Void (mL):  (nas bathroom)  Number of Times Voided: 1  Urine Color: Yellow  Urine Clarity: Clots  Number of Times Incontinent of Urine: 0  Genitourinary Assessment   Bladder Assessment (WDL):  Within Defined Limits  Langley Catheter: Not Applicable  Urine Color: Yellow  Urine Clarity: Clots  Number of Bladder Accidents: 0  Total Number of Bladder of Accidents in Last 7 Days: 0  Number of Times Incontinent of Urine: 0  Bladder Device: Bathroom  Time Void: No  Bladder Scan: Post Void  $ Bladder Scan  Results (mL):  (none)    Skin  Vlad Score   19  Sensory Interventions   Bed Types: Standard/Trauma Mattress  Skin Preventative Measures: Pillows in Use to Float Heels  Moisture Interventions  Moisturizers/Barriers: Barrier Wipes, Barrier Cream      Pain  Pain Rating Scale  8 - Awful, hard to do anything  Pain Location  Leg  Pain Location Orientation  Left  Pain Interventions   [place]    ADLs    Bathing   Shower, * * With Assistance from, Staff  Linen Change   Partial  Personal Hygiene  Perineal Care, Change Sammie Pads  Chlorhexidine Bath      Oral Care  Brushed Teeth  Teeth/Dentures     Shave     Nutrition Percentage Eaten  *  * Meal *  *, Dinner, Between % Consumed  Environmental Precautions  Treaded Slipper Socks on Patient  Patient Turns/Positioning  Patient Turns Self from Side to Side  Patient Turns Assistance/Tolerance     Bed Positions  Bed Controls On, Bed Locked  Head of Bed Elevated  Self regulated      Psychosocial/Neurologic Assessment  Psychosocial Assessment  Psychosocial (WDL):  Within Defined Limits  Patient Behaviors: Anxious, Fatigue  Family Behaviors: No Family Present  Neurologic Assessment  Neuro (WDL): Exceptions to WDL  Level of Consciousness: Alert  Orientation Level: Oriented X4  Cognition: Appropriate judgement  Speech: Clear  Motor Function/Sensation Assessment: Motor strength  RUE Sensation: Full sensation  Muscle Strength Right Arm: Good Strength Against Gravity and Moderate Resistance  LUE Sensation: Full sensation  Muscle Strength Left Arm: Good Strength Against Gravity and Moderate Resistance  RLE Sensation: Numbness  Muscle Strength Right Leg: Good Strength Against Gravity and Moderate Resistance  LLE Motor Response: Spastic  LLE Sensation: Pain, Tingling  Muscle Strength Left Leg: Good Strength Against Gravity and Moderate Resistance  EENT (WDL):  WDL Except    Cardio/Pulmonary Assessment  Edema      Respiratory Breath Sounds     Cardiac Assessment   Cardiac (WDL):  WDL  Except

## 2024-02-11 NOTE — PROGRESS NOTES
"  Physical Medicine & Rehabilitation Progress Note    Encounter Date: 2/11/2024    Chief Complaint: Weakness    Interval Events (Subjective):  Seen in room, in WC. Continues to have left leg pain that is improving.     Objective:  VITAL SIGNS: /68   Pulse 95   Temp 36.9 °C (98.5 °F) (Oral)   Resp 16   Ht 1.499 m (4' 11\")   Wt 102 kg (224 lb)   SpO2 94%   BMI 45.24 kg/m²   Gen: No acute distress, well developed well nourished adult  HEENT: Normal Cephalic Atraumatic, Normal conjunctiva.   CV: warm extremities, well perfused, no edema  Resp: symmetric chest rise, breathing comfortably on room air  Abd: Soft, Non distended  Extremities: normal bulk, no atrophy  Skin: no visible rashes or lesions.   Neuro: alert, awake  Psych: Mood and affect appropriate and congruent    Laboratory Values:  No results found for this or any previous visit (from the past 72 hour(s)).    Medications:  Scheduled Medications   Medication Dose Frequency    oxyCODONE immediate-release  10 mg BID    baclofen  10 mg TID    gabapentin  200 mg BID    gabapentin  300 mg Q EVENING    lidocaine  1 Patch Q24HR    vitamin D3  2,000 Units DAILY    Pharmacy Consult Request  1 Each PHARMACY TO DOSE    senna-docusate  2 Tablet Q EVENING    omeprazole  20 mg DAILY    enoxaparin (LOVENOX) injection  40 mg Q EVENING    atorvastatin  20 mg Q EVENING     PRN medications: oxyCODONE immediate-release **OR** oxyCODONE immediate-release, capsaicin, Respiratory Therapy Consult, senna-docusate **AND** polyethylene glycol/lytes, mag hydrox-al hydrox-simeth, ondansetron **OR** ondansetron, traZODone, sodium chloride    Diet:  Current Diet Order   Procedures    Diet Order Diet: Regular       Medical Decision Making and Plan:  Lumbar stenosis   S/p lumbar fusion   Lumbar fracture   - history of back pain with radicuopathy   - no MRI to review   - 1/23 stage 1 L4-S1 ALIF by Dr. Rodriguez   1/25  left L2/3 XLIF by Dr. Rodriguez   1/30 redo L2-3 laminotomy and L2-S1 " "perc fusion by Dr. Rodriguez   - LSO when OOB   - most recent CT L spine obtained on 1/31 showed oblique nondisplaced L5 vertebral body fracture   - comprehnsive IRF level therapy with 3hrs of therapy 5 days per week      Hypoxia   - hypoxia, worse with anesthesia, causing 3rd surgery to be aborted   - now on albuterol   - stable on 1 L o2  , weaned down to RA   - CT chest negative for PE      ABLA   - post op drop in Hgb   - 2/1 Hgb 10.7  -> 10.5 2/4      HTN   - 2/5 BP well controlled   -2/6- Decontinue Lisinopril 10mg Daily      Vit D deficiency   - Vit D 25  - continue with supplementation      Hypocalcemia  - transferred on calcitrate   - Ca WNL, stopping  calcitrate      Pain  - was on IV diluadid until 2/1 AM   - on scheduled Tylenol and PRN oxycodone   - gabapentin 600mg qhs , additional 300mt BID started 2/4  -2/6 increase gabapentin to 400mg BID and 600mg at night  -2/7 decreased gabapentin to 200mg BID during the da  - pain is located  LLE \"deep to shin\", US negative  -2/6- increase oxycodone from 5mg to 7.5mg PRN  -2/8- Oxycodone 10mg BID scheduled at 7am and 1pm  -continue oxycodone     Insomnia  2/6 Started Baclofen 5mg QHS  -3/7 poor sleep increased to Baclofen 10mg QHS  -2/8 Baclofen 10mg TID scheduled     Bowel  - constipation prevention with scheduled senna   - bowel meds PRN   - Last BM 2/4       Bladder   - timed void to prevent falls and incont epsidoes   -bladder scan if no void > 6hrs, PVR, and cath if retaining > 200ml   - PVR 10      Morbid Obesity   - weight loss education      Skin - Patient at risk for skin breakdown due to debility in areas including sacrum, achilles, elbows and head in addition to other sites. Nursing to assess skin daily.      GI Ppx - Patient on Prilosec for GERD prophylaxis. Patient on Senna-docusate for constipation prophylaxis.      DVT Ppx continue lovenox       ____________________________________  Eamon Rodriguez DO MS  [facility] - Physical Medicine and Rehabilitation "     ____________________________________      Patient was seen for >35 minutes on unit/floor of which > 50% of time was spent on counseling and coordination of care regarding the above, including prognosis, risk reduction, benefits of treatment, and options for next stage of care.

## 2024-02-12 ENCOUNTER — APPOINTMENT (OUTPATIENT)
Dept: OCCUPATIONAL THERAPY | Facility: REHABILITATION | Age: 69
DRG: 052 | End: 2024-02-12
Attending: PHYSICAL MEDICINE & REHABILITATION
Payer: MEDICARE

## 2024-02-12 ENCOUNTER — APPOINTMENT (OUTPATIENT)
Dept: PHYSICAL THERAPY | Facility: REHABILITATION | Age: 69
DRG: 052 | End: 2024-02-12
Attending: PHYSICAL MEDICINE & REHABILITATION
Payer: MEDICARE

## 2024-02-12 PROCEDURE — 700101 HCHG RX REV CODE 250: Performed by: PHYSICAL MEDICINE & REHABILITATION

## 2024-02-12 PROCEDURE — 97116 GAIT TRAINING THERAPY: CPT

## 2024-02-12 PROCEDURE — 770010 HCHG ROOM/CARE - REHAB SEMI PRIVAT*

## 2024-02-12 PROCEDURE — 97535 SELF CARE MNGMENT TRAINING: CPT

## 2024-02-12 PROCEDURE — 97530 THERAPEUTIC ACTIVITIES: CPT

## 2024-02-12 PROCEDURE — A9270 NON-COVERED ITEM OR SERVICE: HCPCS | Performed by: PHYSICAL MEDICINE & REHABILITATION

## 2024-02-12 PROCEDURE — 97112 NEUROMUSCULAR REEDUCATION: CPT

## 2024-02-12 PROCEDURE — 97110 THERAPEUTIC EXERCISES: CPT

## 2024-02-12 PROCEDURE — 700102 HCHG RX REV CODE 250 W/ 637 OVERRIDE(OP): Performed by: PHYSICAL MEDICINE & REHABILITATION

## 2024-02-12 PROCEDURE — A9270 NON-COVERED ITEM OR SERVICE: HCPCS | Performed by: STUDENT IN AN ORGANIZED HEALTH CARE EDUCATION/TRAINING PROGRAM

## 2024-02-12 PROCEDURE — 99232 SBSQ HOSP IP/OBS MODERATE 35: CPT | Performed by: PHYSICAL MEDICINE & REHABILITATION

## 2024-02-12 PROCEDURE — 700102 HCHG RX REV CODE 250 W/ 637 OVERRIDE(OP): Performed by: STUDENT IN AN ORGANIZED HEALTH CARE EDUCATION/TRAINING PROGRAM

## 2024-02-12 RX ORDER — ACETAMINOPHEN 325 MG/1
650 TABLET ORAL EVERY 4 HOURS PRN
Status: DISCONTINUED | OUTPATIENT
Start: 2024-02-12 | End: 2024-02-13 | Stop reason: HOSPADM

## 2024-02-12 RX ORDER — ATORVASTATIN CALCIUM 20 MG/1
20 TABLET, FILM COATED ORAL EVERY EVENING
Qty: 90 TABLET | Refills: 0 | Status: SHIPPED | OUTPATIENT
Start: 2024-02-12

## 2024-02-12 RX ORDER — GABAPENTIN 300 MG/1
300 CAPSULE ORAL EVERY EVENING
Qty: 90 CAPSULE | Refills: 0 | Status: SHIPPED | OUTPATIENT
Start: 2024-02-12

## 2024-02-12 RX ORDER — OXYCODONE HYDROCHLORIDE 5 MG/1
7.5 TABLET ORAL
Status: DISCONTINUED | OUTPATIENT
Start: 2024-02-12 | End: 2024-02-13 | Stop reason: HOSPADM

## 2024-02-12 RX ORDER — OXYCODONE HYDROCHLORIDE 5 MG/1
2.5 TABLET ORAL
Status: DISCONTINUED | OUTPATIENT
Start: 2024-02-12 | End: 2024-02-13 | Stop reason: HOSPADM

## 2024-02-12 RX ORDER — POLYETHYLENE GLYCOL 3350 17 G/17G
17 POWDER, FOR SOLUTION ORAL
Qty: 14 PACKET | Refills: 3
Start: 2024-02-12

## 2024-02-12 RX ORDER — BACLOFEN 10 MG/1
10 TABLET ORAL 3 TIMES DAILY
Qty: 90 TABLET | Refills: 2 | Status: SHIPPED | OUTPATIENT
Start: 2024-02-12

## 2024-02-12 RX ORDER — OXYCODONE HYDROCHLORIDE 5 MG/1
10 TABLET ORAL EVERY 6 HOURS PRN
Qty: 56 TABLET | Refills: 0 | Status: SHIPPED | OUTPATIENT
Start: 2024-02-12 | End: 2024-02-19

## 2024-02-12 RX ADMIN — OXYCODONE HYDROCHLORIDE 10 MG: 10 TABLET ORAL at 05:52

## 2024-02-12 RX ADMIN — ATORVASTATIN CALCIUM 20 MG: 10 TABLET, FILM COATED ORAL at 20:47

## 2024-02-12 RX ADMIN — OMEPRAZOLE 20 MG: 20 CAPSULE, DELAYED RELEASE ORAL at 07:52

## 2024-02-12 RX ADMIN — LIDOCAINE 1 PATCH: 4 PATCH TOPICAL at 11:18

## 2024-02-12 RX ADMIN — GABAPENTIN 300 MG: 300 CAPSULE ORAL at 20:47

## 2024-02-12 RX ADMIN — OXYCODONE HYDROCHLORIDE 10 MG: 10 TABLET ORAL at 12:52

## 2024-02-12 RX ADMIN — POLYETHYLENE GLYCOL 3350 1 PACKET: 17 POWDER, FOR SOLUTION ORAL at 11:23

## 2024-02-12 RX ADMIN — Medication 2000 UNITS: at 07:51

## 2024-02-12 RX ADMIN — ACETAMINOPHEN 650 MG: 325 TABLET ORAL at 18:38

## 2024-02-12 RX ADMIN — BACLOFEN 10 MG: 10 TABLET ORAL at 14:33

## 2024-02-12 RX ADMIN — GABAPENTIN 200 MG: 100 CAPSULE ORAL at 07:51

## 2024-02-12 RX ADMIN — OXYCODONE 7.5 MG: 5 TABLET ORAL at 08:22

## 2024-02-12 RX ADMIN — OXYCODONE HYDROCHLORIDE 7.5 MG: 5 TABLET ORAL at 20:46

## 2024-02-12 RX ADMIN — BACLOFEN 10 MG: 10 TABLET ORAL at 20:47

## 2024-02-12 RX ADMIN — SENNOSIDES AND DOCUSATE SODIUM 2 TABLET: 50; 8.6 TABLET ORAL at 20:47

## 2024-02-12 RX ADMIN — BACLOFEN 10 MG: 10 TABLET ORAL at 07:50

## 2024-02-12 ASSESSMENT — ACTIVITIES OF DAILY LIVING (ADL)
SHOWER_TRANSFER_LEVEL_OF_ASSIST: ABLE TO COMPLETE SHOWER TRANSFER WITHOUT ASSIST
BED_CHAIR_WHEELCHAIR_TRANSFER_DESCRIPTION: INCREASED TIME;VERBAL CUEING;SUPERVISION FOR SAFETY
BED_CHAIR_WHEELCHAIR_TRANSFER_DESCRIPTION: INCREASED TIME;VERBAL CUEING;SUPERVISION FOR SAFETY;ADAPTIVE EQUIPMENT
TOILET_TRANSFER_LEVEL_OF_ASSIST: ABLE TO COMPLETE TOILET TRANSFER WITHOUT ASSIST
TOILETING_LEVEL_OF_ASSIST: ABLE TO COMPLETE TOILETING WITHOUT ASSIST

## 2024-02-12 ASSESSMENT — BRIEF INTERVIEW FOR MENTAL STATUS (BIMS)
ASKED TO RECALL SOCK: YES, NO CUE REQUIRED
ASKED TO RECALL BED: YES, NO CUE REQUIRED
BIMS SUMMARY SCORE: 15
ASKED TO RECALL BLUE: YES, NO CUE REQUIRED
WHAT YEAR IS IT: CORRECT
WHAT MONTH IS IT: ACCURATE WITHIN 5 DAYS
WHAT DAY OF THE WEEK IS IT: CORRECT
INITIAL REPETITION OF BED BLUE SOCK - FIRST ATTEMPT: 3

## 2024-02-12 ASSESSMENT — PATIENT HEALTH QUESTIONNAIRE - PHQ9
2. FEELING DOWN, DEPRESSED, IRRITABLE, OR HOPELESS: NOT AT ALL
1. LITTLE INTEREST OR PLEASURE IN DOING THINGS: NOT AT ALL
SUM OF ALL RESPONSES TO PHQ9 QUESTIONS 1 AND 2: 0

## 2024-02-12 ASSESSMENT — PAIN DESCRIPTION - PAIN TYPE
TYPE: ACUTE PAIN
TYPE: ACUTE PAIN

## 2024-02-12 ASSESSMENT — GAIT ASSESSMENTS
GAIT LEVEL OF ASSIST: STANDBY ASSIST
GAIT LEVEL OF ASSIST: STANDBY ASSIST
DISTANCE (FEET): 250
DEVIATION: BRADYKINETIC
ASSISTIVE DEVICE: FRONT WHEEL WALKER
DEVIATION: ANTALGIC
ASSISTIVE DEVICE: FRONT WHEEL WALKER
DISTANCE (FEET): 100

## 2024-02-12 NOTE — CARE PLAN
The patient is Stable - Low risk of patient condition declining or worsening    Shift Goals  Clinical Goals: Safety, pain control  Patient Goals: Safety  Family Goals: Education    Progress made toward(s) clinical / shift goals:    Problem: Knowledge Deficit - Standard  Goal: Patient and family/care givers will demonstrate understanding of plan of care, disease process/condition, diagnostic tests and medications  Outcome: Progressing   Patient educated on the POC and medications administered. All questions and concerns addressed at this time.  Problem: Pain - Standard  Goal: Alleviation of pain or a reduction in pain to the patient’s comfort goal  Outcome: Progressing   Use of 0-10 pain scale. Patient is able to verbalize pain and discomfort appropriately. PRN pain medications administered.   Problem: Mobility  Goal: Patient's capacity to carry out activities will improve  Outcome: Progressing  Patient is able to transfer self safely with no assistance. Patient calls for any assistance needed.   Problem: Fall Risk - Rehab  Goal: Patient will remain free from falls  Outcome: Progressing   Bed is locked and in low position. Call light and belongings within reach. Call light and belongings within reach.    Patient is not progressing towards the following goals:

## 2024-02-12 NOTE — PROGRESS NOTES
Assumed care of patient. Bedside report received from Antoine DELATORRE. Patient is in bed. Fall precautions in place. Call light and belongings within reach. Updated on POC, all questions and concerns answered at this time. No other needs at this time.

## 2024-02-12 NOTE — PROGRESS NOTES
NURSING DAILY NOTE    Name: Dee Armstrong   Date of Admission: 2/2/2024   Admitting Diagnosis: Lumbar radiculopathy  Attending Physician: Ward Boone M.d.  Allergies: Patient has no known allergies.    Safety  Patient Assist  Min assist  Patient Precautions  Fall Risk, Spinal / Back Precautions , Lumbosacral Orthosis  Precaution Comments  Strong fear of needles  Bed Transfer Status  Standby Assist  Toilet Transfer Status   Modified Independent (with FWW)  Assistive Devices  Rails, Walker - front wheel  Oxygen  None - Room Air  Diet/Therapeutic Dining  Current Diet Order   Procedures    Diet Order Diet: Regular     Pill Administration  whole  Agitated Behavioral Scale     ABS Level of Severity       Fall Risk  Has the patient had a fall this admission?   No  Kalyani Monroe Fall Risk Scoring  12, MODERATE RISK  Fall Risk Safety Measures  bed alarm, chair alarm, and ok to leave pt in bathroom    Vitals  Temperature: 36.9 °C (98.4 °F)  Temp src: Oral  Pulse: 91  Respiration: 18  Blood Pressure : 135/69  Blood Pressure MAP (Calculated): 91 MM HG  BP Location: Right, Upper Arm  Patient BP Position: Supine     Oxygen  Pulse Oximetry: 97 %  O2 (LPM): 0  O2 Delivery Device: None - Room Air    Bowel and Bladder  Last Bowel Movement  02/11/24  Stool Type  Patient stated, Type 3: Like a sausage, but with cracks on its surface  Bowel Device  Bathroom  Continent  Bladder: Did not void   Bowel: No movement  Bladder Function  Urine Void (mL):  (nas bathroom)  Number of Times Voided: 1  Urine Color: Unable To Evaluate  Urine Clarity: Clots  Number of Times Incontinent of Urine: 0  Genitourinary Assessment   Bladder Assessment (WDL):  Within Defined Limits  Langley Catheter: Not Applicable  Urine Color: Unable To Evaluate  Urine Clarity: Clots  Number of Bladder Accidents: 0  Total Number of Bladder of Accidents in Last 7 Days: 0  Number of Times Incontinent of Urine:  0  Bladder Device: Bathroom  Time Void: No  Bladder Scan: Post Void  $ Bladder Scan Results (mL):  (none)    Skin  Vlad Score   19  Sensory Interventions   Bed Types: Standard/Trauma Mattress  Skin Preventative Measures: Pillows in Use for Support / Positioning  Moisture Interventions  Moisturizers/Barriers: Barrier Wipes      Pain  Pain Rating Scale  7 - Focus of attention, prevents doing daily activities  Pain Location  Leg, Back  Pain Location Orientation  Left  Pain Interventions   Medication (see MAR)    ADLs    Bathing   Shower, * * With Assistance from, Staff  Linen Change   Partial  Personal Hygiene  Perineal Care, Change Sammie Pads  Chlorhexidine Bath      Oral Care  Brushed Teeth  Teeth/Dentures     Shave     Nutrition Percentage Eaten  Between % Consumed  Environmental Precautions  Treaded Slipper Socks on Patient, Personal Belongings, Wastebasket, Call Bell etc. in Easy Reach, Report Given to Other Health Care Providers Regarding Fall Risk, Transferred to Stronger Side, Bed in Low Position, Mobility Assessed & Appropriate Sign Placed, Communication Sign for Patients & Families  Patient Turns/Positioning  Patient Turns Self from Side to Side  Patient Turns Assistance/Tolerance     Bed Positions  Bed Locked, Bed Controls On  Head of Bed Elevated  Self regulated      Psychosocial/Neurologic Assessment  Psychosocial Assessment  Psychosocial (WDL):  WDL Except  Patient Behaviors: Anxious  Family Behaviors: No Family Present  Neurologic Assessment  Neuro (WDL): Exceptions to WDL  Level of Consciousness: Alert  Orientation Level: Oriented X4  Cognition: Appropriate judgement  Speech: Clear  Motor Function/Sensation Assessment: Motor strength  RUE Sensation: Full sensation  Muscle Strength Right Arm: Good Strength Against Gravity and Moderate Resistance  LUE Sensation: Full sensation  Muscle Strength Left Arm: Good Strength Against Gravity and Moderate Resistance  RLE Sensation: Numbness  Muscle Strength  Right Leg: Good Strength Against Gravity and Moderate Resistance  LLE Motor Response: Spastic  LLE Sensation: Pain, Tingling  Muscle Strength Left Leg: Good Strength Against Gravity and Moderate Resistance  EENT (WDL):  WDL Except    Cardio/Pulmonary Assessment  Edema      Respiratory Breath Sounds     Cardiac Assessment   Cardiac (WDL):  WDL Except

## 2024-02-12 NOTE — THERAPY
Physical Therapy   Daily Treatment     Patient Name: Dee Armstrong  Age:  68 y.o., Sex:  female  Medical Record #: [id number]  Today's Date: 2/12/2024     Precautions  Precautions: (P) Fall Risk, Spinal / Back Precautions , Lumbosacral Orthosis  Comments: (P) Strong fear of needles    Subjective    Pt is in her wc and agreeable to participate in therapy.     Objective       02/12/24 0831   PT Charge Group   Charges Yes   PT Gait Training (Units) 1   PT Therapeutic Exercise (Units) 1   PT Neuromuscular Re-Education / Balance (Units) 1   PT Therapeutic Activities (Units) 1   PT Total Time Spent   PT Individual Total Time Spent (Mins) 60   Precautions   Precautions Fall Risk;Spinal / Back Precautions ;Lumbosacral Orthosis   Comments Strong fear of needles   Gait Functional Level of Assist    Gait Level Of Assist Standby Assist   Assistive Device Front Wheel Walker   Distance (Feet) 250   # of Times Distance was Traveled 1   Deviation Antalgic   Stairs Functional Level of Assist   Level of Assist with Stairs Standby Assist   # of Stairs Climbed 1   Stairs Description Extra time;Supervision for safety;Safety concerns;Verbal cueing;Walker  (4 inches curb)   Transfer Functional Level of Assist   Bed, Chair, Wheelchair Transfer Standby Assist   Bed Chair Wheelchair Transfer Description Increased time;Verbal cueing;Supervision for safety;Adaptive equipment   Sitting Lower Body Exercises   Nustep Resistance Level 4   Comments x 10 mins, 465 steps   Neuro-Muscular Treatments   Neuro-Muscular Treatments Weight Shift Left;Weight Shift Right;Verbal Cuing;Tactile Cuing;Anterior weight shift;Postural Changes;Postural Facilitation   Comments Facilitate and performed car tansfer and curb negotiations., CGA- SBA   Interdisciplinary Plan of Care Collaboration   IDT Collaboration with  Occupational Therapist   Patient Position at End of Therapy Seated;Self Releasing Lap Belt Applied;Call Light within Reach;Tray Table within  Reach;Phone within Reach   Collaboration Comments POC         Assessment    Pt performed car transfer using FWW to [facility], CGA- SBA. PT guided her LE swinging in and out of the car to avoid twisting of her trunk. Nu step x 10 mins for B UE and LE strengthening. Pt instructed in 4inches curb negotiation using FWW, CGA-SBA with cueing and demo in sequence and techniques. She return demonstrated the techniques in car transfer and curb negotiation with 100% accuracy. But pt has hesitations and trust issue on her L LE. She demonstrates steady progress I'm functional mobility and strength.  Strengths: Adequate strength, Alert and oriented, Effective communication skills, Good insight into deficits/needs, Independent prior level of function, Making steady progress towards goals, Motivated for self care and independence, Pleasant and cooperative, Supportive family, Willingly participates in therapeutic activities, Manages pain appropriately, Able to follow instructions  Barriers: Decreased endurance, Generalized weakness, Impaired activity tolerance, Impaired balance, Pain, Limited mobility    Plan    Dc tomorrow. Recommended getting HH services to continue strengthening and adl retraining.    DME  PT DME Recommendations  Wheelchair:  (no wheelchair necessary, has FWW. No DME needs)    Passport items to be completed:  Get in/out of bed safely, in/out of a vehicle, safely use mobility device, walk or wheel around home/community, navigate up and down stairs, show how to get up/down from the ground, ensure home is accessible, demonstrate HEP, complete caregiver training    Physical Therapy Problems (Active)       Problem: Balance       Dates: Start:  02/03/24         Goal: STG-Within one week, patient will improve Cummings by 10 points (19/56 on eval)       Dates: Start:  02/03/24               Problem: Mobility       Dates: Start:  02/03/24         Goal: STG-Within one week, patient will ambulate up/down a curb with FWW  and min assist       Dates: Start:  02/03/24               Problem: Mobility Transfers       Dates: Start:  02/03/24         Goal: STG-Within one week, patient will perform sit to supine via log roll with supervision assist       Dates: Start:  02/03/24               Problem: PT-Long Term Goals       Dates: Start:  02/03/24         Goal: LTG-By discharge, patient will tolerate Cummings >36/56       Dates: Start:  02/03/24            Goal: LTG-By discharge, patient will ambulate 150 ft between seated rest breaks with FWW and supervision assist       Dates: Start:  02/03/24            Goal: LTG-By discharge, patient will perform home exercise program from handouts with set up assist       Dates: Start:  02/03/24            Goal: LTG-By discharge, patient will transfer in/out of a car with supervision assist and FWW       Dates: Start:  02/03/24

## 2024-02-12 NOTE — PROGRESS NOTES
"  Physical Medicine & Rehabilitation Progress Note    Encounter Date: 2/12/2024    Chief Complaint: Weakness    Interval Events (Subjective):  Seen in chair. Pain controlled. Ready for DC tomorrow.    Objective:  VITAL SIGNS: /69   Pulse 91   Temp 36.9 °C (98.4 °F) (Oral)   Resp 18   Ht 1.499 m (4' 11\")   Wt 102 kg (224 lb)   SpO2 97%   BMI 45.24 kg/m²   Gen: No acute distress, well developed well nourished adult  HEENT: Normal Cephalic Atraumatic, Normal conjunctiva.   CV: warm extremities, well perfused, no edema  Resp: symmetric chest rise, breathing comfortably on room air  Abd: Soft, Non distended  Extremities: normal bulk, no atrophy  Skin: no visible rashes or lesions.   Neuro: alert, awake  Psych: Mood and affect appropriate and congruent    Laboratory Values:  No results found for this or any previous visit (from the past 72 hour(s)).    Medications:  Scheduled Medications   Medication Dose Frequency    oxyCODONE immediate-release  10 mg BID    baclofen  10 mg TID    gabapentin  300 mg Q EVENING    lidocaine  1 Patch Q24HR    vitamin D3  2,000 Units DAILY    Pharmacy Consult Request  1 Each PHARMACY TO DOSE    senna-docusate  2 Tablet Q EVENING    omeprazole  20 mg DAILY    enoxaparin (LOVENOX) injection  40 mg Q EVENING    atorvastatin  20 mg Q EVENING     PRN medications: oxyCODONE immediate-release **OR** oxyCODONE immediate-release, capsaicin, Respiratory Therapy Consult, senna-docusate **AND** polyethylene glycol/lytes, mag hydrox-al hydrox-simeth, ondansetron **OR** ondansetron, traZODone, sodium chloride    Diet:  Current Diet Order   Procedures    Diet Order Diet: Regular       Medical Decision Making and Plan:  Lumbar stenosis   S/p lumbar fusion   Lumbar fracture   - history of back pain with radicuopathy   - no MRI to review   - 1/23 stage 1 L4-S1 ALIF by Dr. Rodriguez   1/25  left L2/3 XLIF by Dr. Rodriguez   1/30 redo L2-3 laminotomy and L2-S1 perc fusion by Dr. Rodriguez   - LSO when OOB   - " "most recent CT L spine obtained on 1/31 showed oblique nondisplaced L5 vertebral body fracture   - comprehnsive IRF level therapy with 3hrs of therapy 5 days per week      Hypoxia   - hypoxia, worse with anesthesia, causing 3rd surgery to be aborted   - now on albuterol   -Off oxygen  - CT chest negative for PE      ABLA   - post op drop in Hgb   - 2/1 Hgb 10.7  -> 10.5 2/4      HTN   - 2/5 BP well controlled   -2/6- Decontinue Lisinopril 10mg Daily      Vit D deficiency   - Vit D 25  - continue with supplementation      Hypocalcemia  - transferred on calcitrate   - Ca WNL, stopping  calcitrate      Pain  - was on IV diluadid until 2/1 AM   - on scheduled Tylenol and PRN oxycodone   - gabapentin 600mg qhs , additional 300mt BID started 2/4  -2/6 increase gabapentin to 400mg BID and 600mg at night  -2/7 decreased gabapentin to 200mg BID during the day  2/12- DC Gabapentin during the day. Continue Gabapentin 300mg QHS  - pain is located  LLE \"deep to shin\", US negative  -2/6- increase oxycodone from 5mg to 7.5mg PRN  -2/8- Oxycodone 10mg BID scheduled at 7am and 1pm  -continue oxycodone     Insomnia  2/6 Started Baclofen 5mg QHS  -3/7 poor sleep increased to Baclofen 10mg QHS  -2/8 Baclofen 10mg TID scheduled     Bowel  - constipation prevention with scheduled senna   - bowel meds PRN   - Last BM 2/4       Bladder   - timed void to prevent falls and incont epsidoes   -bladder scan if no void > 6hrs, PVR, and cath if retaining > 200ml   - PVR 10      Morbid Obesity   - weight loss education      Skin - Patient at risk for skin breakdown due to debility in areas including sacrum, achilles, elbows and head in addition to other sites. Nursing to assess skin daily.      GI Ppx - Patient on Prilosec for GERD prophylaxis. Patient on Senna-docusate for constipation prophylaxis.      DVT Ppx continue lovenox    ____________________________________    Ward Boone MD  Physical Medicine & Rehabilitation   Brain Injury Medicine "   ____________________________________

## 2024-02-13 VITALS
BODY MASS INDEX: 45.16 KG/M2 | SYSTOLIC BLOOD PRESSURE: 130 MMHG | TEMPERATURE: 98.2 F | HEART RATE: 82 BPM | HEIGHT: 59 IN | WEIGHT: 224 LBS | OXYGEN SATURATION: 95 % | DIASTOLIC BLOOD PRESSURE: 68 MMHG | RESPIRATION RATE: 16 BRPM

## 2024-02-13 LAB
ALBUMIN SERPL BCP-MCNC: 2.9 G/DL (ref 3.2–4.9)
ALBUMIN/GLOB SERPL: 1 G/DL
ALP SERPL-CCNC: 119 U/L (ref 30–99)
ALT SERPL-CCNC: 16 U/L (ref 2–50)
ANION GAP SERPL CALC-SCNC: 11 MMOL/L (ref 7–16)
AST SERPL-CCNC: 17 U/L (ref 12–45)
BILIRUB SERPL-MCNC: 0.5 MG/DL (ref 0.1–1.5)
BUN SERPL-MCNC: 12 MG/DL (ref 8–22)
CALCIUM ALBUM COR SERPL-MCNC: 9.4 MG/DL (ref 8.5–10.5)
CALCIUM SERPL-MCNC: 8.5 MG/DL (ref 8.5–10.5)
CHLORIDE SERPL-SCNC: 103 MMOL/L (ref 96–112)
CO2 SERPL-SCNC: 23 MMOL/L (ref 20–33)
CREAT SERPL-MCNC: 0.49 MG/DL (ref 0.5–1.4)
ERYTHROCYTE [DISTWIDTH] IN BLOOD BY AUTOMATED COUNT: 47.8 FL (ref 35.9–50)
GFR SERPLBLD CREATININE-BSD FMLA CKD-EPI: 101 ML/MIN/1.73 M 2
GLOBULIN SER CALC-MCNC: 3 G/DL (ref 1.9–3.5)
GLUCOSE SERPL-MCNC: 95 MG/DL (ref 65–99)
HCT VFR BLD AUTO: 32.6 % (ref 37–47)
HGB BLD-MCNC: 10.3 G/DL (ref 12–16)
MCH RBC QN AUTO: 30.9 PG (ref 27–33)
MCHC RBC AUTO-ENTMCNC: 31.6 G/DL (ref 32.2–35.5)
MCV RBC AUTO: 97.9 FL (ref 81.4–97.8)
PLATELET # BLD AUTO: 430 K/UL (ref 164–446)
PMV BLD AUTO: 9.2 FL (ref 9–12.9)
POTASSIUM SERPL-SCNC: 4.5 MMOL/L (ref 3.6–5.5)
PROT SERPL-MCNC: 5.9 G/DL (ref 6–8.2)
RBC # BLD AUTO: 3.33 M/UL (ref 4.2–5.4)
SODIUM SERPL-SCNC: 137 MMOL/L (ref 135–145)
WBC # BLD AUTO: 4.1 K/UL (ref 4.8–10.8)

## 2024-02-13 PROCEDURE — 700102 HCHG RX REV CODE 250 W/ 637 OVERRIDE(OP): Performed by: PHYSICAL MEDICINE & REHABILITATION

## 2024-02-13 PROCEDURE — A9270 NON-COVERED ITEM OR SERVICE: HCPCS | Performed by: PHYSICAL MEDICINE & REHABILITATION

## 2024-02-13 PROCEDURE — 36415 COLL VENOUS BLD VENIPUNCTURE: CPT

## 2024-02-13 PROCEDURE — A9270 NON-COVERED ITEM OR SERVICE: HCPCS | Performed by: STUDENT IN AN ORGANIZED HEALTH CARE EDUCATION/TRAINING PROGRAM

## 2024-02-13 PROCEDURE — 80053 COMPREHEN METABOLIC PANEL: CPT

## 2024-02-13 PROCEDURE — 85027 COMPLETE CBC AUTOMATED: CPT

## 2024-02-13 PROCEDURE — 700102 HCHG RX REV CODE 250 W/ 637 OVERRIDE(OP): Performed by: STUDENT IN AN ORGANIZED HEALTH CARE EDUCATION/TRAINING PROGRAM

## 2024-02-13 PROCEDURE — 99239 HOSP IP/OBS DSCHRG MGMT >30: CPT | Performed by: PHYSICAL MEDICINE & REHABILITATION

## 2024-02-13 RX ADMIN — Medication 2000 UNITS: at 08:41

## 2024-02-13 RX ADMIN — OXYCODONE HYDROCHLORIDE 10 MG: 10 TABLET ORAL at 06:10

## 2024-02-13 RX ADMIN — OMEPRAZOLE 20 MG: 20 CAPSULE, DELAYED RELEASE ORAL at 08:41

## 2024-02-13 RX ADMIN — BACLOFEN 10 MG: 10 TABLET ORAL at 08:41

## 2024-02-13 ASSESSMENT — PAIN DESCRIPTION - PAIN TYPE: TYPE: ACUTE PAIN

## 2024-02-13 NOTE — CARE PLAN
Problem: Dressing  Goal: STG-Within one week, patient will dress UB at [facility] using DME/AE PRN.  Outcome: Met  Goal: STG-Within one week, patient will dress LB at [facility] using DME/AE PRN  Outcome: Met     Problem: Functional Transfers  Goal: STG-Within one week, patient will transfer to toilet at [facility].  Outcome: Met  Goal: STG-Within one week, patient will transfer to step in shower at [facility].  Outcome: Met     Problem: OT Long Term Goals  Goal: LTG-By discharge, patient will complete basic self care tasks at [facility].  Outcome: Met  Goal: LTG-By discharge, patient will perform bathroom transfers  at [facility].  Outcome: Met     Problem: Bathing  Goal: STG-Within one week, patient will bathe at [facility] using DME/AE PRN  Outcome: Met     Problem: Toileting  Goal: STG-Within one week, patient will complete toileting tasks at [facility] using DME/AE PRN  Outcome: Met     Problem: IADL's  Goal: STG-Within one week, patient will access kitchen area at [facility] using DME/AE PRN  Outcome: Met

## 2024-02-13 NOTE — PROGRESS NOTES
NURSING DAILY NOTE    Name: Dee Armstrong   Date of Admission: 2/2/2024   Admitting Diagnosis: Lumbar radiculopathy  Attending Physician: Ward Boone M.d.  Allergies: Patient has no known allergies.    Safety  Patient Assist  Min assist  Patient Precautions  Fall Risk, Spinal / Back Precautions   Precaution Comments  Strong fear of needles  Bed Transfer Status  Standby Assist  Toilet Transfer Status   Modified Independent (with FWW)  Assistive Devices  Rails, Walker - front wheel  Oxygen  None - Room Air  Diet/Therapeutic Dining  Current Diet Order   Procedures    Diet Order Diet: Regular     Pill Administration  whole  Agitated Behavioral Scale     ABS Level of Severity       Fall Risk  Has the patient had a fall this admission?   No  Kalyani Monroe Fall Risk Scoring  12, MODERATE RISK  Fall Risk Safety Measures  bed alarm and chair alarm    Vitals  Temperature: 36.7 °C (98.1 °F)  Temp src: Oral  Pulse: 98  Respiration: 18  Blood Pressure : 116/56  Blood Pressure MAP (Calculated): 76 MM HG  BP Location: Right, Upper Arm  Patient BP Position: Supine     Oxygen  Pulse Oximetry: 97 %  O2 (LPM): 0  O2 Delivery Device: None - Room Air    Bowel and Bladder  Last Bowel Movement  02/11/24  Stool Type  Patient stated, Type 3: Like a sausage, but with cracks on its surface  Bowel Device  Bathroom  Continent  Bladder: Did not void   Bowel: No movement  Bladder Function  Urine Void (mL):  (nas bathroom)  Number of Times Voided: 1  Urine Color: Unable To Evaluate  Urine Clarity: Clots  Number of Times Incontinent of Urine: 0  Genitourinary Assessment   Bladder Assessment (WDL):  Within Defined Limits  Langley Catheter: Not Applicable  Urine Color: Unable To Evaluate  Urine Clarity: Clots  Number of Bladder Accidents: 0  Total Number of Bladder of Accidents in Last 7 Days: 0  Number of Times Incontinent of Urine: 0  Bladder Device: Bathroom  Time Void: No  Bladder  Scan: Post Void  $ Bladder Scan Results (mL):  (none)    Skin  Vlad Score   19  Sensory Interventions   Bed Types: Standard/Trauma Mattress  Skin Preventative Measures: Pillows in Use for Support / Positioning  Moisture Interventions  Moisturizers/Barriers: Moisturizer       Pain  Pain Rating Scale  8 - Awful, hard to do anything  Pain Location  Leg, Back  Pain Location Orientation  Left  Pain Interventions   Medication (see MAR)    ADLs    Bathing   Shower, * * With Assistance from, Staff  Linen Change   Partial  Personal Hygiene  Perineal Care, Change Sammie Pads  Chlorhexidine Bath      Oral Care  Brushed Teeth  Teeth/Dentures     Shave     Nutrition Percentage Eaten  Between 50-75% Consumed, Breakfast  Environmental Precautions  Treaded Slipper Socks on Patient, Personal Belongings, Wastebasket, Call Bell etc. in Easy Reach, Report Given to Other Health Care Providers Regarding Fall Risk, Transferred to Stronger Side, Bed in Low Position, Mobility Assessed & Appropriate Sign Placed, Communication Sign for Patients & Families  Patient Turns/Positioning  Patient Turns Self from Side to Side  Patient Turns Assistance/Tolerance     Bed Positions  Bed Controls On  Head of Bed Elevated  Self regulated      Psychosocial/Neurologic Assessment  Psychosocial Assessment  Psychosocial (WDL):  Within Defined Limits  Patient Behaviors: Anxious, Fatigue  Family Behaviors: No Family Present  Neurologic Assessment  Neuro (WDL): Exceptions to WDL  Level of Consciousness: Alert  Orientation Level: Oriented X4  Cognition: Appropriate judgement  Speech: Clear  Motor Function/Sensation Assessment: Motor strength  RUE Sensation: Full sensation  Muscle Strength Right Arm: Good Strength Against Gravity and Moderate Resistance  LUE Sensation: Full sensation  Muscle Strength Left Arm: Good Strength Against Gravity and Moderate Resistance  RLE Sensation: Numbness  Muscle Strength Right Leg: Good Strength Against Gravity and Moderate  Resistance  LLE Motor Response: Spastic  LLE Sensation: Pain, Tingling  Muscle Strength Left Leg: Good Strength Against Gravity and Moderate Resistance  EENT (WDL):  WDL Except    Cardio/Pulmonary Assessment  Edema      Respiratory Breath Sounds     Cardiac Assessment   Cardiac (WDL):  WDL Except

## 2024-02-13 NOTE — CARE PLAN
Problem: Mobility  Goal: STG-Within one week, patient will ambulate up/down a curb with FWW and min assist  Outcome: Met     Problem: Mobility Transfers  Goal: STG-Within one week, patient will perform sit to supine via log roll with supervision assist  Outcome: Met     Problem: PT-Long Term Goals  Goal: LTG-By discharge, patient will ambulate 150 ft between seated rest breaks with FWW and supervision assist  Outcome: Met  Goal: LTG-By discharge, patient will perform home exercise program from handouts with set up assist  Outcome: Met     Problem: Balance  Goal: STG-Within one week, patient will improve Cummings by 10 points (19/56 on eval)  Outcome: Discharged - Not Met     Problem: PT-Long Term Goals  Goal: LTG-By discharge, patient will tolerate Cummings >36/56  Outcome: Discharged - Not Met  Goal: LTG-By discharge, patient will transfer in/out of a car with supervision assist and FWW  Outcome: Discharged - Not Met

## 2024-02-13 NOTE — THERAPY
Physical Therapy   Daily Treatment     Patient Name: Dee Armstrong  Age:  68 y.o., Sex:  female  Medical Record #: [id number]  Today's Date: 2/12/2024     Precautions  Precautions: (P) Fall Risk, Spinal / Back Precautions   Comments: (P) Strong fear of needles    Subjective    Patient feels prepared and confident with discharge ronan to  go home tomorrow with HHPT at FWW level with  providing SPV     Objective       02/12/24 1301   PT Charge Group   PT Gait Training (Units) 1   PT Therapeutic Activities (Units) 1   PT Total Time Spent   PT Individual Total Time Spent (Mins) 30   Precautions   Precautions Fall Risk;Spinal / Back Precautions    Comments Strong fear of needles   Gait Functional Level of Assist    Gait Level Of Assist Standby Assist   Assistive Device Front Wheel Walker   Distance (Feet) 100   # of Times Distance was Traveled 3   Deviation Bradykinetic   Transfer Functional Level of Assist   Bed, Chair, Wheelchair Transfer Standby Assist   Bed Chair Wheelchair Transfer Description Increased time;Verbal cueing;Supervision for safety  ( to provide CGA for LE management into car)   Interdisciplinary Plan of Care Collaboration   IDT Collaboration with  Family / Caregiver;Occupational Therapist;Physician   Collaboration Comments treatment family for car transfers,  provided CGA for car transfers; daily rounding       Assessment    Able to perform car transfer with SBA,  to provide LE management to maintain spinal precautions with car transfer    Strengths: Adequate strength, Alert and oriented, Effective communication skills, Good insight into deficits/needs, Independent prior level of function, Making steady progress towards goals, Motivated for self care and independence, Pleasant and cooperative, Supportive family, Willingly participates in therapeutic activities, Manages pain appropriately, Able to follow instructions  Barriers: Decreased endurance, Generalized weakness,  Impaired activity tolerance, Impaired balance, Pain, Limited mobility    Plan    Discharge patient    DME  PT DME Recommendations  Wheelchair:  (no wheelchair necessary, has FWW. No DME needs)    Physical Therapy Problems (Active)       Problem: Balance       Dates: Start:  02/03/24         Goal: STG-Within one week, patient will improve Cummings by 10 points (19/56 on eval)       Dates: Start:  02/03/24               Problem: Mobility       Dates: Start:  02/03/24         Goal: STG-Within one week, patient will ambulate up/down a curb with FWW and min assist       Dates: Start:  02/03/24               Problem: Mobility Transfers       Dates: Start:  02/03/24         Goal: STG-Within one week, patient will perform sit to supine via log roll with supervision assist       Dates: Start:  02/03/24               Problem: PT-Long Term Goals       Dates: Start:  02/03/24         Goal: LTG-By discharge, patient will tolerate Cummings >36/56       Dates: Start:  02/03/24            Goal: LTG-By discharge, patient will ambulate 150 ft between seated rest breaks with FWW and supervision assist       Dates: Start:  02/03/24            Goal: LTG-By discharge, patient will perform home exercise program from handouts with set up assist       Dates: Start:  02/03/24            Goal: LTG-By discharge, patient will transfer in/out of a car with supervision assist and FWW       Dates: Start:  02/03/24

## 2024-02-13 NOTE — PROGRESS NOTES
NURSING DAILY NOTE    Name: Dee Armstrong   Date of Admission: 2/2/2024   Admitting Diagnosis: Lumbar radiculopathy  Attending Physician: Ward Boone M.d.  Allergies: Patient has no known allergies.    Safety  Patient Assist  Min assist  Patient Precautions  Fall Risk, Spinal / Back Precautions   Precaution Comments  Strong fear of needles  Bed Transfer Status  Standby Assist  Toilet Transfer Status   Modified Independent (with FWW)  Assistive Devices  Walker - front wheel  Oxygen  None - Room Air  Diet/Therapeutic Dining  Current Diet Order   Procedures    Diet Order Diet: Regular     Pill Administration  whole  Agitated Behavioral Scale     ABS Level of Severity       Fall Risk  Has the patient had a fall this admission?   No  Kalyani Monroe Fall Risk Scoring  12, MODERATE RISK  Fall Risk Safety Measures  bed alarm, chair alarm, and ok to leave pt in bathroom    Vitals  Temperature: 36.7 °C (98.1 °F)  Temp src: Oral  Pulse: 98  Respiration: 18  Blood Pressure : 116/56  Blood Pressure MAP (Calculated): 76 MM HG  BP Location: Right, Upper Arm  Patient BP Position: Supine     Oxygen  Pulse Oximetry: 97 %  O2 (LPM): 0  O2 Delivery Device: None - Room Air    Bowel and Bladder  Last Bowel Movement  02/11/24  Stool Type  Patient stated, Type 3: Like a sausage, but with cracks on its surface  Bowel Device  Bathroom  Continent  Bladder: Did not void   Bowel: No movement  Bladder Function  Urine Void (mL):  (large)  Number of Times Voided: 1  Urine Color: Unable To Evaluate  Urine Clarity: Clots  Number of Times Incontinent of Urine: 0  Genitourinary Assessment   Bladder Assessment (WDL):  Within Defined Limits  Langley Catheter: Not Applicable  Urine Color: Unable To Evaluate  Urine Clarity: Clots  Number of Bladder Accidents: 0  Total Number of Bladder of Accidents in Last 7 Days: 0  Number of Times Incontinent of Urine: 0  Bladder Device: Bathroom  Time Void:  No  Bladder Scan: Post Void  $ Bladder Scan Results (mL):  (none)    Skin  Vlad Score   19  Sensory Interventions   Bed Types: Standard/Trauma Mattress  Skin Preventative Measures: Pillows in Use for Support / Positioning  Moisture Interventions  Moisturizers/Barriers: Moisturizer       Pain  Pain Rating Scale  10 - As bad as it could be, nothing else matters  Pain Location  Abdomen, Leg, Back  Pain Location Orientation  Left  Pain Interventions   Medication (see MAR)    ADLs    Bathing   Shower, * * With Assistance from, Staff  Linen Change   Partial  Personal Hygiene  Perineal Care, Change Sammie Pads  Chlorhexidine Bath      Oral Care  Brushed Teeth  Teeth/Dentures     Shave     Nutrition Percentage Eaten  Between 50-75% Consumed, Breakfast  Environmental Precautions  Personal Belongings, Wastebasket, Call Bell etc. in Easy Reach, Treaded Slipper Socks on Patient, Transferred to Stronger Side, Report Given to Other Health Care Providers Regarding Fall Risk, Bed in Low Position, Communication Sign for Patients & Families, Mobility Assessed & Appropriate Sign Placed  Patient Turns/Positioning  Patient Turns Self from Side to Side  Patient Turns Assistance/Tolerance     Bed Positions  Bed Locked, Bed Controls On  Head of Bed Elevated  Self regulated      Psychosocial/Neurologic Assessment  Psychosocial Assessment  Psychosocial (WDL):  Within Defined Limits  Patient Behaviors: Anxious, Fatigue  Family Behaviors: No Family Present  Neurologic Assessment  Neuro (WDL): Exceptions to WDL  Level of Consciousness: Alert  Orientation Level: Oriented X4  Cognition: Appropriate judgement  Speech: Clear  Motor Function/Sensation Assessment: Motor strength  RUE Sensation: Full sensation  Muscle Strength Right Arm: Good Strength Against Gravity and Moderate Resistance  LUE Sensation: Full sensation  Muscle Strength Left Arm: Good Strength Against Gravity and Moderate Resistance  RLE Sensation: Numbness  Muscle Strength Right  Leg: Good Strength Against Gravity and Moderate Resistance  LLE Motor Response: Spastic  LLE Sensation: Pain, Tingling  Muscle Strength Left Leg: Good Strength Against Gravity and Moderate Resistance  EENT (WDL):  WDL Except    Cardio/Pulmonary Assessment  Edema      Respiratory Breath Sounds     Cardiac Assessment   Cardiac (WDL):  WDL Except

## 2024-02-13 NOTE — DISCHARGE SUMMARY
Physical Medicine & Rehabilitation Discharge Summary    Admission Date: 2/2/2024    Discharge Date: 2/13/2024    Attending Provider: Ward Boone MD    Admission Diagnosis:   Active Hospital Problems    Diagnosis     *Lumbar radiculopathy     S/P lumbar fusion     Neurogenic claudication     Essential hypertension     Dyslipidemia     DDD (degenerative disc disease), lumbar     Vitamin D deficiency     Severe obesity (HCC)     GERD (gastroesophageal reflux disease)        Discharge Diagnosis:  Active Hospital Problems    Diagnosis     *Lumbar radiculopathy     S/P lumbar fusion     Neurogenic claudication     Essential hypertension     Dyslipidemia     DDD (degenerative disc disease), lumbar     Vitamin D deficiency     Severe obesity (HCC)     GERD (gastroesophageal reflux disease)        HPI per Admission History & Physical:  The patient is a 68 y.o. female with a past medical history of lumbar stenosis and HTN, ;  who presented on 1/23/2024  4:51 AM for scheduled lumbar surgery. Per documentation, patient has been followed in the outpatient setting for lumbar stenosis and back pain. Patient had worsening radiculopathy that failed conservative care and patient was deemed appropriate for surgical intervention. Patient underwent aL4-S1  ALIF on 1/23 and second stage L2-3 XLIF fusion for an L2-3 disc collapse on 1/25. Patient returned to the OR for posterior fusion but surgery was aborted due to hypoxia with anesthesia. CXR showed atelectasis. Patient required 2 L o2 at basline, CT chest  showed R>L bilateral atelectasis. Patient's respiratory status was improved, and was cleared to return to the OR on 1/30 for redo L2-3 laminotomy and fusion.  Post op, patient has acute blood loss anemia with Hgb 10.7, hypocalcemia, and hyponatremia. Her BP has had intermittent HTN.  While at [facility], patient has been on Carafate, neurosurgery team unsure about why patient is on Carafate.  Planning to stop this medication  "as patient has no signs of GI bleed.  Patient is now off IV pain meds, she has been transitioned to scheduled Tylenol with oxycodone.  Patient's pain is currently controlled, has some RLE radiular pain, but is controlled on current tylenol + oxycodone + gabapentin. Denies changes in numbness/tingling/weakness. Does not report HA, lightheadedness, SOB, CP, abdominal pain, or changes in numbness/tingling/weakness. Denies constipation.        Patient was admitted to [facility] on 2/2/2024.     Hospital Course by Problem List:  Lumbar stenosis   S/p lumbar fusion   Lumbar fracture   - history of back pain with radicuopathy   - no MRI to review   - 1/23 stage 1 L4-S1 ALIF by Dr. Rodriguez   1/25  left L2/3 XLIF by Dr. Rodriguez   1/30 redo L2-3 laminotomy and L2-S1 perc fusion by Dr. Rodriguez   - LSO when OOB   - most recent CT L spine obtained on 1/31 showed oblique nondisplaced L5 vertebral body fracture   - Anterior staples will be removed in outpatient office this week  -Continue in home PT/OT     Hypoxia -resolved  - hypoxia, worse with anesthesia, causing 3rd surgery to be aborted   - now on albuterol   -Off oxygen  - CT chest negative for PE      ABLA -resolving  - post op drop in Hgb   - 2/1 Hgb 10.7  -> 10.5 2/4      HTN   - 2/5 BP well controlled   -2/6- Decontinue Lisinopril 10mg Daily  -continue to monitor BP at home      Vit D deficiency   - Vit D 25  - continue with supplementation      Hypocalcemia  - transferred on calcitrate   - Ca WNL, stopping  calcitrate      Pain  - was on IV diluadid until 2/1 AM   - on scheduled Tylenol and PRN oxycodone   - gabapentin 600mg qhs , additional 300mt BID started 2/4  -2/6 increase gabapentin to 400mg BID and 600mg at night  -2/7 decreased gabapentin to 200mg BID during the day  2/12- DC Gabapentin during the day. Continue Gabapentin 300mg QHS  - pain is located  LLE \"deep to shin\", US negative  -2/6- increase oxycodone from 5mg to 7.5mg PRN  -2/8- Oxycodone " 10mg BID scheduled at 7am and 1pm  -continue oxycodone 5-10mg Q6hr PRN at home  -continue Gabapentin 300mg QHS  -Continue Baclofen 10mg TID scheduled for muscle pain  I discussed the risks and benefits of using opiate medications for pain control.  I discussed the risk of addiction, potential for overdose, and respiratory depression (and the potential need for opiate antagonist therapy if this occurs).  I encouraged the patient to take this medication sparingly with the expressed goal of weaning off the medication as soon as is clinically appropriate.  I informed the patient that we are only able to provide up to a 7 day supply of these medications at discharge and that they will be responsible for requesting any refills needed from their primary care provider or their surgeon.  We discussed the need to safely secure these medications to prevent theft, inadvertent ingestion, or misuse.  Any unused medication should be immediately disposed of through a sanctioned medication disposal program.  We discussed adjunctive pain medications and conservative therapies at length.I answered the patient's questions regarding this treatment, and the patient indicated understanding and willingness to proceed.      Insomnia  2/6 Started Baclofen 5mg QHS  -3/7 poor sleep increased to Baclofen 10mg QHS  -2/8 Baclofen 10mg TID scheduled     Morbid Obesity   - weight loss education      Skin - Patient at risk for skin breakdown due to debility in areas including sacrum, achilles, elbows and head in addition to other sites. Nursing to assess skin daily.      GI Ppx - Patient on Prilosec for GERD prophylaxis. Patient on Senna-docusate for constipation prophylaxis.        Functional Status at Discharge  Eating:  Independent  Eating Description:     Grooming:  Modified Independent, Standing  Grooming Description:  Increased time  Bathing:  Modified Independent  Bathing Description:  Grab bar, Hand held shower, Increased time, Long handled  bath tool, Supervision for safety  Upper Body Dressing:  Independent  Upper Body Dressing Description:  Increased time, Initial preparation for task, Set-up of equipment, Application of orthotic or brace  Lower Body Dressing:  Modified Independent  Lower Body Dressing Description:  Reacher, Shoe horn  Discharge Location : Home  Patient Discharging with Assist of: Spouse / Significant Other  Level of Supervision Required: No Supervision  Recommended Equipment for Discharge: Front-Wheeled Walker;Grab Bars in Tub / Shower;Grab Bars by Toilet;Shower Chair;Hand Held Shower Head;Reacher;Long Handled Shoe Horn  Recommended Services Upon Discharge: No Follow-Up Occupational Therapy Recommended  Long Term Goals Met: 2  Long Term Goals Not Met: 0  Criteria for Termination of Services: Maximum Function Achieved for Inpatient Rehabilitation  Walk:  Standby Assist  Distance Walked:  100  Number of Times Distance Was Traveled:  3  Assistive Device:  Front Wheel Walker  Gait Deviation:  Bradykinetic  Wheelchair:     Distance Propelled:      Wheelchair Description:     Stairs Standby Assist  Stairs Description Extra time, Supervision for safety, Safety concerns, Verbal cueing, Walker (4 inches curb)     Comprehension:  Modified Independent  Comprehension Description:  Glasses  Expression:  Independent  Expression Description:     Social Interaction:     Social Interaction Description:     Problem Solving:  Supervision  Problem Solving Description:  Supervision, Verbal cueing  Memory:  Supervision  Memory Description:  Verbal cueing, Supervision       IWard M.D., personally performed a complete drug regimen review and no potential clinically significant medication issues were identified.   Discharge Medication:     Medication List        START taking these medications        Instructions   baclofen 10 MG Tabs  Commonly known as: Lioresal   Take 1 Tablet by mouth 3 times a day.  Dose: 10 mg     Cholecalciferol 2000 UNIT  Tabs  Replaces: Vitamin D3 2000 UNIT Caps   Take 1 Tablet by mouth every day.  Dose: 2,000 Units     gabapentin 300 MG Caps  Commonly known as: Neurontin  Replaces: gabapentin 600 MG tablet   Take 1 Capsule by mouth every evening.  Dose: 300 mg     polyethylene glycol/lytes Pack  Commonly known as: Miralax   Take 1 Packet by mouth 1 time a day as needed (if no bowel movement in last 2 days).  Dose: 17 g            CHANGE how you take these medications        Instructions   oxyCODONE immediate-release 5 MG Tabs  What changed:   how much to take  when to take this  Another medication with the same name was removed. Continue taking this medication, and follow the directions you see here.  Commonly known as: Roxicodone   Take 2 Tablets by mouth every 6 hours as needed (moderate pain) for up to 7 days.  Dose: 10 mg            CONTINUE taking these medications        Instructions   acetaminophen 325 MG Tabs  Commonly known as: Tylenol   Take 2 Tablets by mouth every 6 hours.  Dose: 650 mg     atorvastatin 20 MG Tabs  Commonly known as: Lipitor   Take 1 Tablet by mouth every evening.  Dose: 20 mg     omeprazole 20 MG delayed-release capsule  Commonly known as: PriLOSEC   Take 1 Capsule by mouth every day.  Dose: 20 mg            STOP taking these medications      albuterol 108 (90 Base) MCG/ACT Aers inhalation aerosol     alendronate 70 MG Tabs  Commonly known as: Fosamax     amitriptyline 10 MG Tabs  Commonly known as: Elavil     amoxicillin-clavulanate 875-125 MG Tabs  Commonly known as: Augmentin     ascorbic acid 500 MG Tabs  Commonly known as: Ascorbic Acid     Calcium Carbonate Antacid 1000 MG Chew     clobetasol 0.05 % Crea  Commonly known as: Temovate     cyclobenzaprine 10 mg Tabs  Commonly known as: Flexeril     diphenhydrAMINE 25 MG Tabs  Commonly known as: Benadryl     docusate sodium 100 MG Caps     enoxaparin 40 MG/0.4ML Sosy inj  Commonly known as: Lovenox     estradiol 0.1 MG/GM vaginal cream  Commonly  known as: Estrace     gabapentin 600 MG tablet  Commonly known as: Neurontin  Replaced by: gabapentin 300 MG Caps     lisinopril 10 MG Tabs  Commonly known as: Prinivil     mag hydrox-al hydrox-simeth 400-400-40 MG/5ML Susp  Commonly known as: Maalox Plus Es Or Mylanta Ds     magnesium hydroxide 400 MG/5ML Susp  Commonly known as: Milk Of Magnesia     MULTI VITAMIN DAILY PO     pantoprazole 40 MG Tbec  Commonly known as: Protonix     simethicone 125 MG chewable tablet  Commonly known as: Mylicon     sucralfate 1 GM Tabs  Commonly known as: Carafate     Vitamin D3 2000 UNIT Caps  Replaced by: Cholecalciferol 2000 UNIT Tabs              Discharge Diet:  Current Diet Order   Procedures    Diet Order Diet: Regular       Discharge Activity:  As tolerated     Disposition:  Patient to discharge home with family support and community resources.    Equipment:  Front wheeled walker    Follow-up & Discharge Instructions:  Follow up with your primary care provider (PCP) within 7-10 days of discharge to review your medications and take over your care.     If you develop chest pain, fever, chills, change in neurologic function (weakness, sensation changes, vision changes), or other concerning sxs, seek immediate medical attention or call 911.      No future appointments.    Condition on Discharge:  Good    More than 35 minutes was spent on discharging this patient, including face-to-face time, prescription management, and the dictation of this note.    ____________________________________     Ward Boone MD  Physical Medicine & Rehabilitation   Brain Injury Medicine   ____________________________________    Date of Service: 2/13/2024

## 2024-02-13 NOTE — THERAPY
Occupational Therapy   Discharge Summary     Patient Name: Dee Armstrong  Age:  68 y.o., Sex:  female  Medical Record #: [id number]  Today's Date: 2/12/2024     Precautions  Precautions: Fall Risk, Spinal / Back Precautions   Comments: Strong fear of needles         Subjective    Pt seated in w/c upon arrival, pleasant and cooperative, agreeable to therapy and shower. SO present for family training     Objective       02/12/24 1201   OT Charge Group   OT Self Care / ADL (Units) 2   OT Therapy Activity (Units) 2   OT Total Time Spent   OT Individual Total Time Spent (Mins) 60   Functional Level of Assist   Eating Independent   Grooming Modified Independent;Standing   Bathing Modified Independent   Upper Body Dressing Independent   Lower Body Dressing Modified Independent   Lower Body Dressing Description Reacher;Shoe horn   Toileting Modified Independent   Bed, Chair, Wheelchair Transfer Modified Independent  (with FWW)   Toilet Transfers Modified Independent  (with FWW)   Tub / Shower Transfers Modified Independent  (with FWW)   Interdisciplinary Plan of Care Collaboration   Patient Position at End of Therapy Seated;Call Light within Reach;Tray Table within Reach;Family / Friend in Room   Eating   Assistance Needed Independent   Physical Assistance Level No physical assistance   CARE Score - Eating 6   Oral Hygiene   Assistance Needed Independent   Physical Assistance Level No physical assistance   CARE Score - Oral Hygiene 6   Toileting Hygiene   Assistance Needed Independent   Physical Assistance Level No physical assistance   CARE Score - Toileting Hygiene 6   Shower/Bathe Self   Assistance Needed Independent   Physical Assistance Level No physical assistance   CARE Score - Shower/Bathe Self 6   Upper Body Dressing   Assistance Needed Independent   Physical Assistance Level No physical assistance   CARE Score - Upper Body Dressing 6   Lower Body Dressing   Assistance Needed Independent;Adaptive equipment  "  Physical Assistance Level No physical assistance   CARE Score - Lower Body Dressing 6   Putting On/Taking Off Footwear   Assistance Needed Independent;Adaptive equipment   Physical Assistance Level No physical assistance   CARE Score - Putting On/Taking Off Footwear 6   Toilet Transfer   Assistance Needed Independent;Adaptive equipment   Physical Assistance Level No physical assistance   CARE Score - Toilet Transfer 6   Cognitive Pattern Assessment   Cognitive Pattern Assessment Used BIMS   Brief Interview for Mental Status (BIMS)   Repetition of Three Words (First Attempt) 3   Temporal Orientation: Year Correct   Temporal Orientation: Month Accurate within 5 days   Temporal Orientation: Day Correct   Recall: \"Sock\" Yes, no cue required   Recall: \"Blue\" Yes, no cue required   Recall: \"Bed\" Yes, no cue required   BIMS Summary Score 15   Confusion Assessment Method (CAM)   Is there evidence of an acute change in mental status from the patient's baseline? No   Inattention Behavior not present   Disorganized thinking Behavior not present   Altered level of consciousness Behavior not present   Discharge Summary    Discharge Location  Home   Patient Discharging with Assist of Spouse / Significant Other   Level of Supervision Required No Supervision   Recommended Equipment for Discharge Front-Wheeled Walker;Grab Bars in Tub / Shower;Grab Bars by Toilet;Shower Chair;Hand Held Shower Head;Reacher;Long Handled Shoe Horn   Recommended Services Upon Discharge No Follow-Up Occupational Therapy Recommended   Long Term Goals Met 2   Long Term Goals Not Met 0   Criteria for Termination of Services Maximum Function Achieved for Inpatient Rehabilitation   Discharge Instructions to Patient   Level of Assist Required for Eating Able to Complete Eating without Assist   Level of Assist Required for Grooming Able to Complete Grooming without Assist   Level of Assist Required for Dressing Able to Complete Dressing without Assist "   Equipment for Dressing Reacher;Long Handled Shoe Horn;Dressing Stick   Level of Assist Required for Toileting Able to Complete Toileting without Assist   Level of Assist Required for Toilet Transfer Able to Complete Toilet Transfer without Assist   Equipment for Toilet Transfer Grab Bars by Toilet   Level of Assist Required for Bathing Able to Complete Bathing without Assist   Equipment for Bathing Shower Chair;Grab Bars in Tub / Shower;Hand Held Shower Head;Long Handled Sponge   Level of Assist Required for Shower Transfer Able to Complete Shower Transfer without Assist   Equipment for Shower Transfer Grab Bars in Tub / Shower;Shower Chair   Level of Assist Required for Home Mgmt Able to Complete Home Management without Assist   Level of Assist Required for Meal Prep Requires Supervision with Meal Preparation   Driving May not Drive, Please Contact Physician for Further Information   Home Exercise Program Refer to Home Exercise Program Handout for Details     Functional mobility at FWW level: Mod I room<>bathroom     Family training completed with spouse - Edu provided on:  - CLOF with ADL's and bathroom transfers at Mod I level, reviewed therapeutic goals and current barriers.  - Bathroom DME recommendations: grab bars at shower, discussed grab bar installation and placement. Pt declined GB's, therefore discussed alternative strategies such as using FWW or hand-held assist  - AE recommendations: sock aid, reacher.   - Supervision level recommendations: no supervision required for standing tasks.  - Home safety, energy conservation, and work simplification strategies   - HEP reviewed    Assessment    Family training completed - they both demo good understanding and agreeable to edu and recommendations. No further questions/concerns at this time     Plan    D/c tomorrow     Occupational Therapy Goals (Active)       There are no active problems.

## 2024-02-13 NOTE — DISCHARGE INSTRUCTIONS
[facility] NURSING DISCHARGE INSTRUCTIONS    Blood Pressure : 116/56  Weight: 102 kg (224 lb)  Nursing recommendations for eDe Armstrong at time of discharge are as follows:  Client verbalized understanding of all discharge instructions and prescriptions.     Review all your home medications and newly ordered medications with your doctor and/or pharmacist. Follow medication instructions as directed by your doctor and/or pharmacist.    Pain Management:   Discharge Pain Medication Instructions:  Comfort Goal: Comfort with Movement, Sleep Comfortably  Notify your primary care provider if pain is unrelieved with these measures, if the pain is new, or increased in intensity.    Discharge Skin Characteristics:    Discharge Skin Exam:    Wound 05/13/21 Incision Hip Left benzoin, steri strips, prevena (Active)       Wound 01/23/24 Incision Abdomen abd binder (Active)   Wound Image   02/11/24 2034   Site Assessment Clean;Dry;Intact 02/12/24 2047   Periwound Assessment Clean;Dry;Intact 02/12/24 2047   Margins Attached edges 02/12/24 2047   Closure Staples 02/12/24 2047   Drainage Amount None 02/12/24 2047   Drainage Description Serosanguineous 02/02/24 1639   Dressing Status Clean;Intact;Dry 02/12/24 2047   Dressing Changed Changed 02/12/24 0740   Dressing Options [place] Dressing 02/12/24 2047   Dressing Change/Treatment Frequency As Needed 02/12/24 0740   NEXT Weekly Photo (Inpatient Only) 02/18/24 02/11/24 2034       Wound 01/25/24 Incision Flank Left (Active)   Wound Image   02/11/24 2034   Site Assessment Clean;Intact;Dry 02/12/24 0740   Periwound Assessment Clean;Dry;Intact 02/11/24 2034   Margins Attached edges 02/12/24 0740   Closure Closure strips 02/11/24 2034   Drainage Amount None 02/11/24 2034   Drainage Description Serosanguineous 02/02/24 1639   Wound Cleansing Approved Wound Cleanser 02/10/24 0600   Dressing Status Open to Air 02/12/24 0740   Dressing Changed Observed 02/11/24 2034    Dressing Options [place] Dressing 02/11/24 0730   Dressing Change/Treatment Frequency Daily, and As Needed 02/11/24 0730   NEXT Weekly Photo (Inpatient Only) 02/05/24 02/03/24 0800   Adhesive Closure Strips Intact 02/09/24 1000       Wound 01/30/24 Incision Back (Active)   Wound Image   02/11/24 2034   Site Assessment Clean;Dry;Intact 02/12/24 0740   Periwound Assessment Clean;Dry;Intact 02/12/24 0740   Margins Attached edges 02/12/24 0740   Closure Closure strips 02/11/24 2034   Drainage Amount Scant 02/12/24 0740   Drainage Description Serosanguineous 02/12/24 0740   Treatments Cleansed;Site care 02/11/24 2034   Wound Cleansing Approved Wound Cleanser 02/08/24 0145   Dressing Status Clean;Dry;Intact 02/12/24 2047   Dressing Changed Observed 02/12/24 2047   Dressing Options [place] Dressing 02/12/24 2047   Dressing Change/Treatment Frequency As Needed 02/12/24 0740   NEXT Weekly Photo (Inpatient Only) 02/18/24 02/11/24 2034   Adhesive Closure Strips Intact 02/10/24 1951     Skin / Wound Care Instructions: Please contact your primary care physician for any change in skin integrity.     If You Have Surgical Incisions / Wounds:  Monitor surgical site(s) for signs of increased swelling, redness or symptoms of drainage from the site or fever as this could indicate signs and symptoms of infection. If these symptoms are noted, notifiy your primary care provider.      Discharge Safety Instructions:       Discharge Safety Concerns:    The interdisciplinary team has made recommendation that you should have adult supervision in the house due to weakness  Anti-embolic stockings are not required to increase circulation to the lower extremities.    Discharge Diet: Regular        Discharge Liquids: Thins   Discharge Bowel Function:    Please contact your primary care physician for any changes in bowel habits.  Discharge Bowel Program:    Discharge Bladder Function:    Discharge Urinary Devices:        Nursing Discharge Plan:    Influenza Vaccine Indication: Patient Refuses    Case Management Discharge Instructions:   Discharge Location:    Agency Name/Address/Phone:    Home Health:    Outpatient Services:    DME Provider/Phone:    Medical Equipment Ordered:    Prescription Faxed to:        Discharge Medication Instructions:  Below are the medications your physician expects you to take upon discharge:      Laminectomy - Laminotomy - Discectomy  Your surgeon has decided that a laminectomy (entire lamina removal) or laminotomy (partial lamina removal) is the best treatment for your back problem. These procedures involve removal of bone to relieve pressure on nerve roots. It allows the surgeon access to parts of the spine where other problems are located. This could be an injured disc (the cartilage-like structures located between the bones of the back). In this surgery your surgeon removes a part of the alexandre arch that surrounds your spinal canal. This may be compressing nerve roots. In some cases, the surgeon will remove the disc and fuse (stick together) vertebral bodies (the bones of your back) to make the spine more stable. The type of procedure you will need is usually decided prior to surgery, however modifications may be necessary. The time in surgery depends on the findings in surgery and the procedure necessary to correct the problems.  DISCECTOMY  For people with disc problems, the surgeon removes the portion of the disc that is causing the pressure on the nerve root. Some surgeons perform a micro (small) discectomy, which may require removal of only a small portion of the lamina. A disc nucleus (center) may also be removed either through a needle (percutaneous discectomy) or by injecting an enzyme called chymopapain into the disc. Chymopapain is an enzyme that dissolves the disc. For people with back instability, the surgeon fuses vertebrae that are next to each other with tiny pieces of bone. These are used as bone grafts on the  facets, or between the vertebrae. When this heals, the bones will no longer be able to move. These bone chips are often taken from the pelvic bones. Bones and bone grafts grow into one unit, stabilizing the segments of the spinal column.  LET YOUR CAREGIVER KNOW ABOUT:  Allergies.  Medicines taken including herbs, eyedrops, over-the-counter medicines, and creams.  Use of steroids (by mouth or creams).  Previous problems with anesthetics or numbing medicine.  Possibility of pregnancy, if this applies.  History of blood clots (thrombophlebitis).  History of bleeding or blood problems.  Previous surgery.  Other health problems.  RISKS AND COMPLICATIONS  Your caregiver will discuss possible risks and complications with you before surgery. In addition to the usual risks of anesthesia, other common risks and complications include:  Blood loss and replacement.  Temporary increase in pain due to surgery.  Uncorrected back pain.  Infection.  New nerve damage (tingling, numbness, and pain).  BEFORE THE PROCEDURE  Stop smoking at least 1 week prior to surgery. This lowers risk during surgery.  Your caregiver may advise that you stop taking certain medicines that may affect the outcome of the surgery and your ability to heal. For example, you may need to stop taking anti-inflammatories, such as aspirin, because of possible bleeding problems. Other medicines may have interactions with anesthesia.  Tell your caregiver if you have been on steroids for long periods of time. Often, additional steroids are administered intravenously before and during the procedure to prevent complications.  You should be present 60 minutes prior to your procedure or as directed.  AFTER THE PROCEDURE  After surgery, you will be taken to the recovery area where a nurse will watch and check your progress. Generally, you will be allowed to go home within 1 week barring other problems.  HOME CARE INSTRUCTIONS   Check the surgical cut (incision) twice a  day for signs of infection. Some signs may include a bad smelling, greenish or yellowish discharge from the wound; increased pain or increased redness over the incision site; an opening of the incision; flu-like symptoms; or a temperature above 101.5° F (38.6° C).  Change your bandages in about 24 to 36 hours following surgery or as directed.  You may shower once the bandage is removed or as directed. Avoid bathtubs, swimming pools, and hot tubs for 3 weeks or until your incision has healed completely. If you have stitches (sutures) or staples they may be removed 2 to 3 weeks after surgery, or as directed by your caregiver.  Follow your caregiver's instructions for activities, exercises, and physical therapy.  Weight reduction may be beneficial if you are overweight.  Walking is permitted. You may use a treadmill without an incline. Cut down on activities if you have discomfort. You may also go up and down stairs as tolerated.  Do not lift anything heavier than 10 to 15 pounds. Avoid bending or twisting at the waist. Always bend your knees.  Maintain strength and range of motion as instructed.  No driving is permitted for 2 to 3 weeks, or as directed by your caregiver. You may be a passenger for 20 to 30 minute trips. Lying back in the passenger seat may be more comfortable for you.  Limit your sitting to 20 to 30 minute intervals. You should lie down or walk in between sitting periods. There are no limitations for sitting in a recliner chair.  Only take over-the-counter or prescription medicines for pain, discomfort, or fever as directed by your caregiver.  SEEK MEDICAL CARE IF:   There is increased bleeding (more than a small spot) from the wound.  You notice redness, swelling, or increasing pain in the wound.  Pus is coming from the wound.  An unexplained oral temperature above 102° F (38.9° C) develops.  You notice a bad smell coming from the wound or dressing.  SEEK IMMEDIATE MEDICAL CARE IF:   You develop a  rash.  You have difficulty breathing.  You have any allergic problems.  Document Released: 12/15/2001 Document Revised: 03/11/2013 Document Reviewed: 10/13/2009  [employer]® Patient Information ©2014 [employer].    Understanding Your Risk for Falls  Each year, millions of people have serious injuries from falls. It is important to understand your risk for falling. Talk with your health care provider about your risk and what you can do to lower it. There are actions you can take at home to lower your risk and prevent falls.  If you do have a serious fall, make sure to tell your health care provider. Falling once raises your risk of falling again.  How can falls affect me?  Serious injuries from falls are common. These include:  Broken bones, such as hip fractures.  Head injuries, such as traumatic brain injuries (TBI) or concussion.  A fear of falling can cause you to avoid activities and stay at home. This can make your muscles weaker and actually raise your risk for a fall.  What can increase my risk?  There are a number of risk factors that increase your risk for falling. The more risk factors you have, the higher your risk of falling. Serious injuries from a fall happen most often to people older than age 65. Children and young adults ages 15-29 are also at higher risk.  Common risk factors include:  Weakness in the lower body.  Lack (deficiency) of vitamin D.  Being generally weak or confused due to long-term (chronic) illness.  Dizziness or balance problems.  Poor vision.  Medicines that cause dizziness or drowsiness. These can include medicines for your blood pressure, heart, anxiety, insomnia, or edema, as well as pain medicines and muscle relaxants.  Other risk factors include:  Drinking alcohol.  Having had a fall in the past.  Having depression.  Having foot pain or wearing improper footwear.  Working at a dangerous job.  Having any of the following in your home:  Tripping hazards, such as floor clutter  or loose rugs.  Poor lighting.  Pets.  Having dementia or memory loss.  What actions can I take to lower my risk of falling?         Physical activity  Maintain physical fitness. Do strength and balance exercises. Consider taking a regular class to build strength and balance. Yoga and rena chi are good options.  Vision  Have your eyes checked every year and your vision prescription updated as needed.  Walking aids and footwear  Wear nonskid shoes. Do not wear high heels.  Do not walk around the house in socks or slippers.  Use a cane or walker as told by your health care provider.  Home safety  Attach secure railings on both sides of your stairs.  Install grab bars for your tub, shower, and toilet. Use a bath mat in your tub or shower.  Use good lighting in all rooms. Keep a flashlight near your bed.  Make sure there is a clear path from your bed to the bathroom. Use night-lights.  Do not use throw rugs. Make sure all carpeting is taped or tacked down securely.  Remove all clutter from walkways and stairways, including extension cords.  Repair uneven or broken steps.  Avoid walking on icy or slippery surfaces. Walk on the grass instead of on icy or slick sidewalks. Use ice melt to get rid of ice on walkways.  Use a cordless phone.  Questions to ask your health care provider  Can you help me check my risk for a fall?  Do any of my medicines make me more likely to fall?  Should I take a vitamin D supplement?  What exercises can I do to improve my strength and balance?  Should I make an appointment to have my vision checked?  Do I need a bone density test to check for weak bones or osteoporosis?  Would it help to use a cane or a walker?  Where to find more information  Centers for Disease Control and Prevention, [place]: www.cdc.gov  Community-Based Fall Prevention Programs: www.cdc.gov  National [place] on Aging: www.[place].nih.gov  Contact a health care provider if:  You fall at home.  You are afraid of falling at  home.  You feel weak, drowsy, or dizzy.  Summary  Serious injuries from a fall happen most often to people older than age 65. Children and young adults ages 15-29 are also at higher risk.  Talk with your health care provider about your risks for falling and how to lower those risks.  Taking certain precautions at home can lower your risk for falling.  If you fall, always tell your health care provider.  This information is not intended to replace advice given to you by your health care provider. Make sure you discuss any questions you have with your health care provider.  Document Revised: 2021 Document Reviewed: 2021  [employer] Patient Education ©  [employer]    Helping Someone Who Is Suicidal  Suicide is the act of ending, or taking, one's own life. Someone who is thinking about suicide needs help right away. Listen to the person. Even if you do not know what to say or do to help, you can start by letting the person know that you care.  Talk to the person about how to get help. Help is available through suicide hotlines and through therapy and other treatments.  What are the risk factors for suicide?  Risk factors for suicide include:  Having a friend or family member who has  by suicide.  A history of attempted suicide.  Depression or other mental health problems.  Being exposed to graphic stories of suicide in the media.  Alcohol or drug misuse, especially when combined with a mental illness.  A serious physical problem, such as long-term (chronic) pain.  Stressful life events, now or in the past. These may include:  Divorce or social rejection.  Childhood abuse or neglect.  Sudden life changes, such as a financial crisis or going to nursing home.  What are warning signs to watch for?  Most people who are thinking about suicide show warning signs. Signs may include:  Expressing thoughts about, or a preoccupation with, ending one's own life.  Making threats or comments about ending one's own  life.  Withdrawing from normal activities or avoiding friends, family, coworkers, or classmates.  Dramatic mood swings.  Impulsive or reckless behavior.  An increase in drug or alcohol use.  Follow these instructions at home:  If you think someone may be thinking about or planning suicide:  Ask the person directly whether he or she is thinking about suicide or about hurting himself or herself.  Asking about thoughts of suicide or self-harm does not make someone more likely to attempt suicide.  Avoid giving advice or arguing with the person about the value of his or her life.  If a person confides in you that he or she is considering suicide:  Take the person seriously. Do not ever ignore comments about suicide.  Listen to the person's thoughts and concerns with compassion.  Let the person know that you will stay with him or her.  Offer to help the person get to a mental health professional or other health care provider.  Remove all weapons and medicines from the person's living area.  Do not promise to keep the person's thoughts of suicide a secret.  Contact a suicide crisis helpline, such as:  The National Suicide Prevention Lifeline at 1-664.341.9174 or 958 in the [place]  The Crisis Text Line by texting HOME to 099678.  Get help right away if:  You ever feel like someone may hurt himself or herself or others, or if he or she shares thoughts about taking his or her own life. You can go to your nearest emergency department or:  Call a crisis center or a local suicide prevention center. These are often located at hospitals, clinics, community service organizations, social service providers, or health departments.  Call your local emergency services (118 in the [place]  Call a suicide crisis helpline, such as the National Suicide Prevention Lifeline at 1-101.778.7471 or 803 in the [place] This is open 24 hours a day in the [place]  Text HOME to the Crisis Text Line at 052251 (in the [place]  Call the [facility] and  [facility] (211 in the [place]  Summary  Suicide is the act of ending, or taking, one's own life.  Suicide can be prevented by knowing the risk factors and the signs, and by taking action.  If you know someone who has or is showing any risk factors for suicide, ask if he or she is thinking about hurting himself or herself. Take all concerns about suicide seriously, and get support from experts in mental illness or suicide.  Get help right away if you believe that a person may hurt himself or herself or others, or may be having thoughts of taking his or her own life.  This information is not intended to replace advice given to you by your health care provider. Make sure you discuss any questions you have with your health care provider.  Document Revised: 07/13/2022 Document Reviewed: 04/13/2022  Elsevier Patient Education © 2023 [employer].

## 2024-02-13 NOTE — CARE PLAN
The patient is Stable - Low risk of patient condition declining or worsening    Shift Goals  Clinical Goals: Safety, pain control  Patient Goals: Safety  Family Goals: Education    Progress made toward(s) clinical / shift goals:    Problem: Knowledge Deficit - Standard  Goal: Patient and family/care givers will demonstrate understanding of plan of care, disease process/condition, diagnostic tests and medications  Outcome: Progressing   Patient educated on the POC and medications administered. All questions and concerns addressed at this time   Problem: Pain - Standard  Goal: Alleviation of pain or a reduction in pain to the patient’s comfort goal  Outcome: Progressing   Use of 0-10 pain scale. Patient is able to verbalize pain and discomfort appropriately. PRN pain medications administered.  Problem: Fall Risk - Rehab  Goal: Patient will remain free from falls  Outcome: Progressing   Bed is locked and in low position. Patient calls appropriately throughout the night to be assisted to the restroom. Call light and belongings within reach    Patient is not progressing towards the following goals:

## 2024-02-13 NOTE — PROGRESS NOTES
Patient discharged to home per order.  Discharge instructions reviewed with patient and family ; they verbalize understanding and signed copies placed in chart.  Patient has all belongings; signed copy of form in chart.  Patient left facility at 1050 via walker accompanied by rehab staff.  Have enjoyed working with this pleasant patient.

## 2024-02-14 ENCOUNTER — PATIENT OUTREACH (OUTPATIENT)
Dept: MEDICAL GROUP | Facility: PHYSICIAN GROUP | Age: 69
End: 2024-02-14
Payer: MEDICARE

## 2024-02-14 NOTE — PROGRESS NOTES
Transitional Care Management  TCM Outreach Date and Time: Filed (2/14/2024  1:40 PM)    Discharge Questions  Actual Discharge Date: 02/13/24  Now that you are home, how are you feeling?: Fair  Did you receive any new prescriptions?: Yes  Were you able to get them filled?: Yes  Meds to Bed or Pharmacy filled?: Pharmacy  Do you have any questions about your current medications or new medications (Review Med Rec)?: Yes (Please explain) (questions about bp meds)  Did you have any durable medical equipment ordered?: No  Do you have a follow up appointment scheduled with your PCP?: No  Was an appointment scheduled for the patient?: Yes  Appointment Date: 02/21/24  Appointment Time: 1420  Any issues or paperwork you wish to discuss with your PCP?: No  Are you (patient) able to get to the appointment?: Yes  If Home Health was ordered, have they contacted you (Patient): Yes  Did you have enough support after your last discharge?: Yes  Does this patient qualify for the CCM program?: Yes    Transitional Care  Number of attempts made to contact patient: 1  Current or previous attempts completed within two business days of discharge? : Yes  Provided education regarding treatment plan, medications, self-management, ADLs?: Yes  Has patient completed an Advanced Directive?: Yes  Is the patient's advanced directive on file?: Yes  Has the Care Manager's phone number provided?: No  Is there anything else I can help you with?: No    Discharge Summary  Chief Complaint: back pain  Admitting Diagnosis: Lumbar radiculopathy  Discharge Diagnosis: Lumbar radiculopathy

## 2024-02-16 ENCOUNTER — TELEPHONE (OUTPATIENT)
Dept: MEDICAL GROUP | Facility: PHYSICIAN GROUP | Age: 69
End: 2024-02-16
Payer: MEDICARE

## 2024-02-17 NOTE — TELEPHONE ENCOUNTER
Jennifer from [facility] asking for a verbal order that Dr. Viramontes will sign orders for them. She asked for a call back at [phone]

## 2024-02-21 ENCOUNTER — OFFICE VISIT (OUTPATIENT)
Dept: MEDICAL GROUP | Facility: PHYSICIAN GROUP | Age: 69
End: 2024-02-21
Payer: MEDICARE

## 2024-02-21 VITALS
HEART RATE: 92 BPM | BODY MASS INDEX: 45.16 KG/M2 | TEMPERATURE: 98.5 F | OXYGEN SATURATION: 97 % | SYSTOLIC BLOOD PRESSURE: 124 MMHG | WEIGHT: 224 LBS | HEIGHT: 59 IN | DIASTOLIC BLOOD PRESSURE: 86 MMHG

## 2024-02-21 DIAGNOSIS — K21.9 GASTROESOPHAGEAL REFLUX DISEASE, UNSPECIFIED WHETHER ESOPHAGITIS PRESENT: Chronic | ICD-10-CM

## 2024-02-21 DIAGNOSIS — Z98.1 S/P LUMBAR FUSION: ICD-10-CM

## 2024-02-21 DIAGNOSIS — I10 ESSENTIAL HYPERTENSION: Chronic | ICD-10-CM

## 2024-02-21 DIAGNOSIS — E55.9 VITAMIN D DEFICIENCY: Chronic | ICD-10-CM

## 2024-02-21 DIAGNOSIS — R73.03 PREDIABETES: ICD-10-CM

## 2024-02-21 DIAGNOSIS — E78.5 DYSLIPIDEMIA: Chronic | ICD-10-CM

## 2024-02-21 DIAGNOSIS — D64.9 ANEMIA, UNSPECIFIED TYPE: ICD-10-CM

## 2024-02-21 PROBLEM — D64.89 OTHER SPECIFIED ANEMIAS: Status: RESOLVED | Noted: 2024-02-21 | Resolved: 2024-02-21

## 2024-02-21 PROBLEM — Z96.649 MECHANICAL LOOSENING OF PROSTHETIC HIP (HCC): Status: RESOLVED | Noted: 2021-06-23 | Resolved: 2024-02-21

## 2024-02-21 PROBLEM — D64.89 OTHER SPECIFIED ANEMIAS: Status: ACTIVE | Noted: 2024-02-21

## 2024-02-21 PROBLEM — T84.038A MECHANICAL LOOSENING OF PROSTHETIC HIP (HCC): Status: RESOLVED | Noted: 2021-06-23 | Resolved: 2024-02-21

## 2024-02-21 PROBLEM — H00.015 HORDEOLUM EXTERNUM OF LEFT LOWER EYELID: Status: RESOLVED | Noted: 2022-11-03 | Resolved: 2024-02-21

## 2024-02-21 PROCEDURE — 99495 TRANSJ CARE MGMT MOD F2F 14D: CPT | Performed by: INTERNAL MEDICINE

## 2024-02-21 PROCEDURE — 3074F SYST BP LT 130 MM HG: CPT | Performed by: INTERNAL MEDICINE

## 2024-02-21 PROCEDURE — 3079F DIAST BP 80-89 MM HG: CPT | Performed by: INTERNAL MEDICINE

## 2024-02-21 RX ORDER — PANTOPRAZOLE SODIUM 40 MG/1
40 FOR SUSPENSION ORAL DAILY
COMMUNITY

## 2024-02-21 RX ORDER — CLOBETASOL PROPIONATE 0.05 MG/G
GEL TOPICAL
COMMUNITY

## 2024-02-21 RX ORDER — AMITRIPTYLINE HYDROCHLORIDE 10 MG/1
10 TABLET, FILM COATED ORAL NIGHTLY
COMMUNITY

## 2024-02-21 RX ORDER — ESTRADIOL 0.1 MG/G
CREAM VAGINAL DAILY
COMMUNITY

## 2024-02-21 RX ORDER — SUCRALFATE 1 G/1
1 TABLET ORAL
COMMUNITY

## 2024-02-21 RX ORDER — MULTIVIT-MIN/IRON/FOLIC ACID/K 18-600-40
CAPSULE ORAL
COMMUNITY

## 2024-02-21 RX ORDER — BIOTIN 10 MG
TABLET ORAL
COMMUNITY

## 2024-02-21 RX ORDER — LISINOPRIL 10 MG/1
10 TABLET ORAL DAILY
COMMUNITY

## 2024-02-21 RX ORDER — OXYCODONE HYDROCHLORIDE 5 MG/1
5-30 CAPSULE ORAL EVERY 4 HOURS PRN
COMMUNITY

## 2024-02-21 ASSESSMENT — FIBROSIS 4 INDEX: FIB4 SCORE: 0.69

## 2024-02-21 NOTE — ASSESSMENT & PLAN NOTE
Chronic condition.  Patient presently not taking vitamin D supplementation.  Lab test requested next visit

## 2024-02-21 NOTE — ASSESSMENT & PLAN NOTE
Chronic ongoing condition.  The patient currently not on therapy.  Denies chest pain shortness of breath palpitation or near syncope.

## 2024-02-21 NOTE — PROGRESS NOTES
Dee Armstrong is a 68 y.o. female who presents for Hospital Follow-up.    POST DISCHARGE CALL:  Rohan Leija R.N.  Registered Nurse     Progress Notes     Signed     Encounter Date: 2/14/2024       Transitional Care Management  TCM Outreach Date and Time: Filed (2/14/2024  1:40 PM)     Discharge Questions  Actual Discharge Date: 02/13/24  Now that you are home, how are you feeling?: Fair  Did you receive any new prescriptions?: Yes  Were you able to get them filled?: Yes  Meds to Bed or Pharmacy filled?: Pharmacy  Do you have any questions about your current medications or new medications (Review Med Rec)?: Yes (Please explain) (questions about bp meds)  Did you have any durable medical equipment ordered?: No  Do you have a follow up appointment scheduled with your PCP?: No  Was an appointment scheduled for the patient?: Yes  Appointment Date: 02/21/24  Appointment Time: 1420  Any issues or paperwork you wish to discuss with your PCP?: No  Are you (patient) able to get to the appointment?: Yes  If Home Health was ordered, have they contacted you (Patient): Yes  Did you have enough support after your last discharge?: Yes  Does this patient qualify for the CCM program?: Yes     Transitional Care  Number of attempts made to contact patient: 1  Current or previous attempts completed within two business days of discharge? : Yes  Provided education regarding treatment plan, medications, self-management, ADLs?: Yes  Has patient completed an Advanced Directive?: Yes  Is the patient's advanced directive on file?: Yes  Has the Care Manager's phone number provided?: No  Is there anything else I can help you with?: No     Discharge Summary  Chief Complaint: back pain  Admitting Diagnosis: Lumbar radiculopathy  Discharge Diagnosis: Lumbar radiculopathy          HPI:   Recently hospitalized for:     S/P lumbar fusion  Patient was admitted to the hospital recently.  Patient had lumbar surgeries:    -Patient underwent  stage I L4-S1 ALIF on January 23, 2024  -Jan 25th:  left L2-3 XLIF  -Jab 30th: redo L2-3 laminotomy and L2 S1 PRC fusion    Postsurgery the patient was sent to [facility]    Patient takes oxycodone as needed for severe pain.  No significant side effects reported.  Pt was seen recently seen by PA at [facility] who took metal staples. Pt has pending appt to see spine surgery mar 6   Pt denies fever, chills or any sx after surgery.    Anemia  This is a new condition.  Patient was noted with postoperative drop in hemoglobin  Lab test result reviewed.     Latest Reference Range & Units 01/24/24 06:03 01/24/24 13:59 01/25/24 05:44 01/26/24 04:09 01/27/24 03:31 02/01/24 11:28 02/04/24 05:17 02/13/24 05:35   Hemoglobin 12.0 - 16.0 g/dL 13.0 12.2 11.8 (L) 11.9 (L) 10.6 (L) 10.7 (L) 10.5 (L) 10.3 (L)   Hematocrit 37.0 - 47.0 % 38.1  35.5 (L) 36.2 (L) 32.1 (L) 32.5 (L) 31.8 (L) 32.6 (L)   MCV 81.4 - 97.8 fL 96.5  94.9 96.5 95.8 95.9 96.1 97.9 (H)   (L): Data is abnormally low  (H): Data is abnormally high    Dyslipidemia  Chronic condition.  The patient is being treated with atorvastatin 20 Mg daily.  Patient tolerating medication well.    Essential hypertension  Chronic ongoing condition.  The patient currently not on therapy.  Denies chest pain shortness of breath palpitation or near syncope.    GERD (gastroesophageal reflux disease)  Chronic condition.  The patient presently not taking omeprazole.  She denies nausea vomiting dysphagia or unexplained weight loss.0    Prediabetes  This is a new condition.  Noted with chart review.  Recommend diet and exercise.  Repeat lab test next visit    Vitamin D deficiency  Chronic condition.  Patient presently not taking vitamin D supplementation.  Lab test requested next visit     Current medicines (including reconciliation performed today)  Current Outpatient Medications   Medication Sig Dispense Refill    amitriptyline (ELAVIL) 10 MG Tab Take 10 mg by mouth every evening.       lisinopril (PRINIVIL) 10 MG Tab Take 10 mg by mouth every day.      oxycodone 5 MG capsule Take 5-30 mg by mouth every four hours as needed.      pantoprazole (PROTONIX) 40 MG Pack Take 40 mg by mouth every day.      estradiol (ESTRACE) 0.1 MG/GM vaginal cream Insert  into the vagina every day.      Clobetasol Propionate 0.05 % Gel Apply  topically.      sucralfate (CARAFATE) 1 GM Tab Take 1 g by mouth 4 Times a Day,Before Meals and at Bedtime.      Multiple Vitamins-Minerals (MULTIVITAMIN ADULT) Chew Tab Chew.      Ascorbic Acid (VITAMIN C) 500 MG Cap Take  by mouth.      Magnesium 400 MG Cap Take  by mouth.      Fiber Complete Tab Take  by mouth.      atorvastatin (LIPITOR) 20 MG Tab Take 1 Tablet by mouth every evening. 90 Tablet 0    vitamin D3 2000 UNIT Tab Take 1 Tablet by mouth every day. 60 Tablet 0    gabapentin (NEURONTIN) 300 MG Cap Take 1 Capsule by mouth every evening. (Patient taking differently: Take 600 mg by mouth every evening.) 90 Capsule 0    baclofen (LIORESAL) 10 MG Tab Take 1 Tablet by mouth 3 times a day. 90 Tablet 2    acetaminophen (TYLENOL) 325 MG Tab Take 2 Tablets by mouth every 6 hours. 30 Tablet 0    polyethylene glycol/lytes (MIRALAX) Pack Take 1 Packet by mouth 1 time a day as needed (if no bowel movement in last 2 days). (Patient not taking: Reported on 2024) 14 Packet 3    omeprazole (PRILOSEC) 20 MG delayed-release capsule Take 1 Capsule by mouth every day. (Patient not taking: Reported on 2024) 30 Capsule      No current facility-administered medications for this visit.       Allergies:   Patient has no known allergies.    Social History     Tobacco Use    Smoking status: Former     Current packs/day: 0.00     Types: Cigarettes     Quit date: 1977     Years since quittin.1    Smokeless tobacco: Never    Tobacco comments:     2 to 3 cigarettes a month back in -1977   Vaping Use    Vaping Use: Never used   Substance Use Topics    Alcohol use: Not  "Currently     Comment: One drink about every four months    Drug use: No       ROS:  As noted in HPI otherwise negative    Objective:     /86 (BP Location: Left arm, Patient Position: Sitting, BP Cuff Size: Adult)   Pulse 92   Temp 36.9 °C (98.5 °F) (Temporal)   Ht 1.499 m (4' 11\")   Wt 102 kg (224 lb)   SpO2 97%    Body mass index is 45.24 kg/m².      General: alert in no apparent distress.  Cardiovascular: regular rate and rhythm  Pulmonary: lungs : no wheezing   Gastrointestinal: BS present.  Abdomen wound has healed nicely sutures have been removed.  There is no redness fluctuance or discharge  Low back no redness swelling discharge or fluctuance.      Lab Results   Component Value Date/Time    HBA1C 5.9 (H) 02/04/2024 05:17 AM       Lab Results   Component Value Date/Time    WBC 4.1 (L) 02/13/2024 05:35 AM    HEMOGLOBIN 10.3 (L) 02/13/2024 05:35 AM    HEMATOCRIT 32.6 (L) 02/13/2024 05:35 AM    MCV 97.9 (H) 02/13/2024 05:35 AM    PLATELETCT 430 02/13/2024 05:35 AM         Lab Results   Component Value Date/Time    SODIUM 137 02/13/2024 05:35 AM    POTASSIUM 4.5 02/13/2024 05:35 AM    GLUCOSE 95 02/13/2024 05:35 AM    BUN 12 02/13/2024 05:35 AM    CREATININE 0.49 (L) 02/13/2024 05:35 AM       Lab Results   Component Value Date/Time    CHOLSTRLTOT 167 05/01/2023 07:50 AM    LDL 80 05/01/2023 07:50 AM    HDL 63 05/01/2023 07:50 AM    TRIGLYCERIDE 122 05/01/2023 07:50 AM       Lab Results   Component Value Date/Time    ALTSGPT 16 02/13/2024 05:35 AM           Assessment and Plan:     1. S/P lumbar fusion  Patient has done well postoperatively.  Advised the patient to follow-up with spinal surgery March 6, 2024.  Patient may continue to take baclofen 10 mg 3 times a day as needed for muscle spasm.  She takes oxycodone 5 mg every 6 hours as needed for severe pain.  No side effects reported.  Patient stated that she will start home physical therapy in the next few days.    2. Anemia, unspecified type  This " is a new condition.  Lab test result discussed with patient and .  Likely due to postoperative anemia.  Patient denies any history of bleeding.  Recommend to repeat lab test in a couple of months.    - OCCULT BLOOD STOOL; Future  - FOLATE; Future  - IRON; Future  - VITAMIN B12; Future  - CBC WITHOUT DIFFERENTIAL; Future    3. Dyslipidemia  - ALANINE AMINO-TRANS; Future  - Lipid Profile; Future  Chronic condition.  Stable.  Continue atorvastatin 20 mg daily.  Repeat lab test next visit    4. Essential hypertension  Chronic stable condition.  Continue lisinopril 10 mg daily.  Blood pressure today 124/86.    5. Gastroesophageal reflux disease, unspecified whether esophagitis present  Chronic stable.  Continue pantoprazole 40 Mg daily.    6. Prediabetes  - Basic Metabolic Panel; Future  New condition.  Lab test result discussed with the patient.  Recommend low sweet low-carb diet.  Repeat lab test next visit.  Continue to monitor    7. Vitamin D deficiency  - VITAMIN D,25 HYDROXY (DEFICIENCY); Future  Chronic condition.  Continue vitamin D supplementation.  Lab test next visit        Other orders  - amitriptyline (ELAVIL) 10 MG Tab; Take 10 mg by mouth every evening.  - lisinopril (PRINIVIL) 10 MG Tab; Take 10 mg by mouth every day.  - oxycodone 5 MG capsule; Take 5-30 mg by mouth every four hours as needed.  - pantoprazole (PROTONIX) 40 MG Pack; Take 40 mg by mouth every day.  - estradiol (ESTRACE) 0.1 MG/GM vaginal cream; Insert  into the vagina every day.  - Clobetasol Propionate 0.05 % Gel; Apply  topically.  - sucralfate (CARAFATE) 1 GM Tab; Take 1 g by mouth 4 Times a Day,Before Meals and at Bedtime.  - Multiple Vitamins-Minerals (MULTIVITAMIN ADULT) Chew Tab; Chew.  - Ascorbic Acid (VITAMIN C) 500 MG Cap; Take  by mouth.  - Magnesium 400 MG Cap; Take  by mouth.  - Fiber Complete Tab; Take  by mouth.      - Chart and discharge summary were reviewed.   - Hospitalization and results reviewed with patient.   -  Medications reviewed including instructions regarding high risk medications, dosing and side effects.    Please note that this dictation was created using voice recognition software. I have made every reasonable attempt to correct obvious errors, but I expect that there are errors of grammar and possibly content that I did not discover before finalizing the note.    Face-to-face transitional care management services with MODERATE (today's visit is within 14 days post discharge & LACE+ score of 28-58) medical decision complexity were provided.     LACE+ Historical Score Over Time (0-28: Low, 29-58: Medium, 59+: High): 15        Attestation: I spent:   44   min -  That includes time for chart review before the visit, the actual patient visit, and time spent on documentation in EMR after the visit.  Chart review/prep, review of other providers' records, imaging/lab review, face-to-face time for history/examination, pt's counseling/education, ordering, prescribing,  review of results/meds/ treatment plan with patient, and care coordination.

## 2024-02-21 NOTE — TELEPHONE ENCOUNTER
Called and they stated they were going to fax it over to us for review and signature for homehealth orders.

## 2024-02-21 NOTE — ASSESSMENT & PLAN NOTE
This is a new condition.  Patient was noted with postoperative drop in hemoglobin  Lab test result reviewed.     Latest Reference Range & Units 01/24/24 06:03 01/24/24 13:59 01/25/24 05:44 01/26/24 04:09 01/27/24 03:31 02/01/24 11:28 02/04/24 05:17 02/13/24 05:35   Hemoglobin 12.0 - 16.0 g/dL 13.0 12.2 11.8 (L) 11.9 (L) 10.6 (L) 10.7 (L) 10.5 (L) 10.3 (L)   Hematocrit 37.0 - 47.0 % 38.1  35.5 (L) 36.2 (L) 32.1 (L) 32.5 (L) 31.8 (L) 32.6 (L)   MCV 81.4 - 97.8 fL 96.5  94.9 96.5 95.8 95.9 96.1 97.9 (H)   (L): Data is abnormally low  (H): Data is abnormally high

## 2024-02-21 NOTE — ASSESSMENT & PLAN NOTE
Patient was admitted to the hospital recently.  Patient had lumbar surgeries:    -Patient underwent stage I L4-S1 ALIF on January 23, 2024  -Jan 25th:  left L2-3 XLIF  -Jab 30th: redo L2-3 laminotomy and L2 S1 PRC fusion    Postsurgery the patient was sent to [facility]    Patient takes oxycodone as needed for severe pain.  No significant side effects reported.  Pt was seen recently seen by PA at [facility] who took metal staples. Pt has pending appt to see spine surgery mar 6   Pt denies fever, chills or any sx after surgery.

## 2024-02-21 NOTE — ASSESSMENT & PLAN NOTE
Chronic condition.  The patient is being treated with atorvastatin 20 Mg daily.  Patient tolerating medication well.

## 2024-02-21 NOTE — ASSESSMENT & PLAN NOTE
This is a new condition.  Noted with chart review.  Recommend diet and exercise.  Repeat lab test next visit

## 2024-02-21 NOTE — ASSESSMENT & PLAN NOTE
Chronic condition.  The patient presently not taking omeprazole.  She denies nausea vomiting dysphagia or unexplained weight loss.0

## 2024-02-24 NOTE — ED PROVIDER NOTES
ED Provider Note    CHIEF COMPLAINT  Chief Complaint   Patient presents with   • Hip Pain   • Shoulder Pain   • Fall       HPI  Dee Armstrong is a 66 y.o. female who presents to the emergency department left hip pain, right shoulder pain.  The patient states that earlier this afternoon, she was a convention, the door got stuck, and she twisted and fell landing on her left side.  She does have history of a left total hip replacement has had surgical intervention for hardware repair.  The patient states the pain in her left hip is 7/10 and radiates to the mid femur increased with movement decreased with rest.  As for her shoulders, she states her slight tenderness she has full range of motion does not believe she really hurt them to that degree.  REVIEW OF SYSTEMS  Positives as above. 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), medicated for a year, yearly xrays   • Cough 5/29/2015   • Diverticulosis    • GERD (gastroesophageal reflux disease)    • Heart burn    • Hypertension    • Indigestion    • LBP (low back pain)    • Lumbar radicular pain 5/29/2015   • Muscle disorder    • Obesity    • Osteoporosis of forearm     Alendronate started 6/18   • Pain     Shoulder and numbness to R LE, hip       FAMILY HISTORY  Noncontributory    SOCIAL HISTORY  Social History     Socioeconomic History   • Marital status:      Spouse name: Not on file   • Number of children: Not on file   • Years of education: Not on file   • Highest education level: Not on file   Occupational History   • Not on file   Tobacco Use   • Smoking status: Former Smoker     Years: 2.00     Types: Cigarettes     Quit date:  1977     Years since quittin.8   • Smokeless tobacco: Never Used   Vaping Use   • Vaping Use: Never used   Substance and Sexual Activity   • Alcohol use: Not Currently     Alcohol/week: 0.0 oz   • Drug use: No   • Sexual activity: Yes     Partners: Male     Comment:    Other Topics Concern   • Not on file   Social History Narrative   • Not on file     Social Determinants of Health     Financial Resource Strain:    • Difficulty of Paying Living Expenses:    Food Insecurity:    • Worried About Running Out of Food in the Last Year:    • Ran Out of Food in the Last Year:    Transportation Needs:    • Lack of Transportation (Medical):    • Lack of Transportation (Non-Medical):    Physical Activity:    • Days of Exercise per Week:    • Minutes of Exercise per Session:    Stress:    • Feeling of Stress :    Social Connections:    • Frequency of Communication with Friends and Family:    • Frequency of Social Gatherings with Friends and Family:    • Attends Yazidi Services:    • Active Member of Clubs or Organizations:    • Attends Club or Organization Meetings:    • Marital Status:    Intimate Partner Violence:    • Fear of Current or Ex-Partner:    • Emotionally Abused:    • Physically Abused:    • Sexually Abused:        SURGICAL HISTORY  Past Surgical History:   Procedure Laterality Date   • HIP REVISION TOTAL Left 2021    Procedure: REVISION, TOTAL ARTHROPLASTY, HIP.;  Surgeon: Amol Hogue M.D.;  Location: [facility];  Service: Orthopedics   • PB TOTAL HIP ARTHROPLASTY Left 2019    Procedure: ARTHROPLASTY, HIP, TOTAL, ANTERIOR APPROACH;  Surgeon: Clarence Vallejo M.D.;  Location: [facility];  Service: Orthopedics   • INCISION HERNIA REPAIR Right 2018    Procedure: INCISION HERNIA REPAIR- FLANK WITH MESH;  Surgeon: Misael Ramírez M.D.;  Location: [facility];  Service: General   • HYSTERECTOMY ROBOTIC  2017    Procedure: HYSTERECTOMY ROBOTIC  SI, BILATERAL SALPINGO-OOPHORECTOMY, URETERAL SACRAL LIGAMENT SHORTENING ;  Surgeon: Jerica Hooper M.D.;  Location: SURGERY [facility];  Service:    • CYSTOSCOPY  6/7/2017    Procedure: CYSTOSCOPY;  Surgeon: Jerica Hooper M.D.;  Location: [facility];  Service:    • SHOULDER DECOMPRESSION ARTHROSCOPIC Right 12/6/2016    Procedure: SHOULDER DECOMPRESSION ARTHROSCOPIC - SUBACROMIAL, MANJARREZ;  Surgeon: Kyle Claros M.D.;  Location: [facility];  Service:    • CLAVICLE DISTAL EXCISION Right 12/6/2016    Procedure: CLAVICLE DISTAL EXCISION;  Surgeon: Kyle Claros M.D.;  Location: [facility];  Service:    • SHOULDER ARTHROSCOPY W/ ROTATOR CUFF REPAIR Right 12/6/2016    Procedure: SHOULDER ARTHROSCOPY W/ ROTATOR CUFF REPAIR ;  Surgeon: Kyle Claros M.D.;  Location: [facility];  Service:    • SHOULDER ARTHROSCOPY W/ BICIPITAL TENODESIS REPAIR Right 12/6/2016    Procedure: SHOULDER ARTHROSCOPY W/ BICIPITAL TENODESIS REPAIR ;  Surgeon: Kyle Claros M.D.;  Location: [facility];  Service:    • LUMBAR FUSION POSTERIOR  2/9/2016    Procedure: LUMBAR FUSION POSTERIOR PEDICLE SCREW FIXATION (STAGE #2);  Surgeon: Tim Rodriguez M.D.;  Location: [facility];  Service:    • LUMBAR LAMINECTOMY DISKECTOMY  2/9/2016    Procedure: LUMBAR LAMINECTOMY DISKECTOMY MINI OPEN;  Surgeon: Tim Rodriguez M.D.;  Location: [facility];  Service:    • EXTREME LATERAL INTERBODY FUSION Right 2/6/2016    Procedure: EXTREME LATERAL INTERBODY FUSION L3-4 (STAGE #1);  Surgeon: Tim Rodriguez M.D.;  Location: SURGERY [facility];  Service:    • RECOVERY  12/8/2015    Procedure: CT-CT GUIDED LEFT LUNG BIOPSY-;  Surgeon: Recoveryonly Surgery;  Location: SURGERY [facility] PROC UNIT [facility];  Service:    • REDDY BY LAPAROSCOPY  4/17/2014    Performed by Ankur Lerner M.D. at SURGERY SAME DAY [facility]   • EGD WITH ASP/BX  2/4/2014    mild chronic  "inactive gastritis, scar gastric antrum-   • EGD WITH ASP/BX  9/29/2011    possible barrettes, hiatal hernia, gastritis   • COLONOSCOPY  9/29/2011    diverticulosis sigmoid colon, hemorrhoids   • ARTHROTOMY  9/3/2010    [facility]formed [facility] YARELY [facility]ORRISON [facility]   • ARTHRODESIS  9/3/2010    [facility]ISON [facility]   • ORTHOPEDIC OSTEOTOMY  9/3/2010    Performed by YARELY MORRISON at SURGERY SAME [facility]   • BONE SPUR EXCISION  9/3/2010    Performed by YARELY MORRISON at SURGERY SAME DAY [facility]   • OTHER ABDOMINAL SURGERY  2000    COLON RESECTION   • LAMINOTOMY     • OTHER ORTHOPEDIC SURGERY      PARDEEP CARPAL TUNNEL RELEASES       CURRENT MEDICATIONS  Home Medications     Reviewed by Tamra Dang R.N. (Registered Nurse) on 10/12/21 at 2122  Med List Status: Partial   Medication Last Dose Status   acetaminophen (TYLENOL) 325 MG Tab  Active   alendronate (FOSAMAX) 70 MG Tab  Active   amitriptyline (ELAVIL) 10 MG Tab  Active   asa/apap/caffeine (EXCEDRIN) 250-250-65 MG Tab  Active   ascorbic acid (ASCORBIC ACID) 500 MG Tab  Active   Calcium Carbonate 1500 (600 Ca) MG Tab  Active   Cholecalciferol (VITAMIN D3) 2000 UNIT Cap  Active   clobetasol (TEMOVATE) 0.05 % Cream  Active   docusate sodium 100 MG Cap  Active   estradiol (ESTRACE) 0.1 MG/GM vaginal cream  Active   gabapentin (NEURONTIN) 600 MG tablet  Active   lansoprazole (PREVACID) 30 MG CAPSULE DELAYED RELEASE  Active   lisinopril (PRINIVIL) 10 MG Tab  Active   meloxicam (MOBIC) 15 MG tablet  Active   Multiple Vitamin (MULTI VITAMIN DAILY PO)  Active                ALLERGIES  No Known Allergies    PHYSICAL EXAM  VITAL SIGNS: /51   Pulse 81   Temp 36.2 °C (97.2 °F) (Temporal)   Resp 17   Ht 1.499 m (4' 11\")   SpO2 94%   BMI 37.85 kg/m²      Constitutional: Well developed, Well nourished, N mild acute distress, Non-toxic appearance.   Eyes: PERRLA, EOMI, Conjunctiva normal, " No discharge.   Cardiovascular: Normal heart rate, Normal rhythm, No murmurs, No rubs, No gallops, and intact distal pulses.   Thorax & Lungs:  No respiratory distress, no rales, no rhonchi, No wheezing, No chest wall tenderness.   Abdomen: Bowel sounds normal, Soft, No tenderness, No guarding, No rebound, No pulsatile masses.   Skin: Warm, Dry, No erythema, No rash.   Extremities: Decreased range of motion of the left hip secondary to pain, slight pain in the proximal femur, no step-off deformity, pelvis is stable, distal pulses are brisk, left upper and right upper shoulder full range of motion with no tenderness, no step-off deformity, no evidence of dislocation   Neurologic: Alert & oriented x 3, No focal deficits noted, acting appropriately on exam.  Psychiatric: Affect normal for clinical presentation.      LABORATORY/ECG  Results for orders placed or performed during the hospital encounter of 10/12/21   TROPONIN   Result Value Ref Range    Troponin T <6 6 - 19 ng/L   EKG   Result Value Ref Range    Report       [facility] Emergency Dept.    Test Date:  2021-10-12  Pt Name:    UGO BENTON                Department: [facility]  MRN:        [id number]                      Room:       -OFF THE FLOOR  Gender:     Female                       Technician: CASSIE  :        [id number]                   Requested By:MAGGI MCCALL  Order #:    [id number]                    Reading MD: MAGGI MCCALL, DO    Measurements  Intervals                                Axis  Rate:       84                           P:          58  NY:         148                          QRS:        4  QRSD:       113                          T:          14  QT:         395  QTc:        467    Interpretive Statements  Sinus rhythm  Borderline intraventricular conduction delay  Low voltage, precordial leads  Artifact in lead(s) I,II,III,aVR,aVL,aVF  Compared to ECG 2021 08:40:09  Low QRS voltage now  present  Electronically Signed On 10- 23:54:43 PDT by MAGGI MCCALL, DO           RADIOLOGY/PROCEDURES  CT-HIP W/O PLUS RECONS LEFT   Final Result      1.  There is mild induration surrounding the lower portion of the left colon consistent with diverticulitis. No abscess is appreciated. There is diverticulosis.      2.  Left hip arthroplasty is again noted in place with no change in appearance or alignment.      3.  Native acetabular fracture adjacent to the prosthetic acetabulum demonstrates evidence of interval healing since the prior CT. Small defect remains at the anterior edge of the remaining native acetabulum which likely represents nonunion. Superimposed    acute fracture after healing of the prior fracture is also possible but less likely.      4.  No other defects or fractures identified in the pelvis or proximal femurs.      5.  Postoperative fluid collection is noted lateral to the proximal femur.      DX-CHEST-LIMITED (1 VIEW)   Final Result      1.  No acute cardiac or pulmonary abnormalities are identified.   2.  Stable ovoid density in the left upper lobe along the fissure consistent with a benign etiology.      DX-FEMUR-2+ LEFT   Final Result      No radiographic evidence of acute traumatic injury.      Stable morphology of left hip arthroplasty with no periprosthetic fracture or evidence of loosening      DX-PELVIS-1 OR 2 VIEWS   Final Result      1.  No acute fracture or dislocation is seen.   2.  Moderate right hip osteoarthritis.   3.  Postsurgical changes status post left hip arthroplasty.            COURSE & MEDICAL DECISION MAKING  Pertinent Labs & Imaging studies reviewed. (See chart for details)  This is a pleasant 58-year-old female presents after having a ground-level fall resulting in significant left hip pain.  Patient ordered an x-ray of the pelvis and the femur and that she was in the x-ray suite she started having chest/sternal pain.  She was brought back to the  emergency department on my evaluation she had tenderness on the sternum itself, she had slight painful resolution and she was rubbing her sternum saying it hurts right here in my sternum.  She believes she might have had injury when she was pulled over onto the x-ray bed.  EKG was negative, troponin was negative.  Although negative delta troponin was not completed I do believe the patient musculoskeletal pain is very reproducible, started immediately after she was pulled over onto the x-ray table.  The patient's x-rays are negative for acute fracture but she continued have pain therefore CT scan of the hip was completed revealed evidence of nonunion of the previous surgical site but no evidence of acute fracture.  The patient received pain medications in the form of Percocet, fentanyl, and Norco.  Prior to discharge, patient is amatory with a slight limp.  She does have crutches at home.  She states that she believes she bruised her leg and be following up with Dr. Hogue for further evaluation.  In prescribing controlled substances to this patient, I certify that I have obtained and reviewed the medical history of Dee Armstrong. I have also made a good tracy effort to obtain applicable records from other providers who have treated the patient and records did not demonstrate any increased risk of substance abuse that would prevent me from prescribing controlled substances.     I have conducted a physical exam and documented it. I have reviewed Ms. Armstrong’s prescription history as maintained by the [place] Prescription Monitoring Program.     I have assessed the patient’s risk for abuse, dependency, and addiction using the validated Opioid Risk Tool available at https://www.mdcalc.com/yrsimb-zrhx-lhpd-ort-narcotic-abuse.     Given the above, I believe the benefits of controlled substance therapy outweigh the risks. The reasons for prescribing controlled substances include non-narcotic, oral analgesic  alternatives have been inadequate for pain control. Accordingly, I have discussed the risk and benefits, treatment plan, and alternative therapies with the patient.       FINAL IMPRESSION     1. Left hip pain Active     DISPOSITION:  Patient will be discharged home in stable condition.    FOLLOW UP:  [facility] [place], Emergency Dept  [address]  [place] [id number] [phone]    If symptoms worsen    Amol Hogue M.D.  [address] [facility]  [place] [address]  [phone]            OUTPATIENT MEDICATIONS:  Discharge Medication List as of 10/12/2021  9:27 PM      START taking these medications    Details   HYDROcodone-acetaminophen (NORCO) 5-325 MG Tab per tablet Take 1 Tablet by mouth every four hours as needed for up to 3 days., Disp-15 Tablet, R-0, Normal             Electronically signed by: Dennis De La Torre D.O., 10/12/2021 3:59 PM   DC instructions

## 2024-03-19 ENCOUNTER — TELEPHONE (OUTPATIENT)
Dept: MEDICAL GROUP | Facility: PHYSICIAN GROUP | Age: 69
End: 2024-03-19
Payer: MEDICARE

## 2024-03-19 NOTE — TELEPHONE ENCOUNTER
Name: Faina with [facility]   Phone number: [phone]    Message: Faina is calling on the behalf of Dee, Faina called and left a voicemail stating that she wanted to let us know that they are going to discharge Dee from the whole agency today, patient will be going to outpatient physical therapy. If there are any questions to give Faina a call at [phone].

## 2024-05-22 ENCOUNTER — OFFICE VISIT (OUTPATIENT)
Dept: MEDICAL GROUP | Facility: PHYSICIAN GROUP | Age: 69
End: 2024-05-22
Payer: MEDICARE

## 2024-05-22 VITALS
HEIGHT: 59 IN | SYSTOLIC BLOOD PRESSURE: 130 MMHG | HEART RATE: 96 BPM | DIASTOLIC BLOOD PRESSURE: 84 MMHG | OXYGEN SATURATION: 98 % | TEMPERATURE: 98.7 F | BODY MASS INDEX: 41.61 KG/M2 | WEIGHT: 206.4 LBS

## 2024-05-22 DIAGNOSIS — E55.9 VITAMIN D DEFICIENCY: Chronic | ICD-10-CM

## 2024-05-22 DIAGNOSIS — I10 ESSENTIAL HYPERTENSION: Chronic | ICD-10-CM

## 2024-05-22 DIAGNOSIS — D64.9 ANEMIA, UNSPECIFIED TYPE: Chronic | ICD-10-CM

## 2024-05-22 DIAGNOSIS — R73.03 PREDIABETES: Chronic | ICD-10-CM

## 2024-05-22 DIAGNOSIS — K21.9 GASTROESOPHAGEAL REFLUX DISEASE, UNSPECIFIED WHETHER ESOPHAGITIS PRESENT: Chronic | ICD-10-CM

## 2024-05-22 DIAGNOSIS — N20.0 KIDNEY STONE: Chronic | ICD-10-CM

## 2024-05-22 DIAGNOSIS — E66.01 SEVERE OBESITY (HCC): Chronic | ICD-10-CM

## 2024-05-22 DIAGNOSIS — K25.7 CHRONIC GASTRIC ULCER WITHOUT HEMORRHAGE AND WITHOUT PERFORATION: ICD-10-CM

## 2024-05-22 DIAGNOSIS — E78.5 DYSLIPIDEMIA: Chronic | ICD-10-CM

## 2024-05-22 PROBLEM — R93.3 ABNORMAL FINDING ON GI TRACT IMAGING: Chronic | Status: ACTIVE | Noted: 2023-05-23

## 2024-05-22 PROCEDURE — 3079F DIAST BP 80-89 MM HG: CPT | Performed by: INTERNAL MEDICINE

## 2024-05-22 PROCEDURE — 99214 OFFICE O/P EST MOD 30 MIN: CPT | Performed by: INTERNAL MEDICINE

## 2024-05-22 PROCEDURE — 3075F SYST BP GE 130 - 139MM HG: CPT | Performed by: INTERNAL MEDICINE

## 2024-05-22 ASSESSMENT — FIBROSIS 4 INDEX: FIB4 SCORE: 0.69

## 2024-05-22 NOTE — ASSESSMENT & PLAN NOTE
Chronic condition.  Patient was seen by urologist previously.  Patient currently asymptomatic.  She denies dysuria or hematuria.

## 2024-05-22 NOTE — ASSESSMENT & PLAN NOTE
Chronic condition.  The patient is presently taking lisinopril 10 mg daily.  Blood pressure has been well-controlled at home.  Patient tolerating the treatment well.

## 2024-05-22 NOTE — ASSESSMENT & PLAN NOTE
This is a chronic condition.  The patient presently taking vitamin D3 2000 daily.  Lab test ordered for follow-up.

## 2024-05-22 NOTE — ASSESSMENT & PLAN NOTE
Chronic condition.  Patient followed by GI service.  Her last EGD was done October 2023.  Patient was noted to have normal esophagus.  Medium size hiatal hernia.  Gastric also noted.  GI recommend for the patient to continue taking omeprazole 20 Mg daily and to repeat EGD in 1 year time.  No new symptoms reported.

## 2024-05-22 NOTE — ASSESSMENT & PLAN NOTE
This is a chronic condition.  The patient is taking omeprazole 20 Mg daily.  Patient denies nausea vomiting dysphagia or unexplained weight loss.

## 2024-05-22 NOTE — PROGRESS NOTES
PRIMARY CARE CLINIC VISIT        Chief Complaint   Patient presents with    Follow-Up     Check up.      Gastric ulcer  Acid reflux  Anemia  Vitamin D deficiency  Kidney stone  Hypertension  Hyperlipidemia  Prediabetes  Obesity        History of Present Illness     GERD (gastroesophageal reflux disease)  This is a chronic condition.  The patient is taking omeprazole 20 Mg daily.  Patient denies nausea vomiting dysphagia or unexplained weight loss.    Anemia  Chronic condition.  Noted with chart review.  Patient denies any history of bleeding.  Lab test ordered for follow-up patient currently asymptomatic    Vitamin D deficiency  This is a chronic condition.  The patient presently taking vitamin D3 2000 daily.  Lab test ordered for follow-up.    Kidney stone  Chronic condition.  Patient was seen by urologist previously.  Patient currently asymptomatic.  She denies dysuria or hematuria.    Essential hypertension  Chronic condition.  The patient is presently taking lisinopril 10 mg daily.  Blood pressure has been well-controlled at home.  Patient tolerating the treatment well.    Dyslipidemia  Chronic condition.  Patient is being treated with atorvastatin daily.  Patient tolerated treatment well.    Prediabetes  Chronic condition   the patient currently on diet therapy.  Patient is due for lab test.    Severe obesity (HCC)  Chronic condition.  Discussed with the patient regarding diet, exercise, and lifestyle modification to help achieve and maintain healthy weight         Gastric ulcer  Chronic condition.  Patient followed by GI service.  Her last EGD was done October 2023.  Patient was noted to have normal esophagus.  Medium size hiatal hernia.  Gastric also noted.  GI recommend for the patient to continue taking omeprazole 20 Mg daily and to repeat EGD in 1 year time.  No new symptoms reported.    Current Outpatient Medications on File Prior to Visit   Medication Sig Dispense Refill    amitriptyline (ELAVIL) 10 MG  Tab Take 10 mg by mouth every evening.      lisinopril (PRINIVIL) 10 MG Tab Take 10 mg by mouth every day.      oxycodone 5 MG capsule Take 5-30 mg by mouth every four hours as needed.      atorvastatin (LIPITOR) 20 MG Tab Take 1 Tablet by mouth every evening. 90 Tablet 0    vitamin D3 2000 UNIT Tab Take 1 Tablet by mouth every day. 60 Tablet 0    gabapentin (NEURONTIN) 300 MG Cap Take 1 Capsule by mouth every evening. (Patient taking differently: Take 600 mg by mouth every evening.) 90 Capsule 0    omeprazole (PRILOSEC) 20 MG delayed-release capsule Take 1 Capsule by mouth every day. 30 Capsule     estradiol (ESTRACE) 0.1 MG/GM vaginal cream Insert  into the vagina every day.      Clobetasol Propionate 0.05 % Gel Apply  topically.      Multiple Vitamins-Minerals (MULTIVITAMIN ADULT) Chew Tab Chew.      Ascorbic Acid (VITAMIN C) 500 MG Cap Take  by mouth.      Fiber Complete Tab Take  by mouth.      baclofen (LIORESAL) 10 MG Tab Take 1 Tablet by mouth 3 times a day. 90 Tablet 2    acetaminophen (TYLENOL) 325 MG Tab Take 2 Tablets by mouth every 6 hours. 30 Tablet 0     No current facility-administered medications on file prior to visit.        Allergies: Patient has no known allergies.    Current Outpatient Medications Ordered in Epic   Medication Sig Dispense Refill    amitriptyline (ELAVIL) 10 MG Tab Take 10 mg by mouth every evening.      lisinopril (PRINIVIL) 10 MG Tab Take 10 mg by mouth every day.      oxycodone 5 MG capsule Take 5-30 mg by mouth every four hours as needed.      atorvastatin (LIPITOR) 20 MG Tab Take 1 Tablet by mouth every evening. 90 Tablet 0    vitamin D3 2000 UNIT Tab Take 1 Tablet by mouth every day. 60 Tablet 0    gabapentin (NEURONTIN) 300 MG Cap Take 1 Capsule by mouth every evening. (Patient taking differently: Take 600 mg by mouth every evening.) 90 Capsule 0    omeprazole (PRILOSEC) 20 MG delayed-release capsule Take 1 Capsule by mouth every day. 30 Capsule     estradiol (ESTRACE)  0.1 MG/GM vaginal cream Insert  into the vagina every day.      Clobetasol Propionate 0.05 % Gel Apply  topically.      Multiple Vitamins-Minerals (MULTIVITAMIN ADULT) Chew Tab Chew.      Ascorbic Acid (VITAMIN C) 500 MG Cap Take  by mouth.      Fiber Complete Tab Take  by mouth.      baclofen (LIORESAL) 10 MG Tab Take 1 Tablet by mouth 3 times a day. 90 Tablet 2    acetaminophen (TYLENOL) 325 MG Tab Take 2 Tablets by mouth every 6 hours. 30 Tablet 0     No current Epic-ordered facility-administered medications on file.       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 ([place] fever), medicated for a year, yearly xrays    Cough 05/29/2015    Diverticulosis     GERD (gastroesophageal reflux disease)     Heart burn     Hiatus hernia syndrome 2023    High cholesterol     Hypertension     Indigestion     LBP (low back pain)     Lumbar radicular pain 05/29/2015    Muscle disorder     Obesity     Osteoporosis of forearm     Alendronate started 6/18    Pain     Shoulder and numbness to R LE, hip       Past Surgical History:   Procedure Laterality Date    FUSION, SPINE, LUMBAR, PLIF N/A 1/30/2024    Procedure: FUSION, SPINE, LUMBAR, L2-S1;  Surgeon: Tim Rodriguez M.D.;  Location: [facility];  Service: Neurosurgery    LUMBAR FUSION O-ARM Left 1/25/2024    Procedure: STAGE #2 LEFT LUMBAR EXTREME LATERAL INTERBODY FUSION, LUMBAR 2-SACRAL 1 FUSION WITH POSSIBLE REDO RIGHT LUMBAR 2-3 LAMINOTOMIES AND HARDWARE REMOVAL;  Surgeon: Tim Rodriguez M.D.;  Location: [facility];  Service: Neurosurgery    FUSION, SPINE, XLIF Left 1/25/2024    Procedure: FUSION, SPINE, XLIF;  Surgeon: Tim Rodriguez M.D.;  Location: [facility];  Service: Neurosurgery    LUMBAR FUSION ANTERIOR N/A 1/23/2024    Procedure: STAGE #1 LUMBAR 4-SACRAL 1 ANTERIOR LUMBAR INTERBODY FUSION;  Surgeon: Tim Rodriguez M.D.;  Location: [facility];   Service: Neurosurgery    KY CYSTOSCOPY,INSERT URETERAL STENT Right 06/08/2023    Procedure: CYSTOSCOPY, WITH RIGHT URETEROSCOPY, WITH LITHOTRIPSY, WITH INSERTION OF RIGHT URETERAL STENT;  Surgeon: Amol Sullivan M.D.;  Location: [facility];  Service: Urology    KY CYSTO/URETERO/PYELOSCOPY, DX Right 06/08/2023    Procedure: URETEROSCOPY;  Surgeon: Amol Sullivan M.D.;  Location: [facility];  Service: Urology    LASERTRIPSY Right 06/08/2023    Procedure: LITHOTRIPSY, USING LASER;  Surgeon: Amol Sullivan M.D.;  Location: [facility];  Service: Urology    HIP REVISION TOTAL Left 05/13/2021    Procedure: REVISION, TOTAL ARTHROPLASTY, HIP.;  Surgeon: Amol Hogue M.D.;  Location: [facility];  Service: Orthopedics    KY TOTAL HIP ARTHROPLASTY Left 09/19/2019    Procedure: ARTHROPLASTY, HIP, TOTAL, ANTERIOR APPROACH;  Surgeon: Clarence Vallejo M.D.;  Location: [facility];  Service: Orthopedics    INCISION HERNIA REPAIR Right 11/26/2018    Procedure: INCISION HERNIA REPAIR- FLANK WITH MESH;  Surgeon: Misael Ramírez M.D.;  Location: [facility];  Service: General    HYSTERECTOMY ROBOTIC  06/07/2017    Procedure: HYSTERECTOMY ROBOTIC SI, BILATERAL SALPINGO-OOPHORECTOMY, URETERAL SACRAL LIGAMENT SHORTENING ;  Surgeon: Jerica Hooper M.D.;  Location: [facility];  Service:     CYSTOSCOPY  06/07/2017    Procedure: CYSTOSCOPY;  Surgeon: Jerica Hooper M.D.;  Location: [facility];  Service:     SHOULDER DECOMPRESSION ARTHROSCOPIC Right 12/06/2016    Procedure: SHOULDER DECOMPRESSION ARTHROSCOPIC - SUBACROMIAL, MANJARREZ;  Surgeon: Kyle Claros M.D.;  Location: [facility];  Service:     CLAVICLE DISTAL EXCISION Right 12/06/2016    Procedure: CLAVICLE DISTAL EXCISION;  Surgeon: Kyle Claros M.D.;  Location: [facility];  Service:     SHOULDER ARTHROSCOPY W/ ROTATOR CUFF REPAIR Right 12/06/2016     Procedure: SHOULDER ARTHROSCOPY W/ ROTATOR CUFF REPAIR ;  Surgeon: Kyle Claros M.D.;  Location: SURGERY [facility];  Service:     SHOULDER ARTHROSCOPY W/ BICIPITAL TENODESIS REPAIR Right 12/06/2016    Procedure: SHOULDER ARTHROSCOPY W/ BICIPITAL TENODESIS REPAIR ;  Surgeon: Kyle Claros M.D.;  Location: SURGERY [facility];  Service:     FUSION, SPINE, LUMBAR, PLIF  02/09/2016    Procedure: LUMBAR FUSION POSTERIOR PEDICLE SCREW FIXATION (STAGE #2);  Surgeon: Tim Rodriguez M.D.;  Location: [facility] [place] [facility] [facility];  Service:     LUMBAR LAMINECTOMY DISKECTOMY  02/09/2016    Procedure: LUMBAR LAMINECTOMY DISKECTOMY MINI OPEN;  Surgeon: Tim Rodriguez M.D.;  Location: [facility] [place] [facility];  Service:     FUSION, SPINE, XLIF Right 02/06/2016    Procedure: EXTREME LATERAL INTERBODY FUSION L3-4 (STAGE #1);  Surgeon: Tim Rodriguez M.D.;  Location: [facility] [place] [facility];  Service:     RECOVERY  12/08/2015    Procedure: CT-CT GUIDED LEFT LUNG BIOPSY-;  Surgeon: Recoveryonly Surgery;  Location: SURGERY PRE-POST PROC UNIT [facility];  Service:     REDDY BY LAPAROSCOPY  04/17/2014    Performed by Ankur Lerner M.D. at SURGERY SAME DAY [facility]    EGD WITH ASP/BX  02/04/2014    mild chronic inactive gastritis, scar gastric antrum-    EGD WITH ASP/BX  09/29/2011    possible barrettes, hiatal hernia, gastritis    COLONOSCOPY  09/29/2011    diverticulosis sigmoid colon, hemorrhoids    ARTHROTOMY  09/03/2010    Performed by YARELY MORRISON at SURGERY SAME DAY [facility]    ARTHRODESIS  09/03/2010    Performed by YARELY MORRISON at SURGERY SAME DAY [facility]    ORTHOPEDIC OSTEOTOMY  09/03/2010    Performed by YARELY MORRISON at SURGERY SAME DAY [facility]    BONE SPUR EXCISION  09/03/2010    Performed by YARELY MORRISON at SURGERY SAME DAY [facility]    OTHER ABDOMINAL SURGERY  2000    COLON RESECTION    LAMINOTOMY      OTHER  2000    Diverticulitis    OTHER ORTHOPEDIC SURGERY      PARDEEP CARPAL  "TUNNEL RELEASES       Family History   Problem Relation Age of Onset    Cancer Mother         lymphoma    Stroke Father     Diabetes Father        Social History     Tobacco Use   Smoking Status Former    Current packs/day: 0.00    Types: Cigarettes    Quit date: 1977    Years since quittin.4   Smokeless Tobacco Never   Tobacco Comments    2 to 3 cigarettes a month back in -1977       Social History     Substance and Sexual Activity   Alcohol Use Not Currently    Comment: One drink about every four months       Review of systems  As per HPI above. All other systems reviewed and negative.      Past Medical, Social, and Family history reviewed and updated in [facility]       LAB DATA:    Lab Results   Component Value Date/Time    HBA1C 5.9 (H) 2024 05:17 AM       Lab Results   Component Value Date/Time    WBC 4.1 (L) 2024 05:35 AM    HEMOGLOBIN 10.3 (L) 2024 05:35 AM    HEMATOCRIT 32.6 (L) 2024 05:35 AM    MCV 97.9 (H) 2024 05:35 AM    PLATELETCT 430 2024 05:35 AM       Lab Results   Component Value Date/Time    SODIUM 137 2024 05:35 AM    POTASSIUM 4.5 2024 05:35 AM    GLUCOSE 95 2024 05:35 AM    BUN 12 2024 05:35 AM    CREATININE 0.49 (L) 2024 05:35 AM       Lab Results   Component Value Date/Time    CHOLSTRLTOT 167 2023 07:50 AM    TRIGLYCERIDE 122 2023 07:50 AM    HDL 63 2023 07:50 AM    LDL 80 2023 07:50 AM       Lab Results   Component Value Date/Time    ALTSGPT 16 2024 05:35 AM          Objective     /84 (BP Location: Right arm, Patient Position: Sitting, BP Cuff Size: Large adult)   Pulse 96   Temp 37.1 °C (98.7 °F) (Temporal)   Ht 1.499 m (4' 11\")   Wt 93.6 kg (206 lb 6.4 oz)   SpO2 98%    Body mass index is 41.69 kg/m².    General: alert in no apparent distress.  Cardiovascular: regular rate and rhythm  Pulmonary: lungs : no wheezing   Gastrointestinal: BS present.       Assessment and Plan "     1. Gastroesophageal reflux disease, unspecified whether esophagitis present  2. Gastric ulcer  Chronic stable condition.  Recommend patient to continue omeprazole 20 Mg daily.  Patient to follow-up with GI service as directed.    3. Anemia, unspecified type  Chronic condition.  Unstable.  Lab test showed low hemoglobin recently.  This condition appear after her surgery.  Suspect possible postop anemia  Patient denies any history of bleeding.  The following lab tests order follow-up  - CBC WITH DIFFERENTIAL; Future  - FERRITIN; Future  - OCCULT BLOOD STOOL; Future  - FOLATE; Future  - IRON; Future  - VITAMIN B12; Future    4. Vitamin D deficiency  - VITAMIN D,25 HYDROXY (DEFICIENCY); Future  Chronic condition.  Current status unclear.  Lab test requested for follow-up.  Patient to continue with vitamin D3 2000 unit daily    5. Kidney stone  Chronic Condition.  Patient currently asymptomatic.  Rec pt to stay wel hydrated      6. Essential hypertension  Chronic cond . Stable. Cont lisinopril 10mg daily      7. Dyslipidemia  - ALANINE AMINO-TRANS; Future  - Lipid Profile; Future  Chronic cond  Stable  Cont atorvastatin 20mg daily    8. Prediabetes  Chronic condition.  Recommend low sweet low-carb diet.  Continue to monitor.  Lab test next visit    - HEMOGLOBIN A1C; Future  - Basic Metabolic Panel; Future    9. Severe obesity (HCC)  Chronic condition.  Uncontrolled.  Recommend diet and lifestyle modification.  Encouraged patient to lose weight          Attestation: I spent:   34 minutes -    That includes time for chart review before the visit, the actual patient visit, and time spent on documentation in EMR after the visit.  Chart review/prep, review of other providers' records, imaging/lab review, face-to-face time for history/examination, pt's counseling/education, ordering, prescribing,  review of results/meds/ treatment plan with patient, and care coordination.           [facility]  Maintenance Due   Topic Date Due    Annual Wellness Visit  10/08/2022               Please note that this dictation was created using voice recognition software. I have made every reasonable attempt to correct obvious errors, but I expect that there are errors of grammar and possibly content that I did not discover before finalizing the note.    Emre Viramontes MD  Internal Medicine  [facility]

## 2024-05-22 NOTE — ASSESSMENT & PLAN NOTE
Chronic condition.  Patient is being treated with atorvastatin daily.  Patient tolerated treatment well.

## 2024-05-22 NOTE — ASSESSMENT & PLAN NOTE
Chronic condition.  Noted with chart review.  Patient denies any history of bleeding.  Lab test ordered for follow-up patient currently asymptomatic

## 2024-06-20 ENCOUNTER — HOSPITAL ENCOUNTER (OUTPATIENT)
Dept: RADIOLOGY | Facility: MEDICAL CENTER | Age: 69
End: 2024-06-20
Attending: INTERNAL MEDICINE
Payer: MEDICARE

## 2024-06-20 DIAGNOSIS — E78.5 DYSLIPIDEMIA: ICD-10-CM

## 2024-06-20 DIAGNOSIS — Z12.31 VISIT FOR SCREENING MAMMOGRAM: ICD-10-CM

## 2024-06-20 PROCEDURE — 77063 BREAST TOMOSYNTHESIS BI: CPT

## 2024-06-20 RX ORDER — ATORVASTATIN CALCIUM 20 MG/1
20 TABLET, FILM COATED ORAL EVERY EVENING
Qty: 90 TABLET | Refills: 0 | Status: SHIPPED | OUTPATIENT
Start: 2024-06-20

## 2024-06-20 NOTE — TELEPHONE ENCOUNTER
Received request via: Patient    Was the patient seen in the last year in this department? Yes    Does the patient have an active prescription (recently filled or refills available) for medication(s) requested? No    Pharmacy Name: [facility]    Does the patient have long-term Plus and need 100 day supply (blood pressure, diabetes and cholesterol meds only)? Patient does not have SCP

## 2024-06-24 ENCOUNTER — OFFICE VISIT (OUTPATIENT)
Dept: URGENT CARE | Facility: PHYSICIAN GROUP | Age: 69
End: 2024-06-24
Payer: MEDICARE

## 2024-06-24 VITALS
WEIGHT: 205.8 LBS | BODY MASS INDEX: 41.49 KG/M2 | DIASTOLIC BLOOD PRESSURE: 52 MMHG | HEART RATE: 120 BPM | RESPIRATION RATE: 20 BRPM | HEIGHT: 59 IN | SYSTOLIC BLOOD PRESSURE: 106 MMHG | TEMPERATURE: 96.8 F | OXYGEN SATURATION: 94 %

## 2024-06-24 DIAGNOSIS — H61.21 IMPACTED CERUMEN OF RIGHT EAR: ICD-10-CM

## 2024-06-24 PROCEDURE — 3074F SYST BP LT 130 MM HG: CPT | Performed by: PHYSICIAN ASSISTANT

## 2024-06-24 PROCEDURE — 99213 OFFICE O/P EST LOW 20 MIN: CPT | Performed by: PHYSICIAN ASSISTANT

## 2024-06-24 PROCEDURE — 3078F DIAST BP <80 MM HG: CPT | Performed by: PHYSICIAN ASSISTANT

## 2024-06-24 ASSESSMENT — ENCOUNTER SYMPTOMS
FEVER: 0
CHILLS: 0

## 2024-06-24 ASSESSMENT — FIBROSIS 4 INDEX: FIB4 SCORE: 0.7

## 2024-06-24 NOTE — PROGRESS NOTES
Subjective:   Dee Armstrong is a 69 y.o. female who presents today with   Chief Complaint   Patient presents with    Ear Fullness     Right ear clogged.     Ear Fullness  This is a new problem. The problem occurs constantly. The problem has been unchanged. Pertinent negatives include no chills or fever. She has tried nothing for the symptoms. The treatment provided no relief.     PMH:  has a past medical history of Arthritis, Coccidioidomycosis (12/2015), Cough (05/29/2015), Diverticulosis, GERD (gastroesophageal reflux disease), Heart burn, Hiatus hernia syndrome (2023), High cholesterol, Hypertension, Indigestion, LBP (low back pain), Lumbar radicular pain (05/29/2015), Muscle disorder, Obesity, Osteoporosis of forearm, and Pain.  MEDS:   Current Outpatient Medications:     atorvastatin (LIPITOR) 20 MG Tab, Take 1 Tablet by mouth every evening., Disp: 90 Tablet, Rfl: 0    amitriptyline (ELAVIL) 10 MG Tab, Take 10 mg by mouth every evening., Disp: , Rfl:     lisinopril (PRINIVIL) 10 MG Tab, Take 10 mg by mouth every day., Disp: , Rfl:     estradiol (ESTRACE) 0.1 MG/GM vaginal cream, Insert  into the vagina every day., Disp: , Rfl:     Clobetasol Propionate 0.05 % Gel, Apply  topically., Disp: , Rfl:     Multiple Vitamins-Minerals (MULTIVITAMIN ADULT) Chew Tab, Chew., Disp: , Rfl:     Ascorbic Acid (VITAMIN C) 500 MG Cap, Take  by mouth., Disp: , Rfl:     Fiber Complete Tab, Take  by mouth., Disp: , Rfl:     vitamin D3 2000 UNIT Tab, Take 1 Tablet by mouth every day., Disp: 60 Tablet, Rfl: 0    gabapentin (NEURONTIN) 300 MG Cap, Take 1 Capsule by mouth every evening. (Patient taking differently: Take 600 mg by mouth every evening.), Disp: 90 Capsule, Rfl: 0    acetaminophen (TYLENOL) 325 MG Tab, Take 2 Tablets by mouth every 6 hours., Disp: 30 Tablet, Rfl: 0    omeprazole (PRILOSEC) 20 MG delayed-release capsule, Take 1 Capsule by mouth every day., Disp: 30 Capsule, Rfl:     oxycodone 5 MG capsule, Take 5-30  mg by mouth every four hours as needed., Disp: , Rfl:     baclofen (LIORESAL) 10 MG Tab, Take 1 Tablet by mouth 3 times a day., Disp: 90 Tablet, Rfl: 2  ALLERGIES: No Known Allergies  SURGHX:   Past Surgical History:   Procedure Laterality Date    FUSION, SPINE, LUMBAR, PLIF N/A 1/30/2024    Procedure: FUSION, SPINE, LUMBAR, L2-S1;  Surgeon: Tim Rodriguez M.D.;  Location: [facility];  Service: Neurosurgery    LUMBAR FUSION O-ARM Left 1/25/2024    Procedure: STAGE #2 LEFT LUMBAR EXTREME LATERAL INTERBODY FUSION, LUMBAR 2-SACRAL 1 FUSION WITH POSSIBLE REDO RIGHT LUMBAR 2-3 LAMINOTOMIES AND HARDWARE REMOVAL;  Surgeon: Tim Rodriguez M.D.;  Location: [facility];  Service: Neurosurgery    FUSION, SPINE, XLIF Left 1/25/2024    Procedure: FUSION, SPINE, XLIF;  Surgeon: Tim Rodriguez M.D.;  Location: [facility];  Service: Neurosurgery    LUMBAR FUSION ANTERIOR N/A 1/23/2024    Procedure: STAGE #1 LUMBAR 4-SACRAL 1 ANTERIOR LUMBAR INTERBODY FUSION;  Surgeon: Tim Rodriguez M.D.;  Location: [facility];  Service: Neurosurgery    IN CYSTOSCOPY,INSERT URETERAL STENT Right 06/08/2023    Procedure: CYSTOSCOPY, WITH RIGHT URETEROSCOPY, WITH LITHOTRIPSY, WITH INSERTION OF RIGHT URETERAL STENT;  Surgeon: Amol Sullivan M.D.;  Location: [facility];  Service: Urology    IN CYSTO/URETERO/PYELOSCOPY, DX Right 06/08/2023    Procedure: URETEROSCOPY;  Surgeon: Amol Sullivan M.D.;  Location: [facility];  Service: Urology    LASERTRIPSY Right 06/08/2023    Procedure: LITHOTRIPSY, USING LASER;  Surgeon: Amol Sullivan M.D.;  Location: [facility];  Service: Urology    HIP REVISION TOTAL Left 05/13/2021    Procedure: REVISION, TOTAL ARTHROPLASTY, HIP.;  Surgeon: Amol Hogue M.D.;  Location: [facility];  Service: Orthopedics    IN TOTAL HIP ARTHROPLASTY Left 09/19/2019    Procedure: ARTHROPLASTY, HIP, TOTAL, ANTERIOR APPROACH;  Surgeon: Clarence Vallejo,  M.D.;  Location: [facility];  Service: Orthopedics    INCISION HERNIA REPAIR Right 11/26/2018    Procedure: INCISION HERNIA REPAIR- FLANK WITH MESH;  Surgeon: Misael Ramírez M.D.;  Location: [facility];  Service: General    HYSTERECTOMY ROBOTIC  06/07/2017    Procedure: HYSTERECTOMY ROBOTIC SI, BILATERAL SALPINGO-OOPHORECTOMY, URETERAL SACRAL LIGAMENT SHORTENING ;  Surgeon: Jerica Hooper M.D.;  Location: [facility];  Service:     CYSTOSCOPY  06/07/2017    Procedure: CYSTOSCOPY;  Surgeon: Jerica Hopoer M.D.;  Location: [facility];  Service:     SHOULDER DECOMPRESSION ARTHROSCOPIC Right 12/06/2016    Procedure: SHOULDER DECOMPRESSION ARTHROSCOPIC - SUBACROMIAL, MANJARREZ;  Surgeon: Kyle Claros M.D.;  Location: [facility];  Service:     CLAVICLE DISTAL EXCISION Right 12/06/2016    Procedure: CLAVICLE DISTAL EXCISION;  Surgeon: Kyle Claros M.D.;  Location: [facility];  Service:     SHOULDER ARTHROSCOPY W/ ROTATOR CUFF REPAIR Right 12/06/2016    Procedure: SHOULDER ARTHROSCOPY W/ ROTATOR CUFF REPAIR ;  Surgeon: Kyle Claros M.D.;  Location: [facility];  Service:     SHOULDER ARTHROSCOPY W/ BICIPITAL TENODESIS REPAIR Right 12/06/2016    Procedure: SHOULDER ARTHROSCOPY W/ BICIPITAL TENODESIS REPAIR ;  Surgeon: Kyle Claros M.D.;  Location: [facility];  Service:     FUSION, SPINE, LUMBAR, PLIF  02/09/2016    Procedure: LUMBAR FUSION POSTERIOR PEDICLE SCREW FIXATION (STAGE #2);  Surgeon: Tim Rodriguez M.D.;  Location: [facility];  Service:     LUMBAR LAMINECTOMY DISKECTOMY  02/09/2016    Procedure: LUMBAR LAMINECTOMY DISKECTOMY MINI OPEN;  Surgeon: Tim Rodriguez M.D.;  Location: [facility];  Service:     FUSION, SPINE, XLIF Right 02/06/2016    Procedure: EXTREME LATERAL INTERBODY FUSION L3-4 (STAGE #1);  Surgeon: Tim Rodriguez M.D.;  Location: [facility];  Service:      "RECOVERY  12/08/2015    Procedure: CT-CT GUIDED LEFT LUNG BIOPSY-;  Surgeon: [facility]onol[facility] Surgery;  [place] [facility]  [place]:     REDDY BY LAPAROSCOPY  04/17/2014    Performed by Ankur Lerner M.D. at SURGERY SAME DAY [facility] ORS    EGD WITH ASP/BX  02/04/2014    mild chronic inactive gastritis, scar gastric antrum-    EGD WITH ASP/BX  09/29/2011    possible barrettes, hiatal hernia, gastritis    COLONOSCOPY  09/29/2011    diverticulosis sigmoid colon, hemorrhoids    ARTHROTOMY  09/03/2010    Performed by YARELY MORRISON at SURGERY SAME DAY [facility] ORS    ARTHRODESIS  09/03/2010    Performed by YARELY MORRISON at SURGERY SAME DAY [facility] ORS    ORTHOPEDIC OSTEOTOMY  09/03/2010    Performed by YARELY MORRISON at SURGERY SAME DAY [facility] ORS    BONE SPUR EXCISION  09/03/2010    Performed by YARELY MORRISON at SURGERY SAME DAY [facility] ORS    OTHER ABDOMINAL SURGERY  2000    COLON RESECTION    LAMINOTOMY      OTHER  2000    Diverticulitis    OTHER ORTHOPEDIC SURGERY      PARDEEP CARPAL TUNNEL RELEASES     SOCHX:  reports that she quit smoking about 47 years ago. Her smoking use included cigarettes. She has never used smokeless tobacco. She reports that she does not currently use alcohol. She reports that she does not use drugs.  FH: Reviewed with patient, not pertinent to this visit.       Review of Systems   Constitutional:  Negative for chills and fever.   HENT:  Positive for ear pain (right ear fullness). Negative for ear discharge, hearing loss and tinnitus.       Objective:   /52 (BP Location: Right arm, Patient Position: Sitting, BP Cuff Size: Adult long)   Pulse (!) 120   Temp 36 °C (96.8 °F) (Temporal)   Resp 20   Ht 1.499 m (4' 11\")   Wt 93.3 kg (205 lb 12.8 oz)   SpO2 94%   BMI 41.57 kg/m²   Physical Exam  Vitals and nursing note reviewed.   Constitutional:       General: She is not in acute distress.     Appearance: Normal appearance. She is " well-developed. She is not ill-appearing or toxic-appearing.   HENT:      Head: Normocephalic and atraumatic.      Right Ear: Hearing normal. There is impacted cerumen.      Left Ear: Hearing, tympanic membrane and ear canal normal.   Cardiovascular:      Rate and Rhythm: Normal rate.   Pulmonary:      Effort: Pulmonary effort is normal.   Musculoskeletal:      Comments: Normal movement in all 4 extremities   Skin:     General: Skin is warm and dry.   Neurological:      Mental Status: She is alert.      Coordination: Coordination normal.   Psychiatric:         Mood and Affect: Mood normal.       Assessment/Plan:   Assessment    1. Impacted cerumen of right ear    Patient tolerated ear lavage today and I was able to visualize the TM of the right ear and no signs of perforation or infection.  Patient states symptoms have completely improved following lavage.    Differential diagnosis, natural history, supportive care, and indications for immediate follow-up discussed.   Patient given instructions and understanding of medications and treatment.    If not improving in 3-5 days, F/U with PCP or return to  if symptoms worsen.    Patient agreeable to plan.      Please note that this dictation was created using voice recognition software. I have made every reasonable attempt to correct obvious errors, but I expect that there are errors of grammar and possibly content that I did not discover before finalizing the note.    Dom Dumont PA-C

## 2024-08-02 ENCOUNTER — PATIENT MESSAGE (OUTPATIENT)
Dept: MEDICAL GROUP | Facility: PHYSICIAN GROUP | Age: 69
End: 2024-08-02
Payer: MEDICARE

## 2024-08-02 RX ORDER — AMITRIPTYLINE HYDROCHLORIDE 10 MG/1
10 TABLET ORAL NIGHTLY
Qty: 90 TABLET | Refills: 0 | Status: SHIPPED | OUTPATIENT
Start: 2024-08-02

## 2024-08-02 NOTE — PATIENT COMMUNICATION
Received request via: Patient    Was the patient seen in the last year in this department? Yes    Does the patient have an active prescription (recently filled or refills available) for medication(s) requested? No    Pharmacy Name: [facility]     Does the patient have group home Plus and need 100 day supply (blood pressure, diabetes and cholesterol meds only)? Patient does not have SCP

## 2024-08-18 ENCOUNTER — HOSPITAL ENCOUNTER (EMERGENCY)
Facility: MEDICAL CENTER | Age: 69
End: 2024-08-19
Attending: STUDENT IN AN ORGANIZED HEALTH CARE EDUCATION/TRAINING PROGRAM
Payer: MEDICARE

## 2024-08-18 ENCOUNTER — APPOINTMENT (OUTPATIENT)
Dept: RADIOLOGY | Facility: MEDICAL CENTER | Age: 69
End: 2024-08-18
Attending: STUDENT IN AN ORGANIZED HEALTH CARE EDUCATION/TRAINING PROGRAM
Payer: MEDICARE

## 2024-08-18 DIAGNOSIS — M54.2 ACUTE NECK PAIN: ICD-10-CM

## 2024-08-18 LAB
ALBUMIN SERPL BCP-MCNC: 3.9 G/DL (ref 3.2–4.9)
ALBUMIN/GLOB SERPL: 1.2 G/DL
ALP SERPL-CCNC: 129 U/L (ref 30–99)
ALT SERPL-CCNC: 20 U/L (ref 2–50)
ANION GAP SERPL CALC-SCNC: 15 MMOL/L (ref 7–16)
AST SERPL-CCNC: 18 U/L (ref 12–45)
BASOPHILS # BLD AUTO: 0.6 % (ref 0–1.8)
BASOPHILS # BLD: 0.05 K/UL (ref 0–0.12)
BILIRUB SERPL-MCNC: 0.5 MG/DL (ref 0.1–1.5)
BUN SERPL-MCNC: 14 MG/DL (ref 8–22)
CALCIUM ALBUM COR SERPL-MCNC: 9.3 MG/DL (ref 8.5–10.5)
CALCIUM SERPL-MCNC: 9.2 MG/DL (ref 8.5–10.5)
CHLORIDE SERPL-SCNC: 102 MMOL/L (ref 96–112)
CO2 SERPL-SCNC: 21 MMOL/L (ref 20–33)
CREAT SERPL-MCNC: 0.66 MG/DL (ref 0.5–1.4)
EOSINOPHIL # BLD AUTO: 0.05 K/UL (ref 0–0.51)
EOSINOPHIL NFR BLD: 0.6 % (ref 0–6.9)
ERYTHROCYTE [DISTWIDTH] IN BLOOD BY AUTOMATED COUNT: 48.7 FL (ref 35.9–50)
GFR SERPLBLD CREATININE-BSD FMLA CKD-EPI: 94 ML/MIN/1.73 M 2
GLOBULIN SER CALC-MCNC: 3.2 G/DL (ref 1.9–3.5)
GLUCOSE SERPL-MCNC: 123 MG/DL (ref 65–99)
HCT VFR BLD AUTO: 43.1 % (ref 37–47)
HGB BLD-MCNC: 14.5 G/DL (ref 12–16)
IMM GRANULOCYTES # BLD AUTO: 0.03 K/UL (ref 0–0.11)
IMM GRANULOCYTES NFR BLD AUTO: 0.4 % (ref 0–0.9)
INR PPP: 0.88 (ref 0.87–1.13)
LIPASE SERPL-CCNC: 43 U/L (ref 11–82)
LYMPHOCYTES # BLD AUTO: 1.66 K/UL (ref 1–4.8)
LYMPHOCYTES NFR BLD: 20.9 % (ref 22–41)
MCH RBC QN AUTO: 30.3 PG (ref 27–33)
MCHC RBC AUTO-ENTMCNC: 33.6 G/DL (ref 32.2–35.5)
MCV RBC AUTO: 90.2 FL (ref 81.4–97.8)
MONOCYTES # BLD AUTO: 0.72 K/UL (ref 0–0.85)
MONOCYTES NFR BLD AUTO: 9.1 % (ref 0–13.4)
NEUTROPHILS # BLD AUTO: 5.43 K/UL (ref 1.82–7.42)
NEUTROPHILS NFR BLD: 68.4 % (ref 44–72)
NRBC # BLD AUTO: 0 K/UL
NRBC BLD-RTO: 0 /100 WBC (ref 0–0.2)
PLATELET # BLD AUTO: 380 K/UL (ref 164–446)
PMV BLD AUTO: 9.3 FL (ref 9–12.9)
POTASSIUM SERPL-SCNC: 4.2 MMOL/L (ref 3.6–5.5)
PROT SERPL-MCNC: 7.1 G/DL (ref 6–8.2)
PROTHROMBIN TIME: 12 SEC (ref 12–14.6)
RBC # BLD AUTO: 4.78 M/UL (ref 4.2–5.4)
SODIUM SERPL-SCNC: 138 MMOL/L (ref 135–145)
WBC # BLD AUTO: 7.9 K/UL (ref 4.8–10.8)

## 2024-08-18 PROCEDURE — 93005 ELECTROCARDIOGRAM TRACING: CPT

## 2024-08-18 PROCEDURE — 700102 HCHG RX REV CODE 250 W/ 637 OVERRIDE(OP): Performed by: STUDENT IN AN ORGANIZED HEALTH CARE EDUCATION/TRAINING PROGRAM

## 2024-08-18 PROCEDURE — 96375 TX/PRO/DX INJ NEW DRUG ADDON: CPT | Mod: XU

## 2024-08-18 PROCEDURE — 96376 TX/PRO/DX INJ SAME DRUG ADON: CPT | Mod: XU

## 2024-08-18 PROCEDURE — 83690 ASSAY OF LIPASE: CPT

## 2024-08-18 PROCEDURE — 700117 HCHG RX CONTRAST REV CODE 255: Performed by: STUDENT IN AN ORGANIZED HEALTH CARE EDUCATION/TRAINING PROGRAM

## 2024-08-18 PROCEDURE — A9270 NON-COVERED ITEM OR SERVICE: HCPCS | Performed by: STUDENT IN AN ORGANIZED HEALTH CARE EDUCATION/TRAINING PROGRAM

## 2024-08-18 PROCEDURE — 70496 CT ANGIOGRAPHY HEAD: CPT

## 2024-08-18 PROCEDURE — 700111 HCHG RX REV CODE 636 W/ 250 OVERRIDE (IP): Mod: JZ | Performed by: STUDENT IN AN ORGANIZED HEALTH CARE EDUCATION/TRAINING PROGRAM

## 2024-08-18 PROCEDURE — 99285 EMERGENCY DEPT VISIT HI MDM: CPT

## 2024-08-18 PROCEDURE — 36415 COLL VENOUS BLD VENIPUNCTURE: CPT

## 2024-08-18 PROCEDURE — 80053 COMPREHEN METABOLIC PANEL: CPT

## 2024-08-18 PROCEDURE — 96374 THER/PROPH/DIAG INJ IV PUSH: CPT | Mod: XU

## 2024-08-18 PROCEDURE — 93005 ELECTROCARDIOGRAM TRACING: CPT | Performed by: STUDENT IN AN ORGANIZED HEALTH CARE EDUCATION/TRAINING PROGRAM

## 2024-08-18 PROCEDURE — 85610 PROTHROMBIN TIME: CPT

## 2024-08-18 PROCEDURE — 70498 CT ANGIOGRAPHY NECK: CPT

## 2024-08-18 PROCEDURE — 85025 COMPLETE CBC W/AUTO DIFF WBC: CPT

## 2024-08-18 RX ORDER — NAPROXEN 500 MG/1
500 TABLET ORAL 2 TIMES DAILY WITH MEALS
Qty: 60 TABLET | Refills: 0 | Status: SHIPPED | OUTPATIENT
Start: 2024-08-18

## 2024-08-18 RX ORDER — HYDROMORPHONE HYDROCHLORIDE 1 MG/ML
1 INJECTION, SOLUTION INTRAMUSCULAR; INTRAVENOUS; SUBCUTANEOUS ONCE
Status: COMPLETED | OUTPATIENT
Start: 2024-08-18 | End: 2024-08-18

## 2024-08-18 RX ORDER — KETOROLAC TROMETHAMINE 15 MG/ML
15 INJECTION, SOLUTION INTRAMUSCULAR; INTRAVENOUS ONCE
Status: COMPLETED | OUTPATIENT
Start: 2024-08-18 | End: 2024-08-18

## 2024-08-18 RX ORDER — CYCLOBENZAPRINE HCL 10 MG
10 TABLET ORAL 3 TIMES DAILY PRN
Qty: 30 TABLET | Refills: 0 | Status: SHIPPED | OUTPATIENT
Start: 2024-08-18

## 2024-08-18 RX ORDER — CYCLOBENZAPRINE HCL 10 MG
10 TABLET ORAL ONCE
Status: COMPLETED | OUTPATIENT
Start: 2024-08-18 | End: 2024-08-18

## 2024-08-18 RX ORDER — ACETAMINOPHEN 325 MG/1
650 TABLET ORAL ONCE
Status: COMPLETED | OUTPATIENT
Start: 2024-08-18 | End: 2024-08-18

## 2024-08-18 RX ORDER — LIDOCAINE 50 MG/G
1 PATCH TOPICAL EVERY 24 HOURS
Qty: 10 PATCH | Refills: 0 | Status: SHIPPED | OUTPATIENT
Start: 2024-08-18

## 2024-08-18 RX ADMIN — IOHEXOL 80 ML: 350 INJECTION, SOLUTION INTRAVENOUS at 22:00

## 2024-08-18 RX ADMIN — KETOROLAC TROMETHAMINE 15 MG: 15 INJECTION, SOLUTION INTRAMUSCULAR; INTRAVENOUS at 23:21

## 2024-08-18 RX ADMIN — HYDROMORPHONE HYDROCHLORIDE 1 MG: 1 INJECTION, SOLUTION INTRAMUSCULAR; INTRAVENOUS; SUBCUTANEOUS at 20:39

## 2024-08-18 RX ADMIN — CYCLOBENZAPRINE 10 MG: 10 TABLET, FILM COATED ORAL at 23:22

## 2024-08-18 RX ADMIN — HYDROMORPHONE HYDROCHLORIDE 1 MG: 1 INJECTION, SOLUTION INTRAMUSCULAR; INTRAVENOUS; SUBCUTANEOUS at 23:22

## 2024-08-18 RX ADMIN — ACETAMINOPHEN 650 MG: 325 TABLET ORAL at 23:22

## 2024-08-18 ASSESSMENT — FIBROSIS 4 INDEX: FIB4 SCORE: 0.7

## 2024-08-19 ENCOUNTER — PHARMACY VISIT (OUTPATIENT)
Dept: PHARMACY | Facility: MEDICAL CENTER | Age: 69
End: 2024-08-19
Payer: COMMERCIAL

## 2024-08-19 VITALS
RESPIRATION RATE: 22 BRPM | WEIGHT: 198 LBS | SYSTOLIC BLOOD PRESSURE: 125 MMHG | HEIGHT: 59 IN | TEMPERATURE: 98.6 F | OXYGEN SATURATION: 95 % | HEART RATE: 95 BPM | DIASTOLIC BLOOD PRESSURE: 62 MMHG | BODY MASS INDEX: 39.92 KG/M2

## 2024-08-19 LAB — EKG IMPRESSION: NORMAL

## 2024-08-19 PROCEDURE — RXMED WILLOW AMBULATORY MEDICATION CHARGE: Performed by: STUDENT IN AN ORGANIZED HEALTH CARE EDUCATION/TRAINING PROGRAM

## 2024-08-19 NOTE — ED PROVIDER NOTES
ED Provider Note    CHIEF COMPLAINT  Chief Complaint   Patient presents with    Neck Pain     Pt BIBA with c/o neck pain x1 day. Pt denies any falls or trauma to area. Upon arrival to ED pt pain 9/10 , pt states that she is unable to move her head. Pt took 5mg of her home Oxy , and EMS administered 300mcg of fent and 2 of versed.        EXTERNAL RECORDS REVIEWED  Outpatient notes.  She has a history of lumbar radiculopathy.  She was admitted in February 2024 after spine surgery     HPI/ROS  LIMITATION TO HISTORY   None  OUTSIDE HISTORIAN(S):  EMS gave fentanyl and versed prior to arrival    Dee Armstrong is a 69 y.o. female who presents with acute onset neck pain.  Patient says that she woke up this morning with pain.  She describes it as on both sides of her neck.  She does not have any midline pain.  She says the pain is worse with movement.  She tried an oxycodone which did not help her symptoms.  It is 9 out of 10 in severity.  The pain radiates from the sides of her neck up into her head.  She denies any numbness, weakness, bladder or bowel dysfunction.  She denies any known trauma or injury.  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    SURGICAL HISTORY   has a past surgical history that includes arthrotomy (09/03/2010); egd with asp/bx (09/29/2011); egd with asp/bx (02/04/2014); puja by laparoscopy (04/17/2014); other abdominal surgery (2000); colonoscopy (09/29/2011); recovery (12/08/2015); fusion, spine, xlif (Right, 02/06/2016); fusion, spine, lumbar, plif (02/09/2016); lumbar laminectomy diskectomy (02/09/2016);  "laminotomy; hysterectomy robotic (2017); cystoscopy (2017); incision hernia repair (Right, 2018); total hip arthroplasty (Left, 2019); other orthopedic surgery; arthrodesis (2010); orthopedic osteotomy (2010); bone spur excision (2010); shoulder decompression arthroscopic (Right, 2016); clavicle distal excision (Right, 2016); shoulder arthroscopy w/ rotator cuff repair (Right, 2016); shoulder arthroscopy w/ bicipital tenodesis repair (Right, 2016); hip revision total (Left, 2021); cystoscopy,insert ureteral stent (Right, 2023); cysto/uretero/pyeloscopy, dx (Right, 2023); lasertripsy (Right, 2023); other (); lumbar fusion anterior (N/A, 2024); lumbar fusion o-arm (Left, 2024); fusion, spine, xlif (Left, 2024); and fusion, spine, lumbar, plif (N/A, 2024).    FAMILY HISTORY  Family History   Problem Relation Age of Onset    Cancer Mother         lymphoma    Stroke Father     Diabetes Father        SOCIAL HISTORY  Social History     Tobacco Use    Smoking status: Former     Current packs/day: 0.00     Types: Cigarettes     Quit date: 1977     Years since quittin.6    Smokeless tobacco: Never    Tobacco comments:     2 to 3 cigarettes a month back in -1977   Vaping Use    Vaping status: Never Used   Substance and Sexual Activity    Alcohol use: Not Currently     Comment: One drink about every four months    Drug use: No    Sexual activity: Yes     Partners: Male     Comment:        CURRENT MEDICATIONS  Home Medications    **Home medications have not yet been reviewed for this encounter**         ALLERGIES  No Known Allergies    PHYSICAL EXAM  VITAL SIGNS: /62   Pulse 95   Temp 37 °C (98.6 °F)   Resp (!) 22   Ht 1.499 m (4' 11\")   Wt 89.8 kg (198 lb)   SpO2 95%   BMI 39.99 kg/m²    Constitutional: Awake and alert. Nontoxic but uncomfortable appearing  HENT:  She has no " posterior pharyngeal edema or swelling  Eyes: Grossly normal  Neck: She has no midline tenderness. She has TTP of the trapezius bilaterally. She has limited ROM but no meningismus.   Cardiovascular: Normal heart rate   Thorax & Lungs: No respiratory distress  Abdomen: Nontender  Skin:  No pathologic rash.   Neuro: A&Ox 4.  No facial droop.   Normal speech.  No pronator drift.  She has full strength to bilateral upper and lower extremities. Normal sensation to light touch  Extremities: Well perfused  Psychiatric: Affect normal      EKG/LABS  Results for orders placed or performed during the hospital encounter of 08/18/24   CBC WITH DIFFERENTIAL   Result Value Ref Range    WBC 7.9 4.8 - 10.8 K/uL    RBC 4.78 4.20 - 5.40 M/uL    Hemoglobin 14.5 12.0 - 16.0 g/dL    Hematocrit 43.1 37.0 - 47.0 %    MCV 90.2 81.4 - 97.8 fL    MCH 30.3 27.0 - 33.0 pg    MCHC 33.6 32.2 - 35.5 g/dL    RDW 48.7 35.9 - 50.0 fL    Platelet Count 380 164 - 446 K/uL    MPV 9.3 9.0 - 12.9 fL    Neutrophils-Polys 68.40 44.00 - 72.00 %    Lymphocytes 20.90 (L) 22.00 - 41.00 %    Monocytes 9.10 0.00 - 13.40 %    Eosinophils 0.60 0.00 - 6.90 %    Basophils 0.60 0.00 - 1.80 %    Immature Granulocytes 0.40 0.00 - 0.90 %    Nucleated RBC 0.00 0.00 - 0.20 /100 WBC    Neutrophils (Absolute) 5.43 1.82 - 7.42 K/uL    Lymphs (Absolute) 1.66 1.00 - 4.80 K/uL    Monos (Absolute) 0.72 0.00 - 0.85 K/uL    Eos (Absolute) 0.05 0.00 - 0.51 K/uL    Baso (Absolute) 0.05 0.00 - 0.12 K/uL    Immature Granulocytes (abs) 0.03 0.00 - 0.11 K/uL    NRBC (Absolute) 0.00 K/uL   COMP METABOLIC PANEL   Result Value Ref Range    Sodium 138 135 - 145 mmol/L    Potassium 4.2 3.6 - 5.5 mmol/L    Chloride 102 96 - 112 mmol/L    Co2 21 20 - 33 mmol/L    Anion Gap 15.0 7.0 - 16.0    Glucose 123 (H) 65 - 99 mg/dL    Bun 14 8 - 22 mg/dL    Creatinine 0.66 0.50 - 1.40 mg/dL    Calcium 9.2 8.5 - 10.5 mg/dL    Correct Calcium 9.3 8.5 - 10.5 mg/dL    AST(SGOT) 18 12 - 45 U/L    ALT(SGPT) 20  2 - 50 U/L    Alkaline Phosphatase 129 (H) 30 - 99 U/L    Total Bilirubin 0.5 0.1 - 1.5 mg/dL    Albumin 3.9 3.2 - 4.9 g/dL    Total Protein 7.1 6.0 - 8.2 g/dL    Globulin 3.2 1.9 - 3.5 g/dL    A-G Ratio 1.2 g/dL   LIPASE   Result Value Ref Range    Lipase 43 11 - 82 U/L   PT/INR   Result Value Ref Range    PT 12.0 12.0 - 14.6 sec    INR 0.88 0.87 - 1.13   ESTIMATED GFR   Result Value Ref Range    GFR (CKD-EPI) 94 >60 mL/min/1.73 m 2   EKG   Result Value Ref Range    Report       [facility] Emergency Dept.    Test Date:  2024  Pt Name:    UGO BENTON                Department: ER  MRN:        [id number]                      Room:       [facility]  Gender:     Female                       Technician: [id number]  :        [id number]                   Requested By:ER TRIAGE PROTOCOL  Order #:    [id number]                    Reading MD: Shantell Means    Measurements  Intervals                                Axis  Rate:       118                          P:          20  DE:         153                          QRS:        159  QRSD:       112                          T:          -17  QT:         341  QTc:        478    Interpretive Statements  Sinus tachycardia  Low voltage, precordial leads      Compared to ECG 2024 09:37:16      Left anterior fascicular block no longer present  Incomplete right bundle-branch block no longer present  Right bundle-branch block no longer present  Electronically Signed On 2024 10:42:22 PDT by Shantell Means           RADIOLOGY/PROCEDURES   I have independently interpreted the diagnostic imaging associated with this visit and am waiting the final reading from the radiologist.   My preliminary interpretation is as follows: She has no obvious aneurysm or dissection    Radiologist interpretation:  CT-CTA NECK WITH & W/O-POST PROCESSING   Final Result      Atherosclerosis without significant stenosis, occlusion, or aneurysm.      CT-CTA HEAD WITH & W/O-POST PROCESS    Final Result      There is no significant stenosis, occlusion, or aneurysm.          COURSE & MEDICAL DECISION MAKING    ASSESSMENT, COURSE AND PLAN  Care Narrative: This is a 69-year-old female who presents with one day of bilateral neck pain.  She arrives afebrile though is tachycardic but is uncomfortable appearing.  She does not appear to be systemically ill and is mentating normally.  On exam she does have tenderness to the trapezius muscle bilaterally.  She does not have any midline spinal tenderness.  There are no overlying rashes.  There is no swelling of the posterior pharynx and tonsils appear normal bilaterally.  There is no swelling or erythema to the face and I have low suspicion for deep space infection.  I doubt an infectious source such as RPA or PTA.  She is nontoxic, mentating normally without meningitis and I doubt meningitis or CNS infection. I do not think a lumbar puncture is indicated, I also discussed this with the patient and she agrees.  Due to her degree of pain and acute onset, I was concerned about the possibility of a subarachnoid hemorrhage.  Fortunately however imaging does not show evidence any evidence of intracranial bleed and additionally she has no evidence of aneurysm on imaging making this a extremely unlikely.  There were no other acute findings on her CT scan that explain her symptoms.  Patient was given Dilaudid after which she did have some improvement.  On reevaluation however she was still endorsing pain.  I do think at this point it is most likely she is having musculoskeletal pain or neck spasms due to the location of her pain.  With multimodal pain control including Flexeril, Toradol, Tylenol she had improvement of her symptoms.  I did discuss admission to the hospital for  pain control however she feels improved and feels like she can try to manage her symptoms at home.  I do think that this is reasonable given reassuring work-up in the ER and improvement.  She will  follow-up with her PCP and will return to the ER sooner if worsening.       DISPOSITION AND DISCUSSIONS  I have discussed management of the patient with the following physicians and IRIS's:  None    Discussion of management with other QHP or appropriate source(s): None     Escalation of care considered, and ultimately not performed:after discussion with the patient / family, they have elected to decline an escalation in care    Barriers to care at this time, including but not limited to:  None .     Decision tools and prescription drugs considered including, but not limited to: Pain Medications Flexeril, naproxen and lidocaine patches .    FINAL DIAGNOSIS  1. Acute neck pain Acute        Electronically signed by: Shantell Means M.D., 8/18/2024 7:32 PM

## 2024-08-19 NOTE — ED NOTES
Pt in 10/10 pain ,pt medicated per MAR , labs drawn and sent, pt attached to monitor , call light in reach , family at bedside

## 2024-08-19 NOTE — ED NOTES
RN reviewed d/c instructions w/ patient *and visitor*  including follow up and prescription information. Pt is alert and oriented. Pt  / visitor verbalized understanding of all instructions for discharge and is agreeable to plan of care. Pt wheeled out of ED with steady gait accompanied by .  PIV removed.     Pt agrees not to drive or operate any heavy machinery after receiving / ingesting controlled substances.  Pt  educated on risks/ benefits of being prescribed a controlled substances and agrees to  use prescription  as prescribed by MD.

## 2024-08-19 NOTE — DISCHARGE INSTRUCTIONS
For pain, take Ibuprofen (Motrin, Advil) 600 mg up to every six hours  mg up to every eight hours if your stomach can take it. Instead of Ibuprofen, you can choose Naproxen (Aleve) and take 250-500 mg twice daily. Take Ibuprofen or Naproxen with food to lessen stomach irritation. You may also take Acetaminophen (Tylenol) 500mg (may take up to 1gm) every six hours in addition to Ibuprofen or Naproxen. (Maximum Tylenol 4 gm/day).  I suggest alternating between the ibuprofen and Tylenol every 4 hours for optimal pain relief and adequate spacing of each medication.     The prescribed pain medication is highly addictive.  Please only take it for breakthrough pain that is not relieved by scheduled ibuprofen, Tylenol and other recommended medications.  Please be aware that the opiate pain or muscle relaxant medications that you have been prescribed may cause drowsiness or somnolence. DO NOT drink or drive or operate machinery when taking this medication.

## 2024-08-19 NOTE — ED TRIAGE NOTES
Chief Complaint   Patient presents with    Neck Pain     Pt BIBA with c/o neck pain x1 day. Pt denies any falls or trauma to area. Upon arrival to ED pt pain 9/10 , pt states that she is unable to move her head. Pt took 5mg of her home Oxy , and EMS administered 300mcg of fent and 2 of versed.

## 2024-09-17 DIAGNOSIS — E78.5 DYSLIPIDEMIA: ICD-10-CM

## 2024-09-17 RX ORDER — ATORVASTATIN CALCIUM 20 MG/1
20 TABLET, FILM COATED ORAL EVERY EVENING
Qty: 90 TABLET | Refills: 0 | Status: SHIPPED | OUTPATIENT
Start: 2024-09-17

## 2024-09-17 NOTE — TELEPHONE ENCOUNTER
Received request via: Pharmacy    Was the patient seen in the last year in this department? Yes    Does the patient have an active prescription (recently filled or refills available) for medication(s) requested? No    Pharmacy Name:     [id number] - [place], [place] - [address] (Pharmacy) [phone]       Does the patient have [facility]way Plus and need 100-day supply? (This applies to ALL medications) Patient does not have SCP

## 2024-09-27 RX ORDER — AMITRIPTYLINE HYDROCHLORIDE 10 MG/1
10 TABLET ORAL EVERY EVENING
Qty: 90 TABLET | Refills: 3 | Status: SHIPPED | OUTPATIENT
Start: 2024-09-27

## 2024-09-27 NOTE — TELEPHONE ENCOUNTER
Received request via: Pharmacy    Was the patient seen in the last year in this department? Yes    Does the patient have an active prescription (recently filled or refills available) for medication(s) requested? No    [facility]    Does the patient have [facility] and need 100-day supply? (This applies to ALL medications) Patient does not have SCP

## 2024-10-01 ENCOUNTER — APPOINTMENT (OUTPATIENT)
Dept: RADIOLOGY | Facility: MEDICAL CENTER | Age: 69
End: 2024-10-01
Attending: UROLOGY
Payer: MEDICARE

## 2024-10-01 DIAGNOSIS — R31.0 GROSS HEMATURIA: ICD-10-CM

## 2024-10-01 PROCEDURE — 74178 CT ABD&PLV WO CNTR FLWD CNTR: CPT

## 2024-10-01 PROCEDURE — 700117 HCHG RX CONTRAST REV CODE 255: Performed by: UROLOGY

## 2024-10-01 RX ADMIN — IOHEXOL 100 ML: 350 INJECTION, SOLUTION INTRAVENOUS at 12:15

## 2024-11-11 ENCOUNTER — HOSPITAL ENCOUNTER (OUTPATIENT)
Facility: MEDICAL CENTER | Age: 69
End: 2024-11-11
Attending: INTERNAL MEDICINE
Payer: MEDICARE

## 2024-11-11 PROCEDURE — 82272 OCCULT BLD FECES 1-3 TESTS: CPT

## 2024-11-12 ENCOUNTER — HOSPITAL ENCOUNTER (OUTPATIENT)
Dept: LAB | Facility: MEDICAL CENTER | Age: 69
End: 2024-11-12
Attending: INTERNAL MEDICINE
Payer: MEDICARE

## 2024-11-12 DIAGNOSIS — E78.5 DYSLIPIDEMIA: Chronic | ICD-10-CM

## 2024-11-12 DIAGNOSIS — D64.9 ANEMIA, UNSPECIFIED TYPE: Chronic | ICD-10-CM

## 2024-11-12 DIAGNOSIS — E55.9 VITAMIN D DEFICIENCY: Chronic | ICD-10-CM

## 2024-11-12 DIAGNOSIS — R73.03 PREDIABETES: Chronic | ICD-10-CM

## 2024-11-12 LAB
BASOPHILS # BLD AUTO: 1 % (ref 0–1.8)
BASOPHILS # BLD: 0.04 K/UL (ref 0–0.12)
EOSINOPHIL # BLD AUTO: 0.07 K/UL (ref 0–0.51)
EOSINOPHIL NFR BLD: 1.7 % (ref 0–6.9)
ERYTHROCYTE [DISTWIDTH] IN BLOOD BY AUTOMATED COUNT: 50 FL (ref 35.9–50)
EST. AVERAGE GLUCOSE BLD GHB EST-MCNC: 151 MG/DL
FOLATE SERPL-MCNC: 8.3 NG/ML
HBA1C MFR BLD: 6.9 % (ref 4–5.6)
HCT VFR BLD AUTO: 46 % (ref 37–47)
HEMOCCULT STL QL: NEGATIVE
HGB BLD-MCNC: 15 G/DL (ref 12–16)
IMM GRANULOCYTES # BLD AUTO: 0.01 K/UL (ref 0–0.11)
IMM GRANULOCYTES NFR BLD AUTO: 0.2 % (ref 0–0.9)
LYMPHOCYTES # BLD AUTO: 1.42 K/UL (ref 1–4.8)
LYMPHOCYTES NFR BLD: 33.7 % (ref 22–41)
MCH RBC QN AUTO: 30.2 PG (ref 27–33)
MCHC RBC AUTO-ENTMCNC: 32.6 G/DL (ref 32.2–35.5)
MCV RBC AUTO: 92.7 FL (ref 81.4–97.8)
MONOCYTES # BLD AUTO: 0.32 K/UL (ref 0–0.85)
MONOCYTES NFR BLD AUTO: 7.6 % (ref 0–13.4)
NEUTROPHILS # BLD AUTO: 2.35 K/UL (ref 1.82–7.42)
NEUTROPHILS NFR BLD: 55.8 % (ref 44–72)
NRBC # BLD AUTO: 0 K/UL
NRBC BLD-RTO: 0 /100 WBC (ref 0–0.2)
PLATELET # BLD AUTO: 294 K/UL (ref 164–446)
PMV BLD AUTO: 10 FL (ref 9–12.9)
RBC # BLD AUTO: 4.96 M/UL (ref 4.2–5.4)
WBC # BLD AUTO: 4.2 K/UL (ref 4.8–10.8)

## 2024-11-12 PROCEDURE — 82306 VITAMIN D 25 HYDROXY: CPT

## 2024-11-12 PROCEDURE — 84460 ALANINE AMINO (ALT) (SGPT): CPT

## 2024-11-12 PROCEDURE — 36415 COLL VENOUS BLD VENIPUNCTURE: CPT

## 2024-11-12 PROCEDURE — 85025 COMPLETE CBC W/AUTO DIFF WBC: CPT

## 2024-11-12 PROCEDURE — 82607 VITAMIN B-12: CPT

## 2024-11-12 PROCEDURE — 83036 HEMOGLOBIN GLYCOSYLATED A1C: CPT | Mod: GA

## 2024-11-12 PROCEDURE — 83540 ASSAY OF IRON: CPT

## 2024-11-12 PROCEDURE — 82728 ASSAY OF FERRITIN: CPT

## 2024-11-12 PROCEDURE — 80048 BASIC METABOLIC PNL TOTAL CA: CPT

## 2024-11-12 PROCEDURE — 80061 LIPID PANEL: CPT

## 2024-11-12 PROCEDURE — 82746 ASSAY OF FOLIC ACID SERUM: CPT

## 2024-11-13 LAB
25(OH)D3 SERPL-MCNC: 38 NG/ML (ref 30–100)
ALT SERPL-CCNC: 18 U/L (ref 2–50)
ANION GAP SERPL CALC-SCNC: 10 MMOL/L (ref 7–16)
BUN SERPL-MCNC: 19 MG/DL (ref 8–22)
CALCIUM SERPL-MCNC: 9.7 MG/DL (ref 8.5–10.5)
CHLORIDE SERPL-SCNC: 105 MMOL/L (ref 96–112)
CHOLEST SERPL-MCNC: 165 MG/DL (ref 100–199)
CO2 SERPL-SCNC: 25 MMOL/L (ref 20–33)
CREAT SERPL-MCNC: 0.72 MG/DL (ref 0.5–1.4)
FERRITIN SERPL-MCNC: 23.7 NG/ML (ref 10–291)
GFR SERPLBLD CREATININE-BSD FMLA CKD-EPI: 89 ML/MIN/1.73 M 2
GLUCOSE SERPL-MCNC: 100 MG/DL (ref 65–99)
HDLC SERPL-MCNC: 70 MG/DL
IRON SERPL-MCNC: 75 UG/DL (ref 40–170)
LDLC SERPL CALC-MCNC: 76 MG/DL
POTASSIUM SERPL-SCNC: 4 MMOL/L (ref 3.6–5.5)
SODIUM SERPL-SCNC: 140 MMOL/L (ref 135–145)
TRIGL SERPL-MCNC: 95 MG/DL (ref 0–149)
VIT B12 SERPL-MCNC: 263 PG/ML (ref 211–911)

## 2024-11-20 ENCOUNTER — APPOINTMENT (OUTPATIENT)
Dept: MEDICAL GROUP | Facility: PHYSICIAN GROUP | Age: 69
End: 2024-11-20
Payer: MEDICARE

## 2024-11-20 VITALS
HEIGHT: 59 IN | SYSTOLIC BLOOD PRESSURE: 118 MMHG | WEIGHT: 192.4 LBS | TEMPERATURE: 97.1 F | BODY MASS INDEX: 38.79 KG/M2 | RESPIRATION RATE: 20 BRPM | OXYGEN SATURATION: 95 % | DIASTOLIC BLOOD PRESSURE: 68 MMHG | HEART RATE: 77 BPM

## 2024-11-20 DIAGNOSIS — R29.818 NEUROGENIC CLAUDICATION: ICD-10-CM

## 2024-11-20 DIAGNOSIS — E66.3 OVERWEIGHT: ICD-10-CM

## 2024-11-20 DIAGNOSIS — N20.0 KIDNEY STONE: Chronic | ICD-10-CM

## 2024-11-20 DIAGNOSIS — E55.9 VITAMIN D DEFICIENCY: Chronic | ICD-10-CM

## 2024-11-20 DIAGNOSIS — E11.42 DIABETIC POLYNEUROPATHY ASSOCIATED WITH TYPE 2 DIABETES MELLITUS (HCC): ICD-10-CM

## 2024-11-20 DIAGNOSIS — M81.0 AGE-RELATED OSTEOPOROSIS WITHOUT CURRENT PATHOLOGICAL FRACTURE: Chronic | ICD-10-CM

## 2024-11-20 DIAGNOSIS — I10 ESSENTIAL HYPERTENSION: Chronic | ICD-10-CM

## 2024-11-20 DIAGNOSIS — E78.5 DYSLIPIDEMIA: Chronic | ICD-10-CM

## 2024-11-20 DIAGNOSIS — K21.9 GASTROESOPHAGEAL REFLUX DISEASE, UNSPECIFIED WHETHER ESOPHAGITIS PRESENT: Chronic | ICD-10-CM

## 2024-11-20 DIAGNOSIS — K25.7 CHRONIC GASTRIC ULCER WITHOUT HEMORRHAGE AND WITHOUT PERFORATION: ICD-10-CM

## 2024-11-20 PROBLEM — E11.69 TYPE 2 DIABETES MELLITUS WITH HYPERLIPIDEMIA (HCC): Chronic | Status: ACTIVE | Noted: 2024-11-20

## 2024-11-20 PROBLEM — R73.03 PREDIABETES: Chronic | Status: RESOLVED | Noted: 2024-02-21 | Resolved: 2024-11-20

## 2024-11-20 PROBLEM — D64.9 ANEMIA: Chronic | Status: RESOLVED | Noted: 2024-02-21 | Resolved: 2024-11-20

## 2024-11-20 PROBLEM — R93.3 ABNORMAL FINDING ON GI TRACT IMAGING: Status: RESOLVED | Noted: 2023-05-23 | Resolved: 2024-11-20

## 2024-11-20 PROBLEM — E11.69 TYPE 2 DIABETES MELLITUS WITH HYPERLIPIDEMIA (HCC): Status: ACTIVE | Noted: 2024-11-20

## 2024-11-20 PROCEDURE — 92250 FUNDUS PHOTOGRAPHY W/I&R: CPT | Mod: TC | Performed by: INTERNAL MEDICINE

## 2024-11-20 PROCEDURE — 99215 OFFICE O/P EST HI 40 MIN: CPT | Performed by: INTERNAL MEDICINE

## 2024-11-20 PROCEDURE — 3074F SYST BP LT 130 MM HG: CPT | Performed by: INTERNAL MEDICINE

## 2024-11-20 PROCEDURE — 3078F DIAST BP <80 MM HG: CPT | Performed by: INTERNAL MEDICINE

## 2024-11-20 RX ORDER — METFORMIN HYDROCHLORIDE 500 MG/1
500 TABLET, EXTENDED RELEASE ORAL DAILY
Qty: 90 TABLET | Refills: 3 | Status: SHIPPED | OUTPATIENT
Start: 2024-11-20

## 2024-11-20 RX ORDER — EPINEPHRINE 1 MG/ML(1)
0.5 AMPUL (ML) INJECTION PRN
OUTPATIENT
Start: 2024-11-25

## 2024-11-20 RX ORDER — LISINOPRIL 10 MG/1
10 TABLET ORAL DAILY
Qty: 100 TABLET | Refills: 3 | Status: SHIPPED | OUTPATIENT
Start: 2024-11-20

## 2024-11-20 RX ORDER — LANCETS 30 GAUGE
EACH MISCELLANEOUS
Qty: 200 EACH | Refills: 6 | Status: SHIPPED | OUTPATIENT
Start: 2024-11-20

## 2024-11-20 RX ORDER — DEXLANSOPRAZOLE 30 MG/1
30 CAPSULE, DELAYED RELEASE ORAL DAILY
COMMUNITY

## 2024-11-20 RX ORDER — METHYLPREDNISOLONE SODIUM SUCCINATE 125 MG/2ML
125 INJECTION, POWDER, LYOPHILIZED, FOR SOLUTION INTRAMUSCULAR; INTRAVENOUS PRN
OUTPATIENT
Start: 2024-11-25

## 2024-11-20 RX ORDER — GLUCOSAMINE HCL/CHONDROITIN SU 500-400 MG
CAPSULE ORAL
Qty: 200 EACH | Refills: 6 | Status: SHIPPED | OUTPATIENT
Start: 2024-11-20

## 2024-11-20 RX ORDER — DIPHENHYDRAMINE HYDROCHLORIDE 50 MG/ML
50 INJECTION INTRAMUSCULAR; INTRAVENOUS PRN
OUTPATIENT
Start: 2024-11-25

## 2024-11-20 ASSESSMENT — FIBROSIS 4 INDEX: FIB4 SCORE: 1.02

## 2024-11-20 NOTE — ASSESSMENT & PLAN NOTE
Chronic condition.  The patient was seen by urologist previously.  Patient denies fever chill dysuria or hematuria.  Currently the patient asymptomatic.

## 2024-11-20 NOTE — ASSESSMENT & PLAN NOTE
Chronic condition.  Is presently taking dexlansoprazole.  Patient denies nausea vomiting or dysphagia.  The patient has been followed by GI specialist

## 2024-11-20 NOTE — ASSESSMENT & PLAN NOTE
This is a new condition.  Recent lab test showed A1c 6.9%.  Result discussed with the patient.  Patient currently asymptomatic.  Patient denies significant hypoglycemia.

## 2024-11-20 NOTE — ASSESSMENT & PLAN NOTE
This is a chronic condition.  The patient has been followed by GI specialist.  Patient presently taking dexlansoprazole.  Patient denies nausea vomiting or dysphagia.  Patient stated that she is scheduled to meet with a specialist for follow-up soon.

## 2024-11-20 NOTE — ASSESSMENT & PLAN NOTE
Body mass index is 38.86 kg/m².     Chronic condition.  Recommend pt to follow a healthy , well balance diet  Pt to continue with regular exercise activity and to maintain ideal weight.

## 2024-11-20 NOTE — ASSESSMENT & PLAN NOTE
This is a chronic condition.  The patient has completed alendronate treatment for 5 years.  Today I discussed and recommend patient to consider Prolia injection every 6 months.  The patient was advised regarding potential side effects.  The patient would like to proceed with the treatment.

## 2024-11-20 NOTE — ASSESSMENT & PLAN NOTE
This is a chronic condition for the patient has been taking atorvastatin.  Recent lab test result discussed with the patient.

## 2024-11-20 NOTE — PROGRESS NOTES
PRIMARY CARE CLINIC VISIT        Chief Complaint   Patient presents with    Follow-Up     Lab results       Follow-up diabetes  Gastric ulcer  Neurogenic claudication  Overweight  Acid reflux  Vitamin D deficiency  Kidney stone  Hypertension  Hyperlipidemia  Osteoporosis  Lab test result  Medication review and refills        History of Present Illness     GERD (gastroesophageal reflux disease)  Chronic condition.  Is presently taking dexlansoprazole.  Patient denies nausea vomiting or dysphagia.  The patient has been followed by GI specialist    Vitamin D deficiency  Chronic condition.  The patient has been taking vitamin D supplementation.  Tolerating medication well.  No new symptoms reported.    Kidney stone  Chronic condition.  The patient was seen by urologist previously.  Patient denies fever chill dysuria or hematuria.  Currently the patient asymptomatic.    Essential hypertension  Chronic condition.  Patient is taking lisinopril.  The patient tolerating medication well.  The patient denies chest pain shortness of breath or palpitation.    Dyslipidemia  This is a chronic condition for the patient has been taking atorvastatin.  Recent lab test result discussed with the patient.    Diabetic polyneuropathy associated with type 2 diabetes mellitus (HCC)  This is a new condition.  Recent lab test showed A1c 6.9%.  Result discussed with the patient.  Patient currently asymptomatic.  Patient denies significant hypoglycemia.    Overweight  Body mass index is 38.86 kg/m².     Chronic condition.  Recommend pt to follow a healthy , well balance diet  Pt to continue with regular exercise activity and to maintain ideal weight.     Gastric ulcer  This is a chronic condition.  The patient has been followed by GI specialist.  Patient presently taking dexlansoprazole.  Patient denies nausea vomiting or dysphagia.  Patient stated that she is scheduled to meet with a specialist for follow-up soon.    Neurogenic  claudication  Chronic condition.  Started postsurgery January 2024.  Patient presently followed by surgeon.  She has completed physical therapy.  The patient currently taking gabapentin at bedtime.  Symptoms are well-controlled.    Osteoporosis  This is a chronic condition.  The patient has completed alendronate treatment for 5 years.  Today I discussed and recommend patient to consider Prolia injection every 6 months.  The patient was advised regarding potential side effects.  The patient would like to proceed with the treatment.    Current Outpatient Medications on File Prior to Visit   Medication Sig Dispense Refill    dexlansoprazole (DEXILANT) 30 MG CAPSULE DELAYED RELEASE Take 30 mg by mouth every day.      amitriptyline (ELAVIL) 10 MG Tab TAKE 1 TABLET BY MOUTH IN THE  EVENING 90 Tablet 3    atorvastatin (LIPITOR) 20 MG Tab TAKE 1 TABLET BY MOUTH EVERY DAY IN THE EVENING 90 Tablet 0    vitamin D3 2000 UNIT Tab Take 1 Tablet by mouth every day. 60 Tablet 0    gabapentin (NEURONTIN) 300 MG Cap Take 1 Capsule by mouth every evening. (Patient taking differently: Take 600 mg by mouth every evening.) 90 Capsule 0    estradiol (ESTRACE) 0.1 MG/GM vaginal cream Insert  into the vagina every day.      Clobetasol Propionate 0.05 % Gel Apply  topically.      Multiple Vitamins-Minerals (MULTIVITAMIN ADULT) Chew Tab Chew.      Ascorbic Acid (VITAMIN C) 500 MG Cap Take  by mouth.      Fiber Complete Tab Take  by mouth.      acetaminophen (TYLENOL) 325 MG Tab Take 2 Tablets by mouth every 6 hours. 30 Tablet 0     No current facility-administered medications on file prior to visit.        Allergies: Patient has no known allergies.    Current Outpatient Medications Ordered in Epic   Medication Sig Dispense Refill    metFORMIN ER (GLUCOPHAGE XR) 500 MG TABLET SR 24 HR Take 1 Tablet by mouth every day. 90 Tablet 3    Blood Glucose Meter Kit Freestyle FREEDOM Lite.  Check blood sugar  1x per day . ICD10 code E11.69 1 Kit 0     Blood Glucose Test Strips FREESTYLE FREEDOM LITE . Check blood sugar 1x per day .  ICD10 code E11.69 100 Strip 6    Lancets Per insurance coverage. Check blood sugar 1x per day . ICD10 code E11.69 200 Each 6    Alcohol Swabs Wipe site with prep pad prior to injection 1x daily. ICD10 code E11.69 200 Each 6    dexlansoprazole (DEXILANT) 30 MG CAPSULE DELAYED RELEASE Take 30 mg by mouth every day.      lisinopril (PRINIVIL) 10 MG Tab Take 1 Tablet by mouth every day. 100 Tablet 3    amitriptyline (ELAVIL) 10 MG Tab TAKE 1 TABLET BY MOUTH IN THE  EVENING 90 Tablet 3    atorvastatin (LIPITOR) 20 MG Tab TAKE 1 TABLET BY MOUTH EVERY DAY IN THE EVENING 90 Tablet 0    vitamin D3 2000 UNIT Tab Take 1 Tablet by mouth every day. 60 Tablet 0    gabapentin (NEURONTIN) 300 MG Cap Take 1 Capsule by mouth every evening. (Patient taking differently: Take 600 mg by mouth every evening.) 90 Capsule 0    estradiol (ESTRACE) 0.1 MG/GM vaginal cream Insert  into the vagina every day.      Clobetasol Propionate 0.05 % Gel Apply  topically.      Multiple Vitamins-Minerals (MULTIVITAMIN ADULT) Chew Tab Chew.      Ascorbic Acid (VITAMIN C) 500 MG Cap Take  by mouth.      Fiber Complete Tab Take  by mouth.      acetaminophen (TYLENOL) 325 MG Tab Take 2 Tablets by mouth every 6 hours. 30 Tablet 0     No current Epic-ordered facility-administered medications on file.       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 ([place] fever), medicated for a year, yearly xrays    Cough 05/29/2015    Diverticulosis     GERD (gastroesophageal reflux disease)     Heart burn     Hiatus hernia syndrome 2023    High cholesterol     Hypertension     Indigestion     LBP (low back pain)     Lumbar radicular pain 05/29/2015    Muscle disorder     Obesity     Osteoporosis of forearm     Alendronate started 6/18    Pain     Shoulder and numbness to R LE, hip       Past Surgical  History:   Procedure Laterality Date    FUSION, SPINE, LUMBAR, PLIF N/A 1/30/2024    Procedure: FUSION, SPINE, LUMBAR, L2-S1;  Surgeon: Tim Rodriguez M.D.;  Location: [facility];  Service: Neurosurgery    LUMBAR FUSION O-ARM Left 1/25/2024    Procedure: STAGE #2 LEFT LUMBAR EXTREME LATERAL INTERBODY FUSION, LUMBAR 2-SACRAL 1 FUSION WITH POSSIBLE REDO RIGHT LUMBAR 2-3 LAMINOTOMIES AND HARDWARE REMOVAL;  Surgeon: Tim Rodriguez M.D.;  Location: [facility];  Service: Neurosurgery    FUSION, SPINE, XLIF Left 1/25/2024    Procedure: FUSION, SPINE, XLIF;  Surgeon: Tim Rodriguez M.D.;  Location: [facility];  Service: Neurosurgery    LUMBAR FUSION ANTERIOR N/A 1/23/2024    Procedure: STAGE #1 LUMBAR 4-SACRAL 1 ANTERIOR LUMBAR INTERBODY FUSION;  Surgeon: Tim Rodriguez M.D.;  Location: [facility];  Service: Neurosurgery    WA CYSTOSCOPY,INSERT URETERAL STENT Right 06/08/2023    Procedure: CYSTOSCOPY, WITH RIGHT URETEROSCOPY, WITH LITHOTRIPSY, WITH INSERTION OF RIGHT URETERAL STENT;  Surgeon: Amol Sullivan M.D.;  Location: [facility];  Service: Urology    WA CYSTO/URETERO/PYELOSCOPY, DX Right 06/08/2023    Procedure: URETEROSCOPY;  Surgeon: Amol Sullivan M.D.;  Location: [facility];  Service: Urology    LASERTRIPSY Right 06/08/2023    Procedure: LITHOTRIPSY, USING LASER;  Surgeon: Amol Sullivan M.D.;  Location: [facility];  Service: Urology    HIP REVISION TOTAL Left 05/13/2021    Procedure: REVISION, TOTAL ARTHROPLASTY, HIP.;  Surgeon: Amol Hogue M.D.;  Location: [facility];  Service: Orthopedics    WA TOTAL HIP ARTHROPLASTY Left 09/19/2019    Procedure: ARTHROPLASTY, HIP, TOTAL, ANTERIOR APPROACH;  Surgeon: Clarence Vallejo M.D.;  Location: [facility];  Service: Orthopedics    INCISION HERNIA REPAIR Right 11/26/2018    Procedure: INCISION HERNIA REPAIR- FLANK WITH MESH;  Surgeon: Misael Ramírez M.D.;  Location:  [facility];  Service: General    HYSTERECTOMY ROBOTIC  06/07/2017    Procedure: HYSTERECTOMY ROBOTIC SI, BILATERAL SALPINGO-OOPHORECTOMY, URETERAL SACRAL LIGAMENT SHORTENING ;  Surgeon: Jerica Hooper M.D.;  Location: [facility];  Service:     CYSTOSCOPY  06/07/2017    Procedure: CYSTOSCOPY;  Surgeon: Jerica Hooper M.D.;  Location: [facility];  Service:     SHOULDER DECOMPRESSION ARTHROSCOPIC Right 12/06/2016    Procedure: SHOULDER DECOMPRESSION ARTHROSCOPIC - SUBACROMIAL, MANJARREZ;  Surgeon: Kyle Claros M.D.;  Location: [facility];  Service:     CLAVICLE DISTAL EXCISION Right 12/06/2016    Procedure: CLAVICLE DISTAL EXCISION;  Surgeon: Kyle Claros M.D.;  Location: [facility];  Service:     SHOULDER ARTHROSCOPY W/ ROTATOR CUFF REPAIR Right 12/06/2016    Procedure: SHOULDER ARTHROSCOPY W/ ROTATOR CUFF REPAIR ;  Surgeon: Kyle Claros M.D.;  Location: [facility];  Service:     SHOULDER ARTHROSCOPY W/ BICIPITAL TENODESIS REPAIR Right 12/06/2016    Procedure: SHOULDER ARTHROSCOPY W/ BICIPITAL TENODESIS REPAIR ;  Surgeon: Kyle Claros M.D.;  Location: [facility];  Service:     FUSION, SPINE, LUMBAR, PLIF  02/09/2016    Procedure: LUMBAR FUSION POSTERIOR PEDICLE SCREW FIXATION (STAGE #2);  Surgeon: Tim Rodriguez M.D.;  Location: [facility];  Service:     LUMBAR LAMINECTOMY DISKECTOMY  02/09/2016    Procedure: LUMBAR LAMINECTOMY DISKECTOMY MINI OPEN;  Surgeon: Tim Rodriguez M.D.;  Location: [facility];  Service:     FUSION, SPINE, XLIF Right 02/06/2016    Procedure: EXTREME LATERAL INTERBODY FUSION L3-4 (STAGE #1);  Surgeon: iTm Rodriguez M.D.;  Location: [facility];  Service:     RECOVERY  12/08/2015    Procedure: CT-CT GUIDED LEFT LUNG BIOPSY-;  Surgeon: Recoveryonly Surgery;  Location: SURGERY PRE-POST PROC UNIT [facility];  Service:     REDDY BY LAPAROSCOPY  04/17/2014     Performed by Ankur Lerner M.D. at SURGERY SAME DAY [facility] ORS    EGD WITH ASP/BX  2014    mild chronic inactive gastritis, scar gastric antrum-    EGD WITH ASP/BX  2011    possible barrettes, hiatal hernia, gastritis    COLONOSCOPY  2011    diverticulosis sigmoid colon, hemorrhoids    ARTHROTOMY  2010    Performed by YARELY MORRISON at SURGERY SAME DAY [facility] ORS    ARTHRODESIS  2010    Performed by YARELY MORRISON at SURGERY SAME DAY [facility] ORS    ORTHOPEDIC OSTEOTOMY  2010    Performed by YARELY MORRISON at SURGERY SAME DAY [facility] ORS    BONE SPUR EXCISION  2010    Performed by YARELY MORRISON at SUR[facility]ERY [facility] DAY [facility]    OTHER ABDOMINAL SURGERY      COLON RESECTION    LAMINOTOMY      OTHER      Diverticulitis    OTHER ORTHOPEDIC SURGERY      PARDEEP CARPAL TUNNEL RELEASES       Family History   Problem Relation Age of Onset    Cancer Mother         lymphoma    Stroke Father     Diabetes Father        Social History     Tobacco Use   Smoking Status Former    Current packs/day: 0.00    Types: Cigarettes    Quit date: 1977    Years since quittin.9   Smokeless Tobacco Never   Tobacco Comments    2 to 3 cigarettes a month back in -1977       Social History     Substance and Sexual Activity   Alcohol Use Not Currently    Comment: One drink about every four months       Review of systems  As per HPI above. All other systems reviewed and negative.      Past Medical, Social, and Family history reviewed and updated in [facility]       LAB DATA:     I have independently reviewed / interpreted labs    Lab Results   Component Value Date/Time    HBA1C 6.9 (H) 2024 07:54 AM    HBA1C 5.9 (H) 2024 05:17 AM        Lab Results   Component Value Date/Time    WBC 4.2 (L) 2024 07:54 AM    HEMOGLOBIN 15.0 2024 07:54 AM    HEMATOCRIT 46.0 2024 07:54 AM    MCV 92.7 2024 07:54 AM    PLATELETCT 294 2024 07:54 AM  "      Lab Results   Component Value Date/Time    SODIUM 140 11/12/2024 07:54 AM    POTASSIUM 4.0 11/12/2024 07:54 AM    GLUCOSE 100 (H) 11/12/2024 07:54 AM    BUN 19 11/12/2024 07:54 AM    CREATININE 0.72 11/12/2024 07:54 AM       Lab Results   Component Value Date/Time    CHOLSTRLTOT 165 11/12/2024 07:54 AM    TRIGLYCERIDE 95 11/12/2024 07:54 AM    HDL 70 11/12/2024 07:54 AM    LDL 76 11/12/2024 07:54 AM       Lab Results   Component Value Date/Time    ALTSGPT 18 11/12/2024 07:54 AM          Objective     /68 (BP Location: Right arm, Patient Position: Sitting, BP Cuff Size: Adult)   Pulse 77   Temp 36.2 °C (97.1 °F) (Temporal)   Resp 20   Ht 1.499 m (4' 11\")   Wt 87.3 kg (192 lb 6.4 oz)   SpO2 95%    Body mass index is 38.86 kg/m².    General: alert in no apparent distress.  Cardiovascular: regular rate and rhythm  Pulmonary: lungs : no wheezing   Gastrointestinal: BS present.   Monofilament testing with a 10 gram force: sensation intact: decreased bilaterally  Visual Inspection: Feet without maceration, ulcers, fissures.  Pedal pulses: decreased bilaterally     Assessment and Plan     1. Diabetic polyneuropathy associated with type 2 diabetes mellitus (HCC)  This is a new condition.  A1c 6.9%.  Recommend patient to start metformin 500 mg daily.  Discussed with the patient goals for Diabetes management:  -HbA1C < 7% /  Fasting BG   /  2 hrs postprandial  - 180    Pt's education:   -Discussed with potential microvascular/macrovascular complications of diabetes  -The importance of increasing physical activity to improve diabetes control  discussed with the patient.   -Patient was also educated on changing diet and making better choices to help control blood sugar.                  - Diabetic Monofilament LE Exam  - POCT Retinal Eye Exam  - metFORMIN ER (GLUCOPHAGE XR) 500 MG TABLET SR 24 HR; Take 1 Tablet by mouth every day.  Dispense: 90 Tablet; Refill: 3  - Blood Glucose Meter Kit; Freestyle " FREEDOM Lite.  Check blood sugar  1x per day . ICD10 code E11.69  Dispense: 1 Kit; Refill: 0  - Blood Glucose Test Strips; FREESTYLE FREEDOM LITE . Check blood sugar 1x per day .  ICD10 code E11.69  Dispense: 100 Strip; Refill: 6  - Lancets; Per insurance coverage. Check blood sugar 1x per day . ICD10 code E11.69  Dispense: 200 Each; Refill: 6  - Alcohol Swabs; Wipe site with prep pad prior to injection 1x daily. ICD10 code E11.69  Dispense: 200 Each; Refill: 6  - HEMOGLOBIN A1C; Future  - Basic Metabolic Panel; Future  Recommend follow-up in approximately 3 months.  Lab test before the next visit    2. Gastroesophageal reflux disease, unspecified whether esophagitis present  Chronic stable.  Continue Dexilant 30 mg daily.  Continue follow-up with GI specialist      3. Vitamin D deficiency  Chronic condition.  Recommend patient to continue with vitamin D3 2000 unit daily    4. Kidney stone  Chronic condition for the patient asymptomatic.  Advised the patient stay well-hydrated.  Continue follow-up with urology.    5. Essential hypertension  - lisinopril (PRINIVIL) 10 MG Tab; Take 1 Tablet by mouth every day.  Dispense: 100 Tablet; Refill: 3  - MICROALBUMIN CREAT RATIO URINE; Future  Chronic stable.  Continue lisinopril 10 mg daily.  Recommend to continue to monitor her blood pressure on regular basis at home.    6. Dyslipidemia  - Lipid Profile; Future  Chronic condition.  Stable.  Continue atorvastatin 20 Mg daily    7. Overweight  Chronic condition.  Uncontrolled.  Recommend diet and lifestyle modification.  Encouraged patient to maintain ideal weight    8. Gastric ulcer  - dexlansoprazole (DEXILANT) 30 MG CAPSULE DELAYED RELEASE; Take 30 mg by mouth every day.  Chronic condition.  Current status unclear.  Advised the patient to follow-up with GI.  Continue Dexilant 30 Mg daily    9. Neurogenic claudication  Chronic condition.  Status post surgery January 2024.  Patient has completed physical therapy.  Continue  follow-up with spine specialist as directed    10. Osteoporosis  Chronic condition.  Uncontrolled.  Patient currently not on therapy.  As above the patient would like to start Prolia injection.  Every 6 months.  Other orders  - denosumab (Prolia) injection 60 mg                Time spent:  43   minutes     Reviewed medical records including:  October 18, 2024 orthopedic note  September 30, 2024 orthopedics note  June 24, 2024 urgent care note  May 22, 2024 medical office note  Febrile 21 2024 medical office note  January 19, 2024 orthopedic note  December 18, 2023 orthopedics note    That includes face to face time and non-face to face time.  Chart review before the visit, the actual patient visit, and time spent on documentation in the EMR after the visit.  Chart review/prep, review of other providers' records, Independent review of imagings/labs ,  time for history/examination , pt's counseling/education, ordering, prescribing, treatment plan discussed with patient, and care coordination.              Please note that this dictation was created using voice recognition software. I have made every reasonable attempt to correct obvious errors, but I expect that there are errors of grammar and possibly content that I did not discover before finalizing the note.    Emre Viramontes MD  Internal Medicine  [facility]

## 2024-11-20 NOTE — ASSESSMENT & PLAN NOTE
Chronic condition.  Started postsurgery January 2024.  Patient presently followed by surgeon.  She has completed physical therapy.  The patient currently taking gabapentin at bedtime.  Symptoms are well-controlled.

## 2024-11-20 NOTE — ASSESSMENT & PLAN NOTE
Chronic condition.  Patient is taking lisinopril.  The patient tolerating medication well.  The patient denies chest pain shortness of breath or palpitation.

## 2024-11-20 NOTE — ASSESSMENT & PLAN NOTE
Chronic condition.  The patient has been taking vitamin D supplementation.  Tolerating medication well.  No new symptoms reported.   Dm are you able to close this encounter, I was unable to - thank you

## 2024-11-21 ENCOUNTER — APPOINTMENT (RX ONLY)
Dept: URBAN - METROPOLITAN AREA CLINIC 22 | Facility: CLINIC | Age: 69
Setting detail: DERMATOLOGY
End: 2024-11-21

## 2024-11-21 DIAGNOSIS — L82.1 OTHER SEBORRHEIC KERATOSIS: ICD-10-CM

## 2024-11-21 DIAGNOSIS — D22 MELANOCYTIC NEVI: ICD-10-CM

## 2024-11-21 DIAGNOSIS — Z71.89 OTHER SPECIFIED COUNSELING: ICD-10-CM

## 2024-11-21 DIAGNOSIS — L81.4 OTHER MELANIN HYPERPIGMENTATION: ICD-10-CM

## 2024-11-21 DIAGNOSIS — L57.0 ACTINIC KERATOSIS: ICD-10-CM

## 2024-11-21 PROBLEM — D22.5 MELANOCYTIC NEVI OF TRUNK: Status: ACTIVE | Noted: 2024-11-21

## 2024-11-21 PROBLEM — D23.61 OTHER BENIGN NEOPLASM OF SKIN OF RIGHT UPPER LIMB, INCLUDING SHOULDER: Status: ACTIVE | Noted: 2024-11-21

## 2024-11-21 LAB — RETINAL SCREEN: NEGATIVE

## 2024-11-21 PROCEDURE — ? LIQUID NITROGEN

## 2024-11-21 PROCEDURE — ? SUNSCREEN RECOMMENDATIONS

## 2024-11-21 PROCEDURE — 17000 DESTRUCT PREMALG LESION: CPT

## 2024-11-21 PROCEDURE — 17003 DESTRUCT PREMALG LES 2-14: CPT

## 2024-11-21 PROCEDURE — ? COUNSELING

## 2024-11-21 PROCEDURE — 99213 OFFICE O/P EST LOW 20 MIN: CPT | Mod: 25

## 2024-11-21 ASSESSMENT — LOCATION ZONE DERM
LOCATION ZONE: FACE
LOCATION ZONE: TRUNK
LOCATION ZONE: ARM
LOCATION ZONE: NOSE

## 2024-11-21 ASSESSMENT — LOCATION DETAILED DESCRIPTION DERM
LOCATION DETAILED: RIGHT SUPERIOR MEDIAL MIDBACK
LOCATION DETAILED: LEFT POSTERIOR SHOULDER
LOCATION DETAILED: RIGHT INFERIOR CENTRAL MALAR CHEEK
LOCATION DETAILED: LEFT SUPERIOR FOREHEAD
LOCATION DETAILED: LEFT ANTERIOR SHOULDER
LOCATION DETAILED: LEFT VENTRAL PROXIMAL FOREARM
LOCATION DETAILED: LOWER STERNUM
LOCATION DETAILED: SUPERIOR MID FOREHEAD
LOCATION DETAILED: LEFT NASAL DORSUM
LOCATION DETAILED: RIGHT POSTERIOR SHOULDER
LOCATION DETAILED: LEFT SUPERIOR UPPER BACK
LOCATION DETAILED: UPPER STERNUM
LOCATION DETAILED: RIGHT VENTRAL PROXIMAL FOREARM
LOCATION DETAILED: INFERIOR MID FOREHEAD
LOCATION DETAILED: SUPERIOR THORACIC SPINE

## 2024-11-21 ASSESSMENT — LOCATION SIMPLE DESCRIPTION DERM
LOCATION SIMPLE: RIGHT SHOULDER
LOCATION SIMPLE: RIGHT CHEEK
LOCATION SIMPLE: RIGHT LOWER BACK
LOCATION SIMPLE: NOSE
LOCATION SIMPLE: INFERIOR FOREHEAD
LOCATION SIMPLE: LEFT SHOULDER
LOCATION SIMPLE: LEFT FOREHEAD
LOCATION SIMPLE: SUPERIOR FOREHEAD
LOCATION SIMPLE: LEFT FOREARM
LOCATION SIMPLE: CHEST
LOCATION SIMPLE: UPPER BACK
LOCATION SIMPLE: RIGHT FOREARM
LOCATION SIMPLE: LEFT UPPER BACK

## 2024-11-21 NOTE — PROCEDURE: SUNSCREEN RECOMMENDATIONS
Products Recommended: Teena Roque \\nElta md UV clear
General Sunscreen Counseling: I recommended a broad spectrum sunscreen with a SPF of 30 or higher.  I explained that SPF 30 sunscreens block approximately 97 percent of the sun's harmful rays.  Sunscreens should be applied at least 15 minutes prior to expected sun exposure and then every 2 hours after that as long as sun exposure continues. If swimming or exercising sunscreen should be reapplied every 45 minutes to an hour after getting wet or sweating.  One ounce, or the equivalent of a shot glass full of sunscreen, is adequate to protect the skin not covered by a bathing suit. I also recommended a lip balm with a sunscreen as well. Sun protective clothing can be used in lieu of sunscreen but must be worn the entire time you are exposed to the sun's rays.
Detail Level: Zone

## 2024-11-21 NOTE — PROCEDURE: LIQUID NITROGEN
Post-Care Instructions: I reviewed with the patient in detail post-care instructions. Patient is to wear sunprotection, and avoid picking at any of the treated lesions. Pt may apply Vaseline to crusted or scabbing areas.
Duration Of Freeze Thaw-Cycle (Seconds): 0
Show Applicator Variable?: Yes
Number Of Freeze-Thaw Cycles: 2 freeze-thaw cycles
Detail Level: Detailed
Consent: The patient's consent was obtained including but not limited to risks of crusting, scabbing, blistering, scarring, darker or lighter pigmentary change, recurrence, incomplete removal and infection.
Render Post-Care Instructions In Note?: no

## 2024-12-07 DIAGNOSIS — E11.42 DIABETIC POLYNEUROPATHY ASSOCIATED WITH TYPE 2 DIABETES MELLITUS (HCC): Chronic | ICD-10-CM

## 2024-12-09 ENCOUNTER — TELEPHONE (OUTPATIENT)
Dept: MEDICAL GROUP | Facility: PHYSICIAN GROUP | Age: 69
End: 2024-12-09
Payer: MEDICARE

## 2024-12-09 NOTE — TELEPHONE ENCOUNTER
DOCUMENTATION OF PAR STATUS:    1. Name of Medication & Dose: FreeStyle Lite Test Strips     2. Name of Prescription Coverage Company & phone #: [employer]    3. Date Prior Auth Submitted: 12/9/24    4. What information was given to obtain insurance decision? Cover My Meds    5. Prior Auth Status? Pending    6. Patient Notified: yes

## 2024-12-15 DIAGNOSIS — E78.5 DYSLIPIDEMIA: ICD-10-CM

## 2024-12-16 RX ORDER — ATORVASTATIN CALCIUM 20 MG/1
20 TABLET, FILM COATED ORAL EVERY EVENING
Qty: 90 TABLET | Refills: 0 | Status: SHIPPED | OUTPATIENT
Start: 2024-12-16

## 2024-12-16 NOTE — TELEPHONE ENCOUNTER
Received request via: Patient    Was the patient seen in the last year in this department? Yes    Does the patient have an active prescription (recently filled or refills available) for medication(s) requested? No        Does the patient have senior living Plus and need 100-day supply? (This applies to ALL medications) Patient does not have SCP

## 2024-12-17 ENCOUNTER — OUTPATIENT INFUSION SERVICES (OUTPATIENT)
Dept: ONCOLOGY | Facility: MEDICAL CENTER | Age: 69
End: 2024-12-17
Attending: INTERNAL MEDICINE
Payer: MEDICARE

## 2024-12-17 VITALS
SYSTOLIC BLOOD PRESSURE: 142 MMHG | RESPIRATION RATE: 18 BRPM | DIASTOLIC BLOOD PRESSURE: 71 MMHG | OXYGEN SATURATION: 97 % | BODY MASS INDEX: 39.56 KG/M2 | HEART RATE: 96 BPM | HEIGHT: 59 IN | TEMPERATURE: 97.2 F | WEIGHT: 196.21 LBS

## 2024-12-17 DIAGNOSIS — M81.0 AGE-RELATED OSTEOPOROSIS WITHOUT CURRENT PATHOLOGICAL FRACTURE: ICD-10-CM

## 2024-12-17 LAB
CA-I BLD ISE-SCNC: 1.2 MMOL/L (ref 1.1–1.3)
CREAT BLD-MCNC: 0.7 MG/DL (ref 0.5–1.4)

## 2024-12-17 PROCEDURE — 96372 THER/PROPH/DIAG INJ SC/IM: CPT

## 2024-12-17 PROCEDURE — 82565 ASSAY OF CREATININE: CPT

## 2024-12-17 PROCEDURE — 700111 HCHG RX REV CODE 636 W/ 250 OVERRIDE (IP): Mod: JZ,JG | Performed by: INTERNAL MEDICINE

## 2024-12-17 PROCEDURE — 36415 COLL VENOUS BLD VENIPUNCTURE: CPT

## 2024-12-17 PROCEDURE — 82330 ASSAY OF CALCIUM: CPT

## 2024-12-17 RX ORDER — METHYLPREDNISOLONE SODIUM SUCCINATE 125 MG/2ML
125 INJECTION, POWDER, LYOPHILIZED, FOR SOLUTION INTRAMUSCULAR; INTRAVENOUS PRN
OUTPATIENT
Start: 2025-06-15

## 2024-12-17 RX ORDER — DIPHENHYDRAMINE HYDROCHLORIDE 50 MG/ML
50 INJECTION INTRAMUSCULAR; INTRAVENOUS PRN
OUTPATIENT
Start: 2025-06-15

## 2024-12-17 RX ORDER — EPINEPHRINE 1 MG/ML(1)
0.5 AMPUL (ML) INJECTION PRN
OUTPATIENT
Start: 2025-06-15

## 2024-12-17 RX ADMIN — DENOSUMAB 60 MG: 60 INJECTION SUBCUTANEOUS at 15:36

## 2024-12-17 ASSESSMENT — PAIN DESCRIPTION - PAIN TYPE: TYPE: ACUTE PAIN

## 2024-12-17 ASSESSMENT — FIBROSIS 4 INDEX: FIB4 SCORE: 1.02

## 2024-12-18 DIAGNOSIS — E11.42 DIABETIC POLYNEUROPATHY ASSOCIATED WITH TYPE 2 DIABETES MELLITUS (HCC): Chronic | ICD-10-CM

## 2024-12-18 RX ORDER — GLUCOSAMINE HCL/CHONDROITIN SU 500-400 MG
CAPSULE ORAL
Qty: 200 EACH | Refills: 6 | Status: SHIPPED | OUTPATIENT
Start: 2024-12-18

## 2024-12-18 RX ORDER — LANCETS 30 GAUGE
EACH MISCELLANEOUS
Qty: 200 EACH | Refills: 6 | Status: SHIPPED | OUTPATIENT
Start: 2024-12-18

## 2024-12-18 NOTE — TELEPHONE ENCOUNTER
FINAL PRIOR AUTHORIZATION STATUS:    1.  Name of Medication & Dose: test strips     2. Prior Auth Status: Per your health plan's criteria, this drug is covered if you meet the following: You need to try one Lifescan One Touch test strip product (for example: One Touch Basic, One Touch Sure Step) or one Contour test strip product. The information provided does not show that you meet the criteria listed above. Please speak with your doctor about your options.    3. Action Taken: Pharmacy Notified: no Patient Notified: no

## 2024-12-18 NOTE — PROGRESS NOTES
Dee arrived ambulatory to [facility] for first Prolia injection. Plan of care reviewed, assessment completed. Education provided, educational handout provided, patient verbalized understanding and would like to continue with treatment today.     Patient educated on continuing to take vitamin D and Calcium as directed, patient verbalized understanding. Dee denies any oral surgery in the last 4 weeks and has none scheduled.     Butterfly needle used to obtain labs from L [facility], removed, site dressed with gauze and coban. POC creatinine and ionized calcium checked and within parameters, verified with pharmacy.    Prolia administered to R back of arm, patient tolerated well, site dressed with band-aid. Patient educated on when to call the doctor/go to the ER. Patient verbalized understanding. Scheduling contacted for next appointment, patient knows to check my chart.

## 2025-03-14 DIAGNOSIS — E78.5 DYSLIPIDEMIA: ICD-10-CM

## 2025-03-15 RX ORDER — ATORVASTATIN CALCIUM 20 MG/1
20 TABLET, FILM COATED ORAL EVERY EVENING
Qty: 90 TABLET | Refills: 3 | Status: SHIPPED | OUTPATIENT
Start: 2025-03-15

## 2025-04-14 ENCOUNTER — HOSPITAL ENCOUNTER (OUTPATIENT)
Dept: LAB | Facility: MEDICAL CENTER | Age: 70
End: 2025-04-14
Attending: INTERNAL MEDICINE
Payer: MEDICARE

## 2025-04-14 DIAGNOSIS — E78.5 DYSLIPIDEMIA: Chronic | ICD-10-CM

## 2025-04-14 DIAGNOSIS — E11.42 DIABETIC POLYNEUROPATHY ASSOCIATED WITH TYPE 2 DIABETES MELLITUS (HCC): ICD-10-CM

## 2025-04-14 DIAGNOSIS — I10 ESSENTIAL HYPERTENSION: Chronic | ICD-10-CM

## 2025-04-14 LAB
CREAT UR-MCNC: 154 MG/DL
EST. AVERAGE GLUCOSE BLD GHB EST-MCNC: 137 MG/DL
HBA1C MFR BLD: 6.4 % (ref 4–5.6)
MICROALBUMIN UR-MCNC: <1.2 MG/DL
MICROALBUMIN/CREAT UR: NORMAL MG/G (ref 0–30)

## 2025-04-14 PROCEDURE — 80061 LIPID PANEL: CPT

## 2025-04-14 PROCEDURE — 82043 UR ALBUMIN QUANTITATIVE: CPT

## 2025-04-14 PROCEDURE — 82570 ASSAY OF URINE CREATININE: CPT

## 2025-04-14 PROCEDURE — 83036 HEMOGLOBIN GLYCOSYLATED A1C: CPT | Mod: GA

## 2025-04-14 PROCEDURE — 80048 BASIC METABOLIC PNL TOTAL CA: CPT

## 2025-04-15 ENCOUNTER — RESULTS FOLLOW-UP (OUTPATIENT)
Dept: MEDICAL GROUP | Facility: PHYSICIAN GROUP | Age: 70
End: 2025-04-15
Payer: MEDICARE

## 2025-04-15 LAB
ANION GAP SERPL CALC-SCNC: 13 MMOL/L (ref 7–16)
BUN SERPL-MCNC: 14 MG/DL (ref 8–22)
CALCIUM SERPL-MCNC: 9.3 MG/DL (ref 8.5–10.5)
CHLORIDE SERPL-SCNC: 105 MMOL/L (ref 96–112)
CHOLEST SERPL-MCNC: 124 MG/DL (ref 100–199)
CO2 SERPL-SCNC: 20 MMOL/L (ref 20–33)
CREAT SERPL-MCNC: 0.75 MG/DL (ref 0.5–1.4)
GFR SERPLBLD CREATININE-BSD FMLA CKD-EPI: 85 ML/MIN/1.73 M 2
GLUCOSE SERPL-MCNC: 96 MG/DL (ref 65–99)
HDLC SERPL-MCNC: 52 MG/DL
LDLC SERPL CALC-MCNC: 45 MG/DL
POTASSIUM SERPL-SCNC: 4.3 MMOL/L (ref 3.6–5.5)
SODIUM SERPL-SCNC: 138 MMOL/L (ref 135–145)
TRIGL SERPL-MCNC: 137 MG/DL (ref 0–149)

## 2025-04-17 ENCOUNTER — HOSPITAL ENCOUNTER (OUTPATIENT)
Dept: LAB | Facility: MEDICAL CENTER | Age: 70
End: 2025-04-17
Attending: PHYSICIAN ASSISTANT
Payer: MEDICARE

## 2025-04-17 LAB
BASOPHILS # BLD AUTO: 0.7 % (ref 0–1.8)
BASOPHILS # BLD: 0.05 K/UL (ref 0–0.12)
CRP SERPL HS-MCNC: 1.2 MG/DL (ref 0–0.75)
EOSINOPHIL # BLD AUTO: 0.04 K/UL (ref 0–0.51)
EOSINOPHIL NFR BLD: 0.6 % (ref 0–6.9)
ERYTHROCYTE [DISTWIDTH] IN BLOOD BY AUTOMATED COUNT: 46.4 FL (ref 35.9–50)
ERYTHROCYTE [SEDIMENTATION RATE] IN BLOOD BY WESTERGREN METHOD: 14 MM/HOUR (ref 0–25)
HCT VFR BLD AUTO: 44.1 % (ref 37–47)
HGB BLD-MCNC: 15.1 G/DL (ref 12–16)
IMM GRANULOCYTES # BLD AUTO: 0.01 K/UL (ref 0–0.11)
IMM GRANULOCYTES NFR BLD AUTO: 0.1 % (ref 0–0.9)
LYMPHOCYTES # BLD AUTO: 1.57 K/UL (ref 1–4.8)
LYMPHOCYTES NFR BLD: 22.3 % (ref 22–41)
MCH RBC QN AUTO: 32.5 PG (ref 27–33)
MCHC RBC AUTO-ENTMCNC: 34.2 G/DL (ref 32.2–35.5)
MCV RBC AUTO: 94.8 FL (ref 81.4–97.8)
MONOCYTES # BLD AUTO: 0.58 K/UL (ref 0–0.85)
MONOCYTES NFR BLD AUTO: 8.2 % (ref 0–13.4)
NEUTROPHILS # BLD AUTO: 4.8 K/UL (ref 1.82–7.42)
NEUTROPHILS NFR BLD: 68.1 % (ref 44–72)
NRBC # BLD AUTO: 0 K/UL
NRBC BLD-RTO: 0 /100 WBC (ref 0–0.2)
PLATELET # BLD AUTO: 325 K/UL (ref 164–446)
PMV BLD AUTO: 11 FL (ref 9–12.9)
RBC # BLD AUTO: 4.65 M/UL (ref 4.2–5.4)
WBC # BLD AUTO: 7.1 K/UL (ref 4.8–10.8)

## 2025-04-17 PROCEDURE — 36415 COLL VENOUS BLD VENIPUNCTURE: CPT

## 2025-04-17 PROCEDURE — 85652 RBC SED RATE AUTOMATED: CPT

## 2025-04-17 PROCEDURE — 85025 COMPLETE CBC W/AUTO DIFF WBC: CPT | Mod: GA

## 2025-04-17 PROCEDURE — 86140 C-REACTIVE PROTEIN: CPT

## 2025-04-21 ENCOUNTER — OFFICE VISIT (OUTPATIENT)
Dept: MEDICAL GROUP | Facility: PHYSICIAN GROUP | Age: 70
End: 2025-04-21
Payer: MEDICARE

## 2025-04-21 VITALS
BODY MASS INDEX: 35.6 KG/M2 | DIASTOLIC BLOOD PRESSURE: 68 MMHG | HEART RATE: 72 BPM | RESPIRATION RATE: 16 BRPM | WEIGHT: 176.6 LBS | TEMPERATURE: 97.8 F | HEIGHT: 59 IN | SYSTOLIC BLOOD PRESSURE: 120 MMHG | OXYGEN SATURATION: 97 %

## 2025-04-21 DIAGNOSIS — E66.3 OVERWEIGHT: Chronic | ICD-10-CM

## 2025-04-21 DIAGNOSIS — R29.818 NEUROGENIC CLAUDICATION: ICD-10-CM

## 2025-04-21 DIAGNOSIS — Z02.9 ADMINISTRATIVE ENCOUNTER: ICD-10-CM

## 2025-04-21 DIAGNOSIS — I15.2 HYPERTENSION ASSOCIATED WITH TYPE 2 DIABETES MELLITUS (HCC): Chronic | ICD-10-CM

## 2025-04-21 DIAGNOSIS — E78.5 DYSLIPIDEMIA ASSOCIATED WITH TYPE 2 DIABETES MELLITUS (HCC): ICD-10-CM

## 2025-04-21 DIAGNOSIS — N20.0 KIDNEY STONE: Chronic | ICD-10-CM

## 2025-04-21 DIAGNOSIS — E55.9 VITAMIN D DEFICIENCY: Chronic | ICD-10-CM

## 2025-04-21 DIAGNOSIS — K44.9 HIATAL HERNIA: Chronic | ICD-10-CM

## 2025-04-21 DIAGNOSIS — E11.42 DIABETIC POLYNEUROPATHY ASSOCIATED WITH TYPE 2 DIABETES MELLITUS (HCC): Chronic | ICD-10-CM

## 2025-04-21 DIAGNOSIS — E11.59 HYPERTENSION ASSOCIATED WITH TYPE 2 DIABETES MELLITUS (HCC): Chronic | ICD-10-CM

## 2025-04-21 DIAGNOSIS — E11.69 DYSLIPIDEMIA ASSOCIATED WITH TYPE 2 DIABETES MELLITUS (HCC): ICD-10-CM

## 2025-04-21 DIAGNOSIS — M81.0 AGE-RELATED OSTEOPOROSIS WITHOUT CURRENT PATHOLOGICAL FRACTURE: Chronic | ICD-10-CM

## 2025-04-21 DIAGNOSIS — G62.9 PERIPHERAL POLYNEUROPATHY: ICD-10-CM

## 2025-04-21 PROBLEM — K25.7 CHRONIC GASTRIC ULCER WITHOUT HEMORRHAGE AND WITHOUT PERFORATION: Status: RESOLVED | Noted: 2023-10-06 | Resolved: 2025-04-21

## 2025-04-21 PROBLEM — F51.01 PRIMARY INSOMNIA: Chronic | Status: ACTIVE | Noted: 2025-04-21

## 2025-04-21 PROBLEM — F51.01 PRIMARY INSOMNIA: Status: ACTIVE | Noted: 2025-04-21

## 2025-04-21 PROCEDURE — 3074F SYST BP LT 130 MM HG: CPT | Performed by: INTERNAL MEDICINE

## 2025-04-21 PROCEDURE — 99215 OFFICE O/P EST HI 40 MIN: CPT | Performed by: INTERNAL MEDICINE

## 2025-04-21 PROCEDURE — 3078F DIAST BP <80 MM HG: CPT | Performed by: INTERNAL MEDICINE

## 2025-04-21 RX ORDER — PREDNISONE 20 MG/1
TABLET ORAL
COMMUNITY
Start: 2025-04-17

## 2025-04-21 RX ORDER — TRAMADOL HYDROCHLORIDE 50 MG/1
TABLET ORAL
COMMUNITY
Start: 2025-04-17

## 2025-04-21 ASSESSMENT — PATIENT HEALTH QUESTIONNAIRE - PHQ9: CLINICAL INTERPRETATION OF PHQ2 SCORE: 0

## 2025-04-21 ASSESSMENT — FIBROSIS 4 INDEX: FIB4 SCORE: 0.93

## 2025-04-21 NOTE — PROGRESS NOTES
PRIMARY CARE CLINIC VISIT        Chief Complaint   Patient presents with    Follow-Up      Follow-up diabetes  Request DMV form to be completed  Neurogenic claudication  Overweight  Had a hernia  Hypertension  Hyperlipidemia  Peripheral neuropathy  Vitamin D deficiency  Kidney stone  Osteoporosis  Test result  Medication review    History of Present Illness     Diabetic polyneuropathy associated with type 2 diabetes mellitus (HCC)  Chronic issue.  Patient currently taking metformin and gabapentin.  Patient tolerating the treatment well.  Recent A1c 6.4%.    Neurogenic claudication  Chronic issue. sp surgery performed by Dr. Rodriguez .   Patient denies fever chills patient denies bowel bladder dysfunction.  No new symptoms reported.  Patient presently taking gabapentin.    Overweight  Body mass index is 35.67 kg/m².   Chronic condition.  Recommend pt to follow a healthy , well balance diet  Pt to continue with regular exercise activity and to maintain ideal weight.        Hiatal hernia  Chronic condition.  The patient followed by GI specialist.  Patient had EGD done previously in 2023 showed hiatal hernia.  Patient denies nausea vomiting or dysphagia.    Hypertension associated with type 2 diabetes mellitus (HCC)  Chronic issue.  Patient presently taking lisinopril.  Patient tolerating the treatment well.  Patient currently asymptomatic.    Dyslipidemia associated with type 2 diabetes mellitus (HCC)  Issues.  Patient is taking atorvastatin.  Patient tolerated the treatment well.  Recent lab test result discussed with the patient.    Peripheral polyneuropathy, lower ext bilat  This is a chronic condition.  The patient currently taking amitriptyline.  Patient tolerated the treatment well.  No new symptoms reported.    Vitamin D deficiency  Chronic issues.  Patient is taking vitamin D supplementation.  No new symptoms reported.    Kidney stone  Chronic condition.  The patient currently asymptomatic.  Patient denies dysuria  or hematuria    Osteoporosis  This is a chronic condition.  The patient completed alendronate treatment for 5 years.  Presently receiving Prolia injection every 6 months.  Patient tolerating the treatment well.  No new symptoms reported.    Administrative encounter  Pt requests dmv form to be completed    Current Outpatient Medications on File Prior to Visit   Medication Sig Dispense Refill    predniSONE (DELTASONE) 20 MG Tab TAKE 1 TABLET EVERY DAY BY ORAL ROUTE, FOR PAIN.      traMADol (ULTRAM) 50 MG Tab TAKE 1 TABLET EVERY 8 HOURS BY ORAL ROUTE, FOR WRIST PAIN RATED 7-10/10 IN SEVERITY      atorvastatin (LIPITOR) 20 MG Tab TAKE 1 TABLET BY MOUTH EVERY DAY IN THE EVENING 90 Tablet 3    metFORMIN ER (GLUCOPHAGE XR) 500 MG TABLET SR 24 HR Take 1 Tablet by mouth every day. 90 Tablet 3    lisinopril (PRINIVIL) 10 MG Tab Take 1 Tablet by mouth every day. 100 Tablet 3    amitriptyline (ELAVIL) 10 MG Tab TAKE 1 TABLET BY MOUTH IN THE  EVENING 90 Tablet 3    estradiol (ESTRACE) 0.1 MG/GM vaginal cream Insert  into the vagina every day.      Clobetasol Propionate 0.05 % Gel Apply  topically.      vitamin D3 2000 UNIT Tab Take 1 Tablet by mouth every day. 60 Tablet 0    Blood Glucose Meter Kit Freestyle FREEDOM Lite.  Check blood sugar  1x per day . ICD10 code E11.69 1 Kit 0    Blood Glucose Test Strips FREESTYLE FREEDOM LITE . Check blood sugar 1x per day .  ICD10 code E11.69 100 Strip 6    Lancets Per insurance coverage. Check blood sugar 1x per day . ICD10 code E11.69 200 Each 6    Alcohol Swabs Wipe site with prep pad prior to injection 1x daily. ICD10 code E11.69 200 Each 6    Multiple Vitamins-Minerals (MULTIVITAMIN ADULT) Chew Tab Chew.      Ascorbic Acid (VITAMIN C) 500 MG Cap Take  by mouth.      Fiber Complete Tab Take  by mouth.      gabapentin (NEURONTIN) 300 MG Cap Take 1 Capsule by mouth every evening. (Patient taking differently: Take 600 mg by mouth every evening.) 90 Capsule 0    acetaminophen (TYLENOL) 325  MG Tab Take 2 Tablets by mouth every 6 hours. 30 Tablet 0     No current facility-administered medications on file prior to visit.        Allergies: Patient has no known allergies.    Current Outpatient Medications Ordered in Epic   Medication Sig Dispense Refill    predniSONE (DELTASONE) 20 MG Tab TAKE 1 TABLET EVERY DAY BY ORAL ROUTE, FOR PAIN.      traMADol (ULTRAM) 50 MG Tab TAKE 1 TABLET EVERY 8 HOURS BY ORAL ROUTE, FOR WRIST PAIN RATED 7-10/10 IN SEVERITY      atorvastatin (LIPITOR) 20 MG Tab TAKE 1 TABLET BY MOUTH EVERY DAY IN THE EVENING 90 Tablet 3    metFORMIN ER (GLUCOPHAGE XR) 500 MG TABLET SR 24 HR Take 1 Tablet by mouth every day. 90 Tablet 3    lisinopril (PRINIVIL) 10 MG Tab Take 1 Tablet by mouth every day. 100 Tablet 3    amitriptyline (ELAVIL) 10 MG Tab TAKE 1 TABLET BY MOUTH IN THE  EVENING 90 Tablet 3    estradiol (ESTRACE) 0.1 MG/GM vaginal cream Insert  into the vagina every day.      Clobetasol Propionate 0.05 % Gel Apply  topically.      vitamin D3 2000 UNIT Tab Take 1 Tablet by mouth every day. 60 Tablet 0    Blood Glucose Meter Kit Freestyle FREEDOM Lite.  Check blood sugar  1x per day . ICD10 code E11.69 1 Kit 0    Blood Glucose Test Strips FREESTYLE FREEDOM LITE . Check blood sugar 1x per day .  ICD10 code E11.69 100 Strip 6    Lancets Per insurance coverage. Check blood sugar 1x per day . ICD10 code E11.69 200 Each 6    Alcohol Swabs Wipe site with prep pad prior to injection 1x daily. ICD10 code E11.69 200 Each 6    Multiple Vitamins-Minerals (MULTIVITAMIN ADULT) Chew Tab Chew.      Ascorbic Acid (VITAMIN C) 500 MG Cap Take  by mouth.      Fiber Complete Tab Take  by mouth.      gabapentin (NEURONTIN) 300 MG Cap Take 1 Capsule by mouth every evening. (Patient taking differently: Take 600 mg by mouth every evening.) 90 Capsule 0    acetaminophen (TYLENOL) 325 MG Tab Take 2 Tablets by mouth every 6 hours. 30 Tablet 0     No current Epic-ordered facility-administered medications on file.        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 ([place] fever), medicated for a year, yearly xrays    Cough 05/29/2015    Diverticulosis     GERD (gastroesophageal reflux disease)     Heart burn     Hiatus hernia syndrome 2023    High cholesterol     Hypertension     Indigestion     LBP (low back pain)     Lumbar radicular pain 05/29/2015    Muscle disorder     Obesity     Osteoporosis of forearm     Alendronate started 6/18    Pain     Shoulder and numbness to R LE, hip       Past Surgical History:   Procedure Laterality Date    FUSION, SPINE, LUMBAR, PLIF N/A 1/30/2024    Procedure: FUSION, SPINE, LUMBAR, L2-S1;  Surgeon: Tim Rodriguez M.D.;  Location: [facility];  Service: Neurosurgery    LUMBAR FUSION O-ARM Left 1/25/2024    Procedure: STAGE #2 LEFT LUMBAR EXTREME LATERAL INTERBODY FUSION, LUMBAR 2-SACRAL 1 FUSION WITH POSSIBLE REDO RIGHT LUMBAR 2-3 LAMINOTOMIES AND HARDWARE REMOVAL;  Surgeon: Tim Rodriguez M.D.;  Location: [facility];  Service: Neurosurgery    FUSION, SPINE, XLIF Left 1/25/2024    Procedure: FUSION, SPINE, XLIF;  Surgeon: Tim Rodriguez M.D.;  Location: [facility];  Service: Neurosurgery    LUMBAR FUSION ANTERIOR N/A 1/23/2024    Procedure: STAGE #1 LUMBAR 4-SACRAL 1 ANTERIOR LUMBAR INTERBODY FUSION;  Surgeon: Tim Rodriguez M.D.;  Location: [facility];  Service: Neurosurgery    UT CYSTOSCOPY,INSERT URETERAL STENT Right 06/08/2023    Procedure: CYSTOSCOPY, WITH RIGHT URETEROSCOPY, WITH LITHOTRIPSY, WITH INSERTION OF RIGHT URETERAL STENT;  Surgeon: Amol Sullivan M.D.;  Location: [facility];  Service: Urology    UT CYSTO/URETERO/PYELOSCOPY, DX Right 06/08/2023    Procedure: URETEROSCOPY;  Surgeon: Amol Sullivan M.D.;  Location: [facility];  Service: Urology    LASERTRIPSY Right 06/08/2023    Procedure: LITHOTRIPSY, USING LASER;  Surgeon:  Amol Sullivan M.D.;  Location: [facility];  Service: Urology    HIP REVISION TOTAL Left 05/13/2021    Procedure: REVISION, TOTAL ARTHROPLASTY, HIP.;  Surgeon: Amol Hogue M.D.;  Location: [facility];  Service: Orthopedics    NC TOTAL HIP ARTHROPLASTY Left 09/19/2019    Procedure: ARTHROPLASTY, HIP, TOTAL, ANTERIOR APPROACH;  Surgeon: Clarence Vallejo M.D.;  Location: [facility];  Service: Orthopedics    INCISION HERNIA REPAIR Right 11/26/2018    Procedure: INCISION HERNIA REPAIR- FLANK WITH MESH;  Surgeon: Misael Ramírez M.D.;  Location: [facility];  Service: General    HYSTERECTOMY ROBOTIC  06/07/2017    Procedure: HYSTERECTOMY ROBOTIC SI, BILATERAL SALPINGO-OOPHORECTOMY, URETERAL SACRAL LIGAMENT SHORTENING ;  Surgeon: Jerica Hooper M.D.;  Location: [facility];  Service:     CYSTOSCOPY  06/07/2017    Procedure: CYSTOSCOPY;  Surgeon: Jerica Hooper M.D.;  Location: [facility];  Service:     SHOULDER DECOMPRESSION ARTHROSCOPIC Right 12/06/2016    Procedure: SHOULDER DECOMPRESSION ARTHROSCOPIC - SUBACROMIAL, MANJARREZ;  Surgeon: Kyle Claros M.D.;  Location: [facility];  Service:     CLAVICLE DISTAL EXCISION Right 12/06/2016    Procedure: CLAVICLE DISTAL EXCISION;  Surgeon: Kyle Claros M.D.;  Location: [facility];  Service:     SHOULDER ARTHROSCOPY W/ ROTATOR CUFF REPAIR Right 12/06/2016    Procedure: SHOULDER ARTHROSCOPY W/ ROTATOR CUFF REPAIR ;  Surgeon: Kyle Claros M.D.;  Location: [facility];  Service:     SHOULDER ARTHROSCOPY W/ BICIPITAL TENODESIS REPAIR Right 12/06/2016    Procedure: SHOULDER ARTHROSCOPY W/ BICIPITAL TENODESIS REPAIR ;  Surgeon: Kyle Claros M.D.;  Location: [facility];  Service:     FUSION, SPINE, LUMBAR, PLIF  02/09/2016    Procedure: LUMBAR FUSION POSTERIOR PEDICLE SCREW FIXATION (STAGE #2);  Surgeon: Tim Rodriguez M.D.;  Location: [facility];  Service:     LUMBAR LAMINECTOMY DISKECTOMY  2016    Procedure: LUMBAR LAMINECTOMY DISKECTOMY MINI OPEN;  Surgeon: Tim Rodriguez M.D.;  Location: SURGERY [facility];  Service:     FUSION, SPINE, XLIF Right 2016    Procedure: EXTREME LATERAL INTERBODY FUSION L3-4 (STAGE #1);  Surgeon: Tim Rodriguez M.D.;  Location: SURGERY [facility];  Service:     RECOVERY  2015    Procedure: CT-CT GUIDED LEFT LUNG BIOPSY-;  Surgeon: Recoveryonoliver Surgery;  Location: SURGERY PRE-POST PROC UNIT [facility];  Service:     REDDY BY LAPAROSCOPY  2014    Performed by Ankur Lerner M.D. at SURGERY SAME DAY [facility]    EGD WITH ASP/BX  2014    mild chronic inactive gastritis, scar gastric antrum-    EGD WITH ASP/BX  2011    possible barrettes, hiatal hernia, gastritis    COLONOSCOPY  2011    diverticulosis sigmoid colon, hemorrhoids    ARTHROTOMY  2010    Performed by YARELY MORRISON at SURGERY SAME DAY [facility]S    ARTHRODESIS  2010    Performed by YARELY MORRISON at SURGERY SAME DAY [facility]S    ORTHOPEDIC OSTEOTOMY  2010    Performed by YARELY MORRISON at SURGERY SAME DAY [facility]    BONE SPUR EXCISION  2010    Performed by YARELY MORRISON at SURGERY SAME DAY [facility]    OTHER ABDOMINAL SURGERY  2000    COLON RESECTION    LAMINOTOMY      OTHER  2000    Diverticulitis    OTHER ORTHOPEDIC SURGERY      PARDEEP CARPAL TUNNEL RELEASES       Family History   Problem Relation Age of Onset    Cancer Mother         lymphoma    Stroke Father     Diabetes Father        Social History     Tobacco Use   Smoking Status Former    Current packs/day: 0.00    Types: Cigarettes    Quit date: 1977    Years since quittin.3   Smokeless Tobacco Never   Tobacco Comments    2 to 3 cigarettes a month back in -1977       Social History     Substance and Sexual Activity   Alcohol Use Not Currently    Comment: One drink about every four  "months       Review of systems  As per HPI above. All other systems reviewed and negative.      Past Medical, Social, and Family history reviewed and updated in EPIC       LAB DATA:     I have independently reviewed / interpreted labs    Lab Results   Component Value Date/Time    HBA1C 6.4 (H) 04/14/2025 07:40 AM    HBA1C 6.9 (H) 11/12/2024 07:54 AM    HBA1C 5.9 (H) 02/04/2024 05:17 AM        Lab Results   Component Value Date/Time    WBC 7.1 04/17/2025 01:33 PM    HEMOGLOBIN 15.1 04/17/2025 01:33 PM    HEMATOCRIT 44.1 04/17/2025 01:33 PM    MCV 94.8 04/17/2025 01:33 PM    PLATELETCT 325 04/17/2025 01:33 PM       Lab Results   Component Value Date/Time    SODIUM 138 04/14/2025 07:40 AM    POTASSIUM 4.3 04/14/2025 07:40 AM    GLUCOSE 96 04/14/2025 07:40 AM    BUN 14 04/14/2025 07:40 AM    CREATININE 0.75 04/14/2025 07:40 AM       Lab Results   Component Value Date/Time    CHOLSTRLTOT 124 04/14/2025 07:40 AM    TRIGLYCERIDE 137 04/14/2025 07:40 AM    HDL 52 04/14/2025 07:40 AM    LDL 45 04/14/2025 07:40 AM       Lab Results   Component Value Date/Time    ALTSGPT 18 11/12/2024 07:54 AM          Objective     /68 (BP Location: Right arm, Patient Position: Sitting, BP Cuff Size: Large adult)   Pulse 72   Temp 36.6 °C (97.8 °F) (Temporal)   Resp 16   Ht 1.499 m (4' 11\")   Wt 80.1 kg (176 lb 9.6 oz)   SpO2 97%    Body mass index is 35.67 kg/m².    General: alert in no apparent distress.  Cardiovascular: regular rate and rhythm  Pulmonary: lungs : no wheezing   Gastrointestinal: BS present.       Assessment and Plan     1. Diabetic polyneuropathy associated with type 2 diabetes mellitus (HCC)  - HEMOGLOBIN A1C; Future  - Basic Metabolic Panel; Future  Chronic issue.  Stable.  A1c 6.4%.  Continue metformin 500 mg daily.  Clinical notes:   -Discussed with the patient goals for Diabetes management:   HbA1C < 7% /  Fasting BG   /  2 hrs post meal BG below 160  -Reviewed pathophysiology of diabetes and the " importance of glycemic control to reduce the risk of diabetes related complications   Microvascular: retinopathy, neuropathy, nephropathy  Macrovascular: heart attack, stroke, peripheral vascular dz  -Advised pt to monitor sugar closely  -Counseled pt the importance of recognizing and treating hypoglycemia.   -The importance of increasing physical activity to improve diabetes control  discussed with the patient.   -Patient was also educated on changing diet and making better choices to help control blood sugar.          2. Neurogenic claudication  Chronic issue.  Stable.  Patient to follow-up with spine specialist as directed    3. Overweight  Chronic issue.  improved status.  With diet exercise patient has lost some weight.  Patient stated that she will try to lose more weight    4. Hiatal hernia  Chronic issue.  Stable.  Patient currently not on therapy.  Patient currently asymptomatic    5. Hypertension associated with type 2 diabetes mellitus (HCC)  - MICROALBUMIN CREAT RATIO URINE; Future  Issue.  Stable.  BP normal today.  Continue lisinopril 10 mg daily    6. Dyslipidemia associated with type 2 diabetes mellitus (HCC)  - ALANINE AMINO-TRANS; Future  - Lipid Profile; Future  Chronic issue.  Stable.  Continue atorvastatin 20 Mg daily    7. Peripheral polyneuropathy  Chronic issue.  Stable.  Continue amitriptyline 10 mg at bedtime as needed    8. Vitamin D deficiency  Chronic issue.  Stable.  Continue vitamin D supplementation    9. Kidney stone  Chronic issue per patient currently asymptomatic.  Recommend patient to stay well-hydrated.  Continue to monitor    10. Osteoporosis  - DS-BONE DENSITY STUDY (DEXA); Future  Chronic condition.  Stable continue Prolia injection every 6 months.  Patient request bone density to be done    11. Administrative encounter  DMV form completed for the patient in the office today      Recommend follow-up in 6 months with lab test prior        Time spent:   42  minutes      Additional time required to fill out the DMV form.  Reviewed medical records including:  April 17, 2025 orthopedics note  April 12, 2025 orthopedics note  November 20, 2024 medical office note  October 18, 2024 spine specialist note  September 30, 2024 orthopedics note  September 16, 2024 urology note    Total time includes face to face time and non-face to face time.  Chart review before the visit, the actual patient visit, and time spent on documentation in the EMR after the visit.  Chart review/prep, review of other providers' records, Independent review of imagings/labs ,  time for history/examination , pt's counseling/education, ordering, prescribing, treatment plan discussed with patient, and care coordination.            Please note that this dictation was created using voice recognition software. I have made every reasonable attempt to correct obvious errors, but I expect that there are errors of grammar and possibly content that I did not discover before finalizing the note.    Emre Viramontes MD  Internal Medicine  [facility] care [facility]

## 2025-04-21 NOTE — ASSESSMENT & PLAN NOTE
Chronic issue.  Patient sp surgery performed by Dr. Rodriguez before.   Patient denies fever chills patient denies bowel bladder dysfunction.  No new symptoms reported.  Patient presently taking gabapentin.

## 2025-04-21 NOTE — ASSESSMENT & PLAN NOTE
Chronic issue.  Patient currently taking metformin and gabapentin.  Patient tolerating the treatment well.  Recent A1c 6.4%.

## 2025-04-21 NOTE — ASSESSMENT & PLAN NOTE
This is a chronic condition.  The patient completed alendronate treatment for 5 years.  Presently receiving Prolia injection every 6 months.  Patient tolerating the treatment well.  No new symptoms reported.

## 2025-04-21 NOTE — ASSESSMENT & PLAN NOTE
Chronic issue.  Patient presently taking lisinopril.  Patient tolerating the treatment well.  Patient currently asymptomatic.

## 2025-04-21 NOTE — ASSESSMENT & PLAN NOTE
Chronic condition.  The patient followed by GI specialist.  Patient had EGD done previously in 2023 showed hiatal hernia.  Patient denies nausea vomiting or dysphagia.

## 2025-04-21 NOTE — ASSESSMENT & PLAN NOTE
Issues.  Patient is taking atorvastatin.  Patient tolerated the treatment well.  Recent lab test result discussed with the patient.

## 2025-04-21 NOTE — ASSESSMENT & PLAN NOTE
This is a chronic condition.  The patient currently taking amitriptyline.  Patient tolerated the treatment well.  No new symptoms reported.

## 2025-04-21 NOTE — ASSESSMENT & PLAN NOTE
Body mass index is 35.67 kg/m².   Chronic condition.  Recommend pt to follow a healthy , well balance diet  Pt to continue with regular exercise activity and to maintain ideal weight.

## 2025-04-30 ENCOUNTER — HOSPITAL ENCOUNTER (OUTPATIENT)
Dept: LAB | Facility: MEDICAL CENTER | Age: 70
End: 2025-04-30
Attending: PHYSICIAN ASSISTANT
Payer: MEDICARE

## 2025-04-30 LAB
BASOPHILS # BLD AUTO: 0.5 % (ref 0–1.8)
BASOPHILS # BLD: 0.03 K/UL (ref 0–0.12)
CRP SERPL HS-MCNC: 4.11 MG/DL (ref 0–0.75)
EOSINOPHIL # BLD AUTO: 0.14 K/UL (ref 0–0.51)
EOSINOPHIL NFR BLD: 2.3 % (ref 0–6.9)
ERYTHROCYTE [DISTWIDTH] IN BLOOD BY AUTOMATED COUNT: 47 FL (ref 35.9–50)
ERYTHROCYTE [SEDIMENTATION RATE] IN BLOOD BY WESTERGREN METHOD: 34 MM/HOUR (ref 0–25)
HCT VFR BLD AUTO: 42.9 % (ref 37–47)
HGB BLD-MCNC: 14.3 G/DL (ref 12–16)
IMM GRANULOCYTES # BLD AUTO: 0.02 K/UL (ref 0–0.11)
IMM GRANULOCYTES NFR BLD AUTO: 0.3 % (ref 0–0.9)
LYMPHOCYTES # BLD AUTO: 1.08 K/UL (ref 1–4.8)
LYMPHOCYTES NFR BLD: 17.7 % (ref 22–41)
MCH RBC QN AUTO: 32.2 PG (ref 27–33)
MCHC RBC AUTO-ENTMCNC: 33.3 G/DL (ref 32.2–35.5)
MCV RBC AUTO: 96.6 FL (ref 81.4–97.8)
MONOCYTES # BLD AUTO: 0.41 K/UL (ref 0–0.85)
MONOCYTES NFR BLD AUTO: 6.7 % (ref 0–13.4)
NEUTROPHILS # BLD AUTO: 4.42 K/UL (ref 1.82–7.42)
NEUTROPHILS NFR BLD: 72.5 % (ref 44–72)
NRBC # BLD AUTO: 0 K/UL
NRBC BLD-RTO: 0 /100 WBC (ref 0–0.2)
PLATELET # BLD AUTO: 347 K/UL (ref 164–446)
PMV BLD AUTO: 10 FL (ref 9–12.9)
RBC # BLD AUTO: 4.44 M/UL (ref 4.2–5.4)
URATE SERPL-MCNC: 3.8 MG/DL (ref 1.9–8.2)
WBC # BLD AUTO: 6.1 K/UL (ref 4.8–10.8)

## 2025-04-30 PROCEDURE — 86140 C-REACTIVE PROTEIN: CPT

## 2025-04-30 PROCEDURE — 36415 COLL VENOUS BLD VENIPUNCTURE: CPT

## 2025-04-30 PROCEDURE — 85652 RBC SED RATE AUTOMATED: CPT

## 2025-04-30 PROCEDURE — 85025 COMPLETE CBC W/AUTO DIFF WBC: CPT | Mod: GA

## 2025-04-30 PROCEDURE — 84550 ASSAY OF BLOOD/URIC ACID: CPT

## 2025-05-01 ENCOUNTER — HOSPITAL ENCOUNTER (OUTPATIENT)
Dept: RADIOLOGY | Facility: MEDICAL CENTER | Age: 70
End: 2025-05-01
Attending: INTERNAL MEDICINE
Payer: MEDICARE

## 2025-05-01 DIAGNOSIS — M81.0 AGE-RELATED OSTEOPOROSIS WITHOUT CURRENT PATHOLOGICAL FRACTURE: Chronic | ICD-10-CM

## 2025-05-01 PROCEDURE — 77080 DXA BONE DENSITY AXIAL: CPT

## 2025-05-03 ENCOUNTER — RESULTS FOLLOW-UP (OUTPATIENT)
Dept: MEDICAL GROUP | Facility: PHYSICIAN GROUP | Age: 70
End: 2025-05-03
Payer: MEDICARE

## 2025-05-05 ENCOUNTER — HOSPITAL ENCOUNTER (OUTPATIENT)
Dept: LAB | Facility: MEDICAL CENTER | Age: 70
End: 2025-05-05
Attending: PHYSICIAN ASSISTANT
Payer: MEDICARE

## 2025-05-05 PROCEDURE — 86039 ANTINUCLEAR ANTIBODIES (ANA): CPT

## 2025-05-05 PROCEDURE — 86038 ANTINUCLEAR ANTIBODIES: CPT

## 2025-05-05 PROCEDURE — 36415 COLL VENOUS BLD VENIPUNCTURE: CPT

## 2025-05-08 LAB — NUCLEAR IGG SER QL IA: DETECTED

## 2025-05-09 LAB
ANA PAT SER IF-IMP: NORMAL
NUCLEAR IGG SER QL IF: NORMAL

## 2025-05-16 ENCOUNTER — HOSPITAL ENCOUNTER (OUTPATIENT)
Dept: LAB | Facility: MEDICAL CENTER | Age: 70
End: 2025-05-16
Attending: PHYSICIAN ASSISTANT
Payer: MEDICARE

## 2025-05-16 PROCEDURE — 86812 HLA TYPING A B OR C: CPT | Mod: GA

## 2025-05-20 LAB — HLA-B27 QL FC: NEGATIVE

## 2025-05-21 DIAGNOSIS — Z98.1 S/P LUMBAR FUSION: ICD-10-CM

## 2025-05-21 RX ORDER — GABAPENTIN 300 MG/1
600 CAPSULE ORAL EVERY EVENING
Qty: 180 CAPSULE | Refills: 3 | Status: SHIPPED | OUTPATIENT
Start: 2025-05-21

## 2025-05-21 NOTE — TELEPHONE ENCOUNTER
Could I get an refill for Gabapentin  CAP 300mg. I take this twice a day.  It is though [facility].  This is for cramp leg and foot.     Thank you.

## 2025-05-21 NOTE — TELEPHONE ENCOUNTER
Received request via: Patient    Was the patient seen in the last year in this department? Yes    Does the patient have an active prescription (recently filled or refills available) for medication(s) requested? No    [facility] - [address] [address]  Phone: [phone] Fax: [phone]    Does the patient have correction Plus and need 100-day supply? (This applies to ALL medications) Patient does not have SCP   Bilobed Transposition Flap Text: The defect edges were debeveled with a #15 scalpel blade.  Given the location of the defect and the proximity to free margins a bilobed transposition flap was deemed most appropriate.  Using a sterile surgical marker, an appropriate bilobe flap drawn around the defect.    The area thus outlined was incised deep to adipose tissue with a #15 scalpel blade.  The skin margins were undermined to an appropriate distance in all directions utilizing iris scissors.

## 2025-05-22 ENCOUNTER — HOSPITAL ENCOUNTER (OUTPATIENT)
Facility: MEDICAL CENTER | Age: 70
End: 2025-05-22
Attending: PHYSICIAN ASSISTANT
Payer: MEDICARE

## 2025-05-22 LAB — RHEUMATOID FACT SER IA-ACNC: <10 IU/ML (ref 0–14)

## 2025-05-22 PROCEDURE — 36415 COLL VENOUS BLD VENIPUNCTURE: CPT

## 2025-05-22 PROCEDURE — 86431 RHEUMATOID FACTOR QUANT: CPT

## 2025-05-24 ENCOUNTER — HOSPITAL ENCOUNTER (OUTPATIENT)
Facility: MEDICAL CENTER | Age: 70
End: 2025-05-24
Attending: PHYSICIAN ASSISTANT
Payer: MEDICARE

## 2025-05-24 PROCEDURE — 36415 COLL VENOUS BLD VENIPUNCTURE: CPT

## 2025-05-24 PROCEDURE — 86200 CCP ANTIBODY: CPT

## 2025-05-24 PROCEDURE — 86235 NUCLEAR ANTIGEN ANTIBODY: CPT

## 2025-05-26 LAB — CCP IGA+IGG SERPL IA-ACNC: 8 UNITS (ref 0–19)

## 2025-06-06 LAB
ENA SS-A 60KD AB SER-ACNC: 0 AU/ML (ref 0–40)
ENA SS-A IGG SER QL: 2 AU/ML (ref 0–40)
ENA SS-B IGG SER IA-ACNC: 1 AU/ML (ref 0–40)

## 2025-06-30 ENCOUNTER — RESULTS FOLLOW-UP (OUTPATIENT)
Dept: MEDICAL GROUP | Facility: PHYSICIAN GROUP | Age: 70
End: 2025-06-30

## 2025-06-30 ENCOUNTER — OUTPATIENT INFUSION SERVICES (OUTPATIENT)
Dept: ONCOLOGY | Facility: MEDICAL CENTER | Age: 70
End: 2025-06-30
Attending: INTERNAL MEDICINE
Payer: MEDICARE

## 2025-06-30 ENCOUNTER — APPOINTMENT (OUTPATIENT)
Dept: RADIOLOGY | Facility: MEDICAL CENTER | Age: 70
End: 2025-06-30
Attending: INTERNAL MEDICINE
Payer: MEDICARE

## 2025-06-30 VITALS
HEART RATE: 103 BPM | BODY MASS INDEX: 36.27 KG/M2 | OXYGEN SATURATION: 96 % | DIASTOLIC BLOOD PRESSURE: 78 MMHG | HEIGHT: 59 IN | TEMPERATURE: 96.9 F | WEIGHT: 179.9 LBS | SYSTOLIC BLOOD PRESSURE: 124 MMHG | RESPIRATION RATE: 17 BRPM

## 2025-06-30 DIAGNOSIS — Z12.31 VISIT FOR SCREENING MAMMOGRAM: ICD-10-CM

## 2025-06-30 DIAGNOSIS — M81.0 AGE-RELATED OSTEOPOROSIS WITHOUT CURRENT PATHOLOGICAL FRACTURE: Primary | ICD-10-CM

## 2025-06-30 LAB
CA-I BLD ISE-SCNC: 1.14 MMOL/L (ref 1.1–1.3)
CREAT BLD-MCNC: 0.8 MG/DL (ref 0.5–1.4)

## 2025-06-30 PROCEDURE — 96372 THER/PROPH/DIAG INJ SC/IM: CPT

## 2025-06-30 PROCEDURE — 36415 COLL VENOUS BLD VENIPUNCTURE: CPT

## 2025-06-30 PROCEDURE — 77063 BREAST TOMOSYNTHESIS BI: CPT

## 2025-06-30 RX ORDER — METHYLPREDNISOLONE SODIUM SUCCINATE 125 MG/2ML
125 INJECTION, POWDER, LYOPHILIZED, FOR SOLUTION INTRAMUSCULAR; INTRAVENOUS PRN
OUTPATIENT
Start: 2025-12-12

## 2025-06-30 RX ORDER — DIPHENHYDRAMINE HYDROCHLORIDE 50 MG/ML
50 INJECTION, SOLUTION INTRAMUSCULAR; INTRAVENOUS PRN
OUTPATIENT
Start: 2025-12-12

## 2025-06-30 RX ORDER — EPINEPHRINE 1 MG/ML(1)
0.5 AMPUL (ML) INJECTION PRN
OUTPATIENT
Start: 2025-12-12

## 2025-06-30 ASSESSMENT — PAIN DESCRIPTION - PAIN TYPE: TYPE: CHRONIC PAIN

## 2025-06-30 ASSESSMENT — FIBROSIS 4 INDEX: FIB4 SCORE: 0.88

## 2025-06-30 NOTE — PROGRESS NOTES
Dee to [facility] for Prolia injection. POC of care discussed with patient and all questions answered. Pt confirmed that they are taking daily Calcium and vitamin D. Pt denies symptoms of hypocalcemia. Pt denies dental work in the past 4 weeks and no future dental work in the next 4 weeks.  Labs drawn by venipuncture. I-stat performed. Pt meets parameters for injection. Prolia injected into left back arm. Follow up appointment request made to . Pt dicharged to home in no apparent distress.

## 2025-07-30 RX ORDER — AMITRIPTYLINE HYDROCHLORIDE 10 MG/1
10 TABLET ORAL EVERY EVENING
Qty: 90 TABLET | Refills: 3 | Status: SHIPPED | OUTPATIENT
Start: 2025-07-30

## 2025-07-30 NOTE — TELEPHONE ENCOUNTER
Received request via: Pharmacy    Was the patient seen in the last year in this department? Yes    Does the patient have an active prescription (recently filled or refills available) for medication(s) requested? No    Pharmacy Name:     [facility] - [address] (Pharmacy) [phone]       Does the patient have [facility] Plus and need 100-day supply? (This applies to ALL medications) Patient does not have SCP

## (undated) DEVICE — SHEAR, HARMONIC CRVD 8MM

## (undated) DEVICE — MASK AIRWAY SIZE 3 UNIQUE SILICON (10/BX)

## (undated) DEVICE — ELECTRODE DUAL RETURN W/ CORD - (50/PK)

## (undated) DEVICE — DRAPE IOBAN II INCISE 23X17 - (10EA/BX 4BX/CA)

## (undated) DEVICE — CANISTER SUCTION 3000ML MECHANICAL FILTER AUTO SHUTOFF MEDI-VAC NONSTERILE LF DISP  (40EA/CA)

## (undated) DEVICE — INTRAOP NEURO IN OR 1:1 PER 15 MIN

## (undated) DEVICE — LACTATED RINGERS INJ 1000 ML - (14EA/CA 60CA/PF)

## (undated) DEVICE — SUTURE 0 ETHIBOND MO6 C/R - (12/BX) 8-18 INCH ETHICON

## (undated) DEVICE — GLOVE BIOGEL PI ORTHO SZ 6 1/2 SURGICAL PF LF (40PR/BX)

## (undated) DEVICE — GLOVE BIOGEL INDICATOR SZ 8 SURGICAL PF LTX - (50/BX 4BX/CA)

## (undated) DEVICE — SENSOR SPO2 NEO LNCS ADHESIVE (20/BX) SEE USER NOTES

## (undated) DEVICE — GLOVE BIOGEL INDICATOR SZ 6.5 SURGICAL PF LTX - (50PR/BX 4BX/CA)

## (undated) DEVICE — CLIP MED INTNL HRZN TI ESCP - (25/BX)

## (undated) DEVICE — BONE CEMENT SIMPLEX FULL DOSE - (10EA/PK): Type: IMPLANTABLE DEVICE | Site: HIP | Status: NON-FUNCTIONAL

## (undated) DEVICE — SODIUM CHL IRRIGATION 0.9% 1000ML (12EA/CA)

## (undated) DEVICE — MIXER BONE CEMENT REVOLUTION - W/FEMORAL PRESSURIZER (6/CA)

## (undated) DEVICE — GOWN WARMING X-LARGE FLEX - (20/CA)

## (undated) DEVICE — GOWN SURGICAL XX-LARGE - (28EA/CA) SIRUS NON REINFORCED

## (undated) DEVICE — TOWELS CLOTH SURGICAL - (4/PK 20PK/CA)

## (undated) DEVICE — DRAPE SURG STERI-DRAPE 7X11OD - (40EA/CA)

## (undated) DEVICE — DRAIN JACKSON PRATT 10FR - (10/CS)

## (undated) DEVICE — GLOVE BIOGEL SZ 8 SURGICAL PF LTX - (50PR/BX 4BX/CA)

## (undated) DEVICE — SLEEVE, VASO, THIGH, MED

## (undated) DEVICE — GLOVE BIOGEL PI INDICATOR SZ 6.5 SURGICAL PF LF - (50/BX 4BX/CA)

## (undated) DEVICE — DRAPE STRLE REG TOWEL 18X24 - (10/BX 4BX/CA)"

## (undated) DEVICE — STERI STRIP COMPOUND BENZOIN - TINCTURE 0.6ML WITH APPLICATOR (40EA/BX)

## (undated) DEVICE — SUTURE 2-0 MONOCRYL PLUS UNDYED CT-1 1 X 36 (36EA/BX)"

## (undated) DEVICE — TUBE E-T HI-LO CUFF 7.0MM (10EA/PK)

## (undated) DEVICE — SUTURE GENERAL

## (undated) DEVICE — SPONGE GAUZESTER 4 X 4 4PLY - (128PK/CA)

## (undated) DEVICE — GOWN WARMING STANDARD FLEX - (30/CA)

## (undated) DEVICE — PROTECTOR ULNA NERVE - (36PR/CA)

## (undated) DEVICE — SUCTION TIP STRAIGHT ARGYLE - 50EA/CA

## (undated) DEVICE — CANNULA W/SEAL 5X100 Z-THRE - ADED KII (12/BX)

## (undated) DEVICE — GLOVE BIOGEL SZ 6.5 SURGICAL PF LTX (50PR/BX 4BX/CA)

## (undated) DEVICE — SUTURE 1 VICRYL PLUS CTX - 8 X 18 INCH (12/BX)

## (undated) DEVICE — SET EXTENSION WITH 2 PORTS (48EA/CA) ***PART #2C8610 IS A SUBSTITUTE*****

## (undated) DEVICE — WATER IRRIGATION STERILE 1000ML (12EA/CA)

## (undated) DEVICE — SUTURE 0 VICRYL PLUS CT-2 - 27 INCH (36/BX)

## (undated) DEVICE — SLEEVE VASO CALF MED - (10PR/CA)

## (undated) DEVICE — TROCAR Z THREAD BLADED 12 X 150 (6/BX)

## (undated) DEVICE — Device

## (undated) DEVICE — TUBING CLEARLINK DUO-VENT - C-FLO (48EA/CA)

## (undated) DEVICE — MIDAS LUBRICATOR DIFFUSER PACK (4EA/CA)

## (undated) DEVICE — KNIFE ANNULOTOMY

## (undated) DEVICE — SYRINGE 30 ML LS (56/BX)

## (undated) DEVICE — PACK NEURO - (2EA/CA)

## (undated) DEVICE — BASKET ZERO 1.9FR X 120CM

## (undated) DEVICE — LIGHT SOURCE MIS 12FT

## (undated) DEVICE — NEEDLE NON SAFETY HYPO 22 GA X 1 1/2 IN (100/BX)

## (undated) DEVICE — SPONGE KITTNER DISSECTORS - (5EA/PK 50PK/CA)

## (undated) DEVICE — GLOVE BIOGEL SZ 7 SURGICAL PF LTX - (50PR/BX 4BX/CA)

## (undated) DEVICE — CATHETER URET DUAL LUMEN

## (undated) DEVICE — GLOVE BIOGEL PI INDICATOR SZ 7.0 SURGICAL PF LF - (50/BX 4BX/CA)

## (undated) DEVICE — PACK TOTAL HIP - (1/CA)

## (undated) DEVICE — SUTURE 0 PDS CT-2 (36PK/BX)

## (undated) DEVICE — HANDPIECE 10FT INTPLS SCT PLS IRRIGATION HAND CONTROL SET (6/PK)

## (undated) DEVICE — DRAPE C-ARM LARGE 41IN X 74 IN - (10/BX 2BX/CA)

## (undated) DEVICE — HEMOSTAT ABSORBABLE POWDER SURGICEL 3G (5EA/BX)

## (undated) DEVICE — NEPTUNE 4 PORT MANIFOLD - (20/PK)

## (undated) DEVICE — K-WIRE

## (undated) DEVICE — GLOVE BIOGEL INDICATOR SZ 7SURGICAL PF LTX - (50/BX 4BX/CA)

## (undated) DEVICE — SPONGE GAUZESTER. 2X2 4-PL - (2/PK 50PK/BX 30BX/CS)

## (undated) DEVICE — NEEDLE DRIVER SUTURE CUT - DA VINCI 10X'S REUSABLE

## (undated) DEVICE — DRAPE LAPAROTOMY T SHEET - (12EA/CA)

## (undated) DEVICE — DRESSING TRANSPARENT FILM TEGADERM 4 X 4.75" (50EA/BX)"

## (undated) DEVICE — KIT ANESTHESIA W/CIRCUIT & 3/LT BAG W/FILTER (20EA/CA)

## (undated) DEVICE — MARKERS XVS WITH XLINK (10EA/PK)

## (undated) DEVICE — FORCEPS BIPOLAR

## (undated) DEVICE — SYRINGE NON SAFETY 10 CC 20 GA X 1-1/2 IN (100/BX 4BX/CA)

## (undated) DEVICE — DILATOR KIT

## (undated) DEVICE — SUTURE 2-0 PROLENE SH (36PK/BX)

## (undated) DEVICE — NEEDLE INSFL 120MM 14GA VRRS - (20/BX)

## (undated) DEVICE — SODIUM CHL. IRRIGATION 0.9% 3000ML (4EA/CA 65CA/PF)

## (undated) DEVICE — CLIP LG INTNL HRZN TI ESCP LGT - (20/BX)

## (undated) DEVICE — LEAD SET 6 DISP. EKG NIHON KOHDEN (100EA/CA)

## (undated) DEVICE — SUCTION INSTRUMENT YANKAUER BULBOUS TIP W/O VENT (50EA/CA)

## (undated) DEVICE — CELLSAVER STAT

## (undated) DEVICE — PACK JACKSON TABLE KIT W/OUT - HR (6EA/CA)

## (undated) DEVICE — SUTURE 0 PROLENE CT C/R - (12/BX)

## (undated) DEVICE — SUTURE 4-0 VICRYL PLUS FS-1 - 27 INCH (36/BX)

## (undated) DEVICE — SUTURE 4-0 VICRYL PLUSFS-1 - 27 INCH (36/BX)

## (undated) DEVICE — COVER LIGHT HANDLE ALC PLUS DISP (18EA/BX)

## (undated) DEVICE — PEN SKIN MARKER W/RULER - (50EA/BX)

## (undated) DEVICE — CLOSURE SKIN STRIP 1/2 X 4 IN - (STERI STRIP) (50/BX 4BX/CA)

## (undated) DEVICE — FORCEP BIPOLAR MARYLAND DA VINCI 10X'S REUSABLE (10UN/EA)

## (undated) DEVICE — TRAY CATHETER FOLEY URINE METER W/STATLOCK 350ML (10EA/CA)

## (undated) DEVICE — TIP INTPLS HFLO ML ORFC BTRY - (12/CS)  FOR SURGILAV

## (undated) DEVICE — DRAPE 36X28IN RAD CARM BND BG - (25/CA) O

## (undated) DEVICE — MASK ANESTHESIA ADULT  - (100/CA)

## (undated) DEVICE — SYRINGE 1 ML LL POLYCARBONATE LUER LOCK (100EA/BX 8BX/CA)

## (undated) DEVICE — SUTURE 0 SILK TIES (36PK/BX)

## (undated) DEVICE — BOVIE  BLADE 6 EXTENDED - (50/PK)

## (undated) DEVICE — GLOVE BIOGEL INDICATOR SZ 7.5 SURGICAL PF LTX - (50PR/BX 4BX/CA)

## (undated) DEVICE — COVER MAYO STAND X-LG - (22EA/CA)

## (undated) DEVICE — CELLSAVER PACK

## (undated) DEVICE — ARMREST CRADLE FOAM - (2PR/PK 12PR/CA)

## (undated) DEVICE — DRAPE 3 ARM DA VANCI SI - (5EA/CA)

## (undated) DEVICE — PACK MAJOR BASIN - (2EA/CA)

## (undated) DEVICE — FIBER LASER MOSES 200 UM (1/EA)

## (undated) DEVICE — WATER IRRIG. STER 3000 ML - (4/CA)

## (undated) DEVICE — DRAPE PATIENT STERILE FOR USE WITH O OR C ARMS (10EA/BX)

## (undated) DEVICE — SUTURE 2-0 PDS II CT-2 - (36/BX)

## (undated) DEVICE — SYRINGE DISP. 60 CC LL - (30/BX, 12BX/CA)**WHEN THESE ARE GONE ORDER #500206**

## (undated) DEVICE — SET LEADWIRE 5 LEAD BEDSIDE DISPOSABLE ECG (1SET OF 5/EA)

## (undated) DEVICE — HEAD HOLDER JUNIOR/ADULT

## (undated) DEVICE — SUTURE 3-0 MONOCRYL PLUS PS-1 - 27 INCH (36/BX)

## (undated) DEVICE — PIN PERC 150MM (5EA/PK)

## (undated) DEVICE — DECANTER FLD BLS - (50/CA)

## (undated) DEVICE — SEAL CANNULA DA VINCI - (10/BX)

## (undated) DEVICE — DRAPE LARGE 3 QUARTER - (20/CA)

## (undated) DEVICE — ATTACHMENT RETRACTOR DISC SHIM TSI

## (undated) DEVICE — BIOPSY NEEDLE

## (undated) DEVICE — GLOVE BIOGEL PI ORTHO SZ 7.5 PF LF (40PR/BX)

## (undated) DEVICE — TUBING LAPAROSCOPIC PLUME DEVICE (10EA/CA)

## (undated) DEVICE — BALL COTTON STERILE 5/PK - (5/PK 25PK/CA)

## (undated) DEVICE — TUBING C&T SET FLYING LEADS DRAIN TUBING (10EA/BX)

## (undated) DEVICE — SUTURE 1 PDS II PLUS TP-1 - (12PK/BX)

## (undated) DEVICE — KIT ROOM DECONTAMINATION

## (undated) DEVICE — HEMOSTAT SURG ABSORBABLE - 4 X 8 IN SURGICEL (24EA/CA)

## (undated) DEVICE — TUBE CONNECT SUCTION CLEAR 120 X 1/4" (50EA/CA)"

## (undated) DEVICE — TUBE E-T HI-LO CUFF 6.5MM (10EA/BX)

## (undated) DEVICE — KIT EVACUATER 3 SPRING PVC LF 1/8 DRAIN SIZE (10EA/CA)"

## (undated) DEVICE — LENS/HOOD FOR SPACESUIT - (32/PK) PEEL AWAY FACE

## (undated) DEVICE — ELECTRODE 850 FOAM ADHESIVE - HYDROGEL RADIOTRNSPRNT (50/PK)

## (undated) DEVICE — DRESSING ABDOMINAL PAD STERILE 8 X 10" (360EA/CA)"

## (undated) DEVICE — CLIP MED LG INTNL HRZN TI ESCP - (20/BX)

## (undated) DEVICE — BLADE SURGICAL CLIPPER - (50EA/CA)

## (undated) DEVICE — GLOVE BIOGEL SZ 7.5 SURGICAL PF LTX - (50PR/BX 4BX/CA)

## (undated) DEVICE — SUTURE 2-0 VICRYL PLUS CT-1 - 8 X 18 INCH(12/BX)

## (undated) DEVICE — BINDER ABDOMINAL 30X45X12IN - FITS 30-45IN WAIST 12IN HIGH

## (undated) DEVICE — DRAPE SRG LG BCK TBL DISP - 10/CA

## (undated) DEVICE — RESERVOIR SUCTION 100 CC - SILICONE (20EA/CA)

## (undated) DEVICE — HEADREST PRONEVIEW LARGE - (10/CA)

## (undated) DEVICE — PAD OR TABLE DA VINCI 2IN X 20IN X 72IN - (12EA/CA)

## (undated) DEVICE — TROCAR 5X100 BLADED Z-THREAD - KII (6/BX)

## (undated) DEVICE — GLOVE BIOGEL SZ 9 SURGICAL PF LTX - (50/BX 4BX/CA)

## (undated) DEVICE — SET IRRIGATION CYSTOSCOPY TUBE L80 IN (20EA/CA)

## (undated) DEVICE — CONTAINER SPECIMEN BAG OR - STERILE 4 OZ W/LID (100EA/CA)

## (undated) DEVICE — DRAPE MICROSCOPE ARMATEC 120IN X 46IN (10EA/CA)

## (undated) DEVICE — TOOL MR8 21CM MATCH HEAD 3MM DIAMETER (1/EA)

## (undated) DEVICE — SENSOR OXIMETER ADULT SPO2 RD SET (20EA/BX)

## (undated) DEVICE — CHLORAPREP 26 ML APPLICATOR - ORANGE TINT(25/CA)

## (undated) DEVICE — GLOVE BIOGEL PI INDICATOR SZ 7.5 SURGICAL PF LF -(50/BX 4BX/CA)

## (undated) DEVICE — BAG URODRAIN WITH TUBING - (20/CA)

## (undated) DEVICE — BLADE 90X18X1.27MM SAW SAGITTAL

## (undated) DEVICE — GLOVE SZ 6.5 BIOGEL PI MICRO - PF LF (50PR/BX)

## (undated) DEVICE — LACTATED RINGERS INJ. 500 ML - (24EA/CA)

## (undated) DEVICE — SET SUCTION/IRRIGATION WITH DISPOSABLE TIP (6/CA )PART #0250-070-520 IS A SUB

## (undated) DEVICE — GLOVE BIOGEL SZ 8.5 SURGICAL PF LTX - (50PR/BX 4BX/CA)

## (undated) DEVICE — BLADE SAGITTAL SAW DUAL CUT 75.0 X 25.0MM (1/EA)

## (undated) DEVICE — SOD. CHL. INJ. 0.9% 1000 ML - (14EA/CA 60CA/PF)

## (undated) DEVICE — DRAPE MAYO STAND - (30/CA)

## (undated) DEVICE — UTERINE MANIP V-CARE LARGE - (DAVINCI)  8/CA

## (undated) DEVICE — SUTURE 2-0 VICRYL PLUS CT-1 36 (36PK/BX)"

## (undated) DEVICE — PACK CYSTO III (2EA/CA)

## (undated) DEVICE — PACK GYN DAVINCI (2EA/CA)

## (undated) DEVICE — GOWN SURGEONS X-LARGE - DISP. (30/CA)

## (undated) DEVICE — DRAPE C ARMOR (12EA/CA)

## (undated) DEVICE — SCOPE DIGITAL URETEROSCOPE DISPOSABLE (10EA/PK)

## (undated) DEVICE — PAD LAP STERILE 18 X 18 - (5/PK 40PK/CA)

## (undated) DEVICE — OBTURATOR 8MM BLADELESS - (24EA/BX) DA VINCI

## (undated) DEVICE — GLOVE PROTEXIS PI MICRO SZ 8.5 (200PR/CA)

## (undated) DEVICE — SUTURE 3-0 MONOCRYL SH (36PK/BX)

## (undated) DEVICE — INSERT HARMONIC CRVD SHEAR - (5/BX) DA VINCI

## (undated) DEVICE — SUTURE 1 PDS-2 PLUS CTX - (24/BX)

## (undated) DEVICE — NEEDLE SAFETY 18 GA X 1 1/2 IN (100EA/BX)

## (undated) DEVICE — FIBRILLAR SURGICEL 4X4 - 10/CA

## (undated) DEVICE — PAD SANITARY 11IN MAXI IND WRAPPED  (12EA/PK 24PK/CA)

## (undated) DEVICE — SET EPIDURAL BURRON NON-SAFETY (12EA/CA)

## (undated) DEVICE — LIGHTSOURCE LONG ROUND PHOTONSABER 12FR

## (undated) DEVICE — SUTURE 3-0 VICRYL PLUS FS-1 27 (36PK/BX)"

## (undated) DEVICE — SPONGE RADIOPAQUE CTN X-LG - STERILE (50PK/CA) MADE TO ORDER ITEM AND HAS A 4-6 WEEK LEAD TIME

## (undated) DEVICE — DERMABOND ADVANCED - (12EA/BX)

## (undated) DEVICE — GOWN SURGEONS LARGE - (32/CA)

## (undated) DEVICE — SYRINGE 30 ML LL (56/BX)

## (undated) DEVICE — SUTURE 2-0 SILK 12 REEL (12PK/BX)"

## (undated) DEVICE — KIT SURGIFLO W/OUT THROMBIN - (6EA/CA)

## (undated) DEVICE — ROBOTIC SURGERY SERVICES

## (undated) DEVICE — TOOL MR8 14CM MATCH HD SYM-TRI 3MM DIAMETER (1/EA)

## (undated) DEVICE — GLOVE BIOGEL PI INDICATOR SZ 8.0 SURGICAL PF LF -(50/BX 4BX/CA)

## (undated) DEVICE — GLOVE BIOGEL INDICATOR SZ 8.5 SURGICAL PF LTX - (50/BX 4BX/CA)

## (undated) DEVICE — GLOVE BIOGEL ECLIPSE PF LATEX SIZE 8.0  (50PR/BX)

## (undated) DEVICE — CONNECTOR HOSE NEPTUNE FOR CYSTO ROOM

## (undated) DEVICE — NEEDLE 20 GA X 1 INCH - NON SAFTEY (100/BX)

## (undated) DEVICE — STAPLER SKIN DISP - (6/BX 10BX/CA) VISISTAT

## (undated) DEVICE — WIRE GUIDE SENSOR DUAL FLEX - 5/BX

## (undated) DEVICE — GLOVE PROTEXIS W/NEU-THERA SZ 8.5 (50PR/BX)

## (undated) DEVICE — TRAY SPINAL ANESTHESIA NON-SAFETY (10/CA)

## (undated) DEVICE — BLADE SURGICAL #15 - (50/BX 3BX/CA)

## (undated) DEVICE — SYSTEM PREVENA INCISION MNGM - (1/EA)

## (undated) DEVICE — BLADE SURGICAL #10 - (50/BX)

## (undated) DEVICE — PTFE PLED STER - (250/CA)

## (undated) DEVICE — SUTURE 1 VICRYL PLUS CT-1 - 18 INCH (12/BX)

## (undated) DEVICE — TOOL MR8 21CM TAPER 3MM (1/EA)

## (undated) DEVICE — DRAPE SURGICAL U 77X120 - (10/CA)

## (undated) DEVICE — TOWEL STOP TIMEOUT SAFETY FLAG (40EA/CA)